# Patient Record
Sex: FEMALE | Race: WHITE | NOT HISPANIC OR LATINO | Employment: OTHER | URBAN - METROPOLITAN AREA
[De-identification: names, ages, dates, MRNs, and addresses within clinical notes are randomized per-mention and may not be internally consistent; named-entity substitution may affect disease eponyms.]

---

## 2023-02-16 PROBLEM — F32.A DEPRESSION: Status: ACTIVE | Noted: 2023-02-16

## 2023-02-16 PROBLEM — R35.0 INCREASED FREQUENCY OF URINATION: Status: ACTIVE | Noted: 2023-02-16

## 2023-02-16 PROBLEM — M79.7 FIBROMYALGIA: Status: ACTIVE | Noted: 2023-02-16

## 2023-02-16 PROBLEM — G89.29 CHRONIC PAIN: Status: ACTIVE | Noted: 2023-02-16

## 2023-02-16 PROBLEM — K59.00 CONSTIPATION: Status: ACTIVE | Noted: 2023-02-16

## 2023-02-16 PROBLEM — E66.812 CLASS 2 SEVERE OBESITY WITH SERIOUS COMORBIDITY AND BODY MASS INDEX (BMI) OF 39.0 TO 39.9 IN ADULT: Status: ACTIVE | Noted: 2023-02-16

## 2023-02-16 PROBLEM — Z96.651 CHRONIC KNEE PAIN AFTER TOTAL REPLACEMENT OF RIGHT KNEE JOINT: Status: ACTIVE | Noted: 2023-02-16

## 2023-02-16 PROBLEM — K11.8 PAROTID NODULE: Status: ACTIVE | Noted: 2023-02-16

## 2023-02-16 PROBLEM — F17.200 NICOTINE DEPENDENCE: Status: ACTIVE | Noted: 2023-02-16

## 2023-02-16 PROBLEM — G47.30 SLEEP APNEA: Status: ACTIVE | Noted: 2023-02-16

## 2023-02-16 PROBLEM — R74.8 ELEVATED LIVER ENZYMES: Status: ACTIVE | Noted: 2023-02-16

## 2023-02-16 PROBLEM — M17.12 PRIMARY OSTEOARTHRITIS OF LEFT KNEE: Status: ACTIVE | Noted: 2023-02-16

## 2023-02-16 PROBLEM — K11.8 MASS OF LEFT PAROTID GLAND: Status: ACTIVE | Noted: 2023-02-16

## 2023-02-16 PROBLEM — E66.01 MORBID OBESITY WITH BMI OF 40.0-44.9, ADULT (MULTI): Status: ACTIVE | Noted: 2023-02-16

## 2023-02-16 PROBLEM — K21.9 GERD (GASTROESOPHAGEAL REFLUX DISEASE): Status: ACTIVE | Noted: 2023-02-16

## 2023-02-16 PROBLEM — J01.90 ACUTE SINUSITIS: Status: ACTIVE | Noted: 2023-02-16

## 2023-02-16 PROBLEM — T84.028A INSTABILITY OF PROSTHETIC KNEE (CMS-HCC): Status: ACTIVE | Noted: 2023-02-16

## 2023-02-16 PROBLEM — M25.561 CHRONIC KNEE PAIN AFTER TOTAL REPLACEMENT OF RIGHT KNEE JOINT: Status: ACTIVE | Noted: 2023-02-16

## 2023-02-16 PROBLEM — R91.1 LUNG NODULE: Status: ACTIVE | Noted: 2023-02-16

## 2023-02-16 PROBLEM — E55.9 VITAMIN D DEFICIENCY: Status: ACTIVE | Noted: 2023-02-16

## 2023-02-16 PROBLEM — D61.818 PANCYTOPENIA (MULTI): Status: ACTIVE | Noted: 2023-02-16

## 2023-02-16 PROBLEM — R53.83 FATIGUE: Status: ACTIVE | Noted: 2023-02-16

## 2023-02-16 PROBLEM — Z96.651 PAIN DUE TO TOTAL RIGHT KNEE REPLACEMENT (CMS-HCC): Status: ACTIVE | Noted: 2023-02-16

## 2023-02-16 PROBLEM — M25.461 EFFUSION OF RIGHT KNEE: Status: ACTIVE | Noted: 2023-02-16

## 2023-02-16 PROBLEM — T84.84XA PAIN DUE TO TOTAL RIGHT KNEE REPLACEMENT (CMS-HCC): Status: ACTIVE | Noted: 2023-02-16

## 2023-02-16 PROBLEM — R05.3 CHRONIC COUGH: Status: ACTIVE | Noted: 2023-02-16

## 2023-02-16 PROBLEM — Z86.16 PERSONAL HISTORY OF COVID-19: Status: ACTIVE | Noted: 2023-02-16

## 2023-02-16 PROBLEM — K76.0 FATTY LIVER DISEASE, NONALCOHOLIC: Status: ACTIVE | Noted: 2023-02-16

## 2023-02-16 PROBLEM — M54.50 LOW BACK PAIN: Status: ACTIVE | Noted: 2023-02-16

## 2023-02-16 PROBLEM — G47.00 INSOMNIA: Status: ACTIVE | Noted: 2023-02-16

## 2023-02-16 PROBLEM — Z96.651 STATUS POST REVISION OF TOTAL REPLACEMENT OF RIGHT KNEE: Status: ACTIVE | Noted: 2023-02-16

## 2023-02-16 PROBLEM — G89.29 CHRONIC KNEE PAIN AFTER TOTAL REPLACEMENT OF RIGHT KNEE JOINT: Status: ACTIVE | Noted: 2023-02-16

## 2023-02-16 PROBLEM — E11.9 TYPE 2 DIABETES MELLITUS (MULTI): Status: ACTIVE | Noted: 2023-02-16

## 2023-02-16 PROBLEM — R51.9 HEADACHE: Status: ACTIVE | Noted: 2023-02-16

## 2023-02-16 PROBLEM — E66.812 CLASS 2 OBESITY WITH BODY MASS INDEX (BMI) OF 38.0 TO 38.9 IN ADULT: Status: ACTIVE | Noted: 2023-02-16

## 2023-02-16 PROBLEM — R29.898 LEG WEAKNESS: Status: ACTIVE | Noted: 2023-02-16

## 2023-02-16 PROBLEM — H92.03 OTALGIA OF BOTH EARS: Status: ACTIVE | Noted: 2023-02-16

## 2023-02-16 PROBLEM — Z96.659 INSTABILITY OF PROSTHETIC KNEE (CMS-HCC): Status: ACTIVE | Noted: 2023-02-16

## 2023-02-16 PROBLEM — N95.1 VAGINAL DRYNESS, MENOPAUSAL: Status: ACTIVE | Noted: 2023-02-16

## 2023-02-16 PROBLEM — N39.3 STRESS INCONTINENCE, FEMALE: Status: ACTIVE | Noted: 2023-02-16

## 2023-02-16 PROBLEM — E66.9 CLASS 2 OBESITY WITH BODY MASS INDEX (BMI) OF 38.0 TO 38.9 IN ADULT: Status: ACTIVE | Noted: 2023-02-16

## 2023-02-16 PROBLEM — E66.01 CLASS 2 SEVERE OBESITY WITH SERIOUS COMORBIDITY AND BODY MASS INDEX (BMI) OF 39.0 TO 39.9 IN ADULT (MULTI): Status: ACTIVE | Noted: 2023-02-16

## 2023-02-16 RX ORDER — ESTRADIOL 0.1 MG/G
CREAM VAGINAL
COMMUNITY
Start: 2022-05-12 | End: 2023-06-05 | Stop reason: SDUPTHER

## 2023-02-16 RX ORDER — LIDOCAINE 50 MG/G
PATCH TOPICAL
COMMUNITY
Start: 2017-11-27 | End: 2023-08-10 | Stop reason: ALTCHOICE

## 2023-02-16 RX ORDER — BLOOD SUGAR DIAGNOSTIC
1 STRIP MISCELLANEOUS
COMMUNITY
Start: 2021-12-10 | End: 2023-11-13

## 2023-02-16 RX ORDER — LANOLIN ALCOHOL/MO/W.PET/CERES
1 CREAM (GRAM) TOPICAL DAILY
COMMUNITY
Start: 2019-05-23

## 2023-02-16 RX ORDER — NAPROXEN 500 MG/1
1 TABLET ORAL EVERY 12 HOURS PRN
COMMUNITY
Start: 2016-10-28 | End: 2023-05-03 | Stop reason: SDUPTHER

## 2023-02-16 RX ORDER — DULAGLUTIDE 0.75 MG/.5ML
0.75 INJECTION, SOLUTION SUBCUTANEOUS
COMMUNITY
Start: 2021-12-10 | End: 2023-04-04

## 2023-02-16 RX ORDER — BREXPIPRAZOLE 3 MG/1
1 TABLET ORAL DAILY
COMMUNITY
Start: 2022-03-25 | End: 2023-06-05 | Stop reason: ALTCHOICE

## 2023-02-16 RX ORDER — CHOLECALCIFEROL (VITAMIN D3) 50 MCG
1 TABLET ORAL DAILY
COMMUNITY
Start: 2019-09-30 | End: 2023-03-29 | Stop reason: SDUPTHER

## 2023-02-16 RX ORDER — CYCLOBENZAPRINE HCL 10 MG
1 TABLET ORAL DAILY PRN
COMMUNITY
Start: 2022-07-20 | End: 2023-05-03 | Stop reason: SDUPTHER

## 2023-02-16 RX ORDER — ALBUTEROL SULFATE 90 UG/1
2 AEROSOL, METERED RESPIRATORY (INHALATION) 4 TIMES DAILY PRN
COMMUNITY
Start: 2019-06-10 | End: 2023-07-21 | Stop reason: SDUPTHER

## 2023-02-16 RX ORDER — MULTIVIT WITH MINERALS/HERBS
1 TABLET ORAL DAILY
COMMUNITY
Start: 2020-04-17 | End: 2023-04-04

## 2023-02-16 RX ORDER — FERROUS SULFATE 325(65) MG
1 TABLET ORAL
COMMUNITY
Start: 2022-09-19 | End: 2023-05-03 | Stop reason: SDUPTHER

## 2023-02-16 RX ORDER — IBUPROFEN 200 MG
1 TABLET ORAL EVERY 24 HOURS
COMMUNITY
End: 2023-04-25

## 2023-02-16 RX ORDER — MULTIVITAMIN
1 TABLET ORAL DAILY
COMMUNITY
Start: 2019-12-02 | End: 2023-09-21 | Stop reason: SDUPTHER

## 2023-02-16 RX ORDER — ISOPROPYL ALCOHOL 70 ML/100ML
SWAB TOPICAL
COMMUNITY

## 2023-02-16 RX ORDER — TRAZODONE HYDROCHLORIDE 50 MG/1
2 TABLET ORAL NIGHTLY
COMMUNITY
Start: 2017-11-03 | End: 2023-04-04

## 2023-02-16 RX ORDER — ESCITALOPRAM OXALATE 10 MG/1
1 TABLET ORAL DAILY
COMMUNITY
End: 2023-06-05 | Stop reason: DRUGHIGH

## 2023-02-16 RX ORDER — LORATADINE 10 MG/1
1 TABLET ORAL DAILY
COMMUNITY
Start: 2016-01-07 | End: 2023-07-21 | Stop reason: SDUPTHER

## 2023-02-16 RX ORDER — FLUTICASONE PROPIONATE 50 MCG
2 SPRAY, SUSPENSION (ML) NASAL DAILY
COMMUNITY
Start: 2017-11-30 | End: 2023-04-11

## 2023-02-16 RX ORDER — DIPHENHYDRAMINE HCL 25 MG
CAPSULE ORAL
COMMUNITY
End: 2023-03-29 | Stop reason: SDUPTHER

## 2023-02-16 RX ORDER — FOLIC ACID 1 MG/1
1 TABLET ORAL DAILY
COMMUNITY
Start: 2022-09-19 | End: 2023-04-04

## 2023-02-16 RX ORDER — POLYETHYLENE GLYCOL 3350 17 G/17G
POWDER, FOR SOLUTION ORAL DAILY
COMMUNITY

## 2023-02-16 RX ORDER — TRAMADOL HYDROCHLORIDE 50 MG/1
1 TABLET ORAL EVERY 8 HOURS PRN
COMMUNITY
Start: 2022-10-09 | End: 2023-06-05 | Stop reason: SDUPTHER

## 2023-02-16 RX ORDER — ACETAMINOPHEN 500 MG
2 TABLET ORAL EVERY 8 HOURS PRN
COMMUNITY
Start: 2022-07-20 | End: 2023-04-25

## 2023-02-16 RX ORDER — METFORMIN HYDROCHLORIDE 500 MG/1
2 TABLET ORAL 2 TIMES DAILY
COMMUNITY
Start: 2021-08-17 | End: 2023-03-29 | Stop reason: SDUPTHER

## 2023-02-16 RX ORDER — SIMETHICONE 80 MG
1 TABLET,CHEWABLE ORAL 4 TIMES DAILY
COMMUNITY
Start: 2021-12-27 | End: 2023-08-10 | Stop reason: ALTCHOICE

## 2023-02-16 RX ORDER — INSULIN PUMP SYRINGE, 3 ML
EACH MISCELLANEOUS
COMMUNITY

## 2023-02-16 RX ORDER — ASPIRIN 81 MG
1 TABLET, DELAYED RELEASE (ENTERIC COATED) ORAL 2 TIMES DAILY PRN
COMMUNITY
Start: 2021-06-17 | End: 2023-09-21 | Stop reason: SDUPTHER

## 2023-02-16 RX ORDER — TOPIRAMATE 100 MG/1
1 TABLET, FILM COATED ORAL DAILY
COMMUNITY
Start: 2018-05-08 | End: 2023-06-05 | Stop reason: ALTCHOICE

## 2023-02-16 RX ORDER — GABAPENTIN 300 MG/1
2 CAPSULE ORAL 3 TIMES DAILY
COMMUNITY
Start: 2018-08-03 | End: 2023-03-29 | Stop reason: SDUPTHER

## 2023-03-29 ENCOUNTER — APPOINTMENT (OUTPATIENT)
Dept: PRIMARY CARE | Facility: CLINIC | Age: 58
End: 2023-03-29
Payer: COMMERCIAL

## 2023-03-29 DIAGNOSIS — F17.219 CIGARETTE NICOTINE DEPENDENCE WITH NICOTINE-INDUCED DISORDER: ICD-10-CM

## 2023-03-29 DIAGNOSIS — G89.29 CHRONIC LOW BACK PAIN, UNSPECIFIED BACK PAIN LATERALITY, UNSPECIFIED WHETHER SCIATICA PRESENT: ICD-10-CM

## 2023-03-29 DIAGNOSIS — M54.50 CHRONIC LOW BACK PAIN, UNSPECIFIED BACK PAIN LATERALITY, UNSPECIFIED WHETHER SCIATICA PRESENT: ICD-10-CM

## 2023-03-29 DIAGNOSIS — E11.9 TYPE 2 DIABETES MELLITUS WITHOUT COMPLICATION, WITHOUT LONG-TERM CURRENT USE OF INSULIN (MULTI): Primary | ICD-10-CM

## 2023-03-29 DIAGNOSIS — E55.9 VITAMIN D DEFICIENCY: ICD-10-CM

## 2023-03-29 RX ORDER — METFORMIN HYDROCHLORIDE 500 MG/1
1000 TABLET ORAL 2 TIMES DAILY
Qty: 180 TABLET | Refills: 1 | Status: SHIPPED | OUTPATIENT
Start: 2023-03-29 | End: 2023-05-03 | Stop reason: SDUPTHER

## 2023-03-29 RX ORDER — CHOLECALCIFEROL (VITAMIN D3) 50 MCG
50 TABLET ORAL DAILY
Qty: 90 TABLET | Refills: 1 | Status: SHIPPED | OUTPATIENT
Start: 2023-03-29 | End: 2023-05-03 | Stop reason: SDUPTHER

## 2023-03-29 RX ORDER — GABAPENTIN 300 MG/1
600 CAPSULE ORAL 3 TIMES DAILY
Qty: 270 CAPSULE | Refills: 2 | Status: SHIPPED | OUTPATIENT
Start: 2023-03-29 | End: 2023-05-03 | Stop reason: SDUPTHER

## 2023-03-29 RX ORDER — DIPHENHYDRAMINE HCL 25 MG
4 CAPSULE ORAL AS NEEDED
Qty: 100 EACH | Refills: 1 | Status: SHIPPED | OUTPATIENT
Start: 2023-03-29 | End: 2023-10-03

## 2023-04-11 DIAGNOSIS — J30.9 ALLERGIC RHINITIS, UNSPECIFIED SEASONALITY, UNSPECIFIED TRIGGER: Primary | ICD-10-CM

## 2023-04-11 RX ORDER — FLUTICASONE PROPIONATE 50 MCG
SPRAY, SUSPENSION (ML) NASAL
Qty: 16 G | Refills: 3 | Status: SHIPPED | OUTPATIENT
Start: 2023-04-11 | End: 2023-08-15

## 2023-04-25 DIAGNOSIS — Z72.0 TOBACCO ABUSE: Primary | ICD-10-CM

## 2023-04-25 DIAGNOSIS — G89.29 OTHER CHRONIC PAIN: Primary | ICD-10-CM

## 2023-04-25 RX ORDER — IBUPROFEN 200 MG
TABLET ORAL
Qty: 28 PATCH | Refills: 1 | Status: SHIPPED | OUTPATIENT
Start: 2023-04-25 | End: 2023-06-02

## 2023-04-25 RX ORDER — ACETAMINOPHEN 500 MG
TABLET ORAL
Qty: 180 TABLET | Refills: 1 | Status: SHIPPED | OUTPATIENT
Start: 2023-04-25 | End: 2023-05-03 | Stop reason: SDUPTHER

## 2023-05-03 ENCOUNTER — OFFICE VISIT (OUTPATIENT)
Dept: PRIMARY CARE | Facility: CLINIC | Age: 58
End: 2023-05-03
Payer: COMMERCIAL

## 2023-05-03 VITALS
RESPIRATION RATE: 16 BRPM | SYSTOLIC BLOOD PRESSURE: 128 MMHG | BODY MASS INDEX: 39.94 KG/M2 | TEMPERATURE: 97.1 F | HEART RATE: 82 BPM | DIASTOLIC BLOOD PRESSURE: 70 MMHG | WEIGHT: 255 LBS | OXYGEN SATURATION: 95 %

## 2023-05-03 DIAGNOSIS — E11.9 TYPE 2 DIABETES MELLITUS WITHOUT COMPLICATION, WITHOUT LONG-TERM CURRENT USE OF INSULIN (MULTI): ICD-10-CM

## 2023-05-03 DIAGNOSIS — G47.30 SLEEP APNEA, UNSPECIFIED TYPE: ICD-10-CM

## 2023-05-03 DIAGNOSIS — G89.29 OTHER CHRONIC PAIN: ICD-10-CM

## 2023-05-03 DIAGNOSIS — Z12.31 ENCOUNTER FOR SCREENING MAMMOGRAM FOR MALIGNANT NEOPLASM OF BREAST: Primary | ICD-10-CM

## 2023-05-03 DIAGNOSIS — E55.9 VITAMIN D DEFICIENCY: ICD-10-CM

## 2023-05-03 DIAGNOSIS — M54.50 CHRONIC LOW BACK PAIN, UNSPECIFIED BACK PAIN LATERALITY, UNSPECIFIED WHETHER SCIATICA PRESENT: ICD-10-CM

## 2023-05-03 DIAGNOSIS — R14.3 EXCESSIVE FLATUS: ICD-10-CM

## 2023-05-03 DIAGNOSIS — G89.29 CHRONIC LOW BACK PAIN, UNSPECIFIED BACK PAIN LATERALITY, UNSPECIFIED WHETHER SCIATICA PRESENT: ICD-10-CM

## 2023-05-03 LAB
ALANINE AMINOTRANSFERASE (SGPT) (U/L) IN SER/PLAS: 30 U/L (ref 7–45)
ALBUMIN (G/DL) IN SER/PLAS: 3.5 G/DL (ref 3.4–5)
ALKALINE PHOSPHATASE (U/L) IN SER/PLAS: 65 U/L (ref 33–110)
ANION GAP IN SER/PLAS: 11 MMOL/L (ref 10–20)
ASPARTATE AMINOTRANSFERASE (SGOT) (U/L) IN SER/PLAS: 28 U/L (ref 9–39)
BASOPHILS (10*3/UL) IN BLOOD BY AUTOMATED COUNT: 0.04 X10E9/L (ref 0–0.1)
BASOPHILS/100 LEUKOCYTES IN BLOOD BY AUTOMATED COUNT: 0.6 % (ref 0–2)
BILIRUBIN TOTAL (MG/DL) IN SER/PLAS: 0.4 MG/DL (ref 0–1.2)
CALCIUM (MG/DL) IN SER/PLAS: 9.3 MG/DL (ref 8.6–10.3)
CARBON DIOXIDE, TOTAL (MMOL/L) IN SER/PLAS: 33 MMOL/L (ref 21–32)
CHLORIDE (MMOL/L) IN SER/PLAS: 99 MMOL/L (ref 98–107)
CHOLESTEROL (MG/DL) IN SER/PLAS: 149 MG/DL (ref 0–199)
CHOLESTEROL IN HDL (MG/DL) IN SER/PLAS: 47.2 MG/DL
CHOLESTEROL/HDL RATIO: 3.2
CREATININE (MG/DL) IN SER/PLAS: 0.81 MG/DL (ref 0.5–1.05)
EOSINOPHILS (10*3/UL) IN BLOOD BY AUTOMATED COUNT: 0.23 X10E9/L (ref 0–0.7)
EOSINOPHILS/100 LEUKOCYTES IN BLOOD BY AUTOMATED COUNT: 3.5 % (ref 0–6)
ERYTHROCYTE DISTRIBUTION WIDTH (RATIO) BY AUTOMATED COUNT: 15 % (ref 11.5–14.5)
ERYTHROCYTE MEAN CORPUSCULAR HEMOGLOBIN CONCENTRATION (G/DL) BY AUTOMATED: 33.4 G/DL (ref 32–36)
ERYTHROCYTE MEAN CORPUSCULAR VOLUME (FL) BY AUTOMATED COUNT: 93 FL (ref 80–100)
ERYTHROCYTES (10*6/UL) IN BLOOD BY AUTOMATED COUNT: 3.97 X10E12/L (ref 4–5.2)
ESTIMATED AVERAGE GLUCOSE FOR HBA1C: 143 MG/DL
GFR FEMALE: 85 ML/MIN/1.73M2
GLUCOSE (MG/DL) IN SER/PLAS: 141 MG/DL (ref 74–99)
HEMATOCRIT (%) IN BLOOD BY AUTOMATED COUNT: 36.8 % (ref 36–46)
HEMOGLOBIN (G/DL) IN BLOOD: 12.3 G/DL (ref 12–16)
HEMOGLOBIN A1C/HEMOGLOBIN TOTAL IN BLOOD: 6.6 %
IMMATURE GRANULOCYTES/100 LEUKOCYTES IN BLOOD BY AUTOMATED COUNT: 1.7 % (ref 0–0.9)
LDL: 76 MG/DL (ref 0–99)
LEUKOCYTES (10*3/UL) IN BLOOD BY AUTOMATED COUNT: 6.6 X10E9/L (ref 4.4–11.3)
LYMPHOCYTES (10*3/UL) IN BLOOD BY AUTOMATED COUNT: 1.59 X10E9/L (ref 1.2–4.8)
LYMPHOCYTES/100 LEUKOCYTES IN BLOOD BY AUTOMATED COUNT: 24.1 % (ref 13–44)
MONOCYTES (10*3/UL) IN BLOOD BY AUTOMATED COUNT: 0.67 X10E9/L (ref 0.1–1)
MONOCYTES/100 LEUKOCYTES IN BLOOD BY AUTOMATED COUNT: 10.1 % (ref 2–10)
NEUTROPHILS (10*3/UL) IN BLOOD BY AUTOMATED COUNT: 3.97 X10E9/L (ref 1.2–7.7)
NEUTROPHILS/100 LEUKOCYTES IN BLOOD BY AUTOMATED COUNT: 60 % (ref 40–80)
PLATELETS (10*3/UL) IN BLOOD AUTOMATED COUNT: 95 X10E9/L (ref 150–450)
POTASSIUM (MMOL/L) IN SER/PLAS: 3.5 MMOL/L (ref 3.5–5.3)
PROTEIN TOTAL: 6.6 G/DL (ref 6.4–8.2)
SODIUM (MMOL/L) IN SER/PLAS: 139 MMOL/L (ref 136–145)
THYROTROPIN (MIU/L) IN SER/PLAS BY DETECTION LIMIT <= 0.05 MIU/L: 3.89 MIU/L (ref 0.44–3.98)
TRIGLYCERIDE (MG/DL) IN SER/PLAS: 129 MG/DL (ref 0–149)
UREA NITROGEN (MG/DL) IN SER/PLAS: 17 MG/DL (ref 6–23)
VLDL: 26 MG/DL (ref 0–40)

## 2023-05-03 PROCEDURE — 99214 OFFICE O/P EST MOD 30 MIN: CPT | Performed by: INTERNAL MEDICINE

## 2023-05-03 PROCEDURE — 3078F DIAST BP <80 MM HG: CPT | Performed by: INTERNAL MEDICINE

## 2023-05-03 PROCEDURE — 3051F HG A1C>EQUAL 7.0%<8.0%: CPT | Performed by: INTERNAL MEDICINE

## 2023-05-03 PROCEDURE — 3074F SYST BP LT 130 MM HG: CPT | Performed by: INTERNAL MEDICINE

## 2023-05-03 RX ORDER — GABAPENTIN 300 MG/1
600 CAPSULE ORAL 3 TIMES DAILY
Qty: 270 CAPSULE | Refills: 2 | Status: SHIPPED | OUTPATIENT
Start: 2023-05-03 | End: 2024-01-17

## 2023-05-03 RX ORDER — NAPROXEN 500 MG/1
500 TABLET ORAL EVERY 12 HOURS PRN
Qty: 90 TABLET | Refills: 1 | Status: SHIPPED | OUTPATIENT
Start: 2023-05-03 | End: 2023-07-06

## 2023-05-03 RX ORDER — FERROUS SULFATE 325(65) MG
1 TABLET ORAL
Qty: 90 TABLET | Refills: 2 | Status: SHIPPED | OUTPATIENT
Start: 2023-05-03 | End: 2023-10-16

## 2023-05-03 RX ORDER — CHOLECALCIFEROL (VITAMIN D3) 50 MCG
50 TABLET ORAL DAILY
Qty: 90 TABLET | Refills: 1 | Status: SHIPPED | OUTPATIENT
Start: 2023-05-03 | End: 2023-10-24

## 2023-05-03 RX ORDER — CYCLOBENZAPRINE HCL 10 MG
10 TABLET ORAL DAILY PRN
Qty: 90 TABLET | Refills: 2 | Status: SHIPPED | OUTPATIENT
Start: 2023-05-03 | End: 2023-11-02 | Stop reason: ALTCHOICE

## 2023-05-03 RX ORDER — METFORMIN HYDROCHLORIDE 500 MG/1
1000 TABLET ORAL 2 TIMES DAILY
Qty: 180 TABLET | Refills: 1 | Status: SHIPPED | OUTPATIENT
Start: 2023-05-03 | End: 2023-09-26

## 2023-05-03 RX ORDER — ACETAMINOPHEN 500 MG
TABLET ORAL
Qty: 180 TABLET | Refills: 1 | Status: SHIPPED | OUTPATIENT
Start: 2023-05-03 | End: 2023-07-21 | Stop reason: SDUPTHER

## 2023-05-03 ASSESSMENT — PATIENT HEALTH QUESTIONNAIRE - PHQ9
1. LITTLE INTEREST OR PLEASURE IN DOING THINGS: NOT AT ALL
SUM OF ALL RESPONSES TO PHQ9 QUESTIONS 1 AND 2: 0
2. FEELING DOWN, DEPRESSED OR HOPELESS: NOT AT ALL

## 2023-05-03 ASSESSMENT — ENCOUNTER SYMPTOMS
APNEA: 1
FATIGUE: 1
ABDOMINAL PAIN: 1

## 2023-05-03 NOTE — PROGRESS NOTES
Patient here for stomach issues and restless legs     Subjective   Patient ID: Alejandra Houston is a 57 y.o. female who presents for Abdominal Pain and Restless Legs.  She is accompanied by her  who offers some of the history.    The patient reports recent onset of excessive flatus particularly while walking.  This is affecting her ability to go outside comfortably.  She has tried taking Gas-X with no relief.  Her last colonoscopy was in 5/2019 and will be due for a repeat 5/20247004-3120.  She otherwise denies any bowel problems.     The patient reports severe throbbing hip pain radiating to the lower limb since 3/2023.  At times, she has to ask her son to help her out of bed due to pain.  She went to the ED on 4/27/2023 where DVT was ruled out.  She has an upcoming appointment on 5/11/2023 with  from Orthopedics next week for suspected Sciatica. The patient has been taking Naproxen 500mg BID, and he inquires whether she would be eligible for a scooter to help with ambulation.      The patient notes worsening restless leg syndrome that wakes her up from sleep.     The patient requests a referral to Sleep Medicine for sleep apnea.    The patient mentions sexual dysfunction and inquires about possible management options.    Abdominal Pain    Restless Legs  Associated symptoms include abdominal pain and fatigue.       Review of Systems   Constitutional:  Positive for fatigue.   Respiratory:  Positive for apnea.    Gastrointestinal:  Positive for abdominal pain.        Positive for excessive flatus.   Genitourinary:         Positive for sexual dysfunction.   Musculoskeletal:         Positive for severe hip pain.   Neurological:         Positive for restless legs syndrome.     Objective   Physical Exam  Constitutional:       Appearance: Normal appearance.   Cardiovascular:      Rate and Rhythm: Normal rate and regular rhythm.      Heart sounds: Normal heart sounds.   Pulmonary:      Effort: Pulmonary effort  is normal.      Breath sounds: Normal breath sounds.   Abdominal:      General: Bowel sounds are normal.      Palpations: Abdomen is soft.      Tenderness: There is no abdominal tenderness.   Skin:     General: Skin is warm and dry.   Neurological:      General: No focal deficit present.      Mental Status: She is alert and oriented to person, place, and time. Mental status is at baseline.   Psychiatric:         Mood and Affect: Mood normal.         Behavior: Behavior normal.       Assessment/Plan   Problem List Items Addressed This Visit       Chronic pain    Relevant Medications    acetaminophen (Tylenol) 500 mg tablet    Low back pain    Relevant Medications    naproxen (Naprosyn) 500 mg tablet    ferrous sulfate 325 (65 Fe) MG tablet    cyclobenzaprine (Flexeril) 10 mg tablet    gabapentin (Neurontin) 300 mg capsule    Type 2 diabetes mellitus (CMS/HCC)    Relevant Medications    metFORMIN (Glucophage) 500 mg tablet    Vitamin D deficiency    Relevant Medications    cholecalciferol (Vitamin D-3) 50 MCG (2000 UT) tablet    Sleep apnea    Relevant Orders    Referral to Adult Sleep Medicine     Other Visit Diagnoses       Encounter for screening mammogram for malignant neoplasm of breast    -  Primary    Relevant Orders    BI mammo bilateral screening tomosynthesis    Excessive flatus        Relevant Orders    Referral to Gastroenterology            IMPRESSION/PLAN :      Excessive Flatus  -Advised patient to try Metamucil supplementation as 0.5 teaspoon in a glass of water daily for the first week to be increased to a full teaspoon thereafter.  Last colonoscopy 5/2019, repeat due 5/2024.  Ordered referral to Gastroenterology.    Sleep Apnea  - Ordered referral to Adult Sleep Medicine.    Diabetes II   - Last HbA1c 7.4% 7/2022. currently maintained on metformin 500mg 2 tabs BID, and Trulicity 0.75mg/0.5mL 1 pen weekly. Previously on glimperide 2mg QD. Ordered HbA1c.    Sexual Dysfunction  -Advised patient to try  lubricant over the counter.  Call the clinic if symptoms persist or worsen.    Possible Restless Legs Syndrome  -Likely a complication of hip pain and lumbar spondylosis.  Patient has an upcoming appointment with  on 5/11/2023.  Call the clinic if symptoms persist or worsen.    /70 in the office today.     Fatigue   - Advised to discontinue hydroxyzine 50mg QD. Ordered TSH.     Low Back Pain/ leg weakness   - Xray of LS spine showed multilevel spondylosis and an old-appearing coccygeal deformity. Has upcoming appointment with  5/11/2023.     Smoking History   - CT Chest 8/2021 showed few less than 4 mm nonspecific pulmonary nodules, likely benign. CT Chest with Contrast for PE 4/2023 showed Persistent groundglass opacities in the bilateral lower lobes, posterior left upper lobe and new groundglass opacities in the anterior left upper lobe may be infectious/inflammatory.      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 Underwent complex revision of right TKA. Previously following with  in Orthopedic Surgery, currently following .     Health Maintenance   -  Routine labs completed and results pending. Last Mammogram 5/2022, ordered repeat for 2023. Last colonoscopy 5/2019, repeat due 5/20248683-6163.  Advised patient to receive her second dose of Shingrix series vaccine through the pharmacy and discussed adverse effects.  Advised patient to request a medical record release for employment purposes.     Follow-up according to routine health maintenance, call sooner if needed.        Scribe Attestation  By signing my name below, I, Pedro Richard   attest that this documentation has been prepared under the direction and in the presence of Jhoan Eller DO.

## 2023-05-04 ENCOUNTER — TELEPHONE (OUTPATIENT)
Dept: PRIMARY CARE | Facility: CLINIC | Age: 58
End: 2023-05-04
Payer: COMMERCIAL

## 2023-05-04 DIAGNOSIS — M79.662 PAIN OF LEFT CALF: Primary | ICD-10-CM

## 2023-05-04 DIAGNOSIS — D69.6 THROMBOCYTOPENIA (CMS-HCC): Primary | ICD-10-CM

## 2023-05-04 NOTE — TELEPHONE ENCOUNTER
Patient seen yesterday and forgot to mention that her left ankle/calf area has been swollen. Denies redness and it is not warm/hot to the touch.     Patient wants to know if symptoms could be associated with her sciatica. Wants to know what to do for it.     Please advise....

## 2023-05-04 NOTE — TELEPHONE ENCOUNTER
Patient was seen yesterday- advised a cream was supposed to be called in. Please advise pharmacy didn't receive.

## 2023-05-04 NOTE — TELEPHONE ENCOUNTER
I put an ultrasound order in for her.  Please have her set up ASAP.  If redness/warm develops or if worsening- needs ER.

## 2023-05-15 ENCOUNTER — TELEPHONE (OUTPATIENT)
Dept: PRIMARY CARE | Facility: CLINIC | Age: 58
End: 2023-05-15
Payer: COMMERCIAL

## 2023-05-15 DIAGNOSIS — M79.89 LEG SWELLING: Primary | ICD-10-CM

## 2023-05-15 RX ORDER — CEPHALEXIN 500 MG/1
500 CAPSULE ORAL 3 TIMES DAILY
Qty: 30 CAPSULE | Refills: 0 | Status: SHIPPED | OUTPATIENT
Start: 2023-05-15 | End: 2023-05-25

## 2023-05-15 NOTE — TELEPHONE ENCOUNTER
Thanks Valerie- would recommend she see one of our vascular specialists.  I will enter the referral.  Please have her call the  to set up.  I will also start her on an antibiotic to treat possible cellulitis.  If things worsen- please let us know.

## 2023-05-15 NOTE — TELEPHONE ENCOUNTER
----- Message from Jhoan Eller DO sent at 5/15/2023  4:07 PM EDT -----  Please let her know that her ultrasound looks OK- no signs of any blood clots in the legs.

## 2023-05-15 NOTE — TELEPHONE ENCOUNTER
Patient was given results but she still is having swelling, redness and pain and wants to know what could be going on.

## 2023-05-16 ENCOUNTER — TELEPHONE (OUTPATIENT)
Dept: PRIMARY CARE | Facility: CLINIC | Age: 58
End: 2023-05-16
Payer: COMMERCIAL

## 2023-05-16 NOTE — TELEPHONE ENCOUNTER
I entered a referral for vascular medicine to see why her legs are swelling.  I also sent her in an antibiotic to treat as a skin infection to see if this helps ([facility]'s pharmacy).  She did not have a blood clot on the ultrasound.

## 2023-05-16 NOTE — TELEPHONE ENCOUNTER
Pt received a call regarding her legs however can't remember what was said and would like a call back to advise what is wrong with her legs please call

## 2023-05-24 ENCOUNTER — TELEPHONE (OUTPATIENT)
Dept: PRIMARY CARE | Facility: CLINIC | Age: 58
End: 2023-05-24
Payer: COMMERCIAL

## 2023-05-24 DIAGNOSIS — M79.606 PAIN OF LOWER EXTREMITY, UNSPECIFIED LATERALITY: Primary | ICD-10-CM

## 2023-05-24 NOTE — TELEPHONE ENCOUNTER
Would need to see pain management- I can enter a referral if needed.  Can also see me for follow-up if needed.

## 2023-05-24 NOTE — TELEPHONE ENCOUNTER
Pt stated she was in last month - had discussed leg pain in both legs - pain is getting worse - would like a call back to discuss pain medication. Can hardly walk.     Pt did have appointment with Vascular Dr last week - no pain medication prescribed though.

## 2023-06-02 ENCOUNTER — TELEPHONE (OUTPATIENT)
Dept: PRIMARY CARE | Facility: CLINIC | Age: 58
End: 2023-06-02
Payer: COMMERCIAL

## 2023-06-02 DIAGNOSIS — E11.9 TYPE 2 DIABETES MELLITUS WITHOUT COMPLICATION, WITHOUT LONG-TERM CURRENT USE OF INSULIN (MULTI): ICD-10-CM

## 2023-06-02 DIAGNOSIS — Z72.0 TOBACCO ABUSE: ICD-10-CM

## 2023-06-02 RX ORDER — FOLIC ACID 1 MG/1
TABLET ORAL
Qty: 30 TABLET | Refills: 1 | Status: SHIPPED | OUTPATIENT
Start: 2023-06-02 | End: 2023-07-24

## 2023-06-02 RX ORDER — IBUPROFEN 200 MG
TABLET ORAL
Qty: 28 PATCH | Refills: 1 | Status: SHIPPED | OUTPATIENT
Start: 2023-06-02 | End: 2023-07-24

## 2023-06-02 RX ORDER — DULAGLUTIDE 0.75 MG/.5ML
0.75 INJECTION, SOLUTION SUBCUTANEOUS
Qty: 2 ML | Refills: 1 | Status: SHIPPED | OUTPATIENT
Start: 2023-06-02 | End: 2023-07-24

## 2023-06-05 ENCOUNTER — OFFICE VISIT (OUTPATIENT)
Dept: PRIMARY CARE | Facility: CLINIC | Age: 58
End: 2023-06-05
Payer: COMMERCIAL

## 2023-06-05 VITALS
HEART RATE: 80 BPM | BODY MASS INDEX: 37.9 KG/M2 | WEIGHT: 242 LBS | RESPIRATION RATE: 16 BRPM | TEMPERATURE: 97.3 F | SYSTOLIC BLOOD PRESSURE: 128 MMHG | DIASTOLIC BLOOD PRESSURE: 70 MMHG | OXYGEN SATURATION: 95 %

## 2023-06-05 DIAGNOSIS — N95.1 VAGINAL DRYNESS, MENOPAUSAL: ICD-10-CM

## 2023-06-05 DIAGNOSIS — M54.50 LOW BACK PAIN, UNSPECIFIED BACK PAIN LATERALITY, UNSPECIFIED CHRONICITY, UNSPECIFIED WHETHER SCIATICA PRESENT: Primary | ICD-10-CM

## 2023-06-05 PROCEDURE — 3074F SYST BP LT 130 MM HG: CPT | Performed by: INTERNAL MEDICINE

## 2023-06-05 PROCEDURE — 99214 OFFICE O/P EST MOD 30 MIN: CPT | Performed by: INTERNAL MEDICINE

## 2023-06-05 PROCEDURE — 3078F DIAST BP <80 MM HG: CPT | Performed by: INTERNAL MEDICINE

## 2023-06-05 PROCEDURE — 3044F HG A1C LEVEL LT 7.0%: CPT | Performed by: INTERNAL MEDICINE

## 2023-06-05 RX ORDER — TRAMADOL HYDROCHLORIDE 50 MG/1
50 TABLET ORAL EVERY 8 HOURS PRN
Qty: 15 TABLET | Refills: 0 | Status: SHIPPED | OUTPATIENT
Start: 2023-06-05 | End: 2023-06-16 | Stop reason: SDUPTHER

## 2023-06-05 RX ORDER — ESTRADIOL 0.1 MG/G
CREAM VAGINAL
Qty: 42.5 G | Refills: 0 | Status: SHIPPED | OUTPATIENT
Start: 2023-06-05 | End: 2023-11-29 | Stop reason: SDUPTHER

## 2023-06-05 RX ORDER — ESCITALOPRAM OXALATE 10 MG/1
20 TABLET ORAL DAILY
Qty: 60 TABLET | Refills: 3 | Status: SHIPPED | OUTPATIENT
Start: 2023-06-05 | End: 2023-09-21 | Stop reason: ALTCHOICE

## 2023-06-05 ASSESSMENT — ENCOUNTER SYMPTOMS: LEG PAIN: 1

## 2023-06-05 NOTE — PROGRESS NOTES
"Subjective   Patient ID: Alejandra Houston is a 57 y.o. female who presents for Leg Pain.  Patient here today for follow-up.  She is accompanied by her .  Since hip pain eval in ER- has noted left  leg swelling and toe pain.  Feels like she \"banged her toes\" and it is waking her up in the middle of the night.  Has been present for a month a half now.  Nothing seems to help.  All the way from low back down to toes.  Saw vascular as well as medical spine as well as ortho.    Has an MRI tomorrow of her low back.    Occasionally getting some chest discomfort.  Happening every other day.  Feels like it is tightening.  No trouble breathing or shortness of breath.  If she moves around more she can reproduce the pain.  Can pinpoint the spot in her chest and when she pushes she gets more pain.    She is very fatigued.  She is trying to get an earlier appointment with sleep medicine because she is off CPAP and feels like this is causing her symptoms.  Prior to this was seeing sleep medicine at [facility].    Is off her Topamax and Rexulti now.  Feels like she needs some help for her mood.  No SI or HI.        Leg Pain         Review of Systems   All other systems reviewed and are negative.      Objective   Visit Vitals  /70   Pulse 80   Temp 36.3 °C (97.3 °F)   Resp 16   Wt 110 kg (242 lb)   SpO2 95%   BMI 37.90 kg/m²   Smoking Status Every Day   BSA 2.28 m²     Physical Exam  Constitutional:       Appearance: Normal appearance.   Cardiovascular:      Rate and Rhythm: Normal rate and regular rhythm.      Heart sounds: Normal heart sounds.   Pulmonary:      Effort: Pulmonary effort is normal.      Breath sounds: Normal breath sounds.   Abdominal:      General: Bowel sounds are normal.      Palpations: Abdomen is soft.      Tenderness: There is no abdominal tenderness.   Musculoskeletal:      Comments: LLE swelling and some tenderness throughout as well   Skin:     General: Skin is warm and dry.   Neurological:      " General: No focal deficit present.      Mental Status: She is alert and oriented to person, place, and time. Mental status is at baseline.   Psychiatric:         Mood and Affect: Mood normal.         Behavior: Behavior normal.           Assessment/Plan   Problem List Items Addressed This Visit       Low back pain - Primary    Relevant Medications    traMADol (Ultram) 50 mg tablet    Vaginal dryness, menopausal    Relevant Medications    estradiol (Estrace) 0.1 MG/GM vaginal cream     /70 in the office today.    LLE swelling- has seen vascular.  Looks like they suspect this is related to low back issues which I agree with.  Needs MRI which she has tomorrow.  She is on gabapentin.  I did give her a few Tramadol to have on hand to use VERY sparingly.  Regarding her leg swelling- vascular ordered an ultrasound- I asked her to touch base with their office to see if she needs this repeated as we did this at our last visit and it was negative.  She will call.      Vaginal dryness- I renewed her Estradiol cream.    Constipation - Prescribed polyethylene glycol 3350 17 gm oral packet as 1 packet in 8 ounces of liquid QD.    Depression - Following with Psychiatry.  She is now longer on Rexulti or Topamax.  We increased her Lexapro to 20 mg daily as she feels like she needs some extra support during this issue with her LBP.  No SI or HI.      Sleep Apnea - Has upcoming appointment with sleep medicine in August.  She will attempt to schedule sooner as she is significantly symptomatic now.    Low Back Pain/ leg weakness - As above.  She saw Dr. Elizabeth.    Smoking History - CT Chest 8/2021 showed few less than 4 mm nonspecific pulmonary nodules, likely benign. Ordered repeat CT Chest Low Dose per Radiology recommendations.    Right Knee Pain - s/p failed TKA with coronal and sagittal and plane instability 10/7/2022. Underwent complex revision of right TKA. Following with  in Orthopedic Surgery.    Diabetes II -  Last HbA1c 6.6. currently maintained on metformin 500mg 2 tabs BID, and Trulicity 0.75mg/0.5mL 1 pen weekly. Previously on glimperide 2mg QD. Ordered HbA1c.    Thrombocytopenia- she is going to see Heme.  Has seen in past but lost to follow-up.  Stable- but platelete count 92 on recent CBC.      Chest pain is MSK- sounds like costochondritis as noted in HPI.  Monitor.      Health Maintenance - Last Mammogram 5/2022. Last colonoscopy 5/2019, repeat due 5/20242300-1380.    Follow-up according to routine health maintenance, call sooner if needed.           Jhoan Eller, DO

## 2023-06-06 ENCOUNTER — TELEPHONE (OUTPATIENT)
Dept: PRIMARY CARE | Facility: CLINIC | Age: 58
End: 2023-06-06
Payer: COMMERCIAL

## 2023-06-06 NOTE — TELEPHONE ENCOUNTER
She should touch base with his office and/or Dr. Elizabeth.  If denied by insurance- Dr. Kaminski can address.

## 2023-06-06 NOTE — TELEPHONE ENCOUNTER
Pt had an order for MRI from Dr Nix - pt went to apt but it was cancelled by insurance/denied- Pt does not know if Dr Nix will place another order/or call insurance because he left for a new practice and she will no longer be seeing him. Pt stated she talked to Dr Eller about the MRI - wants to know if he can place a new order/help with insurance.

## 2023-06-06 NOTE — TELEPHONE ENCOUNTER
Advised patient to call the office that ordered the test to see what they would like to do next tn

## 2023-06-09 ENCOUNTER — TELEPHONE (OUTPATIENT)
Dept: PRIMARY CARE | Facility: CLINIC | Age: 58
End: 2023-06-09
Payer: COMMERCIAL

## 2023-06-09 DIAGNOSIS — M54.32 SCIATICA OF LEFT SIDE: Primary | ICD-10-CM

## 2023-06-09 NOTE — TELEPHONE ENCOUNTER
Patient left a message- advised that Dr Casanova's office told her MRI was canceled that she needs to do 6 weeks of PT. She is unable to do this because of no transportation and her legs hurt to much to do PT. She would like to know what her next step is?

## 2023-06-09 NOTE — TELEPHONE ENCOUNTER
Is her pain worsening?  Is she having more trouble ambulating?  If so I can try to order- but they still may make her do physical therapy.

## 2023-06-12 ENCOUNTER — TELEPHONE (OUTPATIENT)
Dept: PRIMARY CARE | Facility: CLINIC | Age: 58
End: 2023-06-12
Payer: COMMERCIAL

## 2023-06-16 ENCOUNTER — OFFICE VISIT (OUTPATIENT)
Dept: PRIMARY CARE | Facility: CLINIC | Age: 58
End: 2023-06-16
Payer: COMMERCIAL

## 2023-06-16 VITALS
SYSTOLIC BLOOD PRESSURE: 128 MMHG | DIASTOLIC BLOOD PRESSURE: 80 MMHG | TEMPERATURE: 97.2 F | OXYGEN SATURATION: 99 % | RESPIRATION RATE: 16 BRPM | HEART RATE: 77 BPM

## 2023-06-16 DIAGNOSIS — M54.30 SCIATICA, UNSPECIFIED LATERALITY: Primary | ICD-10-CM

## 2023-06-16 DIAGNOSIS — M54.50 LOW BACK PAIN, UNSPECIFIED BACK PAIN LATERALITY, UNSPECIFIED CHRONICITY, UNSPECIFIED WHETHER SCIATICA PRESENT: ICD-10-CM

## 2023-06-16 PROCEDURE — 3044F HG A1C LEVEL LT 7.0%: CPT | Performed by: INTERNAL MEDICINE

## 2023-06-16 PROCEDURE — 80361 OPIATES 1 OR MORE: CPT

## 2023-06-16 PROCEDURE — 99214 OFFICE O/P EST MOD 30 MIN: CPT | Performed by: INTERNAL MEDICINE

## 2023-06-16 PROCEDURE — 80373 DRUG SCREENING TRAMADOL: CPT

## 2023-06-16 PROCEDURE — 80346 BENZODIAZEPINES1-12: CPT

## 2023-06-16 PROCEDURE — 80354 DRUG SCREENING FENTANYL: CPT

## 2023-06-16 PROCEDURE — 80358 DRUG SCREENING METHADONE: CPT

## 2023-06-16 PROCEDURE — 80307 DRUG TEST PRSMV CHEM ANLYZR: CPT

## 2023-06-16 PROCEDURE — 3074F SYST BP LT 130 MM HG: CPT | Performed by: INTERNAL MEDICINE

## 2023-06-16 PROCEDURE — 80368 SEDATIVE HYPNOTICS: CPT

## 2023-06-16 PROCEDURE — 3079F DIAST BP 80-89 MM HG: CPT | Performed by: INTERNAL MEDICINE

## 2023-06-16 PROCEDURE — 80365 DRUG SCREENING OXYCODONE: CPT

## 2023-06-16 RX ORDER — TRAMADOL HYDROCHLORIDE 50 MG/1
50 TABLET ORAL EVERY 8 HOURS PRN
Qty: 30 TABLET | Refills: 0 | Status: SHIPPED | OUTPATIENT
Start: 2023-06-16 | End: 2023-06-26 | Stop reason: SDUPTHER

## 2023-06-16 ASSESSMENT — ENCOUNTER SYMPTOMS
BACK PAIN: 1
SLEEP DISTURBANCE: 1

## 2023-06-16 NOTE — PROGRESS NOTES
Patient here for a follow up to discuss medication     Subjective   Patient ID: Alejandra Houston is a 57 y.o. female who presents for Follow-up.    The patient was unable to complete the MRI of the Lumbar Spine due to issues with insurance coverage.  She was advised to undergo six weeks of physical therapy, which she cannot complete due to pain in her lower limbs and inability to find transportation.  She notes that the back pain is worsening and radiating down the gluteal region of the lower limbs into the toes.  The patient notes that she is having to ambulate with a cane in her house, and she is having difficulty sitting, lying down, or sleeping since 4/2023.  She denies bowel incontinence or perianal paresthesia, but does indorse urinary incontinence.  She has been taking gabapentin 600 mg three times daily, Naproxen 500mg twice daily as needed, and Tramadol 50mg twice daily as needed for breakthrough control and this regimen has been helping.  She has an upcoming appointment with Pain Medicine next week.        Review of Systems   Musculoskeletal:  Positive for back pain.   Psychiatric/Behavioral:  Positive for sleep disturbance.      Objective   Physical Exam  Constitutional:       Appearance: Normal appearance.   Cardiovascular:      Rate and Rhythm: Normal rate and regular rhythm.      Heart sounds: Normal heart sounds.   Pulmonary:      Effort: Pulmonary effort is normal.      Breath sounds: Normal breath sounds.   Abdominal:      General: Bowel sounds are normal.      Palpations: Abdomen is soft.      Tenderness: There is no abdominal tenderness.   Skin:     General: Skin is warm and dry.   Neurological:      General: No focal deficit present.      Mental Status: She is alert and oriented to person, place, and time. Mental status is at baseline.      Motor: Weakness present.      Comments: Weakness noted in left lower extremity.   Psychiatric:         Mood and Affect: Mood normal.         Behavior: Behavior  normal.       Assessment/Plan   Problem List Items Addressed This Visit       Low back pain    Relevant Medications    traMADol (Ultram) 50 mg tablet     Other Visit Diagnoses       Sciatica, unspecified laterality    -  Primary    Relevant Orders    Opiate/Opioid/Benzo Extended Prescription Compliance            IMPRESSIONS/PLAN:    LLE swelling  - Last Venous Duplex U/S 5/15/2023 negative for DVT. Advised patient to call the clinic if she is unable to clarify ultrasound order from vascular (she has already had 2 negative ultrasounds for DVT).  Looks like they suspected this is related to low back issues which I agree with.  Re-ordered MRI LS.  She is on gabapentin.  I did give her a few Tramadol to have on hand to use VERY sparingly.     Low Back Pain/ leg weakness   - Worsening since 4/2023.  Weakness on LLE on exam.  Patient unable to ambulate without cane at home, and is having difficulty sitting, lying down, or sleeping.  Patient unable to do physical therapy due to severity of pain in lower limbs and inability to find transportation.  Re-ordered MRI Lumbar Spine without Contrast.  Continue with gabapentin 600 mg TID, Naproxen 500 mg BID prn, and refilled tramadol 50 mg twice daily as needed.  [place]. Substance controlled contract signed. Drug screen ordered.  Seen by  from Orthopedic Surgery.  Patient has upcoming appointment with Pain Medicine next week.    /80 in the office today.     Diabetes II   - Last HbA1c 6.6 per 5/2023.  Currently maintained on metformin 500mg 2 tabs BID, and Trulicity 0.75mg/0.5mL 1 pen weekly. Previously on glimperide 2mg QD.     Depression   - Following with Psychiatry.  Continue with escitalopram 20mg every day.  Previously on Rexulti or Topamax.  No SI or HI.      Sleep Apnea   - Has upcoming appointment with Sleep Medicine in August.  She will attempt to schedule sooner as she is significantly symptomatic now.     Smoking History   - CT Chest 4/2023  shows redemonstration of groundglass opacities in the posterior bilateral upper lobes, left lower lobe and new groundglass  opacities in the anterior left upper lobe. Central airways are clear.  Stable noncalcified pulmonary nodule in the right upper lobe (6, 38) and right middle lobe (6, 57) since 5/19/2019.       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 Underwent complex revision of right TKA. Following with  in Orthopedic Surgery.     Thrombocytopenia  - Patient will see Hematology.  Has seen in past but lost to follow-up.  Stable- but platelete count 95 per 5/2023.      Vaginal dryness  - Refilled Estradiol cream.     Constipation   - Continue with polyethylene glycol 3350 17 gm oral packet as 1 packet in 8 ounces of liquid QD.     Chest pain is MSK  - sounds like costochondritis as noted in HPI.  Monitor.       Health Maintenance   - Routine labs 5/2023.  Last Mammogram 5/2023. Last colonoscopy 5/2019, repeat due 5/20240708-0933.     Follow-up according to routine health maintenance, call sooner if needed.        Scribe Attestation  By signing my name below, I, Aida Galdamez, Scribe   attest that this documentation has been prepared under the direction and in the presence of Jhoan Eller DO.

## 2023-06-22 LAB
6-ACETYLMORPHINE: <25 NG/ML
7-AMINOCLONAZEPAM: <25 NG/ML
ALPHA-HYDROXYALPRAZOLAM: <25 NG/ML
ALPHA-HYDROXYMIDAZOLAM: <25 NG/ML
ALPRAZOLAM: <25 NG/ML
AMPHETAMINE (PRESENCE) IN URINE BY SCREEN METHOD: ABNORMAL
BARBITURATES PRESENCE IN URINE BY SCREEN METHOD: ABNORMAL
CANNABINOIDS IN URINE BY SCREEN METHOD: ABNORMAL
CHLORDIAZEPOXIDE: <25 NG/ML
CLONAZEPAM: <25 NG/ML
COCAINE (PRESENCE) IN URINE BY SCREEN METHOD: ABNORMAL
CODEINE: <50 NG/ML
CREATINE, URINE FOR DRUG: 149.3 MG/DL
DIAZEPAM: <25 NG/ML
DRUG SCREEN COMMENT URINE: ABNORMAL
EDDP: <25 NG/ML
FENTANYL CONFIRMATION, URINE: <2.5 NG/ML
HYDROCODONE: <25 NG/ML
HYDROMORPHONE: <25 NG/ML
LORAZEPAM: <25 NG/ML
METHADONE CONFIRMATION,URINE: <25 NG/ML
MIDAZOLAM: <25 NG/ML
MORPHINE URINE: <50 NG/ML
NORDIAZEPAM: <25 NG/ML
NORFENTANYL: <2.5 NG/ML
NORHYDROCODONE: <25 NG/ML
NOROXYCODONE: <25 NG/ML
O-DESMETHYLTRAMADOL: <50 NG/ML
OXAZEPAM: <25 NG/ML
OXYCODONE: <25 NG/ML
OXYMORPHONE: <25 NG/ML
PHENCYCLIDINE (PRESENCE) IN URINE BY SCREEN METHOD: ABNORMAL
TEMAZEPAM: <25 NG/ML
TRAMADOL: 62 NG/ML
ZOLPIDEM METABOLITE (ZCA): <25 NG/ML
ZOLPIDEM: <25 NG/ML

## 2023-06-26 ENCOUNTER — TELEPHONE (OUTPATIENT)
Dept: PRIMARY CARE | Facility: CLINIC | Age: 58
End: 2023-06-26
Payer: COMMERCIAL

## 2023-06-26 DIAGNOSIS — M54.50 LOW BACK PAIN, UNSPECIFIED BACK PAIN LATERALITY, UNSPECIFIED CHRONICITY, UNSPECIFIED WHETHER SCIATICA PRESENT: ICD-10-CM

## 2023-06-26 DIAGNOSIS — F51.01 PRIMARY INSOMNIA: ICD-10-CM

## 2023-06-26 RX ORDER — TRAMADOL HYDROCHLORIDE 50 MG/1
50 TABLET ORAL EVERY 8 HOURS PRN
Qty: 30 TABLET | Refills: 0 | Status: SHIPPED | OUTPATIENT
Start: 2023-06-26 | End: 2023-07-11 | Stop reason: SDUPTHER

## 2023-06-26 RX ORDER — TRAZODONE HYDROCHLORIDE 50 MG/1
TABLET ORAL
Qty: 60 TABLET | Refills: 2 | Status: SHIPPED | OUTPATIENT
Start: 2023-06-26 | End: 2023-10-03

## 2023-06-26 NOTE — TELEPHONE ENCOUNTER
I renewed her Tramadol for her.  She will need to discuss the Lexapro from Cymbalta switch with her psychiatrist and make sure they are OK with this.

## 2023-06-26 NOTE — TELEPHONE ENCOUNTER
Pt went to see pain management and they said that she had fibromyalgia and a pinched nerve.    That doctor wanted to know if you can put pt on cymbalta to help with the fibromyalgia.    Also pt only has two pills like of the tramadol, can you call in a refill of that?    Please advise.

## 2023-07-06 DIAGNOSIS — G89.29 CHRONIC LOW BACK PAIN, UNSPECIFIED BACK PAIN LATERALITY, UNSPECIFIED WHETHER SCIATICA PRESENT: ICD-10-CM

## 2023-07-06 DIAGNOSIS — M54.50 CHRONIC LOW BACK PAIN, UNSPECIFIED BACK PAIN LATERALITY, UNSPECIFIED WHETHER SCIATICA PRESENT: ICD-10-CM

## 2023-07-06 RX ORDER — NAPROXEN 500 MG/1
TABLET ORAL
Qty: 60 TABLET | Refills: 0 | Status: SHIPPED | OUTPATIENT
Start: 2023-07-06 | End: 2023-08-23

## 2023-07-11 DIAGNOSIS — M54.50 LOW BACK PAIN, UNSPECIFIED BACK PAIN LATERALITY, UNSPECIFIED CHRONICITY, UNSPECIFIED WHETHER SCIATICA PRESENT: ICD-10-CM

## 2023-07-11 RX ORDER — TRAMADOL HYDROCHLORIDE 50 MG/1
50 TABLET ORAL EVERY 8 HOURS PRN
Qty: 30 TABLET | Refills: 0 | Status: SHIPPED | OUTPATIENT
Start: 2023-07-11 | End: 2023-07-21

## 2023-07-14 ENCOUNTER — TELEPHONE (OUTPATIENT)
Dept: PRIMARY CARE | Facility: CLINIC | Age: 58
End: 2023-07-14
Payer: COMMERCIAL

## 2023-07-14 NOTE — TELEPHONE ENCOUNTER
Pt called and states that her stomach is hurting her.    She states that she feels constipated and she is having trouble urinating.    It has been 5+ hours since she urinated last.    She knows that she has to pee but nothing will come out.    She wanted to know if you could prescribe something for her?    Please advise.

## 2023-07-21 DIAGNOSIS — R05.3 CHRONIC COUGH: Primary | ICD-10-CM

## 2023-07-21 DIAGNOSIS — Z00.00 HEALTHCARE MAINTENANCE: Primary | ICD-10-CM

## 2023-07-21 DIAGNOSIS — G89.29 OTHER CHRONIC PAIN: ICD-10-CM

## 2023-07-21 RX ORDER — ALBUTEROL SULFATE 90 UG/1
2 AEROSOL, METERED RESPIRATORY (INHALATION) 4 TIMES DAILY PRN
Qty: 18 G | Refills: 1 | Status: SHIPPED | OUTPATIENT
Start: 2023-07-21 | End: 2023-07-31 | Stop reason: SDUPTHER

## 2023-07-21 RX ORDER — LORATADINE 10 MG/1
10 TABLET ORAL DAILY
Qty: 90 TABLET | Refills: 2 | Status: SHIPPED | OUTPATIENT
Start: 2023-07-21 | End: 2024-03-28 | Stop reason: ALTCHOICE

## 2023-07-21 RX ORDER — ACETAMINOPHEN 500 MG
TABLET ORAL
Qty: 180 TABLET | Refills: 1 | Status: SHIPPED | OUTPATIENT
Start: 2023-07-21 | End: 2023-07-31

## 2023-07-21 NOTE — TELEPHONE ENCOUNTER
Patient left a message nicotine patches are not working for the patient and would like to get chantix instead to be called in.

## 2023-07-24 DIAGNOSIS — Z72.0 TOBACCO ABUSE: ICD-10-CM

## 2023-07-24 DIAGNOSIS — E11.9 TYPE 2 DIABETES MELLITUS WITHOUT COMPLICATION, WITHOUT LONG-TERM CURRENT USE OF INSULIN (MULTI): ICD-10-CM

## 2023-07-24 RX ORDER — IBUPROFEN 200 MG
TABLET ORAL
Qty: 28 PATCH | Refills: 1 | Status: SHIPPED | OUTPATIENT
Start: 2023-07-24 | End: 2023-10-03

## 2023-07-24 RX ORDER — DULAGLUTIDE 0.75 MG/.5ML
INJECTION, SOLUTION SUBCUTANEOUS
Qty: 2 ML | Refills: 1 | Status: SHIPPED | OUTPATIENT
Start: 2023-07-24 | End: 2023-10-03

## 2023-07-24 RX ORDER — FOLIC ACID 1 MG/1
TABLET ORAL
Qty: 30 TABLET | Refills: 1 | Status: SHIPPED | OUTPATIENT
Start: 2023-07-24 | End: 2023-10-03

## 2023-07-28 DIAGNOSIS — G89.29 OTHER CHRONIC PAIN: ICD-10-CM

## 2023-07-28 DIAGNOSIS — Z00.00 HEALTHCARE MAINTENANCE: ICD-10-CM

## 2023-07-28 DIAGNOSIS — Z00.00 ROUTINE GENERAL MEDICAL EXAMINATION AT A HEALTH CARE FACILITY: Primary | ICD-10-CM

## 2023-07-28 RX ORDER — ALBUTEROL SULFATE 90 UG/1
2 AEROSOL, METERED RESPIRATORY (INHALATION) 4 TIMES DAILY PRN
Qty: 18 G | Refills: 0 | OUTPATIENT
Start: 2023-07-28

## 2023-07-28 RX ORDER — ACETAMINOPHEN 500 MG
TABLET ORAL
Qty: 180 TABLET | Refills: 0 | OUTPATIENT
Start: 2023-07-28

## 2023-07-29 DIAGNOSIS — G89.29 OTHER CHRONIC PAIN: ICD-10-CM

## 2023-07-31 DIAGNOSIS — Z00.00 HEALTHCARE MAINTENANCE: ICD-10-CM

## 2023-07-31 RX ORDER — ACETAMINOPHEN 500 MG
TABLET ORAL
Qty: 180 TABLET | Refills: 0 | Status: SHIPPED | OUTPATIENT
Start: 2023-07-31 | End: 2023-08-23

## 2023-07-31 RX ORDER — ALBUTEROL SULFATE 90 UG/1
2 AEROSOL, METERED RESPIRATORY (INHALATION) 4 TIMES DAILY PRN
Qty: 18 G | Refills: 0 | Status: SHIPPED | OUTPATIENT
Start: 2023-07-31 | End: 2023-11-02 | Stop reason: SDUPTHER

## 2023-08-03 ENCOUNTER — TELEPHONE (OUTPATIENT)
Dept: PRIMARY CARE | Facility: CLINIC | Age: 58
End: 2023-08-03
Payer: COMMERCIAL

## 2023-08-03 DIAGNOSIS — G89.4 CHRONIC PAIN SYNDROME: Primary | ICD-10-CM

## 2023-08-03 RX ORDER — TRAMADOL HYDROCHLORIDE 50 MG/1
50 TABLET ORAL EVERY 6 HOURS PRN
Qty: 15 TABLET | Refills: 0 | Status: SHIPPED | OUTPATIENT
Start: 2023-08-03 | End: 2023-08-10 | Stop reason: SDUPTHER

## 2023-08-03 NOTE — TELEPHONE ENCOUNTER
The patient called the office back and left a message stating that she saw Dr. Eller about this concern in the past and he prescribed Tramadol for the pain. The patient states that the swelling became bad last night and today her feet are so big she can hardly walk. The tramadol helped with the pain and swelling in the past

## 2023-08-03 NOTE — TELEPHONE ENCOUNTER
The patient called and stated that she woke up today to leg and feet swelling and pain. The patient was requesting a rx of Tramadol.

## 2023-08-10 ENCOUNTER — OFFICE VISIT (OUTPATIENT)
Dept: PRIMARY CARE | Facility: CLINIC | Age: 58
End: 2023-08-10
Payer: COMMERCIAL

## 2023-08-10 VITALS
HEART RATE: 81 BPM | RESPIRATION RATE: 18 BRPM | OXYGEN SATURATION: 96 % | SYSTOLIC BLOOD PRESSURE: 124 MMHG | BODY MASS INDEX: 39.28 KG/M2 | DIASTOLIC BLOOD PRESSURE: 84 MMHG | TEMPERATURE: 97.2 F | WEIGHT: 250.8 LBS

## 2023-08-10 DIAGNOSIS — R20.2 PARESTHESIA OF RIGHT UPPER EXTREMITY: Primary | ICD-10-CM

## 2023-08-10 DIAGNOSIS — G89.4 CHRONIC PAIN SYNDROME: ICD-10-CM

## 2023-08-10 PROCEDURE — 3044F HG A1C LEVEL LT 7.0%: CPT | Performed by: INTERNAL MEDICINE

## 2023-08-10 PROCEDURE — 99215 OFFICE O/P EST HI 40 MIN: CPT | Performed by: INTERNAL MEDICINE

## 2023-08-10 PROCEDURE — 3079F DIAST BP 80-89 MM HG: CPT | Performed by: INTERNAL MEDICINE

## 2023-08-10 PROCEDURE — 3074F SYST BP LT 130 MM HG: CPT | Performed by: INTERNAL MEDICINE

## 2023-08-10 RX ORDER — TRAMADOL HYDROCHLORIDE 50 MG/1
50 TABLET ORAL EVERY 6 HOURS PRN
Qty: 28 TABLET | Refills: 1 | Status: SHIPPED | OUTPATIENT
Start: 2023-08-10 | End: 2023-08-24

## 2023-08-10 ASSESSMENT — ENCOUNTER SYMPTOMS
SLEEP DISTURBANCE: 1
DYSPHORIC MOOD: 1
NUMBNESS: 1

## 2023-08-10 NOTE — PROGRESS NOTES
Subjective   Patient ID: Alejandra Houston is a 57 y.o. female who presents for Follow-up (3 month follow up), Foot Swelling, and Leg Swelling.  She is accompanied by her  who offers some of the history.    The patient reports ongoing bilateral lower limb pain and swelling, associated with dyspnea and chest pain, that resulted in a visit to the ED at [facility] on 8/7/2023.  She underwent a number of investigations including CT Chest with IV contrast which only showed cirrhosis.  ACS and Pulmonary Embolism were ruled out, and the patient was treated with oxycodone and a short course of furosemide.  She was also given a prescription for doxycycline as empirical management of ground glass opacities seen on the CT Chest.  She was discharged home in stable condition.    The patient states that the lower limb pain is ongoing.  She notes that it seems to be worsening as it is now up to the level of the thigh, and limits her ability to ambulate.  The patient describes the pain as throbbing and aching.  She also mentions that the MRI lumbar spine was denied by her insurance.  She previously followed up with  from Pain Medicine.  The patient is unable to undergo Physical Therapy as she does not have access to regular transportation.    The patient reports worsening numbness in the right upper limb that extends from the shoulder to the hand.  This is resulting in sleep disturbance as she cannot lay on the affected side.  She denies any pain in the upper extremity.    The patient mentions depressed mood and finds that recently, she is crying more often.  She is worried primarily about her health condition and the chronic pain.  She is taking escitalopram 10 mg as two tablets once daily, and does not feel this is helping.    The patient requests a prescription for Chantix, as she was able to stop smoking while on this medication in the past.        Review of Systems   Cardiovascular:  Positive for leg swelling.    Musculoskeletal:         Positive for bilateral lower limb pain and swelling.   Neurological:  Positive for numbness.        Positive for right upper limb numbness.   Psychiatric/Behavioral:  Positive for dysphoric mood and sleep disturbance.      Objective   Physical Exam  Constitutional:       Appearance: Normal appearance.   Cardiovascular:      Rate and Rhythm: Normal rate and regular rhythm.      Heart sounds: Normal heart sounds.   Pulmonary:      Effort: Pulmonary effort is normal.      Breath sounds: Normal breath sounds.   Abdominal:      General: Bowel sounds are normal.      Palpations: Abdomen is soft.      Tenderness: There is no abdominal tenderness.   Skin:     General: Skin is warm and dry.   Neurological:      General: No focal deficit present.      Mental Status: She is alert and oriented to person, place, and time. Mental status is at baseline.   Psychiatric:         Mood and Affect: Mood normal.         Behavior: Behavior normal.       Assessment/Plan       IMPRESSIONS/PLAN:     Depression   - Following with Psychiatry.  Switch from escitalopram to duloxetine gradually as directed.  Reduce escitalopram to 10 mg as 0.5 tablet for one week, and start duloxetine 30 mg once daily at the same time.  Then stop escitalopram and increase duloxetine to 60 mg once daily.  Previously on Rexulti or Topamax.  No SI or HI.  Follow-up in 6 weeks for close monitoring.     Paresthesia of Right Upper Extremity  - Ordered EMG and Nerve Conduction.    LLE swelling  - Currently on short course of furosemide from ER, and advised patient to update clinic with progress once completed.  Last Venous Duplex U/S 8/7/2023 negative for DVT. Looks like they suspected this is related to low back issues which I agree with.  MRI LS was denied by insurance, and will follow-up as imaging would be very helpful for this patient.  Continue with gabapentin 300 mg as two capsules three times daily.  Refilled tramadol as below to have  on hand to use VERY sparingly, and patient understands not to use with cyclobenzaprine. Encouraged patient to follow up with  from Pain Medicine, and she agreed.     Low Back Pain/ leg weakness   - Worsening since 4/2023.  Weakness on LLE on exam.  Patient unable to ambulate without cane at home, and is having difficulty sitting, lying down, or sleeping.  Patient unable to do physical therapy due to severity of pain in lower limbs and inability to find transportation.  MRI Lumbar Spine without Contrast denied by insurance, and will follow-up. Continue with gabapentin 600 mg TID, Naproxen 500 mg BID prn, and refilled tramadol 50 mg every 6 hours as needed.  [place] reviewed. Substance controlled contract signed. Drug screen ordered.  Seen by  from Orthopedic Surgery. Ordered referral for Home Physical Therapy, and encouraged patient to follow up with  from Pain Medicine.       Smoking History   - CT Chest 4/2023 shows redemonstration of groundglass opacities in the posterior bilateral upper lobes, left lower lobe and new groundglass  opacities in the anterior left upper lobe. Central airways are clear.  Stable noncalcified pulmonary nodule in the right upper lobe (6, 38) and right middle lobe (6, 57) since 5/19/2019.  Discussed with patient that varenicline was recalled.      /84 in the office today.     Diabetes II   - Last HbA1c 6.6 per 5/2023.  Currently maintained on metformin 500mg 2 tabs BID, and Trulicity 0.75mg/0.5mL 1 pen weekly. Previously on glimperide 2mg QD.      Sleep Apnea   - Has upcoming appointment with Sleep Medicine in August.  She will attempt to schedule sooner as she is significantly symptomatic now.     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 Underwent complex revision of right TKA. Following with  in Orthopedic Surgery.     Thrombocytopenia  - Patient will see Hematology.  Has seen in past but  lost to follow-up.  Stable- but platelete count 95 per 5/2023.       Vaginal dryness  - Refilled Estradiol cream.     Constipation   - Continue with polyethylene glycol 3350 17 gm oral packet as 1 packet in 8 ounces of liquid QD.     Chest pain is MSK  - sounds like costochondritis as noted in HPI.  Monitor.       Health Maintenance   - Routine labs 5/2023.  Last Mammogram 5/2023. Last colonoscopy 5/2019, repeat due 5/[id number].     Follow-up in 6 weeks for close monitoring, call sooner if needed.        Scribe Attestation  By signing my name below, I, Aida Galdamez, Scribe   attest that this documentation has been prepared under the direction and in the presence of Jhoan Eller DO..

## 2023-08-14 DIAGNOSIS — E11.9 TYPE 2 DIABETES MELLITUS WITHOUT COMPLICATION, WITHOUT LONG-TERM CURRENT USE OF INSULIN (MULTI): ICD-10-CM

## 2023-08-14 DIAGNOSIS — J30.9 ALLERGIC RHINITIS, UNSPECIFIED SEASONALITY, UNSPECIFIED TRIGGER: ICD-10-CM

## 2023-08-15 RX ORDER — VITAMIN B COMPLEX
TABLET ORAL
Qty: 30 TABLET | Refills: 3 | Status: SHIPPED | OUTPATIENT
Start: 2023-08-15 | End: 2023-11-13

## 2023-08-15 RX ORDER — FLUTICASONE PROPIONATE 50 MCG
SPRAY, SUSPENSION (ML) NASAL
Qty: 16 G | Refills: 3 | Status: SHIPPED | OUTPATIENT
Start: 2023-08-15 | End: 2023-11-13

## 2023-08-17 ENCOUNTER — TELEPHONE (OUTPATIENT)
Dept: PRIMARY CARE | Facility: CLINIC | Age: 58
End: 2023-08-17
Payer: COMMERCIAL

## 2023-08-17 NOTE — TELEPHONE ENCOUNTER
Patient left a message- was seen last week and wants PT at home, asking about a prescription for chantix and Cymbalta and a test for her arm?

## 2023-08-18 ENCOUNTER — TELEPHONE (OUTPATIENT)
Dept: PRIMARY CARE | Facility: CLINIC | Age: 58
End: 2023-08-18
Payer: COMMERCIAL

## 2023-08-18 DIAGNOSIS — F32.A DEPRESSION, UNSPECIFIED DEPRESSION TYPE: Primary | ICD-10-CM

## 2023-08-18 RX ORDER — DULOXETIN HYDROCHLORIDE 30 MG/1
30 CAPSULE, DELAYED RELEASE ORAL DAILY
Qty: 30 CAPSULE | Refills: 5 | Status: SHIPPED | OUTPATIENT
Start: 2023-08-18 | End: 2023-09-21 | Stop reason: SDUPTHER

## 2023-08-18 NOTE — TELEPHONE ENCOUNTER
Will send her in the Cymbalta.  She can take 1/2 tablet of her Lexapro for a week along with the cymbalta.  Then after a week of 1/2 tablet of Lexapro she can stop the lexapro all together and just continue on the Cymbalta.   I put home PT order in they should be reaching out.  I also entered EMG they should be reaching out.

## 2023-08-22 DIAGNOSIS — G89.29 OTHER CHRONIC PAIN: ICD-10-CM

## 2023-08-22 DIAGNOSIS — G89.29 CHRONIC LOW BACK PAIN, UNSPECIFIED BACK PAIN LATERALITY, UNSPECIFIED WHETHER SCIATICA PRESENT: ICD-10-CM

## 2023-08-22 DIAGNOSIS — M54.50 CHRONIC LOW BACK PAIN, UNSPECIFIED BACK PAIN LATERALITY, UNSPECIFIED WHETHER SCIATICA PRESENT: ICD-10-CM

## 2023-08-23 RX ORDER — ACETAMINOPHEN 500 MG
TABLET ORAL
Qty: 180 TABLET | Refills: 0 | Status: SHIPPED | OUTPATIENT
Start: 2023-08-23 | End: 2023-08-29

## 2023-08-23 RX ORDER — NAPROXEN 500 MG/1
TABLET ORAL
Qty: 60 TABLET | Refills: 0 | Status: SHIPPED | OUTPATIENT
Start: 2023-08-23 | End: 2023-08-29

## 2023-08-29 DIAGNOSIS — G89.29 CHRONIC LOW BACK PAIN, UNSPECIFIED BACK PAIN LATERALITY, UNSPECIFIED WHETHER SCIATICA PRESENT: ICD-10-CM

## 2023-08-29 DIAGNOSIS — M54.50 CHRONIC LOW BACK PAIN, UNSPECIFIED BACK PAIN LATERALITY, UNSPECIFIED WHETHER SCIATICA PRESENT: ICD-10-CM

## 2023-08-29 DIAGNOSIS — G89.29 OTHER CHRONIC PAIN: ICD-10-CM

## 2023-08-29 RX ORDER — ACETAMINOPHEN 500 MG
TABLET ORAL
Qty: 180 TABLET | Refills: 0 | Status: SHIPPED | OUTPATIENT
Start: 2023-08-29 | End: 2023-09-26

## 2023-08-29 RX ORDER — NAPROXEN 500 MG/1
TABLET ORAL
Qty: 60 TABLET | Refills: 0 | Status: SHIPPED | OUTPATIENT
Start: 2023-08-29 | End: 2023-09-26

## 2023-09-12 PROBLEM — G47.33 OBSTRUCTIVE SLEEP APNEA, ADULT: Status: ACTIVE | Noted: 2018-02-28

## 2023-09-12 PROBLEM — R50.9 FEVER: Status: ACTIVE | Noted: 2017-09-04

## 2023-09-12 PROBLEM — S82.63XA CLOSED FRACTURE OF LATERAL MALLEOLUS: Status: ACTIVE | Noted: 2017-01-13

## 2023-09-12 PROBLEM — M54.16 SPINAL STENOSIS OF LUMBAR REGION WITH RADICULOPATHY: Status: ACTIVE | Noted: 2023-09-12

## 2023-09-12 PROBLEM — J44.9 CHRONIC OBSTRUCTIVE LUNG DISEASE (MULTI): Status: ACTIVE | Noted: 2023-09-12

## 2023-09-12 PROBLEM — R11.2 NON-INTRACTABLE VOMITING WITH NAUSEA: Status: ACTIVE | Noted: 2017-09-06

## 2023-09-12 PROBLEM — L03.90 CELLULITIS: Status: ACTIVE | Noted: 2017-05-24

## 2023-09-12 PROBLEM — M47.816 LUMBAR SPONDYLOSIS: Status: ACTIVE | Noted: 2023-09-12

## 2023-09-12 PROBLEM — I10 HYPERTENSION: Status: ACTIVE | Noted: 2018-08-05

## 2023-09-12 PROBLEM — R60.0 LEG EDEMA, LEFT: Status: ACTIVE | Noted: 2023-09-12

## 2023-09-12 PROBLEM — E78.5 DYSLIPIDEMIA: Status: ACTIVE | Noted: 2017-04-07

## 2023-09-12 PROBLEM — F41.9 ANXIETY: Status: ACTIVE | Noted: 2023-09-12

## 2023-09-12 PROBLEM — M48.061 SPINAL STENOSIS OF LUMBAR REGION WITH RADICULOPATHY: Status: ACTIVE | Noted: 2023-09-12

## 2023-09-12 PROBLEM — L03.115 CELLULITIS OF RIGHT LOWER EXTREMITY: Status: ACTIVE | Noted: 2017-05-24

## 2023-09-12 PROBLEM — M15.9 GENERALIZED OSTEOARTHRITIS: Status: ACTIVE | Noted: 2017-04-07

## 2023-09-21 ENCOUNTER — OFFICE VISIT (OUTPATIENT)
Dept: PRIMARY CARE | Facility: CLINIC | Age: 58
End: 2023-09-21
Payer: COMMERCIAL

## 2023-09-21 ENCOUNTER — LAB (OUTPATIENT)
Dept: LAB | Facility: LAB | Age: 58
End: 2023-09-21
Payer: COMMERCIAL

## 2023-09-21 VITALS
TEMPERATURE: 96.9 F | BODY MASS INDEX: 38.84 KG/M2 | RESPIRATION RATE: 18 BRPM | HEART RATE: 76 BPM | DIASTOLIC BLOOD PRESSURE: 70 MMHG | WEIGHT: 248 LBS | SYSTOLIC BLOOD PRESSURE: 128 MMHG | OXYGEN SATURATION: 96 %

## 2023-09-21 DIAGNOSIS — M79.89 LEG SWELLING: ICD-10-CM

## 2023-09-21 DIAGNOSIS — R20.2 ARM PARESTHESIA, LEFT: ICD-10-CM

## 2023-09-21 DIAGNOSIS — R53.83 OTHER FATIGUE: ICD-10-CM

## 2023-09-21 DIAGNOSIS — M79.644 FINGER PAIN, RIGHT: ICD-10-CM

## 2023-09-21 DIAGNOSIS — Z00.00 HEALTHCARE MAINTENANCE: Primary | ICD-10-CM

## 2023-09-21 DIAGNOSIS — F32.A DEPRESSION, UNSPECIFIED DEPRESSION TYPE: ICD-10-CM

## 2023-09-21 DIAGNOSIS — L85.3 DRY SKIN: ICD-10-CM

## 2023-09-21 DIAGNOSIS — M54.16 LUMBAR RADICULOPATHY: ICD-10-CM

## 2023-09-21 LAB
CALCIDIOL (25 OH VITAMIN D3) (NG/ML) IN SER/PLAS: 32 NG/ML
COBALAMIN (VITAMIN B12) (PG/ML) IN SER/PLAS: 380 PG/ML (ref 211–911)
THYROTROPIN (MIU/L) IN SER/PLAS BY DETECTION LIMIT <= 0.05 MIU/L: 2.19 MIU/L (ref 0.44–3.98)

## 2023-09-21 PROCEDURE — 82306 VITAMIN D 25 HYDROXY: CPT

## 2023-09-21 PROCEDURE — 3078F DIAST BP <80 MM HG: CPT | Performed by: INTERNAL MEDICINE

## 2023-09-21 PROCEDURE — 3074F SYST BP LT 130 MM HG: CPT | Performed by: INTERNAL MEDICINE

## 2023-09-21 PROCEDURE — 99215 OFFICE O/P EST HI 40 MIN: CPT | Performed by: INTERNAL MEDICINE

## 2023-09-21 PROCEDURE — 36415 COLL VENOUS BLD VENIPUNCTURE: CPT

## 2023-09-21 PROCEDURE — 82607 VITAMIN B-12: CPT

## 2023-09-21 PROCEDURE — 84443 ASSAY THYROID STIM HORMONE: CPT

## 2023-09-21 PROCEDURE — 3044F HG A1C LEVEL LT 7.0%: CPT | Performed by: INTERNAL MEDICINE

## 2023-09-21 RX ORDER — ASPIRIN 81 MG
100 TABLET, DELAYED RELEASE (ENTERIC COATED) ORAL 2 TIMES DAILY PRN
Qty: 10 TABLET | Refills: 2 | Status: SHIPPED | OUTPATIENT
Start: 2023-09-21 | End: 2023-11-13

## 2023-09-21 RX ORDER — DULOXETIN HYDROCHLORIDE 30 MG/1
60 CAPSULE, DELAYED RELEASE ORAL DAILY
Qty: 60 CAPSULE | Refills: 5 | Status: SHIPPED | OUTPATIENT
Start: 2023-09-21 | End: 2023-11-02 | Stop reason: SDUPTHER

## 2023-09-21 RX ORDER — MULTIVITAMIN
1 TABLET ORAL DAILY
Qty: 90 TABLET | Refills: 3 | Status: SHIPPED | OUTPATIENT
Start: 2023-09-21

## 2023-09-21 ASSESSMENT — ENCOUNTER SYMPTOMS
FATIGUE: 1
DYSPHORIC MOOD: 1
MYALGIAS: 1

## 2023-09-21 NOTE — PROGRESS NOTES
Patient here for a 6 week follow up    Subjective   Patient ID: Alejandra Houston is a 57 y.o. female who presents for Follow-up.  She is accompanied by her  who offers some of the history.    The patient reports ongoing lower limb pain and swelling.  She has been waiting to schedule Home Physical Therapy sessions.  She was told by her insurance carrier that she needs to complete physical therapy before an MRI of the Lumbar Spine can be approved.  She continues to follow with  from Pain Medicine, and recently rescheduled an appointment with Vascular Medicine.  The patient has EMG and nerve conduction tests pending as well.    The patient has switched from escitalopram to duloxetine 30 mg once daily as clinically advised, and has not yet noticed a difference in mood or chronic pain despite four weeks of therapy.  She continues to follow with Psychiatry.    The patient notes recent onset of intermittent sharp chest pain since about 9/7/2023 that is unrelated to activity.  She recalls that her mother suffered a myocardial infarction, and requests a referral to Cardiology.    The patient notes increased somnolence and finds herself sleeping during the day as well as the night.  She has an appointment with Sleep Medicine next week to discuss starting use of the CPAP machine.     The patient reports right third finger pain since 8/2023 but does not recall any injury.      Review of Systems   Constitutional:  Positive for fatigue.   Cardiovascular:  Positive for leg swelling.   Musculoskeletal:  Positive for myalgias.        Positive for bilateral lower limb pain, right third finger pain, intermittent chest wall pain.   Psychiatric/Behavioral:  Positive for dysphoric mood.        Objective   Physical Exam  Constitutional:       Appearance: Normal appearance.   Neck:      Vascular: No carotid bruit.   Cardiovascular:      Rate and Rhythm: Normal rate and regular rhythm.      Heart sounds: Normal heart sounds.    Pulmonary:      Effort: Pulmonary effort is normal.      Breath sounds: Normal breath sounds.   Abdominal:      General: Bowel sounds are normal.      Palpations: Abdomen is soft.      Tenderness: There is no abdominal tenderness.   Skin:     General: Skin is warm and dry.   Neurological:      General: No focal deficit present.      Mental Status: She is alert and oriented to person, place, and time. Mental status is at baseline.   Psychiatric:         Mood and Affect: Mood normal.         Behavior: Behavior normal.       Assessment/Plan   Problem List Items Addressed This Visit       Depression    Relevant Medications    DULoxetine (Cymbalta) 30 mg DR capsule    Fatigue    Relevant Orders    Tsh With Reflex To Free T4 If Abnormal    Vitamin D 25-Hydroxy,Total (for eval of Vitamin D levels)    Vitamin B12     Other Visit Diagnoses       Healthcare maintenance    -  Primary    Relevant Medications    docusate sodium (Colace) 100 mg tablet    multivitamin tablet    Leg swelling        Relevant Orders    Referral to Cardiology    Referral to Home Care    Transthoracic Echo (TTE) Complete    Arm paresthesia, left        Relevant Orders    EMG & nerve conduction    Dry skin        Relevant Medications    Cetaphil moisturizing lotion    Finger pain, right        Relevant Orders    XR fingers right 2+ views            IMPRESSIONS/PLAN:     Right Third Finger Pain  - Ordered Xray of fingers right hand.    Dry Skin  - Prescribed Cetaphil moisturizing lotion.     Fatigue  - Likely due to untreated sleep apnea.  Encouraged patient to follow-up with Sleep Medicine, and she has appointment for next week.  Ordered Vitamin D, Vitamin B12, and TSH.    LLE swelling  - Ordered Echocardiogram and referral to Cardiology.  Last Venous Duplex U/S 8/7/2023 negative for DVT. Looks like they suspected this is related to low back issues which I agree with.  MRI LS was denied by insurance, and will follow-up as imaging would be very helpful  for this patient.  Continue with gabapentin 300 mg as two capsules three times daily.  Refilled tramadol as below to have on hand to use VERY sparingly, and patient understands not to use with cyclobenzaprine. Encouraged patient to follow up with  from Pain Medicine, and she agreed.  Re-ordered referral for Home Physical Therapy and Occupational Therapy.    Depression   - Following with Psychiatry.  Increase duloxetine 30 mg to two tablets once daily. Previously on Lexapro, and Rexulti or Topamax.  No SI or HI.  Will follow-up in 6 weeks for close monitoring.     Low Back Pain/ leg weakness   - Worsening since 4/2023.  Weakness on LLE on exam.  Patient unable to ambulate without cane at home, and is having difficulty sitting, lying down, or sleeping.  Patient unable to do physical therapy due to severity of pain in lower limbs and inability to find transportation.  MRI Lumbar Spine without Contrast denied by insurance, and will follow-up. Continue with gabapentin 600 mg TID, Naproxen 500 mg BID prn, and refilled tramadol 50 mg every 6 hours as needed.  [place] reviewed. Substance controlled contract signed. Drug screen ordered.  Seen by  from Orthopedic Surgery. Re-ordered referral for Home Physical Therapy, and encouraged patient to follow up with  from Pain Medicine.       /70 in the office today.     Diabetes II   - Last HbA1c 6.6 per 5/2023.  Currently maintained on metformin 500mg 2 tabs BID, and Trulicity 0.75mg/0.5mL 1 pen weekly. Previously on glimperide 2mg QD.      Sleep Apnea   - Has upcoming appointment with Sleep Medicine in August.  She will attempt to schedule sooner as she is significantly symptomatic now.  Encouraged patient to follow-up soon.     Paresthesia of Right Upper Extremity  - Previously ordered EMG and Nerve Conduction.    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 Underwent complex revision of right TKA.  Following with  in Orthopedic Surgery.     Thrombocytopenia  - Patient will see Hematology.  Has seen in past but lost to follow-up.  Stable- but platelete count 95 per 5/2023.       Vaginal dryness  - Refilled Estradiol cream.     Constipation   - Continue with polyethylene glycol 3350 17 gm oral packet as 1 packet in 8 ounces of liquid QD.     Atypical Chest pain  -Echo.  Referred to cards.      Smoking History   - CT Chest 4/2023 shows redemonstration of groundglass opacities in the posterior bilateral upper lobes, left lower lobe and new groundglass  opacities in the anterior left upper lobe. Central airways are clear.  Stable noncalcified pulmonary nodule in the right upper lobe (6, 38) and right middle lobe (6, 57) since 5/19/2019.  Discussed with patient that varenicline was recalled.       Health Maintenance   - Routine labs 5/2023.  Last Mammogram 5/2023. Last colonoscopy 5/2019, repeat due 5/[id number].     Follow-up in 6 weeks for close monitoring, call sooner if needed.        Scribe Attestation  By signing my name below, IAida Scribe   attest that this documentation has been prepared under the direction and in the presence of Jhoan Eller DO.

## 2023-09-25 ENCOUNTER — TELEPHONE (OUTPATIENT)
Dept: PRIMARY CARE | Facility: CLINIC | Age: 58
End: 2023-09-25
Payer: COMMERCIAL

## 2023-09-25 NOTE — TELEPHONE ENCOUNTER
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

## 2023-09-26 ENCOUNTER — TELEPHONE (OUTPATIENT)
Dept: PRIMARY CARE | Facility: CLINIC | Age: 58
End: 2023-09-26
Payer: COMMERCIAL

## 2023-09-26 DIAGNOSIS — E11.9 TYPE 2 DIABETES MELLITUS WITHOUT COMPLICATION, WITHOUT LONG-TERM CURRENT USE OF INSULIN (MULTI): ICD-10-CM

## 2023-09-26 DIAGNOSIS — G89.29 OTHER CHRONIC PAIN: ICD-10-CM

## 2023-09-26 DIAGNOSIS — G89.29 CHRONIC LOW BACK PAIN, UNSPECIFIED BACK PAIN LATERALITY, UNSPECIFIED WHETHER SCIATICA PRESENT: ICD-10-CM

## 2023-09-26 DIAGNOSIS — M54.50 CHRONIC LOW BACK PAIN, UNSPECIFIED BACK PAIN LATERALITY, UNSPECIFIED WHETHER SCIATICA PRESENT: ICD-10-CM

## 2023-09-26 RX ORDER — NAPROXEN 500 MG/1
TABLET ORAL
Qty: 60 TABLET | Refills: 0 | Status: SHIPPED | OUTPATIENT
Start: 2023-09-26 | End: 2023-11-29 | Stop reason: SDUPTHER

## 2023-09-26 RX ORDER — METFORMIN HYDROCHLORIDE 500 MG/1
1000 TABLET ORAL 2 TIMES DAILY
Qty: 120 TABLET | Refills: 1 | Status: SHIPPED | OUTPATIENT
Start: 2023-09-26 | End: 2023-11-21

## 2023-09-26 RX ORDER — ACETAMINOPHEN 500 MG
TABLET ORAL
Qty: 180 TABLET | Refills: 0 | Status: SHIPPED | OUTPATIENT
Start: 2023-09-26 | End: 2023-11-29 | Stop reason: SDUPTHER

## 2023-09-26 NOTE — TELEPHONE ENCOUNTER
Jose Miguel Donaldson from central scheduling advised that referral for PT & OT  is not able to be scheduled until Oct 1

## 2023-10-03 DIAGNOSIS — Z72.0 TOBACCO ABUSE: ICD-10-CM

## 2023-10-03 DIAGNOSIS — E11.9 TYPE 2 DIABETES MELLITUS WITHOUT COMPLICATION, WITHOUT LONG-TERM CURRENT USE OF INSULIN (MULTI): ICD-10-CM

## 2023-10-03 DIAGNOSIS — F51.01 PRIMARY INSOMNIA: ICD-10-CM

## 2023-10-03 DIAGNOSIS — F17.219 CIGARETTE NICOTINE DEPENDENCE WITH NICOTINE-INDUCED DISORDER: ICD-10-CM

## 2023-10-03 RX ORDER — DIPHENHYDRAMINE HCL 25 MG
4 CAPSULE ORAL AS NEEDED
Qty: 100 EACH | Refills: 1 | Status: SHIPPED | OUTPATIENT
Start: 2023-10-03 | End: 2023-11-13

## 2023-10-03 RX ORDER — DULAGLUTIDE 0.75 MG/.5ML
INJECTION, SOLUTION SUBCUTANEOUS
Qty: 2 ML | Refills: 1 | Status: SHIPPED | OUTPATIENT
Start: 2023-10-03 | End: 2023-11-13

## 2023-10-03 RX ORDER — IBUPROFEN 200 MG
TABLET ORAL
Qty: 28 PATCH | Refills: 1 | Status: SHIPPED | OUTPATIENT
Start: 2023-10-03 | End: 2023-11-13

## 2023-10-03 RX ORDER — TRAZODONE HYDROCHLORIDE 50 MG/1
TABLET ORAL
Qty: 60 TABLET | Refills: 2 | Status: SHIPPED | OUTPATIENT
Start: 2023-10-03 | End: 2023-11-29 | Stop reason: SDUPTHER

## 2023-10-03 RX ORDER — FOLIC ACID 1 MG/1
TABLET ORAL
Qty: 30 TABLET | Refills: 1 | Status: SHIPPED | OUTPATIENT
Start: 2023-10-03 | End: 2023-11-13

## 2023-10-06 ENCOUNTER — HOME HEALTH ADMISSION (OUTPATIENT)
Dept: HOME HEALTH SERVICES | Facility: HOME HEALTH | Age: 58
End: 2023-10-06
Payer: COMMERCIAL

## 2023-10-09 ENCOUNTER — APPOINTMENT (OUTPATIENT)
Dept: CARDIOLOGY | Facility: CLINIC | Age: 58
End: 2023-10-09
Payer: COMMERCIAL

## 2023-10-11 ENCOUNTER — HOME CARE VISIT (OUTPATIENT)
Dept: HOME HEALTH SERVICES | Facility: HOME HEALTH | Age: 58
End: 2023-10-11
Payer: COMMERCIAL

## 2023-10-11 VITALS
WEIGHT: 249 LBS | BODY MASS INDEX: 39.08 KG/M2 | TEMPERATURE: 97.3 F | HEIGHT: 67 IN | HEART RATE: 73 BPM | DIASTOLIC BLOOD PRESSURE: 70 MMHG | SYSTOLIC BLOOD PRESSURE: 128 MMHG

## 2023-10-11 PROCEDURE — 0023 HH SOC

## 2023-10-11 PROCEDURE — G0151 HHCP-SERV OF PT,EA 15 MIN: HCPCS

## 2023-10-11 SDOH — HEALTH STABILITY: PHYSICAL HEALTH: EXERCISE ACTIVITIES SETS: 1

## 2023-10-11 SDOH — HEALTH STABILITY: PHYSICAL HEALTH: PHYSICAL EXERCISE: 1

## 2023-10-11 SDOH — HEALTH STABILITY: PHYSICAL HEALTH: EXERCISE ACTIVITY: SINGLE KNEE TO CHEST STRETCH

## 2023-10-11 SDOH — HEALTH STABILITY: PHYSICAL HEALTH: EXERCISE COMMENTS: PAIN INCREASED WITH ALL ACTIVITIES

## 2023-10-11 SDOH — HEALTH STABILITY: PHYSICAL HEALTH: EXERCISE ACTIVITY: HIP STRETCH

## 2023-10-11 SDOH — HEALTH STABILITY: PHYSICAL HEALTH

## 2023-10-11 SDOH — HEALTH STABILITY: PHYSICAL HEALTH: EXERCISE ACTIVITY: SEATED LUMBAR FLEXION

## 2023-10-11 SDOH — HEALTH STABILITY: PHYSICAL HEALTH: PHYSICAL EXERCISE: SEATED, CROSSING LEG OVER

## 2023-10-11 SDOH — HEALTH STABILITY: PHYSICAL HEALTH: PHYSICAL EXERCISE: SEATED

## 2023-10-11 ASSESSMENT — ENCOUNTER SYMPTOMS
PAIN LOCATION - EXACERBATING FACTORS: ACTIVITY
PAIN LOCATION - PAIN QUALITY: ACHING
PAIN LOCATION - RELIEVING FACTORS: REST
MUSCLE WEAKNESS: 1
PAIN SEVERITY GOAL: 0/10
PAIN LOCATION - PAIN FREQUENCY: INTERMITTENT
PAIN LOCATION: BACK
LIMITED RANGE OF MOTION: 1
PAIN LOCATION - PAIN SEVERITY: 8/10
PERSON REPORTING PAIN: PATIENT
PAIN: 1
HIGHEST PAIN SEVERITY IN PAST 24 HOURS: 10/10

## 2023-10-11 ASSESSMENT — ACTIVITIES OF DAILY LIVING (ADL)
AMBULATION ASSISTANCE ON FLAT SURFACES: 1
ENTERING_EXITING_HOME: NEEDS ASSISTANCE

## 2023-10-11 NOTE — HOME HEALTH
58 year old female with yo hx of anxiety/depression/bipolar disorder, COPD, fibromyalgia, neuropathy, DDD, chronic back pain, recently went to the ER related to feeling a crack in her left knee when ambulating on stairs to enter/exit the home. She had orthopedic appointment 10/10 and stated she has a minor ligament tear, but no other issues. Pain was noted with valgus stress test along MCL. Despite the knee pain for 8 days, her biggest complaint today is her left lumbar back pain.  Lower left lateral muscle pain present at all times and was sensitive to palpation. Pain not relieved with flexion based exercise or stretching. When ambulating with small quad cane, she has forward leaning posture and poor tolerance to upright activity. She prefers to lay in sidelying as supine is also painful.  Educated patient on the benefits of slowing increasing walking distances daily for back pain as she endorses spending most of her days in bed. Patient also due to have a sleep study for [facility] and will likely get a CPAP. Improved sleep may potentially reduce back pain. She will benefit from a short bout of skilled physical therapy to address above deficits and attempt to reduce pain.

## 2023-10-12 ENCOUNTER — HOME CARE VISIT (OUTPATIENT)
Dept: HOME HEALTH SERVICES | Facility: HOME HEALTH | Age: 58
End: 2023-10-12
Payer: COMMERCIAL

## 2023-10-12 ENCOUNTER — DOCUMENTATION (OUTPATIENT)
Dept: HEMATOLOGY/ONCOLOGY | Facility: HOSPITAL | Age: 58
End: 2023-10-12
Payer: COMMERCIAL

## 2023-10-12 VITALS
SYSTOLIC BLOOD PRESSURE: 140 MMHG | HEART RATE: 78 BPM | OXYGEN SATURATION: 98 % | DIASTOLIC BLOOD PRESSURE: 80 MMHG | TEMPERATURE: 96.6 F

## 2023-10-12 PROCEDURE — G0152 HHCP-SERV OF OT,EA 15 MIN: HCPCS

## 2023-10-12 ASSESSMENT — ENCOUNTER SYMPTOMS
PAIN LOCATION - EXACERBATING FACTORS: MOVEMENT
PAIN LOCATION - PAIN FREQUENCY: INTERMITTENT
LOWEST PAIN SEVERITY IN PAST 24 HOURS: 4/10
HIGHEST PAIN SEVERITY IN PAST 24 HOURS: 9/10
PAIN LOCATION - PAIN SEVERITY: 7/10
PAIN LOCATION - RELIEVING FACTORS: MEDS
SUBJECTIVE PAIN PROGRESSION: GRADUALLY IMPROVING
PERSON REPORTING PAIN: PATIENT
PAIN LOCATION: BACK
PAIN: 1

## 2023-10-12 ASSESSMENT — ACTIVITIES OF DAILY LIVING (ADL): PREPARING MEALS: INDEPENDENT

## 2023-10-12 NOTE — PROGRESS NOTES
Diagnosis thrombocytopenia. History includes-thrombocytopenia, pancytopenia, fibromyalgia, chronic back pain, lumbar spondylosis, elevated LFTs, anxiety/depression, Bipolar disorder, diabetes, COPD, sleep apnea, HLD, GERD.  Last labs 10/2/23 RBC 4.49, HGB 13.6, Hematocrit 40.7, PLT 77  Patient confirmed her appointment for 10/16/23.

## 2023-10-12 NOTE — HOME HEALTH
Patient referred for OTHH services following ER visit due to aute L knee pain when going down steps of home and chest pain that had been going on for approx 1 week. Patient found to have no acute fx or cardiac findings. PMH:bipolar disorder, COPD, fibromyalgia, obstructive sleep apnea, DM 2, tobacco use disorder, hypertension. per patient she followed up with ortho on 10/11 and had knee drained with significant pain relief. she is going to continue to follow ortho for L knee, R knee and back pain. Patient is reporting some dizziness at times in/out of home activities with all vitals noted to be normal on this date. Patient previously mod I using spc for functional task completion. Patient lives with spouse and other family memebers who assist with IADLs. Patient has tub setup with shower with seat. Patient currently at functional baseline following safety instruction for bathing with no further OT needs at this time.

## 2023-10-16 ENCOUNTER — APPOINTMENT (OUTPATIENT)
Dept: HEMATOLOGY/ONCOLOGY | Facility: CLINIC | Age: 58
End: 2023-10-16
Payer: COMMERCIAL

## 2023-10-16 DIAGNOSIS — G89.29 CHRONIC LOW BACK PAIN, UNSPECIFIED BACK PAIN LATERALITY, UNSPECIFIED WHETHER SCIATICA PRESENT: ICD-10-CM

## 2023-10-16 DIAGNOSIS — M54.50 CHRONIC LOW BACK PAIN, UNSPECIFIED BACK PAIN LATERALITY, UNSPECIFIED WHETHER SCIATICA PRESENT: ICD-10-CM

## 2023-10-16 RX ORDER — FERROUS SULFATE TAB 325 MG (65 MG ELEMENTAL FE) 325 (65 FE) MG
1 TAB ORAL
Qty: 30 TABLET | Refills: 1 | Status: SHIPPED | OUTPATIENT
Start: 2023-10-16 | End: 2023-12-11

## 2023-10-16 NOTE — PROGRESS NOTES
Patient ID: Alejandra Houston is a 58 y.o. female.  Referring Physician: No referring provider defined for this encounter.  Primary Care Provider: Jhoan Eller DO  Visit Type: Initial Visit      Subjective    HPI  Location: [facility]  Initial consult: 10/16/23  Reason: thrombocytopenia    Patient is a  59 yo female with a PMH of thrombocytopenia, cirrhosis, pancytopenia, fibromyalgia, chronic back pain, lumbar spondylosis, elevated LFTs, anxiety/depression, Bipolar disorder, diabetes, COPD, sleep apnea, HLD, GERD and was referred to benign hematology for consultation of thrombocytopenia. Referred by Dr. Eller last May.     Today, patient presents for initial consultation. Denies abnormal weight loss, night sweats, fever. Denies fatigue, chills, sob, cp, n/v/d, abdominal pain, early satiety. Denies any abnormal bleeding or bruising. No recurrent infections or lymphadenopathy. No bone pain. No known blood disorders in family. Has had surgery in past w/o issue.     10/2023 - CT: Cirrhotic liver morphology with sequela of portal hypertension (splenomegaly and esophageal varices).     dental/ent  resp  gi  rash/yeast  uti  nails  Hepatitis/HIV  Recent virus/inf  up to date on cancer screenings    SH: Diet - reg, Tobacco- daily, ETOH- occ, Rec drug use- hx crack cocaine, or Radiation exposure- no  PSH: , lt hip, knee  FH:    Meds: see list     Review of Systems - Oncology     Objective   BSA: There is no height or weight on file to calculate BSA.  There were no vitals taken for this visit.     has a past medical history of Abnormal levels of other serum enzymes (2022) and Idiopathic aseptic necrosis of left femur (CMS/HCC) (10/12/2018).   has a past surgical history that includes Total knee arthroplasty (2018); Other surgical history (06/10/2019); and  section, classic (02/15/2018).  Family History   Problem Relation Name Age of Onset    Hypertension Mother      Diabetes Mother      COPD  "Mother      Heart disease Mother      Lymphoma Mother      Cancer Other fam hx     Diabetes Other fam hx     Hypertension Other fam hx     Heart disease Other fam hx      Oncology History    No history exists.       Alejandra Houston  reports that she has been smoking cigarettes. She has never used smokeless tobacco.  She  reports current alcohol use.  She  reports no history of drug use.    Physical Exam    WBC   Date/Time Value Ref Range Status   05/03/2023 10:10 AM 6.6 4.4 - 11.3 x10E9/L Final   01/24/2023 12:15 PM 4.6 4.4 - 11.3 x10E9/L Final   10/09/2022 05:35 AM 6.3 4.4 - 11.3 x10E9/L Final     nRBC   Date Value Ref Range Status   10/09/2022 0.0 0.0 - 0.0 /100 WBC Final   10/08/2022 0.0 0.0 - 0.0 /100 WBC Final   09/26/2022 0.0 0.0 - 0.0 /100 WBC Final     RBC   Date Value Ref Range Status   05/03/2023 3.97 (L) 4.00 - 5.20 x10E12/L Final   01/24/2023 4.95 4.00 - 5.20 x10E12/L Final   10/09/2022 3.09 (L) 4.00 - 5.20 x10E12/L Final     Hemoglobin   Date Value Ref Range Status   05/03/2023 12.3 12.0 - 16.0 g/dL Final   01/24/2023 14.2 12.0 - 16.0 g/dL Final   10/09/2022 9.2 (L) 12.0 - 16.0 g/dL Final     Hematocrit   Date Value Ref Range Status   05/03/2023 36.8 36.0 - 46.0 % Final   01/24/2023 43.9 36.0 - 46.0 % Final   10/09/2022 28.1 (L) 36.0 - 46.0 % Final     MCV   Date/Time Value Ref Range Status   05/03/2023 10:10 AM 93 80 - 100 fL Final   01/24/2023 12:15 PM 89 80 - 100 fL Final   10/09/2022 05:35 AM 91 80 - 100 fL Final     No results found for: \"MCH\"  MCHC   Date/Time Value Ref Range Status   05/03/2023 10:10 AM 33.4 32.0 - 36.0 g/dL Final   01/24/2023 12:15 PM 32.3 32.0 - 36.0 g/dL Final   10/09/2022 05:35 AM 32.7 32.0 - 36.0 g/dL Final     RDW   Date/Time Value Ref Range Status   05/03/2023 10:10 AM 15.0 (H) 11.5 - 14.5 % Final   01/24/2023 12:15 PM 14.3 11.5 - 14.5 % Final   10/09/2022 05:35 AM 14.0 11.5 - 14.5 % Final     Platelets   Date/Time Value Ref Range Status   05/03/2023 10:10 AM 95 (L) 150 - " "450 x10E9/L Final   01/24/2023 12:15 PM 92 (L) 150 - 450 x10E9/L Final   10/09/2022 05:35 AM 69 (L) 150 - 450 x10E9/L Final     No results found for: \"MPV\"  Neutrophils %   Date/Time Value Ref Range Status   05/03/2023 10:10 AM 60.0 40.0 - 80.0 % Final   01/24/2023 12:15 PM 53.1 40.0 - 80.0 % Final   10/09/2022 05:35 AM 62.1 40.0 - 80.0 % Final     Immature Granulocytes %, Automated   Date/Time Value Ref Range Status   05/03/2023 10:10 AM 1.7 (H) 0.0 - 0.9 % Final     Comment:      Immature Granulocyte Count (IG) includes promyelocytes,    myelocytes and metamyelocytes but does not include bands.   Percent differential counts (%) should be interpreted in the   context of the absolute cell counts (cells/L).     01/24/2023 12:15 PM 0.2 0.0 - 0.9 % Final     Comment:      Immature Granulocyte Count (IG) includes promyelocytes,    myelocytes and metamyelocytes but does not include bands.   Percent differential counts (%) should be interpreted in the   context of the absolute cell counts (cells/L).     10/09/2022 05:35 AM 0.3 0.0 - 0.9 % Final     Comment:      Immature Granulocyte Count (IG) includes promyelocytes,    myelocytes and metamyelocytes but does not include bands.   Percent differential counts (%) should be interpreted in the   context of the absolute cell counts (cells/L).       Lymphocytes %   Date/Time Value Ref Range Status   05/03/2023 10:10 AM 24.1 13.0 - 44.0 % Final   01/24/2023 12:15 PM 29.3 13.0 - 44.0 % Final   10/09/2022 05:35 AM 25.4 13.0 - 44.0 % Final     Monocytes %   Date/Time Value Ref Range Status   05/03/2023 10:10 AM 10.1 2.0 - 10.0 % Final   01/24/2023 12:15 PM 10.0 2.0 - 10.0 % Final   10/09/2022 05:35 AM 9.5 2.0 - 10.0 % Final     Eosinophils %   Date/Time Value Ref Range Status   05/03/2023 10:10 AM 3.5 0.0 - 6.0 % Final   01/24/2023 12:15 PM 6.5 0.0 - 6.0 % Final   10/09/2022 05:35 AM 2.4 0.0 - 6.0 % Final     Basophils %   Date/Time Value Ref Range Status   05/03/2023 10:10 AM 0.6 " "0.0 - 2.0 % Final   01/24/2023 12:15 PM 0.9 0.0 - 2.0 % Final   10/09/2022 05:35 AM 0.3 0.0 - 2.0 % Final     Neutrophils Absolute   Date/Time Value Ref Range Status   05/03/2023 10:10 AM 3.97 1.20 - 7.70 x10E9/L Final   01/24/2023 12:15 PM 2.44 1.20 - 7.70 x10E9/L Final   10/09/2022 05:35 AM 3.90 1.20 - 7.70 x10E9/L Final     No results found for: \"IGABSOL\"  Lymphocytes Absolute   Date/Time Value Ref Range Status   05/03/2023 10:10 AM 1.59 1.20 - 4.80 x10E9/L Final   01/24/2023 12:15 PM 1.35 1.20 - 4.80 x10E9/L Final   10/09/2022 05:35 AM 1.60 1.20 - 4.80 x10E9/L Final     Monocytes Absolute   Date/Time Value Ref Range Status   05/03/2023 10:10 AM 0.67 0.10 - 1.00 x10E9/L Final   01/24/2023 12:15 PM 0.46 0.10 - 1.00 x10E9/L Final   10/09/2022 05:35 AM 0.60 0.10 - 1.00 x10E9/L Final     Eosinophils Absolute   Date/Time Value Ref Range Status   05/03/2023 10:10 AM 0.23 0.00 - 0.70 x10E9/L Final   01/24/2023 12:15 PM 0.30 0.00 - 0.70 x10E9/L Final   10/09/2022 05:35 AM 0.15 0.00 - 0.70 x10E9/L Final     Basophils Absolute   Date/Time Value Ref Range Status   05/03/2023 10:10 AM 0.04 0.00 - 0.10 x10E9/L Final   01/24/2023 12:15 PM 0.04 0.00 - 0.10 x10E9/L Final   10/09/2022 05:35 AM 0.02 0.00 - 0.10 x10E9/L Final       No components found for: \"PT\"  aPTT   Date/Time Value Ref Range Status   09/26/2022 12:20 PM 38 26 - 39 sec Final     Comment:       THE APTT IS NO LONGER USED FOR MONITORING     UNFRACTIONATED HEPARIN THERAPY.    FOR MONITORING HEPARIN THERAPY,     USE THE HEPARIN ASSAY.     07/29/2022 09:53 AM 38 26 - 39 sec Final     Comment:       THE APTT IS NO LONGER USED FOR MONITORING     UNFRACTIONATED HEPARIN THERAPY.    FOR MONITORING HEPARIN THERAPY,     USE THE HEPARIN ASSAY.     09/24/2018 03:24 PM 29 25 - 36 sec Final     Comment:       THE APTT IS NO LONGER USED FOR MONITORING     UNFRACTIONATED HEPARIN THERAPY.    FOR MONITORING HEPARIN THERAPY,     USE THE HEPARIN ASSAY.         Assessment/Plan  "     Thrombocytopenia. 10/2023 - CT: Cirrhotic liver morphology with sequela of portal hypertension (splenomegaly and esophageal varices).     Discussed possible etiologies of thrombocytopenia including vitamin deficiency, enlarged spleen/liver, autoimmune disease, infection, inflammatory disorder, allergies, collection tube clumping, etc. Will start hematological workup today.      Avoid contact sports, including fast rides at water rubin or amusement rubin, always wear a seat belt in car, recommended use of a medical bracelet. Avoid NSAIDs as they affect platelet activity and increase risk of bleeding. Call or visit closest ER with any persistent headaches, visual disturbances, weakness, numbness, severe bruising/bleeding or any concerning signs or symptoms. Will need to contact us at least a couple months in advance before any elective procedure/surgery as may need treatment to increase plt count for procedure.      {Assess/PlanSmartLinks:03529}         Fiorella Hayes, APRN-CNP

## 2023-10-18 ENCOUNTER — HOME CARE VISIT (OUTPATIENT)
Dept: HOME HEALTH SERVICES | Facility: HOME HEALTH | Age: 58
End: 2023-10-18
Payer: COMMERCIAL

## 2023-10-23 ENCOUNTER — OFFICE VISIT (OUTPATIENT)
Dept: CARDIOLOGY | Facility: CLINIC | Age: 58
End: 2023-10-23
Payer: COMMERCIAL

## 2023-10-23 ENCOUNTER — TELEPHONE (OUTPATIENT)
Dept: PRIMARY CARE | Facility: CLINIC | Age: 58
End: 2023-10-23
Payer: COMMERCIAL

## 2023-10-23 VITALS
SYSTOLIC BLOOD PRESSURE: 140 MMHG | OXYGEN SATURATION: 95 % | HEART RATE: 90 BPM | WEIGHT: 233.6 LBS | HEIGHT: 67 IN | BODY MASS INDEX: 36.66 KG/M2 | DIASTOLIC BLOOD PRESSURE: 100 MMHG

## 2023-10-23 DIAGNOSIS — M54.50 LOW BACK PAIN, UNSPECIFIED BACK PAIN LATERALITY, UNSPECIFIED CHRONICITY, UNSPECIFIED WHETHER SCIATICA PRESENT: Primary | ICD-10-CM

## 2023-10-23 DIAGNOSIS — M79.89 LEG SWELLING: ICD-10-CM

## 2023-10-23 DIAGNOSIS — I73.9 CLAUDICATION (CMS-HCC): Primary | ICD-10-CM

## 2023-10-23 DIAGNOSIS — R06.02 SHORTNESS OF BREATH: ICD-10-CM

## 2023-10-23 PROCEDURE — 99214 OFFICE O/P EST MOD 30 MIN: CPT | Performed by: INTERNAL MEDICINE

## 2023-10-23 PROCEDURE — 3044F HG A1C LEVEL LT 7.0%: CPT | Performed by: INTERNAL MEDICINE

## 2023-10-23 PROCEDURE — 3080F DIAST BP >= 90 MM HG: CPT | Performed by: INTERNAL MEDICINE

## 2023-10-23 PROCEDURE — 3077F SYST BP >= 140 MM HG: CPT | Performed by: INTERNAL MEDICINE

## 2023-10-23 RX ORDER — DOCUSATE SODIUM 100 MG/1
CAPSULE, LIQUID FILLED ORAL
COMMUNITY
Start: 2023-10-13 | End: 2023-10-23 | Stop reason: SDUPTHER

## 2023-10-23 RX ORDER — TRAMADOL HYDROCHLORIDE 50 MG/1
50 TABLET ORAL EVERY 6 HOURS PRN
Qty: 28 TABLET | Refills: 0 | Status: SHIPPED | OUTPATIENT
Start: 2023-10-23 | End: 2023-11-02 | Stop reason: SDUPTHER

## 2023-10-23 ASSESSMENT — ACTIVITIES OF DAILY LIVING (ADL): OASIS_M1830: 03

## 2023-10-23 ASSESSMENT — PAIN SCALES - GENERAL: PAINLEVEL: 8

## 2023-10-23 NOTE — PROGRESS NOTES
"Chief complaint: Leg swelling and pain     Subjective   Patient was last seen in May, and then she was lost to follow-up  She has been doing double Tubigrip, and trying to elevate her legs when sitting/sleeping with significant improvement  Recent CT PE/DVT US, negative for VTE   She continues to have back pain, as well as bilateral lower extremity throbbing pain associated with numbness and tenderness  Lumbar MRI was recommended but continues to be denied by insurance    Review of Systems  + As noted above   + Intermittent CP and SOB   + Chronic back pain   + Arthritis   + Neuropathy  + Fibromyalgia   + Insomnia   + RLS   + GERD   + Depression   + Fatigue     Past Medical History:   Diagnosis Date    Abnormal levels of other serum enzymes 2022    Elevated liver enzymes    Idiopathic aseptic necrosis of left femur (CMS/HCC) 10/12/2018    Avascular necrosis of bone of left hip     Past Surgical History:   Procedure Laterality Date     SECTION, CLASSIC  02/15/2018     Section    OTHER SURGICAL HISTORY  06/10/2019    Hip replacement    TOTAL KNEE ARTHROPLASTY  2018    Knee Replacement     Social History     Tobacco Use    Smoking status: Every Day     Packs/day: 0.50     Years: 43.00     Additional pack years: 0.00     Total pack years: 21.50     Types: Cigarettes     Start date:     Smokeless tobacco: Never   Vaping Use    Vaping Use: Never used   Substance Use Topics    Alcohol use: Not Currently     Comment: social    Drug use: Never      Family History   Problem Relation Name Age of Onset    Hypertension Mother      Diabetes Mother      COPD Mother      Heart disease Mother      Lymphoma Mother      Cancer Other fam hx     Diabetes Other fam hx     Hypertension Other fam hx     Heart disease Other fam hx       No Known Allergies    Objective   Physical Exam  BP (!) 140/100 (BP Location: Right arm, Patient Position: Sitting, BP Cuff Size: Large adult)   Pulse 90   Ht 1.702 m (5' 7\") "   Wt 106 kg (233 lb 9.6 oz)   BMI 36.59 kg/m²      General: Not in acute distress, obese   Neuro: AAO x3  Hoarse voice   Pulmonary: scattered rhonchi   CVS: Regular HR   Abdomen: Soft, non-tender   Lower extremity: augustin LE edema. Legs and feet are tender to touch anywhere. Augustin +1DP     Medications   Current Outpatient Medications on File Prior to Visit   Medication Sig Dispense Refill    acetaminophen (Tylenol) 500 mg tablet TAKE 2 TABLETS EVERY 8 HOURS IF NEEDED FOR PAIN 180 tablet 0    alcohol swabs pads, medicated Apply topically. 70% pad, 4 x daily; use as directed      B Complex-Vitamin B12 tablet TAKE 1 TABLET DAILY. 30 tablet 3    Cetaphil moisturizing lotion Apply topically if needed for dry skin. 100 mL 1    cholecalciferol (Vitamin D-3) 50 MCG (2000 UT) tablet Take 1 tablet (50 mcg) by mouth once daily. 90 tablet 1    cyclobenzaprine (Flexeril) 10 mg tablet Take 1 tablet (10 mg) by mouth once daily as needed (back pain). 90 tablet 2    docusate sodium (Colace) 100 mg tablet Take 1 tablet (100 mg) by mouth 2 times a day as needed for constipation. 10 tablet 2    DULoxetine (Cymbalta) 30 mg DR capsule Take 2 capsules (60 mg) by mouth once daily. Do not crush or chew. 60 capsule 5    EASY COMFORT LANCETS MISC in the morning, at noon, and at bedtime. Use to test      estradiol (Estrace) 0.1 MG/GM vaginal cream Insert 1 applicator vaginally every night weekly 42.5 g 0    FeroSuL 325 mg (65 mg iron) tablet TAKE 1 TABLET BY MOUTH TWICE A DAY WITH MEALS 30 tablet 1    fluticasone (Flonase) 50 mcg/actuation nasal spray USE 2 SPRAYS IN EACH NOSTRIL ONCE DAILY 16 g 3    folic acid (Folvite) 1 mg tablet TAKE 1 TABLET BY MOUTH EVERY DAY 30 tablet 1    FreeStyle glucose monitoring kit True Metrix Blood Glucose test in vitro strip; Use as directed 4-5 times daily      gabapentin (Neurontin) 300 mg capsule Take 2 capsules (600 mg) by mouth 3 times a day. TITRATE THE DOSE WEEKLY 270 capsule 2    loratadine (Claritin) 10  mg tablet Take 1 tablet (10 mg) by mouth once daily. 90 tablet 2    metFORMIN (Glucophage) 500 mg tablet TAKE 2 TABLETS (1,000 MG) BY MOUTH IN THE MORNING AND 2 TABLETS (1,000 MG) BEFORE BEDTIME. 120 tablet 1    multivitamin tablet Take 1 tablet by mouth once daily. 90 tablet 3    naproxen (Naprosyn) 500 mg tablet TAKE 1 TABLET BY MOUTH EVERY 12 HOURS AS NEEDED FOR PAIN WITH FOOD 60 tablet 0    OneTouch Ultra Test strip 1 strip. 4 to 5 times daily      polyethylene glycol (Glycolax) 17 gram packet Take by mouth in the morning. Mix 1 packet in 8 ounces of liquid      traZODone (Desyrel) 50 mg tablet TAKE 2 TABLETS BY MOUTH AT BEDTIME 60 tablet 2    Trulicity 0.75 mg/0.5 mL pen injector INJECT 0.75MG UNDER THE SKIN EVERY WEEK 2 mL 1    [DISCONTINUED] docusate sodium (Colace) 100 mg capsule TAKE 1 TABLET (100 MG) BY MOUTH 2 TIMES A DAY AS NEEDED FOR CONSTIPATION.      albuterol 90 mcg/actuation inhaler Inhale 2 puffs 4 times a day as needed for shortness of breath or wheezing. (Patient not taking: Reported on 10/23/2023) 18 g 0    nicotine (Nicoderm CQ) 21 mg/24 hr patch APPLY 1 PATCH DAILY AS DIRECTED. (Patient not taking: Reported on 10/23/2023) 28 patch 1    nicotine polacrilex (Nicorette) 4 mg gum CHEW 1 EACH (4 MG) IF NEEDED FOR SMOKING CESSATION. USE AS DIRECTED (Patient not taking: Reported on 10/23/2023) 100 each 1    thiamine (Vitamin B-1) 100 mg tablet Take 1 tablet (100 mg) by mouth once daily.       No current facility-administered medications on file prior to visit.       Lab Review   Lab Results   Component Value Date     05/03/2023     01/24/2023     10/09/2022    K 3.5 05/03/2023    K 4.1 01/24/2023    K 4.3 10/09/2022    CO2 33 (H) 05/03/2023    CO2 30 01/24/2023    CO2 29 10/09/2022    BUN 17 05/03/2023    BUN 10 01/24/2023    BUN 26 (H) 10/09/2022    CREATININE 0.81 05/03/2023    CREATININE 0.70 01/24/2023    CREATININE 0.90 10/09/2022    GLUCOSE 141 (H) 05/03/2023    GLUCOSE 149  (H) 01/24/2023    GLUCOSE 153 (H) 10/09/2022    CALCIUM 9.3 05/03/2023    CALCIUM 9.9 01/24/2023    CALCIUM 8.5 (L) 10/09/2022     Lab Results   Component Value Date    WBC 6.6 05/03/2023    WBC 4.6 01/24/2023    WBC 6.3 10/09/2022    HGB 12.3 05/03/2023    HGB 14.2 01/24/2023    HGB 9.2 (L) 10/09/2022    HCT 36.8 05/03/2023    HCT 43.9 01/24/2023    HCT 28.1 (L) 10/09/2022    MCV 93 05/03/2023    MCV 89 01/24/2023    MCV 91 10/09/2022    PLT 95 (L) 05/03/2023    PLT 92 (L) 01/24/2023    PLT 69 (L) 10/09/2022       PTT - No results in last year.  _   _ _     Lab Results   Component Value Date    CHOL 149 05/03/2023    CHOL 193 01/24/2023    CHOL 213 (H) 03/25/2022    HDL 47.2 05/03/2023    HDL 30.1 (A) 01/24/2023    HDL 43.0 03/25/2022    TRIG 129 05/03/2023    TRIG 250 (H) 01/24/2023    TRIG 185 (H) 03/25/2022       Lab Results   Component Value Date    HGBA1C 6.6 (A) 05/03/2023       Imaging  CT CHEST W IVCON PE 10/2/2023  No CT evidence of pulmonary embolism. Smoking related changes with mild diffuse bronchial wall thickening secondary to chronic airways inflammation and heterogeneous mosaic attenuation of the lung parenchyma with scattered ill-defined groundglass changes and reticular opacities most prominent in the mid-lower lungs grossly similar to prior exam likely sequela of small airways inflammation and subsegmental atelectasis.  No consolidation. Cirrhotic liver morphology with sequela of portal hypertension (splenomegaly and esophageal varices).     US DVT LOWER LEFT 10/2/2023  Negative study for proximal DVT in the left lower extremity. Negative study for calf DVT in the left lower extremity. Negative study for superficial thrombophlebitis in the imaged segments of the left lower extremity    CT A/P 7/17/23   No significant colonic stool burden. Normal caliber small bowel.   Cirrhotic liver.   Punctate nonobstructing right renal calculus.   A 3.6 cm benign-appearing left adnexal lesion is seen.  In an  early   post-menopausal woman (<5 years since menopause), follow-up ultrasound at   6-12 months is recommended.     Assessment/Plan   Alejandra Houston is a 58 y.o. female with PMHx of obesity,  HTN, ?COPD, CADEN, cirrhosis, DM, thrombocytopenia, fibromyalgia, GERD, RLS and active smoker. F/u with VM for Leg swelling      Leg swelling, Lt>Rt       _ Possible contributing risk factors:   spinal stenosis/arthritis as well as favoring of rt leg, limiting lt calf muscle pump role   Lack of compression/elevation -improved    Lack of exercise   Left adnexal mass   Weight, cirrhosis/pHTN, HTN/COPD/CADEN      Leg pain  _Bil constant (not related to exercise) leg pain associated with tenderness suggesting possible underlying neuropathy rather than a vascular cause, given PAD Rfs, will r/o it out      Plan  -- Use 20-30 mmhg compression stockings  -- SELVIN   -- Agree with ECHO especially with h/o HTN, COPD and CADEN   -- Pt needs further work up of the liver cirrhosis and left adnexal mass noted on the CT, follow up with the PCP  -- Referral to cardiology placed given c/o CP and SOB   -- The rest as detailed in the patient's instructions above     Sandy Mauro MD

## 2023-10-23 NOTE — PATIENT INSTRUCTIONS
-- Meticulous skin care:   Wash daily with mild soap and warm water   Daily use of emollient moisturizer (ex. Nevin, Aquaphor, Eucerin)   Follow up with podiatry for toenail care   -- Legs should be elevated whenever you are sleeping/lying in bed or sitting with 1-2 pillows below the legs in order to try to keep them above the level of heart  -- Exercise encouraged (at least 30 mins, 5 times a week, cyclic dependent thrusting of legs recommended to activate calf muscle pump). Walking encouraged (at least 3 mins each hour, heel to toe)   -- Please try to follow a low salt diet  -- Weight loss highly encouraged, follow up with PCP  -- Close monitoring for signs of infection/wounds   -- Follow up in 8 weeks, earlier if needed

## 2023-10-24 ENCOUNTER — APPOINTMENT (OUTPATIENT)
Dept: HOME HEALTH SERVICES | Facility: HOME HEALTH | Age: 58
End: 2023-10-24
Payer: COMMERCIAL

## 2023-10-24 DIAGNOSIS — E55.9 VITAMIN D DEFICIENCY: ICD-10-CM

## 2023-10-24 RX ORDER — OMEGA-3S/DHA/EPA/FISH OIL/D3 300MG-1000
50 CAPSULE ORAL DAILY
Qty: 30 TABLET | Refills: 1 | Status: SHIPPED | OUTPATIENT
Start: 2023-10-24 | End: 2023-12-19

## 2023-10-25 ENCOUNTER — HOME CARE VISIT (OUTPATIENT)
Dept: HOME HEALTH SERVICES | Facility: HOME HEALTH | Age: 58
End: 2023-10-25
Payer: COMMERCIAL

## 2023-10-25 PROCEDURE — G0157 HHC PT ASSISTANT EA 15: HCPCS

## 2023-10-25 ASSESSMENT — ENCOUNTER SYMPTOMS
HIGHEST PAIN SEVERITY IN PAST 24 HOURS: 10/10
PAIN: 1
PAIN SEVERITY GOAL: 0/10
PAIN LOCATION: LEFT KNEE
PERSON REPORTING PAIN: PATIENT
LOWEST PAIN SEVERITY IN PAST 24 HOURS: 7/10
SUBJECTIVE PAIN PROGRESSION: WAXING AND WANING

## 2023-10-26 ENCOUNTER — APPOINTMENT (OUTPATIENT)
Dept: HOME HEALTH SERVICES | Facility: HOME HEALTH | Age: 58
End: 2023-10-26
Payer: COMMERCIAL

## 2023-10-27 ENCOUNTER — HOME CARE VISIT (OUTPATIENT)
Dept: HOME HEALTH SERVICES | Facility: HOME HEALTH | Age: 58
End: 2023-10-27
Payer: COMMERCIAL

## 2023-10-27 PROCEDURE — G0157 HHC PT ASSISTANT EA 15: HCPCS

## 2023-10-27 ASSESSMENT — ENCOUNTER SYMPTOMS
PAIN LOCATION: LEFT KNEE
PAIN LOCATION: RIGHT LEG
PAIN LOCATION: LEFT LEG
PERSON REPORTING PAIN: PATIENT
HIGHEST PAIN SEVERITY IN PAST 24 HOURS: 7/10
PAIN SEVERITY GOAL: 0/10
PAIN: 1
LOWEST PAIN SEVERITY IN PAST 24 HOURS: 5/10
SUBJECTIVE PAIN PROGRESSION: UNCHANGED
NAUSEA: 1

## 2023-10-30 ENCOUNTER — APPOINTMENT (OUTPATIENT)
Dept: HOME HEALTH SERVICES | Facility: HOME HEALTH | Age: 58
End: 2023-10-30
Payer: COMMERCIAL

## 2023-10-31 ENCOUNTER — HOME CARE VISIT (OUTPATIENT)
Dept: HOME HEALTH SERVICES | Facility: HOME HEALTH | Age: 58
End: 2023-10-31
Payer: COMMERCIAL

## 2023-10-31 PROCEDURE — G0157 HHC PT ASSISTANT EA 15: HCPCS

## 2023-10-31 ASSESSMENT — ENCOUNTER SYMPTOMS
SUBJECTIVE PAIN PROGRESSION: WAXING AND WANING
HIGHEST PAIN SEVERITY IN PAST 24 HOURS: 10/10
PAIN LOCATION: LEFT KNEE
PAIN LOCATION: RIGHT HIP
LOWEST PAIN SEVERITY IN PAST 24 HOURS: 5/10
PAIN SEVERITY GOAL: 0/10
PAIN: 1
NAUSEA: 1

## 2023-11-02 ENCOUNTER — OFFICE VISIT (OUTPATIENT)
Dept: PRIMARY CARE | Facility: CLINIC | Age: 58
End: 2023-11-02
Payer: COMMERCIAL

## 2023-11-02 ENCOUNTER — TELEPHONE (OUTPATIENT)
Dept: PRIMARY CARE | Facility: CLINIC | Age: 58
End: 2023-11-02

## 2023-11-02 VITALS
TEMPERATURE: 97.2 F | DIASTOLIC BLOOD PRESSURE: 70 MMHG | BODY MASS INDEX: 36.81 KG/M2 | OXYGEN SATURATION: 98 % | WEIGHT: 235 LBS | SYSTOLIC BLOOD PRESSURE: 128 MMHG | HEART RATE: 85 BPM | RESPIRATION RATE: 16 BRPM

## 2023-11-02 DIAGNOSIS — D69.6 THROMBOCYTOPENIA (CMS-HCC): ICD-10-CM

## 2023-11-02 DIAGNOSIS — Z00.00 HEALTHCARE MAINTENANCE: ICD-10-CM

## 2023-11-02 DIAGNOSIS — K74.60 CIRRHOSIS OF LIVER WITHOUT ASCITES, UNSPECIFIED HEPATIC CIRRHOSIS TYPE (MULTI): Primary | ICD-10-CM

## 2023-11-02 DIAGNOSIS — F32.A DEPRESSION, UNSPECIFIED DEPRESSION TYPE: ICD-10-CM

## 2023-11-02 DIAGNOSIS — M54.12 CERVICAL RADICULOPATHY: ICD-10-CM

## 2023-11-02 DIAGNOSIS — M54.50 LOW BACK PAIN, UNSPECIFIED BACK PAIN LATERALITY, UNSPECIFIED CHRONICITY, UNSPECIFIED WHETHER SCIATICA PRESENT: ICD-10-CM

## 2023-11-02 PROCEDURE — 3074F SYST BP LT 130 MM HG: CPT | Performed by: INTERNAL MEDICINE

## 2023-11-02 PROCEDURE — 3044F HG A1C LEVEL LT 7.0%: CPT | Performed by: INTERNAL MEDICINE

## 2023-11-02 PROCEDURE — 99214 OFFICE O/P EST MOD 30 MIN: CPT | Performed by: INTERNAL MEDICINE

## 2023-11-02 PROCEDURE — 3078F DIAST BP <80 MM HG: CPT | Performed by: INTERNAL MEDICINE

## 2023-11-02 RX ORDER — DULOXETIN HYDROCHLORIDE 30 MG/1
60 CAPSULE, DELAYED RELEASE ORAL DAILY
Qty: 60 CAPSULE | Refills: 5 | Status: SHIPPED | OUTPATIENT
Start: 2023-11-02 | End: 2024-04-18

## 2023-11-02 RX ORDER — TRAMADOL HYDROCHLORIDE 50 MG/1
50 TABLET ORAL EVERY 6 HOURS PRN
Qty: 28 TABLET | Refills: 0 | Status: SHIPPED | OUTPATIENT
Start: 2023-11-02 | End: 2023-11-09 | Stop reason: SDUPTHER

## 2023-11-02 RX ORDER — METHOCARBAMOL 500 MG/1
TABLET, FILM COATED ORAL
Qty: 40 TABLET | Refills: 0 | Status: SHIPPED | OUTPATIENT
Start: 2023-11-02 | End: 2023-11-29 | Stop reason: SDUPTHER

## 2023-11-02 RX ORDER — ALBUTEROL SULFATE 90 UG/1
2 AEROSOL, METERED RESPIRATORY (INHALATION) 4 TIMES DAILY PRN
Qty: 18 G | Refills: 0 | Status: SHIPPED | OUTPATIENT
Start: 2023-11-02 | End: 2023-11-14 | Stop reason: SDUPTHER

## 2023-11-02 RX ORDER — PREDNISONE 5 MG/1
TABLET ORAL
Qty: 30 TABLET | Refills: 0 | Status: SHIPPED | OUTPATIENT
Start: 2023-11-02 | End: 2023-11-29 | Stop reason: ALTCHOICE

## 2023-11-02 ASSESSMENT — ENCOUNTER SYMPTOMS
BACK PAIN: 1
NECK PAIN: 1

## 2023-11-02 NOTE — PROGRESS NOTES
Patient here for a 6 week follow up    Subjective   Patient ID: Alejandra Houston is a 58 y.o. female who presents for Follow-up.  She is accompanied by her  who offers some of the history.     The patient reports sudden onset of severe left knee pain while climbing the stairs on 10/2/2023, that resulted in a visit to the ED.  She also mentioned a one week history of dyspnea on mild to moderate exertion at the time as well.  The patient underwent a left knee xray which showed no fractures or sublaxation, but did confirm a small effusion with tricompartmental degenerative changes.  She was given morphine and Toradol with improvement, an ACE wrap was applied, she was given crutches, and was recommended to follow-up with Orthopedics.  The patient also underwent a Venous Ultrasound which showed no DVT despite elevated D-Dimer. A CT of the Chest was negative for PE.  EKG was normal sinus rhythm with no ischemic changes, and high-sensitivity troponins were reassuring.  She was advised to follow-up with her Primary Care Physician and discuss undergoing a Cardiac Stress Test.  The patient was discharged home the same day in relatively stable condition.    The patient states that she recalls drinking significant amounts of alcohol in the past when she was younger.  Currently, she has not been drinking alcohol, but has been taking significant amounts of Tylenol for pain control.     The patient met with  from Cardiology and was pleased with the care she received.  She was referred to another Cardiologist for further follow-up.       The patient has been following with  from Orthopedic Surgery and is awaiting an ultrasound to investigate the left knee pain.  She also has an upcoming appointment with  from Pain Medicine on 11/20/2023.  The patient continues to take duloxetine 30 mg as two tablets nightly and gabapentin as 600 mg three times daily.    The patient has started physical therapy  sessions for chronic back pain, and is optimistic this will help.      The patient reports that her blood glucose levels at home have been relatively within the normal range.    The patient reports left-sided shoulder and upper extremity pain that seems to originate from the neck. She suspects this may be related to laying on her left side at night.  She describes the pain as a stabbing sensation but denies any paresthesia.       Review of Systems   Musculoskeletal:  Positive for back pain and neck pain.        Positive for left knee pain, left upper extremity pain.        Objective   Physical Exam  Constitutional:       Appearance: Normal appearance.   Neck:      Vascular: No carotid bruit.   Cardiovascular:      Rate and Rhythm: Normal rate and regular rhythm.      Heart sounds: Normal heart sounds.   Pulmonary:      Effort: Pulmonary effort is normal.      Breath sounds: Normal breath sounds.   Abdominal:      General: Bowel sounds are normal.      Palpations: Abdomen is soft.      Tenderness: There is no abdominal tenderness.   Skin:     General: Skin is warm and dry.   Neurological:      General: No focal deficit present.      Mental Status: She is alert and oriented to person, place, and time. Mental status is at baseline.   Psychiatric:         Mood and Affect: Mood normal.         Behavior: Behavior normal.       Assessment/Plan   Problem List Items Addressed This Visit             ICD-10-CM    Depression F32.A    Relevant Medications    DULoxetine (Cymbalta) 30 mg DR capsule     Other Visit Diagnoses         Codes    Cirrhosis of liver without ascites, unspecified hepatic cirrhosis type (CMS/HCC)    -  Primary K74.60    Relevant Orders    Referral to Hepatology    Healthcare maintenance     Z00.00    Relevant Medications    albuterol 90 mcg/actuation inhaler    Thrombocytopenia (CMS/HCC)     D69.6    Relevant Orders    Referral to Hematology and Oncology    Cervical radiculopathy     M54.12    Relevant  Medications    predniSONE (Deltasone) 5 mg tablet    methocarbamol (Robaxin) 500 mg tablet            IMPRESSIONS/PLAN:     Neck Spasm  - Muscle Tension on palpation.  Prescribed prednisone 5mg taper to be started tomorrow in the morning with breakfast and always taken with food.  Also prescribed methocarbamol 500mg 1 or2 tablets prn at bedtime not to be taken with tramadol.  Told patient to stop taking cyclobenzaprine as well and she verbalized understanding.  Call the clinic if symptoms persist or worsen.    Thrombocytopenia  - Last CBC showed platelet count of 77 per 10/2023.  Ordered referral for Hematology/Oncology with .  Suspect from underlying liver cirrhosis.      Liver Cirrhosis  - CT Chest with IV Contrast 10/2023 showed incidental cirrhotic liver morphology with sequela of portal hypertension (splenomegaly and esophageal varices).  Ordered referral for Hepatology.  Advised the patient to stop taking Tylenol.   She has seen Dr. Millard in the past.     Left Adnexal Mass  - CT Abdomen Pelvis 7/2023 showed incidental finding of A 3.6 cm benign-appearing left adnexal lesion is seen.  In an early post-menopausal woman (<5 years since menopause), follow-up ultrasound at 6-12 months is recommended. Patient has scheduled appointment with Gynecology.    /70 in the office today.     Diabetes II   - Last HbA1c 6.6 per 5/2023.  Currently maintained on metformin 500mg 2 tabs BID, and Trulicity 0.75mg/0.5mL 1 pen weekly. Previously on glimperide 2mg QD.      Sleep Apnea   - Previously referred to sleep medicine.     Paresthesia of Right Upper Extremity  - Previously ordered EMG and Nerve Conduction.     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 Underwent complex revision of right TKA. Following with  in Orthopedic Surgery.     Thrombocytopenia  - Patient will see Hematology.  Has seen in past but lost to follow-up.  Stable- but platelete count  95 per 5/2023.       Vaginal dryness  - Refilled Estradiol cream.     Constipation   - Continue with polyethylene glycol 3350 17 gm oral packet as 1 packet in 8 ounces of liquid QD.     Right Third Finger Pain  - Previously ordered Xray of fingers right hand.     Dry Skin  - Prescribed Cetaphil moisturizing lotion.      Fatigue  - Likely due to untreated sleep apnea.  Encouraged patient to follow-up with Sleep Medicine, and she has appointment for next week.  Vitamin D, Vitamin B12, and TSH wnl per 9/2023.     LLE swelling  - Previously ordered Echocardiogram and referral to Cardiology.  Last Venous Duplex U/S 8/7/2023 negative for DVT. Looks like they suspected this is related to low back issues which I agree with.  MRI LS was denied by insurance, and will follow-up as imaging would be very helpful for this patient.  Continue with gabapentin 300 mg as two capsules three times daily.  Refilled tramadol as below to have on hand to use VERY sparingly, and patient understands not to use with cyclobenzaprine. Encouraged patient to follow up with  from Pain Medicine, and she agreed.  Re-ordered referral for Home Physical Therapy and Occupational Therapy.     Depression   - Following with Psychiatry. Continue duloxetine 30 mg as two tablets once daily. Previously on Lexapro, and Rexulti or Topamax.  No SI or HI.       Low Back Pain/ leg weakness   - Worsening since 4/2023.  Weakness on LLE on exam.  Patient unable to ambulate without cane at home, and is having difficulty sitting, lying down, or sleeping.  Patient unable to do physical therapy due to severity of pain in lower limbs and inability to find transportation.  MRI Lumbar Spine without Contrast denied by insurance, and will follow-up. Continue with gabapentin 600 mg TID, Naproxen 500 mg BID prn, and refilled tramadol 50 mg every 6 hours as needed.  [place]. Substance controlled contract signed. Drug screen ordered.  Seen by  from  Orthopedic Surgery. Re-ordered referral for Home Physical Therapy, and encouraged patient to follow up with  from Pain Medicine.      Atypical Chest pain  -Echo.  Referred to cards.       Smoking History   - CT Chest 4/2023 shows redemonstration of groundglass opacities in the posterior bilateral upper lobes, left lower lobe and new groundglass  opacities in the anterior left upper lobe. Central airways are clear.  Stable noncalcified pulmonary nodule in the right upper lobe (6, 38) and right middle lobe (6, 57) since 5/19/2019.  Discussed with patient that varenicline was recalled.       Health Maintenance   - Routine labs 10/2023.  Last Mammogram 5/2023. Last colonoscopy 5/2019, repeat due [id number].     Follow-up in 3 months, call sooner if needed.        Scribe Attestation  By signing my name below, I, Aida Galdamez, Scribe   attest that this documentation has been prepared under the direction and in the presence of Jhoan Eller DO.

## 2023-11-02 NOTE — TELEPHONE ENCOUNTER
Patient seen today and states you prescribed several medications. States she called her pharmacy and they stated they did not receive script for her Tramadol.     Patient states she really needs this and has no pills left.     Uses [facility].     Please advise

## 2023-11-03 ENCOUNTER — APPOINTMENT (OUTPATIENT)
Dept: HOME HEALTH SERVICES | Facility: HOME HEALTH | Age: 58
End: 2023-11-03
Payer: COMMERCIAL

## 2023-11-06 ENCOUNTER — HOME CARE VISIT (OUTPATIENT)
Dept: HOME HEALTH SERVICES | Facility: HOME HEALTH | Age: 58
End: 2023-11-06
Payer: COMMERCIAL

## 2023-11-07 ENCOUNTER — TELEPHONE (OUTPATIENT)
Dept: PRIMARY CARE | Facility: CLINIC | Age: 58
End: 2023-11-07
Payer: COMMERCIAL

## 2023-11-07 NOTE — TELEPHONE ENCOUNTER
[facility] pharmacy called back in asking for a new script for her walker.   Should state that it is for a Rollator with a diagnosis for hips/knees if possible to assist with her approval process. Please advise and refax. Thank you!

## 2023-11-08 ENCOUNTER — APPOINTMENT (OUTPATIENT)
Dept: CARDIOLOGY | Facility: CLINIC | Age: 58
End: 2023-11-08
Payer: COMMERCIAL

## 2023-11-09 DIAGNOSIS — M54.50 LOW BACK PAIN, UNSPECIFIED BACK PAIN LATERALITY, UNSPECIFIED CHRONICITY, UNSPECIFIED WHETHER SCIATICA PRESENT: ICD-10-CM

## 2023-11-09 RX ORDER — TRAMADOL HYDROCHLORIDE 50 MG/1
50 TABLET ORAL EVERY 6 HOURS PRN
Qty: 28 TABLET | Refills: 0 | Status: SHIPPED | OUTPATIENT
Start: 2023-11-09 | End: 2023-11-16 | Stop reason: SDUPTHER

## 2023-11-10 ENCOUNTER — TELEPHONE (OUTPATIENT)
Dept: PRIMARY CARE | Facility: CLINIC | Age: 58
End: 2023-11-10
Payer: COMMERCIAL

## 2023-11-10 DIAGNOSIS — R14.3 FLATUS: ICD-10-CM

## 2023-11-10 DIAGNOSIS — J01.80 ACUTE NON-RECURRENT SINUSITIS OF OTHER SINUS: Primary | ICD-10-CM

## 2023-11-10 RX ORDER — SIMETHICONE 125 MG
125 CAPSULE ORAL EVERY 6 HOURS PRN
Qty: 28 CAPSULE | Refills: 0 | Status: SHIPPED | OUTPATIENT
Start: 2023-11-10 | End: 2023-11-13

## 2023-11-10 RX ORDER — AMOXICILLIN AND CLAVULANATE POTASSIUM 875; 125 MG/1; MG/1
875 TABLET, FILM COATED ORAL 2 TIMES DAILY
Qty: 20 TABLET | Refills: 0 | Status: SHIPPED | OUTPATIENT
Start: 2023-11-10 | End: 2023-11-20 | Stop reason: ALTCHOICE

## 2023-11-10 NOTE — TELEPHONE ENCOUNTER
Patient would like something for burping and gas. Also has a sinus infection with drainage and headache. Would like both prescriptions to be called into [facility] pharmacy.

## 2023-11-13 DIAGNOSIS — R14.3 FLATUS: ICD-10-CM

## 2023-11-13 DIAGNOSIS — Z72.0 TOBACCO ABUSE: ICD-10-CM

## 2023-11-13 DIAGNOSIS — J30.9 ALLERGIC RHINITIS, UNSPECIFIED SEASONALITY, UNSPECIFIED TRIGGER: ICD-10-CM

## 2023-11-13 DIAGNOSIS — E11.9 TYPE 2 DIABETES MELLITUS WITHOUT COMPLICATION, WITHOUT LONG-TERM CURRENT USE OF INSULIN (MULTI): ICD-10-CM

## 2023-11-13 DIAGNOSIS — F17.219 CIGARETTE NICOTINE DEPENDENCE WITH NICOTINE-INDUCED DISORDER: ICD-10-CM

## 2023-11-13 DIAGNOSIS — Z00.00 HEALTHCARE MAINTENANCE: ICD-10-CM

## 2023-11-13 RX ORDER — FOLIC ACID 1 MG/1
TABLET ORAL
Qty: 30 TABLET | Refills: 1 | Status: SHIPPED | OUTPATIENT
Start: 2023-11-13 | End: 2024-01-16

## 2023-11-13 RX ORDER — CALCIUM CITRATE/VITAMIN D3 200MG-6.25
TABLET ORAL
Qty: 100 STRIP | Refills: 5 | Status: SHIPPED | OUTPATIENT
Start: 2023-11-13 | End: 2024-05-20

## 2023-11-13 RX ORDER — SIMETHICONE 125 MG/1
CAPSULE, LIQUID FILLED ORAL
Qty: 28 CAPSULE | Refills: 0 | Status: SHIPPED | OUTPATIENT
Start: 2023-11-13 | End: 2023-11-29 | Stop reason: SDUPTHER

## 2023-11-13 RX ORDER — DIPHENHYDRAMINE HCL 25 MG
4 CAPSULE ORAL AS NEEDED
Qty: 90 EACH | Refills: 1 | Status: SHIPPED | OUTPATIENT
Start: 2023-11-13 | End: 2024-01-22

## 2023-11-13 RX ORDER — IBUPROFEN 200 MG
TABLET ORAL
Qty: 28 PATCH | Refills: 1 | Status: SHIPPED | OUTPATIENT
Start: 2023-11-13 | End: 2024-01-16

## 2023-11-13 RX ORDER — DOCUSATE SODIUM 100 MG/1
CAPSULE, LIQUID FILLED ORAL
Qty: 10 CAPSULE | Refills: 2 | Status: SHIPPED | OUTPATIENT
Start: 2023-11-13 | End: 2023-11-30 | Stop reason: SDUPTHER

## 2023-11-13 RX ORDER — DULAGLUTIDE 0.75 MG/.5ML
INJECTION, SOLUTION SUBCUTANEOUS
Qty: 2 ML | Refills: 1 | Status: SHIPPED | OUTPATIENT
Start: 2023-11-13 | End: 2024-01-16

## 2023-11-13 RX ORDER — FLUTICASONE PROPIONATE 50 MCG
SPRAY, SUSPENSION (ML) NASAL
Qty: 16 G | Refills: 3 | Status: SHIPPED | OUTPATIENT
Start: 2023-11-13 | End: 2024-03-13

## 2023-11-13 RX ORDER — VITAMIN B COMPLEX
TABLET ORAL
Qty: 30 TABLET | Refills: 3 | Status: SHIPPED | OUTPATIENT
Start: 2023-11-13 | End: 2024-03-13

## 2023-11-14 ENCOUNTER — HOME CARE VISIT (OUTPATIENT)
Dept: HOME HEALTH SERVICES | Facility: HOME HEALTH | Age: 58
End: 2023-11-14
Payer: COMMERCIAL

## 2023-11-14 DIAGNOSIS — Z00.00 HEALTHCARE MAINTENANCE: ICD-10-CM

## 2023-11-14 RX ORDER — ALBUTEROL SULFATE 90 UG/1
2 AEROSOL, METERED RESPIRATORY (INHALATION) 4 TIMES DAILY PRN
Qty: 18 G | Refills: 0 | Status: SHIPPED | OUTPATIENT
Start: 2023-11-14 | End: 2023-11-29 | Stop reason: SDUPTHER

## 2023-11-15 ENCOUNTER — APPOINTMENT (OUTPATIENT)
Dept: GASTROENTEROLOGY | Facility: CLINIC | Age: 58
End: 2023-11-15
Payer: COMMERCIAL

## 2023-11-15 ENCOUNTER — HOME CARE VISIT (OUTPATIENT)
Dept: HOME HEALTH SERVICES | Facility: HOME HEALTH | Age: 58
End: 2023-11-15
Payer: COMMERCIAL

## 2023-11-15 PROCEDURE — 0023 HH SOC

## 2023-11-15 PROCEDURE — G0151 HHCP-SERV OF PT,EA 15 MIN: HCPCS

## 2023-11-15 ASSESSMENT — ENCOUNTER SYMPTOMS
HIGHEST PAIN SEVERITY IN PAST 24 HOURS: 10/10
SUBJECTIVE PAIN PROGRESSION: UNCHANGED
PAIN LOCATION - PAIN SEVERITY: 8/10
PAIN LOCATION: RIGHT LEG
PAIN LOCATION: LEFT KNEE

## 2023-11-15 ASSESSMENT — ACTIVITIES OF DAILY LIVING (ADL)
HOME_HEALTH_OASIS: 01
OASIS_M1830: 03

## 2023-11-16 ENCOUNTER — APPOINTMENT (OUTPATIENT)
Dept: GASTROENTEROLOGY | Facility: CLINIC | Age: 58
End: 2023-11-16
Payer: COMMERCIAL

## 2023-11-16 DIAGNOSIS — M54.50 LOW BACK PAIN, UNSPECIFIED BACK PAIN LATERALITY, UNSPECIFIED CHRONICITY, UNSPECIFIED WHETHER SCIATICA PRESENT: ICD-10-CM

## 2023-11-16 RX ORDER — TRAMADOL HYDROCHLORIDE 50 MG/1
50 TABLET ORAL EVERY 6 HOURS PRN
Qty: 28 TABLET | Refills: 0 | Status: SHIPPED | OUTPATIENT
Start: 2023-11-16 | End: 2023-11-20 | Stop reason: SDUPTHER

## 2023-11-19 ENCOUNTER — CLINICAL SUPPORT (OUTPATIENT)
Dept: SLEEP MEDICINE | Facility: CLINIC | Age: 58
End: 2023-11-19
Payer: COMMERCIAL

## 2023-11-19 DIAGNOSIS — G47.33 OBSTRUCTIVE SLEEP APNEA (ADULT) (PEDIATRIC): ICD-10-CM

## 2023-11-20 ENCOUNTER — OFFICE VISIT (OUTPATIENT)
Dept: PAIN MEDICINE | Facility: CLINIC | Age: 58
End: 2023-11-20
Payer: COMMERCIAL

## 2023-11-20 VITALS
RESPIRATION RATE: 18 BRPM | WEIGHT: 234 LBS | DIASTOLIC BLOOD PRESSURE: 107 MMHG | SYSTOLIC BLOOD PRESSURE: 163 MMHG | HEART RATE: 80 BPM | BODY MASS INDEX: 36.73 KG/M2 | HEIGHT: 67 IN

## 2023-11-20 VITALS
BODY MASS INDEX: 35.46 KG/M2 | HEIGHT: 68 IN | DIASTOLIC BLOOD PRESSURE: 82 MMHG | SYSTOLIC BLOOD PRESSURE: 126 MMHG | WEIGHT: 234 LBS

## 2023-11-20 DIAGNOSIS — M54.50 LOW BACK PAIN, UNSPECIFIED BACK PAIN LATERALITY, UNSPECIFIED CHRONICITY, UNSPECIFIED WHETHER SCIATICA PRESENT: ICD-10-CM

## 2023-11-20 DIAGNOSIS — G89.29 CHRONIC PAIN OF LEFT KNEE: Primary | ICD-10-CM

## 2023-11-20 DIAGNOSIS — M25.562 CHRONIC PAIN OF LEFT KNEE: Primary | ICD-10-CM

## 2023-11-20 DIAGNOSIS — J01.90 ACUTE SINUSITIS, RECURRENCE NOT SPECIFIED, UNSPECIFIED LOCATION: Primary | ICD-10-CM

## 2023-11-20 PROCEDURE — 3080F DIAST BP >= 90 MM HG: CPT | Performed by: PAIN MEDICINE

## 2023-11-20 PROCEDURE — 3044F HG A1C LEVEL LT 7.0%: CPT | Performed by: PAIN MEDICINE

## 2023-11-20 PROCEDURE — 3077F SYST BP >= 140 MM HG: CPT | Performed by: PAIN MEDICINE

## 2023-11-20 PROCEDURE — 99213 OFFICE O/P EST LOW 20 MIN: CPT | Performed by: PAIN MEDICINE

## 2023-11-20 PROCEDURE — 4004F PT TOBACCO SCREEN RCVD TLK: CPT | Performed by: PAIN MEDICINE

## 2023-11-20 RX ORDER — DICLOFENAC SODIUM 10 MG/G
4 GEL TOPICAL 4 TIMES DAILY
Qty: 480 G | Refills: 2 | Status: ON HOLD | OUTPATIENT
Start: 2023-11-20 | End: 2024-02-08

## 2023-11-20 RX ORDER — TRAMADOL HYDROCHLORIDE 50 MG/1
50 TABLET ORAL EVERY 6 HOURS PRN
Qty: 28 TABLET | Refills: 0 | Status: SHIPPED | OUTPATIENT
Start: 2023-11-20 | End: 2023-11-29 | Stop reason: SDUPTHER

## 2023-11-20 RX ORDER — DOXYCYCLINE 100 MG/1
100 CAPSULE ORAL 2 TIMES DAILY
Qty: 14 CAPSULE | Refills: 0 | Status: SHIPPED | OUTPATIENT
Start: 2023-11-20 | End: 2023-11-21

## 2023-11-20 ASSESSMENT — SLEEP AND FATIGUE QUESTIONNAIRES
SITING INACTIVE IN A PUBLIC PLACE LIKE A CLASS ROOM OR A MOVIE THEATER: HIGH CHANCE OF DOZING
HOW LIKELY ARE YOU TO NOD OFF OR FALL ASLEEP WHILE SITTING AND TALKING TO SOMEONE: HIGH CHANCE OF DOZING
HOW LIKELY ARE YOU TO NOD OFF OR FALL ASLEEP WHILE LYING DOWN TO REST IN THE AFTERNOON WHEN CIRCUMSTANCES PERMIT: HIGH CHANCE OF DOZING
HOW LIKELY ARE YOU TO NOD OFF OR FALL ASLEEP IN A CAR, WHILE STOPPED FOR A FEW MINUTES IN TRAFFIC: HIGH CHANCE OF DOZING
HOW LIKELY ARE YOU TO NOD OFF OR FALL ASLEEP WHILE SITTING AND READING: HIGH CHANCE OF DOZING
HOW LIKELY ARE YOU TO NOD OFF OR FALL ASLEEP WHILE SITTING QUIETLY AFTER LUNCH WITHOUT ALCOHOL: HIGH CHANCE OF DOZING
HOW LIKELY ARE YOU TO NOD OFF OR FALL ASLEEP WHILE WATCHING TV: HIGH CHANCE OF DOZING
ESS-CHAD TOTAL SCORE: 24
HOW LIKELY ARE YOU TO NOD OFF OR FALL ASLEEP WHEN YOU ARE A PASSENGER IN A CAR FOR AN HOUR WITHOUT A BREAK: HIGH CHANCE OF DOZING

## 2023-11-20 ASSESSMENT — PAIN - FUNCTIONAL ASSESSMENT: PAIN_FUNCTIONAL_ASSESSMENT: 0-10

## 2023-11-20 ASSESSMENT — PAIN DESCRIPTION - DESCRIPTORS: DESCRIPTORS: ACHING

## 2023-11-20 ASSESSMENT — PAIN SCALES - GENERAL
PAINLEVEL: 6
PAINLEVEL_OUTOF10: 6

## 2023-11-20 NOTE — PROGRESS NOTES
[facility] TECH NOTE:     Patient: Alejandra Houston   MRN//AGE: [id number]  1965  58 y.o.   Technologist: Brandi Oro   Room: 2   Service Date: 2023        Sleep Testing Location: Replaced by [facility]:     TECHNOLOGIST SLEEP STUDY PROCEDURE NOTE:   This sleep study is being conducted according to the policies and procedures outlined by the [employer] accreditation standards.  The sleep study procedure and processes involved during this appointment was explained to the patient/patient’s family, questions were answered. The patient/family verbalized understanding.      The patient is a 58 y.o. year old female scheduled for a Diagnostic PSG Split night with montage of:  Diagnostic PSG . she arrived for her appointment.      The study that was ultimately completed was a Diagnostic PSG  with montage of:  Diagnostic PSG .    The full study Was completed.  Patient questionnaires completed?: yes     Consents signed? yes    Initial Fall Risk Screening:     Alejandra has fallen in the last 6 months. her did result in injury. Alejandra does have a fear of falling. He does need assistance with sitting, standing, or walking. she does need assistance walking in her home. she does need assistance in an unfamiliar setting. The patient isusing an assistive device.     Brief Study observations: Patient ambulates with the assistance of a walker. All sensors applied. Full study completed.    Deviation to order/protocol and reason: None      IAfter the procedure, the patient/family was informed to ensure followup with ordering clinician for testing results.      Technologist: NEY Rodriguez

## 2023-11-20 NOTE — PROGRESS NOTES
11/20/2023      [facility]    Ms. Houston is a pleasant 58-year-old Years old  female Follow-up visit.  She is well-known to my pain clinic.  Multiple pain complaints.  Today.  Left knee pain.  This has been going on for 2 months.  She twisted her left knee.Emergency room because of the severe pain.  They did x-ray no acute fracture.  Small effusion.  She saw orthopedist who did an injection and aspiration in the same time.  She completed 6 weeks of physical therapy.  There is no resolutions.  She is waiting for MRI.  Pain is specifically in the right mid pain of the knee with some swelling no redness no fever no chills.  Unable to walk with.  She tried ice and heat as well as.  She also suffering from the back pain  But her left knee pain is the worst.      P.E    Awake oriented x3  No acute distress  Antalgic gait favoring left side using walker  Left knee mild swollen  Tenderness in the right aspect of the knee  Flexion extension aggravate the knee pain  No redness  No discharges      A/P    Pt  Voltaren cream   Consider genicular block if MRI no approved       Buddy spivey                     Signed             Diagnoses/Problems          Knee pain     · Fibromyalgia (729.1) (M79.7)   · Nicotine dependence (305.1) (F17.200)   · Spinal stenosis of lumbar region with radiculopathy (724.02,724.4) (M48.061,M54.16)     Patient Discussion/Summary     Discussed with patient possible etiology of pain. Please follow-up with vascular medicine. Please talk to your primary care physician about switching Lexapro to Cymbalta as this may help with your myalgia type pain. We have also given you exercises to complete from the [employer] please start these. We have also ordered a lumbar MRI for you please get this completed at your earliest convenience.      Provider Impressions     Patient is a 57-year-old female who presents with fibromyalgia as well as lumbar radicular type symptoms radiating down  the left lower extremity in the L4 distribution. Patient's last MRI was in 2018 which demonstrated sever degenerative disease with stenosis.     -Follow-up with vascular medicine  â€“ Continue with [facility]  â€“ Discussed with patient to discuss with her primary care physician about switching from Lexapro to Cymbalta as may help with abnormality  â€“ Patient given [employer] spine conditioning program she will start doing these.  â€“ Lumbar MRI ordered for the patient. She may benefit from some injections in the future.  -Patient counseled on smoking sensation     Attending Attestation: I saw and evaluated the patient with the PGY-5 Fellow, Dr. Smart, participated in caicedo portions of the service and directly involved in management of the patient. Discussed with resident and agree with the resident's findings and plan as documented in the resident's note.      Chief Complaint     pt here today c/o lower back and bilateral leg, more left than right. no recent images, no PT. pt is currently taking Tramadol, gabapentin, naproxen and Tylenol      History of Present IllnessReferred by primary care physician     CC: Lower back pain     HPI:  Patient is a 57-year-old female who presents with lower back pain. Patient states that she had back pain on and off for years as well as palp with her fibromyalgia but states that in April 2023 she started spearing seeing back pain radiating down the left lower extremity in the L4 distribution. She states her pain is currently 8 out of 10. She states that her pain is associated with swelling and redness. She has been worked up with 2 previous DVT studies she went to go see vascular medicine who recommended another study. She has yet to do the study. Patient states that her pain is worse with walking and better with elevation and rest tramadol 50 mg twice daily prescribed her primary care physician, naproxen 500 mg 2 times daily and gabapentin 600 mg 3 times daily. Patient  states that her pain is constant through the day and is worse at night.        Therapy: Patient has not done any recent physical therapy does not do home exercise program. She states that it is difficult for her to get to physical therapy because she only has a limited amount of rides by her insurance.     PMHx: From myalgia, diabetes, CADEN, depression, fatty liver disease, GERD, headaches, smoker diabetes     PSurgHx: Right-sided knee replacement with revision x2, left hip replacement     Social Hx: tobacco EtOH recreational drugs, works as a     Family Hx: non contributory     Meds: As per EMR of note patient is on aspirin 81 mg daily     Allergies: Denies     Past Procedures: Patient states she had multiple epidural steroid injections in the past done at [facility]     Imaging:      Patient has a lumbar MRI from 2018 which demonstrates degenerative disc disease as well as moderate to severe central stenosis at L2-3, L3-4, L4-5      Review of Systems     13 systems all normal except noted in HP.   Patient denies fevers chills nausea vomiting denies any changes in bowel or bladder      Active Problems     · Acute sinusitis (461.9) (J01.90)   · Back pain (724.5) (M54.9)   · Breast cancer screening (V76.10) (Z12.39)   · Chronic cough (786.2) (R05.3)   · Chronic knee pain after total replacement of right knee joint (719.46,V43.65)  (M25.561,G89.29,Z96.651)   · Chronic pain (338.29) (G89.29)   · Class 2 obesity with body mass index (BMI) of 38.0 to 38.9 in adult (278.00,V85.38)  (E66.9,Z68.38)   · Class 2 severe obesity with serious comorbidity and body mass index (BMI) of 39.0 to  39.9 in adult (278.01,V85.39) (E66.01,Z68.39)   · Constipation (564.00) (K59.00)   · Depression (311) (F32.A)   · Effusion of right knee (719.06) (M25.461)   · Elevated liver enzymes (790.5) (R74.8)   · Fatigue, unspecified type (780.79) (R53.83)   · Fatty liver disease, nonalcoholic (571.8) (K76.0)   · Fibromyalgia (729.1) (M79.7)   ·  Follow-up exam (V67.9) (Z09)   · GERD (gastroesophageal reflux disease) (530.81) (K21.9)   · Headache (784.0) (R51.9)   · History of smoking 30 or more pack years (V15.82) (Z87.891)   · Increased frequency of urination (788.41) (R35.0)   · Insomnia (780.52) (G47.00)   · Instability of prosthetic knee, initial encounter (996.42,V43.65) (T84.028A,Z96.659)   · Leg edema, left (782.3) (R60.0)   · Leg weakness (729.89) (R29.898)   · Low back pain (724.2) (M54.50)   · Lumbar spondylosis (721.3) (M47.816)   · Lung nodule (793.11) (R91.1)   · Mass of left parotid gland (527.8) (K11.8)   · Morbid obesity with BMI of 40.0-44.9, adult (278.01,V85.41) (E66.01,Z68.41)   · Nicotine dependence (305.1) (F17.200)   · Otalgia of both ears (388.70) (H92.03)   · Pain due to total right knee replacement, subsequent encounter  (V58.89,996.77,338.18,V43.65) (T84.84XD,Z96.651)   · Pain in hip region after total hip replacement, initial encounter (996.77,V43.64)  (T84.84XA,Z96.649)   · Left hip   · Pain of left hip joint (719.45) (M25.552)   · Pancytopenia (284.19) (D61.818)   · Parotid nodule (527.8) (K11.8)   · Personal history of COVID-19 (V12.09) (Z86.16)   · Positive 2021   · Primary osteoarthritis of left knee (715.16) (M17.12)   · Sleep apnea (780.57) (G47.30)   · Status post revision of total replacement of right knee (V43.65) (Z96.651)   · Stress incontinence, female (625.6) (N39.3)   · Type 2 diabetes mellitus (250.00) (E11.9)   · Vaginal dryness, menopausal (627.2) (N95.1)   · Vitamin D deficiency (268.9) (E55.9)     Past Medical History     · History of Avascular necrosis of bone of left hip (733.42) (M87.052)   · Resolved Date: 2022   · Elevated liver enzymes (790.5) (R74.8)   · Encounter for preventive health examination (V70.0) (Z00.00)   · Resolved Date: 1900     Surgical History     · History of  Section   · History of Hip replacement   · LEft   · History of Knee Replacement     Family History     ·  Family history of cardiac disorder (V17.49) (Z82.49)   · Family history of chronic obstructive pulmonary disease (V17.6) (Z82.5)   · Family history of diabetes mellitus (V18.0) (Z83.3)   · Family history of hypertension (V17.49) (Z82.49)   · Family history of lymphoma (V16.7) (Z80.7)     · Family history of cardiac disorder (V17.49) (Z82.49)   · Family history of diabetes mellitus (V18.0) (Z83.3)   · Family history of hypertension (V17.49) (Z82.49)   · Family history of malignant neoplasm (V16.9) (Z80.9)     Social History     · Always uses seat belt   · Current every day smoker (305.1) (F17.200)   · Disabled   · Has 3 children   · History of crack cocaine use (305.63) (Z87.898)   · Denied: History of domestic violence   · History of smoking 30 or more pack years (V15.82) (Z87.891)   · History of tobacco abuse (V15.82) (Z87.891)   ·    · No alcohol use   · No illicit drug use   · Occasional alcohol use     Allergies     · No Known Drug Allergies   Recorded By: Juju Harris; 1/7/2016 2:20:52 PM     Current Meds     Medication Name Instruction   Acetaminophen Extra Strength 500 MG Oral Tablet TAKE 2 TABLET EVERY 8 HOURS IF NEEDED FOR PAIN   Albuterol Sulfate  (90 Base) MCG/ACT Inhalation Aerosol Solution INHALE 2 PUFFS 4 TIMES A DAY IF NEEDED FOR SHORTNESS OF BREATH AND WHEEZING   Alcohol Pads 70 % Pad USE AS DIRECTED.   Alcohol Prep 70 % Pad use as directed 4 times daily   Aspirin 81 MG Oral Tablet Delayed Release Take 1 tablet 2 times a day for a month after surgery to minimize the risk of blood clots   B-Complex/B-12 Oral Tablet TAKE 1 TABLET DAILY.   Daily Multiple Vitamins Oral Tablet TAKE ONE TABLET BY MOUTH EVERY DAY   Docusate Sodium 100 MG TABS TAKE 1 TABLET Twice daily PRN constipation   Easy Comfort Lancets USE TO TEST 3 TIMES A DAY   Estradiol 0.1 MG/GM Vaginal Cream 1 applicator vaginally every night for 1 week, then taper to twice weekly for 1 month, then continue weekly.    FeroSul 325 (65 Fe) MG Oral Tablet TAKE 1 TABLET BY MOUTH TWICE A DAY WITH MEALS   Fluticasone Propionate 50 MCG/ACT Nasal Suspension USE 2 SPRAYS IN EACH NOSTRIL ONCE DAILY   Folic Acid 1 MG Oral Tablet TAKE 1 TABLET BY MOUTH EVERY DAY   Gabapentin 300 MG Oral Capsule TAKE 2 CAPSULES BY MOUTH 3 TIMES A DAY - TITRATE THE DOSE WEEKLY   Lexapro 10 MG Oral Tablet Take 1 tablet daily   Loratadine 10 MG Oral Tablet TAKE 1 TABLET BY MOUTH EVERY DAY   metFORMIN HCl - 500 MG Oral Tablet TAKE 2 TABLETS BY MOUTH TWICE A DAY   Mi-Acid Gas Relief 80 MG CHEW CHEW AND SWALLOW 1 TABLET 4 TIMES A DAY AFTER MEALS AND AT BEDTIME   Naproxen 500 MG Oral Tablet TAKE 1 TABLET BY MOUTH EVERY 12 HOURS WITH FOOD AS NEEDED   OneTouch Ultra 2 w/Device Kit     OneTouch Ultra In Vitro Strip use one strip 4 to 5 times daily   Polyethylene Glycol 3350 17 GM Oral Packet MIX 1 PACKET IN 8 OUNCES OF LIQUID AND DRINK ONE  TIME DAILY   traZODone HCl - 50 MG Oral Tablet TAKE 2 TABLETS BY MOUTH AT BEDTIME   True Metrix Blood Glucose Test In Vitro Strip USE AS DIRECTED 4-5 TIMES DAILY   Trulicity 0.75 MG/0.5ML Subcutaneous Solution Pen-injector INJECT 0.5 ML UNDER THE SKIN EVERY SUNDAY   Ventolin  (90 Base) MCG/ACT Inhalation Aerosol Solution INHALE 2 PUFFS 4 TIMES A DAY IF NEEDED FOR SHORTNESS OF BREATH AND WHEEZING   Vitamin B1 100 MG Oral Tablet TAKE 1 TABLET DAILY.   Vitamin D3 50 MCG (2000 UT) Oral Tablet TAKE 1 TABLET BY MOUTH EVERY DAY      Vitals  Vital Signs     Recorded: 21Jun2023 11:03AM   Heart Rate 82   Respiration 17   Systolic 128   Diastolic 72   Height 5 ft 7 in   Weight 242 lb    BMI Calculated 37.9 kg/m2   BSA Calculated 2.19   Tobacco Use a) Yes   Patient encouraged to stop using tobacco products Yes   Falls Screening (Age 18+) a) No falls within the last year   Pain Scale 8      Physical Exam  Awake, alert and oriented  Constitutional: Patient is well-appearing in no acute distress  Eyes: Pupils are equal and round  Psych:  Normal affect  Cardiovascular: Regular rate and rhythm  Abdomen: Nondistended  Respiratory: Nonlabored breathing  Skin: Warm and dry scar over right knee present      Lumbar spine      Palpation:  Tenderness to palpation over hips shoulders elbows knees chest as well as ankles and wrists   lumbar spinous tenderness   lumbar paraspinal tenderness        Sensory examination:  Decree sensation in stocking distribution     Manual Muscle Testing   RIGHT LEFT  Hip Flexors: 4 4  Hip Abductor: 4 4  Quadriceps: 4 4  Hamstrings: 4 4  Dorsiflexors: 4 4  Plantarflexors: 4 4  1st Toe Dorsiflexion: 4 4     Of note atrophy of the patient right-sided quadricep noted.   MSR   RIGHT LEFT  Quadriceps: 2+ 2+  Gastrocsoleus: 2+ 2+  clonus neg neg      Gait Evaluation:  Patient ambulates with a slow antalgic gait     Special tests  SLR + on the right   Seated slump + on t he right   Cotter -      Results/Data  Radiology were obtained and reviewed. Xray BN Spine, Lumbosacral; 2 or 3 Views [id number] 10:34AM Alannah Elizabeth      Test Name Result Flag Reference   Xray Lumbar Spine AP + Lateral (Report)       FINAL REPORT  Interpreted by: JAMES BOLDEN   05/12/23 12:39  MRN: [id number]  Patient Name: DARRION UMAÑA     STUDY:  Lumbar Spine, 2 views.     INDICATION:  pain M54.9: Back pain.     COMPARISON:  07/20/2022..     ACCESSION NUMBER(S):  [id number]     ORDERING CLINICIAN:  ALANNAH ELIZABETH     FINDINGS:  There is mild levoconvex scoliosis of the lumbar spine centered  around L2-L3 level. Lumbar vertebral body heights are maintained  without acute compression fracture deformities. Alignment of lumbar  vertebral bodies is maintained. There are moderate discogenic  degenerative changes of the lumbar spine, more pronounced at L3-L4  and L4-L5 levels with significant loss of disc space height and  posterior disc osteophyte complex with diffuse facet arthropathy  contributing to at least mild to moderate degree of bilateral  neural  foraminal stenosis at these 2 levels. Bowel gas pattern is  nonobstructive. Moderate degenerative changes of bilateral sacroiliac  joints are noted. Left total hip arthroplasty hardware is partially  included in the field of view.     IMPRESSION:  Moderate lumbar spondylosis more pronounced at L3-L4 and L4-L5 levels  with suggestion of at least mild to moderate neural foraminal  stenosis.     Electronically signed by: KIMI  05/12/23 12:39      MRI L Spine without Contrast [id number] 10:32AM Jhoan Eller      Test Name Result Flag Reference   MRI L Spine without Contrast

## 2023-11-20 NOTE — PROGRESS NOTES
Pt here today c/o left knee pain. Referral for MRI. Recently was walking up the stairs, there was popping sound she went to ED. Recent PT.

## 2023-11-21 DIAGNOSIS — J01.90 ACUTE SINUSITIS, RECURRENCE NOT SPECIFIED, UNSPECIFIED LOCATION: ICD-10-CM

## 2023-11-21 DIAGNOSIS — E11.9 TYPE 2 DIABETES MELLITUS WITHOUT COMPLICATION, WITHOUT LONG-TERM CURRENT USE OF INSULIN (MULTI): ICD-10-CM

## 2023-11-21 RX ORDER — METFORMIN HYDROCHLORIDE 500 MG/1
1000 TABLET ORAL 2 TIMES DAILY
Qty: 120 TABLET | Refills: 1 | Status: SHIPPED | OUTPATIENT
Start: 2023-11-21 | End: 2024-01-17

## 2023-11-21 RX ORDER — DOXYCYCLINE 100 MG/1
100 CAPSULE ORAL 2 TIMES DAILY
Qty: 14 CAPSULE | Refills: 0 | Status: SHIPPED | OUTPATIENT
Start: 2023-11-21 | End: 2023-11-28

## 2023-11-28 ENCOUNTER — OFFICE VISIT (OUTPATIENT)
Dept: HEMATOLOGY/ONCOLOGY | Facility: CLINIC | Age: 58
End: 2023-11-28
Payer: COMMERCIAL

## 2023-11-28 ENCOUNTER — LAB (OUTPATIENT)
Dept: LAB | Facility: CLINIC | Age: 58
End: 2023-11-28
Payer: COMMERCIAL

## 2023-11-28 VITALS
DIASTOLIC BLOOD PRESSURE: 86 MMHG | HEART RATE: 73 BPM | HEIGHT: 68 IN | SYSTOLIC BLOOD PRESSURE: 134 MMHG | RESPIRATION RATE: 16 BRPM | OXYGEN SATURATION: 93 % | WEIGHT: 238.1 LBS | BODY MASS INDEX: 36.09 KG/M2 | TEMPERATURE: 97.2 F

## 2023-11-28 DIAGNOSIS — E11.9 TYPE 2 DIABETES MELLITUS WITHOUT COMPLICATION, WITHOUT LONG-TERM CURRENT USE OF INSULIN (MULTI): ICD-10-CM

## 2023-11-28 DIAGNOSIS — D69.6 THROMBOCYTOPENIA (CMS-HCC): ICD-10-CM

## 2023-11-28 LAB
BASOPHILS # BLD AUTO: 0.03 X10*3/UL (ref 0–0.1)
BASOPHILS NFR BLD AUTO: 0.5 %
EOSINOPHIL # BLD AUTO: 0.24 X10*3/UL (ref 0–0.7)
EOSINOPHIL NFR BLD AUTO: 4 %
ERYTHROCYTE [DISTWIDTH] IN BLOOD BY AUTOMATED COUNT: 14.4 % (ref 11.5–14.5)
HCT VFR BLD AUTO: 42.2 % (ref 36–46)
HGB BLD-MCNC: 14.4 G/DL (ref 12–16)
IMM GRANULOCYTES # BLD AUTO: 0.02 X10*3/UL (ref 0–0.7)
IMM GRANULOCYTES NFR BLD AUTO: 0.3 % (ref 0–0.9)
LYMPHOCYTES # BLD AUTO: 1.42 X10*3/UL (ref 1.2–4.8)
LYMPHOCYTES NFR BLD AUTO: 23.5 %
MCH RBC QN AUTO: 30.3 PG (ref 26–34)
MCHC RBC AUTO-ENTMCNC: 34.1 G/DL (ref 32–36)
MCV RBC AUTO: 89 FL (ref 80–100)
MONOCYTES # BLD AUTO: 0.52 X10*3/UL (ref 0.1–1)
MONOCYTES NFR BLD AUTO: 8.6 %
NEUTROPHILS # BLD AUTO: 3.81 X10*3/UL (ref 1.2–7.7)
NEUTROPHILS NFR BLD AUTO: 63.1 %
PLATELET # BLD AUTO: 83 X10*3/UL (ref 150–450)
RBC # BLD AUTO: 4.76 X10*6/UL (ref 4–5.2)
WBC # BLD AUTO: 6 X10*3/UL (ref 4.4–11.3)

## 2023-11-28 PROCEDURE — 3075F SYST BP GE 130 - 139MM HG: CPT | Performed by: PHYSICIAN ASSISTANT

## 2023-11-28 PROCEDURE — 83036 HEMOGLOBIN GLYCOSYLATED A1C: CPT

## 2023-11-28 PROCEDURE — 4004F PT TOBACCO SCREEN RCVD TLK: CPT | Performed by: PHYSICIAN ASSISTANT

## 2023-11-28 PROCEDURE — 86235 NUCLEAR ANTIGEN ANTIBODY: CPT

## 2023-11-28 PROCEDURE — 82607 VITAMIN B-12: CPT

## 2023-11-28 PROCEDURE — 86431 RHEUMATOID FACTOR QUANT: CPT

## 2023-11-28 PROCEDURE — 86038 ANTINUCLEAR ANTIBODIES: CPT

## 2023-11-28 PROCEDURE — 82525 ASSAY OF COPPER: CPT

## 2023-11-28 PROCEDURE — 36415 COLL VENOUS BLD VENIPUNCTURE: CPT

## 2023-11-28 PROCEDURE — 85025 COMPLETE CBC W/AUTO DIFF WBC: CPT

## 2023-11-28 PROCEDURE — 3079F DIAST BP 80-89 MM HG: CPT | Performed by: PHYSICIAN ASSISTANT

## 2023-11-28 PROCEDURE — 3044F HG A1C LEVEL LT 7.0%: CPT | Performed by: PHYSICIAN ASSISTANT

## 2023-11-28 PROCEDURE — 99215 OFFICE O/P EST HI 40 MIN: CPT | Performed by: PHYSICIAN ASSISTANT

## 2023-11-28 PROCEDURE — 99205 OFFICE O/P NEW HI 60 MIN: CPT | Performed by: PHYSICIAN ASSISTANT

## 2023-11-28 ASSESSMENT — ENCOUNTER SYMPTOMS
FATIGUE: 1
DIARRHEA: 0
NECK PAIN: 0
DIFFICULTY URINATING: 0
FREQUENCY: 0
CARDIOVASCULAR NEGATIVE: 1
HEADACHES: 1
CONFUSION: 0
VOMITING: 0
BACK PAIN: 1
BRUISES/BLEEDS EASILY: 1
NUMBNESS: 0
CONSTIPATION: 0
SCLERAL ICTERUS: 0
DECREASED CONCENTRATION: 0
ARTHRALGIAS: 1
ABDOMINAL PAIN: 0
APPETITE CHANGE: 0
NAUSEA: 1
EYE PROBLEMS: 0
NECK STIFFNESS: 0
HEMATURIA: 0
CHILLS: 0
DIAPHORESIS: 0
DIZZINESS: 0
SHORTNESS OF BREATH: 1
ABDOMINAL DISTENTION: 0
SPEECH DIFFICULTY: 0
WOUND: 0

## 2023-11-28 ASSESSMENT — PAIN SCALES - GENERAL: PAINLEVEL: 0-NO PAIN

## 2023-11-28 NOTE — PROGRESS NOTES
Patient ID: Alejandra Houston is a 58 y.o. female.  Primary care physician: Dr. Jhoan Eller     Interval History:   Ms. Houston is a 58 years-old female with PMH of cirrhosis, fibromyalgia, COPD, type 2 DM, and obstructive sleep apnea. She is referred to [facility] today for the evaluation of thrombocytopenia. Of note, patient was seen by Dr. Bernabe Millard previously, however, she lost follow up since 2021. Admits history of alcohol drinking, 12 packs once every other day since 1980, but starting 5 to 6 years ago, patient states that she only drinks socially. Admits history of smoking, used to smoke one pack per day since 1979, currently, she smokes 3 cigarette per day.     Selected labs prior to the initial consult:   7/6/18: platelets 91,000  3/15/21: platelets 122,000  10/28/21: Hep A total Ab reactive, hepatitis B surface Ag non-reactive, Hep C Ab non-reactive, hep B core total Ab non-reactive, WILLEM negative,   7/29/22: platelets 70,000  5/3/23: platelets 95,000  8/7/23: platelet 72,000  10/2/23: WBC 4.25, ANC 2.75, Hgb 13.6, MCV 90.6, platelets 77,000    7/17/23: CT abdomen/pelvis without IV contrast  -Liver: Cirrhotic nodular liver.   Biliary: The gallbladder is unremarkable.   Spleen: Borderline enlarged measuring 13.8 cm in craniocaudal length.    Subjective    -Complains of fatigue and headaches  -Complains of low back pain, knee pain  -Denies any fever, drenching night sweats. Denies early satiety.   -Complains of SOB and congestion  -Complains of nausea  -Denies any nose or gum bleeding. Denies any melena or hematochezia.   -Admits easy bruising.     Social History     Tobacco Use    Smoking status: Every Day     Packs/day: 0.50     Years: 43.00     Additional pack years: 0.00     Total pack years: 21.50     Types: Cigarettes     Start date: 1980    Smokeless tobacco: Never   Vaping Use    Vaping Use: Never used   Substance Use Topics    Alcohol use: Not Currently     Comment: social    Drug use: Never         Objective    BMI:   Body mass index is 36.25 kg/m².     Vitals:   Visit Vitals  Smoking Status Every Day     Vitals:    11/28/23 1254   BP: 134/86   Pulse: 73   Resp: 16   Temp: 36.2 °C (97.2 °F)   SpO2: 93%        Review of Systems   Constitutional:  Positive for fatigue. Negative for appetite change, chills and diaphoresis.   HENT:   Negative for lump/mass, mouth sores and nosebleeds.    Eyes:  Negative for eye problems and icterus.   Respiratory:  Positive for shortness of breath.         Admits congestion    Cardiovascular: Negative.    Gastrointestinal:  Positive for nausea. Negative for abdominal distention, abdominal pain, constipation, diarrhea and vomiting.   Genitourinary:  Negative for bladder incontinence, difficulty urinating, frequency and hematuria.    Musculoskeletal:  Positive for arthralgias and back pain. Negative for gait problem, neck pain and neck stiffness.   Skin:  Negative for itching, rash and wound.   Neurological:  Positive for headaches. Negative for dizziness, gait problem, numbness and speech difficulty.   Hematological:  Bruises/bleeds easily.   Psychiatric/Behavioral:  Negative for confusion and decreased concentration.         Physical Exam  HENT:      Head: Normocephalic.      Nose: Nose normal.      Mouth/Throat:      Mouth: Mucous membranes are moist.   Eyes:      Pupils: Pupils are equal, round, and reactive to light.   Cardiovascular:      Rate and Rhythm: Normal rate and regular rhythm.      Pulses: Normal pulses.      Heart sounds: Normal heart sounds.   Pulmonary:      Effort: Pulmonary effort is normal.      Breath sounds: Normal breath sounds.   Musculoskeletal:         General: Normal range of motion.   Skin:     General: Skin is warm and dry.   Neurological:      General: No focal deficit present.      Mental Status: She is alert and oriented to person, place, and time.   Psychiatric:         Mood and Affect: Mood normal.         Behavior: Behavior normal.  "        Labs:  Lab Results   Component Value Date    WBC 6.6 05/03/2023    NEUTROABS 3.97 05/03/2023    LYMPHSABS 1.59 05/03/2023    MONOSABS 0.67 05/03/2023    EOSABS 0.23 05/03/2023    BASOSABS 0.04 05/03/2023    RBC 3.97 (L) 05/03/2023    MCV 93 05/03/2023    MCHC 33.4 05/03/2023    HGB 12.3 05/03/2023    HCT 36.8 05/03/2023    PLT 95 (L) 05/03/2023     No results found for: \"RETICCTPCT\"   Lab Results   Component Value Date    CREATININE 0.81 05/03/2023    BUN 17 05/03/2023     05/03/2023    K 3.5 05/03/2023    CL 99 05/03/2023    CO2 33 (H) 05/03/2023      Lab Results   Component Value Date    ALT 30 05/03/2023    AST 28 05/03/2023    ALKPHOS 65 05/03/2023    BILITOT 0.4 05/03/2023      Lab Results   Component Value Date    TSH 2.19 09/21/2023     Lab Results   Component Value Date    TSH 2.19 09/21/2023     Lab Results   Component Value Date    IRON 128 10/28/2021    TIBC 304 10/28/2021    FERRITIN 314 (H) 10/28/2021      Lab Results   Component Value Date    BQBTXMQY23 380 09/21/2023      Lab Results   Component Value Date    WILLEM NEGATIVE 10/28/2021    SEDRATE 16 02/27/2018        Lab Results   Component Value Date    SPEP SEE COMMENT 12/03/2019     No results found for: \"IGG\", \"IGM\", \"IGA\"       Performance Status:  Asymptomatic    Assessment/Plan      Thrombocytopenia  -Chronic, moderate thrombocytopenia. Patient is asymptomatic.   -Discussed causes of thrombocytopenia with patient: pseudothrombocytopenia, ITP, drug induced, infection (e.g. HIV, hepatitis C, mono), hypersplenism due to chronic liver disease, alcohol, nutrient deficiencies (e.g. vitamin B12, folate, copper), autoimmune disorder (SLE, RA), MDS, cancer with bone marrow infiltration, hereditary    -9/21/23: vitamin B12 380--> at the lower end of the normal range. Currently, patient is taking oral vitamin B12 once per day. States that she has been taking it for years.   -7/17/23: CT abdomen/pelvis without IV contrast showed   Liver: " Cirrhotic nodular liver.   Spleen: Borderline enlarged measuring 13.8 cm in craniocaudal length.  -most likely, thrombocytopenia secondary to liver cirrhosis and splenomegaly.   -However, we will do CBC, vitamin B12, folate, hayde, WILLEM and RF today.   -We will call and update results once they are available. If her vitamin B12 is low, we will switch patient to vitamin B12 subQ injection  -Patient was seen by Dr. Bernabe Millard previously, but lost follow up since 2021. We will refer patient to a hepatologist today.   -If above blood tests come back normal, we will monitor her blood counts, I will go ahead and schedule a 4 months follow up with patient with CBC done on arrival due to patient's transportation issue.      2. DM  -currently on metformin, Trulicity      3. Sleep apnea    4. Anxiety/depression  -Currently on duloxetine    5. Back pain     6. COPD  -currently on albuterol, fluticasone      7. Fibromyalgia         Problem List Items Addressed This Visit    None  Visit Diagnoses         Codes    Thrombocytopenia (CMS/HCC)     D69.6    Relevant Orders    Referral to Hepatology    Vitamin B12    CBC and Auto Differential    WILLEM with Reflex to [place]    Rheumatoid Factor    Copper, Blood    Clinic Appointment Request Follow Up; FERMÍN PAYNE; [facility]    CBC and Auto Differential                 Fermín Payne PA-C

## 2023-11-28 NOTE — PATIENT INSTRUCTIONS
We will send you to see a liver doctor for cirrhosis.     You had some blood drawn today. We will call and update results with you once they are available.     I will see you back in 4 months to follow up with labs done on arrival.

## 2023-11-29 ENCOUNTER — OFFICE VISIT (OUTPATIENT)
Dept: PRIMARY CARE | Facility: CLINIC | Age: 58
End: 2023-11-29
Payer: COMMERCIAL

## 2023-11-29 VITALS
SYSTOLIC BLOOD PRESSURE: 132 MMHG | OXYGEN SATURATION: 97 % | TEMPERATURE: 97.1 F | HEART RATE: 79 BPM | WEIGHT: 239.2 LBS | DIASTOLIC BLOOD PRESSURE: 90 MMHG | BODY MASS INDEX: 36.25 KG/M2 | HEIGHT: 68 IN

## 2023-11-29 DIAGNOSIS — G89.29 CHRONIC LOW BACK PAIN, UNSPECIFIED BACK PAIN LATERALITY, UNSPECIFIED WHETHER SCIATICA PRESENT: ICD-10-CM

## 2023-11-29 DIAGNOSIS — R35.0 URINARY FREQUENCY: ICD-10-CM

## 2023-11-29 DIAGNOSIS — E11.9 TYPE 2 DIABETES MELLITUS WITHOUT COMPLICATION, WITHOUT LONG-TERM CURRENT USE OF INSULIN (MULTI): ICD-10-CM

## 2023-11-29 DIAGNOSIS — J32.9 CHRONIC SINUSITIS, UNSPECIFIED LOCATION: Primary | ICD-10-CM

## 2023-11-29 DIAGNOSIS — G89.29 OTHER CHRONIC PAIN: ICD-10-CM

## 2023-11-29 DIAGNOSIS — M54.12 CERVICAL RADICULOPATHY: ICD-10-CM

## 2023-11-29 DIAGNOSIS — M54.50 LOW BACK PAIN, UNSPECIFIED BACK PAIN LATERALITY, UNSPECIFIED CHRONICITY, UNSPECIFIED WHETHER SCIATICA PRESENT: ICD-10-CM

## 2023-11-29 DIAGNOSIS — N95.1 VAGINAL DRYNESS, MENOPAUSAL: ICD-10-CM

## 2023-11-29 DIAGNOSIS — S43.422A SPRAIN OF LEFT ROTATOR CUFF CAPSULE, INITIAL ENCOUNTER: ICD-10-CM

## 2023-11-29 DIAGNOSIS — M54.50 CHRONIC LOW BACK PAIN, UNSPECIFIED BACK PAIN LATERALITY, UNSPECIFIED WHETHER SCIATICA PRESENT: ICD-10-CM

## 2023-11-29 DIAGNOSIS — F51.01 PRIMARY INSOMNIA: ICD-10-CM

## 2023-11-29 DIAGNOSIS — R14.3 FLATUS: ICD-10-CM

## 2023-11-29 DIAGNOSIS — Z00.00 HEALTHCARE MAINTENANCE: ICD-10-CM

## 2023-11-29 PROBLEM — E83.10 DISORDER OF IRON METABOLISM: Status: ACTIVE | Noted: 2023-11-29

## 2023-11-29 PROBLEM — G25.81 RESTLESS LEG SYNDROME: Status: ACTIVE | Noted: 2023-11-29

## 2023-11-29 PROBLEM — M25.561 CHRONIC PAIN OF RIGHT KNEE: Status: ACTIVE | Noted: 2017-06-14

## 2023-11-29 PROBLEM — M16.0 ARTHRITIS OF BOTH HIPS: Status: ACTIVE | Noted: 2023-10-06

## 2023-11-29 LAB
EST. AVERAGE GLUCOSE BLD GHB EST-MCNC: 177 MG/DL
HBA1C MFR BLD: 7.8 %
RHEUMATOID FACT SER NEPH-ACNC: 11 IU/ML (ref 0–15)
VIT B12 SERPL-MCNC: 520 PG/ML (ref 211–911)

## 2023-11-29 PROCEDURE — 3080F DIAST BP >= 90 MM HG: CPT | Performed by: INTERNAL MEDICINE

## 2023-11-29 PROCEDURE — 99215 OFFICE O/P EST HI 40 MIN: CPT | Performed by: INTERNAL MEDICINE

## 2023-11-29 PROCEDURE — 3075F SYST BP GE 130 - 139MM HG: CPT | Performed by: INTERNAL MEDICINE

## 2023-11-29 PROCEDURE — 4004F PT TOBACCO SCREEN RCVD TLK: CPT | Performed by: INTERNAL MEDICINE

## 2023-11-29 PROCEDURE — 3044F HG A1C LEVEL LT 7.0%: CPT | Performed by: INTERNAL MEDICINE

## 2023-11-29 RX ORDER — SIMETHICONE 125 MG
CAPSULE ORAL
Qty: 28 CAPSULE | Refills: 0 | Status: SHIPPED | OUTPATIENT
Start: 2023-11-29 | End: 2024-02-10 | Stop reason: HOSPADM

## 2023-11-29 RX ORDER — ALBUTEROL SULFATE 90 UG/1
2 AEROSOL, METERED RESPIRATORY (INHALATION) 4 TIMES DAILY PRN
Qty: 18 G | Refills: 0 | Status: SHIPPED | OUTPATIENT
Start: 2023-11-29 | End: 2024-01-17

## 2023-11-29 RX ORDER — NAPROXEN 500 MG/1
TABLET ORAL
Qty: 60 TABLET | Refills: 0 | Status: SHIPPED | OUTPATIENT
Start: 2023-11-29 | End: 2024-03-28 | Stop reason: SDUPTHER

## 2023-11-29 RX ORDER — TRAZODONE HYDROCHLORIDE 50 MG/1
100 TABLET ORAL NIGHTLY
Qty: 60 TABLET | Refills: 2 | Status: SHIPPED | OUTPATIENT
Start: 2023-11-29 | End: 2024-03-13

## 2023-11-29 RX ORDER — METHYLPREDNISOLONE 4 MG/1
TABLET ORAL
Qty: 21 TABLET | Refills: 0 | Status: SHIPPED | OUTPATIENT
Start: 2023-11-29 | End: 2023-12-06

## 2023-11-29 RX ORDER — ACETAMINOPHEN 500 MG
1000 TABLET ORAL EVERY 8 HOURS PRN
Qty: 180 TABLET | Refills: 0 | Status: SHIPPED | OUTPATIENT
Start: 2023-11-29 | End: 2023-11-30 | Stop reason: SDUPTHER

## 2023-11-29 RX ORDER — FAMOTIDINE 40 MG/1
40 TABLET, FILM COATED ORAL DAILY
COMMUNITY
Start: 2023-11-20 | End: 2024-01-22 | Stop reason: ENTERED-IN-ERROR

## 2023-11-29 RX ORDER — TRAMADOL HYDROCHLORIDE 50 MG/1
50 TABLET ORAL EVERY 6 HOURS PRN
Qty: 28 TABLET | Refills: 0 | Status: SHIPPED | OUTPATIENT
Start: 2023-11-29 | End: 2023-12-06 | Stop reason: SDUPTHER

## 2023-11-29 RX ORDER — METHOCARBAMOL 500 MG/1
TABLET, FILM COATED ORAL
Qty: 40 TABLET | Refills: 0 | Status: SHIPPED | OUTPATIENT
Start: 2023-11-29 | End: 2023-12-21

## 2023-11-29 RX ORDER — ESTRADIOL 0.1 MG/G
CREAM VAGINAL
Qty: 42.5 G | Refills: 0 | Status: SHIPPED | OUTPATIENT
Start: 2023-11-29 | End: 2024-02-10 | Stop reason: HOSPADM

## 2023-11-29 ASSESSMENT — ENCOUNTER SYMPTOMS
SINUS PRESSURE: 1
DEPRESSION: 0
LOSS OF SENSATION IN FEET: 0
RHINORRHEA: 1
OCCASIONAL FEELINGS OF UNSTEADINESS: 1
CONSTIPATION: 1

## 2023-11-29 ASSESSMENT — PATIENT HEALTH QUESTIONNAIRE - PHQ9
2. FEELING DOWN, DEPRESSED OR HOPELESS: NOT AT ALL
1. LITTLE INTEREST OR PLEASURE IN DOING THINGS: NOT AT ALL
SUM OF ALL RESPONSES TO PHQ9 QUESTIONS 1 AND 2: 0

## 2023-11-29 NOTE — PROGRESS NOTES
Patient is here for a follow up    Subjective   Patient ID: Alejandra Houston is a 58 y.o. female who presents for Follow-up (Patient states that the antibiotics has not been working, shoulder pain is still persistent. Patient has incontinence, and headaches every day for couple weeks now. Cetaphil prescription not covered by her insurance, she is asking if you can prescribe something else.). She is accompanied by her  who offers some of the history.     The patient reports ongoing rhinorrhea, coryza, sinus pressure, and postnasal drip since about 10/18/2023 despite several rounds of antibiotics and nasal sprays.      The patient mentions recurrent constipation despite taking Colace.  Currently, the last bowel movement was three days prior, and recalls runny loose stools at the time.  She has scheduled an appointment with Gastroenterology for 1/2024.    The patient reports nocturia of 5 times per evening associated with urinary urgency.  She is maintained with metformin 500mg 2 tabs BID, and Trulicity 0.75mg/0.5mL 1 pen weekly and is generally tolerating these medications well.    The patient met with Hematology/Oncology yesterday for thrombocytopenia, and is pleased with the care she received.  The last platelet count yesterday was stable at 83 per CBC with differential.    The patient mentions ongoing left shoulder pain that is limiting her ability to put on her shirt.      The patient has cut down on smoking, and had a small cigarette this morning.       Review of Systems   HENT:  Positive for postnasal drip, rhinorrhea, sinus pressure and sneezing.    Gastrointestinal:  Positive for constipation.   Genitourinary:  Positive for urgency.        Positive for nocturia of five times per evening.   Musculoskeletal:         Positive for left shoulder pain.       Objective   Physical Exam  Constitutional:       Appearance: Normal appearance.   Neck:      Vascular: No carotid bruit.   Cardiovascular:      Rate and  Rhythm: Normal rate and regular rhythm.      Heart sounds: Normal heart sounds.   Pulmonary:      Effort: Pulmonary effort is normal.      Breath sounds: Normal breath sounds.   Abdominal:      General: Bowel sounds are normal.      Palpations: Abdomen is soft.      Tenderness: There is no abdominal tenderness.   Skin:     General: Skin is warm and dry.   Neurological:      General: No focal deficit present.      Mental Status: She is alert and oriented to person, place, and time. Mental status is at baseline.   Psychiatric:         Mood and Affect: Mood normal.         Behavior: Behavior normal.         Assessment/Plan   Problem List Items Addressed This Visit             ICD-10-CM    Chronic pain G89.29    Relevant Medications    acetaminophen (Tylenol) 500 mg tablet    Insomnia G47.00    Relevant Medications    traZODone (Desyrel) 50 mg tablet    Low back pain M54.50    Relevant Medications    traMADol (Ultram) 50 mg tablet    naproxen (Naprosyn) 500 mg tablet    Type 2 diabetes mellitus (CMS/[facility]) E11.9    Relevant Orders    Hemoglobin A1c    Vaginal dryness, menopausal N95.1    Relevant Medications    estradiol (Estrace) 0.01 % (0.1 mg/gram) vaginal cream     Other Visit Diagnoses         Codes    Chronic sinusitis, unspecified location    -  Primary J32.9    Relevant Orders    Referral to ENT    CT sinus wo IV contrast    Healthcare maintenance     Z00.00    Relevant Medications    albuterol 90 mcg/actuation inhaler    Flatus     R14.3    Relevant Medications    simethicone (Gas Relief Extra Strength) 125 mg capsule    Cervical radiculopathy     M54.12    Relevant Medications    methocarbamol (Robaxin) 500 mg tablet    Urinary frequency     R35.0    Relevant Orders    Urinalysis with Reflex Microscopic    Urine Culture    Sprain of left rotator cuff capsule, initial encounter     S43.422A    Relevant Medications    methylPREDNISolone (Medrol Dospak) 4 mg tablets            IMPRESSIONS/PLAN:     Diabetes II   -  Last HbA1c 6.6 per 5/2023.  Currently maintained on metformin 500mg 2 tabs BID, and Trulicity 0.75mg/0.5mL 1 pen weekly. Previously on glimperide 2mg QD.  Ordered HbA1c.     Urinary Frequency  - Ordered Urinalysis with reflex microscopy, and Urine Culture.    Chronic Sinusitis  - Ordered CT Sinus without Contrast, and referral to Otolaryngology.      Constipation   - Last BM was three days prior.  Advised patient to try Colace BID and polyethylene glycol 3350 17 gm oral packet as 1 packet in 8 ounces of liquid once daily in the afternoon.    Left Shoulder Pain  - Prescribed methylprednisolone 4mg Dospak to be taken as directed on the packaging.  Call the clinic if symptoms persist or worsen.    /90 in the office today.     Sleep Apnea   - Previously referred to sleep medicine.     Paresthesia of Right Upper Extremity  - Previously ordered EMG and Nerve Conduction.     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 Underwent complex revision of right TKA. Following with  in Orthopedic Surgery.     Thrombocytopenia  - Last CBC showed platelet count of 77 per 10/2023.  Following with Hematology/Oncology.  Suspect from underlying liver cirrhosis.       Liver Cirrhosis  - CT Chest with IV Contrast 10/2023 showed incidental cirrhotic liver morphology with sequela of portal hypertension (splenomegaly and esophageal varices).  Previously ordered referral for Hepatology. Advised the patient to stop taking Tylenol.   She has seen Dr. Millard in the past.      Left Adnexal Mass  - CT Abdomen Pelvis 7/2023 showed incidental finding of A 3.6 cm benign-appearing left adnexal lesion is seen.  In an early post-menopausal woman (<5 years since menopause), follow-up ultrasound at 6-12 months is recommended. Patient has scheduled appointment with Gynecology.    Vaginal dryness  - Refilled Estradiol cream.     Right Third Finger Pain  - Previously ordered Xray of fingers  right hand.     Dry Skin  - Prescribed Cetaphil moisturizing lotion.      Fatigue  - Likely due to untreated sleep apnea.  Encouraged patient to follow-up with Sleep Medicine, and she has appointment for next week.  Vitamin D, Vitamin B12, and TSH wnl per 9/2023.     LLE swelling  - Previously ordered Echocardiogram and referral to Cardiology.  Last Venous Duplex U/S 8/7/2023 negative for DVT. Looks like they suspected this is related to low back issues which I agree with.  MRI LS was denied by insurance, and will follow-up as imaging would be very helpful for this patient.  Continue with gabapentin 300 mg as two capsules three times daily.  Refilled tramadol as below to have on hand to use VERY sparingly, and patient understands not to use with cyclobenzaprine. Encouraged patient to follow up with  from Pain Medicine, and she agreed.       Depression   - Following with Psychiatry. Continue duloxetine 30 mg as two tablets once daily. Previously on Lexapro, and Rexulti or Topamax.  No SI or HI.       Low Back Pain/ leg weakness   - Worsening since 4/2023.  Weakness on LLE on exam.  Patient unable to ambulate without cane at home, and is having difficulty sitting, lying down, or sleeping.  Patient unable to do physical therapy due to severity of pain in lower limbs and inability to find transportation.  MRI Lumbar Spine without Contrast denied by insurance, and will follow-up. Continue with gabapentin 600 mg TID, Naproxen 500 mg BID prn, and refilled tramadol 50 mg every 6 hours as needed.  [place]. Substance controlled contract signed. Drug screen ordered. Has followed up with  from Pain Medicine.       Atypical Chest pain  -Echo.  Referred to cards previously.     Smoking History   - CT Chest 4/2023 shows redemonstration of groundglass opacities in the posterior bilateral upper lobes, left lower lobe and new groundglass  opacities in the anterior left upper lobe. Central airways are clear.   Stable noncalcified pulmonary nodule in the right upper lobe (6, 38) and right middle lobe (6, 57) since 5/19/2019.  Discussed with patient that varenicline was recalled.       Health Maintenance   - Routine labs 10/2023.  Last Mammogram 5/2023. Last colonoscopy 5/2019, repeat due [id number].  Patient received Influenza vaccine in the clinic today, tolerated well.      Follow-up in 3 months, call sooner if needed.        Scribe Attestation  By signing my name below, I, Pedro Richard   attest that this documentation has been prepared under the direction and in the presence of Jhoan Eller DO.

## 2023-11-30 DIAGNOSIS — G89.29 OTHER CHRONIC PAIN: ICD-10-CM

## 2023-11-30 DIAGNOSIS — Z00.00 HEALTHCARE MAINTENANCE: ICD-10-CM

## 2023-11-30 LAB — COPPER SERPL-MCNC: 91.6 UG/DL (ref 80–155)

## 2023-11-30 RX ORDER — ACETAMINOPHEN 500 MG
1000 TABLET ORAL EVERY 8 HOURS PRN
Qty: 180 TABLET | Refills: 0 | Status: SHIPPED | OUTPATIENT
Start: 2023-11-30 | End: 2023-12-11

## 2023-11-30 RX ORDER — DOCUSATE SODIUM 100 MG/1
CAPSULE, LIQUID FILLED ORAL
Qty: 10 CAPSULE | Refills: 2 | Status: SHIPPED | OUTPATIENT
Start: 2023-11-30 | End: 2024-02-10 | Stop reason: HOSPADM

## 2023-12-03 LAB
ANA PATTERN: ABNORMAL
ANA SER QL HEP2 SUBST: POSITIVE
ANA TITR SER IF: ABNORMAL {TITER}

## 2023-12-04 ENCOUNTER — TELEPHONE (OUTPATIENT)
Dept: HEMATOLOGY/ONCOLOGY | Facility: CLINIC | Age: 58
End: 2023-12-04
Payer: COMMERCIAL

## 2023-12-04 LAB
CENTROMERE B AB SER-ACNC: <0.2 AI
CHROMATIN AB SERPL-ACNC: <0.2 AI
DSDNA AB SER-ACNC: 1 IU/ML
ENA JO1 AB SER QL IA: <0.2 AI
ENA RNP AB SER IA-ACNC: 0.2 AI
ENA SCL70 AB SER QL IA: <0.2 AI
ENA SM AB SER IA-ACNC: <0.2 AI
ENA SM+RNP AB SER QL IA: <0.2 AI
ENA SS-A AB SER IA-ACNC: <0.2 AI
ENA SS-B AB SER IA-ACNC: <0.2 AI
RIBOSOMAL P AB SER-ACNC: <0.2 AI

## 2023-12-04 NOTE — LETTER
December 5, 2023     Alejandra Houston    Patient: Alejandra Houston   YOB: 1965   Date of Visit:        Dear Alejandra Houston:    Thank you for seeing me in clinic for evaluation. Below are your lab results and findings. We have tried several times to call your home to discuss; without success. Please reach out to the office with any further questions. Our office phone number is: [phone], option #3    Test results:  Platelet counts are low, but stable, likely due to cirrhosis. I already sent a referral to hepatologist. Her vitamin B12 is good. She does not need vitamin B12 injections. WILLEM positive, but reflex is normal. RF is normal, Copper level is normal. She will keep an appointment with me in 4 months.         Sincerely,         Shruthi Vincent PA-C        ______________________________________________________________________________________

## 2023-12-04 NOTE — TELEPHONE ENCOUNTER
Per Shruthi Vincent:  Can you let patient know that her platelet counts are low, but stable, likely due to cirrhosis. I already sent a referral to hepatologist. Her vitamin B12 is good. She does not need vitamin B12 injections. WILLEM positive, but reflex is normal. RF is normal, Copper level is normal. She will keep an appointment with me in 4 months.     Left message for the patient to call the office back to discuss further.

## 2023-12-05 NOTE — TELEPHONE ENCOUNTER
2nd message left for the patient to call the office back to discuss further. Letter also sent to patient's My Chart haider with lab results.

## 2023-12-06 DIAGNOSIS — M54.50 LOW BACK PAIN, UNSPECIFIED BACK PAIN LATERALITY, UNSPECIFIED CHRONICITY, UNSPECIFIED WHETHER SCIATICA PRESENT: ICD-10-CM

## 2023-12-06 RX ORDER — TRAMADOL HYDROCHLORIDE 50 MG/1
50 TABLET ORAL EVERY 6 HOURS PRN
Qty: 28 TABLET | Refills: 0 | Status: SHIPPED | OUTPATIENT
Start: 2023-12-06 | End: 2023-12-13 | Stop reason: SDUPTHER

## 2023-12-06 NOTE — TELEPHONE ENCOUNTER
Spoke with the patient. Provided update from Shruthi Vincent on lab results. Pt verbalized understanding and had no further questions or concerns.

## 2023-12-07 ENCOUNTER — TELEPHONE (OUTPATIENT)
Dept: PRIMARY CARE | Facility: CLINIC | Age: 58
End: 2023-12-07
Payer: COMMERCIAL

## 2023-12-07 ENCOUNTER — PATIENT MESSAGE (OUTPATIENT)
Dept: PRIMARY CARE | Facility: CLINIC | Age: 58
End: 2023-12-07
Payer: COMMERCIAL

## 2023-12-07 DIAGNOSIS — M54.41 LOW BACK PAIN WITH BILATERAL SCIATICA, UNSPECIFIED BACK PAIN LATERALITY, UNSPECIFIED CHRONICITY: Primary | ICD-10-CM

## 2023-12-07 DIAGNOSIS — M54.42 LOW BACK PAIN WITH BILATERAL SCIATICA, UNSPECIFIED BACK PAIN LATERALITY, UNSPECIFIED CHRONICITY: Primary | ICD-10-CM

## 2023-12-07 NOTE — TELEPHONE ENCOUNTER
Advised patient she will call around to [employer] or [employer] to see if they will take her insurance for is to send the order for the walker.

## 2023-12-07 NOTE — TELEPHONE ENCOUNTER
Cindy from [facility] called in stating they received a script for her walker and they cannot complete this. Just wanting to inform us. Please advise. Thank you!

## 2023-12-08 ENCOUNTER — PATIENT MESSAGE (OUTPATIENT)
Dept: PRIMARY CARE | Facility: CLINIC | Age: 58
End: 2023-12-08
Payer: COMMERCIAL

## 2023-12-11 DIAGNOSIS — G89.29 OTHER CHRONIC PAIN: ICD-10-CM

## 2023-12-11 DIAGNOSIS — M54.50 CHRONIC LOW BACK PAIN, UNSPECIFIED BACK PAIN LATERALITY, UNSPECIFIED WHETHER SCIATICA PRESENT: ICD-10-CM

## 2023-12-11 DIAGNOSIS — G89.29 CHRONIC LOW BACK PAIN, UNSPECIFIED BACK PAIN LATERALITY, UNSPECIFIED WHETHER SCIATICA PRESENT: ICD-10-CM

## 2023-12-11 RX ORDER — FERROUS SULFATE TAB 325 MG (65 MG ELEMENTAL FE) 325 (65 FE) MG
1 TAB ORAL
Qty: 30 TABLET | Refills: 0 | Status: SHIPPED | OUTPATIENT
Start: 2023-12-11 | End: 2024-01-16

## 2023-12-11 RX ORDER — ACETAMINOPHEN 500 MG
1000 TABLET ORAL EVERY 8 HOURS PRN
Qty: 180 TABLET | Refills: 0 | Status: SHIPPED | OUTPATIENT
Start: 2023-12-11 | End: 2024-02-05

## 2023-12-13 ENCOUNTER — OFFICE VISIT (OUTPATIENT)
Dept: CARDIOLOGY | Facility: CLINIC | Age: 58
End: 2023-12-13
Payer: COMMERCIAL

## 2023-12-13 ENCOUNTER — APPOINTMENT (OUTPATIENT)
Dept: RADIOLOGY | Facility: CLINIC | Age: 58
End: 2023-12-13
Payer: COMMERCIAL

## 2023-12-13 VITALS
HEIGHT: 69 IN | BODY MASS INDEX: 35.99 KG/M2 | OXYGEN SATURATION: 97 % | WEIGHT: 243 LBS | DIASTOLIC BLOOD PRESSURE: 84 MMHG | SYSTOLIC BLOOD PRESSURE: 134 MMHG | HEART RATE: 74 BPM

## 2023-12-13 DIAGNOSIS — I10 PRIMARY HYPERTENSION: ICD-10-CM

## 2023-12-13 DIAGNOSIS — R07.2 PRECORDIAL PAIN: Primary | ICD-10-CM

## 2023-12-13 DIAGNOSIS — M54.50 LOW BACK PAIN, UNSPECIFIED BACK PAIN LATERALITY, UNSPECIFIED CHRONICITY, UNSPECIFIED WHETHER SCIATICA PRESENT: ICD-10-CM

## 2023-12-13 DIAGNOSIS — R06.02 SHORTNESS OF BREATH: ICD-10-CM

## 2023-12-13 PROCEDURE — 99214 OFFICE O/P EST MOD 30 MIN: CPT | Performed by: INTERNAL MEDICINE

## 2023-12-13 PROCEDURE — 93010 ELECTROCARDIOGRAM REPORT: CPT | Performed by: INTERNAL MEDICINE

## 2023-12-13 PROCEDURE — 99215 OFFICE O/P EST HI 40 MIN: CPT | Mod: 25 | Performed by: INTERNAL MEDICINE

## 2023-12-13 PROCEDURE — 3051F HG A1C>EQUAL 7.0%<8.0%: CPT | Performed by: INTERNAL MEDICINE

## 2023-12-13 PROCEDURE — 4004F PT TOBACCO SCREEN RCVD TLK: CPT | Performed by: INTERNAL MEDICINE

## 2023-12-13 PROCEDURE — 93005 ELECTROCARDIOGRAM TRACING: CPT | Performed by: INTERNAL MEDICINE

## 2023-12-13 PROCEDURE — 3075F SYST BP GE 130 - 139MM HG: CPT | Performed by: INTERNAL MEDICINE

## 2023-12-13 PROCEDURE — 3079F DIAST BP 80-89 MM HG: CPT | Performed by: INTERNAL MEDICINE

## 2023-12-13 RX ORDER — TRAMADOL HYDROCHLORIDE 50 MG/1
50 TABLET ORAL EVERY 6 HOURS PRN
Qty: 28 TABLET | Refills: 0 | Status: SHIPPED | OUTPATIENT
Start: 2023-12-13 | End: 2023-12-20 | Stop reason: SDUPTHER

## 2023-12-13 RX ORDER — REGADENOSON 0.08 MG/ML
0.4 INJECTION, SOLUTION INTRAVENOUS
Status: CANCELLED | OUTPATIENT
Start: 2023-12-13

## 2023-12-13 ASSESSMENT — PAIN SCALES - GENERAL
PAINLEVEL: 6
PAINLEVEL: 6

## 2023-12-13 NOTE — PROGRESS NOTES
Name : Alejandra Houston    : 1965   MRN : [id number]   ENC Date : 23     Reason for visit: Chest pain and shortness of breath    Assessment and Plan:  Chest pain: Patient has multiple risk factors for coronary disease including tobacco use and significant family history.  She is unable to exercise due to orthopedic issues and currently is using a cane.  I recommended a Lexiscan nuclear imaging stress test.  Patient was agreeable with that plan.  Shortness of breath: Patient has multiple reasons for potential shortness of breath including to continued tobacco use.  However she also has cirrhosis of the liver and cirrhosis can be associated with pulmonary hypertension.  Therefore I think an echocardiogram is warranted to both assess LV function and make sure were not missing pulmonary hypertension.  Thrombocytopenia: Agree with hematology this is most likely sequestration.  Cirrhosis: Currently not on any beta-blocker and has not had an EGD to look for varices.  That being said she has not had any bleeding either.  We have enough blood pressure and heart rate room to add carvedilol if needed.  Await hepatology visit in early January.  Disp: Follow-up after echocardiogram and stress test      HPI:  Patient seen by myself for the first time today.  She has no prior cardiac history.  She has a history of what appears to be cirrhosis of the liver.  She has not had any esophageal varices documented.  She has thrombocytopenia unfortunately but not had any bleeding complications.  She describes chest pain has been going on for the last several months.  Her discomfort is described as a chest pressure with and without activity.  It is generally worse with activity.  She has not had any prior evaluation.  She also continues to complain of shortness of breath.  She is still smoking but plans on quitting very soon.  No orthopnea nor PND.  No syncopal events.  No TIA or CVA-like symptoms.      Problem List:   Patient  Active Problem List   Diagnosis    Acute sinusitis    Chronic cough    Depression    Effusion of right knee    Elevated liver enzymes    Fatigue    Fatty liver disease, nonalcoholic    GERD (gastroesophageal reflux disease)    Chronic pain    Fibromyalgia    Headache    Increased frequency of urination    Instability of prosthetic knee (CMS/HCC)    Insomnia    Leg weakness    Low back pain    Mass of left parotid gland    Nicotine dependence    Otalgia of both ears    Chronic knee pain after total replacement of right knee joint    Pain due to total right knee replacement (CMS/HCC)    Pancytopenia (CMS/HCC)    Parotid nodule    Personal history of COVID-19    Primary osteoarthritis of left knee    Status post revision of total replacement of right knee    Stress incontinence, female    Type 2 diabetes mellitus (CMS/HCC)    Vaginal dryness, menopausal    Vitamin D deficiency    Class 2 severe obesity with serious comorbidity and body mass index (BMI) of 39.0 to 39.9 in adult (CMS/HCC)    Class 2 obesity with body mass index (BMI) of 38.0 to 38.9 in adult    Constipation    Lung nodule    Morbid obesity with BMI of 40.0-44.9, adult (CMS/HCC)    Sleep apnea    Anxiety    Cellulitis of right lower extremity    Cellulitis    Chronic obstructive lung disease (CMS/HCC)    Closed fracture of lateral malleolus    Dermatophytosis of nail    Dyslipidemia    Disorder of sacrum    Essential hypertension, benign    Fever    Generalized osteoarthritis    Genital herpes    Hyperlipidemia    Hypertension    Hypomagnesemia    Leg edema, left    Lumbar spondylosis    Lumbosacral spondylosis without myelopathy    Non-intractable vomiting with nausea    Obstructive sleep apnea, adult    Other specified abnormal findings of blood chemistry    Hereditary and idiopathic peripheral neuropathy    Peripheral nerve disease    Spinal stenosis of lumbar region with radiculopathy    Primary localized osteoarthrosis, lower leg    Arthritis of both  hips    Disorder of iron metabolism    Chronic pain of right knee    Restless leg syndrome        Meds:   Current Outpatient Medications on File Prior to Visit   Medication Sig Dispense Refill    acetaminophen (Tylenol) 500 mg tablet TAKE 2 TABLETS (1,000 MG) BY MOUTH EVERY 8 HOURS IF NEEDED FOR MILD PAIN (1 - 3). (Patient taking differently: Take 2 tablets (1,000 mg) by mouth 3 times a day.) 180 tablet 0    albuterol 90 mcg/actuation inhaler Inhale 2 puffs 4 times a day as needed for shortness of breath or wheezing. 18 g 0    alcohol swabs pads, medicated Apply topically. 70% pad, 4 x daily; use as directed      B Complex-Vitamin B12 tablet TAKE 1 TABLET DAILY. 30 tablet 3    Cetaphil moisturizing lotion Apply topically if needed for dry skin. 100 mL 1    diclofenac sodium (Voltaren) 1 % gel gel Apply 1 Application topically 4 times a day. 480 g 2    docusate sodium (Colace) 100 mg capsule TAKE 1 TABLET (100 MG) BY MOUTH 2 TIMES A DAY AS NEEDED FOR CONSTIPATION. 10 capsule 2    DULoxetine (Cymbalta) 30 mg DR capsule Take 2 capsules (60 mg) by mouth once daily. Do not crush or chew. 60 capsule 5    EASY COMFORT LANCETS MISC in the morning, at noon, and at bedtime. Use to test      estradiol (Estrace) 0.01 % (0.1 mg/gram) vaginal cream Insert 1 applicator vaginally every night weekly 42.5 g 0    famotidine (Pepcid) 40 mg tablet Take 1 tablet (40 mg) by mouth once daily.      ferrous sulfate, 325 mg ferrous sulfate, (FeroSuL) tablet TAKE 1 TABLET BY MOUTH TWICE A DAY WITH MEALS 30 tablet 0    fluticasone (Flonase) 50 mcg/actuation nasal spray USE 2 SPRAYS IN EACH NOSTRIL ONCE DAILY 16 g 3    folic acid (Folvite) 1 mg tablet TAKE 1 TABLET BY MOUTH EVERY DAY 30 tablet 1    FreeStyle glucose monitoring kit True Metrix Blood Glucose test in vitro strip; Use as directed 4-5 times daily      gabapentin (Neurontin) 300 mg capsule Take 2 capsules (600 mg) by mouth 3 times a day. TITRATE THE DOSE WEEKLY 270 capsule 2     loratadine (Claritin) 10 mg tablet Take 1 tablet (10 mg) by mouth once daily. 90 tablet 2    metFORMIN (Glucophage) 500 mg tablet TAKE 2 TABLETS (1,000 MG) BY MOUTH IN THE MORNING AND 2 TABLETS (1,000 MG) BEFORE BEDTIME. 120 tablet 1    methocarbamol (Robaxin) 500 mg tablet Take 1 to 2 tablets at bedtime as needed for muscle spam (Patient taking differently: Take 2 tablets (1,000 mg) by mouth once daily at bedtime. Take 1 to 2 tablets at bedtime as needed for muscle spam) 40 tablet 0    multivitamin tablet Take 1 tablet by mouth once daily. 90 tablet 3    naproxen (Naprosyn) 500 mg tablet TAKE 1 TABLET BY MOUTH EVERY 12 HOURS AS NEEDED FOR PAIN WITH FOOD (Patient taking differently: Take 1 tablet (500 mg) by mouth 2 times a day with meals. TAKE 1 TABLET BY MOUTH EVERY 12 HOURS AS NEEDED FOR PAIN WITH FOOD) 60 tablet 0    polyethylene glycol (Glycolax) 17 gram packet Take by mouth in the morning. Mix 1 packet in 8 ounces of liquid      simethicone (Gas Relief Extra Strength) 125 mg capsule TAKE 1 CAPSULE (125 MG) BY MOUTH EVERY 6 HOURS IF NEEDED FOR FLATULENCE. 28 capsule 0    thiamine (Vitamin B-1) 100 mg tablet Take 1 tablet (100 mg) by mouth once daily.      traZODone (Desyrel) 50 mg tablet Take 2 tablets (100 mg) by mouth once daily at bedtime. 60 tablet 2    True Metrix Glucose Test Strip strip USE AS DIRECTED 4-5 TIMES DAILY 100 strip 5    Trulicity 0.75 mg/0.5 mL pen injector INJECT 0.75MG UNDER THE SKIN EVERY WEEK 2 mL 1    Vitamin D3 50 mcg (2,000 unit) tablet TAKE 1 TABLET (50 MCG) BY MOUTH ONCE DAILY. 30 tablet 1    [] methylPREDNISolone (Medrol Dospak) 4 mg tablets Take as directed on package. 21 tablet 0    nicotine (Nicoderm CQ) 21 mg/24 hr patch APPLY 1 PATCH DAILY AS DIRECTED. (Patient not taking: Reported on 2023) 28 patch 1    nicotine polacrilex (Nicorette) 4 mg gum CHEW 1 EACH (4 MG) IF NEEDED FOR SMOKING CESSATION. USE AS DIRECTED (Patient not taking: Reported on 2023) 90 each  1    [DISCONTINUED] acetaminophen (Tylenol) 500 mg tablet Take 2 tablets (1,000 mg) by mouth every 8 hours if needed for mild pain (1 - 3). 180 tablet 0    [DISCONTINUED] FeroSuL 325 mg (65 mg iron) tablet TAKE 1 TABLET BY MOUTH TWICE A DAY WITH MEALS 30 tablet 1    [DISCONTINUED] traMADol (Ultram) 50 mg tablet Take 1 tablet (50 mg) by mouth every 6 hours if needed for severe pain (7 - 10) for up to 7 days. 28 tablet 0     No current facility-administered medications on file prior to visit.       All: No Known Allergies    Fam Hx:   Family History   Problem Relation Name Age of Onset    Hypertension Mother      Diabetes Mother      COPD Mother      Heart disease Mother      Lymphoma Mother      Cancer Other fam hx     Diabetes Other fam hx     Hypertension Other fam hx     Heart disease Other fam hx        Soc Hx:   Social History     Socioeconomic History    Marital status:      Spouse name: Not on file    Number of children: Not on file    Years of education: Not on file    Highest education level: Not on file   Occupational History     Employer: NONE   Tobacco Use    Smoking status: Every Day     Packs/day: 0.50     Years: 43.00     Additional pack years: 0.00     Total pack years: 21.50     Types: Cigarettes     Start date: 1980    Smokeless tobacco: Never   Vaping Use    Vaping Use: Never used   Substance and Sexual Activity    Alcohol use: Not Currently     Comment: social    Drug use: Never    Sexual activity: Not on file   Other Topics Concern    Not on file   Social History Narrative    Not on file     Social Determinants of Health     Financial Resource Strain: Not on file   Food Insecurity: Not on file   Transportation Needs: No Transportation Needs (11/15/2023)    [place]IS : Transportation     Lack of Transportation (Medical): No     Lack of Transportation (Non-Medical): No     Patient Unable or Declines to Respond: No   Physical Activity: Not on file   Stress: Not on file   Social Connections:  "Feeling Socially Integrated (11/15/2023)    OASIS : Social Isolation     Frequency of experiencing loneliness or isolation: Never   Intimate Partner Violence: Not on file   Housing Stability: Not on file       ROS    VS: /84 (BP Location: Right arm, Patient Position: Sitting, BP Cuff Size: Large adult)   Pulse 74   Ht 1.753 m (5' 9\")   Wt 110 kg (243 lb)   SpO2 97%   BMI 35.88 kg/m²      Physical Exam  Vitals reviewed.   Constitutional:       Appearance: Normal appearance.   Eyes:      Pupils: Pupils are equal, round, and reactive to light.   Neck:      Vascular: No JVD.   Cardiovascular:      Rate and Rhythm: Normal rate and regular rhythm.      Pulses: Normal pulses.      Heart sounds: No murmur heard.     No gallop.   Pulmonary:      Effort: No respiratory distress.      Breath sounds: No wheezing or rales.   Abdominal:      General: Abdomen is flat. There is no distension.      Palpations: Abdomen is soft.   Musculoskeletal:         General: No swelling.      Right lower leg: No edema.      Left lower leg: No edema.   Neurological:      General: No focal deficit present.      Mental Status: She is alert.   Psychiatric:         Mood and Affect: Mood normal.        ECG: Normal sinus rhythm.  PACs.  Otherwise unremarkable          Tae Zheng MD   "

## 2023-12-14 LAB
ATRIAL RATE: 67 BPM
P AXIS: 69 DEGREES
P OFFSET: 188 MS
P ONSET: 135 MS
PR INTERVAL: 162 MS
Q ONSET: 216 MS
QRS COUNT: 11 BEATS
QRS DURATION: 86 MS
QT INTERVAL: 406 MS
QTC CALCULATION(BAZETT): 429 MS
QTC FREDERICIA: 421 MS
R AXIS: 20 DEGREES
T AXIS: 62 DEGREES
T OFFSET: 419 MS
VENTRICULAR RATE: 67 BPM

## 2023-12-18 ENCOUNTER — APPOINTMENT (OUTPATIENT)
Dept: CARDIOLOGY | Facility: CLINIC | Age: 58
End: 2023-12-18
Payer: COMMERCIAL

## 2023-12-19 ENCOUNTER — PATIENT MESSAGE (OUTPATIENT)
Dept: PRIMARY CARE | Facility: CLINIC | Age: 58
End: 2023-12-19
Payer: COMMERCIAL

## 2023-12-19 DIAGNOSIS — E55.9 VITAMIN D DEFICIENCY: ICD-10-CM

## 2023-12-19 RX ORDER — OMEGA-3S/DHA/EPA/FISH OIL/D3 300MG-1000
50 CAPSULE ORAL DAILY
Qty: 30 TABLET | Refills: 1 | Status: SHIPPED | OUTPATIENT
Start: 2023-12-19 | End: 2024-02-16

## 2023-12-20 ENCOUNTER — APPOINTMENT (OUTPATIENT)
Dept: RADIOLOGY | Facility: CLINIC | Age: 58
End: 2023-12-20
Payer: COMMERCIAL

## 2023-12-20 DIAGNOSIS — M54.50 LOW BACK PAIN, UNSPECIFIED BACK PAIN LATERALITY, UNSPECIFIED CHRONICITY, UNSPECIFIED WHETHER SCIATICA PRESENT: ICD-10-CM

## 2023-12-20 RX ORDER — TRAMADOL HYDROCHLORIDE 50 MG/1
50 TABLET ORAL EVERY 6 HOURS PRN
Qty: 28 TABLET | Refills: 0 | Status: SHIPPED | OUTPATIENT
Start: 2023-12-20 | End: 2023-12-27 | Stop reason: SDUPTHER

## 2023-12-21 DIAGNOSIS — M54.12 CERVICAL RADICULOPATHY: ICD-10-CM

## 2023-12-21 RX ORDER — METHOCARBAMOL 500 MG/1
1000 TABLET, FILM COATED ORAL NIGHTLY
Qty: 40 TABLET | Refills: 0 | Status: SHIPPED | OUTPATIENT
Start: 2023-12-21 | End: 2024-02-10 | Stop reason: HOSPADM

## 2023-12-27 ENCOUNTER — OFFICE VISIT (OUTPATIENT)
Dept: OTOLARYNGOLOGY | Facility: CLINIC | Age: 58
End: 2023-12-27
Payer: COMMERCIAL

## 2023-12-27 VITALS — WEIGHT: 242.9 LBS | BODY MASS INDEX: 35.87 KG/M2

## 2023-12-27 DIAGNOSIS — J32.9 CHRONIC SINUSITIS, UNSPECIFIED LOCATION: ICD-10-CM

## 2023-12-27 DIAGNOSIS — R09.81 NASAL CONGESTION: ICD-10-CM

## 2023-12-27 DIAGNOSIS — J34.2 DEVIATED NASAL SEPTUM: ICD-10-CM

## 2023-12-27 DIAGNOSIS — R09.82 POST-NASAL DRAINAGE: ICD-10-CM

## 2023-12-27 DIAGNOSIS — R51.9 FREQUENT HEADACHES: Primary | ICD-10-CM

## 2023-12-27 DIAGNOSIS — M54.50 LOW BACK PAIN, UNSPECIFIED BACK PAIN LATERALITY, UNSPECIFIED CHRONICITY, UNSPECIFIED WHETHER SCIATICA PRESENT: ICD-10-CM

## 2023-12-27 PROCEDURE — 3051F HG A1C>EQUAL 7.0%<8.0%: CPT | Performed by: OTOLARYNGOLOGY

## 2023-12-27 PROCEDURE — 4004F PT TOBACCO SCREEN RCVD TLK: CPT | Performed by: OTOLARYNGOLOGY

## 2023-12-27 PROCEDURE — 99204 OFFICE O/P NEW MOD 45 MIN: CPT | Performed by: OTOLARYNGOLOGY

## 2023-12-27 PROCEDURE — 31231 NASAL ENDOSCOPY DX: CPT | Performed by: OTOLARYNGOLOGY

## 2023-12-27 RX ORDER — TRAMADOL HYDROCHLORIDE 50 MG/1
50 TABLET ORAL EVERY 6 HOURS PRN
Qty: 28 TABLET | Refills: 0 | Status: SHIPPED | OUTPATIENT
Start: 2023-12-27 | End: 2024-01-03 | Stop reason: SDUPTHER

## 2023-12-27 ASSESSMENT — PATIENT HEALTH QUESTIONNAIRE - PHQ9
2. FEELING DOWN, DEPRESSED OR HOPELESS: NEARLY EVERY DAY
2. FEELING DOWN, DEPRESSED OR HOPELESS: NOT AT ALL

## 2023-12-27 NOTE — PROGRESS NOTES
Chief Complaint:  Headaches    History Of Present Illness:  Reason For Visit:   Patient presents to Otolaryngology Clinic in consultation at request of Dr. Jhoan Eller.    The Symptoms Are: persistent and have been present for 2-3 months.  In the last 12 months, the patient has had 1 sinus infections.  In the last 12 months, the patient has been treated with 2 antibiotic course(s).  The antibiotics have included Doxycycline and Augmentin.     Main Symptoms:  Patient has anterior nasal drainage.     Patient has  posterior nasal drainage.  posterior worse than anterior   Patient has nasal airway obstruction. bilateral   Patient has  facial pain.    Patient has  facial pressure.  over face, pressure worse than pain   Patient does not have decreased sense of smell.   Associated Symptoms:   Patient has  headaches.  over forehead, travel down back of neck   Patient has throat clearing.    Patient has coughing.    Patient has dysphonia.   Patient has nasal bleeding.  Can see blood on the tissue    Medications currently on for sinonasal symptoms: Flonase - 2 puffs each side 1x daily (as needed)   Medications tried in the past for sinonasal symptoms:  Augmentin, Doxycyline, Medrol Dose Zack     Other Pertinent Medical Conditions:   Patient does not have asthma.    Patient does not have aspirin sensitivity.    Patient does not have migraines.  was told in the ED head pain could be migraine related, has never seen neurology  Patient does not have history of allergy testing.   Patient does not have history of sinus surgery.  [facility]   Patient does not have history of nasal fracture.    Patient has heartburn.    The patient does not take medical therapy for heartburn.   The patient has an appointment to establish with GI.  January 10th 2024.   The patient does have imaging of sinuses. When: CT Brain/Head 12/4/23 @ [facility]     Alejandra Houston presents to me as a new patient in consultation of her  primary care provider for frequent headaches    Over the last several months she has had progressive symptoms as outlined above notably frequent headaches.  She is also been experiencing bilateral ear fullness.  She has nasal drainage that is worse going down the back of her throat as well as bilateral nasal airway obstruction.  She had a quite severe headache and presented to an emergency department through the [facility] and underwent a CT head.  I could not directly visualize the imaging but the report demonstrated clear sinuses that were visualized on that study.  She has been treated with Augmentin, doxycycline, and a Medrol Dosepak without resolution of her symptoms.    She denies significant issues with her nose or sinuses prior to the last several months.  She has a number of health issues and follows with multiple specialist.  She does have reflux and is establishing with a GI provider in January.  She is currently not on any medical therapy for reflux.    The remainder of a full 10 systems review of systems was pan negative outside of that stated in the history of present illness.    Active Problems:  Patient Active Problem List   Diagnosis    Acute sinusitis    Chronic cough    Depression    Effusion of right knee    Elevated liver enzymes    Fatigue    Fatty liver disease, nonalcoholic    GERD (gastroesophageal reflux disease)    Chronic pain    Fibromyalgia    Headache    Increased frequency of urination    Instability of prosthetic knee (CMS/HCC)    Insomnia    Leg weakness    Low back pain    Mass of left parotid gland    Nicotine dependence    Otalgia of both ears    Chronic knee pain after total replacement of right knee joint    Pain due to total right knee replacement (CMS/HCC)    Pancytopenia (CMS/HCC)    Parotid nodule    Personal history of COVID-19    Primary osteoarthritis of left knee    Status post revision of total replacement of right knee    Stress incontinence, female    Type 2  diabetes mellitus (CMS/[facility])    Vaginal dryness, menopausal    Vitamin D deficiency    Class 2 severe obesity with serious comorbidity and body mass index (BMI) of 39.0 to 39.9 in adult (CMS/[facility])    Class 2 obesity with body mass index (BMI) of 38.0 to 38.9 in adult    Constipation    Lung nodule    Morbid obesity with BMI of 40.0-44.9, adult (CMS/[facility])    Sleep apnea    Anxiety    Cellulitis of right lower extremity    Cellulitis    Chronic obstructive lung disease (CMS/[facility])    Closed fracture of lateral malleolus    Dermatophytosis of nail    Dyslipidemia    Disorder of sacrum    Essential hypertension, benign    Fever    Generalized osteoarthritis    Genital herpes    Hyperlipidemia    Hypertension    Hypomagnesemia    Leg edema, left    Lumbar spondylosis    Lumbosacral spondylosis without myelopathy    Non-intractable vomiting with nausea    Obstructive sleep apnea, adult    Other specified abnormal findings of blood chemistry    Hereditary and idiopathic peripheral neuropathy    Peripheral nerve disease    Spinal stenosis of lumbar region with radiculopathy    Primary localized osteoarthrosis, lower leg    Arthritis of both hips    Disorder of iron metabolism    Chronic pain of right knee    Restless leg syndrome      Past Medical History:  She has a past medical history of Abnormal levels of other serum enzymes (2022) and 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    Surgical History:  She has a past surgical history that includes Total knee arthroplasty (2018); Other surgical history (06/10/2019); and  section, classic (02/15/2018).     Family History:  Family History   Problem Relation Name Age of Onset    Hypertension Mother      Diabetes Mother      COPD Mother      Heart disease Mother      Lymphoma Mother      Cancer Other fam hx     Diabetes Other fam hx     Hypertension Other fam hx     Heart disease Other fam hx      Social History:  She reports that she has  been smoking cigarettes. She started smoking about 44 years ago. She has a 21.50 pack-year smoking history. She has never used smokeless tobacco. She reports that she does not currently use alcohol. She reports that she does not use drugs.     Allergies:  Patient has no known allergies.    Current Meds:  Current Outpatient Medications:     acetaminophen (Tylenol) 500 mg tablet, TAKE 2 TABLETS (1,000 MG) BY MOUTH EVERY 8 HOURS IF NEEDED FOR MILD PAIN (1 - 3). (Patient taking differently: Take 2 tablets (1,000 mg) by mouth 3 times a day.), Disp: 180 tablet, Rfl: 0    albuterol 90 mcg/actuation inhaler, Inhale 2 puffs 4 times a day as needed for shortness of breath or wheezing., Disp: 18 g, Rfl: 0    alcohol swabs pads, medicated, Apply topically. 70% pad, 4 x daily; use as directed, Disp: , Rfl:     B Complex-Vitamin B12 tablet, TAKE 1 TABLET DAILY., Disp: 30 tablet, Rfl: 3    Cetaphil moisturizing lotion, Apply topically if needed for dry skin., Disp: 100 mL, Rfl: 1    diclofenac sodium (Voltaren) 1 % gel gel, Apply 1 Application topically 4 times a day., Disp: 480 g, Rfl: 2    docusate sodium (Colace) 100 mg capsule, TAKE 1 TABLET (100 MG) BY MOUTH 2 TIMES A DAY AS NEEDED FOR CONSTIPATION., Disp: 10 capsule, Rfl: 2    DULoxetine (Cymbalta) 30 mg DR capsule, Take 2 capsules (60 mg) by mouth once daily. Do not crush or chew., Disp: 60 capsule, Rfl: 5    EASY COMFORT LANCETS [facility], in the morning, at noon, and at bedtime. Use to test, Disp: , Rfl:     estradiol (Estrace) 0.01 % (0.1 mg/gram) vaginal cream, Insert 1 applicator vaginally every night weekly, Disp: 42.5 g, Rfl: 0    famotidine (Pepcid) 40 mg tablet, Take 1 tablet (40 mg) by mouth once daily., Disp: , Rfl:     ferrous sulfate, 325 mg ferrous sulfate, (FeroSuL) tablet, TAKE 1 TABLET BY MOUTH TWICE A DAY WITH MEALS, Disp: 30 tablet, Rfl: 0    fluticasone (Flonase) 50 mcg/actuation nasal spray, USE 2 SPRAYS IN EACH NOSTRIL ONCE DAILY, Disp: 16 g, Rfl: 3     folic acid (Folvite) 1 mg tablet, TAKE 1 TABLET BY MOUTH EVERY DAY, Disp: 30 tablet, Rfl: 1    FreeStyle glucose monitoring kit, True Metrix Blood Glucose test in vitro strip; Use as directed 4-5 times daily, Disp: , Rfl:     gabapentin (Neurontin) 300 mg capsule, Take 2 capsules (600 mg) by mouth 3 times a day. TITRATE THE DOSE WEEKLY, Disp: 270 capsule, Rfl: 2    loratadine (Claritin) 10 mg tablet, Take 1 tablet (10 mg) by mouth once daily., Disp: 90 tablet, Rfl: 2    metFORMIN (Glucophage) 500 mg tablet, TAKE 2 TABLETS (1,000 MG) BY MOUTH IN THE MORNING AND 2 TABLETS (1,000 MG) BEFORE BEDTIME., Disp: 120 tablet, Rfl: 1    methocarbamol (Robaxin) 500 mg tablet, Take 2 tablets (1,000 mg) by mouth once daily at bedtime. Take 1 to 2 tablets at bedtime as needed for muscle spam, Disp: 40 tablet, Rfl: 0    multivitamin tablet, Take 1 tablet by mouth once daily., Disp: 90 tablet, Rfl: 3    naproxen (Naprosyn) 500 mg tablet, TAKE 1 TABLET BY MOUTH EVERY 12 HOURS AS NEEDED FOR PAIN WITH FOOD (Patient taking differently: Take 1 tablet (500 mg) by mouth 2 times a day with meals. TAKE 1 TABLET BY MOUTH EVERY 12 HOURS AS NEEDED FOR PAIN WITH FOOD), Disp: 60 tablet, Rfl: 0    nicotine (Nicoderm CQ) 21 mg/24 hr patch, APPLY 1 PATCH DAILY AS DIRECTED. (Patient not taking: Reported on 12/13/2023), Disp: 28 patch, Rfl: 1    nicotine polacrilex (Nicorette) 4 mg gum, CHEW 1 EACH (4 MG) IF NEEDED FOR SMOKING CESSATION. USE AS DIRECTED (Patient not taking: Reported on 12/13/2023), Disp: 90 each, Rfl: 1    polyethylene glycol (Glycolax) 17 gram packet, Take by mouth in the morning. Mix 1 packet in 8 ounces of liquid, Disp: , Rfl:     simethicone (Gas Relief Extra Strength) 125 mg capsule, TAKE 1 CAPSULE (125 MG) BY MOUTH EVERY 6 HOURS IF NEEDED FOR FLATULENCE., Disp: 28 capsule, Rfl: 0    thiamine (Vitamin B-1) 100 mg tablet, Take 1 tablet (100 mg) by mouth once daily., Disp: , Rfl:     traMADol (Ultram) 50 mg tablet, Take 1 tablet (50  mg) by mouth every 6 hours if needed for severe pain (7 - 10) for up to 7 days., Disp: 28 tablet, Rfl: 0    traZODone (Desyrel) 50 mg tablet, Take 2 tablets (100 mg) by mouth once daily at bedtime., Disp: 60 tablet, Rfl: 2    True Metrix Glucose Test Strip strip, USE AS DIRECTED 4-5 TIMES DAILY, Disp: 100 strip, Rfl: 5    Trulicity 0.75 mg/0.5 mL pen injector, INJECT 0.75MG UNDER THE SKIN EVERY WEEK, Disp: 2 mL, Rfl: 1    Vitamin D3 50 mcg (2,000 unit) tablet, TAKE 1 TABLET (50 MCG) BY MOUTH ONCE DAILY., Disp: 30 tablet, Rfl: 1    Vitals:  Visit Vitals  Wt 110 kg (242 lb 14.4 oz)   BMI 35.87 kg/m²   Smoking Status Every Day   BSA 2.31 m²     Physical Exam:  CONSTITUTIONAL:  Vitals reviewed in nursing chart, well developed, well nourished.  RESPIRATION:  Breathing comfortably, no stridor.  CV:  No clubbing/cyanosis/edema in hands.  EYES:  EOM Intact, sclera normal.  NEURO:  Alert and oriented times 3, Cranial nerves 2-12 intact and symmetric bilaterally.  HEAD AND FACE:  Skin with no masses or lesions, sinuses nontender to palpation.  SALIVARY GLANDS:  Parotid and submandibular glands normal bilaterally.  EARS:  Normal external ears.  Scattered cerumen within each external canal limiting view of each tympanic membrane.  Normal hearing to whispered voice.  NOSE:  External nose midline, anterior rhinoscopy is normal with limited visualization to the anterior aspect of the inferior turbinates (see nasal endoscopy).  ORAL CAVITY/OROPHARYNX/LIPS:  Normal mucous membranes, normal floor of mouth/tongue/OP, no masses or lesions are noted. Status post uvulopalatopharyngoplasty.   PHARYNGEAL WALLS AND NASOPHARYNX:  No masses noted.  NECK/LYMPH:  No LAD, no thyroid masses.  Sensitive to palpation of the base of her neck.  LARYNX:  Cannot visualize transorally.     SINONASAL ENDOSCOPY (CPT 79510): To better evaluate the patient's symptoms, sinonasal endoscopy is indicated.  After discussion of risks and benefits, and topical  decongestion and anesthesia,an endoscope was used to perform nasal endoscopy on each side.  A time out identifying the patient, the procedure, the location of the procedure and any concerns was performed prior to beginning the procedure.    Findings:  Examination of the right nasal cavity revealed a general septal deviation to that side.  Middle meatus and sphenoethmoid recess were normal without pus or polyps.  Examination of the left nasal cavity revealed a normal middle meatus and sphenoethmoid recess without pus or polyps.  She had a bony spur to the left posteriorly.  The nasopharynx appears normal.     Results/Data:  I personally reviewed the report of the CT Brain from 12/4/23, I demonstrated the visualized sinuses were clear.  I reviewed an office visit from December 13, 2023 with cardiology.  This was part of the workup for chest pain.  They also mention cirrhosis as well as thrombocytopenia.  I reviewed her last note from her primary care provider outlining consult to ENT for her new sinus symptoms.    Provider Impressions:  1.  Headaches, facial pain and pressure  2.  Rhinorrhea  3.  Nasal airway obstruction, deviated nasal septum  4.  Throat clearing, coughing, dysphonia  5.  Obstructive sleep apnea s/p UPPP through [facility] 2001  6.  Reflux planning to follow with gastroenterology  7.  Cirrhosis  8.  Thrombocytopenia    Discussion: Alejandra SMITH Davideugene and I discussed her exam and options.  I will request the CT brain from the [facility] but this will not completely image all of her sinuses.      We discussed additional options such as trials of intranasal medical therapy, neurology evaluation, or formal CT sinus.  At this point in time she wished to move forward with imaging.  She has recently completed a 10-day course of doxycycline and I did not see evidence of infection on exam today so I recommended a noncontrast CT sinus without further medical therapy.  I asked her to schedule a virtual visit to  discuss those results.  Contact information was provided both to schedule the imaging as well as to set up the follow-up.      We can also look at her ears on the CT to determine if she has middle ear fluid but I do not see anything specifically concerning today.  We could consider an audiogram to assess her baseline hearing if desired.  We can discuss this at follow-up.    I also provided her with a consultation to be seen by headache neurology.  There is often quite a wait to be seen by that service so hopefully she can get scheduled and if her headaches persist be seen following her otolaryngology workup.    If her CT is normal, we can certainly instate her alternative intranasal medical therapy pending her neurologic evaluation.  All questions were answered.    Patient Discussion/Summary:  Welcome to Dr. Umair Alexandra's clinic. We are here to assist you with your ENT needs at [facility] [place]. Dr. Alexandra is an ENT that specializes in nose, sinus, and skull base disorders.    Dr. Umair Alexandra's office number is [phone]. Please call this number to contact his care team regardless of which office you use to access care. This number is the most direct way to communicate with all the members of the care team.    Deborah Harry CNP is a nurse practitioner who is a part of Dr. Alexandra's team. She will work collaboratively with Dr. Alexandra to meet your goals. This often may include seeing you for more urgent appointments or follow-up visits under Dr. Alexandra's supervision.    Dilma Recio RN BSN is Dr. Alexandra's primary nurse. She can be reached by calling [phone]. Dilma is available during business hours Monday through Friday. Messages left for her will be returned within one business day. She is also Dr. Alexandra's surgery scheduler and will assist you with planning and scheduling of your surgery.     Danielle Wahl is Dr. Alexandra's  and you can reach her  at [phone]. She can help you with scheduling of appointments, general questions and information. She is available to receive calls Monday through Friday from 8:00 am until 4:25 pm.     For your convenience, Dr. Alexandra sees patients at [facility] and [facility]. While we try to make your appointments as convenient as possible, occasionally a visit to another location may be necessary to provide the best care for you.    Dr. Alexandra makes every effort to run on time for your appointments. Therefore, if you are more than 25 minutes late, your appointment will need to be rescheduled to another day. We appreciate your understanding.     We look forward to working with you to meet your healthcare goals.     Signature:  Scribe Attestation  By signing my name below, IIrma Scribe   attest that this documentation has been prepared under the direction and in the presence of Umair Alexandra MD.

## 2023-12-28 ENCOUNTER — APPOINTMENT (OUTPATIENT)
Dept: RADIOLOGY | Facility: CLINIC | Age: 58
End: 2023-12-28
Payer: COMMERCIAL

## 2023-12-29 DIAGNOSIS — M54.50 LOW BACK PAIN, UNSPECIFIED BACK PAIN LATERALITY, UNSPECIFIED CHRONICITY, UNSPECIFIED WHETHER SCIATICA PRESENT: ICD-10-CM

## 2023-12-29 DIAGNOSIS — G89.29 CHRONIC LOW BACK PAIN, UNSPECIFIED BACK PAIN LATERALITY, UNSPECIFIED WHETHER SCIATICA PRESENT: ICD-10-CM

## 2023-12-29 DIAGNOSIS — M54.50 CHRONIC LOW BACK PAIN, UNSPECIFIED BACK PAIN LATERALITY, UNSPECIFIED WHETHER SCIATICA PRESENT: ICD-10-CM

## 2023-12-29 RX ORDER — NAPROXEN 500 MG/1
TABLET ORAL
Qty: 60 TABLET | Refills: 0 | OUTPATIENT
Start: 2023-12-29

## 2024-01-03 ENCOUNTER — APPOINTMENT (OUTPATIENT)
Dept: RADIOLOGY | Facility: CLINIC | Age: 59
End: 2024-01-03
Payer: COMMERCIAL

## 2024-01-03 ENCOUNTER — PATIENT MESSAGE (OUTPATIENT)
Dept: PRIMARY CARE | Facility: CLINIC | Age: 59
End: 2024-01-03
Payer: COMMERCIAL

## 2024-01-03 ENCOUNTER — TELEPHONE (OUTPATIENT)
Dept: CARDIOLOGY | Facility: HOSPITAL | Age: 59
End: 2024-01-03
Payer: COMMERCIAL

## 2024-01-03 ENCOUNTER — APPOINTMENT (OUTPATIENT)
Dept: CARDIOLOGY | Facility: CLINIC | Age: 59
End: 2024-01-03
Payer: COMMERCIAL

## 2024-01-03 RX ORDER — TRAMADOL HYDROCHLORIDE 50 MG/1
50 TABLET ORAL EVERY 6 HOURS PRN
Qty: 28 TABLET | Refills: 0 | Status: SHIPPED | OUTPATIENT
Start: 2024-01-03 | End: 2024-01-10 | Stop reason: SDUPTHER

## 2024-01-03 NOTE — TELEPHONE ENCOUNTER
I was sent a preop form for knee surgery on this patient.  When I saw her in December she had complaints of chest pain and shortness of breath and we ordered a stress test and echocardiogram.  They do not appear to have been done yet.  Can you see if they are scheduled.    SDH

## 2024-01-05 ENCOUNTER — APPOINTMENT (OUTPATIENT)
Dept: CARDIOLOGY | Facility: CLINIC | Age: 59
End: 2024-01-05
Payer: COMMERCIAL

## 2024-01-07 NOTE — PROGRESS NOTES
Hepatology: Initial Office Note     Patient: Alejandra Houston, a 58 y.o. year old female presents for evaluation of cirrhosis.  She is accompanied by her spouse for today's visit.  PCP: Jhoan Eller, DO    History of Present Illness   Alejandra Houston presents for evaluation of cirrhosis.    Diagnosed with cirrhosis due to steatotic liver disease. Had prior imaging with hepatic steatosis. Prior hx of abnormal liver enzymes.    History of complications their liver disease:   Ascites or Volume overload:  no known hx  Hepatic Encephalopathy:  no known hx  GI (variceal) Bleeding:  no known hx  HCC:  no known hx  Hx of Infections:  no known hx  Portal vein Thrombosis:  no known hx  SMV Thrombosis:  no known hx  Hx of TIPS:  no known hx  Recent Hospitalizations: none due to liver ds    Notes that back in the 1990s she had developed jaundice due to acute hepatitis-she is unaware of what type of viral hepatitis infection she had.  Has noted easy bruisability, no episodes of nosebleeds, gum bleed, melenic stools, hematemesis or rectal bleeding.  Due to financial constraints, she is dependent on limited food items.  In addition patient notes that she has been experiencing increased abdominal bloating/belching and constipation.  Notes using MiraLAX daily for constipation with little to no effect.  Moves her bowels every 3 to 4 days.  The states she typically eats Ramen noodles.  Occasional symptoms of heartburn and sensation of pills getting stuck in the retrosternal area.  Denies episodes of food bolus impaction.  Denies having episodes of regurgitation of food.  Has been experiencing increased postnasal drip.  Denies having inadvertent weight loss.  Denies symptoms of right upper quadrant abdominal pain, nausea, vomiting, jaundice, confusion.     Review of Systems   Constitutional/ General: No fever, no night sweats, no weight loss  Eyes: no yellow discoloration  ENT: Postnasal drip  Cardiovascular: no chest pain, no  palpitations  Respiratory: no shortness of breath  Gastrointestinal: Abdominal bloating, belching, constipation  Integumentary: Easy bruising over skin  Neurologic: no weakness or numbness of extremities  Psychiatric: no mood fluctuations  Musculoskeletal: no joint swelling   Genitourinary: no dysuria, no hematuria    All other systems have been reviewed and are negative except as noted in the HPI and above.    PMHx: HTN, depression, GERD, DM II  PSHx: C section, hip and knee replacement  Social hx: Stopped alcohol use, + hx of smoking, denies hx of illicit substance use.   Family hx: denies history of hepatobiliary disease or malignancy in the family.     Medications     Current Outpatient Medications   Medication Instructions    acetaminophen (TYLENOL) 1,000 mg, oral, Every 8 hours PRN    albuterol 90 mcg/actuation inhaler 2 puffs, inhalation, 4 times daily PRN    alcohol swabs pads, medicated topical (top), 70% pad, 4 x daily; use as directed    B Complex-Vitamin B12 tablet TAKE 1 TABLET DAILY.    Cetaphil moisturizing lotion Topical, As needed    ciprofloxacin-hydrocortisone (Cipro HC Otic) otic suspension 3 drops, Each Ear, 2 times daily    cyclobenzaprine (FLEXERIL) 10 mg, oral, 3 times daily PRN    diclofenac sodium (Voltaren) 1 % gel gel 1 Application, Topical, 4 times daily    docusate sodium (Colace) 100 mg capsule TAKE 1 TABLET (100 MG) BY MOUTH 2 TIMES A DAY AS NEEDED FOR CONSTIPATION.    DULoxetine (CYMBALTA) 60 mg, oral, Daily, Do not crush or chew.    EASY COMFORT LANCETS MISC miscellaneous, 3 times daily, Use to test    erythromycin (Romycin) 5 mg/gram (0.5 %) ophthalmic ointment ophthalmic (eye), 4 times daily, Apply Amount per Dose: 0.5 inch (~1 cm) per dose.    estradiol (Estrace) 0.01 % (0.1 mg/gram) vaginal cream Insert 1 applicator vaginally every night weekly    famotidine (PEPCID) 40 mg, oral, Daily    ferrous sulfate, 325 mg ferrous sulfate, (FeroSuL) tablet 1 tablet, oral, 2 times daily  with meals    fluticasone (Flonase) 50 mcg/actuation nasal spray USE 2 SPRAYS IN EACH NOSTRIL ONCE DAILY    folic acid (Folvite) 1 mg tablet TAKE 1 TABLET BY MOUTH EVERY DAY    FreeStyle glucose monitoring kit miscellaneous, True Metrix Blood Glucose test in vitro strip; Use as directed 4-5 times daily    gabapentin (NEURONTIN) 600 mg, oral, 3 times daily, TITRATE THE DOSE WEEKLY    ibuprofen 600 mg, oral, Every 6 hours PRN    lidocaine 4 % kit topical (top)    loratadine (CLARITIN) 10 mg, oral, Daily    metFORMIN (GLUCOPHAGE) 1,000 mg, oral, 2 times daily    methocarbamol (ROBAXIN) 1,000 mg, oral, Nightly, Take 1 to 2 tablets at bedtime as needed for muscle spam    multivitamin tablet 1 tablet, oral, Daily    naproxen (Naprosyn) 500 mg tablet TAKE 1 TABLET BY MOUTH EVERY 12 HOURS AS NEEDED FOR PAIN WITH FOOD    nicotine (Nicoderm CQ) 21 mg/24 hr patch APPLY 1 PATCH DAILY AS DIRECTED.    nicotine polacrilex (NICORETTE) 4 mg, Mouth/Throat, As needed, Use as directed    polyethylene glycol (Glycolax) 17 gram packet oral, Daily, Mix 1 packet in 8 ounces of liquid    predniSONE (Deltasone) 10 mg tablet Take 5 tablets (50 mg) by mouth once daily for 2 days, THEN 4 tablets (40 mg) once daily for 2 days, THEN 3 tablets (30 mg) once daily for 2 days, THEN 2 tablets (20 mg) once daily for 2 days, THEN 1 tablet (10 mg) once daily for 2 days.    psyllium (Metamucil) 3.4 gram packet 1 packet, oral, 2 times daily    simethicone (Gas Relief Extra Strength) 125 mg capsule TAKE 1 CAPSULE (125 MG) BY MOUTH EVERY 6 HOURS IF NEEDED FOR FLATULENCE.    thiamine (Vitamin B-1) 100 mg tablet 1 tablet, oral, Daily    traMADol (ULTRAM) 50 mg, oral, Every 6 hours PRN    traZODone (DESYREL) 100 mg, oral, Nightly    True Metrix Glucose Test Strip strip USE AS DIRECTED 4-5 TIMES DAILY    Trulicity 0.75 mg/0.5 mL pen injector INJECT 0.75MG UNDER THE SKIN EVERY WEEK    Vitamin D3 50 mcg, oral, Daily         Physical Examination     Vitals:     01/10/24 1511   BP: (!) 137/98   Pulse: 76   Temp: 36.3 °C (97.3 °F)   SpO2: 99%     Vitals:    01/10/24 1511   Weight: 112 kg (246 lb)     Body mass index is 37.4 kg/m².  Constitutional: awake, alert   Eyes: EOMI, anicteric sclera  ENT: no oropharyngeal lesions visualized  Respiratory: Bilateral air entry equal no wheezing  Cardiovascular: regular rate and rhythm, no lower extremity edema  Abdomen: soft, non tender, non distended, increased abdominal adiposity, no free fluid wave appreciated, bowel sounds present  Integumentary: no wounds on examined skin   Musculoskeletal: no joint swelling   Neurologic: gross motor strength intact, no asterixis  Psychiatric: alert, appropriate mood and affect, oriented to time/place/person    Labs     Lab Results   Component Value Date     05/03/2023    K 3.5 05/03/2023    CREATININE 0.81 05/03/2023    ALBUMIN 3.5 05/03/2023    ALT 30 05/03/2023    AST 28 05/03/2023    ALKPHOS 65 05/03/2023    INR 1.2 (H) 09/26/2022    HGB 14.4 11/28/2023    PLT 83 (L) 11/28/2023       Dec 2023: albumin 4.3, ALT 34, AST 35, ALP 82, Bili 0.7, Na 143, creat 0.73, Platelet 102, Hb 15, WBC 8.43  Nov 2023: WILLEM 1:160  July 2023: Plt 88, Hb 13.8, ALT 23, AST 38, ALP 89, Bili 0.7    Imaging   CTAP with contrast Dec 2023: Hepatic steatosis and cirrhotic liver morphology. No splenomegaly.   US abdomen April 2021: Coarsened and hyperechoic hepatic parenchyma, compatible with steatosis or hepatocellular disease.    Assessment and Plan    Alejandra Houston, a 58 y.o. year old female presents for evaluation of cirrhosis. I have reviewed pertinent provider notes, labs and imaging studies. Discussed results and their interpretation with the patient today.    Encounter Diagnoses   Name Primary?    Cirrhosis of liver without ascites, unspecified hepatic cirrhosis type (CMS/HCC)     Thrombocytopenia (CMS/HCC)     Constipation, unspecified constipation type Yes       Orders Placed This Encounter   Procedures     US abdomen limited liver    Alpha-Fetoprotein    Basic Metabolic Panel    CBC and Auto Differential    Hepatic Function Panel    Protime-INR    Hepatitis A Antibody, Total    Hepatitis B Core Antibody, Total    Hepatitis B Surface Antibody    Hepatitis B Surface Antigen    Hepatitis C Antibody      Appears to have compensated cirrhosis in the setting of steatotic liver disease likely from metabolic dysfunction.  Given prior reported history of hepatitis infection in the 90s, would like to screen patient for chronic viral hepatitis B and C.  In addition recommend check of serologies for hepatitis A and B.  No overt features/symptoms to suggest jaundice, encephalopathy, ascites on exam today.    Discussed with Alejandra SMITH Rosemarie the pathophysiology of cirrhosis, the etiology of her liver disease. Discussed disease course including development of potential complications like portal hypertension leading to ascites, development of varices with complications of bleeding from the same; hepatic encephalopathy, jaundice, hepatocellular carcinoma, renal failure, sarcopenia, etc. We discussed the management of advanced liver disease with lifestyle measures including dietary recommendations, medications, lab work and screening measures.     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     Encourage patient to target weight loss of at least 7 to 10% body weight in the next 6 to 12 months.    Discussed dietary modifications for fatty liver disease, increasing proportion of grains and vegetables, opting for leaner proteins and decreasing proportion of simple carbohydrates.  Discussed exclusion/elimination of poor caloric value foods including sodas, cakes, candy, ice cream, crackers, chips etc.  In addition given financial constraints, patient is advised to incorporate some form of protein with each meal.    Recommendations:  - Labs: Recommend check of MELD labs, hepatitis A/B/C  serologies.  - Variceal screening: Given degree of thrombocytopenia, recommend check of EGD to evaluate for presence of varices.  In the absence of high risk varices, can consider beta-blocker therapy over band ligation.  - HCC screening: Recommend every 6 month AFP check as well as imaging.  She will be due for an AFP check with labs now.  Ultrasound liver is advised for June 2024.    - Medications: Pending EGD results can consider need for nonselective beta-blocker therapy.  Counseled patient against use of NSAIDs, given risk of idiosyncratic liver injury as well as renal injury in the setting of cirrhosis.  Okay to use acetaminophen however patient should limit this dose to less than 2000 mg in 24 hours.    -Given patient's upper GI symptoms of abdominal bloating, belching, heartburn and occasional pill associated dysphagia recommend EGD.  EGD is being planned for variceal screening as noted above.  -Suspect patient is experiencing constipation in the setting of low fiber and fluid intake in her diet.  Counseled patient to incorporate soluble fiber such as Metamucil up to twice daily dosing.  She is also advised to increase MiraLAX from daily to twice daily dosing as well.  She has had 1 tubular adenoma on colonoscopy in 2019.  Pending improvement of constipation, can plan surveillance colonoscopy which based on updated guidelines would be indicated within 7 years of last colonoscopy.  -Counseled patient to avoid hepatotoxic agents as noted above including NSAIDs, alcohol use as well as herbal supplements.    Follow up visit in 6 months.

## 2024-01-10 ENCOUNTER — OFFICE VISIT (OUTPATIENT)
Dept: GASTROENTEROLOGY | Facility: HOSPITAL | Age: 59
End: 2024-01-10
Payer: COMMERCIAL

## 2024-01-10 VITALS
BODY MASS INDEX: 37.28 KG/M2 | WEIGHT: 246 LBS | OXYGEN SATURATION: 99 % | SYSTOLIC BLOOD PRESSURE: 137 MMHG | TEMPERATURE: 97.3 F | HEIGHT: 68 IN | DIASTOLIC BLOOD PRESSURE: 98 MMHG | HEART RATE: 76 BPM

## 2024-01-10 DIAGNOSIS — D69.6 THROMBOCYTOPENIA (CMS-HCC): ICD-10-CM

## 2024-01-10 DIAGNOSIS — M54.50 LOW BACK PAIN, UNSPECIFIED BACK PAIN LATERALITY, UNSPECIFIED CHRONICITY, UNSPECIFIED WHETHER SCIATICA PRESENT: ICD-10-CM

## 2024-01-10 DIAGNOSIS — K74.60 CIRRHOSIS OF LIVER WITHOUT ASCITES, UNSPECIFIED HEPATIC CIRRHOSIS TYPE (MULTI): ICD-10-CM

## 2024-01-10 DIAGNOSIS — K59.00 CONSTIPATION, UNSPECIFIED CONSTIPATION TYPE: Primary | ICD-10-CM

## 2024-01-10 PROCEDURE — 99214 OFFICE O/P EST MOD 30 MIN: CPT | Performed by: STUDENT IN AN ORGANIZED HEALTH CARE EDUCATION/TRAINING PROGRAM

## 2024-01-10 PROCEDURE — 3075F SYST BP GE 130 - 139MM HG: CPT | Performed by: STUDENT IN AN ORGANIZED HEALTH CARE EDUCATION/TRAINING PROGRAM

## 2024-01-10 PROCEDURE — 3080F DIAST BP >= 90 MM HG: CPT | Performed by: STUDENT IN AN ORGANIZED HEALTH CARE EDUCATION/TRAINING PROGRAM

## 2024-01-10 PROCEDURE — 99204 OFFICE O/P NEW MOD 45 MIN: CPT | Performed by: STUDENT IN AN ORGANIZED HEALTH CARE EDUCATION/TRAINING PROGRAM

## 2024-01-10 RX ORDER — TRAMADOL HYDROCHLORIDE 50 MG/1
50 TABLET ORAL EVERY 6 HOURS PRN
Qty: 28 TABLET | Refills: 0 | Status: SHIPPED | OUTPATIENT
Start: 2024-01-10 | End: 2024-01-17 | Stop reason: SDUPTHER

## 2024-01-10 ASSESSMENT — PAIN SCALES - GENERAL: PAINLEVEL: 10-WORST PAIN EVER

## 2024-01-10 NOTE — PATIENT INSTRUCTIONS
Alejandra Houston,     Thank you for coming in for your hepatology office visit today. As per our discussion, I recommend:     Have labs done for check of the liver.   Have an endoscopy done, call [phone] to schedule this.   Have an ultrasound of the liver done in June 2024. Call [phone] to schedule this.   Try metamucil 2 times a day. You can also increase miralax to 2scoops a day with 10-12 oz of fluid.     I would like to see you back in the office in 6 months.   If you are not provided with a date for your follow up visit at the end of your encounter today at the check out desk, I would encourage you to call [phone] to schedule your appointment with me.   If you have any trouble or need assistance with having this done, please reach out to my 's office at [phone] (Ms Yolanda Cerda) or my nurse coordinator at [phone] (Ms Negrita PATEL).     I look forward to seeing you again. Thank you for allowing me to participate in your care.     Sincerely,  Jamison Flores MD  Hepatology

## 2024-01-11 ENCOUNTER — LAB (OUTPATIENT)
Dept: LAB | Facility: LAB | Age: 59
End: 2024-01-11
Payer: COMMERCIAL

## 2024-01-11 ENCOUNTER — OFFICE VISIT (OUTPATIENT)
Dept: PRIMARY CARE | Facility: CLINIC | Age: 59
End: 2024-01-11
Payer: COMMERCIAL

## 2024-01-11 ENCOUNTER — ANCILLARY PROCEDURE (OUTPATIENT)
Dept: RADIOLOGY | Facility: CLINIC | Age: 59
End: 2024-01-11
Payer: COMMERCIAL

## 2024-01-11 VITALS
RESPIRATION RATE: 16 BRPM | BODY MASS INDEX: 37.4 KG/M2 | DIASTOLIC BLOOD PRESSURE: 90 MMHG | WEIGHT: 246 LBS | OXYGEN SATURATION: 98 % | TEMPERATURE: 96.9 F | HEART RATE: 85 BPM | SYSTOLIC BLOOD PRESSURE: 148 MMHG

## 2024-01-11 DIAGNOSIS — H01.006 BLEPHARITIS OF BOTH EYES, UNSPECIFIED EYELID, UNSPECIFIED TYPE: ICD-10-CM

## 2024-01-11 DIAGNOSIS — K74.60 CIRRHOSIS OF LIVER WITHOUT ASCITES, UNSPECIFIED HEPATIC CIRRHOSIS TYPE (MULTI): ICD-10-CM

## 2024-01-11 DIAGNOSIS — G54.2 PINCHED CERVICAL NERVE ROOT: Primary | ICD-10-CM

## 2024-01-11 DIAGNOSIS — R09.81 NASAL CONGESTION: ICD-10-CM

## 2024-01-11 DIAGNOSIS — R09.82 POST-NASAL DRAINAGE: ICD-10-CM

## 2024-01-11 DIAGNOSIS — R35.0 URINARY FREQUENCY: ICD-10-CM

## 2024-01-11 DIAGNOSIS — J44.9 CHRONIC OBSTRUCTIVE PULMONARY DISEASE, UNSPECIFIED COPD TYPE (MULTI): ICD-10-CM

## 2024-01-11 DIAGNOSIS — H01.003 BLEPHARITIS OF BOTH EYES, UNSPECIFIED EYELID, UNSPECIFIED TYPE: ICD-10-CM

## 2024-01-11 DIAGNOSIS — E11.69 TYPE 2 DIABETES MELLITUS WITH OTHER SPECIFIED COMPLICATION, WITHOUT LONG-TERM CURRENT USE OF INSULIN (MULTI): ICD-10-CM

## 2024-01-11 DIAGNOSIS — H60.90 OTITIS EXTERNA, UNSPECIFIED CHRONICITY, UNSPECIFIED LATERALITY, UNSPECIFIED TYPE: ICD-10-CM

## 2024-01-11 LAB
AFP SERPL-MCNC: 4 NG/ML (ref 0–9)
ALBUMIN SERPL BCP-MCNC: 4.2 G/DL (ref 3.4–5)
ALP SERPL-CCNC: 79 U/L (ref 33–110)
ALT SERPL W P-5'-P-CCNC: 33 U/L (ref 7–45)
AMORPH CRY #/AREA UR COMP ASSIST: NORMAL /HPF
ANION GAP SERPL CALC-SCNC: 12 MMOL/L (ref 10–20)
APPEARANCE UR: ABNORMAL
AST SERPL W P-5'-P-CCNC: 32 U/L (ref 9–39)
BASOPHILS # BLD AUTO: 0.02 X10*3/UL (ref 0–0.1)
BASOPHILS NFR BLD AUTO: 0.5 %
BILIRUB DIRECT SERPL-MCNC: 0.1 MG/DL (ref 0–0.3)
BILIRUB SERPL-MCNC: 0.7 MG/DL (ref 0–1.2)
BILIRUB UR STRIP.AUTO-MCNC: NEGATIVE MG/DL
BUN SERPL-MCNC: 12 MG/DL (ref 6–23)
BURR CELLS BLD QL SMEAR: NORMAL
CALCIUM SERPL-MCNC: 9.2 MG/DL (ref 8.6–10.3)
CHLORIDE SERPL-SCNC: 103 MMOL/L (ref 98–107)
CO2 SERPL-SCNC: 25 MMOL/L (ref 21–32)
COLOR UR: ABNORMAL
CREAT SERPL-MCNC: 0.7 MG/DL (ref 0.5–1.05)
EGFRCR SERPLBLD CKD-EPI 2021: >90 ML/MIN/1.73M*2
EOSINOPHIL # BLD AUTO: 0.17 X10*3/UL (ref 0–0.7)
EOSINOPHIL NFR BLD AUTO: 4.1 %
ERYTHROCYTE [DISTWIDTH] IN BLOOD BY AUTOMATED COUNT: 13.8 % (ref 11.5–14.5)
GLUCOSE SERPL-MCNC: 231 MG/DL (ref 74–99)
GLUCOSE UR STRIP.AUTO-MCNC: ABNORMAL MG/DL
HAV AB SER QL IA: REACTIVE
HBV CORE AB SER QL: NONREACTIVE
HBV SURFACE AB SER-ACNC: <3.1 MIU/ML
HBV SURFACE AG SERPL QL IA: NONREACTIVE
HCT VFR BLD AUTO: 37.5 % (ref 36–46)
HCV AB SER QL: NONREACTIVE
HGB BLD-MCNC: 12.4 G/DL (ref 12–16)
IMM GRANULOCYTES # BLD AUTO: 0.01 X10*3/UL (ref 0–0.7)
IMM GRANULOCYTES NFR BLD AUTO: 0.2 % (ref 0–0.9)
INR PPP: 1.2 (ref 0.9–1.1)
KETONES UR STRIP.AUTO-MCNC: ABNORMAL MG/DL
LEUKOCYTE ESTERASE UR QL STRIP.AUTO: ABNORMAL
LYMPHOCYTES # BLD AUTO: 1.22 X10*3/UL (ref 1.2–4.8)
LYMPHOCYTES NFR BLD AUTO: 29.4 %
MCH RBC QN AUTO: 31.4 PG (ref 26–34)
MCHC RBC AUTO-ENTMCNC: 33.1 G/DL (ref 32–36)
MCV RBC AUTO: 95 FL (ref 80–100)
MONOCYTES # BLD AUTO: 0.43 X10*3/UL (ref 0.1–1)
MONOCYTES NFR BLD AUTO: 10.4 %
MUCOUS THREADS #/AREA URNS AUTO: NORMAL /LPF
NEUTROPHILS # BLD AUTO: 2.3 X10*3/UL (ref 1.2–7.7)
NEUTROPHILS NFR BLD AUTO: 55.4 %
NITRITE UR QL STRIP.AUTO: NEGATIVE
NRBC BLD-RTO: 0 /100 WBCS (ref 0–0)
PH UR STRIP.AUTO: 5 [PH]
PLATELET # BLD AUTO: 77 X10*3/UL (ref 150–450)
POTASSIUM SERPL-SCNC: 3.9 MMOL/L (ref 3.5–5.3)
PROT SERPL-MCNC: 7.4 G/DL (ref 6.4–8.2)
PROT UR STRIP.AUTO-MCNC: NEGATIVE MG/DL
PROTHROMBIN TIME: 13 SECONDS (ref 9.8–12.8)
RBC # BLD AUTO: 3.95 X10*6/UL (ref 4–5.2)
RBC # UR STRIP.AUTO: NEGATIVE /UL
RBC #/AREA URNS AUTO: NORMAL /HPF
RBC MORPH BLD: NORMAL
SODIUM SERPL-SCNC: 136 MMOL/L (ref 136–145)
SP GR UR STRIP.AUTO: 1.03
SQUAMOUS #/AREA URNS AUTO: NORMAL /HPF
UROBILINOGEN UR STRIP.AUTO-MCNC: 4 MG/DL
WBC # BLD AUTO: 4.2 X10*3/UL (ref 4.4–11.3)
WBC #/AREA URNS AUTO: NORMAL /HPF

## 2024-01-11 PROCEDURE — 82105 ALPHA-FETOPROTEIN SERUM: CPT

## 2024-01-11 PROCEDURE — 86704 HEP B CORE ANTIBODY TOTAL: CPT

## 2024-01-11 PROCEDURE — 86708 HEPATITIS A ANTIBODY: CPT

## 2024-01-11 PROCEDURE — 85610 PROTHROMBIN TIME: CPT

## 2024-01-11 PROCEDURE — 86803 HEPATITIS C AB TEST: CPT

## 2024-01-11 PROCEDURE — 86706 HEP B SURFACE ANTIBODY: CPT

## 2024-01-11 PROCEDURE — 99214 OFFICE O/P EST MOD 30 MIN: CPT | Performed by: INTERNAL MEDICINE

## 2024-01-11 PROCEDURE — 81001 URINALYSIS AUTO W/SCOPE: CPT

## 2024-01-11 PROCEDURE — 3077F SYST BP >= 140 MM HG: CPT | Performed by: INTERNAL MEDICINE

## 2024-01-11 PROCEDURE — 3080F DIAST BP >= 90 MM HG: CPT | Performed by: INTERNAL MEDICINE

## 2024-01-11 PROCEDURE — 85025 COMPLETE CBC W/AUTO DIFF WBC: CPT

## 2024-01-11 PROCEDURE — 80053 COMPREHEN METABOLIC PANEL: CPT

## 2024-01-11 PROCEDURE — 70486 CT MAXILLOFACIAL W/O DYE: CPT

## 2024-01-11 PROCEDURE — 70486 CT MAXILLOFACIAL W/O DYE: CPT | Performed by: RADIOLOGY

## 2024-01-11 PROCEDURE — 87340 HEPATITIS B SURFACE AG IA: CPT

## 2024-01-11 PROCEDURE — 82248 BILIRUBIN DIRECT: CPT

## 2024-01-11 PROCEDURE — 36415 COLL VENOUS BLD VENIPUNCTURE: CPT

## 2024-01-11 RX ORDER — PREDNISONE 10 MG/1
TABLET ORAL
Qty: 30 TABLET | Refills: 0 | Status: SHIPPED | OUTPATIENT
Start: 2024-01-11 | End: 2024-01-21

## 2024-01-11 RX ORDER — CYCLOBENZAPRINE HCL 10 MG
10 TABLET ORAL 3 TIMES DAILY PRN
COMMUNITY
End: 2024-02-10 | Stop reason: HOSPADM

## 2024-01-11 RX ORDER — IBUPROFEN 600 MG/1
600 TABLET ORAL EVERY 6 HOURS PRN
Status: ON HOLD | COMMUNITY
End: 2024-02-10 | Stop reason: SDUPTHER

## 2024-01-11 RX ORDER — ERYTHROMYCIN 5 MG/G
OINTMENT OPHTHALMIC 4 TIMES DAILY
Qty: 3.5 G | Refills: 0 | Status: SHIPPED | OUTPATIENT
Start: 2024-01-11 | End: 2024-01-18

## 2024-01-11 ASSESSMENT — ENCOUNTER SYMPTOMS
EYE ITCHING: 1
NECK PAIN: 1

## 2024-01-11 NOTE — PROGRESS NOTES
Patient here for possible pinch nerve in neck     Subjective   Patient ID: Alejandra Houston is a 58 y.o. female who presents for Neck Pain.  She is accompanied by her  who offers some of the history.     The patient reports worsening left shoulder pain since 10/2023 that resulted in an ED visit on 1/6/2024.  She underwent CT Cervical Spine which showed mild to moderate spinal canal stenosis at C4-C5 and moderate bilateral foraminal stenosis.  She was given oxycodone in the ER, which resulted in significant relief.  The patient was discharged home in stable condition with prescriptions for Ibuprofen, lidocaine patches, and cyclobenzaprine the same day.    The patient notes that the left shoulder pain is ongoing with minimal relief from the cyclobenzaprine.  She has been taking gabapentin 300mg three times daily as well.  The patient has met with  from Pain Medicine in the past.      The patient mentions bilateral ear fullness and requests ear drops.  She recently was seen by  from Otolaryngology.    The patient reports bilateral itchy eyes which she believes is due to dry eyelashes.      Neck Pain       Review of Systems   HENT:          Positive for bilateral ear fullness.    Eyes:  Positive for itching.   Musculoskeletal:  Positive for neck pain.        Positive for left shoulder pain.     Objective   Physical Exam  Constitutional:       Appearance: Normal appearance.   HENT:      Right Ear: Tympanic membrane normal.      Left Ear: Tympanic membrane, ear canal and external ear normal.      Ears:      Comments: Mild erythema of right ear canal.  Left ear normal.  Neck:      Vascular: No carotid bruit.   Cardiovascular:      Rate and Rhythm: Normal rate and regular rhythm.      Heart sounds: Normal heart sounds.   Pulmonary:      Effort: Pulmonary effort is normal.      Breath sounds: Normal breath sounds.   Abdominal:      General: Bowel sounds are normal.      Palpations: Abdomen is soft.       Tenderness: There is no abdominal tenderness.   Skin:     General: Skin is warm and dry.   Neurological:      General: No focal deficit present.      Mental Status: She is alert and oriented to person, place, and time. Mental status is at baseline.   Psychiatric:         Mood and Affect: Mood normal.         Behavior: Behavior normal.       Assessment/Plan   Problem List Items Addressed This Visit             ICD-10-CM    Type 2 diabetes mellitus (CMS/[facility]) E11.9    Chronic obstructive lung disease (CMS/[facility]) J44.9     Other Visit Diagnoses         Codes    Pinched cervical nerve root    -  Primary G54.2    Relevant Medications    predniSONE (Deltasone) 10 mg tablet    Blepharitis of both eyes, unspecified eyelid, unspecified type     H01.003, H01.006    Relevant Medications    erythromycin (Romycin) 5 mg/gram (0.5 %) ophthalmic ointment    Otitis externa, unspecified chronicity, unspecified laterality, unspecified type     H60.90    Relevant Medications    ciprofloxacin-hydrocortisone (Cipro HC Otic) otic suspension            IMPRESSIONS/PLAN:     Bilateral Blepharitis  - Prescribed erythromycin 5mg/gram (0.5%) ophthalmic ointment as 4 times daily.  Also advised patient to try small amount of baby oil for moisturization.  Call the clinic if symptoms persist or worsen.    Otitis Externa  - Mild erythema of right ear canal.  Prescribed ciprofloxacin-hydrocortisone otic suspension as 3 drops twice daily.Call the clinic I symptoms persist or worsen.      Pinched Cervical Nerve Root/Left Shoulder Pain  - CT Cervical Spine 1/2024 showed mild to moderate spinal canal stenosis at C4-C5 and moderate bilateral foraminal stenosis.  Prescribed prednisone 10mg taper as directed on packaging. Encouraged patient to schedule appointment with  from Pain Medicine.  Call the clinic if symptoms persist or worsen.    /90 in the office today.     Diabetes II   - Last HbA1c 7.8 per 11/2023.  Currently maintained on  metformin 500mg 2 tabs BID, and Trulicity 0.75mg/0.5mL 1 pen weekly. Previously on glimperide 2mg QD.      Depression   - Following with Psychiatry. Continue duloxetine 30 mg as two tablets once daily. Previously on Lexapro, and Rexulti or Topamax.  No SI or HI.      Chronic Sinusitis  - CT Sinus without Contrast wnl and unremarkable 1/2024.  Following with  from Otolaryngology..       Constipation   - Advised patient to try Colace BID and polyethylene glycol 3350 17 gm oral packet as 1 packet in 8 ounces of liquid once daily in the afternoon.    Sleep Apnea   - Previously referred to sleep medicine.     Paresthesia of Right Upper Extremity  - Previously ordered EMG and Nerve Conduction.     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 Underwent complex revision of right TKA. Following with  in Orthopedic Surgery.     Thrombocytopenia  - Last CBC showed platelet count of 77 per 10/2023.  Following with Hematology/Oncology.  Suspect from underlying liver cirrhosis.       Liver Cirrhosis  - CT Chest with IV Contrast 10/2023 showed incidental cirrhotic liver morphology with sequela of portal hypertension (splenomegaly and esophageal varices).  Previously ordered referral for Hepatology. Advised the patient to stop taking Tylenol.   She has seen Dr. Millard in the past.      Left Adnexal Mass  - CT Abdomen Pelvis 7/2023 showed incidental finding of A 3.6 cm benign-appearing left adnexal lesion is seen.  In an early post-menopausal woman (<5 years since menopause), follow-up ultrasound at 6-12 months is recommended. Patient has scheduled appointment with Gynecology.     Vaginal dryness  - Refilled Estradiol cream.     Right Third Finger Pain  - Previously ordered Xray of fingers right hand.     Dry Skin  - Prescribed Cetaphil moisturizing lotion.      Fatigue  - Likely due to untreated sleep apnea.  Encouraged patient to follow-up with Sleep Medicine,  and she has appointment for next week.  Vitamin D, Vitamin B12, and TSH wnl per 9/2023.     LLE swelling  - Previously ordered Echocardiogram and referral to Cardiology.  Last Venous Duplex U/S 8/7/2023 negative for DVT. Looks like they suspected this is related to low back issues which I agree with.  MRI LS was denied by insurance, and will follow-up as imaging would be very helpful for this patient.  Continue with gabapentin 300 mg as two capsules three times daily.  Refilled tramadol as below to have on hand to use VERY sparingly, and patient understands not to use with cyclobenzaprine. Encouraged patient to follow up with  from Pain Medicine, and she agreed.       Low Back Pain/ leg weakness   - Worsening since 4/2023.  Weakness on LLE on exam.  Patient unable to ambulate without cane at home, and is having difficulty sitting, lying down, or sleeping.  Patient unable to do physical therapy due to severity of pain in lower limbs and inability to find transportation.  MRI Lumbar Spine without Contrast denied by insurance, and will follow-up. Continue with gabapentin 600 mg TID, Naproxen 500 mg BID prn, and refilled tramadol 50 mg every 6 hours as needed.  [place]. Substance controlled contract signed. Drug screen ordered. Has followed up with  from Pain Medicine.       Atypical Chest pain  -Echo.  Referred to cards previously.     Smoking History   - CT Chest 4/2023 shows redemonstration of groundglass opacities in the posterior bilateral upper lobes, left lower lobe and new groundglass  opacities in the anterior left upper lobe. Central airways are clear.  Stable noncalcified pulmonary nodule in the right upper lobe (6, 38) and right middle lobe (6, 57) since 5/19/2019.  Discussed with patient that varenicline was recalled.       Health Maintenance   - Routine labs 10/2023.  Last Mammogram 5/2023. Last colonoscopy 5/2019, repeat due [id number].  Patient received Influenza vaccine in the  clinic today, tolerated well.      Follow-up in 3 months, call sooner if needed.        Scribe Attestation  By signing my name below, I, Aida Galdamez, Pedro   attest that this documentation has been prepared under the direction and in the presence of Jhoan Eller DO.   Aida Galdamez 01/11/24 3:34 PM

## 2024-01-15 ENCOUNTER — ANCILLARY PROCEDURE (OUTPATIENT)
Dept: RADIOLOGY | Facility: CLINIC | Age: 59
End: 2024-01-15
Payer: COMMERCIAL

## 2024-01-15 ENCOUNTER — TELEPHONE (OUTPATIENT)
Dept: CARDIOLOGY | Facility: HOSPITAL | Age: 59
End: 2024-01-15
Payer: COMMERCIAL

## 2024-01-15 ENCOUNTER — HOSPITAL ENCOUNTER (OUTPATIENT)
Dept: CARDIOLOGY | Facility: CLINIC | Age: 59
Discharge: HOME | End: 2024-01-15
Payer: COMMERCIAL

## 2024-01-15 DIAGNOSIS — R07.9 CHEST PAIN: Primary | ICD-10-CM

## 2024-01-15 DIAGNOSIS — M54.41 LOW BACK PAIN WITH BILATERAL SCIATICA, UNSPECIFIED BACK PAIN LATERALITY, UNSPECIFIED CHRONICITY: ICD-10-CM

## 2024-01-15 DIAGNOSIS — R07.2 PRECORDIAL PAIN: ICD-10-CM

## 2024-01-15 DIAGNOSIS — R06.02 SOB (SHORTNESS OF BREATH): ICD-10-CM

## 2024-01-15 DIAGNOSIS — M54.42 LOW BACK PAIN WITH BILATERAL SCIATICA, UNSPECIFIED BACK PAIN LATERALITY, UNSPECIFIED CHRONICITY: ICD-10-CM

## 2024-01-15 DIAGNOSIS — R06.02 SHORTNESS OF BREATH: ICD-10-CM

## 2024-01-15 PROCEDURE — 78452 HT MUSCLE IMAGE SPECT MULT: CPT

## 2024-01-15 PROCEDURE — 93017 CV STRESS TEST TRACING ONLY: CPT

## 2024-01-15 PROCEDURE — 78452 HT MUSCLE IMAGE SPECT MULT: CPT | Performed by: NUCLEAR MEDICINE

## 2024-01-15 PROCEDURE — 93016 CV STRESS TEST SUPVJ ONLY: CPT | Performed by: INTERNAL MEDICINE

## 2024-01-15 PROCEDURE — 72148 MRI LUMBAR SPINE W/O DYE: CPT | Performed by: RADIOLOGY

## 2024-01-15 PROCEDURE — A9502 TC99M TETROFOSMIN: HCPCS | Performed by: INTERNAL MEDICINE

## 2024-01-15 PROCEDURE — 3430000001 HC RX 343 DIAGNOSTIC RADIOPHARMACEUTICALS: Performed by: INTERNAL MEDICINE

## 2024-01-15 PROCEDURE — 72148 MRI LUMBAR SPINE W/O DYE: CPT

## 2024-01-15 PROCEDURE — 2500000004 HC RX 250 GENERAL PHARMACY W/ HCPCS (ALT 636 FOR OP/ED): Mod: SE | Performed by: INTERNAL MEDICINE

## 2024-01-15 RX ORDER — REGADENOSON 0.08 MG/ML
0.4 INJECTION, SOLUTION INTRAVENOUS
Status: COMPLETED | OUTPATIENT
Start: 2024-01-15 | End: 2024-01-15

## 2024-01-15 RX ADMIN — TETROFOSMIN 10.2 MILLICURIE: 0.23 INJECTION, POWDER, LYOPHILIZED, FOR SOLUTION INTRAVENOUS at 08:48

## 2024-01-15 RX ADMIN — TETROFOSMIN 33 MILLICURIE: 0.23 INJECTION, POWDER, LYOPHILIZED, FOR SOLUTION INTRAVENOUS at 10:42

## 2024-01-15 RX ADMIN — REGADENOSON 0.4 MG: 0.08 INJECTION INTRAVENOUS at 10:42

## 2024-01-15 NOTE — TELEPHONE ENCOUNTER
I called and informed patient.   Please advise  Sig states:    Apply once daily at nighttime, wash in the morning.  Maintenance 1-2 times a week     Is it daily? Or 1-2 times a week?  Can we send a new script with correct sig?  Please advise

## 2024-01-16 ENCOUNTER — APPOINTMENT (OUTPATIENT)
Dept: PAIN MEDICINE | Facility: CLINIC | Age: 59
End: 2024-01-16
Payer: COMMERCIAL

## 2024-01-16 DIAGNOSIS — G89.29 CHRONIC LOW BACK PAIN, UNSPECIFIED BACK PAIN LATERALITY, UNSPECIFIED WHETHER SCIATICA PRESENT: ICD-10-CM

## 2024-01-16 DIAGNOSIS — E11.9 TYPE 2 DIABETES MELLITUS WITHOUT COMPLICATION, WITHOUT LONG-TERM CURRENT USE OF INSULIN (MULTI): ICD-10-CM

## 2024-01-16 DIAGNOSIS — Z72.0 TOBACCO ABUSE: ICD-10-CM

## 2024-01-16 DIAGNOSIS — M54.50 CHRONIC LOW BACK PAIN, UNSPECIFIED BACK PAIN LATERALITY, UNSPECIFIED WHETHER SCIATICA PRESENT: ICD-10-CM

## 2024-01-16 DIAGNOSIS — K74.60 CIRRHOSIS OF LIVER WITHOUT ASCITES, UNSPECIFIED HEPATIC CIRRHOSIS TYPE (MULTI): ICD-10-CM

## 2024-01-16 RX ORDER — FERROUS SULFATE TAB 325 MG (65 MG ELEMENTAL FE) 325 (65 FE) MG
1 TAB ORAL
Qty: 30 TABLET | Refills: 0 | Status: SHIPPED | OUTPATIENT
Start: 2024-01-16 | End: 2024-02-05

## 2024-01-16 RX ORDER — FOLIC ACID 1 MG/1
TABLET ORAL
Qty: 30 TABLET | Refills: 1 | Status: SHIPPED | OUTPATIENT
Start: 2024-01-16 | End: 2024-03-13

## 2024-01-16 RX ORDER — DULAGLUTIDE 0.75 MG/.5ML
INJECTION, SOLUTION SUBCUTANEOUS
Qty: 2 ML | Refills: 1 | Status: SHIPPED | OUTPATIENT
Start: 2024-01-16 | End: 2024-03-13

## 2024-01-16 RX ORDER — IBUPROFEN 200 MG
TABLET ORAL
Qty: 28 PATCH | Refills: 1 | Status: SHIPPED | OUTPATIENT
Start: 2024-01-16 | End: 2024-02-10 | Stop reason: HOSPADM

## 2024-01-17 ENCOUNTER — PATIENT MESSAGE (OUTPATIENT)
Dept: PRIMARY CARE | Facility: CLINIC | Age: 59
End: 2024-01-17
Payer: COMMERCIAL

## 2024-01-17 DIAGNOSIS — G89.29 CHRONIC LOW BACK PAIN, UNSPECIFIED BACK PAIN LATERALITY, UNSPECIFIED WHETHER SCIATICA PRESENT: ICD-10-CM

## 2024-01-17 DIAGNOSIS — M54.50 CHRONIC LOW BACK PAIN, UNSPECIFIED BACK PAIN LATERALITY, UNSPECIFIED WHETHER SCIATICA PRESENT: ICD-10-CM

## 2024-01-17 DIAGNOSIS — Z00.00 HEALTHCARE MAINTENANCE: ICD-10-CM

## 2024-01-17 DIAGNOSIS — E11.9 TYPE 2 DIABETES MELLITUS WITHOUT COMPLICATION, WITHOUT LONG-TERM CURRENT USE OF INSULIN (MULTI): ICD-10-CM

## 2024-01-17 DIAGNOSIS — M54.50 LOW BACK PAIN, UNSPECIFIED BACK PAIN LATERALITY, UNSPECIFIED CHRONICITY, UNSPECIFIED WHETHER SCIATICA PRESENT: ICD-10-CM

## 2024-01-17 RX ORDER — METFORMIN HYDROCHLORIDE 500 MG/1
1000 TABLET ORAL 2 TIMES DAILY
Qty: 120 TABLET | Refills: 1 | Status: SHIPPED | OUTPATIENT
Start: 2024-01-17 | End: 2024-03-15

## 2024-01-17 RX ORDER — ALBUTEROL SULFATE 90 UG/1
2 AEROSOL, METERED RESPIRATORY (INHALATION) 4 TIMES DAILY PRN
Qty: 8.5 G | Refills: 0 | Status: SHIPPED | OUTPATIENT
Start: 2024-01-17 | End: 2024-02-05

## 2024-01-17 RX ORDER — TRAMADOL HYDROCHLORIDE 50 MG/1
50 TABLET ORAL EVERY 6 HOURS PRN
Qty: 28 TABLET | Refills: 0 | Status: SHIPPED | OUTPATIENT
Start: 2024-01-17 | End: 2024-01-24

## 2024-01-17 RX ORDER — GABAPENTIN 300 MG/1
CAPSULE ORAL
Qty: 90 CAPSULE | Refills: 0 | Status: SHIPPED | OUTPATIENT
Start: 2024-01-17 | End: 2024-02-05

## 2024-01-17 NOTE — TELEPHONE ENCOUNTER
You can send me refill but she needs to see pain management for anything stronger or an increase in frequency.  Her MRI shows some stenosis as well- she should discuss options with them tomorrow about this.

## 2024-01-17 NOTE — TELEPHONE ENCOUNTER
From: Alejandra Houston  To: Jhoan Eller DO  Sent: 1/17/2024 2:15 PM EST  Subject: Refill     Hi Dr Mesa I was calling to see if you were going to be able to renew my tramadol please I'm hurting very bad if you can raise the milligram or let me take three a day until I can get into the doctors tomorrow

## 2024-01-18 ENCOUNTER — APPOINTMENT (OUTPATIENT)
Dept: PAIN MEDICINE | Facility: CLINIC | Age: 59
End: 2024-01-18
Payer: COMMERCIAL

## 2024-01-19 ENCOUNTER — APPOINTMENT (OUTPATIENT)
Dept: CARDIOLOGY | Facility: CLINIC | Age: 59
End: 2024-01-19
Payer: COMMERCIAL

## 2024-01-19 ENCOUNTER — PATIENT MESSAGE (OUTPATIENT)
Dept: PRIMARY CARE | Facility: CLINIC | Age: 59
End: 2024-01-19
Payer: COMMERCIAL

## 2024-01-19 DIAGNOSIS — F17.219 CIGARETTE NICOTINE DEPENDENCE WITH NICOTINE-INDUCED DISORDER: ICD-10-CM

## 2024-01-22 ENCOUNTER — LAB (OUTPATIENT)
Dept: LAB | Facility: LAB | Age: 59
End: 2024-01-22
Payer: COMMERCIAL

## 2024-01-22 ENCOUNTER — PRE-ADMISSION TESTING (OUTPATIENT)
Dept: PREADMISSION TESTING | Facility: HOSPITAL | Age: 59
End: 2024-01-22
Payer: COMMERCIAL

## 2024-01-22 VITALS
HEIGHT: 68 IN | SYSTOLIC BLOOD PRESSURE: 167 MMHG | BODY MASS INDEX: 37.89 KG/M2 | DIASTOLIC BLOOD PRESSURE: 99 MMHG | WEIGHT: 250 LBS | TEMPERATURE: 96.8 F | HEART RATE: 71 BPM | RESPIRATION RATE: 16 BRPM

## 2024-01-22 DIAGNOSIS — Z01.818 PRE-OP EXAM: ICD-10-CM

## 2024-01-22 DIAGNOSIS — M17.12 PRIMARY OSTEOARTHRITIS OF LEFT KNEE: ICD-10-CM

## 2024-01-22 DIAGNOSIS — Z01.818 PRE-OP TESTING: Primary | ICD-10-CM

## 2024-01-22 LAB
ABO GROUP (TYPE) IN BLOOD: NORMAL
ANION GAP SERPL CALC-SCNC: 11 MMOL/L (ref 10–20)
ANTIBODY SCREEN: NORMAL
APTT PPP: 33 SECONDS (ref 27–38)
BASOPHILS # BLD AUTO: 0.04 X10*3/UL (ref 0–0.1)
BASOPHILS NFR BLD AUTO: 0.8 %
BUN SERPL-MCNC: 11 MG/DL (ref 6–23)
CALCIUM SERPL-MCNC: 9.2 MG/DL (ref 8.6–10.3)
CHLORIDE SERPL-SCNC: 100 MMOL/L (ref 98–107)
CO2 SERPL-SCNC: 28 MMOL/L (ref 21–32)
CREAT SERPL-MCNC: 0.65 MG/DL (ref 0.5–1.05)
EGFRCR SERPLBLD CKD-EPI 2021: >90 ML/MIN/1.73M*2
EOSINOPHIL # BLD AUTO: 0.18 X10*3/UL (ref 0–0.7)
EOSINOPHIL NFR BLD AUTO: 3.7 %
ERYTHROCYTE [DISTWIDTH] IN BLOOD BY AUTOMATED COUNT: 13.5 % (ref 11.5–14.5)
GLUCOSE SERPL-MCNC: 155 MG/DL (ref 74–99)
HCT VFR BLD AUTO: 39.2 % (ref 36–46)
HGB BLD-MCNC: 12.9 G/DL (ref 12–16)
IMM GRANULOCYTES # BLD AUTO: 0.02 X10*3/UL (ref 0–0.7)
IMM GRANULOCYTES NFR BLD AUTO: 0.4 % (ref 0–0.9)
INR PPP: 1.1 (ref 0.9–1.1)
LYMPHOCYTES # BLD AUTO: 1.28 X10*3/UL (ref 1.2–4.8)
LYMPHOCYTES NFR BLD AUTO: 26.1 %
MCH RBC QN AUTO: 30.9 PG (ref 26–34)
MCHC RBC AUTO-ENTMCNC: 32.9 G/DL (ref 32–36)
MCV RBC AUTO: 94 FL (ref 80–100)
MONOCYTES # BLD AUTO: 0.42 X10*3/UL (ref 0.1–1)
MONOCYTES NFR BLD AUTO: 8.6 %
NEUTROPHILS # BLD AUTO: 2.97 X10*3/UL (ref 1.2–7.7)
NEUTROPHILS NFR BLD AUTO: 60.4 %
NRBC BLD-RTO: 0 /100 WBCS (ref 0–0)
PLATELET # BLD AUTO: 76 X10*3/UL (ref 150–450)
POTASSIUM SERPL-SCNC: 3.8 MMOL/L (ref 3.5–5.3)
PROTHROMBIN TIME: 12.9 SECONDS (ref 9.8–12.8)
RBC # BLD AUTO: 4.18 X10*6/UL (ref 4–5.2)
RH FACTOR (ANTIGEN D): NORMAL
SODIUM SERPL-SCNC: 135 MMOL/L (ref 136–145)
WBC # BLD AUTO: 4.9 X10*3/UL (ref 4.4–11.3)

## 2024-01-22 PROCEDURE — 85025 COMPLETE CBC W/AUTO DIFF WBC: CPT

## 2024-01-22 PROCEDURE — 85610 PROTHROMBIN TIME: CPT

## 2024-01-22 PROCEDURE — 86850 RBC ANTIBODY SCREEN: CPT

## 2024-01-22 PROCEDURE — 99214 OFFICE O/P EST MOD 30 MIN: CPT | Performed by: NURSE PRACTITIONER

## 2024-01-22 PROCEDURE — 85730 THROMBOPLASTIN TIME PARTIAL: CPT

## 2024-01-22 PROCEDURE — 36415 COLL VENOUS BLD VENIPUNCTURE: CPT

## 2024-01-22 PROCEDURE — 80048 BASIC METABOLIC PNL TOTAL CA: CPT

## 2024-01-22 PROCEDURE — 86900 BLOOD TYPING SEROLOGIC ABO: CPT

## 2024-01-22 PROCEDURE — 86901 BLOOD TYPING SEROLOGIC RH(D): CPT

## 2024-01-22 PROCEDURE — 87081 CULTURE SCREEN ONLY: CPT | Mod: STJLAB

## 2024-01-22 RX ORDER — OXYCODONE AND ACETAMINOPHEN 5; 325 MG/1; MG/1
1 TABLET ORAL EVERY 6 HOURS PRN
COMMUNITY

## 2024-01-22 RX ORDER — CHLORHEXIDINE GLUCONATE ORAL RINSE 1.2 MG/ML
SOLUTION DENTAL
Qty: 473 ML | Refills: 0 | Status: SHIPPED | OUTPATIENT
Start: 2024-01-22 | End: 2024-02-10 | Stop reason: HOSPADM

## 2024-01-22 RX ORDER — DIPHENHYDRAMINE HCL 25 MG
4 CAPSULE ORAL AS NEEDED
Qty: 110 EACH | Refills: 1 | Status: SHIPPED | OUTPATIENT
Start: 2024-01-22 | End: 2024-02-10 | Stop reason: HOSPADM

## 2024-01-22 ASSESSMENT — CHADS2 SCORE
CHF: NO
CHADS2 SCORE: 1
DIABETES: YES
AGE GREATER THAN OR EQUAL TO 75: NO
PRIOR STROKE OR TIA OR THROMBOEMBOLISM: NO
HYPERTENSION: NO

## 2024-01-22 ASSESSMENT — DUKE ACTIVITY SCORE INDEX (DASI)
CAN YOU WALK A BLOCK OR TWO ON LEVEL GROUND: NO
CAN YOU WALK INDOORS, SUCH AS AROUND YOUR HOUSE: YES
CAN YOU DO LIGHT WORK AROUND THE HOUSE LIKE DUSTING OR WASHING DISHES: YES
CAN YOU DO YARD WORK LIKE RAKING LEAVES, WEEDING OR PUSHING A MOWER: NO
CAN YOU DO MODERATE WORK AROUND THE HOUSE LIKE VACUUMING, SWEEPING FLOORS OR CARRYING GROCERIES: NO
CAN YOU PARTICIPATE IN MODERATE RECREATIONAL ACTIVITIES LIKE GOLF, BOWLING, DANCING, DOUBLES TENNIS OR THROWING A BASEBALL OR FOOTBALL: NO
CAN YOU DO HEAVY WORK AROUND THE HOUSE LIKE SCRUBBING FLOORS OR LIFTING AND MOVING HEAVY FURNITURE: NO
CAN YOU RUN A SHORT DISTANCE: NO
CAN YOU CLIMB A FLIGHT OF STAIRS OR WALK UP A HILL: NO
CAN YOU HAVE SEXUAL RELATIONS: NO
CAN YOU TAKE CARE OF YOURSELF (EAT, DRESS, BATHE, OR USE TOILET): NO

## 2024-01-22 ASSESSMENT — LIFESTYLE VARIABLES: SMOKING_STATUS: SMOKER

## 2024-01-22 ASSESSMENT — ACTIVITIES OF DAILY LIVING (ADL): ADL_SCORE: 5

## 2024-01-22 NOTE — PREPROCEDURE INSTRUCTIONS
Medication List            Accurate as of January 22, 2024 10:25 AM. Always use your most recent med list.                acetaminophen 500 mg tablet  Commonly known as: Tylenol  TAKE 2 TABLETS (1,000 MG) BY MOUTH EVERY 8 HOURS IF NEEDED FOR MILD PAIN (1 - 3).  Medication Adjustments for Surgery: Other (Comment)  Notes to patient: May take the morning of surgery     albuterol 90 mcg/actuation inhaler  INHALE 2 PUFFS 4 TIMES A DAY AS NEEDED FOR SHORTNESS OF BREATH OR WHEEZING.  Medication Adjustments for Surgery: Other (Comment)  Notes to patient: May take the morning of surgery     alcohol swabs pads, medicated  Medication Adjustments for Surgery: Other (Comment)     B Complex-Vitamin B12 tablet  Generic drug: vitamin B complex  TAKE 1 TABLET DAILY.  Medication Adjustments for Surgery: Stop 7 days before surgery     Cetaphil moisturizing lotion  Apply topically if needed for dry skin.  Medication Adjustments for Surgery: Other (Comment)  Notes to patient: May use as needed     chlorhexidine 0.12 % solution  Commonly known as: Peridex  Swish and spit 15 ml for 2 doses, 15mL the night before surgery and 15 ml morning of surgery - swish for 30 seconds -DO NOT SWALLOW, SPIT OUT     cyclobenzaprine 10 mg tablet  Commonly known as: Flexeril  Medication Adjustments for Surgery: Take morning of surgery with sip of water, no other fluids     diclofenac sodium 1 % gel gel  Commonly known as: Voltaren  Apply 1 Application topically 4 times a day.  Medication Adjustments for Surgery: Stop 7 days before surgery     docusate sodium 100 mg capsule  Commonly known as: Colace  TAKE 1 TABLET (100 MG) BY MOUTH 2 TIMES A DAY AS NEEDED FOR CONSTIPATION.  Medication Adjustments for Surgery: Continue until night before surgery     DULoxetine 30 mg DR capsule  Commonly known as: Cymbalta  Take 2 capsules (60 mg) by mouth once daily. Do not crush or chew.  Medication Adjustments for Surgery: Take morning of surgery with sip of water, no  other fluids     EASY COMFORT LANCETS MISC  Medication Adjustments for Surgery: Other (Comment)     estradiol 0.01 % (0.1 mg/gram) vaginal cream  Commonly known as: Estrace  Insert 1 applicator vaginally every night weekly  Medication Adjustments for Surgery: Continue until night before surgery     FeroSuL tablet  Generic drug: ferrous sulfate (325 mg ferrous sulfate)  TAKE 1 TABLET BY MOUTH TWICE A DAY WITH MEALS  Medication Adjustments for Surgery: Continue until night before surgery     fluticasone 50 mcg/actuation nasal spray  Commonly known as: Flonase  USE 2 SPRAYS IN EACH NOSTRIL ONCE DAILY  Medication Adjustments for Surgery: Take morning of surgery with sip of water, no other fluids  Notes to patient: May use morning of surgery     folic acid 1 mg tablet  Commonly known as: Folvite  TAKE 1 TABLET BY MOUTH EVERY DAY  Medication Adjustments for Surgery: Stop 7 days before surgery     FreeStyle glucose monitoring kit  Medication Adjustments for Surgery: Other (Comment)     gabapentin 300 mg capsule  Commonly known as: Neurontin  TAKE 2 CAPSULES BY MOUTH 3 TIMES A DAY. TITRATE THE DOSE WEEKLY  Medication Adjustments for Surgery: Take morning of surgery with sip of water, no other fluids     ibuprofen 600 mg tablet  Medication Adjustments for Surgery: Stop 7 days before surgery     loratadine 10 mg tablet  Commonly known as: Claritin  Take 1 tablet (10 mg) by mouth once daily.  Medication Adjustments for Surgery: Take morning of surgery with sip of water, no other fluids     metFORMIN 500 mg tablet  Commonly known as: Glucophage  TAKE 2 TABLETS (1,000 MG) BY MOUTH IN THE MORNING AND 2 TABLETS (1,000 MG) BEFORE BEDTIME.  Medication Adjustments for Surgery: Continue until night before surgery     methocarbamol 500 mg tablet  Commonly known as: Robaxin  Take 2 tablets (1,000 mg) by mouth once daily at bedtime. Take 1 to 2 tablets at bedtime as needed for muscle spam  Medication Adjustments for Surgery: Other  (Comment)  Notes to patient: Not taking     multivitamin tablet  Take 1 tablet by mouth once daily.  Medication Adjustments for Surgery: Stop 7 days before surgery     naproxen 500 mg tablet  Commonly known as: Naprosyn  TAKE 1 TABLET BY MOUTH EVERY 12 HOURS AS NEEDED FOR PAIN WITH FOOD  Medication Adjustments for Surgery: Stop 7 days before surgery     nicotine 21 mg/24 hr patch  Commonly known as: Nicoderm CQ  APPLY 1 PATCH DAILY AS DIRECTED.  Medication Adjustments for Surgery: Other (Comment)  Notes to patient: Not taking     nicotine polacrilex 4 mg gum  Commonly known as: Nicorette  CHEW 1 EACH (4 MG) IF NEEDED FOR SMOKING CESSATION. USE AS DIRECTED  Medication Adjustments for Surgery: Other (Comment)  Notes to patient: Not taking     oxyCODONE-acetaminophen 5-325 mg tablet  Commonly known as: Percocet  Medication Adjustments for Surgery: Take morning of surgery with sip of water, no other fluids  Notes to patient: As needed     polyethylene glycol 17 gram packet  Commonly known as: Glycolax, Miralax  Medication Adjustments for Surgery: Continue until night before surgery     psyllium 3.4 gram packet  Commonly known as: Metamucil  Take 1 packet by mouth 2 times a day.  Medication Adjustments for Surgery: Other (Comment)  Notes to patient: Not taking     simethicone 125 mg capsule  Commonly known as: Gas Relief Extra Strength  TAKE 1 CAPSULE (125 MG) BY MOUTH EVERY 6 HOURS IF NEEDED FOR FLATULENCE.  Medication Adjustments for Surgery: Other (Comment)  Notes to patient: Not taking     thiamine 100 mg tablet  Commonly known as: Vitamin B-1  Medication Adjustments for Surgery: Stop 7 days before surgery     traMADol 50 mg tablet  Commonly known as: Ultram  Take 1 tablet (50 mg) by mouth every 6 hours if needed for severe pain (7 - 10) for up to 7 days.  Medication Adjustments for Surgery: Other (Comment)  Notes to patient: Not taking     traZODone 50 mg tablet  Commonly known as: Desyrel  Take 2 tablets (100 mg)  by mouth once daily at bedtime.  Medication Adjustments for Surgery: Other (Comment)  Notes to patient: Not taking     True Metrix Glucose Test Strip strip  Generic drug: blood sugar diagnostic  USE AS DIRECTED 4-5 TIMES DAILY  Medication Adjustments for Surgery: Other (Comment)     Trulicity 0.75 mg/0.5 mL pen injector  Generic drug: dulaglutide  INJECT 0.75MG UNDER THE SKIN EVERY WEEK  Medication Adjustments for Surgery: Stop 7 days before surgery     Vitamin D3 50 MCG (2000 UT) tablet  Generic drug: cholecalciferol  TAKE 1 TABLET (50 MCG) BY MOUTH ONCE DAILY.  Medication Adjustments for Surgery: Stop 7 days before surgery                            .STJ

## 2024-01-22 NOTE — H&P (VIEW-ONLY)
[facility]/PAT Evaluation       Name: Alejandra Houston (Alejandra Houston)  /Age: 1965/58 y.o.     In-Person       Chief Complaint: left knee pains    HPI    AALIYAH is a 59 yo female who suffers from multiple medical complaints involving neck, lower back, and left knee pains.  Presents today to [facility] for upcoming scheduled left total knee replacement due to imaging showing left knee osteoarthritis.  She complains of constant left knee pain with limited range of motion, using a cane for stability.  Has had a recent steroid injection in the left knee and oral steroids for recent neck pains due to a possible pinched nerve as she was total in the emergency room-has an upcoming pain management appointment on 2024.  Denies any recent falls and skin is intact to surgical limb. Otherwise denies any recent illness, fever/chills, chest pains or shortness of breath.     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 Eller  Cards: Dr. Zheng    Stress test 1/15/2024  Summary:   1. PEAK HR= 79 which is 48% of APMHR.   2. PEAK BP= 149/94.   3. LOWEST BP= 141/82.   4. 0.4mg Reagadenson given IVP.   5. 33.0 mCi Myoview Tracer given IVP.   6. Adequate level of stress achieved.   7. No clinical or electrocardiographic evidence for ischemia at a maximal infusion.   8. Normal Stress Test.   9. Nuclear image results are reported separately.    Hemoglobin A1C   Date Value Ref Range Status   2023 7.8 (H) see below % Final         Past Surgical  History:   Procedure Laterality Date     SECTION, CLASSIC  02/15/2018     Section     SECTION, CLASSIC      HIP ARTHROPLASTY      KNEE ARTHROPLASTY      OTHER SURGICAL HISTORY  06/10/2019    Hip replacement    TOTAL KNEE ARTHROPLASTY  2018    Knee Replacement    UVULOPALATOPHARYNGOPLASTY         Patient Sexual activity questions deferred to the physician.    Family History   Problem Relation Name Age of Onset    Hypertension Mother      Diabetes Mother      COPD Mother      Heart disease Mother      Lymphoma Mother      Cancer Other fam hx     Diabetes Other fam hx     Hypertension Other fam hx     Heart disease Other fam hx        No Known Allergies    Prior to Admission medications    Medication Sig Start Date End Date Taking? Authorizing Provider   acetaminophen (Tylenol) 500 mg tablet TAKE 2 TABLETS (1,000 MG) BY MOUTH EVERY 8 HOURS IF NEEDED FOR MILD PAIN (1 - 3).  Patient taking differently: Take 2 tablets (1,000 mg) by mouth once daily as needed for mild pain (1 - 3). 23   Carlos Vanessa MD   albuterol 90 mcg/actuation inhaler INHALE 2 PUFFS 4 TIMES A DAY AS NEEDED FOR SHORTNESS OF BREATH OR WHEEZING. 24   Jhoan Eller, DO   alcohol swabs pads, medicated Apply topically. 70% pad, 4 x daily; use as directed    Historical Provider, MD   B Complex-Vitamin B12 tablet TAKE 1 TABLET DAILY. 23   Jhoan Eller, DO   Cetaphil moisturizing lotion Apply topically if needed for dry skin.  Patient not taking: Reported on 2024  Jhoan Eller, DO   ciprofloxacin-hydrocortisone (Cipro HC Otic) otic suspension Administer 3 drops into each ear 2 times a day for 10 days. 24  Jhoan Eller DO   cyclobenzaprine (Flexeril) 10 mg tablet Take 1 tablet (10 mg) by mouth 3 times a day as needed for muscle spasms.    Historical Provider, MD   diclofenac sodium (Voltaren) 1 % gel gel Apply 1 Application topically 4 times a day. 23   Elsa Rojas MD   docusate sodium (Colace) 100 mg capsule TAKE 1 TABLET (100 MG) BY MOUTH 2 TIMES A DAY AS NEEDED FOR CONSTIPATION. 11/30/23   Jhoan Eller DO   DULoxetine (Cymbalta) 30 mg DR capsule Take 2 capsules (60 mg) by mouth once daily. Do not crush or chew. 11/2/23 4/30/24  Jhoan Eller DO   EASY COMFORT LANCETS MISC in the morning, at noon, and at bedtime. Use to test    Historical Provider, MD   erythromycin (Romycin) 5 mg/gram (0.5 %) ophthalmic ointment Apply to affected eye(s) 4 times a day for 7 days. Apply Amount per Dose: 0.5 inch (~1 cm) per dose.  Patient not taking: Reported on 1/22/2024 1/11/24 1/18/24  Jhoan Eller DO   estradiol (Estrace) 0.01 % (0.1 mg/gram) vaginal cream Insert 1 applicator vaginally every night weekly  Patient taking differently: Insert 1 applicator vaginally every night weekly-Sunday 11/29/23   Jhoan Eller DO   FeroSuL tablet TAKE 1 TABLET BY MOUTH TWICE A DAY WITH MEALS 1/16/24   Jhoan Eller DO   fluticasone (Flonase) 50 mcg/actuation nasal spray USE 2 SPRAYS IN EACH NOSTRIL ONCE DAILY 11/13/23   Jhoan Eller DO   folic acid (Folvite) 1 mg tablet TAKE 1 TABLET BY MOUTH EVERY DAY 1/16/24   Jhoan Eller DO   FreeStyle glucose monitoring kit True Metrix Blood Glucose test in vitro strip; Use as directed 4-5 times daily    Historical Provider, MD   gabapentin (Neurontin) 300 mg capsule TAKE 2 CAPSULES BY MOUTH 3 TIMES A DAY. TITRATE THE DOSE WEEKLY 1/17/24   Jhoan Eller DO   ibuprofen 600 mg tablet Take 1 tablet (600 mg) by mouth every 6 hours if needed for mild pain (1 - 3).    Historical Provider, MD   loratadine (Claritin) 10 mg tablet Take 1 tablet (10 mg) by mouth once daily. 7/21/23   Jhoan Eller DO   metFORMIN (Glucophage) 500 mg tablet TAKE 2 TABLETS (1,000 MG) BY MOUTH IN THE MORNING AND 2 TABLETS (1,000 MG) BEFORE BEDTIME. 1/17/24   Jhoan Eller DO   methocarbamol (Robaxin) 500 mg tablet Take 2 tablets  (1,000 mg) by mouth once daily at bedtime. Take 1 to 2 tablets at bedtime as needed for muscle spam 12/21/23   Jhoan Eller DO   multivitamin tablet Take 1 tablet by mouth once daily. 9/21/23   Jhoan Eller DO   naproxen (Naprosyn) 500 mg tablet TAKE 1 TABLET BY MOUTH EVERY 12 HOURS AS NEEDED FOR PAIN WITH FOOD  Patient taking differently: Take 1 tablet (500 mg) by mouth 2 times a day with meals. TAKE 1 TABLET BY MOUTH EVERY 12 HOURS AS NEEDED FOR PAIN WITH FOOD 11/29/23   Jhoan Eller DO   nicotine (Nicoderm CQ) 21 mg/24 hr patch APPLY 1 PATCH DAILY AS DIRECTED. 1/16/24   Jhoan Eller DO   nicotine polacrilex (Nicorette) 4 mg gum CHEW 1 EACH (4 MG) IF NEEDED FOR SMOKING CESSATION. USE AS DIRECTED 1/22/24   Jhoan Eller DO   polyethylene glycol (Glycolax) 17 gram packet Take by mouth in the morning. Mix 1 packet in 8 ounces of liquid    Historical Provider, MD   predniSONE (Deltasone) 10 mg tablet Take 5 tablets (50 mg) by mouth once daily for 2 days, THEN 4 tablets (40 mg) once daily for 2 days, THEN 3 tablets (30 mg) once daily for 2 days, THEN 2 tablets (20 mg) once daily for 2 days, THEN 1 tablet (10 mg) once daily for 2 days. 1/11/24 1/21/24  Jhoan Eller DO   psyllium (Metamucil) 3.4 gram packet Take 1 packet by mouth 2 times a day. 1/10/24 2/9/24  Jamison Flores MD   simethicone (Gas Relief Extra Strength) 125 mg capsule TAKE 1 CAPSULE (125 MG) BY MOUTH EVERY 6 HOURS IF NEEDED FOR FLATULENCE. 11/29/23   Jhoan Eller DO   thiamine (Vitamin B-1) 100 mg tablet Take 1 tablet (100 mg) by mouth once daily. 5/23/19   Historical Provider, MD   traMADol (Ultram) 50 mg tablet Take 1 tablet (50 mg) by mouth every 6 hours if needed for severe pain (7 - 10) for up to 7 days. 1/17/24 1/24/24  Jhoan Eller DO   traZODone (Desyrel) 50 mg tablet Take 2 tablets (100 mg) by mouth once daily at bedtime. 11/29/23   Jhoan Eller, DO   True Metrix Glucose Test Strip strip USE AS DIRECTED  4-5 TIMES DAILY 11/13/23   Jhoan Eller DO   Trulicity 0.75 mg/0.5 mL pen injector INJECT 0.75MG UNDER THE SKIN EVERY WEEK 1/16/24   Jhoan Eller DO   Vitamin D3 50 mcg (2,000 unit) tablet TAKE 1 TABLET (50 MCG) BY MOUTH ONCE DAILY. 12/19/23   Jhoan Eller DO   albuterol 90 mcg/actuation inhaler Inhale 2 puffs 4 times a day as needed for shortness of breath or wheezing. 11/29/23 1/17/24  Jhoan Eller DO   famotidine (Pepcid) 40 mg tablet Take 1 tablet (40 mg) by mouth once daily. 11/20/23 1/22/24  Historical Provider, MD   ferrous sulfate, 325 mg ferrous sulfate, (FeroSuL) tablet TAKE 1 TABLET BY MOUTH TWICE A DAY WITH MEALS 12/11/23 1/16/24  Carlos Vanessa MD   folic acid (Folvite) 1 mg tablet TAKE 1 TABLET BY MOUTH EVERY DAY 11/13/23 1/16/24  Jhoan Eller DO   gabapentin (Neurontin) 300 mg capsule Take 2 capsules (600 mg) by mouth 3 times a day. TITRATE THE DOSE WEEKLY 5/3/23 1/17/24  Jhoan Eller DO   lidocaine 4 % kit Apply topically.  1/22/24  Historical Provider, MD   metFORMIN (Glucophage) 500 mg tablet TAKE 2 TABLETS (1,000 MG) BY MOUTH IN THE MORNING AND 2 TABLETS (1,000 MG) BEFORE BEDTIME. 11/21/23 1/17/24  Jhoan Eller DO   nicotine (Nicoderm CQ) 21 mg/24 hr patch APPLY 1 PATCH DAILY AS DIRECTED.  Patient not taking: Reported on 1/11/2024 11/13/23 1/16/24  Jhoan Eller DO   nicotine polacrilex (Nicorette) 4 mg gum CHEW 1 EACH (4 MG) IF NEEDED FOR SMOKING CESSATION. USE AS DIRECTED  Patient not taking: Reported on 1/11/2024 11/13/23 1/22/24  Jhoan Eller DO   traMADol (Ultram) 50 mg tablet Take 1 tablet (50 mg) by mouth every 6 hours if needed for severe pain (7 - 10) for up to 7 days. 1/10/24 1/17/24  Jhoan Eller DO   Trulicity 0.75 mg/0.5 mL pen injector INJECT 0.75MG UNDER THE SKIN EVERY WEEK 11/13/23 1/16/24  Jhoan Eller DO        PAT ROS   Constitutional: Negative for fever, chills, or sweats   ENMT: Negative for nasal discharge, congestion, ear  pain, mouth pain, throat pain   Respiratory: Negative for cough, wheezing, shortness of breath   Cardiac: Negative for chest pain, dyspnea on exertion, palpitations   Gastrointestinal: Negative for nausea, vomiting, diarrhea, constipation, abdominal pain  Genitourinary: Negative for dysuria, flank pain, frequency, hematuria     Musculoskeletal: Positive for decreased ROM, pain, swelling, weakness in left knee, lower back and neck with n/t down the left arm at times- using a cane     Neurological: Negative for dizziness, confusion, headache  Psychiatric: Negative for mood changes   Skin: Negative for itching, rash, ulcer    Hematologic/Lymph: Negative for bruising, easy bleeding  Allergic/Immunologic: Negative itching, sneezing, swelling      Physical Exam  HENT:      Head: Normocephalic.      Comments: Edentulous- mouth caves in     Mouth/Throat:      Mouth: Mucous membranes are moist.   Eyes:      Extraocular Movements: Extraocular movements intact.   Cardiovascular:      Rate and Rhythm: Normal rate and regular rhythm.   Pulmonary:      Effort: Pulmonary effort is normal.      Breath sounds: Normal breath sounds.   Abdominal:      General: Abdomen is flat.      Palpations: Abdomen is soft.   Musculoskeletal:         General: Normal range of motion.      Cervical back: Normal range of motion.   Skin:     General: Skin is warm and dry.   Neurological:      General: No focal deficit present.      Mental Status: She is alert.   Psychiatric:         Mood and Affect: Mood normal.          PAT AIRWAY:   Airway:     Neck ROM::  Limited   edentulous    Anesthesia:  Patient denies any anesthesia complications.       Visit Vitals  BP (!) 167/99   Pulse 71   Temp 36 °C (96.8 °F)   Resp 16       DASI Risk Score    No data to display       Caprini DVT Assessment      Flowsheet Row Most Recent Value   DVT Score 5   Current Status Major surgery planned, lasting 2-3 hours   History Prior major surgery   Age 40-59 years           Modified Frailty Index    No data to display       CHADS2 Stroke Risk  Current as of 51 minutes ago        N/A 3 - 100%: High Risk   2 - 3%: Medium Risk   0 - 2%: Low Risk     Last Change: N/A          This score determines the patient's risk of having a stroke if the patient has atrial fibrillation.        This score is not applicable to this patient. Components are not calculated.          Revised Cardiac Risk Index      Flowsheet Row Most Recent Value   Revised Cardiac Risk Calculator 0          Apfel Simplified Score    No data to display       Risk Analysis Index Results This Encounter         1/22/2024  0949             GARCIA Cancer History: Patient does not indicate history of cancer    Total Risk Analysis Index Score Without Cancer: 24    Total Risk Analysis Index Score: 24          Stop Bang Score      Flowsheet Row Most Recent Value   Do you often feel tired or fatigued after your sleep? 1   Has anyone ever observed you stop breathing in your sleep? 0   Do you have or are you being treated for high blood pressure? 0   Is your neck circumference greater than 17 inches (Male) or 16 inches (Female)? 0            Assessment and Plan:     57 yo female scheduled for left total knee arthroplasty on 2/8/2024 with Dr. Kaminski. Blood work and MRSA ordered- oral rinse prescribed. Previous EKG in Dec 2023 shows sinus rhythm with premature ventricular complexes or fusion complexes on file with stress test on file as well for surgical clearance. She is instructed to notify surgeon office if any new skin changes occur to surgical limb. Otherwise no further orders indicated.     Cardiac clearance has been requested from surgeon- Dr. Zheng sent patient for EKG and stress test which results are in- pending clearance at this time.     ASA 3: A to review  Anesthesia:  Patient denies any anesthesia complications.   Airway: Edentulous    See risk scores as previously documented.

## 2024-01-22 NOTE — TELEPHONE ENCOUNTER
From: Alejandra Houston  To: Jhoan Eller, DO  Sent: 1/19/2024 2:51 PM EST  Subject: Prescription     Hey Dr Mesa I'm just messaging you because [facility] is having a hard time of filling the prescription I got from emergency last night they did give me something stronger and because you have been giving me the tramadol so they're saying there's a red flag because I'm getting something stronger but I did go to emergency last night and they did give me a couple pills the last few days the [facility] is supposed to be getting in contact with you so that you can give them permission to fill it. Thank you very much Dr Mesa you are the best

## 2024-01-22 NOTE — CPM/PAT H&P
[facility]/PAT Evaluation       Name: Alejandra Houston (Alejandra Houston)  /Age: 1965/58 y.o.     In-Person       Chief Complaint: left knee pains    HPI    AALIYAH is a 59 yo female who suffers from multiple medical complaints involving neck, lower back, and left knee pains.  Presents today to [facility] for upcoming scheduled left total knee replacement due to imaging showing left knee osteoarthritis.  She complains of constant left knee pain with limited range of motion, using a cane for stability.  Has had a recent steroid injection in the left knee and oral steroids for recent neck pains due to a possible pinched nerve as she was total in the emergency room-has an upcoming pain management appointment on 2024.  Denies any recent falls and skin is intact to surgical limb. Otherwise denies any recent illness, fever/chills, chest pains or shortness of breath.     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 Eller  Cards: Dr. Zheng    Stress test 1/15/2024  Summary:   1. PEAK HR= 79 which is 48% of APMHR.   2. PEAK BP= 149/94.   3. LOWEST BP= 141/82.   4. 0.4mg Reagadenson given IVP.   5. 33.0 mCi Myoview Tracer given IVP.   6. Adequate level of stress achieved.   7. No clinical or electrocardiographic evidence for ischemia at a maximal infusion.   8. Normal Stress Test.   9. Nuclear image results are reported separately.    Hemoglobin A1C   Date Value Ref Range Status   2023 7.8 (H) see below % Final         Past Surgical  History:   Procedure Laterality Date     SECTION, CLASSIC  02/15/2018     Section     SECTION, CLASSIC      HIP ARTHROPLASTY      KNEE ARTHROPLASTY      OTHER SURGICAL HISTORY  06/10/2019    Hip replacement    TOTAL KNEE ARTHROPLASTY  2018    Knee Replacement    UVULOPALATOPHARYNGOPLASTY         Patient Sexual activity questions deferred to the physician.    Family History   Problem Relation Name Age of Onset    Hypertension Mother      Diabetes Mother      COPD Mother      Heart disease Mother      Lymphoma Mother      Cancer Other fam hx     Diabetes Other fam hx     Hypertension Other fam hx     Heart disease Other fam hx        No Known Allergies    Prior to Admission medications    Medication Sig Start Date End Date Taking? Authorizing Provider   acetaminophen (Tylenol) 500 mg tablet TAKE 2 TABLETS (1,000 MG) BY MOUTH EVERY 8 HOURS IF NEEDED FOR MILD PAIN (1 - 3).  Patient taking differently: Take 2 tablets (1,000 mg) by mouth once daily as needed for mild pain (1 - 3). 23   Carlos Vanessa MD   albuterol 90 mcg/actuation inhaler INHALE 2 PUFFS 4 TIMES A DAY AS NEEDED FOR SHORTNESS OF BREATH OR WHEEZING. 24   Jhoan Eller, DO   alcohol swabs pads, medicated Apply topically. 70% pad, 4 x daily; use as directed    Historical Provider, MD   B Complex-Vitamin B12 tablet TAKE 1 TABLET DAILY. 23   Jhoan Eller, DO   Cetaphil moisturizing lotion Apply topically if needed for dry skin.  Patient not taking: Reported on 2024  Jhoan Eller, DO   ciprofloxacin-hydrocortisone (Cipro HC Otic) otic suspension Administer 3 drops into each ear 2 times a day for 10 days. 24  Jhoan Eller DO   cyclobenzaprine (Flexeril) 10 mg tablet Take 1 tablet (10 mg) by mouth 3 times a day as needed for muscle spasms.    Historical Provider, MD   diclofenac sodium (Voltaren) 1 % gel gel Apply 1 Application topically 4 times a day. 23   Elsa Rojas MD   docusate sodium (Colace) 100 mg capsule TAKE 1 TABLET (100 MG) BY MOUTH 2 TIMES A DAY AS NEEDED FOR CONSTIPATION. 11/30/23   Jhoan Eller DO   DULoxetine (Cymbalta) 30 mg DR capsule Take 2 capsules (60 mg) by mouth once daily. Do not crush or chew. 11/2/23 4/30/24  Jhoan Eller DO   EASY COMFORT LANCETS MISC in the morning, at noon, and at bedtime. Use to test    Historical Provider, MD   erythromycin (Romycin) 5 mg/gram (0.5 %) ophthalmic ointment Apply to affected eye(s) 4 times a day for 7 days. Apply Amount per Dose: 0.5 inch (~1 cm) per dose.  Patient not taking: Reported on 1/22/2024 1/11/24 1/18/24  Jhoan Eller DO   estradiol (Estrace) 0.01 % (0.1 mg/gram) vaginal cream Insert 1 applicator vaginally every night weekly  Patient taking differently: Insert 1 applicator vaginally every night weekly-Sunday 11/29/23   Jhoan Eller DO   FeroSuL tablet TAKE 1 TABLET BY MOUTH TWICE A DAY WITH MEALS 1/16/24   Jhoan Eller DO   fluticasone (Flonase) 50 mcg/actuation nasal spray USE 2 SPRAYS IN EACH NOSTRIL ONCE DAILY 11/13/23   Jhoan Eller DO   folic acid (Folvite) 1 mg tablet TAKE 1 TABLET BY MOUTH EVERY DAY 1/16/24   Jhoan Eller DO   FreeStyle glucose monitoring kit True Metrix Blood Glucose test in vitro strip; Use as directed 4-5 times daily    Historical Provider, MD   gabapentin (Neurontin) 300 mg capsule TAKE 2 CAPSULES BY MOUTH 3 TIMES A DAY. TITRATE THE DOSE WEEKLY 1/17/24   Jhoan Eller DO   ibuprofen 600 mg tablet Take 1 tablet (600 mg) by mouth every 6 hours if needed for mild pain (1 - 3).    Historical Provider, MD   loratadine (Claritin) 10 mg tablet Take 1 tablet (10 mg) by mouth once daily. 7/21/23   Jhoan Eller DO   metFORMIN (Glucophage) 500 mg tablet TAKE 2 TABLETS (1,000 MG) BY MOUTH IN THE MORNING AND 2 TABLETS (1,000 MG) BEFORE BEDTIME. 1/17/24   Jhoan Eller DO   methocarbamol (Robaxin) 500 mg tablet Take 2 tablets  (1,000 mg) by mouth once daily at bedtime. Take 1 to 2 tablets at bedtime as needed for muscle spam 12/21/23   Jhoan Eller DO   multivitamin tablet Take 1 tablet by mouth once daily. 9/21/23   Jhoan Eller DO   naproxen (Naprosyn) 500 mg tablet TAKE 1 TABLET BY MOUTH EVERY 12 HOURS AS NEEDED FOR PAIN WITH FOOD  Patient taking differently: Take 1 tablet (500 mg) by mouth 2 times a day with meals. TAKE 1 TABLET BY MOUTH EVERY 12 HOURS AS NEEDED FOR PAIN WITH FOOD 11/29/23   Jhoan Eller DO   nicotine (Nicoderm CQ) 21 mg/24 hr patch APPLY 1 PATCH DAILY AS DIRECTED. 1/16/24   Jhoan Eller DO   nicotine polacrilex (Nicorette) 4 mg gum CHEW 1 EACH (4 MG) IF NEEDED FOR SMOKING CESSATION. USE AS DIRECTED 1/22/24   Jhoan Eller DO   polyethylene glycol (Glycolax) 17 gram packet Take by mouth in the morning. Mix 1 packet in 8 ounces of liquid    Historical Provider, MD   predniSONE (Deltasone) 10 mg tablet Take 5 tablets (50 mg) by mouth once daily for 2 days, THEN 4 tablets (40 mg) once daily for 2 days, THEN 3 tablets (30 mg) once daily for 2 days, THEN 2 tablets (20 mg) once daily for 2 days, THEN 1 tablet (10 mg) once daily for 2 days. 1/11/24 1/21/24  Jhoan Eller DO   psyllium (Metamucil) 3.4 gram packet Take 1 packet by mouth 2 times a day. 1/10/24 2/9/24  Jamison Flores MD   simethicone (Gas Relief Extra Strength) 125 mg capsule TAKE 1 CAPSULE (125 MG) BY MOUTH EVERY 6 HOURS IF NEEDED FOR FLATULENCE. 11/29/23   Jhoan Eller DO   thiamine (Vitamin B-1) 100 mg tablet Take 1 tablet (100 mg) by mouth once daily. 5/23/19   Historical Provider, MD   traMADol (Ultram) 50 mg tablet Take 1 tablet (50 mg) by mouth every 6 hours if needed for severe pain (7 - 10) for up to 7 days. 1/17/24 1/24/24  Jhoan Eller DO   traZODone (Desyrel) 50 mg tablet Take 2 tablets (100 mg) by mouth once daily at bedtime. 11/29/23   Jhoan Eller, DO   True Metrix Glucose Test Strip strip USE AS DIRECTED  4-5 TIMES DAILY 11/13/23   Jhoan Eller DO   Trulicity 0.75 mg/0.5 mL pen injector INJECT 0.75MG UNDER THE SKIN EVERY WEEK 1/16/24   Jhoan Eller DO   Vitamin D3 50 mcg (2,000 unit) tablet TAKE 1 TABLET (50 MCG) BY MOUTH ONCE DAILY. 12/19/23   Jhoan Eller DO   albuterol 90 mcg/actuation inhaler Inhale 2 puffs 4 times a day as needed for shortness of breath or wheezing. 11/29/23 1/17/24  Jhoan Eller DO   famotidine (Pepcid) 40 mg tablet Take 1 tablet (40 mg) by mouth once daily. 11/20/23 1/22/24  Historical Provider, MD   ferrous sulfate, 325 mg ferrous sulfate, (FeroSuL) tablet TAKE 1 TABLET BY MOUTH TWICE A DAY WITH MEALS 12/11/23 1/16/24  Carlos Vanessa MD   folic acid (Folvite) 1 mg tablet TAKE 1 TABLET BY MOUTH EVERY DAY 11/13/23 1/16/24  Jhoan Eller DO   gabapentin (Neurontin) 300 mg capsule Take 2 capsules (600 mg) by mouth 3 times a day. TITRATE THE DOSE WEEKLY 5/3/23 1/17/24  Jhoan Eller DO   lidocaine 4 % kit Apply topically.  1/22/24  Historical Provider, MD   metFORMIN (Glucophage) 500 mg tablet TAKE 2 TABLETS (1,000 MG) BY MOUTH IN THE MORNING AND 2 TABLETS (1,000 MG) BEFORE BEDTIME. 11/21/23 1/17/24  Jhoan Eller DO   nicotine (Nicoderm CQ) 21 mg/24 hr patch APPLY 1 PATCH DAILY AS DIRECTED.  Patient not taking: Reported on 1/11/2024 11/13/23 1/16/24  Jhoan Eller DO   nicotine polacrilex (Nicorette) 4 mg gum CHEW 1 EACH (4 MG) IF NEEDED FOR SMOKING CESSATION. USE AS DIRECTED  Patient not taking: Reported on 1/11/2024 11/13/23 1/22/24  Jhoan Eller DO   traMADol (Ultram) 50 mg tablet Take 1 tablet (50 mg) by mouth every 6 hours if needed for severe pain (7 - 10) for up to 7 days. 1/10/24 1/17/24  Jhoan Eller DO   Trulicity 0.75 mg/0.5 mL pen injector INJECT 0.75MG UNDER THE SKIN EVERY WEEK 11/13/23 1/16/24  Jhoan Eller DO        PAT ROS   Constitutional: Negative for fever, chills, or sweats   ENMT: Negative for nasal discharge, congestion, ear  pain, mouth pain, throat pain   Respiratory: Negative for cough, wheezing, shortness of breath   Cardiac: Negative for chest pain, dyspnea on exertion, palpitations   Gastrointestinal: Negative for nausea, vomiting, diarrhea, constipation, abdominal pain  Genitourinary: Negative for dysuria, flank pain, frequency, hematuria     Musculoskeletal: Positive for decreased ROM, pain, swelling, weakness in left knee, lower back and neck with n/t down the left arm at times- using a cane     Neurological: Negative for dizziness, confusion, headache  Psychiatric: Negative for mood changes   Skin: Negative for itching, rash, ulcer    Hematologic/Lymph: Negative for bruising, easy bleeding  Allergic/Immunologic: Negative itching, sneezing, swelling      Physical Exam  HENT:      Head: Normocephalic.      Comments: Edentulous- mouth caves in     Mouth/Throat:      Mouth: Mucous membranes are moist.   Eyes:      Extraocular Movements: Extraocular movements intact.   Cardiovascular:      Rate and Rhythm: Normal rate and regular rhythm.   Pulmonary:      Effort: Pulmonary effort is normal.      Breath sounds: Normal breath sounds.   Abdominal:      General: Abdomen is flat.      Palpations: Abdomen is soft.   Musculoskeletal:         General: Normal range of motion.      Cervical back: Normal range of motion.   Skin:     General: Skin is warm and dry.   Neurological:      General: No focal deficit present.      Mental Status: She is alert.   Psychiatric:         Mood and Affect: Mood normal.          PAT AIRWAY:   Airway:     Neck ROM::  Limited   edentulous    Anesthesia:  Patient denies any anesthesia complications.       Visit Vitals  BP (!) 167/99   Pulse 71   Temp 36 °C (96.8 °F)   Resp 16       DASI Risk Score    No data to display       Caprini DVT Assessment      Flowsheet Row Most Recent Value   DVT Score 5   Current Status Major surgery planned, lasting 2-3 hours   History Prior major surgery   Age 40-59 years           Modified Frailty Index    No data to display       CHADS2 Stroke Risk  Current as of 51 minutes ago        N/A 3 - 100%: High Risk   2 - 3%: Medium Risk   0 - 2%: Low Risk     Last Change: N/A          This score determines the patient's risk of having a stroke if the patient has atrial fibrillation.        This score is not applicable to this patient. Components are not calculated.          Revised Cardiac Risk Index      Flowsheet Row Most Recent Value   Revised Cardiac Risk Calculator 0          Apfel Simplified Score    No data to display       Risk Analysis Index Results This Encounter         1/22/2024  0949             GARCIA Cancer History: Patient does not indicate history of cancer    Total Risk Analysis Index Score Without Cancer: 24    Total Risk Analysis Index Score: 24          Stop Bang Score      Flowsheet Row Most Recent Value   Do you often feel tired or fatigued after your sleep? 1   Has anyone ever observed you stop breathing in your sleep? 0   Do you have or are you being treated for high blood pressure? 0   Is your neck circumference greater than 17 inches (Male) or 16 inches (Female)? 0            Assessment and Plan:     57 yo female scheduled for left total knee arthroplasty on 2/8/2024 with Dr. Kaminski. Blood work and MRSA ordered- oral rinse prescribed. Previous EKG in Dec 2023 shows sinus rhythm with premature ventricular complexes or fusion complexes on file with stress test on file as well for surgical clearance. She is instructed to notify surgeon office if any new skin changes occur to surgical limb. Otherwise no further orders indicated.     Cardiac clearance has been requested from surgeon- Dr. Zheng sent patient for EKG and stress test which results are in- pending clearance at this time.     ASA 3: A to review  Anesthesia:  Patient denies any anesthesia complications.   Airway: Edentulous    See risk scores as previously documented.

## 2024-01-23 ENCOUNTER — PATIENT MESSAGE (OUTPATIENT)
Dept: PRIMARY CARE | Facility: CLINIC | Age: 59
End: 2024-01-23
Payer: COMMERCIAL

## 2024-01-23 NOTE — TELEPHONE ENCOUNTER
Please have her work with the [employer] to see if we can get her into  pain management- she has seen a  pain management physician here previously.  The appointment they cancelled was for a [facility] doctor.

## 2024-01-24 LAB — STAPHYLOCOCCUS SPEC CULT: ABNORMAL

## 2024-01-26 ENCOUNTER — APPOINTMENT (OUTPATIENT)
Dept: PAIN MEDICINE | Facility: CLINIC | Age: 59
End: 2024-01-26
Payer: COMMERCIAL

## 2024-01-30 ENCOUNTER — TELEPHONE (OUTPATIENT)
Dept: CARDIOLOGY | Facility: HOSPITAL | Age: 59
End: 2024-01-30

## 2024-01-30 NOTE — TELEPHONE ENCOUNTER
Please see if patient is scheduled for echocardiogram.  I see her stress test was completed.  Also please reach out to her orthopedic surgeons office to get a form for clearance.    SDH

## 2024-02-02 ENCOUNTER — APPOINTMENT (OUTPATIENT)
Dept: PRIMARY CARE | Facility: CLINIC | Age: 59
End: 2024-02-02
Payer: COMMERCIAL

## 2024-02-05 DIAGNOSIS — Z00.00 HEALTHCARE MAINTENANCE: ICD-10-CM

## 2024-02-05 DIAGNOSIS — G89.29 CHRONIC LOW BACK PAIN, UNSPECIFIED BACK PAIN LATERALITY, UNSPECIFIED WHETHER SCIATICA PRESENT: ICD-10-CM

## 2024-02-05 DIAGNOSIS — G89.29 OTHER CHRONIC PAIN: ICD-10-CM

## 2024-02-05 DIAGNOSIS — M54.50 CHRONIC LOW BACK PAIN, UNSPECIFIED BACK PAIN LATERALITY, UNSPECIFIED WHETHER SCIATICA PRESENT: ICD-10-CM

## 2024-02-05 RX ORDER — ALBUTEROL SULFATE 90 UG/1
2 AEROSOL, METERED RESPIRATORY (INHALATION) 4 TIMES DAILY PRN
Qty: 8.5 G | Refills: 0 | Status: SHIPPED | OUTPATIENT
Start: 2024-02-05 | End: 2024-03-13

## 2024-02-05 RX ORDER — GABAPENTIN 300 MG/1
CAPSULE ORAL
Qty: 90 CAPSULE | Refills: 0 | Status: SHIPPED | OUTPATIENT
Start: 2024-02-05 | End: 2024-02-15

## 2024-02-05 RX ORDER — ACETAMINOPHEN 500 MG
1000 TABLET ORAL EVERY 8 HOURS PRN
Qty: 180 TABLET | Refills: 0 | Status: SHIPPED | OUTPATIENT
Start: 2024-02-05 | End: 2024-02-10 | Stop reason: HOSPADM

## 2024-02-05 RX ORDER — FERROUS SULFATE TAB 325 MG (65 MG ELEMENTAL FE) 325 (65 FE) MG
1 TAB ORAL
Qty: 30 TABLET | Refills: 0 | Status: SHIPPED | OUTPATIENT
Start: 2024-02-05 | End: 2024-03-13

## 2024-02-05 NOTE — TELEPHONE ENCOUNTER
Alejandra left a message on VM stating that the insurance would not pay for the echocardiogram, that's why it was cancelled.      She is scheduled for total knee arthroscopy on Thursday 2/8/24 and surgeon is looking for clearance.  Will you clear her ?  I can send the form to you to sign and send back to me if you like.

## 2024-02-06 ENCOUNTER — ANESTHESIA EVENT (OUTPATIENT)
Dept: OPERATING ROOM | Facility: HOSPITAL | Age: 59
End: 2024-02-06
Payer: COMMERCIAL

## 2024-02-08 ENCOUNTER — HOSPITAL ENCOUNTER (OUTPATIENT)
Facility: HOSPITAL | Age: 59
Discharge: HOME | End: 2024-02-10
Attending: ORTHOPAEDIC SURGERY | Admitting: ORTHOPAEDIC SURGERY
Payer: COMMERCIAL

## 2024-02-08 ENCOUNTER — ANESTHESIA (OUTPATIENT)
Dept: OPERATING ROOM | Facility: HOSPITAL | Age: 59
End: 2024-02-08
Payer: COMMERCIAL

## 2024-02-08 DIAGNOSIS — M17.12 ARTHRITIS OF LEFT KNEE: Primary | ICD-10-CM

## 2024-02-08 DIAGNOSIS — Z96.652 STATUS POST TOTAL LEFT KNEE REPLACEMENT: ICD-10-CM

## 2024-02-08 DIAGNOSIS — G89.18 ACUTE POST-OPERATIVE PAIN: ICD-10-CM

## 2024-02-08 LAB
GLUCOSE BLD MANUAL STRIP-MCNC: 140 MG/DL (ref 74–99)
GLUCOSE BLD MANUAL STRIP-MCNC: 350 MG/DL (ref 74–99)
GLUCOSE BLD MANUAL STRIP-MCNC: 415 MG/DL (ref 74–99)

## 2024-02-08 PROCEDURE — 2720000007 HC OR 272 NO HCPCS: Performed by: ORTHOPAEDIC SURGERY

## 2024-02-08 PROCEDURE — 64447 NJX AA&/STRD FEMORAL NRV IMG: CPT | Performed by: ANESTHESIOLOGY

## 2024-02-08 PROCEDURE — 2500000005 HC RX 250 GENERAL PHARMACY W/O HCPCS: Performed by: ANESTHESIOLOGIST ASSISTANT

## 2024-02-08 PROCEDURE — C1776 JOINT DEVICE (IMPLANTABLE): HCPCS | Performed by: ORTHOPAEDIC SURGERY

## 2024-02-08 PROCEDURE — A27447 PR TOTAL KNEE ARTHROPLASTY: Performed by: ANESTHESIOLOGIST ASSISTANT

## 2024-02-08 PROCEDURE — 7100000001 HC RECOVERY ROOM TIME - INITIAL BASE CHARGE: Performed by: ORTHOPAEDIC SURGERY

## 2024-02-08 PROCEDURE — 3600000005 HC OR TIME - INITIAL BASE CHARGE - PROCEDURE LEVEL FIVE: Performed by: ORTHOPAEDIC SURGERY

## 2024-02-08 PROCEDURE — 2500000004 HC RX 250 GENERAL PHARMACY W/ HCPCS (ALT 636 FOR OP/ED): Performed by: ANESTHESIOLOGY

## 2024-02-08 PROCEDURE — 2500000001 HC RX 250 WO HCPCS SELF ADMINISTERED DRUGS (ALT 637 FOR MEDICARE OP): Performed by: ORTHOPAEDIC SURGERY

## 2024-02-08 PROCEDURE — 3600000010 HC OR TIME - EACH INCREMENTAL 1 MINUTE - PROCEDURE LEVEL FIVE: Performed by: ORTHOPAEDIC SURGERY

## 2024-02-08 PROCEDURE — 2500000001 HC RX 250 WO HCPCS SELF ADMINISTERED DRUGS (ALT 637 FOR MEDICARE OP): Performed by: ANESTHESIOLOGY

## 2024-02-08 PROCEDURE — 2500000005 HC RX 250 GENERAL PHARMACY W/O HCPCS: Performed by: ORTHOPAEDIC SURGERY

## 2024-02-08 PROCEDURE — 82947 ASSAY GLUCOSE BLOOD QUANT: CPT | Mod: 59

## 2024-02-08 PROCEDURE — 7100000011 HC EXTENDED STAY RECOVERY HOURLY - NURSING UNIT

## 2024-02-08 PROCEDURE — 2500000004 HC RX 250 GENERAL PHARMACY W/ HCPCS (ALT 636 FOR OP/ED): Performed by: ANESTHESIOLOGIST ASSISTANT

## 2024-02-08 PROCEDURE — 3700000002 HC GENERAL ANESTHESIA TIME - EACH INCREMENTAL 1 MINUTE: Performed by: ORTHOPAEDIC SURGERY

## 2024-02-08 PROCEDURE — 2500000002 HC RX 250 W HCPCS SELF ADMINISTERED DRUGS (ALT 637 FOR MEDICARE OP, ALT 636 FOR OP/ED): Performed by: ORTHOPAEDIC SURGERY

## 2024-02-08 PROCEDURE — C1713 ANCHOR/SCREW BN/BN,TIS/BN: HCPCS | Performed by: ORTHOPAEDIC SURGERY

## 2024-02-08 PROCEDURE — 3700000001 HC GENERAL ANESTHESIA TIME - INITIAL BASE CHARGE: Performed by: ORTHOPAEDIC SURGERY

## 2024-02-08 PROCEDURE — 2780000003 HC OR 278 NO HCPCS: Performed by: ORTHOPAEDIC SURGERY

## 2024-02-08 PROCEDURE — 7100000002 HC RECOVERY ROOM TIME - EACH INCREMENTAL 1 MINUTE: Performed by: ORTHOPAEDIC SURGERY

## 2024-02-08 PROCEDURE — 2500000002 HC RX 250 W HCPCS SELF ADMINISTERED DRUGS (ALT 637 FOR MEDICARE OP, ALT 636 FOR OP/ED): Performed by: PHYSICIAN ASSISTANT

## 2024-02-08 PROCEDURE — A27447 PR TOTAL KNEE ARTHROPLASTY: Performed by: ANESTHESIOLOGY

## 2024-02-08 PROCEDURE — 2500000004 HC RX 250 GENERAL PHARMACY W/ HCPCS (ALT 636 FOR OP/ED): Performed by: ORTHOPAEDIC SURGERY

## 2024-02-08 PROCEDURE — A4217 STERILE WATER/SALINE, 500 ML: HCPCS | Performed by: ORTHOPAEDIC SURGERY

## 2024-02-08 DEVICE — ATTUNE FEMORAL POROCOAT POSTERIOR STABILIZED SIZE 7 LEFT CEMENTLESS
Type: IMPLANTABLE DEVICE | Site: KNEE | Status: FUNCTIONAL
Brand: ATTUNE

## 2024-02-08 DEVICE — DEPUY CMW 2 FAST SET BONE CEMENT 20G: Type: IMPLANTABLE DEVICE | Site: KNEE | Status: FUNCTIONAL

## 2024-02-08 DEVICE — ATTUNE KNEE SYSTEM TIBIAL INSERT ROTATING PLATFORM POSTERIOR STABILIZED 7 5MM AOX
Type: IMPLANTABLE DEVICE | Site: KNEE | Status: NON-FUNCTIONAL
Brand: ATTUNE

## 2024-02-08 DEVICE — IMPLANTABLE DEVICE: Type: IMPLANTABLE DEVICE | Site: KNEE | Status: FUNCTIONAL

## 2024-02-08 DEVICE — ATTUNE PATELLA MEDIALIZED DOME 41MM CEMENTED AOX
Type: IMPLANTABLE DEVICE | Site: KNEE | Status: FUNCTIONAL
Brand: ATTUNE

## 2024-02-08 DEVICE — SIGMA LCS HIGH PERFORMANCE STERILE THREADED HEADED PINS
Type: IMPLANTABLE DEVICE | Site: KNEE | Status: FUNCTIONAL
Brand: SIGMA LCS HIGH PERFORMANCE

## 2024-02-08 DEVICE — ATTUNE KNEE SYSTEM TIBIAL INSERT ROTATING PLATFORM POSTERIOR STABILIZED 7 6MM AOX
Type: IMPLANTABLE DEVICE | Site: KNEE | Status: FUNCTIONAL
Brand: ATTUNE

## 2024-02-08 RX ORDER — POLYETHYLENE GLYCOL 3350 17 G/17G
17 POWDER, FOR SOLUTION ORAL DAILY
Status: DISCONTINUED | OUTPATIENT
Start: 2024-02-08 | End: 2024-02-10 | Stop reason: HOSPADM

## 2024-02-08 RX ORDER — PROMETHAZINE HYDROCHLORIDE 25 MG/1
25 SUPPOSITORY RECTAL EVERY 12 HOURS PRN
Status: DISCONTINUED | OUTPATIENT
Start: 2024-02-08 | End: 2024-02-10 | Stop reason: HOSPADM

## 2024-02-08 RX ORDER — ASPIRIN 81 MG/1
81 TABLET ORAL 2 TIMES DAILY
Status: DISCONTINUED | OUTPATIENT
Start: 2024-02-08 | End: 2024-02-10 | Stop reason: HOSPADM

## 2024-02-08 RX ORDER — ACETAMINOPHEN 325 MG/1
975 TABLET ORAL EVERY 6 HOURS SCHEDULED
Status: DISCONTINUED | OUTPATIENT
Start: 2024-02-08 | End: 2024-02-10 | Stop reason: HOSPADM

## 2024-02-08 RX ORDER — TRAZODONE HYDROCHLORIDE 50 MG/1
50 TABLET ORAL NIGHTLY
Status: DISCONTINUED | OUTPATIENT
Start: 2024-02-08 | End: 2024-02-10 | Stop reason: HOSPADM

## 2024-02-08 RX ORDER — PROPOFOL 10 MG/ML
INJECTION, EMULSION INTRAVENOUS AS NEEDED
Status: DISCONTINUED | OUTPATIENT
Start: 2024-02-08 | End: 2024-02-08

## 2024-02-08 RX ORDER — CEFAZOLIN SODIUM 2 G/100ML
2 INJECTION, SOLUTION INTRAVENOUS EVERY 8 HOURS
Status: COMPLETED | OUTPATIENT
Start: 2024-02-08 | End: 2024-02-09

## 2024-02-08 RX ORDER — DULOXETIN HYDROCHLORIDE 60 MG/1
60 CAPSULE, DELAYED RELEASE ORAL DAILY
Status: DISCONTINUED | OUTPATIENT
Start: 2024-02-08 | End: 2024-02-10 | Stop reason: HOSPADM

## 2024-02-08 RX ORDER — TRANEXAMIC ACID 100 MG/ML
INJECTION, SOLUTION INTRAVENOUS AS NEEDED
Status: DISCONTINUED | OUTPATIENT
Start: 2024-02-08 | End: 2024-02-08

## 2024-02-08 RX ORDER — OXYCODONE HYDROCHLORIDE 10 MG/1
10 TABLET ORAL EVERY 4 HOURS PRN
Status: DISCONTINUED | OUTPATIENT
Start: 2024-02-08 | End: 2024-02-10 | Stop reason: HOSPADM

## 2024-02-08 RX ORDER — FOLIC ACID 1 MG/1
1 TABLET ORAL DAILY
Status: DISCONTINUED | OUTPATIENT
Start: 2024-02-08 | End: 2024-02-10 | Stop reason: HOSPADM

## 2024-02-08 RX ORDER — DIPHENHYDRAMINE HCL 25 MG
25 TABLET ORAL EVERY 6 HOURS PRN
Status: DISCONTINUED | OUTPATIENT
Start: 2024-02-08 | End: 2024-02-10 | Stop reason: HOSPADM

## 2024-02-08 RX ORDER — HYDROMORPHONE HYDROCHLORIDE 1 MG/ML
0.5 INJECTION, SOLUTION INTRAMUSCULAR; INTRAVENOUS; SUBCUTANEOUS EVERY 5 MIN PRN
Status: DISCONTINUED | OUTPATIENT
Start: 2024-02-08 | End: 2024-02-08 | Stop reason: HOSPADM

## 2024-02-08 RX ORDER — PROMETHAZINE HYDROCHLORIDE 25 MG/1
25 TABLET ORAL EVERY 6 HOURS PRN
Status: DISCONTINUED | OUTPATIENT
Start: 2024-02-08 | End: 2024-02-10 | Stop reason: HOSPADM

## 2024-02-08 RX ORDER — ALBUTEROL SULFATE 90 UG/1
2 AEROSOL, METERED RESPIRATORY (INHALATION) 4 TIMES DAILY PRN
Status: DISCONTINUED | OUTPATIENT
Start: 2024-02-08 | End: 2024-02-08 | Stop reason: CLARIF

## 2024-02-08 RX ORDER — LIDOCAINE HYDROCHLORIDE 10 MG/ML
0.1 INJECTION, SOLUTION EPIDURAL; INFILTRATION; INTRACAUDAL; PERINEURAL ONCE
Status: DISCONTINUED | OUTPATIENT
Start: 2024-02-08 | End: 2024-02-08 | Stop reason: HOSPADM

## 2024-02-08 RX ORDER — ROPIVACAINE/EPI/CLONIDINE/KET 2.46-0.005
SYRINGE (ML) INJECTION AS NEEDED
Status: DISCONTINUED | OUTPATIENT
Start: 2024-02-08 | End: 2024-02-08 | Stop reason: HOSPADM

## 2024-02-08 RX ORDER — FLUTICASONE PROPIONATE 50 MCG
2 SPRAY, SUSPENSION (ML) NASAL DAILY
Status: DISCONTINUED | OUTPATIENT
Start: 2024-02-08 | End: 2024-02-10 | Stop reason: HOSPADM

## 2024-02-08 RX ORDER — DEXTROSE 50 % IN WATER (D50W) INTRAVENOUS SYRINGE
25
Status: DISCONTINUED | OUTPATIENT
Start: 2024-02-08 | End: 2024-02-10 | Stop reason: HOSPADM

## 2024-02-08 RX ORDER — CEFAZOLIN SODIUM 2 G/100ML
INJECTION, SOLUTION INTRAVENOUS AS NEEDED
Status: DISCONTINUED | OUTPATIENT
Start: 2024-02-08 | End: 2024-02-08

## 2024-02-08 RX ORDER — ONDANSETRON HYDROCHLORIDE 2 MG/ML
4 INJECTION, SOLUTION INTRAVENOUS EVERY 8 HOURS PRN
Status: DISCONTINUED | OUTPATIENT
Start: 2024-02-08 | End: 2024-02-10 | Stop reason: HOSPADM

## 2024-02-08 RX ORDER — CYCLOBENZAPRINE HCL 10 MG
10 TABLET ORAL 3 TIMES DAILY
Status: DISCONTINUED | OUTPATIENT
Start: 2024-02-08 | End: 2024-02-10 | Stop reason: HOSPADM

## 2024-02-08 RX ORDER — DEXAMETHASONE SODIUM PHOSPHATE 4 MG/ML
INJECTION, SOLUTION INTRA-ARTICULAR; INTRALESIONAL; INTRAMUSCULAR; INTRAVENOUS; SOFT TISSUE AS NEEDED
Status: DISCONTINUED | OUTPATIENT
Start: 2024-02-08 | End: 2024-02-08

## 2024-02-08 RX ORDER — OXYCODONE HYDROCHLORIDE 10 MG/1
10 TABLET ORAL EVERY 4 HOURS PRN
Status: DISCONTINUED | OUTPATIENT
Start: 2024-02-08 | End: 2024-02-08 | Stop reason: HOSPADM

## 2024-02-08 RX ORDER — ALBUTEROL SULFATE 0.83 MG/ML
2.5 SOLUTION RESPIRATORY (INHALATION) EVERY 6 HOURS PRN
Status: DISCONTINUED | OUTPATIENT
Start: 2024-02-08 | End: 2024-02-10 | Stop reason: HOSPADM

## 2024-02-08 RX ORDER — DIPHENHYDRAMINE HYDROCHLORIDE 50 MG/ML
12.5 INJECTION INTRAMUSCULAR; INTRAVENOUS ONCE AS NEEDED
Status: DISCONTINUED | OUTPATIENT
Start: 2024-02-08 | End: 2024-02-08 | Stop reason: HOSPADM

## 2024-02-08 RX ORDER — INSULIN LISPRO 100 [IU]/ML
15 INJECTION, SOLUTION INTRAVENOUS; SUBCUTANEOUS ONCE
Status: COMPLETED | OUTPATIENT
Start: 2024-02-08 | End: 2024-02-08

## 2024-02-08 RX ORDER — GABAPENTIN 600 MG/1
600 TABLET ORAL 3 TIMES DAILY
Status: DISCONTINUED | OUTPATIENT
Start: 2024-02-08 | End: 2024-02-10 | Stop reason: HOSPADM

## 2024-02-08 RX ORDER — ONDANSETRON 4 MG/1
4 TABLET, ORALLY DISINTEGRATING ORAL EVERY 8 HOURS PRN
Status: DISCONTINUED | OUTPATIENT
Start: 2024-02-08 | End: 2024-02-10 | Stop reason: HOSPADM

## 2024-02-08 RX ORDER — ONDANSETRON HYDROCHLORIDE 2 MG/ML
INJECTION, SOLUTION INTRAVENOUS AS NEEDED
Status: DISCONTINUED | OUTPATIENT
Start: 2024-02-08 | End: 2024-02-08

## 2024-02-08 RX ORDER — ONDANSETRON HYDROCHLORIDE 2 MG/ML
4 INJECTION, SOLUTION INTRAVENOUS ONCE AS NEEDED
Status: COMPLETED | OUTPATIENT
Start: 2024-02-08 | End: 2024-02-08

## 2024-02-08 RX ORDER — MIDAZOLAM HYDROCHLORIDE 1 MG/ML
INJECTION, SOLUTION INTRAMUSCULAR; INTRAVENOUS AS NEEDED
Status: DISCONTINUED | OUTPATIENT
Start: 2024-02-08 | End: 2024-02-08

## 2024-02-08 RX ORDER — HYDRALAZINE HYDROCHLORIDE 20 MG/ML
5 INJECTION INTRAMUSCULAR; INTRAVENOUS EVERY 30 MIN PRN
Status: DISCONTINUED | OUTPATIENT
Start: 2024-02-08 | End: 2024-02-08 | Stop reason: HOSPADM

## 2024-02-08 RX ORDER — LORATADINE 10 MG/1
10 TABLET ORAL DAILY
Status: DISCONTINUED | OUTPATIENT
Start: 2024-02-08 | End: 2024-02-10 | Stop reason: HOSPADM

## 2024-02-08 RX ORDER — SODIUM CHLORIDE, SODIUM LACTATE, POTASSIUM CHLORIDE, CALCIUM CHLORIDE 600; 310; 30; 20 MG/100ML; MG/100ML; MG/100ML; MG/100ML
100 INJECTION, SOLUTION INTRAVENOUS CONTINUOUS
Status: DISCONTINUED | OUTPATIENT
Start: 2024-02-08 | End: 2024-02-08 | Stop reason: HOSPADM

## 2024-02-08 RX ORDER — BISACODYL 5 MG
10 TABLET, DELAYED RELEASE (ENTERIC COATED) ORAL DAILY PRN
Status: DISCONTINUED | OUTPATIENT
Start: 2024-02-08 | End: 2024-02-10 | Stop reason: HOSPADM

## 2024-02-08 RX ORDER — FERROUS SULFATE 325(65) MG
65 TABLET ORAL DAILY
Status: DISCONTINUED | OUTPATIENT
Start: 2024-02-08 | End: 2024-02-10 | Stop reason: HOSPADM

## 2024-02-08 RX ORDER — MULTIVIT-MIN/IRON FUM/FOLIC AC 7.5 MG-4
1 TABLET ORAL DAILY
Status: DISCONTINUED | OUTPATIENT
Start: 2024-02-08 | End: 2024-02-10 | Stop reason: HOSPADM

## 2024-02-08 RX ORDER — ACETAMINOPHEN 325 MG/1
975 TABLET ORAL ONCE
Status: DISCONTINUED | OUTPATIENT
Start: 2024-02-08 | End: 2024-02-08 | Stop reason: HOSPADM

## 2024-02-08 RX ORDER — HYDROMORPHONE HYDROCHLORIDE 1 MG/ML
0.2 INJECTION, SOLUTION INTRAMUSCULAR; INTRAVENOUS; SUBCUTANEOUS EVERY 5 MIN PRN
Status: DISCONTINUED | OUTPATIENT
Start: 2024-02-08 | End: 2024-02-08 | Stop reason: HOSPADM

## 2024-02-08 RX ORDER — LABETALOL HYDROCHLORIDE 5 MG/ML
5 INJECTION, SOLUTION INTRAVENOUS ONCE AS NEEDED
Status: DISCONTINUED | OUTPATIENT
Start: 2024-02-08 | End: 2024-02-08 | Stop reason: HOSPADM

## 2024-02-08 RX ORDER — FENTANYL CITRATE 50 UG/ML
INJECTION, SOLUTION INTRAMUSCULAR; INTRAVENOUS AS NEEDED
Status: DISCONTINUED | OUTPATIENT
Start: 2024-02-08 | End: 2024-02-08

## 2024-02-08 RX ORDER — OXYCODONE HYDROCHLORIDE 5 MG/1
5 TABLET ORAL EVERY 4 HOURS PRN
Status: DISCONTINUED | OUTPATIENT
Start: 2024-02-08 | End: 2024-02-08 | Stop reason: HOSPADM

## 2024-02-08 RX ORDER — MEPERIDINE HYDROCHLORIDE 50 MG/ML
12.5 INJECTION INTRAMUSCULAR; INTRAVENOUS; SUBCUTANEOUS EVERY 10 MIN PRN
Status: DISCONTINUED | OUTPATIENT
Start: 2024-02-08 | End: 2024-02-08 | Stop reason: HOSPADM

## 2024-02-08 RX ORDER — INSULIN LISPRO 100 [IU]/ML
0-15 INJECTION, SOLUTION INTRAVENOUS; SUBCUTANEOUS
Status: DISCONTINUED | OUTPATIENT
Start: 2024-02-09 | End: 2024-02-10 | Stop reason: HOSPADM

## 2024-02-08 RX ORDER — SODIUM CHLORIDE 0.9 G/100ML
IRRIGANT IRRIGATION AS NEEDED
Status: DISCONTINUED | OUTPATIENT
Start: 2024-02-08 | End: 2024-02-08 | Stop reason: HOSPADM

## 2024-02-08 RX ORDER — NALOXONE HYDROCHLORIDE 0.4 MG/ML
0.2 INJECTION, SOLUTION INTRAMUSCULAR; INTRAVENOUS; SUBCUTANEOUS EVERY 5 MIN PRN
Status: DISCONTINUED | OUTPATIENT
Start: 2024-02-08 | End: 2024-02-10 | Stop reason: HOSPADM

## 2024-02-08 RX ORDER — DOCUSATE SODIUM 100 MG/1
100 CAPSULE, LIQUID FILLED ORAL 2 TIMES DAILY
Status: DISCONTINUED | OUTPATIENT
Start: 2024-02-08 | End: 2024-02-10 | Stop reason: HOSPADM

## 2024-02-08 RX ORDER — LIDOCAINE HYDROCHLORIDE 20 MG/ML
INJECTION, SOLUTION INFILTRATION; PERINEURAL AS NEEDED
Status: DISCONTINUED | OUTPATIENT
Start: 2024-02-08 | End: 2024-02-08

## 2024-02-08 RX ORDER — FAMOTIDINE 10 MG/ML
20 INJECTION INTRAVENOUS ONCE
Status: DISCONTINUED | OUTPATIENT
Start: 2024-02-08 | End: 2024-02-08 | Stop reason: HOSPADM

## 2024-02-08 RX ORDER — METFORMIN HYDROCHLORIDE 1000 MG/1
1000 TABLET ORAL 2 TIMES DAILY
Status: DISCONTINUED | OUTPATIENT
Start: 2024-02-08 | End: 2024-02-10 | Stop reason: HOSPADM

## 2024-02-08 RX ORDER — KETOROLAC TROMETHAMINE 30 MG/ML
INJECTION, SOLUTION INTRAMUSCULAR; INTRAVENOUS AS NEEDED
Status: DISCONTINUED | OUTPATIENT
Start: 2024-02-08 | End: 2024-02-08

## 2024-02-08 RX ORDER — KETOROLAC TROMETHAMINE 30 MG/ML
30 INJECTION, SOLUTION INTRAMUSCULAR; INTRAVENOUS EVERY 6 HOURS
Status: COMPLETED | OUTPATIENT
Start: 2024-02-08 | End: 2024-02-09

## 2024-02-08 RX ORDER — TRANEXAMIC ACID 650 MG/1
1950 TABLET ORAL ONCE
Status: COMPLETED | OUTPATIENT
Start: 2024-02-08 | End: 2024-02-08

## 2024-02-08 RX ORDER — METOCLOPRAMIDE HYDROCHLORIDE 5 MG/ML
10 INJECTION INTRAMUSCULAR; INTRAVENOUS ONCE AS NEEDED
Status: DISCONTINUED | OUTPATIENT
Start: 2024-02-08 | End: 2024-02-08 | Stop reason: HOSPADM

## 2024-02-08 RX ORDER — HYDROMORPHONE HYDROCHLORIDE 1 MG/ML
INJECTION, SOLUTION INTRAMUSCULAR; INTRAVENOUS; SUBCUTANEOUS AS NEEDED
Status: DISCONTINUED | OUTPATIENT
Start: 2024-02-08 | End: 2024-02-08

## 2024-02-08 RX ORDER — DEXTROSE MONOHYDRATE 100 MG/ML
0.3 INJECTION, SOLUTION INTRAVENOUS ONCE AS NEEDED
Status: DISCONTINUED | OUTPATIENT
Start: 2024-02-08 | End: 2024-02-10 | Stop reason: HOSPADM

## 2024-02-08 RX ORDER — ALBUTEROL SULFATE 0.83 MG/ML
2.5 SOLUTION RESPIRATORY (INHALATION) ONCE AS NEEDED
Status: DISCONTINUED | OUTPATIENT
Start: 2024-02-08 | End: 2024-02-08 | Stop reason: HOSPADM

## 2024-02-08 RX ORDER — SODIUM CHLORIDE, SODIUM LACTATE, POTASSIUM CHLORIDE, CALCIUM CHLORIDE 600; 310; 30; 20 MG/100ML; MG/100ML; MG/100ML; MG/100ML
100 INJECTION, SOLUTION INTRAVENOUS CONTINUOUS
Status: ACTIVE | OUTPATIENT
Start: 2024-02-08 | End: 2024-02-09

## 2024-02-08 RX ADMIN — Medication 1 CAPSULE: at 20:58

## 2024-02-08 RX ADMIN — CYCLOBENZAPRINE 10 MG: 10 TABLET, FILM COATED ORAL at 20:57

## 2024-02-08 RX ADMIN — HYDROMORPHONE HYDROCHLORIDE 0.5 MG: 1 INJECTION, SOLUTION INTRAMUSCULAR; INTRAVENOUS; SUBCUTANEOUS at 22:30

## 2024-02-08 RX ADMIN — Medication 2 L/MIN: at 19:00

## 2024-02-08 RX ADMIN — HYDROMORPHONE HYDROCHLORIDE 0.5 MG: 1 INJECTION, SOLUTION INTRAMUSCULAR; INTRAVENOUS; SUBCUTANEOUS at 11:50

## 2024-02-08 RX ADMIN — HYDROMORPHONE HYDROCHLORIDE 0.5 MG: 1 INJECTION, SOLUTION INTRAMUSCULAR; INTRAVENOUS; SUBCUTANEOUS at 14:02

## 2024-02-08 RX ADMIN — DEXAMETHASONE SODIUM PHOSPHATE 4 MG: 4 INJECTION INTRA-ARTICULAR; INTRALESIONAL; INTRAMUSCULAR; INTRAVENOUS; SOFT TISSUE at 11:19

## 2024-02-08 RX ADMIN — LIDOCAINE HYDROCHLORIDE 100 MG: 20 INJECTION, SOLUTION INFILTRATION; PERINEURAL at 11:14

## 2024-02-08 RX ADMIN — KETOROLAC TROMETHAMINE 30 MG: 30 INJECTION, SOLUTION INTRAMUSCULAR; INTRAVENOUS at 19:00

## 2024-02-08 RX ADMIN — INSULIN LISPRO 15 UNITS: 100 INJECTION, SOLUTION INTRAVENOUS; SUBCUTANEOUS at 19:31

## 2024-02-08 RX ADMIN — PROPOFOL 200 MG: 10 INJECTION, EMULSION INTRAVENOUS at 11:14

## 2024-02-08 RX ADMIN — LORATADINE 10 MG: 10 TABLET ORAL at 20:57

## 2024-02-08 RX ADMIN — HYDROMORPHONE HYDROCHLORIDE 0.5 MG: 1 INJECTION, SOLUTION INTRAMUSCULAR; INTRAVENOUS; SUBCUTANEOUS at 12:20

## 2024-02-08 RX ADMIN — FENTANYL CITRATE 75 MCG: 50 INJECTION, SOLUTION INTRAMUSCULAR; INTRAVENOUS at 11:50

## 2024-02-08 RX ADMIN — HYDROMORPHONE HYDROCHLORIDE 0.2 MG: 1 INJECTION, SOLUTION INTRAMUSCULAR; INTRAVENOUS; SUBCUTANEOUS at 15:15

## 2024-02-08 RX ADMIN — HYDROMORPHONE HYDROCHLORIDE 0.2 MG: 1 INJECTION, SOLUTION INTRAMUSCULAR; INTRAVENOUS; SUBCUTANEOUS at 14:21

## 2024-02-08 RX ADMIN — DEXAMETHASONE SODIUM PHOSPHATE 4 MG: 4 INJECTION INTRA-ARTICULAR; INTRALESIONAL; INTRAMUSCULAR; INTRAVENOUS; SOFT TISSUE at 13:26

## 2024-02-08 RX ADMIN — TRAZODONE HYDROCHLORIDE 50 MG: 50 TABLET ORAL at 20:57

## 2024-02-08 RX ADMIN — TRANEXAMIC ACID 1950 MG: 650 TABLET ORAL at 19:00

## 2024-02-08 RX ADMIN — HYDROMORPHONE HYDROCHLORIDE 0.5 MG: 1 INJECTION, SOLUTION INTRAMUSCULAR; INTRAVENOUS; SUBCUTANEOUS at 11:34

## 2024-02-08 RX ADMIN — SODIUM CHLORIDE, POTASSIUM CHLORIDE, SODIUM LACTATE AND CALCIUM CHLORIDE 100 ML/HR: 600; 310; 30; 20 INJECTION, SOLUTION INTRAVENOUS at 19:00

## 2024-02-08 RX ADMIN — ONDANSETRON 4 MG: 2 INJECTION, SOLUTION INTRAMUSCULAR; INTRAVENOUS at 13:26

## 2024-02-08 RX ADMIN — GABAPENTIN 600 MG: 600 TABLET, FILM COATED ORAL at 20:57

## 2024-02-08 RX ADMIN — FERROUS SULFATE TAB 325 MG (65 MG ELEMENTAL FE) 1 TABLET: 325 (65 FE) TAB at 20:57

## 2024-02-08 RX ADMIN — CEFAZOLIN SODIUM 2 G: 2 INJECTION, SOLUTION INTRAVENOUS at 19:00

## 2024-02-08 RX ADMIN — FOLIC ACID 1 MG: 1 TABLET ORAL at 20:59

## 2024-02-08 RX ADMIN — ASPIRIN 81 MG: 81 TABLET, COATED ORAL at 20:58

## 2024-02-08 RX ADMIN — HYDROMORPHONE HYDROCHLORIDE 0.5 MG: 1 INJECTION, SOLUTION INTRAMUSCULAR; INTRAVENOUS; SUBCUTANEOUS at 12:14

## 2024-02-08 RX ADMIN — HYDROMORPHONE HYDROCHLORIDE 0.5 MG: 1 INJECTION, SOLUTION INTRAMUSCULAR; INTRAVENOUS; SUBCUTANEOUS at 13:50

## 2024-02-08 RX ADMIN — CEFAZOLIN SODIUM 3 G: 2 INJECTION, SOLUTION INTRAVENOUS at 11:19

## 2024-02-08 RX ADMIN — ONDANSETRON 4 MG: 2 INJECTION INTRAMUSCULAR; INTRAVENOUS at 14:02

## 2024-02-08 RX ADMIN — OXYCODONE HYDROCHLORIDE 10 MG: 10 TABLET ORAL at 15:56

## 2024-02-08 RX ADMIN — DULOXETINE HYDROCHLORIDE 60 MG: 60 CAPSULE, DELAYED RELEASE ORAL at 20:57

## 2024-02-08 RX ADMIN — MIDAZOLAM 2 MG: 1 INJECTION INTRAMUSCULAR; INTRAVENOUS at 11:00

## 2024-02-08 RX ADMIN — Medication 1 TABLET: at 20:57

## 2024-02-08 RX ADMIN — OXYCODONE HYDROCHLORIDE 10 MG: 10 TABLET ORAL at 20:58

## 2024-02-08 RX ADMIN — KETOROLAC TROMETHAMINE 30 MG: 30 INJECTION, SOLUTION INTRAMUSCULAR; INTRAVENOUS at 13:26

## 2024-02-08 RX ADMIN — TRANEXAMIC ACID 1000 MG: 100 INJECTION, SOLUTION INTRAVENOUS at 11:24

## 2024-02-08 RX ADMIN — ACETAMINOPHEN 975 MG: 325 TABLET ORAL at 19:00

## 2024-02-08 RX ADMIN — METFORMIN HYDROCHLORIDE 1000 MG: 1000 TABLET ORAL at 20:57

## 2024-02-08 RX ADMIN — FENTANYL CITRATE 25 MCG: 50 INJECTION, SOLUTION INTRAMUSCULAR; INTRAVENOUS at 11:34

## 2024-02-08 SDOH — SOCIAL STABILITY: SOCIAL INSECURITY: HAVE YOU HAD THOUGHTS OF HARMING ANYONE ELSE?: NO

## 2024-02-08 SDOH — SOCIAL STABILITY: SOCIAL INSECURITY: ARE THERE ANY APPARENT SIGNS OF INJURIES/BEHAVIORS THAT COULD BE RELATED TO ABUSE/NEGLECT?: NO

## 2024-02-08 SDOH — SOCIAL STABILITY: SOCIAL INSECURITY: DO YOU FEEL ANYONE HAS EXPLOITED OR TAKEN ADVANTAGE OF YOU FINANCIALLY OR OF YOUR PERSONAL PROPERTY?: NO

## 2024-02-08 SDOH — SOCIAL STABILITY: SOCIAL INSECURITY: DOES ANYONE TRY TO KEEP YOU FROM HAVING/CONTACTING OTHER FRIENDS OR DOING THINGS OUTSIDE YOUR HOME?: NO

## 2024-02-08 SDOH — SOCIAL STABILITY: SOCIAL INSECURITY: WERE YOU ABLE TO COMPLETE ALL THE BEHAVIORAL HEALTH SCREENINGS?: YES

## 2024-02-08 SDOH — SOCIAL STABILITY: SOCIAL INSECURITY: ABUSE: ADULT

## 2024-02-08 SDOH — SOCIAL STABILITY: SOCIAL INSECURITY: DO YOU FEEL UNSAFE GOING BACK TO THE PLACE WHERE YOU ARE LIVING?: NO

## 2024-02-08 SDOH — SOCIAL STABILITY: SOCIAL INSECURITY: ARE YOU OR HAVE YOU BEEN THREATENED OR ABUSED PHYSICALLY, EMOTIONALLY, OR SEXUALLY BY ANYONE?: NO

## 2024-02-08 SDOH — SOCIAL STABILITY: SOCIAL INSECURITY: HAS ANYONE EVER THREATENED TO HURT YOUR FAMILY OR YOUR PETS?: NO

## 2024-02-08 ASSESSMENT — PAIN DESCRIPTION - DESCRIPTORS
DESCRIPTORS: SHARP;SORE;THROBBING
DESCRIPTORS: THROBBING
DESCRIPTORS: SHARP;SORE;THROBBING

## 2024-02-08 ASSESSMENT — PAIN DESCRIPTION - ORIENTATION
ORIENTATION: LEFT

## 2024-02-08 ASSESSMENT — PAIN SCALES - GENERAL
PAINLEVEL_OUTOF10: 5 - MODERATE PAIN
PAINLEVEL_OUTOF10: 5 - MODERATE PAIN
PAINLEVEL_OUTOF10: 9
PAINLEVEL_OUTOF10: 9
PAINLEVEL_OUTOF10: 5 - MODERATE PAIN
PAINLEVEL_OUTOF10: 6
PAINLEVEL_OUTOF10: 5 - MODERATE PAIN
PAINLEVEL_OUTOF10: 7
PAINLEVEL_OUTOF10: 6
PAINLEVEL_OUTOF10: 9
PAINLEVEL_OUTOF10: 6
PAINLEVEL_OUTOF10: 6
PAINLEVEL_OUTOF10: 7
PAINLEVEL_OUTOF10: 6
PAINLEVEL_OUTOF10: 0 - NO PAIN
PAINLEVEL_OUTOF10: 5 - MODERATE PAIN
PAIN_LEVEL: 0
PAINLEVEL_OUTOF10: 5 - MODERATE PAIN
PAINLEVEL_OUTOF10: 5 - MODERATE PAIN

## 2024-02-08 ASSESSMENT — ACTIVITIES OF DAILY LIVING (ADL)
HEARING - LEFT EAR: FUNCTIONAL
PATIENT'S MEMORY ADEQUATE TO SAFELY COMPLETE DAILY ACTIVITIES?: YES
JUDGMENT_ADEQUATE_SAFELY_COMPLETE_DAILY_ACTIVITIES: YES
TOILETING: NEEDS ASSISTANCE
LACK_OF_TRANSPORTATION: NO
ASSISTIVE_DEVICE: CANE;WALKER
BATHING: NEEDS ASSISTANCE
ADEQUATE_TO_COMPLETE_ADL: YES
HEARING - RIGHT EAR: FUNCTIONAL
DRESSING YOURSELF: NEEDS ASSISTANCE
GROOMING: NEEDS ASSISTANCE
FEEDING YOURSELF: NEEDS ASSISTANCE
WALKS IN HOME: NEEDS ASSISTANCE

## 2024-02-08 ASSESSMENT — COLUMBIA-SUICIDE SEVERITY RATING SCALE - C-SSRS
1. IN THE PAST MONTH, HAVE YOU WISHED YOU WERE DEAD OR WISHED YOU COULD GO TO SLEEP AND NOT WAKE UP?: NO
2. HAVE YOU ACTUALLY HAD ANY THOUGHTS OF KILLING YOURSELF?: NO
6. HAVE YOU EVER DONE ANYTHING, STARTED TO DO ANYTHING, OR PREPARED TO DO ANYTHING TO END YOUR LIFE?: NO

## 2024-02-08 ASSESSMENT — LIFESTYLE VARIABLES
SUBSTANCE_ABUSE_PAST_12_MONTHS: NO
HOW OFTEN DO YOU HAVE 6 OR MORE DRINKS ON ONE OCCASION: NEVER
HOW OFTEN DO YOU HAVE A DRINK CONTAINING ALCOHOL: MONTHLY OR LESS
PRESCIPTION_ABUSE_PAST_12_MONTHS: NO
AUDIT-C TOTAL SCORE: 1
HOW MANY STANDARD DRINKS CONTAINING ALCOHOL DO YOU HAVE ON A TYPICAL DAY: 1 OR 2
SKIP TO QUESTIONS 9-10: 1
AUDIT-C TOTAL SCORE: 1

## 2024-02-08 ASSESSMENT — COGNITIVE AND FUNCTIONAL STATUS - GENERAL
WALKING IN HOSPITAL ROOM: A LITTLE
HELP NEEDED FOR BATHING: A LITTLE
MOVING TO AND FROM BED TO CHAIR: A LITTLE
EATING MEALS: A LITTLE
MOBILITY SCORE: 18
MOVING FROM LYING ON BACK TO SITTING ON SIDE OF FLAT BED WITH BEDRAILS: A LITTLE
DRESSING REGULAR LOWER BODY CLOTHING: A LITTLE
DAILY ACTIVITIY SCORE: 18
TURNING FROM BACK TO SIDE WHILE IN FLAT BAD: A LITTLE
CLIMB 3 TO 5 STEPS WITH RAILING: A LITTLE
TOILETING: A LITTLE
DRESSING REGULAR UPPER BODY CLOTHING: A LITTLE
STANDING UP FROM CHAIR USING ARMS: A LITTLE
PATIENT BASELINE BEDBOUND: NO
PERSONAL GROOMING: A LITTLE

## 2024-02-08 ASSESSMENT — PAIN DESCRIPTION - LOCATION
LOCATION: KNEE

## 2024-02-08 ASSESSMENT — PATIENT HEALTH QUESTIONNAIRE - PHQ9
1. LITTLE INTEREST OR PLEASURE IN DOING THINGS: SEVERAL DAYS
2. FEELING DOWN, DEPRESSED OR HOPELESS: NOT AT ALL
SUM OF ALL RESPONSES TO PHQ9 QUESTIONS 1 & 2: 1

## 2024-02-08 NOTE — NURSING NOTE
Providers notified of pt's glucose levels on admission. Per pt she recently ate dinner an hour prior. Awaiting further interventions at this time.

## 2024-02-08 NOTE — ANESTHESIA PROCEDURE NOTES
Peripheral Block    Patient location during procedure: pre-op  Start time: 2/8/2024 11:00 AM  End time: 2/8/2024 11:01 AM  Reason for block: at surgeon's request and post-op pain management  Staffing  Performed: attending   Authorized by: Mamadou Chowdary DO    Performed by: Mamadou Chowdary DO  Preanesthetic Checklist  Completed: patient identified, IV checked, site marked, risks and benefits discussed, surgical consent, monitors and equipment checked, pre-op evaluation and timeout performed   Timeout performed at: 2/8/2024 11:00 AM  Peripheral Block  Patient position: laying flat  Prep: ChloraPrep  Patient monitoring: heart rate and continuous pulse ox  Block type: adductor canal  Laterality: left  Injection technique: single-shot  Guidance: ultrasound guided  Local infiltration: ropivacaine  Infiltration strength: 0.5 %  Dose: 25 mL  Needle  Needle type: pencil-tip   Needle gauge: 21 G  Needle localization: ultrasound guidance     image stored in chart  Assessment  Injection assessment: negative aspiration for heme, no paresthesia on injection, incremental injection and local visualized surrounding nerve on ultrasound

## 2024-02-08 NOTE — OP NOTE
Arthroplasty Resurfacing Total Knee (L) Operative Note     Date: 2024  OR Location: [facility] OR    Name: Alejandra Houston, : 1965, Age: 58 y.o., MRN: [id number], Sex: female    Diagnosis  Pre-op Diagnosis     * Primary osteoarthritis of left knee [M17.12] Post-op Diagnosis     * Primary osteoarthritis of left knee [M17.12]     Procedures  LEFT TOTAL KNEE ARTHROPLASTY    Surgeons      * Carlos Kaminski - Primary    Resident/Fellow/Other Assistant:  Mrs. Ann Centeno ()    Procedure Summary  Anesthesia: General with pre-op adductor canal block  ASA: III  Anesthesia Staff: Anesthesiologist: Mamadou Chowdary DO  C-AA: PATRIC Chamberlain; PATRIC Marcano  Estimated Blood Loss: 250mL  Intra-op Medications:   Administrations occurring from 1030 to 1330 on 24:   Medication Name Total Dose   ropivacaine-epinephrine-clonidine-ketorolac 2.46-0.005- 0.0008-0.3mg/mL periarticular syringe 100 mL   sodium chloride 0.9 % irrigation solution 1,000 mL              Anesthesia Record               Intraprocedure I/O Totals          Intake    Tranexamic Acid 0.00 mL    The total shown is the total volume documented since Anesthesia Start was filed.    Total Intake 0 mL          Specimen: No specimens collected     Staff:   Circulator: Haim Helton RN; Beto Sanders RN  Relief Circulator: Tom Dubose Jr. RN  Scrub Person: Ann Centeno; Han Morales         Drains and/or Catheters: * None in log *    Tourniquet Times:   N/A    Implants:  1. DePuy Attune [place] size #7 LEFT femoral posterior stabilized cementless component.   2. DePuy Attune [place] size #7 press-fit rotating platform tibial component.   3. DePuy Attune 6mm anti-oxidized posterior stabilized rotating platform tibial polyethylene insert.   4. DePuyy Attune 41mm anti-oxidized, domed all-polyethylene patellar component.  Implants       Type Name Action Serial No.      Screw PIN, HP THREADED, STRL, HEADED - [id number]  Implanted      Joint Knee BONE CEMENT, CMW 2 , FAST SET, 20G - [id number] Implanted      Joint Knee INSERT, ATTUNE PS RP, SZ 7, 6MM - [id number] Implanted      Joint Knee DOME, PATELLA, MEDIALIZED, 41MM - [id number] Implanted      Joint Knee INSERT, ATTUNE PS RP, SZ 7, 5MM - [id number] Implanted       SIZE 7 ATTUNE ROTATING PLATFORM TIBIAL BASE, POROUS Implanted      Joint Knee FEMORAL, ATTUNE, POR PS, SIZE 7, LT - [id number] Implanted               Findings: Significant medial and patellofemoral compartment degenerative changes with displaced root tear of the medial meniscus.      Indications: Alejandra Houston is an 58 y.o. female with longstanding history of LEFT knee pain and osteoarthritis best noticed on MRI which demonstrated significant patellofemoral and medial compartment disease with reactive bone marrow edema, subchondral cystic changes and a complete, displaced tear of the root of the medial meniscus.  She has severe pain and dysfunction that is severely impacting her quality of life and ability to perform day-to-day activities.  She has exhausted an extensive course of conservative treatment and is still having severe pain.  She was indicated for a LEFT total knee replacement.  Please see my office note for further in-depth details of the history, conservative treatments, physical exam, informed consent and discussion process.  We discussed the pros and cons of press-fit vs cemented total knee components given her young age and bone quality based on Xrays.  Will plan on using press-fit femoral and tibial components unless there are intra-operative findings that I feel that cementless components would be at risk of poor outcome or failure of ingrowth.       The patient was seen in the preoperative area. The risks, benefits, complications, treatment options, non-operative alternatives, expected recovery and outcomes were discussed with the patient. The possibilities of reaction to medication, pulmonary  aspiration, injury to surrounding structures, bleeding, recurrent infection, the need for additional procedures, failure to diagnose a condition, and creating a complication requiring transfusion or operation were discussed with the patient. The patient concurred with the proposed plan, giving informed consent.  The site of surgery was properly noted/marked if necessary per policy. The patient has been actively warmed in preoperative area.      Preoperative antibiotics have been ordered and given within 1 hours of incision.   Venous thrombosis prophylaxis have been ordered including unilateral sequential compression device     OPERATIVE DETAILS:    The patient was met in the preoperative holding area.  The LEFT knee was marked.  We reviewed and signed her informed consent.  A pre-op adductor canal block was performed by the anesthesia service.  She was transported to the OR and transferred from the hospital bed to the operating room table.  A time-out was performed, which confirmed the correct patient, procedure, and laterality.  Her x-rays were on the PACS viewing monitor.  A general anesthesia was performed and she was supine and all bony prominences were well padded.  A bump was placed under the hip.  Tourniquet was placed high on the operative lower extremity.  We prepped and draped the lower extremity in the usual sterile fashion.  A preincision pause confirmed that weight-based IV Ancef and tranexamic acid had been administered and documented.  We did not use a tourniquet for this surgery and used both a Bovie electrocautery and bipolar sealant to gain excellent hemostasis.     We began by making a longitudinal incision over the anterior aspect of the knee.  We incised through the skin and subcutaneous tissue.  We came down the level of fascia and raised small full-thickness flap to identify the medial and lateral border of the patella as well as the  VMO.  We made a medial parapatellar arthrotomy.  We made a  standard medial release, mobilized the extensor mechanism, and excised the majority of the synovium throughout the suprapatellar pouch, medial and lateral gutter.  Then, we were able to sublux the patella and flex the knee to 90 degrees.  We excised the majority of the infrapatellar fat pad.  All osteophytes over the distal femur, proximal tibia and patella were excised.  We then excised the ACL and PCL.  We gained access to the distal femur by using the step drill and inserted the intramedullary guide set at 5-degree valgus cut angle and 9mm of distal resection.  The guide was pinned in place.  We made the distal femoral resection.  We used the AP sizing guide and it was sized to a #7 component and pinned in appropriate external rotation approximately 3 degrees from the transepicondylar axis and perpendicular to Whitesides line.  We then pinned the 4-in-1 cutting block in place and confirmed an rex wing that we would not notch the distal femur.  We then made the cuts in sequence.  We then pinned the posterior stabilized box cutting guide in place and was slightly lateralized.  We made the box cut with the reciprocating saw.       We then subluxed the tibia anteriorly with a blunt Hohmann and had excellent exposure to the proximal tibia.  We recessed the remaining PCL over the insertion on the proximal tibia.  We then pinned the extramedullary tibial cutting guide in place centered over the medial third of the tibial tubercle in line with the  center of the ankle joint distally.  It was pinned roughly 3 degree posterior slope.  We then used a stylus to confirm that we were taking ~2mm off the medial tibial plateau. We then made the cut and then confirmed that it was in neutral coronal alignment and appropriate posterior slope using a block and a balbir.  We then placed a laminar  and sequence excised the medial meniscus, lateral meniscus, and inflamed synovium over the posterior capsule and posterior condylar  osteophytes.  We had excellent hemostasis over the posterior capsule.  We then subluxed the tibia anteriorly and it was sized to a #7 component.  It was pinned in appropriate external rotation roughly centered over the medial third of the tibial tubercle.  We removed any remaining osteophytes over the medial tibial plateau.  We then used the press-fit Boss reamer and keel punch and drilled for the press-fit lugs.  We then placed the size #7 femoral component in place. We got optimal stability using a 5mm posterior stabilized rotating platform polyethylene trial.  There was excellent tension over the MCL and was nicely stable to varus and valgus stress at 0, 30, 90 degrees.  There was smooth patellofemoral tracking from 0 to 130 degrees.  There was a couple millimeters of anterior tibial subluxation with the knee at 90 degrees and clamping down the arthrotomy with 2 towel clips.  We measured the thickness of her patella at 21mm and then made a flush resection with the remaining thickness of 14mm.  She was sized to a 41mm patella we drilled for the lug holes and then removed any remaining peripheral osteophytes over the superolateral facet.  We drilled the lug holes in the  distal femur.  There was excellent bony apposition and bone stock, so we elected to use the press-fit components.  We were happy with our overall stability in the coronal and sagittal plane, patellofemoral tracking and component orientation.  We used these as our final implants.  We then removed all of the trials, gently irrigated the bony surfaces and removed any bony debris.  We then impacted the size #7 press-fit rotating platform tibial component and there was excellent bony apposition and excellent rotational stability.  Similarly, we impacted the size #7  posterior stabilized press-fit component.  There was excellent bony apposition overall cut surfaces.  We then mixed 1 bag of DePuy HV bone cement without antibiotics and then cemented the  41mm patellar component and this was held in place with a patellar clamp as the cement hardened. We then inserted the 5mm posterior stabilized rotating platform polyethylene insert and reduced the knee.  At this time, there was some mild laxity in the coronal plane.  To improve this, I removed the 5mm poly and inserted a 6mm RP PS insert.  At this point the knee still came into extension.  There was no recurvatum.  It did not buckle into flexion with axial load.  There was very nice coronal plane stability to varus/valgus stress.  The MCL was appropriately taut.  There was still smooth patellofemoral tracking from 0-125 degrees with no patellar subluxation, lift off or tilt.  We irrigated with a total of 3L of normal saline followed by 1 bottle of Irrisept solution.  We made sure there was no retained cement or bony debris.  We injected the capsule and periosteum with 100cc of Ropivacaine, Epinephrine, Clonidine and Ketorolac.  We then closed the arthrotomy with a combination of #1 Vicryl in a figure-of-eight fashion and #2 Stratafix running barbed suture.  There was no extravasation of fluid and a watertight arthrotomy with excellent patellofemoral tracking.  We then closed the subcutaneous tissue with 2-0 Vicryl. The skin was closed with a running subcuticular 3-0 Stratafix.  We then applied Exofin and a silver impregnated Mepilex dressing.  The drapes were removed.  I was present and scrubbed for all critical portions of the procedure.  The patient had palpable pulses.  She was awoken from anesthesia and transferred to the recovery room in stable condition.       Procedure Details: See Above  Complications:  None; patient tolerated the procedure well.    Disposition: PACU - hemodynamically stable.  Condition: stable         Additional Details: None    Attending Attestation: I was present and scrubbed for the entire procedure.    Carlos Kaminski MD  Phone: [phone]

## 2024-02-08 NOTE — ANESTHESIA PROCEDURE NOTES
Airway  Date/Time: 2/8/2024 11:17 AM  Urgency: elective    Airway not difficult    Staffing  Performed: PATRIC   Authorized by: Mamadou Chowdary DO    Performed by: PATRIC Chamberlain  Patient location during procedure: OR    Indications and Patient Condition  Indications for airway management: anesthesia  Spontaneous ventilation: present  Sedation level: deep  Preoxygenated: yes  Patient position: sniffing  Mask difficulty assessment: 0 - not attempted  Planned trial extubation    Final Airway Details  Final airway type: supraglottic airway      Successful airway: Supreme  Size 4     Number of attempts at approach: 1

## 2024-02-08 NOTE — BRIEF OP NOTE
Date: 2024  OR Location: [facility] OR    Name: Alejandra Houston, : 1965, Age: 58 y.o., MRN: [id number], Sex: female    Diagnosis  Pre-op Diagnosis     * Primary osteoarthritis of left knee [M17.12] Post-op Diagnosis     * Primary osteoarthritis of left knee [M17.12]     Procedures  LEFT TOTAL KNEE ARTHROPLASTY    Surgeons      * Carlos Kaminski - Primary    Resident/Fellow/Other Assistant:  Mrs. Ann Centeno ()    Procedure Summary  Anesthesia: General with pre-op adductor canal block  ASA: III  Anesthesia Staff: Anesthesiologist: Mamadou Chowdary DO  C-AA: PATRIC Chamberlain; PATRIC Marcano  Estimated Blood Loss: 250mL  Intra-op Medications:   Administrations occurring from 1030 to 1330 on 24:   Medication Name Total Dose   ropivacaine-epinephrine-clonidine-ketorolac 2.46-0.005- 0.0008-0.3mg/mL periarticular syringe 100 mL   sodium chloride 0.9 % irrigation solution 1,000 mL              Anesthesia Record               Intraprocedure I/O Totals          Intake    Tranexamic Acid 0.00 mL    The total shown is the total volume documented since Anesthesia Start was filed.    Total Intake 0 mL          Specimen: No specimens collected     Staff:   Circulator: Haim Helton RN; Beto Sanders RN  Relief Circulator: Tom Dubose Jr., RN  Scrub Person: Ann Centeno; Han Morales          Findings: Significant medial and patellofemoral compartment degenerative changes with displaced root tear of the medial meniscus.     Complications:  None; patient tolerated the procedure well.     Disposition: PACU - hemodynamically stable.  Condition: stable  Specimens Collected: No specimens collected  Attending Attestation: I was present and scrubbed for the entire procedure.    Carlos Kaminski MD  Phone: [phone]

## 2024-02-08 NOTE — ANESTHESIA PREPROCEDURE EVALUATION
Patient: Alejandra Houston    Procedure Information       Date/Time: 02/08/24 1030    Procedure: Arthroplasty Resurfacing Total Knee (Left: Knee)    Location: [facility] OR 02 / [facility]    Surgeons: Carlos Kaminski MD            Relevant Problems   Cardiovascular   (+) Essential hypertension, benign   (+) Hyperlipidemia   (+) Hypertension      Endocrine   (+) Class 2 obesity with body mass index (BMI) of 38.0 to 38.9 in adult   (+) Class 2 severe obesity with serious comorbidity and body mass index (BMI) of 39.0 to 39.9 in adult (CMS/HCC)   (+) Type 2 diabetes mellitus (CMS/HCC)      GI   (+) GERD (gastroesophageal reflux disease)      /Renal   (+) Fatty liver disease, nonalcoholic      Neuro/Psych   (+) Anxiety   (+) Depression   (+) Peripheral nerve disease      Pulmonary   (+) Chronic obstructive lung disease (CMS/HCC)   (+) Obstructive sleep apnea, adult      GI/Hepatic   (+) Fatty liver disease, nonalcoholic      Hematology   (+) Pancytopenia (CMS/HCC)      Musculoskeletal   (+) Fibromyalgia   (+) Generalized osteoarthritis   (+) Lumbar spondylosis   (+) Lumbosacral spondylosis without myelopathy   (+) Primary localized osteoarthrosis, lower leg   (+) Primary osteoarthritis of left knee   (+) Spinal stenosis of lumbar region with radiculopathy      Infectious Disease   (+) Dermatophytosis of nail   (+) Genital herpes      Other   (+) Arthritis of both hips       Clinical information reviewed:   Tobacco  Allergies  Meds   Med Hx  Surg Hx  OB Status  Fam Hx  Soc   Hx        NPO Detail:  NPO/Void Status  Date of Last Liquid: 02/08/24  Time of Last Liquid: 0630  Date of Last Solid: 02/07/24  Time of Last Solid: 1200  Last Intake Type: Clear fluids  Time of Last Void: 0745         Physical Exam    Airway  Mallampati: III  TM distance: >3 FB     Cardiovascular   Rhythm: regular  Rate: normal     Dental - normal exam     Pulmonary   Breath sounds clear to auscultation     Abdominal        Anesthesia  Plan    History of general anesthesia?: yes  History of complications of general anesthesia?: no    ASA 3     general and regional     intravenous induction   Postoperative administration of opioids is intended.  Anesthetic plan and risks discussed with patient.

## 2024-02-08 NOTE — ANESTHESIA POSTPROCEDURE EVALUATION
Patient: Alejandra Houston    Procedure Summary       Date: 02/08/24 Room / Location: [facility] 02 / [facility]    Anesthesia Start: 1111 Anesthesia Stop: 1346    Procedure: Arthroplasty Resurfacing Total Knee (Left: Knee) Diagnosis:       Primary osteoarthritis of left knee      (Left knee osteoarthritis)      (Depuy- Jacques Depompei)      (Depuy Attune Press fit TKA system)    Surgeons: Carlos Kaminski MD Responsible Provider: Mamadou Chowdary DO    Anesthesia Type: general, regional ASA Status: 3            Anesthesia Type: general, regional    Vitals Value Taken Time   /106 02/08/24 1340   Temp 36.5 02/08/24 1346   Pulse 98 02/08/24 1343   Resp 26 02/08/24 1343   SpO2 100 % 02/08/24 1343   Vitals shown include unvalidated device data.    Anesthesia Post Evaluation    Patient location during evaluation: PACU  Patient participation: complete - patient cannot participate  Level of consciousness: sleepy but conscious  Pain score: 0  Pain management: adequate  Multimodal analgesia pain management approach  Airway patency: patent  Two or more strategies used to mitigate risk of obstructive sleep apnea  Cardiovascular status: acceptable and stable  Respiratory status: acceptable, face mask and oral airway  Hydration status: acceptable  Postoperative Nausea and Vomiting: none        No notable events documented.

## 2024-02-09 LAB
ANION GAP SERPL CALC-SCNC: 12 MMOL/L (ref 10–20)
BUN SERPL-MCNC: 16 MG/DL (ref 6–23)
CALCIUM SERPL-MCNC: 9.4 MG/DL (ref 8.6–10.3)
CHLORIDE SERPL-SCNC: 103 MMOL/L (ref 98–107)
CO2 SERPL-SCNC: 26 MMOL/L (ref 21–32)
CREAT SERPL-MCNC: 0.8 MG/DL (ref 0.5–1.05)
EGFRCR SERPLBLD CKD-EPI 2021: 87 ML/MIN/1.73M*2
ERYTHROCYTE [DISTWIDTH] IN BLOOD BY AUTOMATED COUNT: 13.2 % (ref 11.5–14.5)
GLUCOSE BLD MANUAL STRIP-MCNC: 246 MG/DL (ref 74–99)
GLUCOSE BLD MANUAL STRIP-MCNC: 254 MG/DL (ref 74–99)
GLUCOSE SERPL-MCNC: 181 MG/DL (ref 74–99)
HCT VFR BLD AUTO: 34.2 % (ref 36–46)
HGB BLD-MCNC: 11 G/DL (ref 12–16)
MCH RBC QN AUTO: 30.2 PG (ref 26–34)
MCHC RBC AUTO-ENTMCNC: 32.2 G/DL (ref 32–36)
MCV RBC AUTO: 94 FL (ref 80–100)
NRBC BLD-RTO: 0 /100 WBCS (ref 0–0)
PLATELET # BLD AUTO: 84 X10*3/UL (ref 150–450)
POTASSIUM SERPL-SCNC: 4.4 MMOL/L (ref 3.5–5.3)
RBC # BLD AUTO: 3.64 X10*6/UL (ref 4–5.2)
SODIUM SERPL-SCNC: 137 MMOL/L (ref 136–145)
WBC # BLD AUTO: 7 X10*3/UL (ref 4.4–11.3)

## 2024-02-09 PROCEDURE — 80048 BASIC METABOLIC PNL TOTAL CA: CPT | Performed by: ORTHOPAEDIC SURGERY

## 2024-02-09 PROCEDURE — 82947 ASSAY GLUCOSE BLOOD QUANT: CPT

## 2024-02-09 PROCEDURE — 97116 GAIT TRAINING THERAPY: CPT | Mod: GP

## 2024-02-09 PROCEDURE — 2500000004 HC RX 250 GENERAL PHARMACY W/ HCPCS (ALT 636 FOR OP/ED): Performed by: ORTHOPAEDIC SURGERY

## 2024-02-09 PROCEDURE — 36415 COLL VENOUS BLD VENIPUNCTURE: CPT | Performed by: ORTHOPAEDIC SURGERY

## 2024-02-09 PROCEDURE — 97165 OT EVAL LOW COMPLEX 30 MIN: CPT | Mod: GO | Performed by: OCCUPATIONAL THERAPIST

## 2024-02-09 PROCEDURE — 85027 COMPLETE CBC AUTOMATED: CPT | Performed by: ORTHOPAEDIC SURGERY

## 2024-02-09 PROCEDURE — 2500000001 HC RX 250 WO HCPCS SELF ADMINISTERED DRUGS (ALT 637 FOR MEDICARE OP): Performed by: ORTHOPAEDIC SURGERY

## 2024-02-09 PROCEDURE — 97161 PT EVAL LOW COMPLEX 20 MIN: CPT | Mod: GP

## 2024-02-09 PROCEDURE — 2500000004 HC RX 250 GENERAL PHARMACY W/ HCPCS (ALT 636 FOR OP/ED)

## 2024-02-09 PROCEDURE — 2500000002 HC RX 250 W HCPCS SELF ADMINISTERED DRUGS (ALT 637 FOR MEDICARE OP, ALT 636 FOR OP/ED): Performed by: ORTHOPAEDIC SURGERY

## 2024-02-09 PROCEDURE — 7100000011 HC EXTENDED STAY RECOVERY HOURLY - NURSING UNIT

## 2024-02-09 PROCEDURE — 97110 THERAPEUTIC EXERCISES: CPT | Mod: GP,CQ

## 2024-02-09 RX ORDER — KETOROLAC TROMETHAMINE 30 MG/ML
30 INJECTION, SOLUTION INTRAMUSCULAR; INTRAVENOUS EVERY 6 HOURS SCHEDULED
Status: COMPLETED | OUTPATIENT
Start: 2024-02-09 | End: 2024-02-10

## 2024-02-09 RX ADMIN — HYDROMORPHONE HYDROCHLORIDE 0.5 MG: 1 INJECTION, SOLUTION INTRAMUSCULAR; INTRAVENOUS; SUBCUTANEOUS at 14:48

## 2024-02-09 RX ADMIN — DOCUSATE SODIUM 100 MG: 100 CAPSULE, LIQUID FILLED ORAL at 09:32

## 2024-02-09 RX ADMIN — OXYCODONE HYDROCHLORIDE 10 MG: 10 TABLET ORAL at 02:07

## 2024-02-09 RX ADMIN — KETOROLAC TROMETHAMINE 30 MG: 30 INJECTION, SOLUTION INTRAMUSCULAR; INTRAVENOUS at 07:41

## 2024-02-09 RX ADMIN — CYCLOBENZAPRINE 10 MG: 10 TABLET, FILM COATED ORAL at 09:33

## 2024-02-09 RX ADMIN — OXYCODONE HYDROCHLORIDE 10 MG: 10 TABLET ORAL at 11:46

## 2024-02-09 RX ADMIN — INSULIN LISPRO 6 UNITS: 100 INJECTION, SOLUTION INTRAVENOUS; SUBCUTANEOUS at 17:41

## 2024-02-09 RX ADMIN — KETOROLAC TROMETHAMINE 30 MG: 30 INJECTION, SOLUTION INTRAMUSCULAR; INTRAVENOUS at 14:03

## 2024-02-09 RX ADMIN — ASPIRIN 81 MG: 81 TABLET, COATED ORAL at 09:33

## 2024-02-09 RX ADMIN — HYDROMORPHONE HYDROCHLORIDE 0.5 MG: 1 INJECTION, SOLUTION INTRAMUSCULAR; INTRAVENOUS; SUBCUTANEOUS at 10:12

## 2024-02-09 RX ADMIN — DULOXETINE HYDROCHLORIDE 60 MG: 60 CAPSULE, DELAYED RELEASE ORAL at 09:32

## 2024-02-09 RX ADMIN — FOLIC ACID 1 MG: 1 TABLET ORAL at 09:33

## 2024-02-09 RX ADMIN — ACETAMINOPHEN 975 MG: 325 TABLET ORAL at 05:03

## 2024-02-09 RX ADMIN — OXYCODONE HYDROCHLORIDE 10 MG: 10 TABLET ORAL at 22:43

## 2024-02-09 RX ADMIN — Medication 1 TABLET: at 09:32

## 2024-02-09 RX ADMIN — OXYCODONE HYDROCHLORIDE 10 MG: 10 TABLET ORAL at 07:40

## 2024-02-09 RX ADMIN — POLYETHYLENE GLYCOL 3350 17 G: 17 POWDER, FOR SOLUTION ORAL at 09:34

## 2024-02-09 RX ADMIN — METFORMIN HYDROCHLORIDE 1000 MG: 1000 TABLET ORAL at 21:07

## 2024-02-09 RX ADMIN — CYCLOBENZAPRINE 10 MG: 10 TABLET, FILM COATED ORAL at 21:07

## 2024-02-09 RX ADMIN — PSYLLIUM HUSK 1 PACKET: 3.4 POWDER ORAL at 09:32

## 2024-02-09 RX ADMIN — ACETAMINOPHEN 975 MG: 325 TABLET ORAL at 00:09

## 2024-02-09 RX ADMIN — FERROUS SULFATE TAB 325 MG (65 MG ELEMENTAL FE) 1 TABLET: 325 (65 FE) TAB at 09:32

## 2024-02-09 RX ADMIN — ASPIRIN 81 MG: 81 TABLET, COATED ORAL at 21:07

## 2024-02-09 RX ADMIN — GABAPENTIN 600 MG: 600 TABLET, FILM COATED ORAL at 21:07

## 2024-02-09 RX ADMIN — DOCUSATE SODIUM 100 MG: 100 CAPSULE, LIQUID FILLED ORAL at 21:07

## 2024-02-09 RX ADMIN — CYCLOBENZAPRINE 10 MG: 10 TABLET, FILM COATED ORAL at 14:03

## 2024-02-09 RX ADMIN — OXYCODONE HYDROCHLORIDE 10 MG: 10 TABLET ORAL at 17:41

## 2024-02-09 RX ADMIN — ACETAMINOPHEN 975 MG: 325 TABLET ORAL at 17:41

## 2024-02-09 RX ADMIN — HYDROMORPHONE HYDROCHLORIDE 0.5 MG: 1 INJECTION, SOLUTION INTRAMUSCULAR; INTRAVENOUS; SUBCUTANEOUS at 05:04

## 2024-02-09 RX ADMIN — GABAPENTIN 600 MG: 600 TABLET, FILM COATED ORAL at 09:33

## 2024-02-09 RX ADMIN — KETOROLAC TROMETHAMINE 30 MG: 30 INJECTION, SOLUTION INTRAMUSCULAR; INTRAVENOUS at 23:48

## 2024-02-09 RX ADMIN — METFORMIN HYDROCHLORIDE 1000 MG: 1000 TABLET ORAL at 09:33

## 2024-02-09 RX ADMIN — GABAPENTIN 600 MG: 600 TABLET, FILM COATED ORAL at 14:03

## 2024-02-09 RX ADMIN — TRAZODONE HYDROCHLORIDE 50 MG: 50 TABLET ORAL at 21:07

## 2024-02-09 RX ADMIN — ACETAMINOPHEN 975 MG: 325 TABLET ORAL at 22:43

## 2024-02-09 RX ADMIN — KETOROLAC TROMETHAMINE 30 MG: 30 INJECTION, SOLUTION INTRAMUSCULAR; INTRAVENOUS at 00:09

## 2024-02-09 RX ADMIN — Medication 1 CAPSULE: at 09:32

## 2024-02-09 RX ADMIN — CEFAZOLIN SODIUM 2 G: 2 INJECTION, SOLUTION INTRAVENOUS at 02:15

## 2024-02-09 RX ADMIN — LORATADINE 10 MG: 10 TABLET ORAL at 09:33

## 2024-02-09 RX ADMIN — HYDROMORPHONE HYDROCHLORIDE 0.5 MG: 1 INJECTION, SOLUTION INTRAMUSCULAR; INTRAVENOUS; SUBCUTANEOUS at 21:17

## 2024-02-09 ASSESSMENT — COGNITIVE AND FUNCTIONAL STATUS - GENERAL
TOILETING: A LITTLE
EATING MEALS: A LITTLE
WALKING IN HOSPITAL ROOM: A LITTLE
CLIMB 3 TO 5 STEPS WITH RAILING: A LITTLE
MOVING TO AND FROM BED TO CHAIR: A LITTLE
STANDING UP FROM CHAIR USING ARMS: A LITTLE
PERSONAL GROOMING: A LITTLE
DAILY ACTIVITIY SCORE: 19
DAILY ACTIVITIY SCORE: 18
MOVING FROM LYING ON BACK TO SITTING ON SIDE OF FLAT BED WITH BEDRAILS: A LITTLE
WALKING IN HOSPITAL ROOM: A LITTLE
WALKING IN HOSPITAL ROOM: A LITTLE
DRESSING REGULAR UPPER BODY CLOTHING: A LITTLE
DRESSING REGULAR UPPER BODY CLOTHING: A LITTLE
DAILY ACTIVITIY SCORE: 18
HELP NEEDED FOR BATHING: A LITTLE
MOBILITY SCORE: 20
WALKING IN HOSPITAL ROOM: A LITTLE
MOBILITY SCORE: 18
DRESSING REGULAR UPPER BODY CLOTHING: A LITTLE
DRESSING REGULAR LOWER BODY CLOTHING: A LITTLE
CLIMB 3 TO 5 STEPS WITH RAILING: A LITTLE
DRESSING REGULAR LOWER BODY CLOTHING: A LITTLE
HELP NEEDED FOR BATHING: A LITTLE
MOVING TO AND FROM BED TO CHAIR: A LITTLE
STANDING UP FROM CHAIR USING ARMS: A LITTLE
MOBILITY SCORE: 19
MOVING FROM LYING ON BACK TO SITTING ON SIDE OF FLAT BED WITH BEDRAILS: A LITTLE
CLIMB 3 TO 5 STEPS WITH RAILING: A LITTLE
STANDING UP FROM CHAIR USING ARMS: A LITTLE
PERSONAL GROOMING: A LITTLE
TURNING FROM BACK TO SIDE WHILE IN FLAT BAD: A LITTLE
MOVING TO AND FROM BED TO CHAIR: A LITTLE
TURNING FROM BACK TO SIDE WHILE IN FLAT BAD: A LITTLE
MOVING TO AND FROM BED TO CHAIR: A LITTLE
PERSONAL GROOMING: A LITTLE
TOILETING: A LITTLE
CLIMB 3 TO 5 STEPS WITH RAILING: A LOT
MOBILITY SCORE: 18
STANDING UP FROM CHAIR USING ARMS: A LITTLE
HELP NEEDED FOR BATHING: A LITTLE
DRESSING REGULAR LOWER BODY CLOTHING: A LOT
TOILETING: A LITTLE

## 2024-02-09 ASSESSMENT — PAIN - FUNCTIONAL ASSESSMENT

## 2024-02-09 ASSESSMENT — PAIN SCALES - GENERAL
PAINLEVEL_OUTOF10: 5 - MODERATE PAIN
PAINLEVEL_OUTOF10: 10 - WORST POSSIBLE PAIN
PAINLEVEL_OUTOF10: 9
PAINLEVEL_OUTOF10: 10 - WORST POSSIBLE PAIN
PAINLEVEL_OUTOF10: 9
PAINLEVEL_OUTOF10: 6
PAINLEVEL_OUTOF10: 8
PAINLEVEL_OUTOF10: 9
PAINLEVEL_OUTOF10: 5 - MODERATE PAIN
PAINLEVEL_OUTOF10: 6
PAINLEVEL_OUTOF10: 10 - WORST POSSIBLE PAIN
PAINLEVEL_OUTOF10: 0 - NO PAIN
PAINLEVEL_OUTOF10: 6
PAINLEVEL_OUTOF10: 8
PAINLEVEL_OUTOF10: 9
PAINLEVEL_OUTOF10: 6
PAINLEVEL_OUTOF10: 10 - WORST POSSIBLE PAIN
PAINLEVEL_OUTOF10: 8
PAINLEVEL_OUTOF10: 9
PAINLEVEL_OUTOF10: 9
PAINLEVEL_OUTOF10: 7

## 2024-02-09 ASSESSMENT — PAIN DESCRIPTION - LOCATION
LOCATION: KNEE

## 2024-02-09 ASSESSMENT — PAIN DESCRIPTION - ORIENTATION
ORIENTATION: LEFT

## 2024-02-09 ASSESSMENT — PAIN DESCRIPTION - DESCRIPTORS: DESCRIPTORS: SHARP

## 2024-02-09 NOTE — PROGRESS NOTES
Occupational Therapy    Evaluation    Patient Name: Alejandra Houston  MRN: [id number]  Today's Date: 2/9/2024  Time Calculation  Start Time: 1031  Stop Time: 1103  Time Calculation (min): 32 min  Eval only     Assessment  IP OT Assessment  Prognosis: Good  End of Session Communication: Bedside nurse  End of Session Patient Position: Up in chair, Alarm on  Patient presents with decline in ADLs, functional transfers, functional mobility and would benefit from OT during acute stay to improve functional independence and safety. Recommend low intensity OT to maximize functional independence and safety.     Plan:  Treatment Interventions: ADL retraining, UE strengthening/ROM, Patient/family training, Equipment evaluation/education, Compensatory technique education  OT Frequency: Daily  OT Discharge Recommendations: Low intensity level of continued care  Equipment Recommended upon Discharge: Wheeled walker (shower chair, reacher, sock aide, LH shoe horn)  OT - OK to Discharge: Yes from acute care OT services to the next level of care when cleared by medical team    Subjective   Current Problem:  No diagnosis found.    General:  General  Reason for Referral: left TKA  Referred By: Carlos Kaminski MD  Past Medical History Relevant to Rehab: Admitted 2/8/2024 after having left TKA completed by Dr Kaminski.  Co-Treatment: PT  Co-Treatment Reason: for safety  Prior to Session Communication: Bedside nurse  Patient Position Received: Bed, 3 rail up  Preferred Learning Style: verbal  General Comment: Patient in long legged sitting in bed and agreeable to participate in OT evaluation.    Precautions:  LE Weight Bearing Status: Weight Bearing as Tolerated  Medical Precautions: Fall precautions      Pain:  Pain Assessment  Pain Assessment: 0-10  Pain Score: 9  Pain Type: Surgical pain  Pain Location: Knee  Pain Orientation: Left  Pain Interventions: Rest    Objective   Cognition:  Overall Cognitive Status: Within Functional  Limits    Home Living:  Home Living Comments: Lives at home with spouse with 3 SAMMIE with left railing.  Has tub shower with no DME.     Prior Function:  Prior Function Comments: Was independent with all ADLs. Owns cane and WW    ADL:  LE Dressing Assistance: Moderate (don underwear and pants)  ADL Comments: Educated patient on safety and comp strategies for lower body dressing tasks and patient verbalized understanding.    Activity Tolerance:  Endurance: Endurance does not limit participation in activity    Bed Mobility/Transfers:   Bed Mobility  Bed Mobility:  (CGA supine to sit at edge of bed)  Transfers  Transfer:  (CGA sit <>stand. CGA/SBA with WW functional mobility task in room)  Educated patient on safety with car transfers and patient verbalized understanding.    Educated patient on safety and use of shower chair for safety and patient verbalized understanding. Educated patient on having [facility] assess shower safety prior to completing first shower and patient verbalized understanding.     Extremities: RUE   RUE : Within Functional Limits and LUE   LUE: Within Functional Limits    Outcome Measures: [facility] Daily Activity  Putting on and taking off regular lower body clothing: A lot  Bathing (including washing, rinsing, drying): A little  Putting on and taking off regular upper body clothing: A little  Toileting, which includes using toilet, bedpan or urinal: A little  Taking care of personal grooming such as brushing teeth: A little  Eating Meals: None  Daily Activity - Total Score: 18    EDUCATION:  Education  Individual(s) Educated: Patient  Education Provided: Fall precautons (safety and comp strategies with lower body dressing)  Patient Response to Education: Patient/Caregiver Verbalized Understanding of Information      Goals:   Encounter Problems       Encounter Problems (Active)       Balance       STG-Patient will be independent with assistive device dynamic stand task >5 minutes for ADL completion    (Progressing)       Start:  02/09/24    Expected End:  02/23/24               Dressings Lower Extremities       STG - Patient will complete lower body dressing independently with AE and comp strategies  (Progressing)       Start:  02/09/24    Expected End:  02/23/24               Mobility       STG-Patient will be independent with assistive device functional mobility tasks   (Progressing)       Start:  02/09/24    Expected End:  02/23/24               Transfers       STG - Patient will perform car transfers independently demonstrating good safety  (Progressing)       Start:  02/09/24    Expected End:  02/23/24            STG-Patient will be independent with functional transfers demonstrating good safety   (Progressing)       Start:  02/09/24    Expected End:  02/23/24

## 2024-02-09 NOTE — PROGRESS NOTES
"Alejandra Houston is a 58 y.o. female on day 0 of admission presenting with Arthritis of left knee.    Subjective   Overall patient is progressing well post-op.  Having moderate to severe pain.  Feels the \"block wearing off.\"  Tolerating diabetic diet.  Blood sugar under much better control overnight.  Denies n/v/d/f/c/cp/sob         Objective     Physical Exam  NAD, AAOx3  NCAT  MMM  Unlabored symmetric breathing  Left knee dressing c/d/I.  Mild swelling. Mild effusion.  Stable to varus/valgus stress.  Mild pain with ROM but appropriate post-op.  5/5 strength DF/PF/EHL.  SILT s/s/sp/dp/t.  Palp pulses distally.  Negative Homans'    Last Recorded Vitals  Blood pressure 100/68, pulse 70, temperature 36.7 °C (98.1 °F), temperature source Temporal, resp. rate 17, height 1.727 m (5' 7.99\"), weight 113 kg (250 lb), SpO2 99 %.  Intake/Output last 3 Shifts:  I/O last 3 completed shifts:  In: 100 (0.9 mL/kg) [IV Piggyback:100]  Out: - (0 mL/kg)   Weight: 113.4 kg     Relevant Results      Scheduled medications  acetaminophen, 975 mg, oral, q6h SHARRON  aspirin, 81 mg, oral, BID  B complex-vitamin C-folic acid, 1 capsule, oral, Daily  cyclobenzaprine, 10 mg, oral, TID  docusate sodium, 100 mg, oral, BID  DULoxetine, 60 mg, oral, Daily  ferrous sulfate (325 mg ferrous sulfate), 65 mg of iron, oral, Daily  fluticasone, 2 spray, Each Nostril, Daily  folic acid, 1 mg, oral, Daily  gabapentin, 600 mg, oral, TID  insulin lispro, 0-15 Units, subcutaneous, TID with meals  loratadine, 10 mg, oral, Daily  metFORMIN, 1,000 mg, oral, BID  multivitamin with minerals, 1 tablet, oral, Daily  polyethylene glycol, 17 g, oral, Daily  psyllium, 1 packet, oral, Daily  traZODone, 50 mg, oral, Nightly      Continuous medications  lactated Ringer's, 100 mL/hr, Last Rate: Stopped (02/09/24 1442)  oxygen, 2 L/min      PRN medications  PRN medications: albuterol, bisacodyl, dextrose 10 % in water (D10W), dextrose, diphenhydrAMINE, glucagon, HYDROmorphone, " naloxone, ondansetron ODT **OR** ondansetron, oxyCODONE, oxyCODONE, promethazine **OR** promethazine  Results for orders placed or performed during the hospital encounter of 02/08/24 (from the past 24 hour(s))   POCT GLUCOSE   Result Value Ref Range    POCT Glucose 415 (H) 74 - 99 mg/dL   POCT GLUCOSE   Result Value Ref Range    POCT Glucose 350 (H) 74 - 99 mg/dL   CBC   Result Value Ref Range    WBC 7.0 4.4 - 11.3 x10*3/uL    nRBC 0.0 0.0 - 0.0 /100 WBCs    RBC 3.64 (L) 4.00 - 5.20 x10*6/uL    Hemoglobin 11.0 (L) 12.0 - 16.0 g/dL    Hematocrit 34.2 (L) 36.0 - 46.0 %    MCV 94 80 - 100 fL    MCH 30.2 26.0 - 34.0 pg    MCHC 32.2 32.0 - 36.0 g/dL    RDW 13.2 11.5 - 14.5 %    Platelets 84 (L) 150 - 450 x10*3/uL   Basic Metabolic Panel   Result Value Ref Range    Glucose 181 (H) 74 - 99 mg/dL    Sodium 137 136 - 145 mmol/L    Potassium 4.4 3.5 - 5.3 mmol/L    Chloride 103 98 - 107 mmol/L    Bicarbonate 26 21 - 32 mmol/L    Anion Gap 12 10 - 20 mmol/L    Urea Nitrogen 16 6 - 23 mg/dL    Creatinine 0.80 0.50 - 1.05 mg/dL    eGFR 87 >60 mL/min/1.73m*2    Calcium 9.4 8.6 - 10.3 mg/dL   POCT GLUCOSE   Result Value Ref Range    POCT Glucose 254 (H) 74 - 99 mg/dL                            Assessment/Plan   Principal Problem:    Arthritis of left knee    POD #1 following left TKA  -WBAT on LLE  -Continue PT/OT  -Complete 24hrs IV Ancef for Abx ppx  -ASA 81mg po bid along with SCDs for DVT ppx  -Diabetic diet  -Continue home oral hypoglycemics with supplemental ISS  -Heploc IVF with good intake  -No drain, No askew  -Anticipate discharge home with HC services.  Will keep overnight for pain control and likely home tomorrow.    -Resume home meds       I spent 15 minutes in the professional and overall care of this patient.      Carlos Kaminski MD

## 2024-02-09 NOTE — PROGRESS NOTES
02/09/24 1006   Discharge Planning   Living Arrangements Spouse/significant other   Support Systems Spouse/significant other   Type of Residence Private residence   Home or Post Acute Services In home services   Type of Home Care Services Home OT;Home PT   Patient expects to be discharged to: Home with [facility]   Does the patient need discharge transport arranged? No     Met with patient at bedside. Admitted for left total hip replacement. Pt lives with spouse and daughter and was independent PTA with no HHC. Pt has a walker. Pt was able to drive and obtain medications. Therapy evals pending. Pt plans to return home with [facility] PT/OT. Provided patient number to call them when she arrives home. Pt is requesting RX for bedside commode and shower grab bars to obtain equipment from [facility]. . Message sent to MD.

## 2024-02-09 NOTE — PROGRESS NOTES
Physical Therapy    Physical Therapy    Physical Therapy Treatment    Patient Name: Alejandra Houston  MRN: [id number]  Today's Date: 2/9/2024  Time Calculation  Start Time: 1230  Stop Time: 1257  Time Calculation (min): 27 min      02/09/24 1230   PT  Visit   PT Received On 02/09/24   General   Reason for Referral S/p L TKA   Patient Position Received Bed, 3 rail up   Preferred Learning Style verbal;visual   General Comment Pt agreeable to therapy and cleared for participation   Precautions   LE Weight Bearing Status Weight Bearing as Tolerated   Medical Precautions Fall precautions   Pain Assessment   Pain Assessment 0-10   Pain Score 8   Pain Type Surgical pain   Pain Location Knee   Pain Orientation Left   Pain Interventions Cold applied   Cognition   Overall Cognitive Status WFL   Therapeutic Exercise   Therapeutic Exercise Performed Yes   Therapeutic Exercise Activity 1 AP,QS,GS,SLR,HIP ABD, MARCHING,BALL SQUEEZES X 20 B WITH EMPHASIS ON OPERATED LE   Therapeutic Exercise Activity 2 ROM  (75 degrees flexion)   Bed Mobility   Bed Mobility Yes   Bed Mobility 1   Bed Mobility 1 Supine to sitting   Bed Mobility Comments 1 SUP   Ambulation/Gait Training   Ambulation/Gait Training Performed Yes   Ambulation/Gait Training 1   Surface 1 Level tile   Device 1 Rolling walker   Gait Support Devices Gait belt   Assistance 1 Close supervision   Comments/Distance (ft) 1 150', 200' decreased cammie, step to progressing to step through, good safety awareness.   Transfers   Transfer Yes   Transfer 1   Transfer From 1 Bed to   Transfer to 1 Stand   Technique 1 Sit to stand;Stand to sit   Transfer Device 1 Walker;Gait belt   Trials/Comments 1 x4 from varying heights  and surfaces, grossly steady   Stairs   Stairs Yes   Stairs   Rails 1 Right   Device 1 Single point cane   Support Devices 1 Gait belt   Assistance 1 Close supervision   Comment/Number of Steps 1 x3, good safetyawareness, grossly steady.   Activity Tolerance    Endurance Tolerates 30 min exercise with multiple rests   PT Assessment   End of Session Communication Bedside nurse   Assessment Comment Pt requires increased time and effort to complete all activites. Pt is grossly steady with good safety awareness.   End of Session Patient Position Alarm on;Up in chair       Outcome Measures:  [facility] Basic Mobility  Turning from your back to your side while in a flat bed without using bedrails: None  Moving from lying on your back to sitting on the side of a flat bed without using bedrails: None  Moving to and from bed to chair (including a wheelchair): A little  Standing up from a chair using your arms (e.g. wheelchair or bedside chair): A little  To walk in hospital room: A little  Climbing 3-5 steps with railing: A little  Basic Mobility - Total Score: 20                             EDUCATION:     Education Documentation  Mobility Training, taught by Rhianna Pettit PTA at 2/9/2024  1:08 PM.  Learner: Patient  Readiness: Acceptance  Method: Explanation  Response: Verbalizes Understanding, Demonstrated Understanding    Education Comments  No comments found.        GOALS:  Encounter Problems       Encounter Problems (Active)       PT Problem       Bed mobility (Progressing)       Start:  02/09/24    Expected End:  02/23/24       Pt will perform supine<>sit with HOB flat, AMANDA.           Transfers (Progressing)       Start:  02/09/24    Expected End:  02/23/24       Pt will perform all transfers with LRAD, AMANDA.            Gait (Progressing)       Start:  02/09/24    Expected End:  02/23/24       Pt will amb 150' with LRAD, AMANDA with reciprocal gait, upright posture and improved activity tolerance as demonstrated by vitals.           Stairs (Progressing)       Start:  02/09/24    Expected End:  02/23/24       Pt will ascend/descend 3 with LRAD, AMANDA.           L knee AROM (Progressing)       Start:  02/09/24    Expected End:  02/23/24       Pt will improved L knee extension AROM  to 0 degrees.            PT Problem       Strength (Progressing)       Start:  02/09/24    Expected End:  02/23/24       Pt will improve gross LLE strength to 4-/5.            Pain - Adult

## 2024-02-09 NOTE — PROGRESS NOTES
Physical Therapy    Physical Therapy Evaluation & Treatment    Patient Name: Alejandra Houston  MRN: [id number]  Today's Date: 2/9/2024   Time Calculation  Start Time: 1035  Stop Time: 1059  Time Calculation (min): 24 min    Assessment/Plan   PT Assessment  PT Assessment Results: Decreased strength, Decreased range of motion, Decreased endurance, Impaired balance, Decreased mobility, Decreased safety awareness, Pain, Orthopedic restrictions  Rehab Prognosis: Good  Medical Staff Made Aware: Yes  End of Session Communication: Bedside nurse  Assessment Comment: Pt's impairments include generalized weakness, LLE weakness, decreased AROM L knee, impaired balance and decreased activity tolerance. Pt's functional limitations include bed mobility, transfers, gait and elevations. Pt would benefit from continued acute care PT during hospital LOS and upon discharge at a low level intensity. Recommend FWW.  End of Session Patient Position: Alarm on, Up in chair   IP OR SWING BED PT PLAN  Inpatient or Swing Bed: Inpatient  PT Plan  Treatment/Interventions: Bed mobility, Gait training, Transfer training, Stair training, Balance training, Neuromuscular re-education, Strengthening, Endurance training, Range of motion, Therapeutic exercise, Therapeutic activity, Home exercise program, Positioning, Postural re-education  PT Plan: Skilled PT  PT Frequency: BID  PT Discharge Recommendations: Low intensity level of continued care  Equipment Recommended upon Discharge: Wheeled walker  PT Recommended Transfer Status: Contact guard, Assistive device  PT - OK to Discharge: Yes (Pt is ok to discharge from acute care PT to next level of care once cleared by medical team.)      Subjective     General Visit Information:  General  Reason for Referral: S/p L TKA  Referred By: Carlos Kaminski MD  Past Medical History Relevant to Rehab:   Past Medical History:   Diagnosis Date    Abnormal levels of other serum enzymes 03/25/2022    Elevated liver  enzymes    Acid reflux     Anxiety     Bipolar disorder (CMS/HCC)     COPD (chronic obstructive pulmonary disease) (CMS/HCC)     Depression     DM (diabetes mellitus) (CMS/HCC)     Fibromyalgia, primary     Glaucoma     Hypertension     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 FWW for mobility prior to surgery. Assist as needed for ADLs from spouse. Assist for all IADLs from spouse. L shoulder/neck pain that is nerve related per pt.  Precautions:  Precautions  LE Weight Bearing Status: Weight Bearing as Tolerated  Medical Precautions: Fall precautions    Objective   Pain:  Pain Assessment  Pain Assessment: 0-10  Pain Score: 9  Pain Type: Surgical pain  Pain Location: Knee  Pain Orientation: Left  Cognition:  Cognition  Orientation Level: Oriented X4    General Assessments:  Activity Tolerance  Endurance: Decreased tolerance for upright activites (pain)    Sensation  Light Touch: No apparent deficits    Static Sitting Balance  Static Sitting-Comment/Number of Minutes: SBA  Dynamic Sitting Balance  Dynamic Sitting-Comments: SBA to scoot    Static Standing Balance  Static Standing-Comment/Number of Minutes: CGA with FWW  Dynamic Standing Balance  Dynamic Standing-Comments: CGA with FWW during gait, no pathway deviation or overt LOB episodes.  Functional Assessments:  Bed Mobility  Bed Mobility: Yes  Bed Mobility 1  Bed Mobility 1: Supine to sitting  Bed Mobility Comments 1: x1 trial to sit on R side of bed, SBAx1.    Transfers  Transfer: Yes  Transfer 1  Technique 1: Sit to stand, Stand to sit  Transfer Device 1: Walker, Gait belt  Trials/Comments 1: x1 trial from EOB, CGAx1  "with cues for hand placement and LLE management. Denies dizziness or lightheadedness in standing.    Ambulation/Gait Training  Ambulation/Gait Training Performed: Yes  Ambulation/Gait Training 1  Surface 1: Level tile  Device 1: Rolling walker  Gait Support Devices: Gait belt  Assistance 1: Contact guard  Comments/Distance (ft) 1: Pt amb 1x200' with FWW, CGAx1 with step-to vs. step-through gait pattern, decreased gait speed, antalgic gait pattern LLE and flexed postures. Cues for upright posture, gaze elevation, step-to pattern as needed for pain. Education to avoid twisting on LLE while navigating turns. Pt demonstrates understanding.    Stairs  Stairs: Yes  Stairs  Rails 1: Right, Left  Device 1: Single point cane, Railing  Support Devices 1: Gait belt  Assistance 1: Contact guard  Comment/Number of Steps 1: Pt ascended/descended 3 steps with L HR and R SPC, CGAx1 with step-to pattern. Pt ascended/descended 1x3 steps with R HR and L SPC, CGAx1 with step-to pattern. Pt provided education and cues for step-to pattern and \"up with the good, down with the bad technique\".  Extremity/Trunk Assessments:  RLE   RLE : Within Functional Limits  LLE   LLE : Exceptions to WFL  AROM LLE (degrees)  L Knee Extension 0-130: -5  Strength LLE  L Hip Flexion: 2+/5  L Knee Extension: 3/5  L Ankle Dorsiflexion: 5/5  Treatments:  Therapeutic Exercise  Therapeutic Exercise Performed: Yes  Therapeutic Exercise Activity 1: Pt educated to perform 1x10 reps quad sets for LLE. Cues for improved contraction. Pt demonstrates understanding. Pt educated on importance of maintianing L knee extension at rest, no pillows under knee/bed settings. Pt verbalized understanding.  Outcome Measures:  [facility] Basic Mobility  Turning from your back to your side while in a flat bed without using bedrails: A little  Moving from lying on your back to sitting on the side of a flat bed without using bedrails: A little  Moving to and from bed to chair (including a " wheelchair): A little  Standing up from a chair using your arms (e.g. wheelchair or bedside chair): A little  To walk in hospital room: A little  Climbing 3-5 steps with railing: A little  Basic Mobility - Total Score: 18    Encounter Problems       Encounter Problems (Active)       PT Problem       Bed mobility (Progressing)       Start:  02/09/24    Expected End:  02/23/24       Pt will perform supine<>sit with HOB flat, AMANDA.           Transfers (Progressing)       Start:  02/09/24    Expected End:  02/23/24       Pt will perform all transfers with LRAD, AMANDA.            Gait (Progressing)       Start:  02/09/24    Expected End:  02/23/24       Pt will amb 150' with LRAD, AMANDA with reciprocal gait, upright posture and improved activity tolerance as demonstrated by vitals.           Stairs (Progressing)       Start:  02/09/24    Expected End:  02/23/24       Pt will ascend/descend 3 with LRAD, AMANDA.           L knee AROM (Progressing)       Start:  02/09/24    Expected End:  02/23/24       Pt will improved L knee extension AROM to 0 degrees.            PT Problem       Strength (Progressing)       Start:  02/09/24    Expected End:  02/23/24       Pt will improve gross LLE strength to 4-/5.            Pain - Adult              Education Documentation  Mobility Training, taught by Noemi Pittman, PT at 2/9/2024 11:52 AM.  Learner: Patient  Readiness: Acceptance  Method: Explanation  Response: Verbalizes Understanding, Demonstrated Understanding    Education Comments  No comments found.

## 2024-02-10 VITALS
RESPIRATION RATE: 18 BRPM | BODY MASS INDEX: 37.68 KG/M2 | DIASTOLIC BLOOD PRESSURE: 96 MMHG | TEMPERATURE: 96.3 F | HEIGHT: 68 IN | SYSTOLIC BLOOD PRESSURE: 146 MMHG | OXYGEN SATURATION: 97 % | HEART RATE: 80 BPM | WEIGHT: 248.6 LBS

## 2024-02-10 PROBLEM — M17.12 ARTHRITIS OF LEFT KNEE: Status: RESOLVED | Noted: 2024-02-08 | Resolved: 2024-02-10

## 2024-02-10 LAB
GLUCOSE BLD MANUAL STRIP-MCNC: 160 MG/DL (ref 74–99)
GLUCOSE BLD MANUAL STRIP-MCNC: 183 MG/DL (ref 74–99)
GLUCOSE BLD MANUAL STRIP-MCNC: 198 MG/DL (ref 74–99)
GLUCOSE BLD MANUAL STRIP-MCNC: 310 MG/DL (ref 74–99)

## 2024-02-10 PROCEDURE — 97535 SELF CARE MNGMENT TRAINING: CPT | Mod: GO,CO

## 2024-02-10 PROCEDURE — 2500000004 HC RX 250 GENERAL PHARMACY W/ HCPCS (ALT 636 FOR OP/ED): Performed by: ORTHOPAEDIC SURGERY

## 2024-02-10 PROCEDURE — 2500000004 HC RX 250 GENERAL PHARMACY W/ HCPCS (ALT 636 FOR OP/ED)

## 2024-02-10 PROCEDURE — 97530 THERAPEUTIC ACTIVITIES: CPT | Mod: GO,CO

## 2024-02-10 PROCEDURE — 82947 ASSAY GLUCOSE BLOOD QUANT: CPT

## 2024-02-10 PROCEDURE — 7100000011 HC EXTENDED STAY RECOVERY HOURLY - NURSING UNIT

## 2024-02-10 PROCEDURE — 97116 GAIT TRAINING THERAPY: CPT | Mod: GP,CQ

## 2024-02-10 PROCEDURE — 2500000002 HC RX 250 W HCPCS SELF ADMINISTERED DRUGS (ALT 637 FOR MEDICARE OP, ALT 636 FOR OP/ED): Performed by: ORTHOPAEDIC SURGERY

## 2024-02-10 PROCEDURE — 97110 THERAPEUTIC EXERCISES: CPT | Mod: GP,CQ

## 2024-02-10 PROCEDURE — 2500000001 HC RX 250 WO HCPCS SELF ADMINISTERED DRUGS (ALT 637 FOR MEDICARE OP): Performed by: ORTHOPAEDIC SURGERY

## 2024-02-10 RX ORDER — ASPIRIN 81 MG/1
81 TABLET ORAL 2 TIMES DAILY
Qty: 60 TABLET | Refills: 0 | Status: SHIPPED | OUTPATIENT
Start: 2024-02-10 | End: 2024-03-11

## 2024-02-10 RX ORDER — TRAMADOL HYDROCHLORIDE 50 MG/1
50 TABLET ORAL EVERY 8 HOURS PRN
Qty: 20 TABLET | Refills: 0 | Status: SHIPPED | OUTPATIENT
Start: 2024-02-10 | End: 2024-02-17

## 2024-02-10 RX ORDER — IBUPROFEN 800 MG/1
800 TABLET ORAL
Qty: 45 TABLET | Refills: 0 | Status: SHIPPED | OUTPATIENT
Start: 2024-02-10 | End: 2024-02-25

## 2024-02-10 RX ADMIN — OXYCODONE HYDROCHLORIDE 10 MG: 10 TABLET ORAL at 03:29

## 2024-02-10 RX ADMIN — ASPIRIN 81 MG: 81 TABLET, COATED ORAL at 08:40

## 2024-02-10 RX ADMIN — CYCLOBENZAPRINE 10 MG: 10 TABLET, FILM COATED ORAL at 14:47

## 2024-02-10 RX ADMIN — ACETAMINOPHEN 975 MG: 325 TABLET ORAL at 05:15

## 2024-02-10 RX ADMIN — Medication 1 TABLET: at 08:40

## 2024-02-10 RX ADMIN — HYDROMORPHONE HYDROCHLORIDE 0.5 MG: 1 INJECTION, SOLUTION INTRAMUSCULAR; INTRAVENOUS; SUBCUTANEOUS at 01:53

## 2024-02-10 RX ADMIN — METFORMIN HYDROCHLORIDE 1000 MG: 1000 TABLET ORAL at 08:41

## 2024-02-10 RX ADMIN — Medication 1 CAPSULE: at 08:40

## 2024-02-10 RX ADMIN — ACETAMINOPHEN 975 MG: 325 TABLET ORAL at 11:23

## 2024-02-10 RX ADMIN — INSULIN LISPRO 3 UNITS: 100 INJECTION, SOLUTION INTRAVENOUS; SUBCUTANEOUS at 13:38

## 2024-02-10 RX ADMIN — GABAPENTIN 600 MG: 600 TABLET, FILM COATED ORAL at 08:40

## 2024-02-10 RX ADMIN — FERROUS SULFATE TAB 325 MG (65 MG ELEMENTAL FE) 1 TABLET: 325 (65 FE) TAB at 08:41

## 2024-02-10 RX ADMIN — INSULIN LISPRO 3 UNITS: 100 INJECTION, SOLUTION INTRAVENOUS; SUBCUTANEOUS at 08:41

## 2024-02-10 RX ADMIN — DOCUSATE SODIUM 100 MG: 100 CAPSULE, LIQUID FILLED ORAL at 08:41

## 2024-02-10 RX ADMIN — GABAPENTIN 600 MG: 600 TABLET, FILM COATED ORAL at 14:47

## 2024-02-10 RX ADMIN — KETOROLAC TROMETHAMINE 30 MG: 30 INJECTION, SOLUTION INTRAMUSCULAR; INTRAVENOUS at 11:23

## 2024-02-10 RX ADMIN — FOLIC ACID 1 MG: 1 TABLET ORAL at 08:41

## 2024-02-10 RX ADMIN — OXYCODONE HYDROCHLORIDE 10 MG: 10 TABLET ORAL at 11:22

## 2024-02-10 RX ADMIN — KETOROLAC TROMETHAMINE 30 MG: 30 INJECTION, SOLUTION INTRAMUSCULAR; INTRAVENOUS at 05:15

## 2024-02-10 RX ADMIN — LORATADINE 10 MG: 10 TABLET ORAL at 08:41

## 2024-02-10 RX ADMIN — PSYLLIUM HUSK 1 PACKET: 3.4 POWDER ORAL at 08:41

## 2024-02-10 RX ADMIN — POLYETHYLENE GLYCOL 3350 17 G: 17 POWDER, FOR SOLUTION ORAL at 08:41

## 2024-02-10 RX ADMIN — CYCLOBENZAPRINE 10 MG: 10 TABLET, FILM COATED ORAL at 08:41

## 2024-02-10 RX ADMIN — HYDROMORPHONE HYDROCHLORIDE 0.5 MG: 1 INJECTION, SOLUTION INTRAMUSCULAR; INTRAVENOUS; SUBCUTANEOUS at 05:55

## 2024-02-10 RX ADMIN — HYDROMORPHONE HYDROCHLORIDE 0.5 MG: 1 INJECTION, SOLUTION INTRAMUSCULAR; INTRAVENOUS; SUBCUTANEOUS at 14:47

## 2024-02-10 RX ADMIN — DULOXETINE HYDROCHLORIDE 60 MG: 60 CAPSULE, DELAYED RELEASE ORAL at 08:41

## 2024-02-10 ASSESSMENT — PAIN SCALES - GENERAL
PAINLEVEL_OUTOF10: 4
PAINLEVEL_OUTOF10: 8
PAINLEVEL_OUTOF10: 0 - NO PAIN
PAINLEVEL_OUTOF10: 5 - MODERATE PAIN
PAINLEVEL_OUTOF10: 4
PAINLEVEL_OUTOF10: 6
PAINLEVEL_OUTOF10: 10 - WORST POSSIBLE PAIN
PAINLEVEL_OUTOF10: 0 - NO PAIN
PAINLEVEL_OUTOF10: 8
PAINLEVEL_OUTOF10: 9
PAINLEVEL_OUTOF10: 7
PAINLEVEL_OUTOF10: 9
PAINLEVEL_OUTOF10: 6

## 2024-02-10 ASSESSMENT — PAIN - FUNCTIONAL ASSESSMENT
PAIN_FUNCTIONAL_ASSESSMENT: 0-10

## 2024-02-10 ASSESSMENT — COGNITIVE AND FUNCTIONAL STATUS - GENERAL
MOBILITY SCORE: 17
CLIMB 3 TO 5 STEPS WITH RAILING: A LITTLE
MOVING TO AND FROM BED TO CHAIR: A LITTLE
WALKING IN HOSPITAL ROOM: A LITTLE
STANDING UP FROM CHAIR USING ARMS: A LITTLE
TURNING FROM BACK TO SIDE WHILE IN FLAT BAD: A LOT
TOILETING: A LITTLE
DRESSING REGULAR LOWER BODY CLOTHING: A LOT
STANDING UP FROM CHAIR USING ARMS: A LITTLE
MOVING FROM LYING ON BACK TO SITTING ON SIDE OF FLAT BED WITH BEDRAILS: A LITTLE
CLIMB 3 TO 5 STEPS WITH RAILING: A LITTLE
MOBILITY SCORE: 17
HELP NEEDED FOR BATHING: A LOT
TURNING FROM BACK TO SIDE WHILE IN FLAT BAD: A LOT
MOVING FROM LYING ON BACK TO SITTING ON SIDE OF FLAT BED WITH BEDRAILS: A LITTLE
WALKING IN HOSPITAL ROOM: A LITTLE
MOVING TO AND FROM BED TO CHAIR: A LITTLE
PERSONAL GROOMING: A LITTLE
DAILY ACTIVITIY SCORE: 18

## 2024-02-10 ASSESSMENT — ACTIVITIES OF DAILY LIVING (ADL): HOME_MANAGEMENT_TIME_ENTRY: 23

## 2024-02-10 ASSESSMENT — PAIN DESCRIPTION - ORIENTATION: ORIENTATION: LEFT

## 2024-02-10 ASSESSMENT — PAIN DESCRIPTION - LOCATION: LOCATION: KNEE

## 2024-02-10 NOTE — PROGRESS NOTES
Occupational Therapy    OT Treatment    Patient Name: Alejandra Houston  MRN: [id number]  Today's Date: 2/10/2024  Time Calculation  Start Time: 1300  Stop Time: 1339  Time Calculation (min): 39 min       Assessment:  End of Session Communication: Bedside nurse, Physician (requested prescription from MD for BSC, shower chair and tub transfer bench)  End of Session Patient Position: Alarm on, Up in chair     Plan:  Treatment Interventions: ADL retraining, UE strengthening/ROM, Patient/family training, Equipment evaluation/education, Compensatory technique education  OT Frequency: Daily  Treatment Interventions: ADL retraining, UE strengthening/ROM, Patient/family training, Equipment evaluation/education, Compensatory technique education    Subjective   Previous Visit Info:  OT Last Visit  OT Received On: 02/10/24  General:  General  Reason for Referral: S/p L TKA  Prior to Session Communication: Bedside nurse  Patient Position Received: Bed, 3 rail up, Alarm on  General Comment: cleared for therapy by nursing, BP better this afternoon  Precautions:  LE Weight Bearing Status: Weight Bearing as Tolerated  Medical Precautions: Fall precautions  Post-Surgical Precautions: Left total knee precautions     Pain:  Pain Assessment  Pain Assessment: 0-10  Pain Score: 8  Pain Type: Surgical pain  Pain Location: Knee  Pain Orientation: Left    Objective    Cognition:  Cognition  Overall Cognitive Status: Impaired  Orientation Level: Oriented X4  Following Commands: Follows multistep commands with repetition  Problem Solving: Assistance required to implement solutions  Insight: Moderate  Impulsive: Mildly     Activities of Daily Living: UE Dressing  UE Dressing Level of Assistance: Independent (donned shirt)  UE Dressing Where Assessed: Recliner    LE Dressing  LE Dressing: Yes  Pants Level of Assistance:  (donned with comp tech and min a. pants mgmt while in stance with min a)  Sock Level of Assistance:  (right- SBA. left with AE  and min a)  LE Dressing Where Assessed: Edge of bed, Recliner    Toileting  Toileting Level of Assistance: Minimum assistance  Where Assessed: Toilet  Functional Standing Tolerance:  Functional Standing Tolerance Comments: patient stood for a total of 10 mins this date with fair/fair- balance with 2--0 ue support as needed during fucntional ambulation/transfers and ADL tasks  Bed Mobility/Transfers: Bed Mobility  Bed Mobility: Yes  Bed Mobility 1  Bed Mobility 1: Supine to sitting  Level of Assistance 1: Moderate assistance (for LLE)    Transfers  Transfer: Yes  Transfer 1  Technique 1: Sit to stand  Transfer Device 1: Walker  Transfer Level of Assistance 1: Minimum assistance  Transfers 2  Transfer Device 2: Walker  Transfer Level of Assistance 2: Minimum assistance  Trials/Comments 2: functional ambulation    Toilet Transfers  Toilet Transfer Type: To and from  Toilet Transfer to: Standard toilet (BSC frame over toilet)  Toilet Transfer Technique: Ambulating  Toilet Transfers:  (CGA)  Tub Transfers  Tub Transfer Type: To and from  Tub Transfer to: Transfer tub bench  Tub Transfer Technique: Ambulating  Tub Transfers: Minimal assistance (to lift LLE up over tub wall)    Car Transfers  Car Transfer From: Walker  Car Transfer Technique: Ambulating  Car Transfers:  (simualted with use of tub transfer bench)    Sitting Balance:  Dynamic Sitting Balance  Dynamic Sitting-Comments: fair+  Standing Balance:  Static Standing Balance  Static Standing-Level of Assistance:  (fair)  Dynamic Standing Balance  Dynamic Standing-Balance:  (fair/fair- balance)  Outcome Measures:[facility] Daily Activity  Putting on and taking off regular lower body clothing: A lot  Bathing (including washing, rinsing, drying): A lot  Putting on and taking off regular upper body clothing: None  Toileting, which includes using toilet, bedpan or urinal: A little  Taking care of personal grooming such as brushing teeth: A little  Eating Meals: None  Daily  Activity - Total Score: 18    OP EDUCATION:  Education  Individual(s) Educated: Patient  Education Provided: Diagnosis & Precautions, Symptom management, Ergonomics and postural realignment, Joint protection and energy conservation, Fall precautons, Risk and benefits of OT discussed with patient or other, POC discussed and agreed upon  Equipment:  (AE, EC tech, home DME)  Patient/Caregiver Demonstrated Understanding: yes  Plan of Care Discussed and Agreed Upon: yes  Patient Response to Education: Patient/Caregiver Verbalized Understanding of Information    Goals:  Encounter Problems       Encounter Problems (Active)       Balance       STG-Patient will be independent with assistive device dynamic stand task >5 minutes for ADL completion   (Progressing)       Start:  02/09/24    Expected End:  02/23/24               Dressings Lower Extremities       STG - Patient will complete lower body dressing independently with AE and comp strategies  (Progressing)       Start:  02/09/24    Expected End:  02/23/24               Mobility       STG-Patient will be independent with assistive device functional mobility tasks   (Progressing)       Start:  02/09/24    Expected End:  02/23/24               Transfers       STG - Patient will perform car transfers independently demonstrating good safety  (Progressing)       Start:  02/09/24    Expected End:  02/23/24            STG-Patient will be independent with functional transfers demonstrating good safety   (Progressing)       Start:  02/09/24    Expected End:  02/23/24

## 2024-02-10 NOTE — CARE PLAN
Problem: Diabetes  Goal: Achieve decreasing blood glucose levels by end of shift  Outcome: Progressing     Problem: Pain  Goal: Performs ADL's with improved pain control throughout shift  Outcome: Progressing     Problem: Fall/Injury  Goal: Not fall by end of shift  Outcome: Progressing     Problem: Pain - Adult  Goal: Verbalizes/displays adequate comfort level or baseline comfort level  Outcome: Progressing     Problem: Safety - Adult  Goal: Free from fall injury  Outcome: Progressing    The patient's goals for the shift include  feel better and have better pain control    The clinical goals for the shift include manage pain with PRN medications throughout end of shift    Patient having a significant amount of pain throughout the shift despite several different doses of different medications for pain. Patient receiving several doses of oxycodone, dilaudid, toradol, tylenol, gabapentin, and flexeril with persistent 10/10 pain being reported. Patient only has relief for about 30-60 minutes and then she reports she is back to 10/10 pain. Other pain interventions provided like ice application, distraction with television, and repositioning.

## 2024-02-10 NOTE — DISCHARGE SUMMARY
Discharge Diagnosis  Arthritis of left knee    Issues Requiring Follow-Up  S/P left TKA    Test Results Pending At Discharge  Pending Labs       No current pending labs.            Hospital Course  Alejandra is a pleasant 58 year-old female with a history of severe LEFT knee osteoarthritis.  Patient was admitted on elective basis for LEFT total knee arthroplasty on 2/8/24.  Procedure was performed under spinal anesthesia with IV Ancef for patricia-operative antibiotics.  Please see operative note for further details of this procedure.  Patient recovered in the PACU before transfer to a regular nursing floor.  Patient was started on a multimodal pain protocol utilizing narcotic and non-narcotic pain medication. She had ASA 81mg by mouth twice a day and SCDs for DVT prophylaxis.  She did well and by the morning of POD #2 was mobilizing well with physical therapy, able to have ~90 degrees of flexion, tolerate a diabetic diet, void on her own volition, walk several hundred feet with minimal difficulty, navigate stairs, and did not have any abnormalities with her vital signs.  Physical therapy recommended continued recovery at home with home care services.  She was medically appropriate for discharge home on the afternoon of POD #2.  Her dressing was clean, dry and intact with no staining or saturation.  No concern for DVT.  She will keep the Mepilex dressing in place for a week.  She will complete a total of 4 weeks of Aspirin 81mg by mouth twice a day.  She was discharged with prescriptions for Percocet, Tramadol, and Ibuprofen for pain.  She will follow-up with Dr. Kaminski in 2-3 weeks for outpatient follow-up.     Pertinent Physical Exam At Time of Discharge  Physical Exam    Home Medications     Medication List      START taking these medications     aspirin 81 mg EC tablet; Take 1 tablet (81 mg) by mouth 2 times a day.   traMADol 50 mg tablet; Commonly known as: Ultram; Take 1 tablet (50 mg)   by mouth every 8 hours if  needed (breakthrough pain not controlled by   percocet.) for up to 7 days.     CHANGE how you take these medications     ibuprofen 800 mg tablet; Take 1 tablet (800 mg) by mouth 3 times a day   with meals for 15 days.; What changed: medication strength, how much to   take, when to take this, reasons to take this   loratadine 10 mg tablet; Commonly known as: Claritin; Take 1 tablet (10   mg) by mouth once daily.; What changed: when to take this   traZODone 50 mg tablet; Commonly known as: Desyrel; Take 2 tablets (100   mg) by mouth once daily at bedtime.; What changed: how much to take   Trulicity 0.75 mg/0.5 mL pen injector; Generic drug: dulaglutide; INJECT   0.75MG UNDER THE SKIN EVERY WEEK; What changed: See the new instructions.     CONTINUE taking these medications     albuterol 90 mcg/actuation inhaler; INHALE 2 PUFFS 4 TIMES A DAY AS   NEEDED FOR SHORTNESS OF BREATH OR WHEEZING.   alcohol swabs pads, medicated   B Complex-Vitamin B12 tablet; Generic drug: vitamin B complex; TAKE 1   TABLET DAILY.   Blood glucose monitoring meter kit kit   DULoxetine 30 mg DR capsule; Commonly known as: Cymbalta; Take 2   capsules (60 mg) by mouth once daily. Do not crush or chew.   EASY COMFORT LANCETS MISC   FeroSuL tablet; Generic drug: ferrous sulfate (325 mg ferrous sulfate);   TAKE 1 TABLET BY MOUTH TWICE A DAY WITH MEALS   fluticasone 50 mcg/actuation nasal spray; Commonly known as: Flonase;   USE 2 SPRAYS IN EACH NOSTRIL ONCE DAILY   folic acid 1 mg tablet; Commonly known as: Folvite; TAKE 1 TABLET BY   MOUTH EVERY DAY   gabapentin 300 mg capsule; Commonly known as: Neurontin; TAKE 2 CAPSULES   BY MOUTH 3 TIMES A DAY. TITRATE THE DOSE WEEKLY   metFORMIN 500 mg tablet; Commonly known as: Glucophage; TAKE 2 TABLETS   (1,000 MG) BY MOUTH IN THE MORNING AND 2 TABLETS (1,000 MG) BEFORE   BEDTIME.   multivitamin tablet; Take 1 tablet by mouth once daily.   naproxen 500 mg tablet; Commonly known as: Naprosyn; TAKE 1 TABLET BY    MOUTH EVERY 12 HOURS AS NEEDED FOR PAIN WITH FOOD   oxyCODONE-acetaminophen 5-325 mg tablet; Commonly known as: Percocet   polyethylene glycol 17 gram packet; Commonly known as: Glycolax, Miralax   thiamine 100 mg tablet; Commonly known as: Vitamin B-1   True Metrix Glucose Test Strip strip; Generic drug: blood sugar   diagnostic; USE AS DIRECTED 4-5 TIMES DAILY   Vitamin D3 50 MCG (2000 UT) tablet; Generic drug: cholecalciferol; TAKE   1 TABLET (50 MCG) BY MOUTH ONCE DAILY.     STOP taking these medications     acetaminophen 500 mg tablet; Commonly known as: Tylenol   Cetaphil moisturizing lotion   chlorhexidine 0.12 % solution; Commonly known as: Peridex   cyclobenzaprine 10 mg tablet; Commonly known as: Flexeril   docusate sodium 100 mg capsule; Commonly known as: Colace   estradiol 0.01 % (0.1 mg/gram) vaginal cream; Commonly known as: Estrace   methocarbamol 500 mg tablet; Commonly known as: Robaxin   nicotine 21 mg/24 hr patch; Commonly known as: Nicoderm CQ   nicotine polacrilex 4 mg gum; Commonly known as: Nicorette   psyllium 3.4 gram packet; Commonly known as: Metamucil   simethicone 125 mg capsule; Commonly known as: Gas Relief Extra Strength       Outpatient Follow-Up  Future Appointments   Date Time Provider Department [place]   2/14/2024  4:30 PM Umair Alexandra MD [id number] [facility]   2/21/2024  9:30 AM Elsa Rojas MD [id number] [facility]   2/29/2024 11:00 AM Jhoan Eller DO [id number] [facility]   3/8/2024  1:00 PM Reg Willoughby MD [id number] [facility]   3/22/2024  7:30 AM [facility] [id number] [facility]   3/28/2024 10:30 AM JUNAID Lilly-CNP [id number] [facility]   6/3/2024 10:30 [id number] [id number] [facility] Pradeep Kaminski MD

## 2024-02-10 NOTE — DISCHARGE INSTRUCTIONS
Activity:  --Activity as tolerated.     --May shower as the bandage is waterproof.     --May not drive until follow-up visit with Dr. Kaminski.     --No pushing, pulling, or lifting objects greater than 10 pounds until follow-up visit.     --Weight-bearing Instructions: weight-bearing as tolerated LEFT leg.     --Other activity instructions: You can put as much weight on the LEFT leg as you can tolerate.  Continue to use a walker with ambulation for the first 1-2 weeks and transition to a cane as you feel safe/comfortable. Keep the Mepilex bandage in place for 1 week after surgery and then it can be removed.  After it is removed you can continue to shower, but no baths, tubs, soaks, pools or hot tubs for a month after surgery until the incision fully heals. Similarly, do not apply any lotions, creams, or ointments over the incision for a month.  Continue to wear your compression stockings for 2-3 weeks to help with swelling.  Continue to frequently ice several hours throughout the day to help with pain and swelling.    Diet:  · Diet regular, resume DIABETIC diet    Wound Care 1:  · Wound Site LEFT knee  · Wound Type surgical incision  · Instructions no lotions, creams, or tub soaks  · Other Instructions See Above    Additional Orders:  · Additional Instructions   --You will have a number of medications to help control pain after surgery:  1) You can take Ibuprofen (800mg) 1 tab by mouth 3x/day with food for the first 1-2 weeks to help with baseline pain and inflammation.    2) If you are having moderate to severe pain you can take Percocet (5/325mg) 1-2 tabs by mouth every 4 hour as needed for pain.   3) Lastly there will be a Rx for Tramadol (50mg) that you can take that every 8 hours as needed for breakthrough pain.     --Continue taking the Baby Aspirin 81mg 2x/day for the first month after surgery to minimize the risk of blood clots following surgery.     --If needed, you can take any combination of Colace (stool  softener), Dulcolax or Miralax (laxative).  All of these are over-the-counter.     --The following Rxs have been prescribed to you: Percocet (pain medication), Ibuprofen, Tramadol, and Baby Aspirin.  All the stool softeners/laxatives are over-the-counter and can be picked up at any local pharmacy.     --You can resume all of your normal home medications and vitamins.     MEDICATION SIDE EFFECTS.  PERCOCET/TRAMADOL: constipation, nausea, vomiting, upset stomach, drowsiness, dizziness, lightheadedness, itching, headache, blurred vision, dry mouth, sweating    --Call Dr. Kaminski's office if there is any drainage after 7 days, increased redness/warmth/swelling at incision site, pain/tenderness of calf, swelling of calf that does not respond to elevation, SOB/chest pain.    --Do not visit the dentist until 3 months after the surgery date.  You will need to take an antibiotic prior to any dental procedure.  Please call [phone] and request a prescription for antibiotics for prior to these procedures       Call Provider If (Homegoing Patients):  --Breathing faster than normal.     --Breathing harder than normal or having retractions.     --Fever of 100.4 F (38 C) or higher.     --Chills.     --Drinking less than normal.     --Urinating less than normal, over 1 day.     --Acting very sleepy and difficult to awaken.     --Vomiting (throwing up) and not able to eat or drink for 12 hours.     --3 or more loose, watery bowel movements in 24 hours (diarrhea).     --Any new concerning symptoms.

## 2024-02-10 NOTE — NURSING NOTE
DC instructions reviewed with this pt. This pt was educated on activity restrictions as well as incision care. No questions or concerns and follow up appts already scheduled. This nurse gave breakthrough dose of dilaudid to help maintain pt's pain. Ivs removed. Pt to go home via uber where she has family that can help her. Scripts given for bedside commode and shower chair as well. Pt's pharmacy is refilling her tramadol to help manage pain. No questions or concerns.

## 2024-02-10 NOTE — PROGRESS NOTES
Physical Therapy                 Therapy Communication Note    Patient Name: Alejandra Houston  MRN: [id number]  Today's Date: 2/10/2024     Discipline: Physical Therapy    Missed Time: Attempt    Comment:  Missed visit this a.m. due to low BP.  Will try back this p.m..

## 2024-02-10 NOTE — PROGRESS NOTES
Physical Therapy    Physical Therapy Treatment    Patient Name: Alejandra Houston  MRN: [id number]  Today's Date: 2/10/2024  Time Calculation  Start Time: 1430  Stop Time: 1455  Time Calculation (min): 25 min       Assessment/Plan   PT Assessment  PT Assessment Results: Decreased strength, Decreased range of motion, Decreased endurance, Impaired balance, Decreased mobility, Decreased safety awareness, Pain, Orthopedic restrictions  Rehab Prognosis: Good  End of Session Communication: Bedside nurse  Assessment Comment: Patient able to ambulate 50 feet with FWW and close guard supervision-demonstrated antalgic gait-complaining of increased L LE and LBP-RN aware. Patient feels confident in ability to safely navigate home upon d/c and will have assistance from family.  End of Session Patient Position: Bed, 3 rail up  PT Plan  Inpatient/Swing Bed or Outpatient: Inpatient  PT Plan  Treatment/Interventions: Bed mobility, Gait training, Transfer training, Stair training, Balance training, Neuromuscular re-education, Strengthening, Endurance training, Range of motion, Therapeutic exercise, Therapeutic activity, Home exercise program, Positioning, Postural re-education  PT Plan: Skilled PT  PT Frequency: BID  PT Discharge Recommendations: Low intensity level of continued care  Equipment Recommended upon Discharge: Wheeled walker  PT Recommended Transfer Status: Contact guard, Assistive device  PT - OK to Discharge: Yes (Pt is ok to discharge from acute care PT to next level of care once cleared by medical team.)      General Visit Information:   PT  Visit  PT Received On: 02/10/24  General  Reason for Referral: S/p L TKA  Referred By: Carlos Kaminski MD  Prior to Session Communication: Bedside nurse  Patient Position Received: Bed, 3 rail up  Preferred Learning Style: verbal, visual  General Comment: RN cleared patient to work with therapy. Patient pleasant and agreeable to therapy session.    Subjective    Precautions:  Precautions  LE Weight Bearing Status: Weight Bearing as Tolerated (L LE)  Medical Precautions: Fall precautions  Post-Surgical Precautions: Left total knee precautions  Vital Signs:       Objective   Pain:  Pain Assessment  Pain Assessment: 0-10  Pain Score: 8  Pain Type: Surgical pain  Pain Location: Knee  Pain Orientation: Left  Cognition:  Cognition  Overall Cognitive Status: Within Functional Limits  Orientation Level: Oriented X4    Activity Tolerance:  Activity Tolerance  Endurance: Tolerates 30 min exercise with multiple rests, Tolerates 10 - 20 min exercise with multiple rests  Treatments:  Therapeutic Exercise  Therapeutic Exercise Activity 1: Seated Bilateral LE: ankle pumps, LAQ, marching, HS, HIP ABD/ADD, GS x10 each    Bed Mobility  Bed Mobility: Yes  Bed Mobility 1  Bed Mobility 1: Sitting to supine  Level of Assistance 1: Moderate assistance  Bed Mobility Comments 1:  (Patient required assistance with trunk and LE's during transfer)    Ambulation/Gait Training  Ambulation/Gait Training Performed: Yes  Ambulation/Gait Training 1  Surface 1: Level tile  Device 1: Rolling walker  Gait Support Devices: Gait belt  Assistance 1: Close supervision  Quality of Gait 1: Antalgic  Comments/Distance (ft) 1: 50 feet  Transfers  Transfer: Yes  Transfer 1  Transfer From 1: Sit to  Transfer to 1: Stand  Transfer Device 1: Walker  Transfer Level of Assistance 1: Minimum assistance  Transfers 2  Transfer From 2: Stand to  Transfer to 2: Sit  Transfer Device 2: Walker  Transfer Level of Assistance 2: Minimum assistance, Minimal tactile cues    Stairs  Stairs: No    Outcome Measures:  [facility] Basic Mobility  Turning from your back to your side while in a flat bed without using bedrails: A little  Moving from lying on your back to sitting on the side of a flat bed without using bedrails: A lot  Moving to and from bed to chair (including a wheelchair): A little  Standing up from a chair using your arms (e.g.  wheelchair or bedside chair): A little  To walk in hospital room: A little  Climbing 3-5 steps with railing: A little  Basic Mobility - Total Score: 17      Encounter Problems       Encounter Problems (Active)       Balance       STG-Patient will be independent with assistive device dynamic stand task >5 minutes for ADL completion   (Progressing)       Start:  02/09/24    Expected End:  02/23/24               Mobility       STG-Patient will be independent with assistive device functional mobility tasks   (Progressing)       Start:  02/09/24    Expected End:  02/23/24               PT Problem       Bed mobility (Progressing)       Start:  02/09/24    Expected End:  02/23/24       Pt will perform supine<>sit with HOB flat, AMANDA.           Transfers (Progressing)       Start:  02/09/24    Expected End:  02/23/24       Pt will perform all transfers with LRAD, AMANDA.            Gait (Progressing)       Start:  02/09/24    Expected End:  02/23/24       Pt will amb 150' with LRAD, AMANDA with reciprocal gait, upright posture and improved activity tolerance as demonstrated by vitals.           Stairs (Progressing)       Start:  02/09/24    Expected End:  02/23/24       Pt will ascend/descend 3 with LRAD, AMANDA.           L knee AROM (Progressing)       Start:  02/09/24    Expected End:  02/23/24       Pt will improved L knee extension AROM to 0 degrees.            PT Problem       Strength (Progressing)       Start:  02/09/24    Expected End:  02/23/24       Pt will improve gross LLE strength to 4-/5.            Pain - Adult          Transfers       STG - Patient will perform car transfers independently demonstrating good safety  (Progressing)       Start:  02/09/24    Expected End:  02/23/24            STG-Patient will be independent with functional transfers demonstrating good safety   (Progressing)       Start:  02/09/24    Expected End:  02/23/24

## 2024-02-10 NOTE — PROGRESS NOTES
02/10/24 1620   Discharge Planning   Living Arrangements Spouse/significant other   Support Systems Spouse/significant other   Type of Residence Private residence   Do you have animals or pets at home? Yes   Type of Animals or Pets 3 cats; 1 dog   Who is requesting discharge planning? Patient   Home or Post Acute Services In home services   Type of Home Care Services Home OT;Home PT   Patient expects to be discharged to: home with [facility]     Advised [facility]en of pt d/c home today,

## 2024-02-11 LAB
GLUCOSE BLD MANUAL STRIP-MCNC: 139 MG/DL (ref 74–99)
GLUCOSE BLD MANUAL STRIP-MCNC: 175 MG/DL (ref 74–99)

## 2024-02-14 ENCOUNTER — TELEMEDICINE (OUTPATIENT)
Dept: OTOLARYNGOLOGY | Facility: CLINIC | Age: 59
End: 2024-02-14
Payer: COMMERCIAL

## 2024-02-14 DIAGNOSIS — R09.81 NASAL CONGESTION: ICD-10-CM

## 2024-02-14 DIAGNOSIS — R51.9 FREQUENT HEADACHES: Primary | ICD-10-CM

## 2024-02-14 DIAGNOSIS — R09.82 POST-NASAL DRAINAGE: ICD-10-CM

## 2024-02-14 PROCEDURE — 99213 OFFICE O/P EST LOW 20 MIN: CPT | Performed by: OTOLARYNGOLOGY

## 2024-02-14 NOTE — PROGRESS NOTES
An interactive audio and video telecommunication system which permits real time communications between the patient (at the originating site) and provider (at the distant site) was utilized to provide this telehealth service.  Verbal consent was requested and obtained for a telehealth visit.     Chief Complaint:  1.  Headaches, facial pain and pressure  2.  Rhinorrhea  3.  Nasal airway obstruction, deviated nasal septum  4.  Throat clearing, coughing, dysphonia  5.  Obstructive sleep apnea s/p UPPP through [facility]  6.  Reflux planning to follow with gastroenterology  7.  Cirrhosis  8.  Thrombocytopenia    History Of Present Illness:    Alejandra Houston presents being last seen 12/27/23, a CT Sinus was ordered at that time, she presents today to review this imaging.     Interestingly, she presented to the emergency department because of ongoing issues with headaches and imaging was obtained and she was told that she has a pinched nerve in her neck and she is planning further evaluation for this.  The emergency department felt that this would account for some of her headache related issues.     Active Problems:  Patient Active Problem List   Diagnosis    Acute sinusitis    Chronic cough    Depression    Effusion of right knee    Elevated liver enzymes    Fatigue    Fatty liver disease, nonalcoholic    GERD (gastroesophageal reflux disease)    Chronic pain    Fibromyalgia    Headache    Increased frequency of urination    Instability of prosthetic knee (CMS/HCC)    Insomnia    Leg weakness    Low back pain    Mass of left parotid gland    Nicotine dependence    Otalgia of both ears    Chronic knee pain after total replacement of right knee joint    Pain due to total right knee replacement (CMS/HCC)    Pancytopenia (CMS/HCC)    Parotid nodule    Personal history of COVID-19    Primary osteoarthritis of left knee    Status post revision of total replacement of right knee    Stress incontinence, female    Type 2 diabetes  mellitus (CMS/[facility])    Vaginal dryness, menopausal    Vitamin D deficiency    Class 2 severe obesity with serious comorbidity and body mass index (BMI) of 39.0 to 39.9 in adult (CMS/[facility])    Class 2 obesity with body mass index (BMI) of 38.0 to 38.9 in adult    Constipation    Lung nodule    Morbid obesity with BMI of 40.0-44.9, adult (CMS/[facility])    Sleep apnea    Anxiety    Cellulitis of right lower extremity    Cellulitis    Chronic obstructive lung disease (CMS/[facility])    Closed fracture of lateral malleolus    Dermatophytosis of nail    Dyslipidemia    Disorder of sacrum    Essential hypertension, benign    Fever    Generalized osteoarthritis    Genital herpes    Hyperlipidemia    Hypertension    Hypomagnesemia    Leg edema, left    Lumbar spondylosis    Lumbosacral spondylosis without myelopathy    Non-intractable vomiting with nausea    Obstructive sleep apnea, adult    Other specified abnormal findings of blood chemistry    Hereditary and idiopathic peripheral neuropathy    Peripheral nerve disease    Spinal stenosis of lumbar region with radiculopathy    Primary localized osteoarthrosis, lower leg    Arthritis of both hips    Disorder of iron metabolism    Chronic pain of right knee    Restless leg syndrome      Past Medical History:  She has a past medical history of Abnormal levels of other serum enzymes (2022), Acid reflux, Anxiety, Bipolar disorder (CMS/[facility]), COPD (chronic obstructive pulmonary disease) (CMS/[facility]), Depression, DM (diabetes mellitus) (CMS/[facility]), Fibromyalgia, primary, Glaucoma, Hypertension, 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    Surgical History:  She has a past surgical history that includes Total knee arthroplasty (2018); Other surgical history (06/10/2019);  section, classic (02/15/2018); Hip Arthroplasty; Uvulopalatopharyngoplasty;  section, classic; and Knee  Arthroplasty.     Family History:  Family History   Problem Relation Name Age of Onset    Hypertension Mother      Diabetes Mother      COPD Mother      Heart disease Mother      Lymphoma Mother      Cancer Other fam hx     Diabetes Other fam hx     Hypertension Other fam hx     Heart disease Other fam hx      Social History:  She reports that she has been smoking cigarettes. She started smoking about 44 years ago. She has a 21.50 pack-year smoking history. She has never used smokeless tobacco. She reports that she does not currently use alcohol. She reports that she does not use drugs.     Allergies:  Patient has no known allergies.    Current Meds:  Current Outpatient Medications:     albuterol 90 mcg/actuation inhaler, INHALE 2 PUFFS 4 TIMES A DAY AS NEEDED FOR SHORTNESS OF BREATH OR WHEEZING., Disp: 8.5 g, Rfl: 0    alcohol swabs pads, medicated, Apply topically. 70% pad, 4 x daily; use as directed, Disp: , Rfl:     aspirin 81 mg EC tablet, Take 1 tablet (81 mg) by mouth 2 times a day., Disp: 60 tablet, Rfl: 0    B Complex-Vitamin B12 tablet, TAKE 1 TABLET DAILY., Disp: 30 tablet, Rfl: 3    DULoxetine (Cymbalta) 30 mg DR capsule, Take 2 capsules (60 mg) by mouth once daily. Do not crush or chew., Disp: 60 capsule, Rfl: 5    EASY COMFORT LANCETS MISC, in the morning, at noon, and at bedtime. Use to test, Disp: , Rfl:     FeroSuL tablet, TAKE 1 TABLET BY MOUTH TWICE A DAY WITH MEALS, Disp: 30 tablet, Rfl: 0    fluticasone (Flonase) 50 mcg/actuation nasal spray, USE 2 SPRAYS IN EACH NOSTRIL ONCE DAILY, Disp: 16 g, Rfl: 3    folic acid (Folvite) 1 mg tablet, TAKE 1 TABLET BY MOUTH EVERY DAY, Disp: 30 tablet, Rfl: 1    FreeStyle glucose monitoring kit, True Metrix Blood Glucose test in vitro strip; Use as directed 4-5 times daily, Disp: , Rfl:     gabapentin (Neurontin) 300 mg capsule, TAKE 2 CAPSULES BY MOUTH 3 TIMES A DAY. TITRATE THE DOSE WEEKLY, Disp: 90 capsule, Rfl: 0    ibuprofen 800 mg tablet, Take 1 tablet  (800 mg) by mouth 3 times a day with meals for 15 days., Disp: 45 tablet, Rfl: 0    loratadine (Claritin) 10 mg tablet, Take 1 tablet (10 mg) by mouth once daily. (Patient taking differently: Take 1 tablet (10 mg) by mouth once daily at bedtime.), Disp: 90 tablet, Rfl: 2    metFORMIN (Glucophage) 500 mg tablet, TAKE 2 TABLETS (1,000 MG) BY MOUTH IN THE MORNING AND 2 TABLETS (1,000 MG) BEFORE BEDTIME., Disp: 120 tablet, Rfl: 1    multivitamin tablet, Take 1 tablet by mouth once daily., Disp: 90 tablet, Rfl: 3    naproxen (Naprosyn) 500 mg tablet, TAKE 1 TABLET BY MOUTH EVERY 12 HOURS AS NEEDED FOR PAIN WITH FOOD, Disp: 60 tablet, Rfl: 0    oxyCODONE-acetaminophen (Percocet) 5-325 mg tablet, Take 1 tablet by mouth every 6 hours if needed for severe pain (7 - 10)., Disp: , Rfl:     polyethylene glycol (Glycolax) 17 gram packet, Take by mouth in the morning. Mix 1 packet in 8 ounces of liquid, Disp: , Rfl:     thiamine (Vitamin B-1) 100 mg tablet, Take 1 tablet (100 mg) by mouth once daily., Disp: , Rfl:     traMADol (Ultram) 50 mg tablet, Take 1 tablet (50 mg) by mouth every 8 hours if needed (breakthrough pain not controlled by percocet.) for up to 7 days., Disp: 20 tablet, Rfl: 0    traZODone (Desyrel) 50 mg tablet, Take 2 tablets (100 mg) by mouth once daily at bedtime., Disp: 60 tablet, Rfl: 2    True Metrix Glucose Test Strip strip, USE AS DIRECTED 4-5 TIMES DAILY, Disp: 100 strip, Rfl: 5    Trulicity 0.75 mg/0.5 mL pen injector, INJECT 0.75MG UNDER THE SKIN EVERY WEEK (Patient taking differently: Sunday), Disp: 2 mL, Rfl: 1    Vitamin D3 50 mcg (2,000 unit) tablet, TAKE 1 TABLET (50 MCG) BY MOUTH ONCE DAILY., Disp: 30 tablet, Rfl: 1    Vitals:  Visit Vitals  OB Status Menopausal   Smoking Status Every Day      Physical Exam:  Visit conducted virtually.     Results/Data:  I personally reviewed the CT scan dated 1/11/24  with the patient virtually today.  It demonstrated minimal inflammatory change within each  maxillary.  Otherwise it appeared normal.    Provider Impressions:  1.  Headaches, facial pain and pressure  2.  Rhinorrhea  3.  Nasal airway obstruction, deviated nasal septum  4.  Throat clearing, coughing, dysphonia  5.  Obstructive sleep apnea s/p UPPP through [facility]  6.  Reflux planning to follow with gastroenterology  7.  Cirrhosis  8.  Thrombocytopenia     Discussion: Alejandra Houston and I discussed her CT and I reassured her that there was nothing concerning present and I would not recommend surgical intervention.  She is planning on being evaluated through pain medicine in about 1 week and I think this is her best next step in regard to her headaches and facial pain and pressure.  I asked her to follow-up with me as needed and she was amenable to this.  All questions were answered.    Umair Alexandra MD

## 2024-02-15 DIAGNOSIS — M54.50 CHRONIC LOW BACK PAIN, UNSPECIFIED BACK PAIN LATERALITY, UNSPECIFIED WHETHER SCIATICA PRESENT: ICD-10-CM

## 2024-02-15 DIAGNOSIS — G89.29 CHRONIC LOW BACK PAIN, UNSPECIFIED BACK PAIN LATERALITY, UNSPECIFIED WHETHER SCIATICA PRESENT: ICD-10-CM

## 2024-02-15 RX ORDER — GABAPENTIN 300 MG/1
CAPSULE ORAL
Qty: 90 CAPSULE | Refills: 0 | Status: SHIPPED | OUTPATIENT
Start: 2024-02-15 | End: 2024-04-03 | Stop reason: SDUPTHER

## 2024-02-16 DIAGNOSIS — E55.9 VITAMIN D DEFICIENCY: ICD-10-CM

## 2024-02-16 RX ORDER — OMEGA-3S/DHA/EPA/FISH OIL/D3 300MG-1000
50 CAPSULE ORAL DAILY
Qty: 30 TABLET | Refills: 1 | Status: SHIPPED | OUTPATIENT
Start: 2024-02-16 | End: 2024-04-12

## 2024-02-21 ENCOUNTER — OFFICE VISIT (OUTPATIENT)
Dept: PAIN MEDICINE | Facility: CLINIC | Age: 59
End: 2024-02-21
Payer: COMMERCIAL

## 2024-02-21 VITALS — RESPIRATION RATE: 18 BRPM | HEART RATE: 80 BPM | DIASTOLIC BLOOD PRESSURE: 69 MMHG | SYSTOLIC BLOOD PRESSURE: 123 MMHG

## 2024-02-21 DIAGNOSIS — G89.29 CHRONIC LEFT SHOULDER PAIN: ICD-10-CM

## 2024-02-21 DIAGNOSIS — M25.512 CHRONIC LEFT SHOULDER PAIN: ICD-10-CM

## 2024-02-21 PROCEDURE — 3078F DIAST BP <80 MM HG: CPT | Performed by: PAIN MEDICINE

## 2024-02-21 PROCEDURE — 99214 OFFICE O/P EST MOD 30 MIN: CPT | Performed by: PAIN MEDICINE

## 2024-02-21 PROCEDURE — 3074F SYST BP LT 130 MM HG: CPT | Performed by: PAIN MEDICINE

## 2024-02-21 ASSESSMENT — PAIN SCALES - GENERAL
PAINLEVEL_OUTOF10: 8
PAINLEVEL: 8

## 2024-02-21 ASSESSMENT — PAIN DESCRIPTION - DESCRIPTORS: DESCRIPTORS: SHARP;STABBING

## 2024-02-21 ASSESSMENT — PAIN - FUNCTIONAL ASSESSMENT: PAIN_FUNCTIONAL_ASSESSMENT: 0-10

## 2024-02-21 NOTE — PROGRESS NOTES
Subjective   Patient ID: Alejandra Houston is a 58 y.o. female who came today to the clinic complaining of neck pain and left shoulder pain since December.  Her pain is aching and pain is aching and sharp and it starts from her left neck and goes to the shoulder.  Her pain increases with movement when she left her left arm and when she is laying down on bed.  Her pain is decreased with not moving her neck or shoulder, applying ice or heat, and when she takes Percocet.  She had recent visit to the ER at [facility] and she had a CT scan of her C-spine and there was mild to moderate spinal stenosis C4-C5 and moderate bilateral foraminal stenosis at this level.  Notably the patient had a left total knee replacement surgery 2 weeks ago and she is currently taking tramadol 50 3 times daily, ibuprofen 400 mg twice daily, gabapentin 300 mg twice daily, and Flexeril.      Physical Therapy: The patient has not done physical therapy within the past six months  Other Conservative Measures she has tried: Heating Pad and Ice  Classes of medications tried in the past: Acetaminophen, NSAIDs, Gabapentenoids, and Muscle Relaxants      Review of Systems   13-point ROS done and negative except for HPI.     Current Outpatient Medications   Medication Instructions    albuterol 90 mcg/actuation inhaler 2 puffs, inhalation, 4 times daily PRN    alcohol swabs pads, medicated topical (top), 70% pad, 4 x daily; use as directed    aspirin 81 mg, oral, 2 times daily    B Complex-Vitamin B12 tablet TAKE 1 TABLET DAILY.    DULoxetine (CYMBALTA) 60 mg, oral, Daily, Do not crush or chew.    EASY COMFORT LANCETS [facility] miscellaneous, 3 times daily, Use to test    FeroSuL tablet 1 tablet, oral, 2 times daily with meals    fluticasone (Flonase) 50 mcg/actuation nasal spray USE 2 SPRAYS IN EACH NOSTRIL ONCE DAILY    folic acid (Folvite) 1 mg tablet TAKE 1 TABLET BY MOUTH EVERY DAY    FreeStyle glucose monitoring kit miscellaneous, True Metrix Blood  Glucose test in vitro strip; Use as directed 4-5 times daily    gabapentin (Neurontin) 300 mg capsule TAKE 2 CAPSULES BY MOUTH 3 TIMES A DAY. TITRATE THE DOSE WEEKLY    ibuprofen 800 mg, oral, 3 times daily with meals    loratadine (CLARITIN) 10 mg, oral, Daily    metFORMIN (GLUCOPHAGE) 1,000 mg, oral, 2 times daily    multivitamin tablet 1 tablet, oral, Daily    naproxen (Naprosyn) 500 mg tablet TAKE 1 TABLET BY MOUTH EVERY 12 HOURS AS NEEDED FOR PAIN WITH FOOD    oxyCODONE-acetaminophen (Percocet) 5-325 mg tablet 1 tablet, oral, Every 6 hours PRN    polyethylene glycol (Glycolax) 17 gram packet oral, Daily, Mix 1 packet in 8 ounces of liquid    thiamine (Vitamin B-1) 100 mg tablet 1 tablet, oral, Daily    traZODone (DESYREL) 100 mg, oral, Nightly    True Metrix Glucose Test Strip strip USE AS DIRECTED 4-5 TIMES DAILY    Trulicity 0.75 mg/0.5 mL pen injector INJECT 0.75MG UNDER THE SKIN EVERY WEEK    Vitamin D3 50 mcg, oral, Daily       Past Medical History:   Diagnosis Date    Abnormal levels of other serum enzymes 2022    Elevated liver enzymes    Acid reflux     Anxiety     Bipolar disorder (CMS/HCC)     COPD (chronic obstructive pulmonary disease) (CMS/HCC)     Depression     DM (diabetes mellitus) (CMS/HCC)     Fibromyalgia, primary     Glaucoma     Hypertension     Idiopathic aseptic necrosis of left femur (CMS/HCC) 10/12/2018    Avascular necrosis of bone of left hip    Sleep apnea     no cpap    Suicidal ideations     Thrombocytopenia (CMS/HCC)         Past Surgical History:   Procedure Laterality Date     SECTION, CLASSIC  02/15/2018     Section     SECTION, CLASSIC      HIP ARTHROPLASTY      KNEE ARTHROPLASTY      OTHER SURGICAL HISTORY  06/10/2019    Hip replacement    TOTAL KNEE ARTHROPLASTY  2018    Knee Replacement    UVULOPALATOPHARYNGOPLASTY          Family History   Problem Relation Name Age of Onset    Hypertension Mother      Diabetes Mother      COPD Mother       Heart disease Mother      Lymphoma Mother      Cancer Other fam hx     Diabetes Other fam hx     Hypertension Other fam hx     Heart disease Other fam hx         No Known Allergies     Objective     Vitals:    02/21/24 0941   BP: 123/69   Pulse: 80   Resp: 18        Physical Exam  General: NAD, well groomed, well nourished  Eyes: Non-icteric sclera, EOMI  Ears, Nose, Mouth, and Throat: External ears and nose appear to be without deformity or rash. No lesions or masses noted. Hearing is grossly intact.   Neck: Trachea midline  Respiratory: Nonlabored breathing   Cardiovascular: no peripheral edema   Skin: No rashes or open lesions/ulcers identified on skin.  Fibromyalgia Exam: Non-tender at multiple tender points in accordance with ACR criteria.   MSK: Normal muscle tone and bulk  Extremity: No lower extremity edema     Shoulder:   Restricted active and passive range of motion . Internal/external rotation are intact.     Supraspinatus pain over supraspinatus fossa  Empty can test positive   Infraspinatus/  Teres Minor  pain over infraspinatus fossa   Resisted external rotation does  reproduce pain   Subscapularis Bear hug and liftoff tests positive   Coracoid process pain to palpation over coracoid process   AC Joint pain to palpation   Crossed arm adduction positive              Neurologic:   Cranial nerves grossly intact.   Strength 5/5 and symmetric plantar/dorsiflexion   Sensation: Normal to light touch throughout, and to pinprick intact throughout.  DTRs:normal and symmetric throughout  Hardy: absent  Clonus: absent    Psychiatric: Alert, orientation to person, place, and time. Cooperative.        Assessment/Plan   58 y.o. female who came today to the clinic complaining of neck pain and left shoulder pain since December.  Her pain is aching and pain is aching and sharp and it starts from her left neck and goes to the shoulder.  Today during physical exam she has shoulder pain and less likely to be related  neck pain even though that she has some pathology with C4-C5 foraminal stenosis that was seen on CT scan.  differential diagnosis to include, osteoarthritis of the shoulder, impingement syndrome    Plan:  -Suprascapular nerve block today  Verbal consent was done. Complete sterile tech. Suprascapular nerve block on the left side using 3.5  25 g spinal needle total of 7 ml of marcaine 0.75%   Pt tolerated well. Will report in one week  -Left shoulder x-ray  -Refer to physical therapy for her shoulder pain    Planned Procedure:    The patient has failed treatment with : have significant limitations of their quality of life due to the pain    We discussed  the risks, benefits and alternatives of the procedure including but not limited to: , Lack of efficacy , Transiently worsening pain , Bleeding, Infection , and Nerve Damage    Follow up: After procedure    The patient was invited to contact us back anytime with any questions or concerns and follow-up with us in the office as needed.       This note was generated with the aid of dictation software, there may be typos despite my attempts at proofreading.

## 2024-02-21 NOTE — PROGRESS NOTES
Pt here today c/o pain in the neck and left shoulder, with limited movement, with headaches. no recent images, no recent PT. Currently taking percocet that is being given for her knee that she recently had surgery on.

## 2024-02-29 ENCOUNTER — TELEMEDICINE (OUTPATIENT)
Dept: PRIMARY CARE | Facility: CLINIC | Age: 59
End: 2024-02-29
Payer: COMMERCIAL

## 2024-02-29 DIAGNOSIS — G47.33 MODERATE OBSTRUCTIVE SLEEP APNEA: Primary | ICD-10-CM

## 2024-02-29 DIAGNOSIS — G47.30 SLEEP APNEA, UNSPECIFIED TYPE: ICD-10-CM

## 2024-02-29 DIAGNOSIS — E11.9 TYPE 2 DIABETES MELLITUS WITHOUT COMPLICATION, WITHOUT LONG-TERM CURRENT USE OF INSULIN (MULTI): Primary | ICD-10-CM

## 2024-02-29 PROCEDURE — 99213 OFFICE O/P EST LOW 20 MIN: CPT | Performed by: INTERNAL MEDICINE

## 2024-02-29 ASSESSMENT — ENCOUNTER SYMPTOMS: FATIGUE: 1

## 2024-02-29 NOTE — PROGRESS NOTES
Patient doing a virtual visit to discuss sleep study and diabetes   Virtual or Telephone Consent    An interactive audio and video telecommunication system which permits real time communications between the patient (at the originating site) and provider (at the distant site) was utilized to provide this telehealth service.   Verbal consent was requested and obtained from Alejandra Houston on this date, 02/29/24 for a telehealth visit.      Subjective   Patient ID: Alejandra Houston is a 58 y.o. female who presents for Follow-up.    The patient requests a prescription for a glucometer, lancets, and testing strips.  She normally checks her blood glucose once daily.    The patient recently completed a Sleep Study which confirmed moderate CADEN.  She is not currently following with Sleep Medicine.  The patient notes ongoing snoring and is not feeling rested after waking up in the morning.     The patient has followed up with  from Pain Management since her last clinic visit, and will be undergoing an X-ray of Cervical Spine.  She received a corticosteroid injection with minimal to partial benefit.     The patient underwent left total knee arthroplasty on 2/8/2024 with  from Orthopedic Surgery.  She is recovering relatively well from the procedure.    There have been no changes to the patient's medications.      Review of Systems   Constitutional:  Positive for fatigue.   Respiratory:          Positive for snoring.   Musculoskeletal:         Positive for left knee pain.       Objective   Physical Exam  Physical examination not done.    Assessment/Plan   Problem List Items Addressed This Visit    None      IMPRESSIONS/PLAN:     Sleep Apnea   - Completed in-center Sleep Study confirming moderate CADEN.  Ordered referral to Sleep Medicine with .       Diabetes II   - Last HbA1c 7.8 per 11/2023.  Currently maintained on metformin 500mg 2 tabs BID, and Trulicity 0.75mg/0.5mL 1 pen weekly. Previously on  glimperide 2mg QD. Ordered glucometer, lancets, and testing strips per patient request.      Depression   - Following with Psychiatry. Continue duloxetine 30 mg as two tablets once daily. Previously on Lexapro, and Rexulti or Topamax.  No SI or HI.      Pinched Cervical Nerve Root/Left Shoulder Pain   - CT Cervical Spine 1/2024 showed mild to moderate spinal canal stenosis at C4-C5 and moderate bilateral foraminal stenosis.  Prescribed prednisone 10mg taper as directed on packaging. Encouraged patient to schedule appointment with  from Pain Medicine.  Call the clinic if symptoms persist or worsen.    Chronic Sinusitis  - CT Sinus without Contrast wnl and unremarkable 1/2024.  Following with  from Otolaryngology..       Constipation   - Advised patient to try Colace BID and polyethylene glycol 3350 17 gm oral packet as 1 packet in 8 ounces of liquid once daily in the afternoon.     Left Knee OA  - s/p left total knee arthroplasty 2/8/2024 with  from Orthopedic Surgery.    Paresthesia of Right Upper Extremity  - Previously ordered EMG and Nerve Conduction.     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 Underwent complex revision of right TKA. Following with  in Orthopedic Surgery.     Thrombocytopenia  - Last CBC showed platelet count of 77 per 10/2023.  Following with Hematology/Oncology.  Suspect from underlying liver cirrhosis.       Liver Cirrhosis  - CT Chest with IV Contrast 10/2023 showed incidental cirrhotic liver morphology with sequela of portal hypertension (splenomegaly and esophageal varices).  Previously ordered referral for Hepatology. Advised the patient to stop taking Tylenol.   She has seen Dr. Millard in the past.      Left Adnexal Mass  - CT Abdomen Pelvis 7/2023 showed incidental finding of A 3.6 cm benign-appearing left adnexal lesion is seen.  In an early post-menopausal woman (<5 years since menopause),  follow-up ultrasound at 6-12 months is recommended. Patient has scheduled appointment with Gynecology.     Vaginal dryness  - Refilled Estradiol cream.     Right Third Finger Pain  - Previously ordered Xray of fingers right hand.     Dry Skin  - Prescribed Cetaphil moisturizing lotion.      Fatigue  - Likely due to untreated sleep apnea.  Encouraged patient to follow-up with Sleep Medicine, and she has appointment for next week.  Vitamin D, Vitamin B12, and TSH wnl per 9/2023.     LLE swelling  - Previously ordered Echocardiogram and referral to Cardiology.  Last Venous Duplex U/S 8/7/2023 negative for DVT. Looks like they suspected this is related to low back issues which I agree with.  MRI LS was denied by insurance, and will follow-up as imaging would be very helpful for this patient.  Continue with gabapentin 300 mg as two capsules three times daily.  Refilled tramadol as below to have on hand to use VERY sparingly, and patient understands not to use with cyclobenzaprine. Encouraged patient to follow up with  from Pain Medicine, and she agreed.       Low Back Pain/ leg weakness   - Worsening since 4/2023.  Weakness on LLE on exam.  Patient unable to ambulate without cane at home, and is having difficulty sitting, lying down, or sleeping.  Patient unable to do physical therapy due to severity of pain in lower limbs and inability to find transportation.  MRI Lumbar Spine without Contrast denied by insurance, and will follow-up. Continue with gabapentin 600 mg TID, Naproxen 500 mg BID prn, and refilled tramadol 50 mg every 6 hours as needed.  [place]. Substance controlled contract signed. Drug screen ordered. Has followed up with  from Pain Medicine.       Atypical Chest pain  -Echo.  Referred to cards previously.     Smoking History   - CT Chest 4/2023 shows redemonstration of groundglass opacities in the posterior bilateral upper lobes, left lower lobe and new groundglass  opacities in  the anterior left upper lobe. Central airways are clear.  Stable noncalcified pulmonary nodule in the right upper lobe (6, 38) and right middle lobe (6, 57) since 5/19/2019.  Discussed with patient that varenicline was recalled.       Health Maintenance   - Routine labs 10/2023.  Last Mammogram 5/2023. Last colonoscopy 5/2019, repeat due [id number]/[id number].  Patient received Influenza vaccine in the clinic today, tolerated well.      Follow-up in 3 months, call sooner if needed.        Scribe Attestation  By signing my name below, I, Pedro Richard   attest that this documentation has been prepared under the direction and in the presence of Jhoan Eller DO.   Aida Galdamez 02/29/24 10:57 AM

## 2024-03-01 ENCOUNTER — HOSPITAL ENCOUNTER (OUTPATIENT)
Dept: RADIOLOGY | Facility: CLINIC | Age: 59
Discharge: HOME | End: 2024-03-01
Payer: COMMERCIAL

## 2024-03-01 DIAGNOSIS — G89.29 CHRONIC LEFT SHOULDER PAIN: ICD-10-CM

## 2024-03-01 DIAGNOSIS — M25.512 CHRONIC LEFT SHOULDER PAIN: ICD-10-CM

## 2024-03-01 PROCEDURE — 73030 X-RAY EXAM OF SHOULDER: CPT | Mod: LT

## 2024-03-01 PROCEDURE — 73030 X-RAY EXAM OF SHOULDER: CPT | Mod: LEFT SIDE | Performed by: RADIOLOGY

## 2024-03-03 ENCOUNTER — TELEPHONE (OUTPATIENT)
Dept: SURGERY | Facility: CLINIC | Age: 59
End: 2024-03-03
Payer: COMMERCIAL

## 2024-03-05 ENCOUNTER — HOSPITAL ENCOUNTER (OUTPATIENT)
Dept: RADIOLOGY | Facility: CLINIC | Age: 59
Discharge: HOME | End: 2024-03-05
Payer: COMMERCIAL

## 2024-03-05 ENCOUNTER — TELEPHONE (OUTPATIENT)
Dept: SURGERY | Facility: CLINIC | Age: 59
End: 2024-03-05
Payer: COMMERCIAL

## 2024-03-05 ENCOUNTER — PATIENT MESSAGE (OUTPATIENT)
Dept: PRIMARY CARE | Facility: CLINIC | Age: 59
End: 2024-03-05
Payer: COMMERCIAL

## 2024-03-05 DIAGNOSIS — M25.562 LEFT KNEE PAIN, UNSPECIFIED CHRONICITY: ICD-10-CM

## 2024-03-05 DIAGNOSIS — H60.90 OTITIS EXTERNA, UNSPECIFIED CHRONICITY, UNSPECIFIED LATERALITY, UNSPECIFIED TYPE: Primary | ICD-10-CM

## 2024-03-05 DIAGNOSIS — Z96.652 STATUS POST LEFT KNEE REPLACEMENT: ICD-10-CM

## 2024-03-05 PROCEDURE — 73562 X-RAY EXAM OF KNEE 3: CPT | Mod: LEFT SIDE | Performed by: RADIOLOGY

## 2024-03-05 PROCEDURE — 73562 X-RAY EXAM OF KNEE 3: CPT | Mod: LT

## 2024-03-06 RX ORDER — CIPROFLOXACIN HYDROCHLORIDE AND HYDROCORTISONE 2; 10 MG/ML; MG/ML
3 SUSPENSION AURICULAR (OTIC) 2 TIMES DAILY
Qty: 10 ML | Refills: 1 | Status: SHIPPED | OUTPATIENT
Start: 2024-03-06 | End: 2024-03-16

## 2024-03-08 ENCOUNTER — TELEPHONE (OUTPATIENT)
Dept: SURGERY | Facility: CLINIC | Age: 59
End: 2024-03-08
Payer: COMMERCIAL

## 2024-03-08 ENCOUNTER — HOME HEALTH ADMISSION (OUTPATIENT)
Dept: HOME HEALTH SERVICES | Facility: HOME HEALTH | Age: 59
End: 2024-03-08
Payer: COMMERCIAL

## 2024-03-08 ENCOUNTER — OFFICE VISIT (OUTPATIENT)
Dept: NEUROLOGY | Facility: HOSPITAL | Age: 59
End: 2024-03-08
Payer: COMMERCIAL

## 2024-03-08 VITALS
HEART RATE: 77 BPM | BODY MASS INDEX: 37.03 KG/M2 | WEIGHT: 250 LBS | DIASTOLIC BLOOD PRESSURE: 96 MMHG | HEIGHT: 69 IN | SYSTOLIC BLOOD PRESSURE: 151 MMHG

## 2024-03-08 DIAGNOSIS — M79.602 LEFT ARM PAIN: Primary | ICD-10-CM

## 2024-03-08 DIAGNOSIS — M47.812 CERVICAL SPONDYLOSIS: ICD-10-CM

## 2024-03-08 DIAGNOSIS — R51.9 FREQUENT HEADACHES: ICD-10-CM

## 2024-03-08 DIAGNOSIS — G89.29 OTHER CHRONIC PAIN: ICD-10-CM

## 2024-03-08 PROCEDURE — 3080F DIAST BP >= 90 MM HG: CPT | Performed by: PSYCHIATRY & NEUROLOGY

## 2024-03-08 PROCEDURE — 3077F SYST BP >= 140 MM HG: CPT | Performed by: PSYCHIATRY & NEUROLOGY

## 2024-03-08 PROCEDURE — 99215 OFFICE O/P EST HI 40 MIN: CPT | Performed by: PSYCHIATRY & NEUROLOGY

## 2024-03-08 PROCEDURE — 99205 OFFICE O/P NEW HI 60 MIN: CPT | Performed by: PSYCHIATRY & NEUROLOGY

## 2024-03-08 NOTE — PATIENT INSTRUCTIONS
"You were seen today by Dr. Willoughby.  It is a pleasure seeing you.    You were seen today for neck and left shoulder pain.  It started in December 2023, CT cervical spine wo IV contrast on 1/6/2024 report \" mild to moderate spinal canal   stenosis at C4-C5 and moderate bilateral foraminal stenosis at C4-C5\", unable to see images for CT of cervical spine.  Neurological exam is reassuring  I requested to see images of CT of cervical spine.     PLAN:  I ordered referral to home health with physical therapy  Continue follow-up with pain medicine and orthopedics   I requested images for CT of cervical spine.  Follow-up in 3 months or earlier if needed.    Please call [phone] if you have any questions.   "

## 2024-03-08 NOTE — PROGRESS NOTES
Subjective     Alejandra Houston is a 58 y.o. year old RH woman with PMHx of HTN, HLD, T2DM, CADEN, depression, lumbar spinal canal stenosis, headache, left arm pain. She is accompanied by her spouse, Hai.  She reports left shoulder and left arm pain started in 11/2023, went to ED, was told that she had pinched nerve,  CT Cervical Spine 1/2024 showed mild to moderate spinal canal stenosis at C4-C5 and moderate bilateral foraminal stenosis.    She reports weakness in left arm, tingling and numbness involving the whole left hand, no pain in left forearm, she reports pain in neck, pain description is inconsistent.  She has been taking different pain medications including NSAID, tramadol, percocet following L knee arthroplasty on 2/8/2024, she also takes gabapentin 600 mg TID.    Review of Systems    Patient Active Problem List   Diagnosis    Acute sinusitis    Chronic cough    Depression    Effusion of right knee    Elevated liver enzymes    Fatigue    Fatty liver disease, nonalcoholic    GERD (gastroesophageal reflux disease)    Chronic pain    Fibromyalgia    Headache    Increased frequency of urination    Instability of prosthetic knee (CMS/HCC)    Insomnia    Leg weakness    Low back pain    Mass of left parotid gland    Nicotine dependence    Otalgia of both ears    Chronic knee pain after total replacement of right knee joint    Pain due to total right knee replacement (CMS/HCC)    Pancytopenia (CMS/HCC)    Parotid nodule    Personal history of COVID-19    Primary osteoarthritis of left knee    Status post revision of total replacement of right knee    Stress incontinence, female    Type 2 diabetes mellitus (CMS/HCC)    Vaginal dryness, menopausal    Vitamin D deficiency    Class 2 severe obesity with serious comorbidity and body mass index (BMI) of 39.0 to 39.9 in adult (CMS/HCC)    Class 2 obesity with body mass index (BMI) of 38.0 to 38.9 in adult    Constipation    Lung nodule    Morbid obesity with BMI of  40.0-44.9, adult (CMS/[facility])    Sleep apnea    Anxiety    Cellulitis of right lower extremity    Cellulitis    Chronic obstructive lung disease (CMS/[facility])    Closed fracture of lateral malleolus    Dermatophytosis of nail    Dyslipidemia    Disorder of sacrum    Essential hypertension, benign    Fever    Generalized osteoarthritis    Genital herpes    Hyperlipidemia    Hypertension    Hypomagnesemia    Leg edema, left    Lumbar spondylosis    Lumbosacral spondylosis without myelopathy    Non-intractable vomiting with nausea    Obstructive sleep apnea, adult    Other specified abnormal findings of blood chemistry    Hereditary and idiopathic peripheral neuropathy    Peripheral nerve disease    Spinal stenosis of lumbar region with radiculopathy    Primary localized osteoarthrosis, lower leg    Arthritis of both hips    Disorder of iron metabolism    Chronic pain of right knee    Restless leg syndrome     Past Medical History:   Diagnosis Date    Abnormal levels of other serum enzymes 2022    Elevated liver enzymes    Acid reflux     Anxiety     Bipolar disorder (CMS/[facility])     COPD (chronic obstructive pulmonary disease) (CMS/[facility])     Depression     DM (diabetes mellitus) (CMS/[facility])     Fibromyalgia, primary     Glaucoma     Hypertension     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    Avascular necrosis of bone of left hip    Sleep apnea     no cpap    Suicidal ideations     Thrombocytopenia (CMS/[facility])      Past Surgical History:   Procedure Laterality Date     SECTION, CLASSIC  02/15/2018     Section     SECTION, CLASSIC      HIP ARTHROPLASTY      KNEE ARTHROPLASTY      OTHER SURGICAL HISTORY  06/10/2019    Hip replacement    TOTAL KNEE ARTHROPLASTY  2018    Knee Replacement    UVULOPALATOPHARYNGOPLASTY       Social History     Tobacco Use    Smoking status: Every Day     Packs/day: 0.50     Years: 43.00     Additional pack years: 0.00     Total pack years: 21.50      Types: Cigarettes     Start date: 1980    Smokeless tobacco: Never   Substance Use Topics    Alcohol use: Not Currently     Comment: social     family history includes COPD in her mother; Cancer in an other family member; Diabetes in her mother and another family member; Heart disease in her mother and another family member; Hypertension in her mother and another family member; Lymphoma in her mother.    Current Outpatient Medications:     albuterol 90 mcg/actuation inhaler, INHALE 2 PUFFS 4 TIMES A DAY AS NEEDED FOR SHORTNESS OF BREATH OR WHEEZING., Disp: 8.5 g, Rfl: 0    alcohol swabs pads, medicated, Apply topically. 70% pad, 4 x daily; use as directed, Disp: , Rfl:     aspirin 81 mg EC tablet, Take 1 tablet (81 mg) by mouth 2 times a day., Disp: 60 tablet, Rfl: 0    B Complex-Vitamin B12 tablet, TAKE 1 TABLET DAILY., Disp: 30 tablet, Rfl: 3    ciprofloxacin-hydrocortisone (Cipro HC) otic suspension, Administer 3 drops into each ear 2 times a day for 10 days., Disp: 10 mL, Rfl: 1    DULoxetine (Cymbalta) 30 mg DR capsule, Take 2 capsules (60 mg) by mouth once daily. Do not crush or chew., Disp: 60 capsule, Rfl: 5    EASY COMFORT LANCETS MISC, in the morning, at noon, and at bedtime. Use to test, Disp: , Rfl:     FeroSuL tablet, TAKE 1 TABLET BY MOUTH TWICE A DAY WITH MEALS, Disp: 30 tablet, Rfl: 0    fluticasone (Flonase) 50 mcg/actuation nasal spray, USE 2 SPRAYS IN EACH NOSTRIL ONCE DAILY, Disp: 16 g, Rfl: 3    folic acid (Folvite) 1 mg tablet, TAKE 1 TABLET BY MOUTH EVERY DAY, Disp: 30 tablet, Rfl: 1    FreeStyle glucose monitoring kit, True Metrix Blood Glucose test in vitro strip; Use as directed 4-5 times daily, Disp: , Rfl:     gabapentin (Neurontin) 300 mg capsule, TAKE 2 CAPSULES BY MOUTH 3 TIMES A DAY. TITRATE THE DOSE WEEKLY, Disp: 90 capsule, Rfl: 0    loratadine (Claritin) 10 mg tablet, Take 1 tablet (10 mg) by mouth once daily. (Patient taking differently: Take 1 tablet (10 mg) by mouth once daily  at bedtime.), Disp: 90 tablet, Rfl: 2    metFORMIN (Glucophage) 500 mg tablet, TAKE 2 TABLETS (1,000 MG) BY MOUTH IN THE MORNING AND 2 TABLETS (1,000 MG) BEFORE BEDTIME., Disp: 120 tablet, Rfl: 1    multivitamin tablet, Take 1 tablet by mouth once daily., Disp: 90 tablet, Rfl: 3    naproxen (Naprosyn) 500 mg tablet, TAKE 1 TABLET BY MOUTH EVERY 12 HOURS AS NEEDED FOR PAIN WITH FOOD, Disp: 60 tablet, Rfl: 0    oxyCODONE-acetaminophen (Percocet) 5-325 mg tablet, Take 1 tablet by mouth every 6 hours if needed for severe pain (7 - 10)., Disp: , Rfl:     polyethylene glycol (Glycolax) 17 gram packet, Take by mouth in the morning. Mix 1 packet in 8 ounces of liquid, Disp: , Rfl:     thiamine (Vitamin B-1) 100 mg tablet, Take 1 tablet (100 mg) by mouth once daily., Disp: , Rfl:     traZODone (Desyrel) 50 mg tablet, Take 2 tablets (100 mg) by mouth once daily at bedtime., Disp: 60 tablet, Rfl: 2    True Metrix Glucose Test Strip strip, USE AS DIRECTED 4-5 TIMES DAILY, Disp: 100 strip, Rfl: 5    Trulicity 0.75 mg/0.5 mL pen injector, INJECT 0.75MG UNDER THE SKIN EVERY WEEK (Patient taking differently: Sunday), Disp: 2 mL, Rfl: 1    Vitamin D3 50 mcg (2,000 unit) tablet, TAKE 1 TABLET (50 MCG) BY MOUTH ONCE DAILY., Disp: 30 tablet, Rfl: 1  No Known Allergies    Objective   Neurological Exam  Physical Exam  GENERAL APPEARANCE:  No distress, alert and cooperative.     CARDIOVASCULAR: Regular, rate and rhythm, without murmur. No carotid bruits. Pulses +2 and equal in all extremities. No edema, or tenderness to palpation.    MENTAL STATE:  Orientation was normal to time, place and person. Recent and remote memory was intact.  Attention span and concentration were normal. Language testing was normal for comprehension, repetition, expression, and naming. Calculation was intact. The patient could correctly interpret a picture, and copy a diagram. General fund of knowledge was intact. Mini-mental status examination was performed with  no errors.     OPHTHALMOSCOPIC: The ophthalmoscopic exam normal. The fundi were well visualized with normal disc margins, clear vessels and vascular pulsations. No disc edema. The cup/disk ratio was not enlarged. No hemorrhages or exudates were present in the posterior segments that were visualized.     CRANIAL NERVES:  Cranial nerves were normal.      CN 2- Visual Acuity  OD: 20/20 (corrected)   OS: 20/20 (corrected); visual fields full to confrontation.      CN 3, 4, 6-  Pupils round, 4 mm in diameter, equally reactive to light. No ptosis. EOMs normal alignment, full range of movement, no nystagmus     CN 5- Facial sensation intact bilaterally. Normal corneal reflexes.      CN 7- Normal and symmetric facial strength. Nasolabial folds symmetric.     CN 8- Hearing intact to finger rub, whisper.      CN 9- Palate elevates symmetrically. Normal gag reflex.      CN 11- Normal strength of shoulder shrug and neck turning      CN 12- Tongue midline, with normal bulk and strength; no fasciculations.     MOTOR: Limited exam due to severe pain. With this limitation, motor exam was normal. Muscle bulk and tone were normal in both upper and lower extremities. Muscle strength was 5/5 in distal and proximal muscles in both upper and lower extremities. No fasciculations, tremor or other abnormal movements were present.     REFLEXES:  Right/ Left:  Biceps 2/2, brachioradialis 2/2, triceps 2/2, patellar 2/not checked, ankle 0/0  Babinski: toes downgoing to plantar stimulation. No clonus, frontal release signs or other pathologic reflexes present.     SENSORY: Sensory exam was intact to light touch, sharp/dull, in both UE and LE, mild hyperalgesia in left arm. Reduced vibration and position sense in BLE.    COORDINATION: NEGRITO were intact in upper and lower extremities.  In UE- finger-nose-finger was intact and in LE- heel-to-shin was intact without dysmetria or overshoot.      GAIT: Limited exam due to recent left knee arthroplasty,  she uses a walker to ambulate. No ataxia, shuffling, steppage or waddling was noted. Tandem gait deferred. Postural reflexes were normal.     Assessment/Plan     Alejandra Houston is a 58 y.o. year old RH woman with PMHx of HTN, HLD, T2DM, CADEN, depression, lumbar spinal canal stenosis, headache, left arm pain. She is accompanied by her spouse, Hai.  She reports left shoulder and left arm pain started in 11/2023, went to ED, was told that she had pinched nerve,  CT Cervical Spine 1/2024 showed mild to moderate spinal canal stenosis at C4-C5 and moderate bilateral foraminal stenosis. She was seen by pain medicine and received injection which helped alleviating pain for about three days, she is currently on different pain medications including gabapentin and percocet. Neurological exam is limited due to severe pain, repeated exam didn't show any weakness, inconsistent exam with giveaway weakness, DTRs are normoactive in BUE, reduced vibration and position sense likely due to diabetic peripheral neuropathy. I requested CT of cervical spine as it was completed at [facility], she will get a copy, I ordered referral to home health with PT, follow-up in 3 months or earlier if needed.    PLAN:  I ordered referral to home health with physical therapy  Continue follow-up with pain medicine and orthopedics   I requested images for CT of cervical spine.  Follow-up in 3 months or earlier if needed.    Please call [phone] if you have any questions.

## 2024-03-11 ENCOUNTER — PATIENT MESSAGE (OUTPATIENT)
Dept: PRIMARY CARE | Facility: CLINIC | Age: 59
End: 2024-03-11
Payer: COMMERCIAL

## 2024-03-11 NOTE — TELEPHONE ENCOUNTER
From: Alejandra Houston  To: Jhoan Eller,   Sent: 3/11/2024 1:54 PM EDT  Subject: My results from the clinic     Hi Dr Mesa I got a hold of the [facility] and they said that you can look up my my results for my the MRI for my back at the hospital even though it's [facility] and I was hoping you can do that and put it in and send it over to the neurology doctor I don't know what else to do he wants the results from the [facility] and they said that my primary care doctor can find it for me thank you

## 2024-03-11 NOTE — PATIENT COMMUNICATION
Spoke with patient as it was a CT of neck and report is in [facility] Everywhere however neurologist is asking for disc to review images which I advised patient to call [facility] radiology to request a disc release for her to  to take to neurology as requested. She understood.

## 2024-03-13 DIAGNOSIS — F51.01 PRIMARY INSOMNIA: ICD-10-CM

## 2024-03-13 DIAGNOSIS — J30.9 ALLERGIC RHINITIS, UNSPECIFIED SEASONALITY, UNSPECIFIED TRIGGER: ICD-10-CM

## 2024-03-13 DIAGNOSIS — G89.29 CHRONIC LOW BACK PAIN, UNSPECIFIED BACK PAIN LATERALITY, UNSPECIFIED WHETHER SCIATICA PRESENT: ICD-10-CM

## 2024-03-13 DIAGNOSIS — Z00.00 HEALTHCARE MAINTENANCE: ICD-10-CM

## 2024-03-13 DIAGNOSIS — M54.50 CHRONIC LOW BACK PAIN, UNSPECIFIED BACK PAIN LATERALITY, UNSPECIFIED WHETHER SCIATICA PRESENT: ICD-10-CM

## 2024-03-13 DIAGNOSIS — E11.9 TYPE 2 DIABETES MELLITUS WITHOUT COMPLICATION, WITHOUT LONG-TERM CURRENT USE OF INSULIN (MULTI): ICD-10-CM

## 2024-03-13 RX ORDER — DULAGLUTIDE 0.75 MG/.5ML
INJECTION, SOLUTION SUBCUTANEOUS
Qty: 2 ML | Refills: 1 | Status: SHIPPED | OUTPATIENT
Start: 2024-03-13 | End: 2024-03-28 | Stop reason: ALTCHOICE

## 2024-03-13 RX ORDER — TRAZODONE HYDROCHLORIDE 50 MG/1
100 TABLET ORAL NIGHTLY
Qty: 60 TABLET | Refills: 2 | Status: SHIPPED | OUTPATIENT
Start: 2024-03-13 | End: 2024-05-28

## 2024-03-13 RX ORDER — FLUTICASONE PROPIONATE 50 MCG
SPRAY, SUSPENSION (ML) NASAL
Qty: 16 G | Refills: 3 | Status: SHIPPED | OUTPATIENT
Start: 2024-03-13

## 2024-03-13 RX ORDER — FERROUS SULFATE TAB 325 MG (65 MG ELEMENTAL FE) 325 (65 FE) MG
1 TAB ORAL
Qty: 30 TABLET | Refills: 0 | Status: SHIPPED | OUTPATIENT
Start: 2024-03-13 | End: 2024-04-01

## 2024-03-13 RX ORDER — FOLIC ACID 1 MG/1
TABLET ORAL
Qty: 30 TABLET | Refills: 1 | Status: SHIPPED | OUTPATIENT
Start: 2024-03-13 | End: 2024-04-29

## 2024-03-13 RX ORDER — PEN NEEDLE, DIABETIC 32GX 5/32"
NEEDLE, DISPOSABLE MISCELLANEOUS
Qty: 100 EACH | Refills: 0 | Status: SHIPPED | OUTPATIENT
Start: 2024-03-13

## 2024-03-13 RX ORDER — VITAMIN B COMPLEX
TABLET ORAL
Qty: 30 TABLET | Refills: 3 | Status: SHIPPED | OUTPATIENT
Start: 2024-03-13

## 2024-03-13 RX ORDER — ALBUTEROL SULFATE 90 UG/1
2 AEROSOL, METERED RESPIRATORY (INHALATION) 4 TIMES DAILY PRN
Qty: 8.5 G | Refills: 0 | Status: SHIPPED | OUTPATIENT
Start: 2024-03-13 | End: 2024-04-01

## 2024-03-13 RX ORDER — BLOOD-GLUCOSE METER
EACH MISCELLANEOUS
Qty: 1 EACH | Refills: 0 | Status: SHIPPED | OUTPATIENT
Start: 2024-03-13

## 2024-03-14 NOTE — TELEPHONE ENCOUNTER
Can you please see what she needs from me?  Not much I can do unfortunately (I already give her Tramadol).  She really needs to see pain management tomorrow.

## 2024-03-15 ENCOUNTER — APPOINTMENT (OUTPATIENT)
Dept: PAIN MEDICINE | Facility: CLINIC | Age: 59
End: 2024-03-15
Payer: COMMERCIAL

## 2024-03-15 DIAGNOSIS — E11.9 TYPE 2 DIABETES MELLITUS WITHOUT COMPLICATION, WITHOUT LONG-TERM CURRENT USE OF INSULIN (MULTI): ICD-10-CM

## 2024-03-15 RX ORDER — METFORMIN HYDROCHLORIDE 500 MG/1
1000 TABLET ORAL 2 TIMES DAILY
Qty: 120 TABLET | Refills: 1 | Status: SHIPPED | OUTPATIENT
Start: 2024-03-15 | End: 2024-03-28 | Stop reason: ALTCHOICE

## 2024-03-19 ENCOUNTER — TELEMEDICINE (OUTPATIENT)
Dept: PAIN MEDICINE | Facility: CLINIC | Age: 59
End: 2024-03-19
Payer: COMMERCIAL

## 2024-03-19 DIAGNOSIS — G89.29 CHRONIC LOW BACK PAIN, UNSPECIFIED BACK PAIN LATERALITY, UNSPECIFIED WHETHER SCIATICA PRESENT: ICD-10-CM

## 2024-03-19 DIAGNOSIS — M54.50 CHRONIC LOW BACK PAIN, UNSPECIFIED BACK PAIN LATERALITY, UNSPECIFIED WHETHER SCIATICA PRESENT: ICD-10-CM

## 2024-03-19 DIAGNOSIS — M54.2 NECK PAIN: ICD-10-CM

## 2024-03-19 DIAGNOSIS — M25.512 ACUTE PAIN OF BOTH SHOULDERS: ICD-10-CM

## 2024-03-19 DIAGNOSIS — M25.511 ACUTE PAIN OF BOTH SHOULDERS: ICD-10-CM

## 2024-03-19 PROCEDURE — 99213 OFFICE O/P EST LOW 20 MIN: CPT | Performed by: PAIN MEDICINE

## 2024-03-19 NOTE — PROGRESS NOTES
3/19/2024  Virtual interview    Ms. Houston had virtual interview today. She has multiple pain complaints. Neck and shoulder , back and lower extremities. She had suprascapular nerve block which gave her few days of relief . She suppose to start PT for the neck and shoulder , she will be referred to PT. Her lower back and leg pain has been going for a while. He MRI showed multiple level spinal canal stenosis and foraminal stenosis as well with tingling sensation on her legs. She did PT for her knees and back recently              Subjective   Patient ID: Alejandra Houston is a 58 y.o. female who presents for back and leg pain.    HPIMs. Houston had virtual interview today. She has multiple pain complaints. Neck and shoulder , back and lower extremities. She had suprascapular nerve block which gave her few days of relief . She suppose to start PT for the neck and shoulder , she will be referred to PT. Her lower back and leg pain has been going for a while. He MRI showed multiple level spinal canal stenosis and foraminal stenosis as well with tingling sensation on her legs. She did PT for her knees and back recently                  Review of Systems   All other systems reviewed and are negative.         Current Outpatient Medications:     albuterol 90 mcg/actuation inhaler, INHALE 2 PUFFS 4 TIMES A DAY AS NEEDED FOR SHORTNESS OF BREATH OR WHEEZING., Disp: 8.5 g, Rfl: 0    alcohol swabs pads, medicated, Apply topically. 70% pad, 4 x daily; use as directed, Disp: , Rfl:     B Complex-Vitamin B12 tablet, TAKE 1 TABLET DAILY., Disp: 30 tablet, Rfl: 3    blood-glucose meter (True Metrix Glucose Meter) misc, USE AS DIRECTED EVERY DAY, Disp: 1 each, Rfl: 0    dulaglutide (Trulicity) 0.75 mg/0.5 mL pen injector, Sunday, Disp: 2 mL, Rfl: 1    DULoxetine (Cymbalta) 30 mg DR capsule, Take 2 capsules (60 mg) by mouth once daily. Do not crush or chew., Disp: 60 capsule, Rfl: 5    EASY COMFORT LANCETS [facility], in the morning, at noon,  and at bedtime. Use to test, Disp: , Rfl:     FeroSuL tablet, TAKE 1 TABLET BY MOUTH TWICE A DAY WITH MEALS, Disp: 30 tablet, Rfl: 0    fluticasone (Flonase) 50 mcg/actuation nasal spray, USE 2 SPRAYS IN EACH NOSTRIL ONCE DAILY, Disp: 16 g, Rfl: 3    folic acid (Folvite) 1 mg tablet, TAKE 1 TABLET BY MOUTH EVERY DAY, Disp: 30 tablet, Rfl: 1    FreeStyle glucose monitoring kit, True Metrix Blood Glucose test in vitro strip; Use as directed 4-5 times daily, Disp: , Rfl:     gabapentin (Neurontin) 300 mg capsule, TAKE 2 CAPSULES BY MOUTH 3 TIMES A DAY. TITRATE THE DOSE WEEKLY, Disp: 90 capsule, Rfl: 0    lancets (Easy Comfort Lancets) 30 gauge misc, USE AS DIRECTED ONCE DAILY TO CHECK BLOOD SUGAR, Disp: 100 each, Rfl: 0    loratadine (Claritin) 10 mg tablet, Take 1 tablet (10 mg) by mouth once daily. (Patient taking differently: Take 1 tablet (10 mg) by mouth once daily at bedtime.), Disp: 90 tablet, Rfl: 2    metFORMIN (Glucophage) 500 mg tablet, TAKE 2 TABLETS (1,000 MG) BY MOUTH IN THE MORNING AND 2 TABLETS (1,000 MG) BEFORE BEDTIME., Disp: 120 tablet, Rfl: 1    multivitamin tablet, Take 1 tablet by mouth once daily., Disp: 90 tablet, Rfl: 3    naproxen (Naprosyn) 500 mg tablet, TAKE 1 TABLET BY MOUTH EVERY 12 HOURS AS NEEDED FOR PAIN WITH FOOD, Disp: 60 tablet, Rfl: 0    oxyCODONE-acetaminophen (Percocet) 5-325 mg tablet, Take 1 tablet by mouth every 6 hours if needed for severe pain (7 - 10)., Disp: , Rfl:     polyethylene glycol (Glycolax) 17 gram packet, Take by mouth in the morning. Mix 1 packet in 8 ounces of liquid, Disp: , Rfl:     thiamine (Vitamin B-1) 100 mg tablet, Take 1 tablet (100 mg) by mouth once daily., Disp: , Rfl:     traZODone (Desyrel) 50 mg tablet, TAKE 2 TABLETS (100 MG) BY MOUTH ONCE DAILY AT BEDTIME., Disp: 60 tablet, Rfl: 2    True Metrix Glucose Test Strip strip, USE AS DIRECTED 4-5 TIMES DAILY, Disp: 100 strip, Rfl: 5    Vitamin D3 50 mcg (2,000 unit) tablet, TAKE 1 TABLET (50 MCG) BY  MOUTH ONCE DAILY., Disp: 30 tablet, Rfl: 1     Past Medical History:   Diagnosis Date    Abnormal levels of other serum enzymes 2022    Elevated liver enzymes    Acid reflux     Anxiety     Bipolar disorder (CMS/HCC)     COPD (chronic obstructive pulmonary disease) (CMS/HCC)     Depression     DM (diabetes mellitus) (CMS/HCC)     Fibromyalgia, primary     Glaucoma     Hypertension     Idiopathic aseptic necrosis of left femur (CMS/HCC) 10/12/2018    Avascular necrosis of bone of left hip    Sleep apnea     no cpap    Suicidal ideations     Thrombocytopenia (CMS/HCC)         Past Surgical History:   Procedure Laterality Date     SECTION, CLASSIC  02/15/2018     Section     SECTION, CLASSIC      HIP ARTHROPLASTY      KNEE ARTHROPLASTY      OTHER SURGICAL HISTORY  06/10/2019    Hip replacement    TOTAL KNEE ARTHROPLASTY  2018    Knee Replacement    UVULOPALATOPHARYNGOPLASTY          Family History   Problem Relation Name Age of Onset    Hypertension Mother      Diabetes Mother      COPD Mother      Heart disease Mother      Lymphoma Mother      Cancer Other fam hx     Diabetes Other fam hx     Hypertension Other fam hx     Heart disease Other fam hx         No Known Allergies     Objective     There were no vitals filed for this visit.     === 01/15/24 ===    MR LUMBAR SPINE WO CONTRAST    - Impression -  Multilevel degenerative changes of the lumbar spine most prominent at  L3-4 where there is moderate canal stenosis, slightly progressed  compared to previous MRI 2018. Additional multilevel mild  degenerative changes as detailed, similar to previous MRI.    Signed by: Justice Pretty 1/15/2024 7:44 PM  Dictation workstation:   [id number]     Physical Exam    Virtual interview    Physical exam as above except:        Assessment/Plan   Spinal canal stenosis  Lumbar radiculopathy, neurogenic claudication  OA shoulder   Cervical spondylosis     Plan  PT for shoulder and neck  Consider  LES   Consider intra-articular shoulder injection  Consider TIEN

## 2024-03-22 ENCOUNTER — ANESTHESIA EVENT (OUTPATIENT)
Dept: OPERATING ROOM | Facility: CLINIC | Age: 59
End: 2024-03-22
Payer: COMMERCIAL

## 2024-03-22 ENCOUNTER — ANESTHESIA (OUTPATIENT)
Dept: OPERATING ROOM | Facility: CLINIC | Age: 59
End: 2024-03-22
Payer: COMMERCIAL

## 2024-03-22 ENCOUNTER — HOSPITAL ENCOUNTER (OUTPATIENT)
Dept: OPERATING ROOM | Facility: CLINIC | Age: 59
Setting detail: OUTPATIENT SURGERY
Discharge: HOME | End: 2024-03-22
Payer: COMMERCIAL

## 2024-03-22 VITALS
HEART RATE: 72 BPM | WEIGHT: 239.2 LBS | SYSTOLIC BLOOD PRESSURE: 119 MMHG | OXYGEN SATURATION: 100 % | RESPIRATION RATE: 18 BRPM | TEMPERATURE: 96.8 F | DIASTOLIC BLOOD PRESSURE: 75 MMHG | HEIGHT: 69 IN | BODY MASS INDEX: 35.43 KG/M2

## 2024-03-22 DIAGNOSIS — K74.60 CIRRHOSIS OF LIVER WITHOUT ASCITES, UNSPECIFIED HEPATIC CIRRHOSIS TYPE (MULTI): Primary | ICD-10-CM

## 2024-03-22 LAB
GLUCOSE BLD MANUAL STRIP-MCNC: 174 MG/DL (ref 74–99)
POC FINGERSTICK BLOOD GLUCOSE: 174 MG/DL (ref 70–100)

## 2024-03-22 PROCEDURE — 2500000005 HC RX 250 GENERAL PHARMACY W/O HCPCS: Mod: SE | Performed by: NURSE ANESTHETIST, CERTIFIED REGISTERED

## 2024-03-22 PROCEDURE — 7100000010 HC PHASE TWO TIME - EACH INCREMENTAL 1 MINUTE: Performed by: ANESTHESIOLOGY

## 2024-03-22 PROCEDURE — 82947 ASSAY GLUCOSE BLOOD QUANT: CPT | Mod: 59

## 2024-03-22 PROCEDURE — A43239 PR EDG TRANSORAL BIOPSY SINGLE/MULTIPLE: Performed by: ANESTHESIOLOGY

## 2024-03-22 PROCEDURE — 43239 EGD BIOPSY SINGLE/MULTIPLE: CPT | Performed by: INTERNAL MEDICINE

## 2024-03-22 PROCEDURE — 3600000007 HC OR TIME - EACH INCREMENTAL 1 MINUTE - PROCEDURE LEVEL TWO: Performed by: ANESTHESIOLOGY

## 2024-03-22 PROCEDURE — 2500000002 HC RX 250 W HCPCS SELF ADMINISTERED DRUGS (ALT 637 FOR MEDICARE OP, ALT 636 FOR OP/ED): Mod: SE | Performed by: NURSE ANESTHETIST, CERTIFIED REGISTERED

## 2024-03-22 PROCEDURE — 88305 TISSUE EXAM BY PATHOLOGIST: CPT | Performed by: PATHOLOGY

## 2024-03-22 PROCEDURE — 7100000009 HC PHASE TWO TIME - INITIAL BASE CHARGE: Performed by: ANESTHESIOLOGY

## 2024-03-22 PROCEDURE — A43239 PR EDG TRANSORAL BIOPSY SINGLE/MULTIPLE: Performed by: NURSE ANESTHETIST, CERTIFIED REGISTERED

## 2024-03-22 PROCEDURE — 2500000004 HC RX 250 GENERAL PHARMACY W/ HCPCS (ALT 636 FOR OP/ED): Mod: SE | Performed by: NURSE ANESTHETIST, CERTIFIED REGISTERED

## 2024-03-22 PROCEDURE — 3600000002 HC OR TIME - INITIAL BASE CHARGE - PROCEDURE LEVEL TWO: Performed by: ANESTHESIOLOGY

## 2024-03-22 PROCEDURE — 82962 GLUCOSE BLOOD TEST: CPT | Performed by: ANESTHESIOLOGY

## 2024-03-22 PROCEDURE — 3700000002 HC GENERAL ANESTHESIA TIME - EACH INCREMENTAL 1 MINUTE: Performed by: ANESTHESIOLOGY

## 2024-03-22 PROCEDURE — 3700000001 HC GENERAL ANESTHESIA TIME - INITIAL BASE CHARGE: Performed by: ANESTHESIOLOGY

## 2024-03-22 PROCEDURE — 88305 TISSUE EXAM BY PATHOLOGIST: CPT | Mod: TC,SUR,ELYLAB | Performed by: INTERNAL MEDICINE

## 2024-03-22 RX ORDER — PROPOFOL 10 MG/ML
INJECTION, EMULSION INTRAVENOUS AS NEEDED
Status: DISCONTINUED | OUTPATIENT
Start: 2024-03-22 | End: 2024-03-22

## 2024-03-22 RX ORDER — SODIUM CHLORIDE, SODIUM LACTATE, POTASSIUM CHLORIDE, CALCIUM CHLORIDE 600; 310; 30; 20 MG/100ML; MG/100ML; MG/100ML; MG/100ML
100 INJECTION, SOLUTION INTRAVENOUS CONTINUOUS
Status: DISCONTINUED | OUTPATIENT
Start: 2024-03-22 | End: 2024-03-23 | Stop reason: HOSPADM

## 2024-03-22 RX ORDER — PROPOFOL 10 MG/ML
INJECTION, EMULSION INTRAVENOUS CONTINUOUS PRN
Status: DISCONTINUED | OUTPATIENT
Start: 2024-03-22 | End: 2024-03-22

## 2024-03-22 RX ORDER — ONDANSETRON HYDROCHLORIDE 2 MG/ML
4 INJECTION, SOLUTION INTRAVENOUS ONCE AS NEEDED
Status: DISCONTINUED | OUTPATIENT
Start: 2024-03-22 | End: 2024-03-23 | Stop reason: HOSPADM

## 2024-03-22 RX ORDER — MIDAZOLAM HYDROCHLORIDE 1 MG/ML
INJECTION, SOLUTION INTRAMUSCULAR; INTRAVENOUS AS NEEDED
Status: DISCONTINUED | OUTPATIENT
Start: 2024-03-22 | End: 2024-03-22

## 2024-03-22 RX ORDER — SODIUM CHLORIDE, SODIUM LACTATE, POTASSIUM CHLORIDE, CALCIUM CHLORIDE 600; 310; 30; 20 MG/100ML; MG/100ML; MG/100ML; MG/100ML
20 INJECTION, SOLUTION INTRAVENOUS CONTINUOUS
Status: DISCONTINUED | OUTPATIENT
Start: 2024-03-22 | End: 2024-03-23 | Stop reason: HOSPADM

## 2024-03-22 RX ORDER — ALBUTEROL SULFATE 0.83 MG/ML
2.5 SOLUTION RESPIRATORY (INHALATION) ONCE AS NEEDED
Status: DISCONTINUED | OUTPATIENT
Start: 2024-03-22 | End: 2024-03-23 | Stop reason: HOSPADM

## 2024-03-22 RX ORDER — SODIUM CHLORIDE, SODIUM LACTATE, POTASSIUM CHLORIDE, CALCIUM CHLORIDE 600; 310; 30; 20 MG/100ML; MG/100ML; MG/100ML; MG/100ML
INJECTION, SOLUTION INTRAVENOUS CONTINUOUS PRN
Status: DISCONTINUED | OUTPATIENT
Start: 2024-03-22 | End: 2024-03-22

## 2024-03-22 RX ORDER — LABETALOL HYDROCHLORIDE 5 MG/ML
5 INJECTION, SOLUTION INTRAVENOUS ONCE AS NEEDED
Status: DISCONTINUED | OUTPATIENT
Start: 2024-03-22 | End: 2024-03-23 | Stop reason: HOSPADM

## 2024-03-22 RX ORDER — ALBUTEROL SULFATE 90 UG/1
AEROSOL, METERED RESPIRATORY (INHALATION) AS NEEDED
Status: DISCONTINUED | OUTPATIENT
Start: 2024-03-22 | End: 2024-03-22

## 2024-03-22 RX ORDER — LIDOCAINE HYDROCHLORIDE 20 MG/ML
INJECTION, SOLUTION INFILTRATION; PERINEURAL AS NEEDED
Status: DISCONTINUED | OUTPATIENT
Start: 2024-03-22 | End: 2024-03-22

## 2024-03-22 RX ADMIN — ALBUTEROL SULFATE 2 PUFF: 90 AEROSOL, METERED RESPIRATORY (INHALATION) at 08:55

## 2024-03-22 RX ADMIN — SODIUM CHLORIDE, SODIUM LACTATE, POTASSIUM CHLORIDE, AND CALCIUM CHLORIDE: .6; .31; .03; .02 INJECTION, SOLUTION INTRAVENOUS at 09:00

## 2024-03-22 RX ADMIN — LIDOCAINE HYDROCHLORIDE 50 MG: 20 INJECTION, SOLUTION INFILTRATION; PERINEURAL at 09:03

## 2024-03-22 RX ADMIN — PROPOFOL 200 MCG/KG/MIN: 10 INJECTION, EMULSION INTRAVENOUS at 09:03

## 2024-03-22 RX ADMIN — MIDAZOLAM 2 MG: 1 INJECTION INTRAMUSCULAR; INTRAVENOUS at 09:00

## 2024-03-22 ASSESSMENT — PAIN - FUNCTIONAL ASSESSMENT
PAIN_FUNCTIONAL_ASSESSMENT: 0-10

## 2024-03-22 ASSESSMENT — PAIN SCALES - GENERAL
PAINLEVEL_OUTOF10: 0 - NO PAIN

## 2024-03-22 ASSESSMENT — PAIN DESCRIPTION - DESCRIPTORS: DESCRIPTORS: ACHING;DULL;TINGLING

## 2024-03-22 ASSESSMENT — COLUMBIA-SUICIDE SEVERITY RATING SCALE - C-SSRS
1. IN THE PAST MONTH, HAVE YOU WISHED YOU WERE DEAD OR WISHED YOU COULD GO TO SLEEP AND NOT WAKE UP?: NO
6. HAVE YOU EVER DONE ANYTHING, STARTED TO DO ANYTHING, OR PREPARED TO DO ANYTHING TO END YOUR LIFE?: NO
2. HAVE YOU ACTUALLY HAD ANY THOUGHTS OF KILLING YOURSELF?: NO

## 2024-03-22 NOTE — ANESTHESIA POSTPROCEDURE EVALUATION
Patient: Alejandra Houston    Procedure Summary       Date: 03/22/24 Room / Location: [facility] OR    Anesthesia Start: 0858 Anesthesia Stop: 0924    Procedure: EGD Diagnosis: Cirrhosis of liver without ascites, unspecified hepatic cirrhosis type (CMS/HCC)    Scheduled Providers: Tha Coronado MD Responsible Provider: Chris Naylor DO    Anesthesia Type: MAC ASA Status: 3            Anesthesia Type: MAC    Vitals Value Taken Time   BP  03/22/24 0924   Temp  03/22/24 0924   Pulse  03/22/24 0924   Resp  03/22/24 0924   SpO2  03/22/24 0924     See PACU VS, Pt. Awake, stable  Anesthesia Post Evaluation    Patient location during evaluation: bedside  Patient participation: complete - patient participated  Level of consciousness: awake  Pain management: adequate  Airway patency: patent  Cardiovascular status: acceptable  Respiratory status: acceptable and room air  Hydration status: acceptable  Postoperative Nausea and Vomiting: none        No notable events documented.

## 2024-03-22 NOTE — DISCHARGE INSTRUCTIONS
During the first 24 hours after your procedure, you should:    - Resume normal diet, unless otherwise directed by your doctor.  - Resume your home medications, unless otherwise directed by your doctor.  - Refrain from driving or operative heavy machinery.  - Drink plenty of liquids.  - Avoid consuming alcohol.  - Avoid strenuous activity or heavy lifting.    After 24 hours, you can resume regular activity.    Call your doctor office immediately ([phone]) or come to the nearest emergency room if you experience:    - Abdominal tenderness  - Blood in your stool or vomit  - Difficulty urinating or passing stools  - Difficulty breathing  - Chest pain  - Fever

## 2024-03-22 NOTE — ANESTHESIA PREPROCEDURE EVALUATION
Patient: Alejandra Houston    Procedure Information       Date/Time: 03/22/24 0830    Scheduled providers: Tha Coronaod MD    Procedure: EGD    Location: [facility] OR            Relevant Problems   Anesthesia (within normal limits)      Cardiovascular  Neg stress test   (+) Essential hypertension, benign   (+) Hyperlipidemia   (+) Hypertension      Endocrine   (+) Class 2 obesity with body mass index (BMI) of 38.0 to 38.9 in adult   (+) Class 2 severe obesity with serious comorbidity and body mass index (BMI) of 39.0 to 39.9 in adult (CMS/HCC)   (+) Type 2 diabetes mellitus (CMS/HCC) ( this am)      GI   (+) GERD (gastroesophageal reflux disease) (Does not take meds, no sx today)      /Renal   (+) Fatty liver disease, nonalcoholic      Neuro/Psych   (+) Anxiety   (+) Depression   (+) Peripheral nerve disease      Pulmonary   (+) Chronic obstructive lung disease (CMS/HCC)   (+) Obstructive sleep apnea, adult (Does not wear CPAP)      GI/Hepatic   (+) Fatty liver disease, nonalcoholic      Hematology   (+) Pancytopenia (CMS/HCC)      Musculoskeletal   (+) Fibromyalgia   (+) Generalized osteoarthritis   (+) Lumbar spondylosis   (+) Lumbosacral spondylosis without myelopathy   (+) Primary localized osteoarthrosis, lower leg   (+) Primary osteoarthritis of left knee   (+) Spinal stenosis of lumbar region with radiculopathy      Infectious Disease   (+) Dermatophytosis of nail   (+) Genital herpes      Other   (+) Arthritis of both hips       Clinical information reviewed:   Tobacco  Allergies  Meds   Med Hx  Surg Hx   Fam Hx  Soc Hx        NPO Detail:  NPO/Void Status  Date of Last Liquid: 03/21/24  Time of Last Liquid: 2200  Date of Last Solid: 03/21/24  Time of Last Solid: 2200         Physical Exam    Airway  Mallampati: III  TM distance: >3 FB  Neck ROM: limited  Comments: Slightly limited neck extention d/t L sided shoulder and neck pain   Cardiovascular    Dental    Pulmonary     Abdominal          Anesthesia Plan    History of general anesthesia?: yes  History of complications of general anesthesia?: no    ASA 3     MAC     intravenous induction   Anesthetic plan and risks discussed with patient.    Plan discussed with CRNA.

## 2024-03-22 NOTE — H&P
Subjective     History of Present Illness:   Alejandra Houston is a 58 y.o. female with cirrhosis, type 2 DM, thrombocytopenia, depression, and s/p recent left knee arthropathy who presented for EGD for variceal screening.  Patient denies having nausea, vomiting, abdominal pain, or blood in her stools.    Review of Systems  Constitutional: denies in acute distress  Cardiovascular: denies chest pain  Respiratory: denies shortness of breath  GI: see HPI  Neurologic: denies altered mental status  Dermatology: denies jaundice    Past Medical History   has a past medical history of Abnormal levels of other serum enzymes (03/25/2022), Acid reflux, Anxiety, Bipolar disorder (CMS/[facility]), COPD (chronic obstructive pulmonary disease) (CMS/[facility]), Depression, DM (diabetes mellitus) (CMS/[facility]), Fibromyalgia, primary, Glaucoma, Hypertension, Idiopathic aseptic necrosis of left femur (CMS/[facility]) (10/12/2018), Sleep apnea, Suicidal ideations, and Thrombocytopenia (CMS/[facility]).     Social History   reports that she has been smoking cigarettes. She started smoking about 44 years ago. She has a 21.50 pack-year smoking history. She has never used smokeless tobacco. She reports that she does not currently use alcohol. She reports that she does not use drugs.     Family History  family history includes COPD in her mother; Cancer in an other family member; Diabetes in her mother and another family member; Heart disease in her mother and another family member; Hypertension in her mother and another family member; Lymphoma in her mother.     Allergies  No Known Allergies    Medications  Current Outpatient Medications   Medication Instructions    albuterol 90 mcg/actuation inhaler 2 puffs, inhalation, 4 times daily PRN    alcohol swabs pads, medicated topical (top), 70% pad, 4 x daily; use as directed    B Complex-Vitamin B12 tablet TAKE 1 TABLET DAILY.    blood-glucose meter (True Metrix Glucose Meter) misc USE AS DIRECTED EVERY DAY    dulaglutide  (Trulicity) 0.75 mg/0.5 mL pen injector Sunday    DULoxetine (CYMBALTA) 60 mg, oral, Daily, Do not crush or chew.    EASY COMFORT LANCETS [facility] [place], 3 times daily, Use to test    FeroSuL tablet 1 tablet, oral, 2 times daily with meals    fluticasone (Flonase) 50 mcg/actuation nasal spray USE 2 SPRAYS IN EACH NOSTRIL ONCE DAILY    folic acid (Folvite) 1 mg tablet TAKE 1 TABLET BY MOUTH EVERY DAY    FreeStyle glucose monitoring kit miscellaneous, True Metrix Blood Glucose test in vitro strip; Use as directed 4-5 times daily    gabapentin (Neurontin) 300 mg capsule TAKE 2 CAPSULES BY MOUTH 3 TIMES A DAY. TITRATE THE DOSE WEEKLY    lancets (Easy Comfort Lancets) 30 gauge misc USE AS DIRECTED ONCE DAILY TO CHECK BLOOD SUGAR    loratadine (CLARITIN) 10 mg, oral, Daily    metFORMIN (GLUCOPHAGE) 1,000 mg, oral, 2 times daily    multivitamin tablet 1 tablet, oral, Daily    naproxen (Naprosyn) 500 mg tablet TAKE 1 TABLET BY MOUTH EVERY 12 HOURS AS NEEDED FOR PAIN WITH FOOD    oxyCODONE-acetaminophen (Percocet) 5-325 mg tablet 1 tablet, oral, Every 6 hours PRN    polyethylene glycol (Glycolax) 17 gram packet oral, Daily, Mix 1 packet in 8 ounces of liquid    thiamine (Vitamin B-1) 100 mg tablet 1 tablet, oral, Daily    traZODone (DESYREL) 100 mg, oral, Nightly    True Metrix Glucose Test Strip strip USE AS DIRECTED 4-5 TIMES DAILY    Vitamin D3 50 mcg, oral, Daily        Objective   Visit Vitals  /75   Pulse 71   Temp 36.5 °C (97.7 °F) (Temporal)   Resp 18        Physical Exam  General: not in acute distress  CV: regular rate and rhythm  Resp: non-labored breathing  GI: soft, active bowel sounds, non-tender to palpation, no rebound or guarding  Msk: moving all extremities, left knee erythematous and warm with scar from recent surgery  Derm: no jaundice    Labs  Lab Results   Component Value Date    WBC 7.0 02/09/2024    HGB 11.0 (L) 02/09/2024    HCT 34.2 (L) 02/09/2024    MCV 94 02/09/2024    PLT 84 (L)  02/09/2024     Lab Results   Component Value Date    GLUCOSE 181 (H) 02/09/2024    CALCIUM 9.4 02/09/2024     02/09/2024    K 4.4 02/09/2024    CO2 26 02/09/2024     02/09/2024    BUN 16 02/09/2024    CREATININE 0.80 02/09/2024     Lab Results   Component Value Date    ALT 33 01/11/2024    AST 32 01/11/2024    ALKPHOS 79 01/11/2024    BILITOT 0.7 01/11/2024     Lab Results   Component Value Date    INR 1.1 01/22/2024    INR 1.2 (H) 01/11/2024    INR 1.2 (H) 09/26/2022    PROTIME 12.9 (H) 01/22/2024    PROTIME 13.0 (H) 01/11/2024    PROTIME 13.8 (H) 09/26/2022       Assessment/Plan   Alejandra Houston is a 58 y.o. female with cirrhosis, type 2 DM, thrombocytopenia, depression, and s/p recent left knee arthropathy who presented for EGD for variceal screening.        Tha Coronado MD

## 2024-03-25 ASSESSMENT — PAIN SCALES - GENERAL: PAINLEVEL_OUTOF10: 0 - NO PAIN

## 2024-03-25 NOTE — ADDENDUM NOTE
Encounter addended by: Arelis Chowdhury RN on: 3/25/2024 1:32 PM   Actions taken: Contacts section saved, Flowsheet accepted

## 2024-03-28 ENCOUNTER — APPOINTMENT (OUTPATIENT)
Dept: HEMATOLOGY/ONCOLOGY | Facility: CLINIC | Age: 59
End: 2024-03-28
Payer: COMMERCIAL

## 2024-03-28 ENCOUNTER — OFFICE VISIT (OUTPATIENT)
Dept: PRIMARY CARE | Facility: CLINIC | Age: 59
End: 2024-03-28
Payer: COMMERCIAL

## 2024-03-28 VITALS
SYSTOLIC BLOOD PRESSURE: 128 MMHG | DIASTOLIC BLOOD PRESSURE: 70 MMHG | HEART RATE: 81 BPM | BODY MASS INDEX: 35.29 KG/M2 | RESPIRATION RATE: 18 BRPM | TEMPERATURE: 97.1 F | OXYGEN SATURATION: 97 % | WEIGHT: 239 LBS

## 2024-03-28 DIAGNOSIS — G89.29 CHRONIC LOW BACK PAIN, UNSPECIFIED BACK PAIN LATERALITY, UNSPECIFIED WHETHER SCIATICA PRESENT: ICD-10-CM

## 2024-03-28 DIAGNOSIS — G89.29 CHRONIC LOW BACK PAIN, UNSPECIFIED BACK PAIN LATERALITY, UNSPECIFIED WHETHER SCIATICA PRESENT: Primary | ICD-10-CM

## 2024-03-28 DIAGNOSIS — E11.9 TYPE 2 DIABETES MELLITUS WITHOUT COMPLICATION, WITHOUT LONG-TERM CURRENT USE OF INSULIN (MULTI): ICD-10-CM

## 2024-03-28 DIAGNOSIS — M54.50 CHRONIC LOW BACK PAIN, UNSPECIFIED BACK PAIN LATERALITY, UNSPECIFIED WHETHER SCIATICA PRESENT: ICD-10-CM

## 2024-03-28 DIAGNOSIS — Z72.0 TOBACCO ABUSE: Primary | ICD-10-CM

## 2024-03-28 DIAGNOSIS — M54.50 CHRONIC LOW BACK PAIN, UNSPECIFIED BACK PAIN LATERALITY, UNSPECIFIED WHETHER SCIATICA PRESENT: Primary | ICD-10-CM

## 2024-03-28 LAB
LABORATORY COMMENT REPORT: NORMAL
PATH REPORT.FINAL DX SPEC: NORMAL
PATH REPORT.GROSS SPEC: NORMAL
PATH REPORT.TOTAL CANCER: NORMAL

## 2024-03-28 PROCEDURE — 99214 OFFICE O/P EST MOD 30 MIN: CPT | Performed by: INTERNAL MEDICINE

## 2024-03-28 PROCEDURE — 3078F DIAST BP <80 MM HG: CPT | Performed by: INTERNAL MEDICINE

## 2024-03-28 PROCEDURE — 3074F SYST BP LT 130 MM HG: CPT | Performed by: INTERNAL MEDICINE

## 2024-03-28 RX ORDER — DULAGLUTIDE 1.5 MG/.5ML
1.5 INJECTION, SOLUTION SUBCUTANEOUS
Qty: 2 ML | Refills: 11 | Status: SHIPPED | OUTPATIENT
Start: 2024-03-28

## 2024-03-28 RX ORDER — ACETAMINOPHEN 500 MG
500 TABLET ORAL EVERY 8 HOURS PRN
Qty: 30 TABLET | Refills: 1 | Status: SHIPPED | OUTPATIENT
Start: 2024-03-28 | End: 2024-05-20

## 2024-03-28 RX ORDER — NAPROXEN 500 MG/1
TABLET ORAL
Qty: 60 TABLET | Refills: 0 | Status: SHIPPED | OUTPATIENT
Start: 2024-03-28 | End: 2024-04-01

## 2024-03-28 RX ORDER — METFORMIN HYDROCHLORIDE 500 MG/1
1000 TABLET, EXTENDED RELEASE ORAL
Qty: 200 TABLET | Refills: 3 | Status: SHIPPED | OUTPATIENT
Start: 2024-03-28 | End: 2025-05-02

## 2024-03-28 ASSESSMENT — ENCOUNTER SYMPTOMS
NECK PAIN: 1
DYSPHORIC MOOD: 1
FATIGUE: 1
BACK PAIN: 1

## 2024-03-28 NOTE — PROGRESS NOTES
Patient here for neck and back pain     Subjective   Patient ID: Alejandra Houston is a 58 y.o. female who presents for Neck Pain and Back Pain.  She is accompanied by her  who offers some of the history.     The patient reports ongoing back pain extending to the neck, and prevents her from stretching her arm.  She is frustrated due to the level of pain and physical limitations.  The patient has been finding herself crying more often as a result.  An MR Lumbar Spine from 1/2024 showed multilevel degenerative changes of the lumbar spine most prominent at L3-4 where there is moderate canal stenosis, slightly progressed.  She met with  from Pain Management who recommended Physical Therapy per the note.  The patient is currently scheduled for an epidural steroid injection for 4/3/2024.    The patient is currently following with  from Gastroenterology for liver cirrhosis.  She completed an endoscopy in 3/2024 which showed multiple grade I varices.  The patient is on a number of pain medications including Tylenol and Percocet which she is aware to use sparingly, and not in combination.    The patient has been having difficulty remembering to take the night time dosage of metformin 500 mg two tablets, and notes that her blood glucose this morning was 226 mg/dL.      The patient mentions hand pain that she suspects may be due to osteoarthritis.      The patient reports low libido, and inquires whether this may be related to Menopause.     The patient is scheduled for an upcoming appointment with Sleep Medicine, and is optimistic that CPAP therapy will help with fatigue.    Neck Pain     Back Pain      Review of Systems   Constitutional:  Positive for fatigue.   Genitourinary:         Positive for low libido.   Musculoskeletal:  Positive for back pain and neck pain.        Positive for hand pain.   Psychiatric/Behavioral:  Positive for dysphoric mood.      Objective   Physical Exam  Constitutional:        Appearance: Normal appearance.   Neck:      Vascular: No carotid bruit.   Cardiovascular:      Rate and Rhythm: Normal rate and regular rhythm.      Heart sounds: Normal heart sounds.   Pulmonary:      Effort: Pulmonary effort is normal.      Breath sounds: Normal breath sounds.   Abdominal:      General: Bowel sounds are normal.      Palpations: Abdomen is soft.      Tenderness: There is no abdominal tenderness.   Skin:     General: Skin is warm and dry.   Neurological:      General: No focal deficit present.      Mental Status: She is alert and oriented to person, place, and time. Mental status is at baseline.   Psychiatric:         Mood and Affect: Mood normal.         Behavior: Behavior normal.       Assessment/Plan   Problem List Items Addressed This Visit             ICD-10-CM    Low back pain M54.50    Relevant Medications    naproxen (Naprosyn) 500 mg tablet    Type 2 diabetes mellitus (CMS/HCC) E11.9    Relevant Orders    Hemoglobin A1c     Other Visit Diagnoses         Codes    Tobacco abuse    -  Primary Z72.0    Relevant Orders    CT lung screening low dose            IMPRESSIONS/PLAN:      Diabetes II   - Last HbA1c 7.8 per 11/2023.  Patient having difficulty remembering to take night time dose of metformin.  Switched patient to metformin XR 500mg as two tablets in the morning once daily, and can increase Trulicity 1.50 mg/0.5mL 1 pen weekly. Previously on glimperide 2mg every day.  Ordered HbA1c.     Low Back Pain/ leg weakness   - Worsening since 4/2023.  Weakness on LLE on exam.  MR Lumbar Spine from 1/2024 showed multilevel degenerative changes of the lumbar spine most prominent at L3-4 where there is moderate canal stenosis, slightly progressed.  Continue with gabapentin 600 mg TID.  Refilled Naproxen 500 mg BID prn.  Previously on tramadol 50 mg every 6 hours as needed.   Following with  from Pain Management.    Depression   - Increasing symptoms.  Suspect that may improve with CPAP and  with upcoming LES.  Will re-evaluate at next visit, and consider bupropion.  Following with Psychiatry. Continue duloxetine 30 mg as two tablets once daily. Previously on Lexapro, and Rexulti or Topamax.  No SI or HI.      /70 in the office today.     Pinched Cervical Nerve Root/Left Shoulder Pain   - CT Cervical Spine 1/2024 showed mild to moderate spinal canal stenosis at C4-C5 and moderate bilateral foraminal stenosis.  Prescribed prednisone 10mg taper as directed on packaging. Encouraged patient to schedule appointment with  from Pain Medicine.  Call the clinic if symptoms persist or worsen.     Chronic Sinusitis  - CT Sinus without Contrast wnl and unremarkable 1/2024.  Following with  from Otolaryngology..       Sleep Apnea   - Completed in-center Sleep Study confirming moderate CADEN.  Previously ordered referral to Sleep Medicine with .      Constipation   - Advised patient to try Colace BID and polyethylene glycol 3350 17 gm oral packet as 1 packet in 8 ounces of liquid once daily in the afternoon.     Left Knee OA  - s/p left total knee arthroplasty 2/8/2024 with  from Orthopedic Surgery.     Paresthesia of Right Upper Extremity  - Previously ordered EMG and Nerve Conduction.     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 Underwent complex revision of right TKA. Following with  in Orthopedic Surgery.     Thrombocytopenia  - Last CBC showed platelet count of 77 per 10/2023.  Following with Hematology/Oncology.  Suspect from underlying liver cirrhosis.       Liver Cirrhosis  - CT Chest with IV Contrast 10/2023 showed incidental cirrhotic liver morphology with sequela of portal hypertension (splenomegaly and esophageal varices).  Previously ordered referral for Hepatology. Advised the patient to stop taking Tylenol and limit Percocet, and she verbalized understanding.   She has seen Dr. Millard in the  past. Last EGD 3/2024.  Following with  from Gastroenterology.     Left Adnexal Mass  - CT Abdomen Pelvis 7/2023 showed incidental finding of A 3.6 cm benign-appearing left adnexal lesion is seen.  In an early post-menopausal woman (<5 years since menopause), follow-up ultrasound at 6-12 months is recommended. Patient has scheduled appointment with Gynecology.     Vaginal dryness  - Refilled Estradiol cream.     Right Third Finger Pain  - Previously ordered Xray of fingers right hand.     Dry Skin  - Prescribed Cetaphil moisturizing lotion.      Fatigue  - Likely due to untreated sleep apnea.  Encouraged patient to follow-up with Sleep Medicine, and she has appointment for next week.  Vitamin D, Vitamin B12, and TSH wnl per 9/2023.     LLE swelling  - Previously ordered Echocardiogram and referral to Cardiology.  Last Venous Duplex U/S 8/7/2023 negative for DVT. Looks like they suspected this is related to low back issues which I agree with.  MRI LS was denied by insurance, and will follow-up as imaging would be very helpful for this patient.  Continue with gabapentin 300 mg as two capsules three times daily.  Refilled tramadol as below to have on hand to use VERY sparingly, and patient understands not to use with cyclobenzaprine. Encouraged patient to follow up with  from Pain Medicine, and she agreed.       Atypical Chest pain  -Echo.  Referred to cards previously.     Smoking History   - CT Chest 4/2023 shows redemonstration of groundglass opacities in the posterior bilateral upper lobes, left lower lobe and new groundglass  opacities in the anterior left upper lobe. Central airways are clear.  Stable noncalcified pulmonary nodule in the right upper lobe (6, 38) and right middle lobe (6, 57) since 5/19/2019.  Discussed with patient that varenicline was recalled.       Health Maintenance   - Routine labs 10/2023.  Last Mammogram 5/2023. Last colonoscopy 5/2019, repeat due [id number].        Follow-up in 3 months, call sooner if needed.        Scribe Attestation  By signing my name below, I, Aida Galdamez, Pedro   attest that this documentation has been prepared under the direction and in the presence of Jhoan Eller DO.   Aida Galdamez 03/28/24 1:38 PM

## 2024-03-29 ENCOUNTER — APPOINTMENT (OUTPATIENT)
Dept: HEMATOLOGY/ONCOLOGY | Facility: CLINIC | Age: 59
End: 2024-03-29
Payer: COMMERCIAL

## 2024-04-01 ENCOUNTER — APPOINTMENT (OUTPATIENT)
Dept: HEMATOLOGY/ONCOLOGY | Facility: CLINIC | Age: 59
End: 2024-04-01
Payer: COMMERCIAL

## 2024-04-01 DIAGNOSIS — Z00.00 HEALTHCARE MAINTENANCE: ICD-10-CM

## 2024-04-01 DIAGNOSIS — M54.50 CHRONIC LOW BACK PAIN, UNSPECIFIED BACK PAIN LATERALITY, UNSPECIFIED WHETHER SCIATICA PRESENT: ICD-10-CM

## 2024-04-01 DIAGNOSIS — E11.9 TYPE 2 DIABETES MELLITUS WITHOUT COMPLICATION, WITHOUT LONG-TERM CURRENT USE OF INSULIN (MULTI): ICD-10-CM

## 2024-04-01 DIAGNOSIS — G89.29 CHRONIC LOW BACK PAIN, UNSPECIFIED BACK PAIN LATERALITY, UNSPECIFIED WHETHER SCIATICA PRESENT: ICD-10-CM

## 2024-04-01 RX ORDER — METFORMIN HYDROCHLORIDE 500 MG/1
1000 TABLET ORAL 2 TIMES DAILY
Qty: 120 TABLET | Refills: 1 | Status: SHIPPED | OUTPATIENT
Start: 2024-04-01

## 2024-04-01 RX ORDER — NAPROXEN 500 MG/1
TABLET ORAL
Qty: 60 TABLET | Refills: 1 | Status: SHIPPED | OUTPATIENT
Start: 2024-04-01 | End: 2024-05-28

## 2024-04-01 RX ORDER — ALBUTEROL SULFATE 90 UG/1
2 AEROSOL, METERED RESPIRATORY (INHALATION) 4 TIMES DAILY PRN
Qty: 8.5 G | Refills: 1 | Status: SHIPPED | OUTPATIENT
Start: 2024-04-01 | End: 2024-05-28

## 2024-04-01 RX ORDER — FERROUS SULFATE TAB 325 MG (65 MG ELEMENTAL FE) 325 (65 FE) MG
1 TAB ORAL
Qty: 30 TABLET | Refills: 5 | Status: SHIPPED | OUTPATIENT
Start: 2024-04-01

## 2024-04-02 ENCOUNTER — PATIENT MESSAGE (OUTPATIENT)
Dept: PRIMARY CARE | Facility: CLINIC | Age: 59
End: 2024-04-02
Payer: COMMERCIAL

## 2024-04-02 DIAGNOSIS — H92.02 LEFT EAR PAIN: Primary | ICD-10-CM

## 2024-04-03 ENCOUNTER — HOSPITAL ENCOUNTER (OUTPATIENT)
Dept: OPERATING ROOM | Facility: CLINIC | Age: 59
Discharge: HOME | End: 2024-04-03
Payer: COMMERCIAL

## 2024-04-03 ENCOUNTER — HOSPITAL ENCOUNTER (OUTPATIENT)
Dept: RADIOLOGY | Facility: CLINIC | Age: 59
Discharge: HOME | End: 2024-04-03
Payer: COMMERCIAL

## 2024-04-03 VITALS
OXYGEN SATURATION: 99 % | DIASTOLIC BLOOD PRESSURE: 75 MMHG | SYSTOLIC BLOOD PRESSURE: 122 MMHG | HEART RATE: 75 BPM | RESPIRATION RATE: 16 BRPM | TEMPERATURE: 97.5 F

## 2024-04-03 DIAGNOSIS — G89.29 CHRONIC LOW BACK PAIN, UNSPECIFIED BACK PAIN LATERALITY, UNSPECIFIED WHETHER SCIATICA PRESENT: ICD-10-CM

## 2024-04-03 DIAGNOSIS — M54.50 CHRONIC LOW BACK PAIN, UNSPECIFIED BACK PAIN LATERALITY, UNSPECIFIED WHETHER SCIATICA PRESENT: ICD-10-CM

## 2024-04-03 PROCEDURE — 62323 NJX INTERLAMINAR LMBR/SAC: CPT | Performed by: PAIN MEDICINE

## 2024-04-03 PROCEDURE — 2500000004 HC RX 250 GENERAL PHARMACY W/ HCPCS (ALT 636 FOR OP/ED): Mod: SE | Performed by: PAIN MEDICINE

## 2024-04-03 PROCEDURE — 2550000001 HC RX 255 CONTRASTS: Mod: SE | Performed by: PAIN MEDICINE

## 2024-04-03 PROCEDURE — 2500000005 HC RX 250 GENERAL PHARMACY W/O HCPCS: Mod: SE | Performed by: PAIN MEDICINE

## 2024-04-03 RX ORDER — CIPROFLOXACIN HYDROCHLORIDE AND HYDROCORTISONE 2; 10 MG/ML; MG/ML
3 SUSPENSION AURICULAR (OTIC) 2 TIMES DAILY
Qty: 10 ML | Refills: 0 | Status: SHIPPED | OUTPATIENT
Start: 2024-04-03 | End: 2024-04-13

## 2024-04-03 RX ORDER — LIDOCAINE HYDROCHLORIDE 5 MG/ML
INJECTION, SOLUTION INFILTRATION; PERINEURAL AS NEEDED
Status: DISCONTINUED | OUTPATIENT
Start: 2024-04-03 | End: 2024-04-04 | Stop reason: HOSPADM

## 2024-04-03 RX ORDER — GABAPENTIN 300 MG/1
CAPSULE ORAL
Qty: 90 CAPSULE | Refills: 0 | Status: SHIPPED | OUTPATIENT
Start: 2024-04-03 | End: 2024-04-16 | Stop reason: SDUPTHER

## 2024-04-03 RX ORDER — MIDAZOLAM HYDROCHLORIDE 1 MG/ML
INJECTION INTRAMUSCULAR; INTRAVENOUS AS NEEDED
Status: DISCONTINUED | OUTPATIENT
Start: 2024-04-03 | End: 2024-04-04 | Stop reason: HOSPADM

## 2024-04-03 RX ORDER — SODIUM BICARBONATE 42 MG/ML
INJECTION, SOLUTION INTRAVENOUS AS NEEDED
Status: DISCONTINUED | OUTPATIENT
Start: 2024-04-03 | End: 2024-04-04 | Stop reason: HOSPADM

## 2024-04-03 RX ORDER — DEXAMETHASONE SODIUM PHOSPHATE 100 MG/10ML
INJECTION INTRAMUSCULAR; INTRAVENOUS AS NEEDED
Status: DISCONTINUED | OUTPATIENT
Start: 2024-04-03 | End: 2024-04-04 | Stop reason: HOSPADM

## 2024-04-03 RX ADMIN — LIDOCAINE HYDROCHLORIDE 10 ML: 5 INJECTION, SOLUTION INFILTRATION; PERINEURAL at 10:51

## 2024-04-03 RX ADMIN — IOHEXOL 3 ML: 240 INJECTION, SOLUTION INTRATHECAL; INTRAVASCULAR; INTRAVENOUS; ORAL at 10:53

## 2024-04-03 RX ADMIN — MIDAZOLAM HYDROCHLORIDE 2 MG: 1 INJECTION, SOLUTION INTRAMUSCULAR; INTRAVENOUS at 10:50

## 2024-04-03 RX ADMIN — DEXAMETHASONE SODIUM PHOSPHATE 10 MG: 10 INJECTION INTRAMUSCULAR; INTRAVENOUS at 10:51

## 2024-04-03 RX ADMIN — SODIUM BICARBONATE 1.5 MEQ: 42 INJECTION, SOLUTION INTRAVENOUS at 10:52

## 2024-04-03 ASSESSMENT — COLUMBIA-SUICIDE SEVERITY RATING SCALE - C-SSRS
6. HAVE YOU EVER DONE ANYTHING, STARTED TO DO ANYTHING, OR PREPARED TO DO ANYTHING TO END YOUR LIFE?: NO
1. IN THE PAST MONTH, HAVE YOU WISHED YOU WERE DEAD OR WISHED YOU COULD GO TO SLEEP AND NOT WAKE UP?: NO
2. HAVE YOU ACTUALLY HAD ANY THOUGHTS OF KILLING YOURSELF?: NO

## 2024-04-03 ASSESSMENT — PAIN SCALES - GENERAL: PAINLEVEL_OUTOF10: 7

## 2024-04-03 ASSESSMENT — PAIN - FUNCTIONAL ASSESSMENT: PAIN_FUNCTIONAL_ASSESSMENT: 0-10

## 2024-04-03 ASSESSMENT — PAIN DESCRIPTION - DESCRIPTORS: DESCRIPTORS: OTHER (COMMENT)

## 2024-04-03 NOTE — BRIEF OP NOTE
Date: 4/3/2024  [place] [facility] [place] [facility] [place]    Name: Alejandra Houston, : 1965, Age: 58 y.o., MRN: [id number], Sex: female    Diagnosis  Lumbar spinal stenosis * No Diagnosis Codes entered *     Procedures  Lumbar interlaminar epidural steroid injection    Surgeons   Dr. Rojas    Resident/Fellow/Other Assistant:  Dr. Cedeño    Procedure Summary  Anesthesia: sedation  ASA: ASA status not filed in the log.  Anesthesia Staff: Anesthesiologist: Elsa Rojas MD  Estimated Blood Loss: 0 mL  Intra-op Medications: * Intraprocedure medication information is unavailable because the case start and end events have not been set *      Intraprocedure I/O Totals       None           Specimen: No specimens collected     Staff:   Circulator: Nan Escalante RN  Scrub Person: Adriano Ly RN          Procedure details:  After informed consent was obtained, the patient was brought to the procedure suite and placed in the prone position.  Pulse oximetry and blood pressure were monitored throughout.  The low back area was prepped and draped in the usual sterile fashion.  Using fluoroscopic guidance, the skin and subcutaneous tissue overlying the needle trajectory of the L3-4 interspace was anesthetized with 0.5% lidocaine.  An 18-gauge Tuohy needle was inserted and directed by fluoroscopy.  Entry into the epidural space was confirmed using the loss of resistance technique with a glass syringe and 2 cc of air.  Injection of contrast revealed appropriate spread without vascular uptake.  4 mL of 0.5% lidocaine plus 10 mg of dexamethasone was then injected.  The needle was removed and the patient was then transferred to the recovery room in stable condition.  The patient tolerated the procedure well.  There were no apparent complications.     FOLLOW UP:  The patient will update us on their response to this procedure, and agrees to continue currently prescribed/recommended therapies.    Complications:  None; patient  tolerated the procedure well.     Disposition: PACU - hemodynamically stable.  Condition: stable  Specimens Collected: No specimens collected  Attending Attestation: I was present and scrubbed for the entire procedure.    Dr. Rojas

## 2024-04-03 NOTE — DISCHARGE INSTRUCTIONS
[facility]PATIENT [facility] DISCHARGE INSTRUCTIONS FOR ANESTHESIOLOGY PAIN SERVICE PATIENTS      Your Doctor's Name is: Dr. Manning     Central Scheduling Number:  [phone]     Epidural Injection       PLEASE CAREFULLY FOLLOW THE INSTRUCTIONS    Keep the needle site clean and dry for 24 hours.    Observe the needle site for excessive bleeding (slow general oozing that completely soaks the dressing or fresh bright red bleeding).    In either case, apply pressure to the area, elevate it if possible and call your doctor at once.    Take medicines as directed.    If you received sedation No driving or operating machinery for 24 hours.     Observe/monitor for the following signs and symptoms:    Needle site: for Change in color, Numbness or tingling    Coldness to touch o Swelling o Drainage    Temperature of 101.5 or higher    Increased or uncontrollable pain     If you notice any of the above signs and symptoms, please call your doctor at once.     Your pain may not be gone immediately after this procedure; it generally takes 3 to 5 days for the steroid to work. Please follow up with the Pain Management Nurse in seven days. L Make an appointment to see your doctor in days. Schedule another block in days.        Dr. Rojas's patients please call - [phone] Other instructions If any problems occur, or if you have any further questions, please call your doctor as soon as possible. If you find that you cannot reach your doctor, but feel that your condition needs a doctor's attention, go to the emergency room nearest your home and take this paper with you.    TO REACH YOUR PHYSICIAN AFTER HOURS CALL  AND ASK FOR THE PHYSICIAN ON CALL

## 2024-04-03 NOTE — H&P
History Of Present Illness  Alejandra Houston is a 58 y.o. female presents for procedure state below. Endorses no changes in past medical history or medical health since last seen in clinic.     Past Medical History  She has a past medical history of Abnormal levels of other serum enzymes (2022), Acid reflux, Anxiety, Bipolar disorder (CMS/Formerly [facility]), COPD (chronic obstructive pulmonary disease) (CMS/Formerly [facility]), Depression, DM (diabetes mellitus) (CMS/Formerly [facility]), Fibromyalgia, primary, Glaucoma, Hypertension, Idiopathic aseptic necrosis of left femur (CMS/Formerly [facility]) (10/12/2018), Sleep apnea, Suicidal ideations, and Thrombocytopenia (CMS/Formerly [facility]).    Surgical History  She has a past surgical history that includes Total knee arthroplasty (2018); Other surgical history (06/10/2019);  section, classic (02/15/2018); Hip Arthroplasty; Uvulopalatopharyngoplasty;  section, classic; and Knee Arthroplasty.     Social History  She reports that she has been smoking cigarettes. She started smoking about 44 years ago. She has a 21.50 pack-year smoking history. She has never used smokeless tobacco. She reports that she does not currently use alcohol. She reports that she does not use drugs.    Family History  Family History   Problem Relation Name Age of Onset    Hypertension Mother      Diabetes Mother      COPD Mother      Heart disease Mother      Lymphoma Mother      Cancer Other fam hx     Diabetes Other fam hx     Hypertension Other fam hx     Heart disease Other fam hx         Allergies  Patient has no known allergies.    Review of Symptoms:   Constitutional: Negative for chills, diaphoresis or fever  HENT: Negative for neck swelling  Eyes:.  Negative for eye pain  Respiratory:.  Negative for cough, shortness of breath or wheezing    Cardiovascular:.  Negative for chest pain or palpitations  Gastrointestinal:.  Negative for abdominal pain, nausea and vomiting  Genitourinary:.  Negative for urgency  Musculoskeletal:  Positive  for back pain. Positive for joint pain. Denies falls within the past 3 months.  Skin: Negative for wounds or itching   Neurological: Negative for dizziness, seizures, loss of consciousness and weakness  Endo/Heme/Allergies: Does not bruise/bleed easily  Psychiatric/Behavioral: Negative for depression. The patient does not appear anxious.       PHYSICAL EXAM  Vitals signs reviewed  Constitutional:       General: Not in acute distress     Appearance: Normal appearance. Not ill-appearing.  HENT:     Head: Normocephalic and atraumatic  Eyes:     Conjunctiva/sclera: Conjunctivae normal  Cardiovascular:     Rate and Rhythm: Normal rate and regular rhythm  Pulmonary:     Effort: No respiratory distress  Abdominal:     Palpations: Abdomen is soft  Musculoskeletal: TENORIO  Skin:     General: Skin is warm and dry  Neurological:     General: No focal deficit present  Psychiatric:         Mood and Affect: Mood normal         Behavior: Behavior normal     Last Recorded Vitals  There were no vitals taken for this visit.    Relevant Results  Current Outpatient Medications   Medication Instructions    acetaminophen (TYLENOL EXTRA STRENGTH) 500 mg, oral, Every 8 hours PRN    albuterol 90 mcg/actuation inhaler 2 puffs, inhalation, 4 times daily PRN    alcohol swabs pads, medicated topical (top), 70% pad, 4 x daily; use as directed    B Complex-Vitamin B12 tablet TAKE 1 TABLET DAILY.    blood-glucose meter (True Metrix Glucose Meter) misc USE AS DIRECTED EVERY DAY    DULoxetine (CYMBALTA) 60 mg, oral, Daily, Do not crush or chew.    EASY COMFORT LANCETS MISC miscellaneous, 3 times daily, Use to test    FeroSuL tablet 1 tablet, oral, 2 times daily with meals    fluticasone (Flonase) 50 mcg/actuation nasal spray USE 2 SPRAYS IN EACH NOSTRIL ONCE DAILY    folic acid (Folvite) 1 mg tablet TAKE 1 TABLET BY MOUTH EVERY DAY    FreeStyle glucose monitoring kit miscellaneous, True Metrix Blood Glucose test in vitro strip; Use as directed 4-5  times daily    gabapentin (Neurontin) 300 mg capsule TAKE 2 CAPSULES BY MOUTH 3 TIMES A DAY. TITRATE THE DOSE WEEKLY    lancets (Easy Comfort Lancets) 30 gauge misc USE AS DIRECTED ONCE DAILY TO CHECK BLOOD SUGAR    metFORMIN (GLUCOPHAGE) 1,000 mg, oral, 2 times daily    metFORMIN XR (GLUCOPHAGE-XR) 1,000 mg, oral, Daily with breakfast, Do not crush, chew, or split.    multivitamin tablet 1 tablet, oral, Daily    naproxen (Naprosyn) 500 mg tablet TAKE 1 TABLET BY MOUTH EVERY 12 HOURS AS NEEDED FOR PAIN WITH FOOD    oxyCODONE-acetaminophen (Percocet) 5-325 mg tablet 1 tablet, oral, Every 6 hours PRN    polyethylene glycol (Glycolax) 17 gram packet oral, Daily, Mix 1 packet in 8 ounces of liquid    thiamine (Vitamin B-1) 100 mg tablet 1 tablet, oral, Daily    traZODone (DESYREL) 100 mg, oral, Nightly    True Metrix Glucose Test Strip strip USE AS DIRECTED 4-5 TIMES DAILY    Trulicity 1.5 mg, subcutaneous, Weekly    Vitamin D3 50 mcg, oral, Daily         MR lumbar spine wo IV contrast 01/15/2024    Narrative  Interpreted By:  Justice Pretty,  STUDY:  MR LUMBAR SPINE WO IV CONTRAST    INDICATION:  Signs/Symptoms:Worsening low back pain- failed home PT    COMPARISON:  Lumbar spine MRI 11/20/2018    ACCESSION NUMBER(S):  [id number]    ORDERING CLINICIAN:  TRAVIS HART    TECHNIQUE:  Multiplanar multisequence MRI of the lumbar spine was performed  without the administration of intravenous contrast, according to  standard protocol.    FINDINGS:  ALIGNMENT: Levoconvex curvature of the lumbar spine centered at L3-4.    VERTEBRAE: The vertebral bodies are normal in height. There is no  fracture or aggressive osseous lesion. Schmorl's node in the superior  endplate of L2 in the inferior endplate of L4.    DISCS: Multilevel disc desiccation. Mild disc height loss at L3-4.    CONUS MEDULLARIS AND CAUDA EQUINA: The conus medullaris terminates at  L2. Mild crowding of the cauda equina nerve roots at L3-4 secondary  to degenerate  changes detailed further below    PARAVERTEBRAL SOFT TISSUES AND VISUALIZED RETROPERITONEUM: The  visualized paravertebral soft tissues appear within normal limits.    EVALUATION OF INDIVIDUAL LEVELS:  L1-2: Shallow disc bulge and facet hypertrophy results in mild  narrowing of the spinal canal. Neural foramina are patent.    L2-3: Shallow disc bulge with facet hypertrophy results in mild  narrowing of the spinal canal. Neural foramina are patent.    L3-4: Disc bulge with superimposed left paracentral disc protrusion  and epidural lipomatosis. There is moderate narrowing of the spinal  canal, slightly progressed compared to previous MRI 2018. There is  mild bilateral foraminal stenosis.    L4-5: Shallow disc bulge with facet hypertrophy, infolding of  ligamentum flavum, and epidural lipomatosis. Mild narrowing of the  spinal canal and bilateral foramina.    L5-S1: Bilateral facet hypertrophy and shallow disc bulge results in  mild foraminal stenosis. Spinal canal remains patent.    LIMITED EVALUATION OF UPPER SACRUM AND SACROILIAC JOINTS: Mild  degenerative changes of the sacroiliac joints.    Impression  Multilevel degenerative changes of the lumbar spine most prominent at  L3-4 where there is moderate canal stenosis, slightly progressed  compared to previous MRI 2018. Additional multilevel mild  degenerative changes as detailed, similar to previous MRI.    Signed by: Justice Pretty 1/15/2024 7:44 PM  Dictation workstation:   [id number]      XR CERVICAL SPINE 2-3 VIEWS 01/06/2024    Narrative  * * *Final Report* * *    DATE OF EXAM: Jan 6 2024  4:18PM    LFX   [id number]  -  XR CERVICAL  2V AP/LAT  / ACCESSION #  [id number]    PROCEDURE REASON: Neck pain    * * * * Physician Interpretation * * * *    EXAMINATION:  XR CERVICAL  2V AP/LAT    CLINICAL HISTORY:   pt states lt shoulder pain and neck pain for months  denies injury    Technique:   XR CERVICAL  2V AP/LAT -- NOT APPLICABLE with 2 c spine 2  chest views on 4 c  spine 3 chest images    Comparison: None      RESULT:    Cervical spine: Counting reference:  Craniocervical junction.   Anatomic  Variants:  None.. Straightening of normal cervical lordosis. Mild  multilevel degenerative disc changes with narrowing and endplate  osteophytes. Mild to moderate facet and uncovertebral arthropathy. No  erosions or syndesmophytes.  No fracture or compression defect.  Prevertebral soft tissues are unremarkable.  Lung apices are clear.    Impression  IMPRESSION:    Degenerative changes in the cervical spine as described without acute  osseous findings.          : [facility]  Transcribe Date/Time: Jan 6 2024  4:49P    Dictated by : TJ WOOTEN MD    This examination was interpreted and the report reviewed and  electronically signed by:  TJ WOOTEN MD on Jan 6 2024  4:50PM  EST     No image results found.       1. Chronic low back pain, unspecified back pain laterality, unspecified whether sciatica present  Epidural Steroid Injection    Epidural Steroid Injection           ASSESSMENT/PLAN  Alejandra Houston is a 58 y.o. female presents for an L3-4 interlaminar epidural steroid injection    Our plan is as follows:  - Follow In pain clinic  - Continue to participate in physical therapy as well as physician directed home exercises  - Continue pain medications as prescribed       Raji Cedeño MD

## 2024-04-04 ENCOUNTER — PATIENT MESSAGE (OUTPATIENT)
Dept: PRIMARY CARE | Facility: CLINIC | Age: 59
End: 2024-04-04
Payer: COMMERCIAL

## 2024-04-04 ENCOUNTER — APPOINTMENT (OUTPATIENT)
Dept: HEMATOLOGY/ONCOLOGY | Facility: CLINIC | Age: 59
End: 2024-04-04
Payer: COMMERCIAL

## 2024-04-04 DIAGNOSIS — N39.0 URINARY TRACT INFECTION WITHOUT HEMATURIA, SITE UNSPECIFIED: Primary | ICD-10-CM

## 2024-04-04 RX ORDER — NITROFURANTOIN 25; 75 MG/1; MG/1
100 CAPSULE ORAL 2 TIMES DAILY
Qty: 10 CAPSULE | Refills: 0 | Status: SHIPPED | OUTPATIENT
Start: 2024-04-04 | End: 2024-04-09

## 2024-04-07 NOTE — PROGRESS NOTES
Hepatology: Follow up Office Note     Patient: Alejandra Houston, a 58 y.o. year old female presents for follow up of cirrhosis.    She is accompanied by her spouse for today's visit.  PCP: Jhoan Eller, DO    History of Present Illness   Alejandra Houston presents for a follow up visit for cirrhosis.    She established care for cirrhosis in Jan 2024. Had been diagnosed with cirrhosis due to steatotic liver disease. Had prior imaging with hepatic steatosis. Prior hx of abnormal liver enzymes.      History of complications their liver disease:   Ascites or Volume overload:  no known hx  Hepatic Encephalopathy:  no known hx  GI (variceal) Bleeding:  no known hx  HCC:  no known hx  Hx of Infections:  no known hx  Portal vein Thrombosis:  no known hx  SMV Thrombosis:  no known hx  Hx of TIPS:  no known hx  Recent Hospitalizations: none due to liver ds    Noted that back in the 1990s she had developed jaundice due to acute hepatitis-she is unaware of what type of viral hepatitis infection she had.  Had noted easy bruisability, no episodes of overt mucosal or GI bleeding.   Due to financial constraints, she noted being dependent on limited food items- ex. Ramen Noodles.  In addition patient noted that she has been experiencing increased abdominal bloating/belching and constipation.  Had been using MiraLAX daily for constipation with little to no effect.  BMs every 3 to 4 days. Occasional symptoms of heartburn and sensation of pills getting stuck in the retrosternal area.  Denied episodes of food bolus impaction., episodes of regurgitation of food.  Had been experiencing increased postnasal drip.  Denied having inadvertent weight loss.    Today's visit:     Denies symptoms of right upper quadrant abdominal pain, nausea, vomiting, jaundice, confusion.     Review of Systems   Constitutional/ General: No fever, no night sweats, no weight loss  Eyes: no yellow discoloration  ENT: Postnasal drip  Cardiovascular: no chest pain,  no palpitations  Respiratory: no shortness of breath  Gastrointestinal: Abdominal bloating, belching, constipation []  Integumentary: Easy bruising over skin  Neurologic: no weakness or numbness of extremities  Psychiatric: no mood fluctuations  Musculoskeletal: no joint swelling   Genitourinary: no dysuria, no hematuria    All other systems have been reviewed and are negative except as noted in the HPI and above.    PMHx: HTN, depression, GERD, DM II  PSHx: C section, hip and knee replacement, left TKA Feb 2024  Social hx: Stopped alcohol use, + hx of smoking, denies hx of illicit substance use.   Family hx: denies history of hepatobiliary disease or malignancy in the family.     Medications     Current Outpatient Medications   Medication Instructions    acetaminophen (TYLENOL EXTRA STRENGTH) 500 mg, oral, Every 8 hours PRN    albuterol 90 mcg/actuation inhaler 2 puffs, inhalation, 4 times daily PRN    alcohol swabs pads, medicated topical (top), 70% pad, 4 x daily; use as directed    B Complex-Vitamin B12 tablet TAKE 1 TABLET DAILY.    blood-glucose meter (True Metrix Glucose Meter) misc USE AS DIRECTED EVERY DAY    ciprofloxacin-hydrocortisone (Cipro HC) otic suspension 3 drops, Left Ear, 2 times daily    DULoxetine (CYMBALTA) 60 mg, oral, Daily, Do not crush or chew.    EASY COMFORT LANCETS MISC miscellaneous, 3 times daily, Use to test    FeroSuL tablet 1 tablet, oral, 2 times daily with meals    fluticasone (Flonase) 50 mcg/actuation nasal spray USE 2 SPRAYS IN EACH NOSTRIL ONCE DAILY    folic acid (Folvite) 1 mg tablet TAKE 1 TABLET BY MOUTH EVERY DAY    FreeStyle glucose monitoring kit miscellaneous, True Metrix Blood Glucose test in vitro strip; Use as directed 4-5 times daily    gabapentin (Neurontin) 300 mg capsule TAKE 2 CAPSULES BY MOUTH 3 TIMES A DAY. TITRATE THE DOSE WEEKLY    lancets (Easy Comfort Lancets) 30 gauge misc USE AS DIRECTED ONCE DAILY TO CHECK BLOOD SUGAR    metFORMIN (GLUCOPHAGE) 1,000  mg, oral, 2 times daily    metFORMIN XR (GLUCOPHAGE-XR) 1,000 mg, oral, Daily with breakfast, Do not crush, chew, or split.    multivitamin tablet 1 tablet, oral, Daily    naproxen (Naprosyn) 500 mg tablet TAKE 1 TABLET BY MOUTH EVERY 12 HOURS AS NEEDED FOR PAIN WITH FOOD    nitrofurantoin, macrocrystal-monohydrate, (Macrobid) 100 mg capsule 100 mg, oral, 2 times daily    oxyCODONE-acetaminophen (Percocet) 5-325 mg tablet 1 tablet, oral, Every 6 hours PRN    polyethylene glycol (Glycolax) 17 gram packet oral, Daily, Mix 1 packet in 8 ounces of liquid    thiamine (Vitamin B-1) 100 mg tablet 1 tablet, oral, Daily    traZODone (DESYREL) 100 mg, oral, Nightly    True Metrix Glucose Test Strip strip USE AS DIRECTED 4-5 TIMES DAILY    Trulicity 1.5 mg, subcutaneous, Once Weekly    Vitamin D3 50 mcg, oral, Daily         Physical Examination     There were no vitals filed for this visit.    There were no vitals filed for this visit.    There is no height or weight on file to calculate BMI.  Constitutional: awake, alert   Eyes: EOMI, anicteric sclera  ENT: no oropharyngeal lesions visualized  Respiratory: Bilateral air entry equal no wheezing  Cardiovascular: regular rate and rhythm, no lower extremity edema  Abdomen: soft, non tender, non distended, increased abdominal adiposity, no free fluid wave appreciated, bowel sounds present  Integumentary: no wounds on examined skin   Musculoskeletal: no joint swelling   Neurologic: gross motor strength intact, no asterixis  Psychiatric: alert, appropriate mood and affect, oriented to time/place/person    Labs     Lab Results   Component Value Date     02/09/2024    K 4.4 02/09/2024    CREATININE 0.80 02/09/2024    ALBUMIN 4.2 01/11/2024    ALT 33 01/11/2024    AST 32 01/11/2024    ALKPHOS 79 01/11/2024    INR 1.1 01/22/2024    AFP 4 01/11/2024    HGB 11.0 (L) 02/09/2024    PLT 84 (L) 02/09/2024       MELD 3.0: 10 at 1/11/2024  1:05 PM  MELD-Na: 8 at 1/11/2024  1:05  PM  Calculated from:  Serum Creatinine: 0.70 mg/dL (Using min of 1 mg/dL) at 1/11/2024  1:05 PM  Serum Sodium: 136 mmol/L at 1/11/2024  1:05 PM  Total Bilirubin: 0.7 mg/dL (Using min of 1 mg/dL) at 1/11/2024  1:05 PM  Serum Albumin: 4.2 g/dL (Using max of 3.5 g/dL) at 1/11/2024  1:05 PM  INR(ratio): 1.2 at 1/11/2024  1:05 PM  Age at listing (hypothetical): 58 years  Sex: Female at 1/11/2024  1:05 PM    Lab Results   Component Value Date    HEPCAB Nonreactive 01/11/2024    HEPBSAG Nonreactive 01/11/2024    HEPBSAB <3.1 01/11/2024    HEPBCAB Nonreactive 01/11/2024    HEPATOT Reactive (A) 01/11/2024     Dec 2023: albumin 4.3, ALT 34, AST 35, ALP 82, Bili 0.7, Na 143, creat 0.73, Platelet 102, Hb 15, WBC 8.43  Nov 2023: WILLEM 1:160  July 2023: Plt 88, Hb 13.8, ALT 23, AST 38, ALP 89, Bili 0.7    Imaging   CTAP with contrast Dec 2023: Hepatic steatosis and cirrhotic liver morphology. No splenomegaly.   US abdomen April 2021: Coarsened and hyperechoic hepatic parenchyma, compatible with steatosis or hepatocellular disease.    EGD March 2024: Grade I Evs.   Assessment and Plan    Alejandra Houston, a 58 y.o. year old female presents for follow up of cirrhosis. I have reviewed pertinent provider notes, labs and imaging studies. Discussed results and their interpretation with the patient today.    No diagnosis found.    No orders of the defined types were placed in this encounter.     # Compensated cirrhosis in the setting of steatotic liver disease likely from metabolic dysfunction.  No overt features/symptoms to suggest jaundice, encephalopathy, ascites on exam today.    Discussed with Alejandra Houston the pathophysiology of cirrhosis, the etiology of her liver disease. Discussed disease course including development of potential complications like portal hypertension leading to ascites, development of varices with complications of bleeding from the same; hepatic encephalopathy, jaundice, hepatocellular carcinoma, renal failure,  sarcopenia, etc. We discussed the management of advanced liver disease with lifestyle measures including dietary recommendations, medications, lab work and screening measures.     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     Encourage patient to target weight loss of at least 7 to 10% body weight in the next 6 to 12 months.    Discussed dietary modifications for fatty liver disease, increasing proportion of grains and vegetables, opting for leaner proteins and decreasing proportion of simple carbohydrates.  Discussed exclusion/elimination of poor caloric value foods including sodas, cakes, candy, ice cream, crackers, chips etc.  In addition given financial constraints, patient is advised to incorporate some form of protein with each meal.    Recommendations:  - Labs: Recommend check of MELD labs now. (Has lab orders in chart)  - Recommend Hep B vaccine.   - Variceal surveillance: Small Evs noted on EGD in March 2024. [] carvedilol  - HCC screening: Recommend every 6 month AFP check as well as imaging. Ultrasound liver is advised for June 2024.       Counseled patient against use of NSAIDs, given risk of idiosyncratic liver injury as well as renal injury in the setting of cirrhosis.  Okay to use acetaminophen however patient should limit this dose to less than 2000 mg in 24 hours.    -Given patient's upper GI symptoms of abdominal bloating, belching, heartburn and occasional pill associated dysphagia recommend EGD.  EGD is being planned for variceal screening as noted above.    -Suspect patient is experiencing constipation in the setting of low fiber and fluid intake in her diet.  Counseled patient to incorporate soluble fiber such as Metamucil up to twice daily dosing.  She is also advised to increase MiraLAX from daily to twice daily dosing as well.  She has had 1 tubular adenoma on colonoscopy in 2019.  Pending improvement of constipation, can plan  surveillance colonoscopy which based on updated guidelines would be indicated within 7 years of last colonoscopy.  -Counseled patient to avoid hepatotoxic agents as noted above including NSAIDs, alcohol use as well as herbal supplements.    Follow up visit in 6 months.    [] labs now for MELD  [] US scheduled in June  [] carvedilol initiation

## 2024-04-08 ENCOUNTER — TELEPHONE (OUTPATIENT)
Dept: PRIMARY CARE | Facility: CLINIC | Age: 59
End: 2024-04-08
Payer: COMMERCIAL

## 2024-04-08 NOTE — TELEPHONE ENCOUNTER
Patient has had issues with depression-with crying  Please call her and get her in over the next several days for an appointment.  Thank you

## 2024-04-09 ENCOUNTER — APPOINTMENT (OUTPATIENT)
Dept: GASTROENTEROLOGY | Facility: CLINIC | Age: 59
End: 2024-04-09
Payer: COMMERCIAL

## 2024-04-09 ENCOUNTER — TELEPHONE (OUTPATIENT)
Dept: GASTROENTEROLOGY | Facility: CLINIC | Age: 59
End: 2024-04-09

## 2024-04-11 ENCOUNTER — TELEMEDICINE (OUTPATIENT)
Dept: PRIMARY CARE | Facility: CLINIC | Age: 59
End: 2024-04-11
Payer: COMMERCIAL

## 2024-04-11 DIAGNOSIS — F32.A DEPRESSION, UNSPECIFIED DEPRESSION TYPE: Primary | ICD-10-CM

## 2024-04-11 PROCEDURE — 99213 OFFICE O/P EST LOW 20 MIN: CPT | Performed by: INTERNAL MEDICINE

## 2024-04-11 RX ORDER — BUPROPION HYDROCHLORIDE 150 MG/1
150 TABLET ORAL EVERY MORNING
Qty: 30 TABLET | Refills: 1 | Status: SHIPPED | OUTPATIENT
Start: 2024-04-11 | End: 2024-05-10

## 2024-04-11 ASSESSMENT — PATIENT HEALTH QUESTIONNAIRE - PHQ9
2. FEELING DOWN, DEPRESSED OR HOPELESS: NEARLY EVERY DAY
3. TROUBLE FALLING OR STAYING ASLEEP OR SLEEPING TOO MUCH: NEARLY EVERY DAY
9. THOUGHTS THAT YOU WOULD BE BETTER OFF DEAD, OR OF HURTING YOURSELF: NEARLY EVERY DAY
SUM OF ALL RESPONSES TO PHQ9 QUESTIONS 1 AND 2: 6
1. LITTLE INTEREST OR PLEASURE IN DOING THINGS: NEARLY EVERY DAY
SUM OF ALL RESPONSES TO PHQ QUESTIONS 1-9: 27
5. POOR APPETITE OR OVEREATING: NEARLY EVERY DAY
9. THOUGHTS THAT YOU WOULD BE BETTER OFF DEAD, OR OF HURTING YOURSELF: NEARLY EVERY DAY
10. IF YOU CHECKED OFF ANY PROBLEMS, HOW DIFFICULT HAVE THESE PROBLEMS MADE IT FOR YOU TO DO YOUR WORK, TAKE CARE OF THINGS AT HOME, OR GET ALONG WITH OTHER PEOPLE: EXTREMELY DIFFICULT
2. FEELING DOWN, DEPRESSED OR HOPELESS: NEARLY EVERY DAY
SUM OF ALL RESPONSES TO PHQ9 QUESTIONS 1 AND 2: 6
4. FEELING TIRED OR HAVING LITTLE ENERGY: NEARLY EVERY DAY
7. TROUBLE CONCENTRATING ON THINGS, SUCH AS READING THE NEWSPAPER OR WATCHING TELEVISION: NEARLY EVERY DAY
5. POOR APPETITE OR OVEREATING: NEARLY EVERY DAY
SUM OF ALL RESPONSES TO PHQ QUESTIONS 1-9: 27
7. TROUBLE CONCENTRATING ON THINGS, SUCH AS READING THE NEWSPAPER OR WATCHING TELEVISION: NEARLY EVERY DAY
8. MOVING OR SPEAKING SO SLOWLY THAT OTHER PEOPLE COULD HAVE NOTICED. OR THE OPPOSITE, BEING SO FIGETY OR RESTLESS THAT YOU HAVE BEEN MOVING AROUND A LOT MORE THAN USUAL: NEARLY EVERY DAY
1. LITTLE INTEREST OR PLEASURE IN DOING THINGS: NEARLY EVERY DAY
3. TROUBLE FALLING OR STAYING ASLEEP OR SLEEPING TOO MUCH: NEARLY EVERY DAY
6. FEELING BAD ABOUT YOURSELF - OR THAT YOU ARE A FAILURE OR HAVE LET YOURSELF OR YOUR FAMILY DOWN: NEARLY EVERY DAY
4. FEELING TIRED OR HAVING LITTLE ENERGY: NEARLY EVERY DAY
8. MOVING OR SPEAKING SO SLOWLY THAT OTHER PEOPLE COULD HAVE NOTICED. OR THE OPPOSITE, BEING SO FIGETY OR RESTLESS THAT YOU HAVE BEEN MOVING AROUND A LOT MORE THAN USUAL: NEARLY EVERY DAY
6. FEELING BAD ABOUT YOURSELF - OR THAT YOU ARE A FAILURE OR HAVE LET YOURSELF OR YOUR FAMILY DOWN: NEARLY EVERY DAY

## 2024-04-11 ASSESSMENT — ENCOUNTER SYMPTOMS
DYSPHORIC MOOD: 1
DEPRESSION: 1

## 2024-04-11 NOTE — PROGRESS NOTES
Patient doing a phone call to discuss depression.  Virtual or Telephone Consent    A telephone visit (audio only) between the patient (at the originating site) and the provider (at the distant site) was utilized to provide this telehealth service.   Verbal consent was requested and obtained from Alejandra Houston on this date, 04/11/24 for a telehealth visit- phone call only.    Subjective   Patient ID: Alejandra Houston is a 58 y.o. female who presents for Depression.  The patient reports depressed mood, increased crying, and irritability that she believes is primarily due to longstanding chronic pain symptoms.  She has an upcoming appointment with Pain Management tomorrow.  The patient is currently taking duloxetine 30mg as two tablets once daily.    Depression      Review of Systems   Psychiatric/Behavioral:  Positive for depression and dysphoric mood.         Positive for irritability.       Objective   Physical Exam  Physical examination not done.    Assessment/Plan   Problem List Items Addressed This Visit             ICD-10-CM    Depression - Primary F32.A    Relevant Medications    buPROPion XL (Wellbutrin XL) 150 mg 24 hr tablet       IMPRESSIONS/PLAN:       Diabetes II   - Last HbA1c 7.8 per 11/2023.  Patient having difficulty remembering to take night time dose of metformin.  Switched patient to metformin XR 500mg as two tablets in the morning once daily, and can increase Trulicity 1.50 mg/0.5mL 1 pen weekly. Previously on glimperide 2mg every day.  Previously ordered HbA1c.     Depression   - Increasing symptoms.  Prescribed bupropion 150mg once daily in the morning, discussed adverse effect profile, and therapeutic expectations.  Advised patient to reach out in two weeks via [facility] with update about progress.  Suspect that may improve with CPAP and with upcoming LES.   Continue duloxetine 30 mg as two tablets once daily. Previously on Lexapro, and Rexulti or Topamax.  No SI or HI.  Call the clinic if  symptoms persist or worsen.      Pinched Cervical Nerve Root/Left Shoulder Pain   - CT Cervical Spine 1/2024 showed mild to moderate spinal canal stenosis at C4-C5 and moderate bilateral foraminal stenosis.  Prescribed prednisone 10mg taper as directed on packaging. Encouraged patient to schedule appointment with  from Pain Medicine.  Call the clinic if symptoms persist or worsen.    Low Back Pain/ leg weakness   - Worsening since 4/2023.  Weakness on LLE on exam.  MR Lumbar Spine from 1/2024 showed multilevel degenerative changes of the lumbar spine most prominent at L3-4 where there is moderate canal stenosis, slightly progressed.  Continue with gabapentin 600 mg TID.  Refilled Naproxen 500 mg BID prn.  Previously on tramadol 50 mg every 6 hours as needed.   Following with  from Pain Management.    Chronic Sinusitis  - CT Sinus without Contrast wnl and unremarkable 1/2024.  Following with  from Otolaryngology..       Sleep Apnea   - Completed in-center Sleep Study confirming moderate CADEN.  Previously ordered referral to Sleep Medicine with .       Constipation   - Advised patient to try Colace BID and polyethylene glycol 3350 17 gm oral packet as 1 packet in 8 ounces of liquid once daily in the afternoon.     Left Knee OA  - s/p left total knee arthroplasty 2/8/2024 with  from Orthopedic Surgery.     Paresthesia of Right Upper Extremity  - Previously ordered EMG and Nerve Conduction.     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 Underwent complex revision of right TKA. Following with  in Orthopedic Surgery.     Thrombocytopenia  - Last CBC showed platelet count of 77 per 10/2023.  Following with Hematology/Oncology.  Suspect from underlying liver cirrhosis.       Liver Cirrhosis  - CT Chest with IV Contrast 10/2023 showed incidental cirrhotic liver morphology with sequela of portal hypertension  (splenomegaly and esophageal varices).  Previously ordered referral for Hepatology. Advised the patient to stop taking Tylenol and limit Percocet, and she verbalized understanding.   She has seen Dr. Millard in the past. Last EGD 3/2024.  Following with  from Gastroenterology.     Left Adnexal Mass  - CT Abdomen Pelvis 7/2023 showed incidental finding of A 3.6 cm benign-appearing left adnexal lesion is seen.  In an early post-menopausal woman (<5 years since menopause), follow-up ultrasound at 6-12 months is recommended. Patient has scheduled appointment with Gynecology.     Vaginal dryness  - Refilled Estradiol cream.     Right Third Finger Pain  - Previously ordered Xray of fingers right hand.     Dry Skin  - Prescribed Cetaphil moisturizing lotion.      Fatigue  - Likely due to untreated sleep apnea.  Encouraged patient to follow-up with Sleep Medicine, and she has appointment for next week.  Vitamin D, Vitamin B12, and TSH wnl per 9/2023.     LLE swelling  - Previously ordered Echocardiogram and referral to Cardiology.  Last Venous Duplex U/S 8/7/2023 negative for DVT. Looks like they suspected this is related to low back issues which I agree with.  MRI LS was denied by insurance, and will follow-up as imaging would be very helpful for this patient.  Continue with gabapentin 300 mg as two capsules three times daily.  Refilled tramadol as below to have on hand to use VERY sparingly, and patient understands not to use with cyclobenzaprine. Encouraged patient to follow up with  from Pain Medicine, and she agreed.       Atypical Chest pain  -Echo.  Referred to cards previously.     Smoking History   - CT Chest 4/2023 shows redemonstration of groundglass opacities in the posterior bilateral upper lobes, left lower lobe and new groundglass  opacities in the anterior left upper lobe. Central airways are clear.  Stable noncalcified pulmonary nodule in the right upper lobe (6, 38) and right middle lobe  (6, 57) since 5/19/2019.  Discussed with patient that varenicline was recalled.       Health Maintenance   - Routine labs 10/2023.  Last Mammogram 5/2023. Last colonoscopy 5/2019, repeat due [id number]/[id number].       Follow-up in 3 months, call sooner if needed.        Scribe Attestation  By signing my name below, I, Aida Galdamez, Scribe   attest that this documentation has been prepared under the direction and in the presence of Jhoan Eller DO.   Aida Galdamez 04/11/24 2:27 PM

## 2024-04-12 ENCOUNTER — OFFICE VISIT (OUTPATIENT)
Dept: PAIN MEDICINE | Facility: CLINIC | Age: 59
End: 2024-04-12
Payer: COMMERCIAL

## 2024-04-12 VITALS — DIASTOLIC BLOOD PRESSURE: 72 MMHG | RESPIRATION RATE: 18 BRPM | SYSTOLIC BLOOD PRESSURE: 109 MMHG | HEART RATE: 94 BPM

## 2024-04-12 DIAGNOSIS — M25.511 CHRONIC RIGHT SHOULDER PAIN: ICD-10-CM

## 2024-04-12 DIAGNOSIS — E55.9 VITAMIN D DEFICIENCY: ICD-10-CM

## 2024-04-12 DIAGNOSIS — M54.2 CERVICAL PAIN (NECK): ICD-10-CM

## 2024-04-12 DIAGNOSIS — G89.29 CHRONIC RIGHT SHOULDER PAIN: ICD-10-CM

## 2024-04-12 PROCEDURE — 3074F SYST BP LT 130 MM HG: CPT | Performed by: PAIN MEDICINE

## 2024-04-12 PROCEDURE — 99214 OFFICE O/P EST MOD 30 MIN: CPT | Performed by: PAIN MEDICINE

## 2024-04-12 PROCEDURE — 3078F DIAST BP <80 MM HG: CPT | Performed by: PAIN MEDICINE

## 2024-04-12 RX ORDER — LIDOCAINE 50 MG/G
1 PATCH TOPICAL DAILY
Qty: 30 PATCH | Refills: 3 | Status: SHIPPED | OUTPATIENT
Start: 2024-04-12 | End: 2024-08-10

## 2024-04-12 RX ORDER — OMEGA-3S/DHA/EPA/FISH OIL/D3 300MG-1000
50 CAPSULE ORAL DAILY
Qty: 30 TABLET | Refills: 1 | Status: SHIPPED | OUTPATIENT
Start: 2024-04-12

## 2024-04-12 ASSESSMENT — ENCOUNTER SYMPTOMS
BACK PAIN: 1
MYALGIAS: 1
WHEEZING: 1
ACTIVITY CHANGE: 1
ARTHRALGIAS: 1
NECK STIFFNESS: 1
PAIN: 1
NECK PAIN: 1

## 2024-04-12 ASSESSMENT — PAIN - FUNCTIONAL ASSESSMENT: PAIN_FUNCTIONAL_ASSESSMENT: 0-10

## 2024-04-12 ASSESSMENT — PAIN SCALES - GENERAL
PAINLEVEL: 10-WORST PAIN EVER
PAINLEVEL_OUTOF10: 10 - WORST POSSIBLE PAIN

## 2024-04-12 ASSESSMENT — PAIN DESCRIPTION - DESCRIPTORS: DESCRIPTORS: SHARP;SHOOTING

## 2024-04-12 NOTE — PROGRESS NOTES
Subjective   Patient ID: Alejandra Houston is a 58 y.o. female who presents for Pain (Pt here today c/o lower back, recently had injection which did help some, also has pain in the neck).    Pain  Associated symptoms include wheezing.       Ms. Houston is 58 y  female known to pain clinic. Multiple pain complains. Low back and leg pain. She had epidural injection few weeks ago that helped. She also has neck and left shoulder pain this pain is the worse for her. Unable to sleep and unable to use her arm on the left side. She reported pain ic all the time , left side of the neck side of the neck to shoulder to front of the clavicle area and sensitivity to touch. She had suprascapular nerve block few weeks ago, did not give good relief. Pain go to arm with subjective weakness     Review of Systems   Constitutional:  Positive for activity change.   HENT: Negative.     Respiratory:  Positive for wheezing.    Musculoskeletal:  Positive for arthralgias, back pain, myalgias, neck pain and neck stiffness.   Skin: Negative.           Current Outpatient Medications:     albuterol 90 mcg/actuation inhaler, INHALE 2 PUFFS 4 TIMES A DAY AS NEEDED FOR SHORTNESS OF BREATH OR WHEEZING., Disp: 8.5 g, Rfl: 1    alcohol swabs pads, medicated, Apply topically. 70% pad, 4 x daily; use as directed, Disp: , Rfl:     B Complex-Vitamin B12 tablet, TAKE 1 TABLET DAILY., Disp: 30 tablet, Rfl: 3    blood-glucose meter (True Metrix Glucose Meter) misc, USE AS DIRECTED EVERY DAY, Disp: 1 each, Rfl: 0    buPROPion XL (Wellbutrin XL) 150 mg 24 hr tablet, Take 1 tablet (150 mg) by mouth once daily in the morning. Do not crush, chew, or split., Disp: 30 tablet, Rfl: 1    ciprofloxacin-hydrocortisone (Cipro HC) otic suspension, Administer 3 drops into the left ear 2 times a day for 10 days., Disp: 10 mL, Rfl: 0    dulaglutide (Trulicity) 1.5 mg/0.5 mL pen injector injection, Inject 1.5 mg under the skin 1 (one) time per week., Disp: 2 mL, Rfl:  11    DULoxetine (Cymbalta) 30 mg DR capsule, Take 2 capsules (60 mg) by mouth once daily. Do not crush or chew., Disp: 60 capsule, Rfl: 5    EASY COMFORT LANCETS MISC, in the morning, at noon, and at bedtime. Use to test, Disp: , Rfl:     FeroSuL tablet, TAKE 1 TABLET BY MOUTH TWICE A DAY WITH MEALS, Disp: 30 tablet, Rfl: 5    fluticasone (Flonase) 50 mcg/actuation nasal spray, USE 2 SPRAYS IN EACH NOSTRIL ONCE DAILY, Disp: 16 g, Rfl: 3    folic acid (Folvite) 1 mg tablet, TAKE 1 TABLET BY MOUTH EVERY DAY, Disp: 30 tablet, Rfl: 1    FreeStyle glucose monitoring kit, True Metrix Blood Glucose test in vitro strip; Use as directed 4-5 times daily, Disp: , Rfl:     gabapentin (Neurontin) 300 mg capsule, TAKE 2 CAPSULES BY MOUTH 3 TIMES A DAY. TITRATE THE DOSE WEEKLY, Disp: 90 capsule, Rfl: 0    lancets (Easy Comfort Lancets) 30 gauge misc, USE AS DIRECTED ONCE DAILY TO CHECK BLOOD SUGAR, Disp: 100 each, Rfl: 0    lidocaine (Lidoderm) 5 % patch, Place 1 patch over 12 hours on the skin once daily. Remove & discard patch within 12 hours or as directed by MD., Disp: 30 patch, Rfl: 3    metFORMIN (Glucophage) 500 mg tablet, TAKE 2 TABLETS (1,000 MG) BY MOUTH IN THE MORNING AND 2 TABLETS (1,000 MG) BEFORE BEDTIME., Disp: 120 tablet, Rfl: 1    metFORMIN XR (Glucophage-XR) 500 mg 24 hr tablet, Take 2 tablets (1,000 mg) by mouth once daily with breakfast. Do not crush, chew, or split., Disp: 200 tablet, Rfl: 3    multivitamin tablet, Take 1 tablet by mouth once daily., Disp: 90 tablet, Rfl: 3    naproxen (Naprosyn) 500 mg tablet, TAKE 1 TABLET BY MOUTH EVERY 12 HOURS AS NEEDED FOR PAIN WITH FOOD, Disp: 60 tablet, Rfl: 1    oxyCODONE-acetaminophen (Percocet) 5-325 mg tablet, Take 1 tablet by mouth every 6 hours if needed for severe pain (7 - 10)., Disp: , Rfl:     polyethylene glycol (Glycolax) 17 gram packet, Take by mouth in the morning. Mix 1 packet in 8 ounces of liquid, Disp: , Rfl:     thiamine (Vitamin B-1) 100 mg tablet,  Take 1 tablet (100 mg) by mouth once daily., Disp: , Rfl:     traZODone (Desyrel) 50 mg tablet, TAKE 2 TABLETS (100 MG) BY MOUTH ONCE DAILY AT BEDTIME., Disp: 60 tablet, Rfl: 2    True Metrix Glucose Test Strip strip, USE AS DIRECTED 4-5 TIMES DAILY, Disp: 100 strip, Rfl: 5    Vitamin D3 50 mcg (2,000 unit) tablet, TAKE 1 TABLET (50 MCG) BY MOUTH ONCE DAILY., Disp: 30 tablet, Rfl: 1     Past Medical History:   Diagnosis Date    Abnormal levels of other serum enzymes 2022    Elevated liver enzymes    Acid reflux     Anxiety     Bipolar disorder (Multi)     COPD (chronic obstructive pulmonary disease) (Multi)     Depression     DM (diabetes mellitus) (Multi)     Fibromyalgia, primary     Glaucoma     Hypertension     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    Avascular necrosis of bone of left hip    Sleep apnea     no cpap    Suicidal ideations     Thrombocytopenia (CMS-HCC)         Past Surgical History:   Procedure Laterality Date     SECTION, CLASSIC  02/15/2018     Section     SECTION, CLASSIC      HIP ARTHROPLASTY      KNEE ARTHROPLASTY      OTHER SURGICAL HISTORY  06/10/2019    Hip replacement    TOTAL KNEE ARTHROPLASTY  2018    Knee Replacement    UVULOPALATOPHARYNGOPLASTY          Family History   Problem Relation Name Age of Onset    Hypertension Mother      Diabetes Mother      COPD Mother      Heart disease Mother      Lymphoma Mother      Cancer Other fam hx     Diabetes Other fam hx     Hypertension Other fam hx     Heart disease Other fam hx         No Known Allergies     Objective     Vitals:    24 1324   BP: 109/72   Pulse: 94   Resp: 18        === 01/15/24 ===    MR LUMBAR SPINE WO CONTRAST    - Impression -  Multilevel degenerative changes of the lumbar spine most prominent at  L3-4 where there is moderate canal stenosis, slightly progressed  compared to previous MRI 2018. Additional multilevel mild  degenerative changes as detailed,  similar to previous MRI.    Signed by: Justice Pretty 1/15/2024 7:44 PM  Dictation workstation:   [id number]     Physical Exam    GENERAL EXAM  Vital Signs: Vital signs to include heart rate, respiration rate, blood pressure, and temperature were reviewed.  General Appearance:  Awake, alert, look tired appearing, well developed, mild pain distress .  Head: Normocephalic without evidence of head injury.  Neck: The appearance of the neck was normal without swelling with a midline trachea. Sensitivity to touch around the neck area and clavicle area . Limited shoulder range of motion secondary to pain  Eyes: The eyelids and eyebrows exhibited no abnormalities.  Pupils were not pin-point.  Sclera was without icterus.  Lungs: Respiration rhythm and depth was normal.  Respiratory movements were normal without labored breathing.  Cardiovascular: No peripheral edema was present.    Neurological: Patient was oriented to time, place, and person.  Speech was normal.  Balance, gait, and stance abnormal because of pain   Psychiatric: Appearance was normal with appropriate dress.  Mood was euthymic and affect was normal.  Skin: Affected regions were without ecchymosis or skin lesions.        Physical exam as above except:        Assessment/Plan   Cervical spondylosis  Cervical radiculopathy  Left shoulder dysfunction  Lumbar radiculopathy      Plan    TIEN  Intra-articular left shoulder steroid injection  PT

## 2024-04-12 NOTE — TELEPHONE ENCOUNTER
"Hepatology Nurse Coordinator Note  Called patient back regarding missed appointment. Patient has already rescheduled for July. Reminded patient she is due for blood work now and her Liver Ultrasound in June. Patient verbalized understanding.     Patient states she wanted to know if she \"had cirrhosis.\" Patient aware it appears this was the case and was discussed in her last office visit. She would like to confirm. Will forward to Dr. Flores for confirmation. Patient would like Cirrhosis education mailed to her if she does. Confirmed mailing address. She would also like to know the \"stage.\" Patient is aware this would be discussed in more detail at her upcoming visit, after she gets updated labs and her liver ultrasound. She verbalized understanding.   "

## 2024-04-16 ENCOUNTER — OFFICE VISIT (OUTPATIENT)
Dept: SLEEP MEDICINE | Facility: CLINIC | Age: 59
End: 2024-04-16
Payer: COMMERCIAL

## 2024-04-16 VITALS
HEIGHT: 68 IN | OXYGEN SATURATION: 99 % | BODY MASS INDEX: 35.77 KG/M2 | WEIGHT: 236 LBS | DIASTOLIC BLOOD PRESSURE: 84 MMHG | SYSTOLIC BLOOD PRESSURE: 120 MMHG | HEART RATE: 73 BPM

## 2024-04-16 DIAGNOSIS — G25.81 RESTLESS LEG SYNDROME: ICD-10-CM

## 2024-04-16 DIAGNOSIS — E83.10 DISORDER OF IRON METABOLISM: ICD-10-CM

## 2024-04-16 DIAGNOSIS — K21.9 GASTROESOPHAGEAL REFLUX DISEASE WITHOUT ESOPHAGITIS: ICD-10-CM

## 2024-04-16 DIAGNOSIS — G47.21 CIRCADIAN RHYTHM SLEEP DISORDER, DELAYED SLEEP PHASE TYPE: ICD-10-CM

## 2024-04-16 DIAGNOSIS — R53.83 OTHER FATIGUE: ICD-10-CM

## 2024-04-16 DIAGNOSIS — G47.33 MODERATE OBSTRUCTIVE SLEEP APNEA: Primary | ICD-10-CM

## 2024-04-16 DIAGNOSIS — G89.29 CHRONIC LOW BACK PAIN, UNSPECIFIED BACK PAIN LATERALITY, UNSPECIFIED WHETHER SCIATICA PRESENT: ICD-10-CM

## 2024-04-16 DIAGNOSIS — M54.50 CHRONIC LOW BACK PAIN, UNSPECIFIED BACK PAIN LATERALITY, UNSPECIFIED WHETHER SCIATICA PRESENT: ICD-10-CM

## 2024-04-16 PROCEDURE — 3008F BODY MASS INDEX DOCD: CPT | Performed by: PHYSICIAN ASSISTANT

## 2024-04-16 PROCEDURE — 3079F DIAST BP 80-89 MM HG: CPT | Performed by: PHYSICIAN ASSISTANT

## 2024-04-16 PROCEDURE — 99215 OFFICE O/P EST HI 40 MIN: CPT | Performed by: PHYSICIAN ASSISTANT

## 2024-04-16 PROCEDURE — 3074F SYST BP LT 130 MM HG: CPT | Performed by: PHYSICIAN ASSISTANT

## 2024-04-16 RX ORDER — FAMOTIDINE 40 MG/1
40 TABLET, FILM COATED ORAL DAILY
Qty: 90 TABLET | Refills: 3 | Status: SHIPPED | OUTPATIENT
Start: 2024-04-16 | End: 2025-04-16

## 2024-04-16 RX ORDER — GABAPENTIN 300 MG/1
CAPSULE ORAL
Qty: 120 CAPSULE | Refills: 2 | Status: SHIPPED | OUTPATIENT
Start: 2024-04-16

## 2024-04-16 ASSESSMENT — PAIN SCALES - GENERAL: PAINLEVEL: 0-NO PAIN

## 2024-04-16 NOTE — PROGRESS NOTES
"[facility]  Followup Visit Note    HISTORY OF PRESENT ILLNESS   Alejandra Houston is a 58 y.o. female who presents to a [facility] for followup.       Current History    On today's visit, the patient reports she had sleep study completed in November of last year. Study showed moderate severity CADEN. Her sleep has been a poor quality.  She has had difficulty falling asleep and staying asleep and had difficulty with daytime sleepiness.    She was previously on CPAP and overall tolerated it well and felt better with it.  She did swallow air with CPAP.  She also had a fullface mask in the past.    She is having difficulty falling asleep.  Currently taking trazodone 50 mg at bedtime.  100 mg dose made her too tired in the morning.  In addition to difficulty falling asleep she is having difficulty waking up in the morning.  She would prefer to wake up around 7 AM when her grandchildren wake up for school.    Her restless leg syndrome symptoms remain bothersome.  She is on gabapentin 600 mg 3 times daily for nerve pain.  She has been recommended to take iron supplements but does sometimes forget.  She did not have her iron checked last time.    Sleep Scales:  ESS: 24     REVIEW OF SYSTEMS    All other systems negative      PHYSICAL EXAM     VITAL SIGNS: /84   Pulse 73   Ht 1.727 m (5' 8\")   Wt 107 kg (236 lb)   SpO2 99%   BMI 35.88 kg/m²      CURRENT WEIGHT: [unfilled]  BMI: [unfilled]  PREVIOUS WEIGHTS:  Wt Readings from Last 3 Encounters:   04/16/24 107 kg (236 lb)   03/28/24 108 kg (239 lb)   03/22/24 109 kg (239 lb 3.2 oz)       Constitutional: Alert and oriented, cooperative, no obvious distress   HEENT: Non icteric or anemic, EOM WNL bilaterally   Neck: Supple, no JVD, no goiter, no adenopathy, no rigidity  Extremities: No clubbing, no LL edema   Neuromuscular: Cranial nerves grossly intact, no focal deficits, using cane for ambulation    RESULTS/DATA "     Iron (ug/dL)   Date Value   10/28/2021 128     Iron Saturation (%)   Date Value   10/28/2021 42     TIBC (ug/dL)   Date Value   10/28/2021 304     Ferritin (ug/L)   Date Value   10/28/2021 314 (H)         ASSESSMENT/PLAN     Ms. Houston is a 58 y.o. female and returns in followup for the following issues:    OBSTRUCTIVE SLEEP APNEA / SLEEPINESS / FREQUENT WAKING  -Reviewed test results with patient  -Will order new CPAP from [facility] [place] [place] with pressure 5-15 cm H2O EPR 3  -Wants to start with n30  -Discussed mask options and common tolerance issues  -Goal of CPAP includes less daytime sleepiness, better sleep quality    CHRONIC INSOMNIA / DELAYED SLEEP PHASE  -Recommend melatonin 1 mg 4-5 hours prior to intended bedtime  -Try increasing light exposure in the morning and decreasing light exposure in the afternoon  -Continue trazodone    RESTLESS LEG SYNDROME  -Likely worse due to trazodone  -Check ferritin level  -increase evening gabapentin dose to 900 mg     GERD  -continue famotidine 40 mg every day    BMI>35  -Body mass index is 35.88 kg/m².  today  -With sufficient weight loss may no longer require treatment for CADEN     Followup 31-90 days after starting CPAP

## 2024-04-16 NOTE — PATIENT INSTRUCTIONS
Good seeing you today,    DELAYED SLEEP PHASE  -Recommend melatonin 1 mg 4-5 hours prior to intended bedtime  -Try increasing light exposure in the morning and decreasing light exposure in the afternoon   -Wake up at 7 am and don't go back to sleep after waking until evening    Try increasing gabapentin to 3 capsules (900 mg) in the evening. This can help with restless leg. We will also check your iron levels. Continue taking iron.    Restart famotidine for acid reflux    Order sent to [facility] [place] with auto pressure 5-15 cm H2O. If you feel uncomfortable with pressure settings let me know and I can change them online.    Some mask options I recommend    Resmed N30i nasal mask (tube on top of head) - good option if you toss and turn a lot  Resmed N30 nasal mask (tube in front)  Arreaga lisandra fx nasal pillow mask (tube in front)  Resmed P10 nasal pillow mask (tube in front)  Resmed P30i nasal mask (tube on top of head) - good option if you toss and turn a lot    You can change humidity settings based on comfort    We will plan on seeing you back in 31-90 days for a required compliance appointment. Try to use at least 4 hours per night for >70% of nights.    Antonio Feliz PA-C    IMPORTANT PHONE NUMBERS     [id number] ([id number])  [employer]): [phone]  For clinical questions and refilling prescriptions: [phone]  [employer]esna Perez (For Jenifer/Trini): P: [phone]

## 2024-04-16 NOTE — TELEPHONE ENCOUNTER
Hepatology Nurse Coordinator Note   Called patient back with confirmed information from Dr. Flores. She's aware Dr. Flores confirmed she does have cirrhosis, but it appears to be compensated at this time. Patient would like educational information on Cirrhosis and nutrition mailed to her. Confirmed mailing address. Will send out.     Patient also reminded to get her blood work.    Patient verbalized understanding. She had no further questions, or concerns at this time.

## 2024-04-17 ENCOUNTER — PATIENT MESSAGE (OUTPATIENT)
Dept: PRIMARY CARE | Facility: CLINIC | Age: 59
End: 2024-04-17
Payer: COMMERCIAL

## 2024-04-17 DIAGNOSIS — Z72.0 TOBACCO ABUSE: Primary | ICD-10-CM

## 2024-04-17 RX ORDER — IBUPROFEN 200 MG
1 TABLET ORAL EVERY 24 HOURS
Qty: 30 PATCH | Refills: 0 | Status: SHIPPED | OUTPATIENT
Start: 2024-04-17 | End: 2024-04-18

## 2024-04-17 RX ORDER — MICONAZOLE NITRATE 2 %
2 CREAM (GRAM) TOPICAL AS NEEDED
Qty: 100 EACH | Refills: 0 | Status: SHIPPED | OUTPATIENT
Start: 2024-04-17 | End: 2024-04-18

## 2024-04-18 DIAGNOSIS — Z72.0 TOBACCO ABUSE: ICD-10-CM

## 2024-04-18 DIAGNOSIS — F32.A DEPRESSION, UNSPECIFIED DEPRESSION TYPE: ICD-10-CM

## 2024-04-18 RX ORDER — DULOXETIN HYDROCHLORIDE 30 MG/1
60 CAPSULE, DELAYED RELEASE ORAL DAILY
Qty: 60 CAPSULE | Refills: 5 | Status: SHIPPED | OUTPATIENT
Start: 2024-04-18 | End: 2024-10-15

## 2024-04-18 RX ORDER — MICONAZOLE NITRATE 2 %
2 CREAM (GRAM) TOPICAL AS NEEDED
Qty: 30 EACH | Refills: 0 | Status: SHIPPED | OUTPATIENT
Start: 2024-04-18 | End: 2024-05-15

## 2024-04-18 RX ORDER — IBUPROFEN 200 MG
1 TABLET ORAL EVERY 24 HOURS
Qty: 28 PATCH | Refills: 0 | Status: SHIPPED | OUTPATIENT
Start: 2024-04-18 | End: 2024-05-15

## 2024-04-19 NOTE — RESULT ENCOUNTER NOTE
Dear Alejandra,    Biopsies obtained from your stomach showed reactive changes.  No evidence of H. Pylori infection.  You should follow-up with Dr. Flores in the liver clinic as scheduled.  Let me know if you have any questions.    Sincerely,  Tha Coronado MD

## 2024-04-24 DIAGNOSIS — D61.818 PANCYTOPENIA (MULTI): ICD-10-CM

## 2024-04-26 ENCOUNTER — APPOINTMENT (OUTPATIENT)
Dept: HEMATOLOGY/ONCOLOGY | Facility: CLINIC | Age: 59
End: 2024-04-26
Payer: COMMERCIAL

## 2024-04-29 ENCOUNTER — PATIENT MESSAGE (OUTPATIENT)
Dept: PRIMARY CARE | Facility: CLINIC | Age: 59
End: 2024-04-29
Payer: COMMERCIAL

## 2024-04-29 DIAGNOSIS — M79.603 PAIN OF UPPER EXTREMITY, UNSPECIFIED LATERALITY: Primary | ICD-10-CM

## 2024-04-29 DIAGNOSIS — E11.9 TYPE 2 DIABETES MELLITUS WITHOUT COMPLICATION, WITHOUT LONG-TERM CURRENT USE OF INSULIN (MULTI): ICD-10-CM

## 2024-04-29 RX ORDER — FOLIC ACID 1 MG/1
TABLET ORAL
Qty: 30 TABLET | Refills: 1 | Status: SHIPPED | OUTPATIENT
Start: 2024-04-29

## 2024-05-02 ENCOUNTER — TELEPHONE (OUTPATIENT)
Dept: HEMATOLOGY/ONCOLOGY | Facility: CLINIC | Age: 59
End: 2024-05-02
Payer: COMMERCIAL

## 2024-05-02 RX ORDER — DEXAMETHASONE SODIUM PHOSPHATE 100 MG/10ML
10 INJECTION INTRAMUSCULAR; INTRAVENOUS ONCE
OUTPATIENT
Start: 2024-05-02 | End: 2024-05-02

## 2024-05-02 RX ORDER — LIDOCAINE HYDROCHLORIDE 10 MG/ML
10 INJECTION, SOLUTION EPIDURAL; INFILTRATION; INTRACAUDAL; PERINEURAL ONCE
OUTPATIENT
Start: 2024-05-02 | End: 2024-05-02

## 2024-05-02 RX ORDER — MIDAZOLAM HYDROCHLORIDE 1 MG/ML
2 INJECTION, SOLUTION INTRAMUSCULAR; INTRAVENOUS ONCE
OUTPATIENT
Start: 2024-05-03 | End: 2024-05-03

## 2024-05-02 NOTE — TELEPHONE ENCOUNTER
----- Message from Alejandra Houston sent at 5/1/2024  5:16 PM EDT -----  Regarding: Appointment   Contact: [phone]  I'm sorry my last name came out wrong it's s k adarsh jenkins l

## 2024-05-02 NOTE — TELEPHONE ENCOUNTER
Called patient after receiving pt advice message asking for a phone call from the office. States she needs to reschedule her appointment. Discussed patient needs provide a ride for her transportation. Discussed patient can come to her appt on Monday 30-45 minutes early and have her lab work drawn at the lab before coming to the office. Pt states she can do this. No longer a need to reschedule appt. Pt was grateful and had no further questions or concerns at this time,

## 2024-05-03 ENCOUNTER — HOSPITAL ENCOUNTER (OUTPATIENT)
Dept: RADIOLOGY | Facility: CLINIC | Age: 59
Discharge: HOME | End: 2024-05-03
Payer: COMMERCIAL

## 2024-05-03 ENCOUNTER — TELEPHONE (OUTPATIENT)
Dept: GASTROENTEROLOGY | Facility: HOSPITAL | Age: 59
End: 2024-05-03

## 2024-05-03 ENCOUNTER — HOSPITAL ENCOUNTER (OUTPATIENT)
Dept: OPERATING ROOM | Facility: CLINIC | Age: 59
Discharge: HOME | End: 2024-05-03
Payer: COMMERCIAL

## 2024-05-03 VITALS
TEMPERATURE: 97.5 F | RESPIRATION RATE: 22 BRPM | WEIGHT: 239.42 LBS | HEART RATE: 77 BPM | DIASTOLIC BLOOD PRESSURE: 78 MMHG | OXYGEN SATURATION: 97 % | HEIGHT: 68 IN | SYSTOLIC BLOOD PRESSURE: 127 MMHG | BODY MASS INDEX: 36.29 KG/M2

## 2024-05-03 DIAGNOSIS — K74.60 CIRRHOSIS OF LIVER WITHOUT ASCITES, UNSPECIFIED HEPATIC CIRRHOSIS TYPE (MULTI): ICD-10-CM

## 2024-05-03 DIAGNOSIS — M54.2 CERVICAL PAIN (NECK): ICD-10-CM

## 2024-05-03 LAB — GLUCOSE BLD MANUAL STRIP-MCNC: 194 MG/DL (ref 74–99)

## 2024-05-03 PROCEDURE — 7100000009 HC PHASE TWO TIME - INITIAL BASE CHARGE

## 2024-05-03 PROCEDURE — 7100000010 HC PHASE TWO TIME - EACH INCREMENTAL 1 MINUTE

## 2024-05-03 PROCEDURE — 99152 MOD SED SAME PHYS/QHP 5/>YRS: CPT

## 2024-05-03 PROCEDURE — A4216 STERILE WATER/SALINE, 10 ML: HCPCS | Mod: SE | Performed by: PAIN MEDICINE

## 2024-05-03 PROCEDURE — 82947 ASSAY GLUCOSE BLOOD QUANT: CPT

## 2024-05-03 PROCEDURE — 62323 NJX INTERLAMINAR LMBR/SAC: CPT | Performed by: PAIN MEDICINE

## 2024-05-03 PROCEDURE — 2550000001 HC RX 255 CONTRASTS: Mod: SE | Performed by: PAIN MEDICINE

## 2024-05-03 PROCEDURE — 2500000004 HC RX 250 GENERAL PHARMACY W/ HCPCS (ALT 636 FOR OP/ED): Mod: SE | Performed by: PAIN MEDICINE

## 2024-05-03 PROCEDURE — 2500000005 HC RX 250 GENERAL PHARMACY W/O HCPCS: Mod: SE | Performed by: PAIN MEDICINE

## 2024-05-03 RX ORDER — SODIUM CHLORIDE 9 MG/ML
INJECTION, SOLUTION INTRAMUSCULAR; INTRAVENOUS; SUBCUTANEOUS AS NEEDED
Status: COMPLETED | OUTPATIENT
Start: 2024-05-03 | End: 2024-05-03

## 2024-05-03 RX ORDER — LIDOCAINE HYDROCHLORIDE 5 MG/ML
INJECTION, SOLUTION INFILTRATION; PERINEURAL AS NEEDED
Status: COMPLETED | OUTPATIENT
Start: 2024-05-03 | End: 2024-05-03

## 2024-05-03 RX ORDER — DEXAMETHASONE SODIUM PHOSPHATE 100 MG/10ML
INJECTION INTRAMUSCULAR; INTRAVENOUS AS NEEDED
Status: COMPLETED | OUTPATIENT
Start: 2024-05-03 | End: 2024-05-03

## 2024-05-03 RX ORDER — MIDAZOLAM HYDROCHLORIDE 1 MG/ML
INJECTION INTRAMUSCULAR; INTRAVENOUS AS NEEDED
Status: COMPLETED | OUTPATIENT
Start: 2024-05-03 | End: 2024-05-03

## 2024-05-03 RX ADMIN — MIDAZOLAM HYDROCHLORIDE 1 MG: 1 INJECTION, SOLUTION INTRAMUSCULAR; INTRAVENOUS at 13:05

## 2024-05-03 RX ADMIN — LIDOCAINE HYDROCHLORIDE 4 ML: 5 INJECTION, SOLUTION INFILTRATION; PERINEURAL at 13:15

## 2024-05-03 RX ADMIN — SODIUM CHLORIDE 5 ML: 9 INJECTION INTRAMUSCULAR; INTRAVENOUS; SUBCUTANEOUS at 13:05

## 2024-05-03 RX ADMIN — DEXAMETHASONE SODIUM PHOSPHATE 10 MG: 10 INJECTION INTRAMUSCULAR; INTRAVENOUS at 13:15

## 2024-05-03 RX ADMIN — IOHEXOL 1 ML: 240 INJECTION, SOLUTION INTRATHECAL; INTRAVASCULAR; INTRAVENOUS; ORAL at 13:15

## 2024-05-03 RX ADMIN — Medication 5 ML: at 13:07

## 2024-05-03 ASSESSMENT — COLUMBIA-SUICIDE SEVERITY RATING SCALE - C-SSRS
2. HAVE YOU ACTUALLY HAD ANY THOUGHTS OF KILLING YOURSELF?: NO
6. HAVE YOU EVER DONE ANYTHING, STARTED TO DO ANYTHING, OR PREPARED TO DO ANYTHING TO END YOUR LIFE?: NO
1. IN THE PAST MONTH, HAVE YOU WISHED YOU WERE DEAD OR WISHED YOU COULD GO TO SLEEP AND NOT WAKE UP?: NO

## 2024-05-03 ASSESSMENT — PAIN SCALES - GENERAL
PAINLEVEL_OUTOF10: 7
PAINLEVEL_OUTOF10: 0 - NO PAIN
PAINLEVEL_OUTOF10: 7

## 2024-05-03 ASSESSMENT — PAIN - FUNCTIONAL ASSESSMENT
PAIN_FUNCTIONAL_ASSESSMENT: 0-10

## 2024-05-03 NOTE — TELEPHONE ENCOUNTER
Hello, patient wanted to know if she could have a phone call from Dr. Jamison Flores to follow up with test results. Patient transportation unavailable and already has an appointment in July to See Dr. Flores

## 2024-05-03 NOTE — H&P
History Of Present Illness  Alejandra Houston is a 58 y.o. female presenting with cervical radiculopathy who is scheduled for cervical epidural steroid injection.      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 She started smoking about 44 years ago. She has a 22.2 pack-year smoking history. She has never used smokeless tobacco. She reports that she does not currently use alcohol. She reports that she does not use drugs.    Family History  Family History   Problem Relation Name Age of Onset    Hypertension Mother      Diabetes Mother      COPD Mother      Heart disease Mother      Lymphoma Mother      Cancer Other fam hx     Diabetes Other fam hx     Hypertension Other fam hx     Heart disease Other fam hx         Allergies  Patient has no known allergies.    Review of Systems   13 point ROS done and negative except for the above.     Physical Exam     General: NAD, well nourished   Eyes: Non-icteric sclera, EOMI  Ears, Nose, Mouth, and Throat: External ears and nose appear to be without deformity or  rash. No lesions or masses noted. Hearing is grossly intact.   Neck: Trachea midline  Respiratory: Nonlabored breathing   Cardiovascular: No JVD  Skin: No rashes or open lesions/ulcers identified on skin.    Last Recorded Vitals  There were no vitals taken for this visit.    Relevant Results       Assessment/Plan   Problem List Items Addressed This Visit    None  Visit Diagnoses         Codes    Cervical pain (neck)     M54.2    Relevant Orders    Epidural Steroid Injection            Alejandra Houston is a 58 y.o. female presenting with L shoulder pain and dysfunction who is scheduled for cervical epidural steroid injection.    - Scheduled for cervical epidural steroid injection  - Will follow in pain clinic  - Continue with physical therapy  - Continue pain medications as prescribed    Risks, benefits, alternatives to the procedure were discussed in detail and patient was amenable to proceeding.    Juan M Gaitan MD   PGY2 Anesthesiology

## 2024-05-03 NOTE — OP NOTE
* No procedures listed * Operative Note     Date: 5/3/2024  OR Location: [facility] NON-OR PROCEDURES    Name: Alejandra Houston, : 1965, Age: 58 y.o., MRN: [id number], Sex: female    Diagnosis  Cervical radiculopathy * No Diagnosis Codes entered *     Procedures  TIEN    Surgeons   Crystal    Resident/Fellow/Other Assistant:  Layton Mcgowan    Procedure Summary  Anesthesia: local and sedation* No anesthesia type entered *  ASA: ASA status not filed in the log.  Anesthesia Staff: No anesthesia staff entered.  Estimated Blood Loss: 0 mL  Intra-op Medications: * Intraprocedure medication information is unavailable because the case start and end events have not been set *      Intraprocedure I/O Totals       None           Specimen: No specimens collected     Staff:   No surgical staff documented.         Drains and/or Catheters:   [REMOVED] External Urinary Catheter Female (Removed)       Tourniquet Times:         Implants:     Findings:     Indications: Alejandra Houston is an 58 y.o. female who is having surgery for * No pre-op diagnosis entered *. Cervical radiculopathy    The patient was seen in the preoperative area. The risks, benefits, complications, treatment options, non-operative alternatives, expected recovery and outcomes were discussed with the patient. The possibilities of reaction to medication, pulmonary aspiration, injury to surrounding structures, bleeding, recurrent infection, the need for additional procedures, failure to diagnose a condition, and creating a complication requiring transfusion or operation were discussed with the patient. The patient concurred with the proposed plan, giving informed consent.  The site of surgery was properly noted/marked if necessary per policy. The patient has been actively warmed in preoperative area. Preoperative antibiotics are not indicated. Venous thrombosis prophylaxis are not indicated.    Procedure Details: PT identified. Consented. IV in place. Pt to OR  5. Time out confirmed. Pt moved to OR table on prone position. Regular monitors. Back neck exposed. Scrubbed regular fashion. Sterile towels. By fluoro guidance C7/T1 epidural space identified. Skin wheals. Taughy needle inserted and advanced guided by fluoro. Epidural space identified loss of resistance tech. Confirmed by contrast. 4 ml of lidocaine half percent and 10 mg dexamethasone injected.  Complications:  None; patient tolerated the procedure well.    Disposition: PACU - hemodynamically stable.  Condition: stable         Additional Details:     Attending Attestation: I was present and scrubbed for the entire procedure.    MD Crystal

## 2024-05-06 ENCOUNTER — APPOINTMENT (OUTPATIENT)
Dept: HEMATOLOGY/ONCOLOGY | Facility: CLINIC | Age: 59
End: 2024-05-06
Payer: COMMERCIAL

## 2024-05-07 ENCOUNTER — TELEPHONE (OUTPATIENT)
Dept: PAIN MEDICINE | Facility: CLINIC | Age: 59
End: 2024-05-07
Payer: COMMERCIAL

## 2024-05-07 NOTE — TELEPHONE ENCOUNTER
Pt called LM on VM reports she woke up this morning with right lower back pain and hip, numbness down her leg. Pt does know what happened, would like to follow up with Dr. Rojas. Pt recently on 5/3/24 had MAN injection, which isn't related to this pain. I tried to reach to patient LM for her to call me back to follow up.

## 2024-05-08 NOTE — TELEPHONE ENCOUNTER
"Hepatology Nurse Coordinator Note  Returned call to patient. Patient is aware her blood work has still not been drawn that Dr. Flores had ordered to be able to review it. Patient states she has gone to the lab and they have not drawn it. She's aware we will place non standing order in hopes to avoid her labs not being done. She was also advised to let the lab know she needs Dr. Flores's orders drawn in addition to her other providers, to ensure all are collected. She verbalized understanding.     Patient would still like to know if her next appointment can be virtual due to transportation issues. She's aware I would ask Dr. Flores if this is possible.    Patient also asked for her EGD results from March. She's aware Dr. Coronado did message her the results that she read. She states her concern is that \"She continues to belch and wants to know if her stomach showed something that it could be attributed to. She's aware I would review her question with Dr. Flores. She also stated \"milk and certain other foods aggravate it.\" She's aware she should avoid foods that could be causing these symptoms. She states she started to. She's aware once Dr. Flores reviews I will reach back out.     Patient verbalized understanding.   "

## 2024-05-10 DIAGNOSIS — F32.A DEPRESSION, UNSPECIFIED DEPRESSION TYPE: ICD-10-CM

## 2024-05-10 RX ORDER — BUPROPION HYDROCHLORIDE 150 MG/1
150 TABLET ORAL EVERY MORNING
Qty: 30 TABLET | Refills: 1 | Status: SHIPPED | OUTPATIENT
Start: 2024-05-10 | End: 2024-07-09

## 2024-05-13 ENCOUNTER — DOCUMENTATION (OUTPATIENT)
Dept: PRIMARY CARE | Facility: CLINIC | Age: 59
End: 2024-05-13
Payer: COMMERCIAL

## 2024-05-14 ENCOUNTER — PATIENT OUTREACH (OUTPATIENT)
Dept: CARE COORDINATION | Facility: CLINIC | Age: 59
End: 2024-05-14
Payer: COMMERCIAL

## 2024-05-14 RX ORDER — OXYCODONE HYDROCHLORIDE 5 MG/1
5 TABLET ORAL EVERY 6 HOURS PRN
COMMUNITY

## 2024-05-14 NOTE — PROGRESS NOTES
Discharge Facility:[facility]  Discharge Diagnosis:Fracture of unspecified carpal bone, left wrist, initial encounter for closed fracture   Admission Date:5/11/2024  Discharge Date: 5/12/2024    PCP Appointment Date:Declined   Specialist Appointment Date:   5/16/2024 Ortho/Cynthia  5/22/2024 Hem/Onc Parker  6/4/2024 [facility]   Hospital Encounter and Summary: Linked   See discharge assessment below for further details  Engagement  Call Start Time: 1237 (5/14/2024 12:41 PM)    Medications  Medications reviewed with patient/caregiver?: Yes (5/14/2024 12:41 PM)  Is the patient having any side effects they believe may be caused by any medication additions or changes?: No (5/14/2024 12:41 PM)  Does the patient have all medications ordered at discharge?: Yes (5/14/2024 12:41 PM)  Care Management Interventions: No intervention needed (5/14/2024 12:41 PM)  Prescription Comments: -- (Oxycodone 5 mg one q 6 hours prn) (5/14/2024 12:41 PM)  Is the patient taking all medications as directed (includes completed medication regime)?: Yes (5/14/2024 12:41 PM)  Care Management Interventions: Provided patient education (Discussed may need to take stool softener if constipation from pain meds.) (5/14/2024 12:41 PM)    Appointments  Does the patient have a primary care provider?: Yes (5/14/2024 12:41 PM)  Care Management Interventions: -- (Has appointment in July, will keep that one and fu with specialists.) (5/14/2024 12:41 PM)  Has the patient kept scheduled appointments due by today?: Yes (5/14/2024 12:41 PM)    Self Management  What is the home health agency?: -- (N/A) (5/14/2024 12:41 PM)  What Durable Medical Equipment (DME) was ordered?: -- (N/A) (5/14/2024 12:41 PM)    Patient Teaching  Does the patient have access to their discharge instructions?: Yes (5/14/2024 12:41 PM)  What is the patient's perception of their health status since discharge?: Improving (5/14/2024 12:41 PM)  Is the patient/caregiver able to teach back the  hierarchy of who to call/visit for symptoms/problems? PCP, Specialist, Home Health nurse, Urgent Care, ED, 911: Yes (5/14/2024 12:41 PM)    Wrap Up  Wrap Up Additional Comments: -- (Alejandra is doing ok, she states still discomfort in wrist but pain meds are helping. She has fu appointments scheduled with her specialist. She will contact PCP office if needs seen sooner or any concerns.) (5/14/2024 12:41 PM)

## 2024-05-15 DIAGNOSIS — Z72.0 TOBACCO ABUSE: ICD-10-CM

## 2024-05-15 RX ORDER — IBUPROFEN 200 MG
1 TABLET ORAL EVERY 24 HOURS
Qty: 28 PATCH | Refills: 0 | Status: SHIPPED | OUTPATIENT
Start: 2024-05-15 | End: 2024-06-14

## 2024-05-15 RX ORDER — MICONAZOLE NITRATE 2 %
2 CREAM (GRAM) TOPICAL AS NEEDED
Qty: 40 EACH | Refills: 0 | Status: SHIPPED | OUTPATIENT
Start: 2024-05-15 | End: 2024-06-14

## 2024-05-17 DIAGNOSIS — G89.29 CHRONIC LOW BACK PAIN, UNSPECIFIED BACK PAIN LATERALITY, UNSPECIFIED WHETHER SCIATICA PRESENT: ICD-10-CM

## 2024-05-17 DIAGNOSIS — M54.50 CHRONIC LOW BACK PAIN, UNSPECIFIED BACK PAIN LATERALITY, UNSPECIFIED WHETHER SCIATICA PRESENT: ICD-10-CM

## 2024-05-17 DIAGNOSIS — E11.9 TYPE 2 DIABETES MELLITUS WITHOUT COMPLICATION, WITHOUT LONG-TERM CURRENT USE OF INSULIN (MULTI): ICD-10-CM

## 2024-05-20 RX ORDER — CALCIUM CITRATE/VITAMIN D3 200MG-6.25
TABLET ORAL
Qty: 100 STRIP | Refills: 5 | Status: SHIPPED | OUTPATIENT
Start: 2024-05-20

## 2024-05-20 RX ORDER — ACETAMINOPHEN 500 MG
500 TABLET ORAL EVERY 8 HOURS PRN
Qty: 30 TABLET | Refills: 1 | Status: SHIPPED | OUTPATIENT
Start: 2024-05-20 | End: 2024-05-30

## 2024-05-24 NOTE — TELEPHONE ENCOUNTER
Hepatology Nurse Coordinator Note   Called patient to provide updated results and recommendations from Dr. Flores as requested. Patient is aware her EGD showed some reactive changes in the stomach and small esophageal varices. She's aware Dr. Flores is recommending that she avoid all dairy products, as belching could be due to lactose intolerance. In addition, she's aware to avoid NSAIDS.     Patient is aware that Dr. Flores prefers to keep her upcoming visit in person, as she would like discuss starting a beta blocker. She's aware Dr. Flores would need to do vitals and assess her. Patient is agreeable to keep her upcoming visit in person. She did request a sooner appointment. She's aware she has been placed on the waiting list.     Patient verbalized understanding. She had no further questions, or concerns at this time.

## 2024-05-28 DIAGNOSIS — Z00.00 HEALTHCARE MAINTENANCE: ICD-10-CM

## 2024-05-28 DIAGNOSIS — F51.01 PRIMARY INSOMNIA: ICD-10-CM

## 2024-05-28 DIAGNOSIS — G89.29 CHRONIC LOW BACK PAIN, UNSPECIFIED BACK PAIN LATERALITY, UNSPECIFIED WHETHER SCIATICA PRESENT: ICD-10-CM

## 2024-05-28 DIAGNOSIS — M54.50 CHRONIC LOW BACK PAIN, UNSPECIFIED BACK PAIN LATERALITY, UNSPECIFIED WHETHER SCIATICA PRESENT: ICD-10-CM

## 2024-05-28 RX ORDER — ALBUTEROL SULFATE 90 UG/1
2 AEROSOL, METERED RESPIRATORY (INHALATION) 4 TIMES DAILY PRN
Qty: 8.5 G | Refills: 1 | Status: SHIPPED | OUTPATIENT
Start: 2024-05-28

## 2024-05-28 RX ORDER — NAPROXEN 500 MG/1
TABLET ORAL
Qty: 60 TABLET | Refills: 1 | Status: SHIPPED | OUTPATIENT
Start: 2024-05-28

## 2024-05-28 RX ORDER — TRAZODONE HYDROCHLORIDE 50 MG/1
100 TABLET ORAL NIGHTLY
Qty: 60 TABLET | Refills: 2 | Status: SHIPPED | OUTPATIENT
Start: 2024-05-28

## 2024-06-03 ENCOUNTER — LAB (OUTPATIENT)
Dept: LAB | Facility: LAB | Age: 59
End: 2024-06-03
Payer: COMMERCIAL

## 2024-06-03 ENCOUNTER — HOSPITAL ENCOUNTER (OUTPATIENT)
Dept: RADIOLOGY | Facility: CLINIC | Age: 59
Discharge: HOME | End: 2024-06-03
Payer: COMMERCIAL

## 2024-06-03 DIAGNOSIS — K74.60 CIRRHOSIS OF LIVER WITHOUT ASCITES, UNSPECIFIED HEPATIC CIRRHOSIS TYPE (MULTI): ICD-10-CM

## 2024-06-03 DIAGNOSIS — D61.818 PANCYTOPENIA (MULTI): ICD-10-CM

## 2024-06-03 DIAGNOSIS — G25.81 RESTLESS LEG SYNDROME: ICD-10-CM

## 2024-06-03 DIAGNOSIS — E83.10 DISORDER OF IRON METABOLISM: ICD-10-CM

## 2024-06-03 DIAGNOSIS — E11.9 TYPE 2 DIABETES MELLITUS WITHOUT COMPLICATION, WITHOUT LONG-TERM CURRENT USE OF INSULIN (MULTI): ICD-10-CM

## 2024-06-03 LAB
AFP SERPL-MCNC: <4 NG/ML (ref 0–9)
ALBUMIN SERPL BCP-MCNC: 4 G/DL (ref 3.4–5)
ALP SERPL-CCNC: 94 U/L (ref 33–110)
ALT SERPL W P-5'-P-CCNC: 18 U/L (ref 7–45)
ANION GAP SERPL CALC-SCNC: 14 MMOL/L (ref 10–20)
AST SERPL W P-5'-P-CCNC: 19 U/L (ref 9–39)
BASOPHILS # BLD AUTO: 0.02 X10*3/UL (ref 0–0.1)
BASOPHILS NFR BLD AUTO: 0.5 %
BILIRUB DIRECT SERPL-MCNC: 0.1 MG/DL (ref 0–0.3)
BILIRUB SERPL-MCNC: 0.6 MG/DL (ref 0–1.2)
BUN SERPL-MCNC: 12 MG/DL (ref 6–23)
CALCIUM SERPL-MCNC: 9.5 MG/DL (ref 8.6–10.6)
CHLORIDE SERPL-SCNC: 106 MMOL/L (ref 98–107)
CO2 SERPL-SCNC: 28 MMOL/L (ref 21–32)
CREAT SERPL-MCNC: 0.59 MG/DL (ref 0.5–1.05)
EGFRCR SERPLBLD CKD-EPI 2021: >90 ML/MIN/1.73M*2
EOSINOPHIL # BLD AUTO: 0.18 X10*3/UL (ref 0–0.7)
EOSINOPHIL NFR BLD AUTO: 4.4 %
ERYTHROCYTE [DISTWIDTH] IN BLOOD BY AUTOMATED COUNT: 15.8 % (ref 11.5–14.5)
EST. AVERAGE GLUCOSE BLD GHB EST-MCNC: 160 MG/DL
FERRITIN SERPL-MCNC: 83 NG/ML (ref 8–150)
GLUCOSE SERPL-MCNC: 144 MG/DL (ref 74–99)
HBA1C MFR BLD: 7.2 %
HCT VFR BLD AUTO: 37.2 % (ref 36–46)
HGB BLD-MCNC: 12.7 G/DL (ref 12–16)
IMM GRANULOCYTES # BLD AUTO: 0.02 X10*3/UL (ref 0–0.7)
IMM GRANULOCYTES NFR BLD AUTO: 0.5 % (ref 0–0.9)
INR PPP: 1.1 (ref 0.9–1.1)
IRON SATN MFR SERPL: 23 % (ref 25–45)
IRON SERPL-MCNC: 71 UG/DL (ref 35–150)
LYMPHOCYTES # BLD AUTO: 1.1 X10*3/UL (ref 1.2–4.8)
LYMPHOCYTES NFR BLD AUTO: 27 %
MCH RBC QN AUTO: 29.7 PG (ref 26–34)
MCHC RBC AUTO-ENTMCNC: 34.1 G/DL (ref 32–36)
MCV RBC AUTO: 87 FL (ref 80–100)
MONOCYTES # BLD AUTO: 0.38 X10*3/UL (ref 0.1–1)
MONOCYTES NFR BLD AUTO: 9.3 %
NEUTROPHILS # BLD AUTO: 2.38 X10*3/UL (ref 1.2–7.7)
NEUTROPHILS NFR BLD AUTO: 58.3 %
NRBC BLD-RTO: 0 /100 WBCS (ref 0–0)
PLATELET # BLD AUTO: 87 X10*3/UL (ref 150–450)
POTASSIUM SERPL-SCNC: 3.6 MMOL/L (ref 3.5–5.3)
PROT SERPL-MCNC: 6.4 G/DL (ref 6.4–8.2)
PROTHROMBIN TIME: 12.5 SECONDS (ref 9.8–12.8)
RBC # BLD AUTO: 4.28 X10*6/UL (ref 4–5.2)
SODIUM SERPL-SCNC: 144 MMOL/L (ref 136–145)
TIBC SERPL-MCNC: 303 UG/DL (ref 240–445)
UIBC SERPL-MCNC: 232 UG/DL (ref 110–370)
WBC # BLD AUTO: 4.1 X10*3/UL (ref 4.4–11.3)

## 2024-06-03 PROCEDURE — 82728 ASSAY OF FERRITIN: CPT

## 2024-06-03 PROCEDURE — 83540 ASSAY OF IRON: CPT

## 2024-06-03 PROCEDURE — 85610 PROTHROMBIN TIME: CPT

## 2024-06-03 PROCEDURE — 83550 IRON BINDING TEST: CPT

## 2024-06-03 PROCEDURE — 76705 ECHO EXAM OF ABDOMEN: CPT | Performed by: RADIOLOGY

## 2024-06-03 PROCEDURE — 76705 ECHO EXAM OF ABDOMEN: CPT

## 2024-06-03 PROCEDURE — 82105 ALPHA-FETOPROTEIN SERUM: CPT

## 2024-06-03 PROCEDURE — 82248 BILIRUBIN DIRECT: CPT

## 2024-06-03 PROCEDURE — 80053 COMPREHEN METABOLIC PANEL: CPT

## 2024-06-03 PROCEDURE — 85025 COMPLETE CBC W/AUTO DIFF WBC: CPT

## 2024-06-03 PROCEDURE — 83036 HEMOGLOBIN GLYCOSYLATED A1C: CPT

## 2024-06-03 PROCEDURE — 36415 COLL VENOUS BLD VENIPUNCTURE: CPT

## 2024-06-04 ENCOUNTER — APPOINTMENT (OUTPATIENT)
Dept: PAIN MEDICINE | Facility: CLINIC | Age: 59
End: 2024-06-04
Payer: COMMERCIAL

## 2024-06-07 ENCOUNTER — HOSPITAL ENCOUNTER (OUTPATIENT)
Dept: RADIOLOGY | Facility: CLINIC | Age: 59
Discharge: HOME | End: 2024-06-07
Payer: COMMERCIAL

## 2024-06-07 DIAGNOSIS — Z72.0 TOBACCO ABUSE: ICD-10-CM

## 2024-06-07 PROCEDURE — 71271 CT THORAX LUNG CANCER SCR C-: CPT

## 2024-06-12 ENCOUNTER — APPOINTMENT (OUTPATIENT)
Dept: HEMATOLOGY/ONCOLOGY | Facility: CLINIC | Age: 59
End: 2024-06-12
Payer: COMMERCIAL

## 2024-06-18 ENCOUNTER — OFFICE VISIT (OUTPATIENT)
Dept: HEMATOLOGY/ONCOLOGY | Facility: CLINIC | Age: 59
End: 2024-06-18
Payer: COMMERCIAL

## 2024-06-18 VITALS
DIASTOLIC BLOOD PRESSURE: 90 MMHG | RESPIRATION RATE: 16 BRPM | HEART RATE: 66 BPM | BODY MASS INDEX: 35.93 KG/M2 | SYSTOLIC BLOOD PRESSURE: 150 MMHG | TEMPERATURE: 96.8 F | OXYGEN SATURATION: 97 % | WEIGHT: 236.33 LBS

## 2024-06-18 DIAGNOSIS — J44.9 CHRONIC OBSTRUCTIVE PULMONARY DISEASE, UNSPECIFIED COPD TYPE (MULTI): ICD-10-CM

## 2024-06-18 DIAGNOSIS — M54.16 SPINAL STENOSIS OF LUMBAR REGION WITH RADICULOPATHY: ICD-10-CM

## 2024-06-18 DIAGNOSIS — F17.219 CIGARETTE NICOTINE DEPENDENCE WITH NICOTINE-INDUCED DISORDER: ICD-10-CM

## 2024-06-18 DIAGNOSIS — F32.A DEPRESSION, UNSPECIFIED DEPRESSION TYPE: ICD-10-CM

## 2024-06-18 DIAGNOSIS — D69.6 THROMBOCYTOPENIA (CMS-HCC): ICD-10-CM

## 2024-06-18 DIAGNOSIS — M48.061 SPINAL STENOSIS OF LUMBAR REGION WITH RADICULOPATHY: ICD-10-CM

## 2024-06-18 DIAGNOSIS — E11.9 TYPE 2 DIABETES MELLITUS WITHOUT COMPLICATION, WITHOUT LONG-TERM CURRENT USE OF INSULIN (MULTI): Primary | ICD-10-CM

## 2024-06-18 PROCEDURE — 99214 OFFICE O/P EST MOD 30 MIN: CPT | Performed by: INTERNAL MEDICINE

## 2024-06-18 PROCEDURE — 3080F DIAST BP >= 90 MM HG: CPT | Performed by: INTERNAL MEDICINE

## 2024-06-18 PROCEDURE — 3077F SYST BP >= 140 MM HG: CPT | Performed by: INTERNAL MEDICINE

## 2024-06-18 PROCEDURE — 3051F HG A1C>EQUAL 7.0%<8.0%: CPT | Performed by: INTERNAL MEDICINE

## 2024-06-18 PROCEDURE — 3008F BODY MASS INDEX DOCD: CPT | Performed by: INTERNAL MEDICINE

## 2024-06-18 ASSESSMENT — PAIN SCALES - GENERAL: PAINLEVEL: 7

## 2024-06-18 NOTE — PROGRESS NOTES
Patient ID: Alejandra Houston is a 58 y.o. female.  Referring Physician: Shruthi Vincent PA-C  Office Address Unavailable  as of 2024  Primary Care Provider: Jhoan Eller DO  Visit Type: Follow Up      Subjective    HPI  I am doing okay    Review of Systems   Constitutional: Negative.    HENT:  Negative.     Eyes: Negative.    Respiratory: Negative.     Cardiovascular: Negative.    Gastrointestinal: Negative.    Endocrine: Negative.    Genitourinary: Negative.     Musculoskeletal: Negative.    Skin: Negative.    Neurological: Negative.    Hematological: Negative.    Psychiatric/Behavioral: Negative.          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   has a past surgical history that includes Total knee arthroplasty (2018); Other surgical history (06/10/2019);  section, classic (02/15/2018); Hip Arthroplasty; Uvulopalatopharyngoplasty;  section, classic; and Knee Arthroplasty.  Family History   Problem Relation Name Age of Onset    Hypertension Mother      Diabetes Mother      COPD Mother      Heart disease Mother      Lymphoma Mother      Cancer Other fam hx     Diabetes Other fam hx     Hypertension Other fam hx     Heart disease Other fam hx      Oncology History    No history exists.       Alejandra Houston  reports that she has been smoking cigarettes. She started smoking about 44 years ago. She has a 22.2 pack-year smoking history. She has  never used smokeless tobacco.  She  reports that she does not currently use alcohol.  She  reports no history of drug use.    Physical Exam  Vitals reviewed.   Constitutional:       Appearance: Normal appearance.   HENT:      Head: Normocephalic.      Mouth/Throat:      Mouth: Mucous membranes are moist.   Eyes:      Extraocular Movements: Extraocular movements intact.      Pupils: Pupils are equal, round, and reactive to light.   Cardiovascular:      Rate and Rhythm: Normal rate and regular rhythm.      Pulses: Normal pulses.      Heart sounds: Normal heart sounds.   Pulmonary:      Breath sounds: Normal breath sounds.   Abdominal:      General: Bowel sounds are normal.      Palpations: Abdomen is soft.   Musculoskeletal:         General: Normal range of motion.      Cervical back: Normal range of motion and neck supple.   Skin:     General: Skin is warm.   Neurological:      General: No focal deficit present.      Mental Status: She is alert and oriented to person, place, and time.   Psychiatric:         Mood and Affect: Mood normal.         Behavior: Behavior normal.         WBC   Date/Time Value Ref Range Status   06/03/2024 09:52 AM 4.1 (L) 4.4 - 11.3 x10*3/uL Final   02/09/2024 05:55 AM 7.0 4.4 - 11.3 x10*3/uL Final   01/22/2024 10:37 AM 4.9 4.4 - 11.3 x10*3/uL Final     nRBC   Date Value Ref Range Status   06/03/2024 0.0 0.0 - 0.0 /100 WBCs Final   02/09/2024 0.0 0.0 - 0.0 /100 WBCs Final   01/22/2024 0.0 0.0 - 0.0 /100 WBCs Final     RBC   Date Value Ref Range Status   06/03/2024 4.28 4.00 - 5.20 x10*6/uL Final   02/09/2024 3.64 (L) 4.00 - 5.20 x10*6/uL Final   01/22/2024 4.18 4.00 - 5.20 x10*6/uL Final     Hemoglobin   Date Value Ref Range Status   06/03/2024 12.7 12.0 - 16.0 g/dL Final   02/09/2024 11.0 (L) 12.0 - 16.0 g/dL Final   01/22/2024 12.9 12.0 - 16.0 g/dL Final     Hematocrit   Date Value Ref Range Status   06/03/2024 37.2 36.0 - 46.0 % Final   02/09/2024 34.2 (L) 36.0 - 46.0 % Final   01/22/2024  "39.2 36.0 - 46.0 % Final     MCV   Date/Time Value Ref Range Status   06/03/2024 09:52 AM 87 80 - 100 fL Final   02/09/2024 05:55 AM 94 80 - 100 fL Final   01/22/2024 10:37 AM 94 80 - 100 fL Final     MCH   Date/Time Value Ref Range Status   06/03/2024 09:52 AM 29.7 26.0 - 34.0 pg Final   02/09/2024 05:55 AM 30.2 26.0 - 34.0 pg Final   01/22/2024 10:37 AM 30.9 26.0 - 34.0 pg Final     MCHC   Date/Time Value Ref Range Status   06/03/2024 09:52 AM 34.1 32.0 - 36.0 g/dL Final   02/09/2024 05:55 AM 32.2 32.0 - 36.0 g/dL Final   01/22/2024 10:37 AM 32.9 32.0 - 36.0 g/dL Final     RDW   Date/Time Value Ref Range Status   06/03/2024 09:52 AM 15.8 (H) 11.5 - 14.5 % Final   02/09/2024 05:55 AM 13.2 11.5 - 14.5 % Final   01/22/2024 10:37 AM 13.5 11.5 - 14.5 % Final     Platelets   Date/Time Value Ref Range Status   06/03/2024 09:52 AM 87 (L) 150 - 450 x10*3/uL Final   02/09/2024 05:55 AM 84 (L) 150 - 450 x10*3/uL Final   01/22/2024 10:37 AM 76 (L) 150 - 450 x10*3/uL Final     No results found for: \"MPV\"  Neutrophils %   Date/Time Value Ref Range Status   06/03/2024 09:52 AM 58.3 40.0 - 80.0 % Final   01/22/2024 10:37 AM 60.4 40.0 - 80.0 % Final   01/11/2024 01:05 PM 55.4 40.0 - 80.0 % Final     Immature Granulocytes %, Automated   Date/Time Value Ref Range Status   06/03/2024 09:52 AM 0.5 0.0 - 0.9 % Final     Comment:     Immature Granulocyte Count (IG) includes promyelocytes, myelocytes and metamyelocytes but does not include bands. Percent differential counts (%) should be interpreted in the context of the absolute cell counts (cells/UL).   01/22/2024 10:37 AM 0.4 0.0 - 0.9 % Final     Comment:     Immature Granulocyte Count (IG) includes promyelocytes, myelocytes and metamyelocytes but does not include bands. Percent differential counts (%) should be interpreted in the context of the absolute cell counts (cells/UL).   01/11/2024 01:05 PM 0.2 0.0 - 0.9 % Final     Comment:     Immature Granulocyte Count (IG) includes " promyelocytes, myelocytes and metamyelocytes but does not include bands. Percent differential counts (%) should be interpreted in the context of the absolute cell counts (cells/UL).     Lymphocytes %   Date/Time Value Ref Range Status   06/03/2024 09:52 AM 27.0 13.0 - 44.0 % Final   01/22/2024 10:37 AM 26.1 13.0 - 44.0 % Final   01/11/2024 01:05 PM 29.4 13.0 - 44.0 % Final     Monocytes %   Date/Time Value Ref Range Status   06/03/2024 09:52 AM 9.3 2.0 - 10.0 % Final   01/22/2024 10:37 AM 8.6 2.0 - 10.0 % Final   01/11/2024 01:05 PM 10.4 2.0 - 10.0 % Final     Eosinophils %   Date/Time Value Ref Range Status   06/03/2024 09:52 AM 4.4 0.0 - 6.0 % Final   01/22/2024 10:37 AM 3.7 0.0 - 6.0 % Final   01/11/2024 01:05 PM 4.1 0.0 - 6.0 % Final     Basophils %   Date/Time Value Ref Range Status   06/03/2024 09:52 AM 0.5 0.0 - 2.0 % Final   01/22/2024 10:37 AM 0.8 0.0 - 2.0 % Final   01/11/2024 01:05 PM 0.5 0.0 - 2.0 % Final     Neutrophils Absolute   Date/Time Value Ref Range Status   06/03/2024 09:52 AM 2.38 1.20 - 7.70 x10*3/uL Final     Comment:     Percent differential counts (%) should be interpreted in the context of the absolute cell counts (cells/uL).   01/22/2024 10:37 AM 2.97 1.20 - 7.70 x10*3/uL Final     Comment:     Percent differential counts (%) should be interpreted in the context of the absolute cell counts (cells/uL).   01/11/2024 01:05 PM 2.30 1.20 - 7.70 x10*3/uL Final     Comment:     Percent differential counts (%) should be interpreted in the context of the absolute cell counts (cells/uL).     Immature Granulocytes Absolute, Automated   Date/Time Value Ref Range Status   06/03/2024 09:52 AM 0.02 0.00 - 0.70 x10*3/uL Final   01/22/2024 10:37 AM 0.02 0.00 - 0.70 x10*3/uL Final   01/11/2024 01:05 PM 0.01 0.00 - 0.70 x10*3/uL Final     Lymphocytes Absolute   Date/Time Value Ref Range Status   06/03/2024 09:52 AM 1.10 (L) 1.20 - 4.80 x10*3/uL Final   01/22/2024 10:37 AM 1.28 1.20 - 4.80 x10*3/uL Final  "  01/11/2024 01:05 PM 1.22 1.20 - 4.80 x10*3/uL Final     Monocytes Absolute   Date/Time Value Ref Range Status   06/03/2024 09:52 AM 0.38 0.10 - 1.00 x10*3/uL Final   01/22/2024 10:37 AM 0.42 0.10 - 1.00 x10*3/uL Final   01/11/2024 01:05 PM 0.43 0.10 - 1.00 x10*3/uL Final     Eosinophils Absolute   Date/Time Value Ref Range Status   06/03/2024 09:52 AM 0.18 0.00 - 0.70 x10*3/uL Final   01/22/2024 10:37 AM 0.18 0.00 - 0.70 x10*3/uL Final   01/11/2024 01:05 PM 0.17 0.00 - 0.70 x10*3/uL Final     Basophils Absolute   Date/Time Value Ref Range Status   06/03/2024 09:52 AM 0.02 0.00 - 0.10 x10*3/uL Final   01/22/2024 10:37 AM 0.04 0.00 - 0.10 x10*3/uL Final   01/11/2024 01:05 PM 0.02 0.00 - 0.10 x10*3/uL Final       No components found for: \"PT\"  aPTT   Date/Time Value Ref Range Status   01/22/2024 10:37 AM 33 27 - 38 seconds Final   09/26/2022 12:20 PM 38 26 - 39 sec Final     Comment:       THE APTT IS NO LONGER USED FOR MONITORING     UNFRACTIONATED HEPARIN THERAPY.    FOR MONITORING HEPARIN THERAPY,     USE THE HEPARIN ASSAY.     07/29/2022 09:53 AM 38 26 - 39 sec Final     Comment:       THE APTT IS NO LONGER USED FOR MONITORING     UNFRACTIONATED HEPARIN THERAPY.    FOR MONITORING HEPARIN THERAPY,     USE THE HEPARIN ASSAY.     09/24/2018 03:24 PM 29 25 - 36 sec Final     Comment:       THE APTT IS NO LONGER USED FOR MONITORING     UNFRACTIONATED HEPARIN THERAPY.    FOR MONITORING HEPARIN THERAPY,     USE THE HEPARIN ASSAY.         Assessment/Plan    Thrombocytopenia  -Chronic, moderate thrombocytopenia. Patient is asymptomatic.   -Discussed causes of thrombocytopenia with patient: pseudothrombocytopenia, ITP, drug induced, infection (e.g. HIV, hepatitis C, mono), hypersplenism due to chronic liver disease, alcohol, nutrient deficiencies (e.g. vitamin B12, folate, copper), autoimmune disorder (SLE, RA), MDS, cancer with bone marrow infiltration, hereditary    -9/21/23: vitamin B12 380--> at the lower end of the " normal range. Currently, patient is taking oral vitamin B12 once per day. States that she has been taking it for years.   -7/17/23: CT abdomen/pelvis without IV contrast showed   Liver: Cirrhotic nodular liver.   Spleen: Borderline enlarged measuring 13.8 cm in craniocaudal length.  -most likely, thrombocytopenia secondary to liver cirrhosis and splenomegaly.   -here for interval followup  -labs done on 6/3/2024 included CBC, COMP  -results reviewed--wbc 4.1, hgb 12.7, plt 87,000, ANC 2380, creatinine 0.59, alk phos 94, AST 19, ALT 18  -thrombocytopenia, secondary to hypersplenism, is stable  -hgb stable  -clinically stable, so will continue to see annually     2. DM  -on Trulicity   -on metformin     3. Tobacco abuse  -on nicoderm     4. Anxiety/depression  -on wellbutrin  -on cymbalta     5. Back pain   -secondary to spinal stenosis  -on oxycodone     6. COPD  -on albuterol  -on fluticasone          Problem List Items Addressed This Visit    None  Visit Diagnoses         Codes    Thrombocytopenia (CMS-HCC)     D69.6    Relevant Orders    Clinic Appointment Request Follow Up; C[facility] PARKER; [id number]    CBC and Auto Differential    Comprehensive metabolic panel                 Cj Parker MD

## 2024-06-20 ENCOUNTER — TELEPHONE (OUTPATIENT)
Dept: GASTROENTEROLOGY | Facility: CLINIC | Age: 59
End: 2024-06-20
Payer: COMMERCIAL

## 2024-06-20 PROBLEM — D69.6 THROMBOCYTOPENIA (CMS-HCC): Status: ACTIVE | Noted: 2024-06-20

## 2024-06-20 ASSESSMENT — ENCOUNTER SYMPTOMS
GASTROINTESTINAL NEGATIVE: 1
CARDIOVASCULAR NEGATIVE: 1
CONSTITUTIONAL NEGATIVE: 1
NEUROLOGICAL NEGATIVE: 1
RESPIRATORY NEGATIVE: 1
ENDOCRINE NEGATIVE: 1
EYES NEGATIVE: 1
MUSCULOSKELETAL NEGATIVE: 1
PSYCHIATRIC NEGATIVE: 1
HEMATOLOGIC/LYMPHATIC NEGATIVE: 1

## 2024-06-20 NOTE — TELEPHONE ENCOUNTER
Hepatology Nurse Coordinator Note - ADDENDUM  6/20/2024 3:17 PM  Called patient to follow up on Mychart message received. She was scheduled for a sooner appointment with Dr. Flores next week as requested. She is aware this is at the [place] location. Also reviewed her liver ultrasound results as noted in Dr. Flores's message to her. She's aware the ultrasound of liver shows stable changes of fatty tissue in the liver. She's aware no spots were noted on the liver. She's aware Dr. Flores will review in more detail at her appointment next week. Patient verbalized understanding and had no further questions at this time.     ----- Message from Jamison Flores MD sent at 5/29/2024 11:44 AM EDT -----  Regarding: FW: Appointment  Contact: [phone]  Can you call pt and review what are her concerns. I think you spoke with her this month, let me know if any questions/updates.     If there is a routine in person visit available for follow up before July 17th, can offer that .     Thanks    ----- Message -----  From: Leonides Blackwell MA  Sent: 5/29/2024  11:39 AM EDT  To: Jamison Flores MD  Subject: FW: Appointment                                    ----- Message -----  From: Alejandra Houston  Sent: 5/29/2024  11:32 AM EDT  To: [facility] Clinical Support Staff  Subject: Appointment                                      Hi I'm messaging you because a nurse called me Friday about the results of my liver and a couple other things she told me and unfortunately when she called at that time I was in with the doctor for my knee I heard what she said but I just wasn't you know into it right then in there because I was with the doctors is there any way somebody could call me back or message me and let me know exactly what is going on please with my stomach and is there a way I can get in to see you sooner than July 17th thank you very much

## 2024-06-24 DIAGNOSIS — E11.9 TYPE 2 DIABETES MELLITUS WITHOUT COMPLICATION, WITHOUT LONG-TERM CURRENT USE OF INSULIN (MULTI): ICD-10-CM

## 2024-06-24 DIAGNOSIS — J30.9 ALLERGIC RHINITIS, UNSPECIFIED SEASONALITY, UNSPECIFIED TRIGGER: ICD-10-CM

## 2024-06-24 RX ORDER — VITAMIN B COMPLEX
TABLET ORAL
Qty: 30 TABLET | Refills: 3 | Status: SHIPPED | OUTPATIENT
Start: 2024-06-24

## 2024-06-24 RX ORDER — FLUTICASONE PROPIONATE 50 MCG
SPRAY, SUSPENSION (ML) NASAL
Qty: 16 G | Refills: 3 | Status: SHIPPED | OUTPATIENT
Start: 2024-06-24

## 2024-06-24 RX ORDER — FOLIC ACID 1 MG/1
TABLET ORAL
Qty: 30 TABLET | Refills: 1 | Status: SHIPPED | OUTPATIENT
Start: 2024-06-24

## 2024-06-24 NOTE — PROGRESS NOTES
Hepatology: Follow up Office Note      Patient: Alejandra Houston, a 58 y.o. year old female presents for follow up of cirrhosis.    PCP: Jhoan Eller, DO    History of Present Illness   Alejandra Houston presents for a follow up visit for cirrhosis.     She established care for cirrhosis in Jan 2024. Had been diagnosed with cirrhosis due to steatotic liver disease. Had prior imaging with hepatic steatosis. Prior hx of abnormal liver enzymes.       History of complications their liver disease:   Ascites or Volume overload:  no known hx  Hepatic Encephalopathy:  no known hx  GI (variceal) Bleeding:  no known hx  HCC:  no known hx  Hx of Infections:  no known hx  Portal vein Thrombosis:  no known hx  SMV Thrombosis:  no known hx  Hx of TIPS:  no known hx  Recent Hospitalizations: none due to liver ds     Noted that back in the 1990s she had developed jaundice due to acute hepatitis-she is unaware of what type of viral hepatitis infection she had.  Had noted easy bruisability, no episodes of overt mucosal or GI bleeding.   Due to financial constraints, she noted being dependent on limited food items- ex. Ramen Noodles.  In addition patient noted that she has been experiencing increased abdominal bloating/belching and constipation. Occasional symptoms of heartburn and sensation of pills getting stuck in the retrosternal area.  Denied episodes of food bolus impaction, episodes of regurgitation of food.  Had been experiencing increased postnasal drip.  Denied having inadvertent weight loss.     Today's visit: Here for a follow-up visit.  Denies having acute symptoms.  Continued symptoms of constipation, can go without having a bowel movement for 4 days.  Notes having straining.  This is subsequently followed with bowel movements that are loose and watery.  She has not been using MiraLAX consistently.  She also notes upper abdominal bloating, discomfort.  Denies symptoms of right upper quadrant abdominal pain, nausea,  vomiting, jaundice, confusion.     Review of Systems   Constitutional/ General: No fever  Eyes: no yellow discoloration  ENT: Postnasal drip  Cardiovascular: no chest pain, no palpitations  Respiratory: no shortness of breath  Gastrointestinal: constipation   Neurologic: no weakness or numbness of extremities  Genitourinary: no dysuria, no hematuria     All other systems have been reviewed and are negative except as noted in the HPI and above.     PMHx: HTN, depression, GERD, DM II  PSHx: C section, hip and knee replacement, left TKA Feb 2024  Social hx: Stopped alcohol use, + hx of smoking, denies hx of illicit substance use.   Family hx: denies history of hepatobiliary disease or malignancy in the family.     Medications     Current Outpatient Medications   Medication Instructions    albuterol 90 mcg/actuation inhaler 2 puffs, inhalation, 4 times daily PRN    alcohol swabs pads, medicated topical (top), 70% pad, 4 x daily; use as directed    B Complex-Vitamin B12 tablet TAKE 1 TABLET DAILY.    blood-glucose meter (True Metrix Glucose Meter) misc USE AS DIRECTED EVERY DAY    buPROPion XL (WELLBUTRIN XL) 150 mg, oral, Every morning, Do not crush, chew, or split.    carvedilol (COREG) 6.25 mg, oral, Daily    cholecalciferol (VITAMIN D-3) 50 mcg, oral, Daily    DULoxetine (CYMBALTA) 60 mg, oral, Daily, Do not crush or chew.    EASY COMFORT LANCETS MISC miscellaneous, 3 times daily, Use to test    famotidine (PEPCID) 40 mg, oral, Daily    FeroSuL tablet 1 tablet, oral, 2 times daily (morning and late afternoon)    fluticasone (Flonase) 50 mcg/actuation nasal spray USE 2 SPRAYS IN EACH NOSTRIL ONCE DAILY    folic acid (Folvite) 1 mg tablet TAKE 1 TABLET BY MOUTH EVERY DAY    FreeStyle glucose monitoring kit miscellaneous, True Metrix Blood Glucose test in vitro strip; Use as directed 4-5 times daily    gabapentin (Neurontin) 300 mg capsule TAKE 2 CAPSULES BY MOUTH IN THE MORNING AND AFTERNOON AND TAKE 3 CAPSULES IN THE  EVENING.    lancets (Easy Comfort Lancets) 30 gauge misc USE AS DIRECTED ONCE DAILY TO CHECK BLOOD SUGAR    lidocaine (Lidoderm) 5 % patch 1 patch, transdermal, Daily, Remove & discard patch within 12 hours or as directed by MD.    metFORMIN (GLUCOPHAGE) 1,000 mg, oral, 2 times daily    metFORMIN XR (GLUCOPHAGE-XR) 1,000 mg, oral, Daily with breakfast, Do not crush, chew, or split.    multivitamin tablet 1 tablet, oral, Daily    naproxen (Naprosyn) 500 mg tablet TAKE 1 TABLET BY MOUTH EVERY 12 HOURS AS NEEDED FOR PAIN WITH FOOD    nicotine (Nicoderm CQ) 21 mg/24 hr patch 1 patch, transdermal, Every 24 hours    nicotine polacrilex (NICORETTE) 2 mg, Mouth/Throat, As needed    omeprazole (PRILOSEC) 20 mg, oral, Daily before breakfast, Do not crush or chew.    oxyCODONE (ROXICODONE) 5 mg, oral, Every 6 hours PRN    oxyCODONE-acetaminophen (Percocet) 5-325 mg tablet 1 tablet, oral, Every 6 hours PRN    polyethylene glycol (GLYCOLAX, MIRALAX) 17 g, oral, Daily    thiamine (Vitamin B-1) 100 mg tablet 1 tablet, oral, Daily    traZODone (DESYREL) 100 mg, oral, Nightly    True Metrix Glucose Test Strip strip USE AS DIRECTED 4-5 TIMES DAILY    Trulicity 1.5 mg, subcutaneous, Once Weekly         Physical Examination     Vitals:    06/25/24 1458   BP: 114/67   Pulse: 66   Resp: 18   Temp: 36.2 °C (97.2 °F)   SpO2: 95%       Vitals:    06/25/24 1458   Weight: 101 kg (222 lb 5.3 oz)       Body mass index is 33.81 kg/m².  Constitutional: awake, alert   Eyes: EOMI, anicteric sclera  ENT: no oropharyngeal lesions visualized  Respiratory: Bilateral air entry equal no wheezing  Cardiovascular: regular rate and rhythm, no lower extremity edema  Abdomen: soft, non tender, non distended, increased abdominal adiposity, no free fluid wave appreciated, bowel sounds present  Neurologic: gross motor strength intact, no asterixis  Psychiatric: alert, appropriate mood and affect, oriented to time/place/person    Labs     Lab Results   Component  Value Date     06/03/2024    K 3.6 06/03/2024    CREATININE 0.59 06/03/2024    ALBUMIN 4.0 06/03/2024    ALT 18 06/03/2024    AST 19 06/03/2024    ALKPHOS 94 06/03/2024    INR 1.1 06/03/2024    AFP <4 06/03/2024    HGB 12.7 06/03/2024    PLT 87 (L) 06/03/2024      MELD 3.0: 8 at 6/3/2024  9:52 AM  MELD-Na: 7 at 6/3/2024  9:52 AM  Calculated from:  Serum Creatinine: 0.59 mg/dL (Using min of 1 mg/dL) at 6/3/2024  9:52 AM  Serum Sodium: 144 mmol/L (Using max of 137 mmol/L) at 6/3/2024  9:52 AM  Total Bilirubin: 0.6 mg/dL (Using min of 1 mg/dL) at 6/3/2024  9:52 AM  Serum Albumin: 4.0 g/dL (Using max of 3.5 g/dL) at 6/3/2024  9:52 AM  INR(ratio): 1.1 at 6/3/2024  9:52 AM  Age at listing (hypothetical): 58 years  Sex: Female at 6/3/2024  9:52 AM      Lab Results   Component Value Date     HEPCAB Nonreactive 01/11/2024     HEPBSAG Nonreactive 01/11/2024     HEPBSAB <3.1 01/11/2024     HEPBCAB Nonreactive 01/11/2024     HEPATOT Reactive (A) 01/11/2024      Dec 2023: albumin 4.3, ALT 34, AST 35, ALP 82, Bili 0.7, Na 143, creat 0.73, Platelet 102, Hb 15, WBC 8.43  Nov 2023: WILLEM 1:160  July 2023: Plt 88, Hb 13.8, ALT 23, AST 38, ALP 89, Bili 0.7    Imaging   US liver June 2024: IMPRESSION:  Hepatomegaly with increased echogenicity of the hepatic parenchyma. The parenchyma is heterogeneous in appearance. The appearance  indicates diffuse hepatocellular disease. However, no evidence for hepatic mass. The appearance of the liver has not changed significantly since prior examination.    Contracted gallbladder with no biliary ductal dilatation.  No pancreatic abnormality.    CTAP with contrast Dec 2023: Hepatic steatosis and cirrhotic liver morphology. No splenomegaly.   US abdomen April 2021: Coarsened and hyperechoic hepatic parenchyma, compatible with steatosis or hepatocellular disease.     EGD March 2024: Grade I Evs.     Assessment and Plan    Alejandra Houston, a 58 y.o. year old female presents for evaluation of  cirrhosis. I have reviewed pertinent provider notes, labs and imaging studies. Discussed results and their interpretation with the patient today.    Encounter Diagnoses   Name Primary?    Cirrhosis of liver without ascites, unspecified hepatic cirrhosis type (Multi) Yes    Portal hypertension with esophageal varices (Multi)     Constipation, unspecified constipation type     Dyspepsia      Orders Placed This Encounter   Procedures    US abdomen limited liver    Alpha-Fetoprotein    Basic Metabolic Panel    CBC and Auto Differential    Hepatic Function Panel    Protime-INR      # Compensated cirrhosis in the setting of steatotic liver disease likely from metabolic dysfunction.  Clinically significant portal hypertension present as evidenced by small nonbleeding esophageal varices on EGD.  No overt features/symptoms to suggest jaundice, encephalopathy, ascites on exam today.     Discussed with Alejandra Houston the pathophysiology of cirrhosis, the etiology of her liver disease.   Discussed in detail disease course including development of potential complications like portal hypertension leading to ascites, development of varices with complications of bleeding from the same; hepatic encephalopathy, jaundice, hepatocellular carcinoma, renal failure, sarcopenia, etc.      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     Encourage patient to target weight loss of at least 7 to 10% body weight in the next 6 to 12 months.    Discussed dietary modifications for fatty liver disease, increasing proportion of grains and vegetables, opting for leaner proteins and decreasing proportion of simple carbohydrates.  Discussed exclusion/elimination of poor caloric value foods including sodas, cakes, candy, ice cream, crackers, chips etc.      Recommendations:  - Labs: Recommend check of MELD labs in 3 months.  - Recommend Hep B vaccine.   - Variceal surveillance: Small Evs noted on EGD  in March 2024.  Recommend initiation of carvedilol 6.25 mg daily.  Patient advised to follow-up in July as scheduled to follow-up on blood pressure and titrate carvedilol accordingly.  - HCC screening: Recommend every 6 month AFP check as well as imaging.   - Patient is advised to avoid hepatotoxic agents including NSAIDs, alcohol and herbal supplements.      -For upper GI symptoms suggestive of nonulcer dyspepsia/non-H. pylori dyspepsia recommend trial of omeprazole 20 mg daily.  Patient is advised to take PPI first thing in the morning 30 minutes before meal.  -Counseled patient to start use of MiraLAX on a daily basis.  Patient has been inconsistent with use of this.  Advised patient that goal is to have more regular bowel movements.  Suspect that she has some degree of overflow symptoms in the setting of constipation.    Follow up visit in July as scheduled

## 2024-06-25 ENCOUNTER — APPOINTMENT (OUTPATIENT)
Dept: GASTROENTEROLOGY | Facility: CLINIC | Age: 59
End: 2024-06-25
Payer: COMMERCIAL

## 2024-06-25 VITALS
TEMPERATURE: 97.2 F | WEIGHT: 222.33 LBS | DIASTOLIC BLOOD PRESSURE: 67 MMHG | SYSTOLIC BLOOD PRESSURE: 114 MMHG | BODY MASS INDEX: 33.81 KG/M2 | HEART RATE: 66 BPM | OXYGEN SATURATION: 95 % | RESPIRATION RATE: 18 BRPM

## 2024-06-25 DIAGNOSIS — R10.13 DYSPEPSIA: ICD-10-CM

## 2024-06-25 DIAGNOSIS — I85.00 PORTAL HYPERTENSION WITH ESOPHAGEAL VARICES (MULTI): ICD-10-CM

## 2024-06-25 DIAGNOSIS — K74.60 CIRRHOSIS OF LIVER WITHOUT ASCITES, UNSPECIFIED HEPATIC CIRRHOSIS TYPE (MULTI): Primary | ICD-10-CM

## 2024-06-25 DIAGNOSIS — K59.00 CONSTIPATION, UNSPECIFIED CONSTIPATION TYPE: ICD-10-CM

## 2024-06-25 DIAGNOSIS — K76.6 PORTAL HYPERTENSION WITH ESOPHAGEAL VARICES (MULTI): ICD-10-CM

## 2024-06-25 PROCEDURE — 3074F SYST BP LT 130 MM HG: CPT | Performed by: STUDENT IN AN ORGANIZED HEALTH CARE EDUCATION/TRAINING PROGRAM

## 2024-06-25 PROCEDURE — 99214 OFFICE O/P EST MOD 30 MIN: CPT | Performed by: STUDENT IN AN ORGANIZED HEALTH CARE EDUCATION/TRAINING PROGRAM

## 2024-06-25 PROCEDURE — 3008F BODY MASS INDEX DOCD: CPT | Performed by: STUDENT IN AN ORGANIZED HEALTH CARE EDUCATION/TRAINING PROGRAM

## 2024-06-25 PROCEDURE — 3051F HG A1C>EQUAL 7.0%<8.0%: CPT | Performed by: STUDENT IN AN ORGANIZED HEALTH CARE EDUCATION/TRAINING PROGRAM

## 2024-06-25 PROCEDURE — 3078F DIAST BP <80 MM HG: CPT | Performed by: STUDENT IN AN ORGANIZED HEALTH CARE EDUCATION/TRAINING PROGRAM

## 2024-06-25 RX ORDER — OMEPRAZOLE 20 MG/1
20 CAPSULE, DELAYED RELEASE ORAL
Qty: 30 CAPSULE | Refills: 2 | Status: SHIPPED | OUTPATIENT
Start: 2024-06-25 | End: 2024-09-23

## 2024-06-25 RX ORDER — CARVEDILOL 6.25 MG/1
6.25 TABLET ORAL DAILY
Qty: 30 TABLET | Refills: 3 | Status: SHIPPED | OUTPATIENT
Start: 2024-06-25 | End: 2024-10-23

## 2024-06-25 RX ORDER — POLYETHYLENE GLYCOL 3350 17 G/17G
17 POWDER, FOR SOLUTION ORAL DAILY
Qty: 30 PACKET | Refills: 1 | Status: SHIPPED | OUTPATIENT
Start: 2024-06-25 | End: 2024-08-24

## 2024-06-25 NOTE — PATIENT INSTRUCTIONS
Alejandra Houston,     Thank you for coming in for your hepatology office visit today. As per our discussion, I recommend:     Start carvedilol 6.25mg once a day dose. If you have any dizziness or lightheadedness with this medication, hold it and call my office.   Start taking omeprazole 20mg once a day.   Start taking miralax 1 packet or 17gm/1 scoop with 8-10 oz of water every day.   Your next lab check will be in 3 months.   Next ultrasound liver in Dec 2024. Call [phone] to schedule this test.    I would like to see you back in the office in July, as scheduled.    If you have any trouble or need assistance with having this done, please reach out to my 's office at [phone] (Ms Yolanda Cerda) or my nurse coordinator at [phone] (Ms Negrita PATEL).     I look forward to seeing you again. Thank you for allowing me to participate in your care.     Sincerely,  Jamison Flores MD  Hepatology    Your next visit is in [facility].     *CHANGE OF LOCATION*  Starting July 2024, Dr. Flores will no longer be seeing patients at [facility] site.  The new Tuesday clinic location will change to [facility], starting July 2024. We look forward to providing care at our new location.   [facility]  [address] (3rd Floor)  [address] [place] [address]

## 2024-06-26 ENCOUNTER — PATIENT MESSAGE (OUTPATIENT)
Dept: PRIMARY CARE | Facility: CLINIC | Age: 59
End: 2024-06-26
Payer: COMMERCIAL

## 2024-06-26 DIAGNOSIS — B37.9 YEAST INFECTION: Primary | ICD-10-CM

## 2024-06-26 RX ORDER — FLUCONAZOLE 150 MG/1
150 TABLET ORAL ONCE
Qty: 2 TABLET | Refills: 0 | Status: SHIPPED | OUTPATIENT
Start: 2024-06-26 | End: 2024-06-26

## 2024-06-27 DIAGNOSIS — E55.9 VITAMIN D DEFICIENCY: ICD-10-CM

## 2024-06-27 DIAGNOSIS — G89.29 CHRONIC LOW BACK PAIN, UNSPECIFIED BACK PAIN LATERALITY, UNSPECIFIED WHETHER SCIATICA PRESENT: ICD-10-CM

## 2024-06-27 DIAGNOSIS — Z72.0 TOBACCO ABUSE: ICD-10-CM

## 2024-06-27 DIAGNOSIS — M54.50 CHRONIC LOW BACK PAIN, UNSPECIFIED BACK PAIN LATERALITY, UNSPECIFIED WHETHER SCIATICA PRESENT: ICD-10-CM

## 2024-06-28 RX ORDER — MICONAZOLE NITRATE 2 %
2 CREAM (GRAM) TOPICAL AS NEEDED
Qty: 40 EACH | Refills: 0 | Status: SHIPPED | OUTPATIENT
Start: 2024-06-28 | End: 2024-07-28

## 2024-06-28 RX ORDER — CHOLECALCIFEROL (VITAMIN D3) 50 MCG
50 TABLET ORAL DAILY
Qty: 30 TABLET | Refills: 1 | Status: SHIPPED | OUTPATIENT
Start: 2024-06-28

## 2024-06-28 RX ORDER — IBUPROFEN 200 MG
1 TABLET ORAL EVERY 24 HOURS
Qty: 28 PATCH | Refills: 0 | Status: SHIPPED | OUTPATIENT
Start: 2024-06-28 | End: 2024-07-28

## 2024-07-01 ENCOUNTER — APPOINTMENT (OUTPATIENT)
Dept: PAIN MEDICINE | Facility: CLINIC | Age: 59
End: 2024-07-01
Payer: COMMERCIAL

## 2024-07-01 ENCOUNTER — APPOINTMENT (OUTPATIENT)
Dept: PRIMARY CARE | Facility: CLINIC | Age: 59
End: 2024-07-01
Payer: COMMERCIAL

## 2024-07-01 RX ORDER — GABAPENTIN 300 MG/1
CAPSULE ORAL
Qty: 150 CAPSULE | Refills: 2 | Status: SHIPPED | OUTPATIENT
Start: 2024-07-01

## 2024-07-08 ENCOUNTER — APPOINTMENT (OUTPATIENT)
Dept: NEUROLOGY | Facility: HOSPITAL | Age: 59
End: 2024-07-08
Payer: COMMERCIAL

## 2024-07-10 ENCOUNTER — APPOINTMENT (OUTPATIENT)
Dept: NEUROLOGY | Facility: HOSPITAL | Age: 59
End: 2024-07-10
Payer: COMMERCIAL

## 2024-07-11 ENCOUNTER — PATIENT MESSAGE (OUTPATIENT)
Dept: PRIMARY CARE | Facility: CLINIC | Age: 59
End: 2024-07-11

## 2024-07-11 ENCOUNTER — APPOINTMENT (OUTPATIENT)
Dept: PRIMARY CARE | Facility: CLINIC | Age: 59
End: 2024-07-11
Payer: COMMERCIAL

## 2024-07-11 DIAGNOSIS — G89.29 OTHER CHRONIC PAIN: ICD-10-CM

## 2024-07-11 DIAGNOSIS — K59.00 CONSTIPATION, UNSPECIFIED CONSTIPATION TYPE: Primary | ICD-10-CM

## 2024-07-11 RX ORDER — ACETAMINOPHEN 500 MG
1000 TABLET ORAL EVERY 8 HOURS PRN
Qty: 30 TABLET | Refills: 2 | Status: SHIPPED | OUTPATIENT
Start: 2024-07-11 | End: 2024-07-26

## 2024-07-17 ENCOUNTER — APPOINTMENT (OUTPATIENT)
Dept: GASTROENTEROLOGY | Facility: HOSPITAL | Age: 59
End: 2024-07-17
Payer: COMMERCIAL

## 2024-07-19 ENCOUNTER — TELEPHONE (OUTPATIENT)
Dept: PULMONOLOGY | Facility: CLINIC | Age: 59
End: 2024-07-19
Payer: COMMERCIAL

## 2024-07-25 DIAGNOSIS — F32.A DEPRESSION, UNSPECIFIED DEPRESSION TYPE: ICD-10-CM

## 2024-07-25 DIAGNOSIS — G89.29 CHRONIC RIGHT SHOULDER PAIN: ICD-10-CM

## 2024-07-25 DIAGNOSIS — M25.511 CHRONIC RIGHT SHOULDER PAIN: ICD-10-CM

## 2024-07-25 DIAGNOSIS — Z72.0 TOBACCO ABUSE: ICD-10-CM

## 2024-07-26 RX ORDER — LIDOCAINE 50 MG/G
1 PATCH TOPICAL DAILY
Qty: 30 PATCH | Refills: 3 | Status: SHIPPED | OUTPATIENT
Start: 2024-07-26 | End: 2024-11-23

## 2024-07-26 RX ORDER — IBUPROFEN 200 MG
1 TABLET ORAL EVERY 24 HOURS
Qty: 28 PATCH | Refills: 0 | Status: SHIPPED | OUTPATIENT
Start: 2024-07-26 | End: 2024-08-25

## 2024-07-26 RX ORDER — BUPROPION HYDROCHLORIDE 150 MG/1
150 TABLET ORAL EVERY MORNING
Qty: 30 TABLET | Refills: 1 | Status: SHIPPED | OUTPATIENT
Start: 2024-07-26 | End: 2024-09-24

## 2024-07-28 NOTE — PROGRESS NOTES
Hepatology: Follow up Office Note      Patient: Alejandra Houston, a 58 y.o. year old female presents for follow up of cirrhosis.    PCP: Jhoan Eller, DO    History of Present Illness   Alejandra Houston presents for a follow up visit for cirrhosis.     She established care for cirrhosis in Jan 2024. Had been diagnosed with cirrhosis due to steatotic liver disease. Had prior imaging with hepatic steatosis. Prior hx of abnormal liver enzymes.       History of complications their liver disease:   Ascites or Volume overload:  no known hx  Hepatic Encephalopathy:  no known hx  GI (variceal) Bleeding:  no known hx  HCC:  no known hx  Hx of Infections:  no known hx  Portal vein Thrombosis:  no known hx  SMV Thrombosis:  no known hx  Hx of TIPS:  no known hx  Recent Hospitalizations: none due to liver ds     Noted that back in the 1990s she had developed jaundice due to acute hepatitis-she is unaware of what type of viral hepatitis infection she had.  Had noted easy bruisability, no episodes of overt mucosal or GI bleeding.   Due to financial constraints, she noted being dependent on limited food items- ex. Ramen Noodles.  In addition patient noted that she has been experiencing increased abdominal bloating/belching and constipation. Occasional symptoms of heartburn and sensation of pills getting stuck in the retrosternal area.  Denied episodes of food bolus impaction, episodes of regurgitation of food.  Had been experiencing increased postnasal drip.  Denied having inadvertent weight loss.     Today's visit: Here for a follow-up visit.  Denies having acute symptoms.  Continued symptoms of constipation, can go without having a bowel movement for 4 days.  Notes having straining.  This is subsequently followed with bowel movements that are loose and watery.  She has not been using MiraLAX consistently.  She also notes upper abdominal bloating, discomfort.  Denies symptoms of right upper quadrant abdominal pain, nausea,  vomiting, jaundice, confusion.     Review of Systems   Constitutional/ General: No fever  Eyes: no yellow discoloration  ENT: Postnasal drip  Cardiovascular: no chest pain, no palpitations  Respiratory: no shortness of breath  Gastrointestinal: constipation   Neurologic: no weakness or numbness of extremities  Genitourinary: no dysuria, no hematuria     All other systems have been reviewed and are negative except as noted in the HPI and above.     PMHx: HTN, depression, GERD, DM II  PSHx: C section, hip and knee replacement, left TKA Feb 2024  Social hx: Stopped alcohol use, + hx of smoking, denies hx of illicit substance use.   Family hx: denies history of hepatobiliary disease or malignancy in the family.     Medications     Current Outpatient Medications   Medication Instructions    albuterol 90 mcg/actuation inhaler 2 puffs, inhalation, 4 times daily PRN    alcohol swabs pads, medicated topical (top), 70% pad, 4 x daily; use as directed    B Complex-Vitamin B12 tablet TAKE 1 TABLET DAILY.    blood-glucose meter (True Metrix Glucose Meter) misc USE AS DIRECTED EVERY DAY    buPROPion XL (WELLBUTRIN XL) 150 mg, oral, Every morning, Do not crush, chew, or split.    carvedilol (COREG) 6.25 mg, oral, Daily    cholecalciferol (VITAMIN D-3) 50 mcg, oral, Daily    DULoxetine (CYMBALTA) 60 mg, oral, Daily, Do not crush or chew.    EASY COMFORT LANCETS MISC miscellaneous, 3 times daily, Use to test    famotidine (PEPCID) 40 mg, oral, Daily    FeroSuL tablet 1 tablet, oral, 2 times daily (morning and late afternoon)    fluticasone (Flonase) 50 mcg/actuation nasal spray USE 2 SPRAYS IN EACH NOSTRIL ONCE DAILY    folic acid (Folvite) 1 mg tablet TAKE 1 TABLET BY MOUTH EVERY DAY    FreeStyle glucose monitoring kit miscellaneous, True Metrix Blood Glucose test in vitro strip; Use as directed 4-5 times daily    gabapentin (Neurontin) 300 mg capsule TAKE 2 CAPSULES BY MOUTH IN THE MORNING AND AFTERNOON AND TAKE 3 CAPSULES IN THE  EVENING.    lancets (Easy Comfort Lancets) 30 gauge misc USE AS DIRECTED ONCE DAILY TO CHECK BLOOD SUGAR    lidocaine (Lidoderm) 5 % patch 1 patch, transdermal, Daily, Remove & discard patch within 12 hours or as directed by MD.    metFORMIN (GLUCOPHAGE) 1,000 mg, oral, 2 times daily    metFORMIN XR (GLUCOPHAGE-XR) 1,000 mg, oral, Daily with breakfast, Do not crush, chew, or split.    multivitamin tablet 1 tablet, oral, Daily    naproxen (Naprosyn) 500 mg tablet TAKE 1 TABLET BY MOUTH EVERY 12 HOURS AS NEEDED FOR PAIN WITH FOOD    nicotine (Nicoderm CQ) 21 mg/24 hr patch 1 patch, transdermal, Every 24 hours    nicotine polacrilex (NICORETTE) 2 mg, Mouth/Throat, As needed    omeprazole (PRILOSEC) 20 mg, oral, Daily before breakfast, Do not crush or chew.    oxyCODONE (ROXICODONE) 5 mg, oral, Every 6 hours PRN    oxyCODONE-acetaminophen (Percocet) 5-325 mg tablet 1 tablet, oral, Every 6 hours PRN    polyethylene glycol (GLYCOLAX, MIRALAX) 17 g, oral, Daily    thiamine (Vitamin B-1) 100 mg tablet 1 tablet, oral, Daily    traZODone (DESYREL) 100 mg, oral, Nightly    True Metrix Glucose Test Strip strip USE AS DIRECTED 4-5 TIMES DAILY    Trulicity 1.5 mg, subcutaneous, Once Weekly         Physical Examination     There were no vitals filed for this visit.      There were no vitals filed for this visit.      There is no height or weight on file to calculate BMI.  Constitutional: awake, alert   Eyes: EOMI, anicteric sclera  ENT: no oropharyngeal lesions visualized  Respiratory: Bilateral air entry equal no wheezing  Cardiovascular: regular rate and rhythm, no lower extremity edema  Abdomen: soft, non tender, non distended, increased abdominal adiposity, no free fluid wave appreciated, bowel sounds present  Neurologic: gross motor strength intact, no asterixis  Psychiatric: alert, appropriate mood and affect, oriented to time/place/person    Labs     Lab Results   Component Value Date     06/03/2024    K 3.6  06/03/2024    CREATININE 0.59 06/03/2024    ALBUMIN 4.0 06/03/2024    ALT 18 06/03/2024    AST 19 06/03/2024    ALKPHOS 94 06/03/2024    INR 1.1 06/03/2024    AFP <4 06/03/2024    HGB 12.7 06/03/2024    PLT 87 (L) 06/03/2024      MELD 3.0: 8 at 6/3/2024  9:52 AM  MELD-Na: 7 at 6/3/2024  9:52 AM  Calculated from:  Serum Creatinine: 0.59 mg/dL (Using min of 1 mg/dL) at 6/3/2024  9:52 AM  Serum Sodium: 144 mmol/L (Using max of 137 mmol/L) at 6/3/2024  9:52 AM  Total Bilirubin: 0.6 mg/dL (Using min of 1 mg/dL) at 6/3/2024  9:52 AM  Serum Albumin: 4.0 g/dL (Using max of 3.5 g/dL) at 6/3/2024  9:52 AM  INR(ratio): 1.1 at 6/3/2024  9:52 AM  Age at listing (hypothetical): 58 years  Sex: Female at 6/3/2024  9:52 AM      Lab Results   Component Value Date     HEPCAB Nonreactive 01/11/2024     HEPBSAG Nonreactive 01/11/2024     HEPBSAB <3.1 01/11/2024     HEPBCAB Nonreactive 01/11/2024     HEPATOT Reactive (A) 01/11/2024      Dec 2023: albumin 4.3, ALT 34, AST 35, ALP 82, Bili 0.7, Na 143, creat 0.73, Platelet 102, Hb 15, WBC 8.43  Nov 2023: WILLEM 1:160  July 2023: Plt 88, Hb 13.8, ALT 23, AST 38, ALP 89, Bili 0.7    Imaging   US liver June 2024: IMPRESSION:  Hepatomegaly with increased echogenicity of the hepatic parenchyma. The parenchyma is heterogeneous in appearance. The appearance  indicates diffuse hepatocellular disease. However, no evidence for hepatic mass. The appearance of the liver has not changed significantly since prior examination.    Contracted gallbladder with no biliary ductal dilatation.  No pancreatic abnormality.    CTAP with contrast Dec 2023: Hepatic steatosis and cirrhotic liver morphology. No splenomegaly.   US abdomen April 2021: Coarsened and hyperechoic hepatic parenchyma, compatible with steatosis or hepatocellular disease.     EGD March 2024: Grade I Evs.     Assessment and Plan    Alejandra Houston, a 58 y.o. year old female presents for evaluation of cirrhosis. I have reviewed pertinent provider  notes, labs and imaging studies. Discussed results and their interpretation with the patient today.    No diagnosis found.    No orders of the defined types were placed in this encounter.     # Compensated cirrhosis in the setting of steatotic liver disease likely from metabolic dysfunction.  Clinically significant portal hypertension present as evidenced by small nonbleeding esophageal varices on EGD.  No overt features/symptoms to suggest jaundice, encephalopathy, ascites on exam today.     Discussed with Alejandra Houston the pathophysiology of cirrhosis, the etiology of her liver disease.   Discussed in detail disease course including development of potential complications like portal hypertension leading to ascites, development of varices with complications of bleeding from the same; hepatic encephalopathy, jaundice, hepatocellular carcinoma, renal failure, sarcopenia, etc.      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     Encourage patient to target weight loss of at least 7 to 10% body weight in the next 6 to 12 months.    Discussed dietary modifications for fatty liver disease, increasing proportion of grains and vegetables, opting for leaner proteins and decreasing proportion of simple carbohydrates.  Discussed exclusion/elimination of poor caloric value foods including sodas, cakes, candy, ice cream, crackers, chips etc.      Recommendations:  - Labs: Recommend check of MELD labs in 3 months.  - Recommend Hep B vaccine.   - Variceal surveillance: Small Evs noted on EGD in March 2024.  Recommend initiation of carvedilol 6.25 mg daily.  Patient advised to follow-up in July as scheduled to follow-up on blood pressure and titrate carvedilol accordingly.  - HCC screening: Recommend every 6 month AFP check as well as imaging.   - Patient is advised to avoid hepatotoxic agents including NSAIDs, alcohol and herbal supplements.      -For upper GI symptoms  suggestive of nonulcer dyspepsia/non-H. pylori dyspepsia recommend trial of omeprazole 20 mg daily.  Patient is advised to take PPI first thing in the morning 30 minutes before meal.  -Counseled patient to start use of MiraLAX on a daily basis.  Patient has been inconsistent with use of this.  Advised patient that goal is to have more regular bowel movements.  Suspect that she has some degree of overflow symptoms in the setting of constipation.    Follow up visit in 4-6 months.

## 2024-07-31 ENCOUNTER — TELEPHONE (OUTPATIENT)
Dept: PRIMARY CARE | Facility: CLINIC | Age: 59
End: 2024-07-31

## 2024-07-31 ENCOUNTER — APPOINTMENT (OUTPATIENT)
Dept: PAIN MEDICINE | Facility: CLINIC | Age: 59
End: 2024-07-31
Payer: COMMERCIAL

## 2024-07-31 ENCOUNTER — OFFICE VISIT (OUTPATIENT)
Dept: PRIMARY CARE | Facility: CLINIC | Age: 59
End: 2024-07-31
Payer: COMMERCIAL

## 2024-07-31 ENCOUNTER — APPOINTMENT (OUTPATIENT)
Dept: GASTROENTEROLOGY | Facility: HOSPITAL | Age: 59
End: 2024-07-31
Payer: COMMERCIAL

## 2024-07-31 VITALS
HEART RATE: 62 BPM | SYSTOLIC BLOOD PRESSURE: 121 MMHG | BODY MASS INDEX: 34.4 KG/M2 | HEIGHT: 68 IN | DIASTOLIC BLOOD PRESSURE: 78 MMHG | RESPIRATION RATE: 18 BRPM | WEIGHT: 227 LBS

## 2024-07-31 VITALS
RESPIRATION RATE: 16 BRPM | OXYGEN SATURATION: 96 % | DIASTOLIC BLOOD PRESSURE: 80 MMHG | TEMPERATURE: 97.6 F | HEART RATE: 75 BPM | BODY MASS INDEX: 34.4 KG/M2 | WEIGHT: 227 LBS | HEIGHT: 68 IN | SYSTOLIC BLOOD PRESSURE: 130 MMHG

## 2024-07-31 DIAGNOSIS — M54.2 CERVICAL PAIN (NECK): ICD-10-CM

## 2024-07-31 DIAGNOSIS — K59.00 CONSTIPATION, UNSPECIFIED CONSTIPATION TYPE: ICD-10-CM

## 2024-07-31 DIAGNOSIS — Z12.31 ENCOUNTER FOR SCREENING MAMMOGRAM FOR MALIGNANT NEOPLASM OF BREAST: Primary | ICD-10-CM

## 2024-07-31 DIAGNOSIS — N83.202 CYST OF LEFT OVARY: ICD-10-CM

## 2024-07-31 DIAGNOSIS — Z12.11 COLON CANCER SCREENING: ICD-10-CM

## 2024-07-31 DIAGNOSIS — Z00.00 HEALTHCARE MAINTENANCE: ICD-10-CM

## 2024-07-31 PROCEDURE — 3078F DIAST BP <80 MM HG: CPT | Performed by: PAIN MEDICINE

## 2024-07-31 PROCEDURE — 3051F HG A1C>EQUAL 7.0%<8.0%: CPT | Performed by: INTERNAL MEDICINE

## 2024-07-31 PROCEDURE — 3051F HG A1C>EQUAL 7.0%<8.0%: CPT | Performed by: PAIN MEDICINE

## 2024-07-31 PROCEDURE — 3074F SYST BP LT 130 MM HG: CPT | Performed by: PAIN MEDICINE

## 2024-07-31 PROCEDURE — 3008F BODY MASS INDEX DOCD: CPT | Performed by: PAIN MEDICINE

## 2024-07-31 PROCEDURE — 3079F DIAST BP 80-89 MM HG: CPT | Performed by: INTERNAL MEDICINE

## 2024-07-31 PROCEDURE — 3075F SYST BP GE 130 - 139MM HG: CPT | Performed by: INTERNAL MEDICINE

## 2024-07-31 PROCEDURE — 99213 OFFICE O/P EST LOW 20 MIN: CPT | Performed by: PAIN MEDICINE

## 2024-07-31 PROCEDURE — 99396 PREV VISIT EST AGE 40-64: CPT | Performed by: INTERNAL MEDICINE

## 2024-07-31 PROCEDURE — 3008F BODY MASS INDEX DOCD: CPT | Performed by: INTERNAL MEDICINE

## 2024-07-31 RX ORDER — ENEMA 19; 7 G/133ML; G/133ML
1 ENEMA RECTAL ONCE
Qty: 3 EACH | Refills: 0 | Status: SHIPPED | OUTPATIENT
Start: 2024-07-31 | End: 2024-07-31

## 2024-07-31 ASSESSMENT — ENCOUNTER SYMPTOMS
NECK PAIN: 1
BLOOD IN STOOL: 0
HEADACHES: 1
ABDOMINAL PAIN: 0
CONSTIPATION: 0

## 2024-07-31 ASSESSMENT — PAIN DESCRIPTION - DESCRIPTORS: DESCRIPTORS: ACHING

## 2024-07-31 ASSESSMENT — PAIN SCALES - GENERAL
PAINLEVEL: 7
PAINLEVEL_OUTOF10: 7

## 2024-07-31 ASSESSMENT — PAIN - FUNCTIONAL ASSESSMENT: PAIN_FUNCTIONAL_ASSESSMENT: 0-10

## 2024-07-31 NOTE — PROGRESS NOTES
Subjective   Patient ID: Alejandra Houston is a 58 y.o. female with a past medical history of cervical pain, left shoulder pain       HPI:   Patient describes at least one year history of neck pain that radiates to her shoulders, especially on the left side.  She recently received Cervical epidural injection with Dr. Rojas in 5/24 that did relieve her pain for the most part. The pain has started to return in the past 3 weeks.  Pain is in her lower neck and shoulder area.  She is getting headaches that are located in her neck and back of head.  The pain is throbbing and aching, 6/10 most of the time but can flair up to a 10/10.  She has tried flexeril that helped partially in the past.  Lidocaine patches did not help.  She has been to PT that did not help.  She takes percocet for her left knee that she thinks also helps her neck pain.  Her current daily medications include tylenol 1000 mg q8 prn, ibuprofen, and gabapentin 300 mg 2 capsules in morning and afternoon, 3 in the evening.  If she misses these meds, her pain does increase.     Physical Therapy: The patient has done six or more weeks of physical therapy in the past six months with minimal improvement  Other Conservative Measures she has tried: Heating Pad, Ice, and Injections  Classes of medications tried in the past: Acetaminophen, NSAIDs, Gabapentenoids, Muscle Relaxants, and Topical agents    I have personally reviewed the [facility] report for Alejandra Houston I have considered the risks of abuse, dependence, addiction and diversion    Is the patient prescribed a combination of a benzodiazepine and opioid?  No    Last Urine Drug Screen:  No results found for this or any previous visit (from the past 8760 hour(s)).  N/A      Review of Systems   13-point ROS done and negative except for HPI.     Current Outpatient Medications   Medication Instructions    albuterol 90 mcg/actuation inhaler 2 puffs, inhalation, 4 times daily PRN    alcohol swabs pads, medicated  topical (top), 70% pad, 4 x daily; use as directed    B Complex-Vitamin B12 tablet TAKE 1 TABLET DAILY.    blood-glucose meter (True Metrix Glucose Meter) misc USE AS DIRECTED EVERY DAY    buPROPion XL (WELLBUTRIN XL) 150 mg, oral, Every morning, Do not crush, chew, or split.    carvedilol (COREG) 6.25 mg, oral, Daily    cholecalciferol (VITAMIN D-3) 50 mcg, oral, Daily    DULoxetine (CYMBALTA) 60 mg, oral, Daily, Do not crush or chew.    EASY COMFORT LANCETS MISC miscellaneous, 3 times daily, Use to test    famotidine (PEPCID) 40 mg, oral, Daily    FeroSuL tablet 1 tablet, oral, 2 times daily (morning and late afternoon)    fluticasone (Flonase) 50 mcg/actuation nasal spray USE 2 SPRAYS IN EACH NOSTRIL ONCE DAILY    folic acid (Folvite) 1 mg tablet TAKE 1 TABLET BY MOUTH EVERY DAY    FreeStyle glucose monitoring kit miscellaneous, True Metrix Blood Glucose test in vitro strip; Use as directed 4-5 times daily    gabapentin (Neurontin) 300 mg capsule TAKE 2 CAPSULES BY MOUTH IN THE MORNING AND AFTERNOON AND TAKE 3 CAPSULES IN THE EVENING.    lancets (Easy Comfort Lancets) 30 gauge misc USE AS DIRECTED ONCE DAILY TO CHECK BLOOD SUGAR    lidocaine (Lidoderm) 5 % patch 1 patch, transdermal, Daily, Remove & discard patch within 12 hours or as directed by MD.    metFORMIN (GLUCOPHAGE) 1,000 mg, oral, 2 times daily    metFORMIN XR (GLUCOPHAGE-XR) 1,000 mg, oral, Daily with breakfast, Do not crush, chew, or split.    naproxen (Naprosyn) 500 mg tablet TAKE 1 TABLET BY MOUTH EVERY 12 HOURS AS NEEDED FOR PAIN WITH FOOD    nicotine (Nicoderm CQ) 21 mg/24 hr patch 1 patch, transdermal, Every 24 hours    nicotine polacrilex (NICORETTE) 2 mg, Mouth/Throat, As needed    omeprazole (PRILOSEC) 20 mg, oral, Daily before breakfast, Do not crush or chew.    oxyCODONE-acetaminophen (Percocet) 5-325 mg tablet 1 tablet, oral, Every 6 hours PRN    polyethylene glycol (GLYCOLAX, MIRALAX) 17 g, oral, Daily    sodium phosphates (Fleet Enema)  19-7 gram/118 mL enema enema 1 enema, rectal, Once    thiamine (Vitamin B-1) 100 mg tablet 1 tablet, oral, Daily    traZODone (DESYREL) 100 mg, oral, Nightly    True Metrix Glucose Test Strip strip USE AS DIRECTED 4-5 TIMES DAILY    Trulicity 1.5 mg, subcutaneous, Once Weekly       Past Medical History:   Diagnosis Date    Abnormal levels of other serum enzymes 2022    Elevated liver enzymes    Acid reflux     Anxiety     Bipolar disorder (Multi)     COPD (chronic obstructive pulmonary disease) (Multi)     Depression     DM (diabetes mellitus) (Multi)     Fibromyalgia, primary     Glaucoma     Hypertension     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    Avascular necrosis of bone of left hip    Sleep apnea     no cpap    Suicidal ideations     Thrombocytopenia (CMS-HCC)         Past Surgical History:   Procedure Laterality Date     SECTION, CLASSIC  02/15/2018     Section     SECTION, CLASSIC      HIP ARTHROPLASTY      KNEE ARTHROPLASTY      OTHER SURGICAL HISTORY  06/10/2019    Hip replacement    TOTAL KNEE ARTHROPLASTY  2018    Knee Replacement    UVULOPALATOPHARYNGOPLASTY          Family History   Problem Relation Name Age of Onset    Hypertension Mother      Diabetes Mother      COPD Mother      Heart disease Mother      Lymphoma Mother      Cancer Other fam hx     Diabetes Other fam hx     Hypertension Other fam hx     Heart disease Other fam hx         No Known Allergies     Objective     Vitals:    24 0930   BP: 121/78   Pulse: 62   Resp: 18        Physical Exam  General: NAD, well groomed, well nourished  Eyes: Non-icteric sclera, EOMI  Ears, Nose, Mouth, and Throat: External ears and nose appear to be without deformity or rash. No lesions or masses noted. Hearing is grossly intact.   Neck: Trachea midline  Respiratory: Nonlabored breathing   Cardiovascular: trace peripheral edema   Skin: No rashes or open lesions/ulcers identified on  skin.    Neck:  Pain to palpation over cervical muscles bilaterally  Negative Spurling bilaterally  Decreased ROM on extension, rotation, and side bending    Shoulders:  Decreases ROM for left shoulder in abduction, flexion, and extension. Pain to palpation over left clavicle, left glenohumeral joint, and left upper trapezius muscle     Neurologic:   Cranial nerves grossly intact.   Strength +4/5 in bilateral upper extremities, exam limited due to pain   Sensation: Normal to light touch throughout, Hyperalgesia in left clavicle/shoulder area    Psychiatric: Alert, orientation to person, place, and time. Cooperative.      Assessment/Plan   #Left subacromial bursitis  #Cervical spondylosis  #Left shoulder dysfunction    Plan:  -Subacromial bursitis injection completed in clinic today, see prodcedure note below  -Repeat Cervical epidural steroid injection  -Continue at home pain regimen    PROCEDURE NOTE:  Alejandra Houston is a 58 y.o. female  with  left shoulder pain here for subacromial bursitis    Procedures performed:   1.  left subacromial bursa injection nerve block under landmark guidance     Indication: left shoulder pain, chronic    Performed by: Dr. Rojas  Assistant: Xin Marie DO     Informed consent was obtained and verified during a time-out procedure, verbal consent was obtained. The patient was prepped and draped in the usual sterile fashion using a plastic drape and chloraprep.  A 25-gauge Quincke needle was advanced to the target area of the subacromial bursa between the lateral clavicle and acromion and periosteum was contacted.  3 mL of 0.75% Marcaine with 1 mL methylprednisolone was injected after repeated negative aspiration every 1 mL.     Hemostasis was ensured.  Band-Aids were applied.  All injectate's were noted to be preservative-free and not .    Post-procedure details:   Procedure completion:  Tolerated well, no immediate complications    VAS Pre: 7  VAS Post: 6     The patient has  failed treatment with : Physical therapy , three or more classes of medications, have significant limitations in their sleep quality due to the pain, have significant limitations of their quality of life due to the pain, and have significant impairments of their activities of daily living (ADLs) due to the pain    We discussed  the risks, benefits and alternatives of the procedure including but not limited to: , Lack of efficacy , Transiently worsening pain , Bleeding, Infection , and Nerve Damage    Follow up: After procedure    The patient was invited to contact us back anytime with any questions or concerns and follow-up with us in the office as needed.     Diagnoses and all orders for this visit:  Cervical pain (neck)  -     FL pain management; Future  -     Epidural Steroid Injection; Future  Other orders  -     NPO Diet Except: Sips with meds; Effective now; Standing  -     Height and weight; Standing  -     Insert and maintain peripheral IV; Standing  -     Saline lock IV; Standing  -     POCT Glucose; Standing  -     Adult diet Regular; Standing  -     Vital Signs; Standing  -     Notify physician - Standard Parameters; Standing    Xin Marie DO  Anesthesiology, PGY-1

## 2024-07-31 NOTE — TELEPHONE ENCOUNTER
----- Message from Jhoan Daphnie sent at 7/31/2024  9:09 AM EDT -----  Regarding: left ovarian cyst  Please call- I was doing a chart review on her and she never made a follow-up with gynecology for her ovarian cyst.  I put a new referral in there for gynecology and I ordered an ultrasound for her as well.

## 2024-07-31 NOTE — PROGRESS NOTES
Patient here for a physical     Subjective   Patient ID: Alejandra Houston is a 58 y.o. female who presents for Annual Exam.  She is accompanied by her  who offers some of the history.     The patient reports ongoing pain and swelling from a left wrist fracture sustained in 5/2024.  She is currently following with  from Orthopedic Surgery, and is managed with an exos splint.      The patient mentions worsening pain due to osteoarthritis in the hands and upper extremities bilaterally.  She is having difficulty with activities of daily living including showering.      There have been no changes to the patient's medications.      The patient has an appointment with Pain Management for neck pain resulting in headache.      The patient requests a referral to Podiatry for toe pain.    The patient has not yet been contacted for her CPAP device and requests contact information.     The patient requests a refill of prescription enema.  She is currently following with Hepatology for a history of Cirrhosis.  The patient denies any abdominal pain, melena, or hematochezia.      The patient mentions excessive cerumen resulting in mild hearing impairment.  She has not undergone an eye exam for some time, but denies significant vision changes.        Review of Systems   HENT:          Positive for excessive cerumen.   Gastrointestinal:  Negative for abdominal pain, blood in stool and constipation.   Musculoskeletal:  Positive for neck pain.        Positive for toe pain.   Neurological:  Positive for headaches.       Objective   Physical Exam  Constitutional:       Appearance: Normal appearance.   Neck:      Vascular: No carotid bruit.   Cardiovascular:      Rate and Rhythm: Normal rate and regular rhythm.      Heart sounds: Normal heart sounds.   Pulmonary:      Effort: Pulmonary effort is normal.      Breath sounds: Normal breath sounds.   Abdominal:      General: Bowel sounds are normal.      Palpations: Abdomen is  soft.      Tenderness: There is no abdominal tenderness.   Skin:     General: Skin is warm and dry.   Neurological:      General: No focal deficit present.      Mental Status: She is alert and oriented to person, place, and time. Mental status is at baseline.   Psychiatric:         Mood and Affect: Mood normal.         Behavior: Behavior normal.         Assessment/Plan   Problem List Items Addressed This Visit             ICD-10-CM    Constipation K59.00    Relevant Medications    sodium phosphates (Fleet Enema) 19-7 gram/118 mL enema enema     Other Visit Diagnoses         Codes    Encounter for screening mammogram for malignant neoplasm of breast    -  Primary Z12.31    Relevant Orders    BI mammo bilateral screening tomosynthesis    Colon cancer screening     Z12.11    Relevant Orders    Colonoscopy Screening; Average Risk Patient    Healthcare maintenance     Z00.00    Relevant Orders    Referral to Podiatry            CPE Performed  -  Discussed healthy diet and regular exercise.    -  Physical exam overall unremarkable. Immunizations reviewed and updated accordingly. Healthy lifestyle choices discussed (tobacco avoidance, appropriate alcohol use, avoidance of illicit substances).   -  Patient is wearing seatbelt.   -  Screening lab work ordered as indicated.    -  Age appropriate screening tests reviewed with patient.        IMPRESSIONS/PLAN:       Hand Pain  - Advised patient to try topical pain medication such as Voltaren OTC.  Call the clinic if symptoms persist or worsen.     Tlong toenails   - Ordered referral to Podiatry with .    Constipation   - Refilled sodium phosphates (Fleet Enema) 19-7g/118mL enema as 1 enema once daily.  Following with Gastroenterology.  Needs colonoscopy- ordered today    /80 in the office today.    Diabetes II   - Last HbA1c 7.2 per 6/2024.  Patient having difficulty remembering to take night time dose of metformin.  Switched patient to metformin XR 500mg as  two tablets in the morning once daily, and can increase Trulicity 1.50 mg/0.5mL 1 pen weekly. Previously on glimperide 2mg every day.  Previously ordered HbA1c.      Depression    Continue on bupropion 150mg once daily in the morning, discussed adverse effect profile, and therapeutic expectations.  Advised patient to reach out in two weeks via [place] with update about progress.  Suspect that may improve with CPAP and with upcoming LES.   Continue duloxetine 30 mg as two tablets once daily. Previously on Lexapro, and Rexulti or Topamax.  No SI or HI.  Call the clinic if symptoms persist or worsen.      Left Wrist Fracture  - Xray Left Wrist 5/2024 showed comminuted, mildly displaced, intra-articular fracture of the distal radius with associated ulnar styloid fracture.  There is also a questionable avulsion fracture of the triquetrum.  The distal radius fracture has a significant amount of dorsal comminution with a large intra-articular dorsal fragment that is mildly displaced.  Following with  from Orthopedic Surgery.    Pinched Cervical Nerve Root/Left Shoulder Pain   - CT Cervical Spine 1/2024 showed mild to moderate spinal canal stenosis at C4-C5 and moderate bilateral foraminal stenosis.  Prescribed prednisone 10mg taper as directed on packaging. Encouraged patient to schedule appointment with  from Pain Medicine.  Call the clinic if symptoms persist or worsen.     Low Back Pain/ leg weakness   - Worsening since 4/2023.  Weakness on LLE on exam.  MR Lumbar Spine from 1/2024 showed multilevel degenerative changes of the lumbar spine most prominent at L3-4 where there is moderate canal stenosis, slightly progressed.  Continue with gabapentin 600 mg TID.  Refilled Naproxen 500 mg BID prn.  Previously on tramadol 50 mg every 6 hours as needed.   Following with  from Pain Management.     Chronic Sinusitis  - CT Sinus without Contrast wnl and unremarkable 1/2024.  Following with   from Otolaryngology..       Sleep Apnea   - Completed in-center Sleep Study confirming moderate CADEN.  Previously ordered referral to Sleep Medicine.  She will reach out to them about obtaining the CPAP supplies.     Left Knee OA  - s/p left total knee arthroplasty 2/8/2024 with  from Orthopedic Surgery.     Paresthesia of Right Upper Extremity  - Previously ordered EMG and Nerve Conduction.     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 Underwent complex revision of right TKA. Following with  in Orthopedic Surgery.     Thrombocytopenia  - Last CBC showed platelet count of 77 per 10/2023.  Following with Hematology/Oncology.  Suspect from underlying liver cirrhosis.       Liver Cirrhosis  - CT Chest with IV Contrast 10/2023 showed incidental cirrhotic liver morphology with sequela of portal hypertension (splenomegaly and esophageal varices).  Previously ordered referral for Hepatology. Advised the patient to stop taking Tylenol and limit Percocet, and she verbalized understanding.   She has seen Dr. Millard in the past. Last EGD 3/2024.  Following with  from Gastroenterology.     Left Adnexal Mass  - CT Abdomen Pelvis 7/2023 showed incidental finding of A 3.6 cm benign-appearing left adnexal lesion is seen.  In an early post-menopausal woman (<5 years since menopause), follow-up ultrasound at 6-12 months is recommended. Patient has scheduled appointment with Gynecology.     Vaginal dryness  - Estradiol cream.    Dry Skin  - Cetaphil moisturizing lotion.      Fatigue  - Likely due to untreated sleep apnea.  Encouraged patient to follow-up with Sleep Medicine, and she has appointment for next week.  Vitamin D, Vitamin B12, and TSH wnl per 9/2023.     Atypical Chest pain  -Echo.  Referred to cards previously.     Smoking History   - CT Chest 4/2023 shows redemonstration of groundglass opacities in the posterior bilateral upper  lobes, left lower lobe and new groundglass  opacities in the anterior left upper lobe. Central airways are clear.  Stable noncalcified pulmonary nodule in the right upper lobe (6, 38) and right middle lobe (6, 57) since 5/19/2019.  Discussed with patient that varenicline was recalled.  Stable per 6/2024 repeat of CT Lung Screening.     Health Maintenance   - Routine labs 6/2024.  Last Mammogram 5/2023, ordered repeat for 2024. Last colonoscopy 5/2019, repeat due [id number].  Ordered repeat colonoscopy for 2024.     Follow-up in 3 months, call sooner if needed.        Scribe Attestation  By signing my name below, I, Aida Galdamez, Martinezibe   attest that this documentation has been prepared under the direction and in the presence of Jhoan Eller DO.   Aida Galdamez 07/31/24 8:23 AM

## 2024-08-13 DIAGNOSIS — Z00.00 HEALTHCARE MAINTENANCE: ICD-10-CM

## 2024-08-13 DIAGNOSIS — M54.50 CHRONIC LOW BACK PAIN, UNSPECIFIED BACK PAIN LATERALITY, UNSPECIFIED WHETHER SCIATICA PRESENT: ICD-10-CM

## 2024-08-13 DIAGNOSIS — G89.29 CHRONIC LOW BACK PAIN, UNSPECIFIED BACK PAIN LATERALITY, UNSPECIFIED WHETHER SCIATICA PRESENT: ICD-10-CM

## 2024-08-13 RX ORDER — ALBUTEROL SULFATE 90 UG/1
2 INHALANT RESPIRATORY (INHALATION) 4 TIMES DAILY PRN
Qty: 8.5 G | Refills: 1 | Status: SHIPPED | OUTPATIENT
Start: 2024-08-13

## 2024-08-13 RX ORDER — NAPROXEN 500 MG/1
TABLET ORAL
Qty: 60 TABLET | Refills: 1 | Status: SHIPPED | OUTPATIENT
Start: 2024-08-13

## 2024-08-14 ENCOUNTER — APPOINTMENT (OUTPATIENT)
Dept: RADIOLOGY | Facility: CLINIC | Age: 59
End: 2024-08-14
Payer: COMMERCIAL

## 2024-08-14 ENCOUNTER — APPOINTMENT (OUTPATIENT)
Dept: ORTHOPEDIC SURGERY | Facility: CLINIC | Age: 59
End: 2024-08-14
Payer: COMMERCIAL

## 2024-08-19 ENCOUNTER — PATIENT MESSAGE (OUTPATIENT)
Dept: PRIMARY CARE | Facility: CLINIC | Age: 59
End: 2024-08-19
Payer: COMMERCIAL

## 2024-08-19 DIAGNOSIS — Z78.0 POST-MENOPAUSE: Primary | ICD-10-CM

## 2024-08-21 ENCOUNTER — HOSPITAL ENCOUNTER (OUTPATIENT)
Dept: OPERATING ROOM | Facility: CLINIC | Age: 59
Discharge: HOME | End: 2024-08-21
Payer: COMMERCIAL

## 2024-08-21 VITALS
BODY MASS INDEX: 34.11 KG/M2 | HEART RATE: 69 BPM | WEIGHT: 225.09 LBS | RESPIRATION RATE: 16 BRPM | DIASTOLIC BLOOD PRESSURE: 90 MMHG | SYSTOLIC BLOOD PRESSURE: 150 MMHG | TEMPERATURE: 97 F | OXYGEN SATURATION: 97 % | HEIGHT: 68 IN

## 2024-08-21 DIAGNOSIS — M54.2 CERVICAL PAIN (NECK): ICD-10-CM

## 2024-08-21 LAB — GLUCOSE BLD MANUAL STRIP-MCNC: 97 MG/DL (ref 74–99)

## 2024-08-21 PROCEDURE — 2500000005 HC RX 250 GENERAL PHARMACY W/O HCPCS: Mod: SE | Performed by: PAIN MEDICINE

## 2024-08-21 PROCEDURE — 7100000010 HC PHASE TWO TIME - EACH INCREMENTAL 1 MINUTE

## 2024-08-21 PROCEDURE — 7100000009 HC PHASE TWO TIME - INITIAL BASE CHARGE

## 2024-08-21 PROCEDURE — 3600000001 HC OR TIME - INITIAL BASE CHARGE - PROCEDURE LEVEL ONE

## 2024-08-21 PROCEDURE — 82947 ASSAY GLUCOSE BLOOD QUANT: CPT

## 2024-08-21 PROCEDURE — 2500000004 HC RX 250 GENERAL PHARMACY W/ HCPCS (ALT 636 FOR OP/ED): Mod: SE | Performed by: PAIN MEDICINE

## 2024-08-21 PROCEDURE — 62323 NJX INTERLAMINAR LMBR/SAC: CPT | Performed by: PAIN MEDICINE

## 2024-08-21 PROCEDURE — 3600000006 HC OR TIME - EACH INCREMENTAL 1 MINUTE - PROCEDURE LEVEL ONE

## 2024-08-21 PROCEDURE — 2550000001 HC RX 255 CONTRASTS: Mod: SE | Performed by: PAIN MEDICINE

## 2024-08-21 RX ORDER — MIDAZOLAM HYDROCHLORIDE 1 MG/ML
2 INJECTION, SOLUTION INTRAMUSCULAR; INTRAVENOUS ONCE
Status: DISCONTINUED | OUTPATIENT
Start: 2024-08-22 | End: 2024-08-22 | Stop reason: HOSPADM

## 2024-08-21 RX ORDER — METHYLPREDNISOLONE ACETATE 40 MG/ML
20 INJECTION, SUSPENSION INTRA-ARTICULAR; INTRALESIONAL; INTRAMUSCULAR; SOFT TISSUE ONCE
Status: DISCONTINUED | OUTPATIENT
Start: 2024-08-21 | End: 2024-08-22 | Stop reason: HOSPADM

## 2024-08-21 RX ORDER — LIDOCAINE HYDROCHLORIDE 10 MG/ML
10 INJECTION, SOLUTION EPIDURAL; INFILTRATION; INTRACAUDAL; PERINEURAL ONCE
Status: DISCONTINUED | OUTPATIENT
Start: 2024-08-21 | End: 2024-08-22 | Stop reason: HOSPADM

## 2024-08-21 RX ORDER — LIDOCAINE HYDROCHLORIDE 5 MG/ML
INJECTION, SOLUTION INFILTRATION; INTRAVENOUS AS NEEDED
Status: COMPLETED | OUTPATIENT
Start: 2024-08-21 | End: 2024-08-21

## 2024-08-21 RX ORDER — MIDAZOLAM HYDROCHLORIDE 1 MG/ML
INJECTION, SOLUTION INTRAMUSCULAR; INTRAVENOUS AS NEEDED
Status: COMPLETED | OUTPATIENT
Start: 2024-08-21 | End: 2024-08-21

## 2024-08-21 RX ORDER — DEXAMETHASONE SODIUM PHOSPHATE 10 MG/ML
INJECTION INTRAMUSCULAR; INTRAVENOUS AS NEEDED
Status: COMPLETED | OUTPATIENT
Start: 2024-08-21 | End: 2024-08-21

## 2024-08-21 RX ORDER — SODIUM BICARBONATE 42 MG/ML
INJECTION, SOLUTION INTRAVENOUS AS NEEDED
Status: COMPLETED | OUTPATIENT
Start: 2024-08-21 | End: 2024-08-21

## 2024-08-21 ASSESSMENT — PAIN SCALES - GENERAL
PAINLEVEL_OUTOF10: 0 - NO PAIN
PAINLEVEL_OUTOF10: 0 - NO PAIN
PAINLEVEL_OUTOF10: 6
PAINLEVEL_OUTOF10: 0 - NO PAIN

## 2024-08-21 ASSESSMENT — COLUMBIA-SUICIDE SEVERITY RATING SCALE - C-SSRS
2. HAVE YOU ACTUALLY HAD ANY THOUGHTS OF KILLING YOURSELF?: NO
1. IN THE PAST MONTH, HAVE YOU WISHED YOU WERE DEAD OR WISHED YOU COULD GO TO SLEEP AND NOT WAKE UP?: NO
6. HAVE YOU EVER DONE ANYTHING, STARTED TO DO ANYTHING, OR PREPARED TO DO ANYTHING TO END YOUR LIFE?: NO

## 2024-08-21 ASSESSMENT — ENCOUNTER SYMPTOMS
OCCASIONAL FEELINGS OF UNSTEADINESS: 0
LOSS OF SENSATION IN FEET: 0
DEPRESSION: 0

## 2024-08-21 ASSESSMENT — PATIENT HEALTH QUESTIONNAIRE - PHQ9
SUM OF ALL RESPONSES TO PHQ9 QUESTIONS 1 AND 2: 0
1. LITTLE INTEREST OR PLEASURE IN DOING THINGS: NOT AT ALL
2. FEELING DOWN, DEPRESSED OR HOPELESS: NOT AT ALL

## 2024-08-21 ASSESSMENT — PAIN - FUNCTIONAL ASSESSMENT
PAIN_FUNCTIONAL_ASSESSMENT: 0-10

## 2024-08-21 NOTE — DISCHARGE INSTRUCTIONS
[facility]PAT[facility] DISCHARGE INSTRUCTIONS FOR ANESTHESIOLOGY PAIN SERVICE PATIENTS     Dr. Rojas     PLEASE CAREFULLY FOLLOW THE INSTRUCTIONS    If you received sedation for your procedure NO DRIVING, NO DRINKING ALCOHOL, TAKING SEDATIVE MEDICATIONS, OR ANY IMPORTANT DECISION MAKING FOR 24 HOURS.     Follow up call in 2 weeks to report how your condition is with Macie.  Macie's Phone Number [phone]    Your pain may not be gone immediately after this procedure; it generally takes 3 to 5 days for the steroid to work.     Keep the needle site clean and dry for 24 hours.    Observe the needle site for excessive bleeding (slow general oozing that completely soaks the dressing or fresh bright red bleeding).    In either case, apply pressure to the area, elevate it if possible and call your doctor at once.    Take medicines as directed.    You may remove the bandage after 24 hours and shower at that time.      Observe/monitor for the following signs and symptoms:    Needle site: for Change in color, Numbness or tingling    Coldness to touch, Swelling, Drainage    Temperature of 101.5 or higher    Increased or uncontrollable pain   If you notice any of the above signs and symptoms, please call your doctor at once.       If any problems occur, or if you have any further questions, please call your doctor as soon as possible.     If you find that you cannot reach your doctor, but feel that your condition needs a doctor's attention   Call the after hours phone number  [phone] ask for Pain Management Resident to be paged: pager Number [phone] Or go to the nearest Emergency Room

## 2024-08-21 NOTE — H&P
Pain Management H&P    History Of Present Illness  Alejandra Houston is a 58 y.o. female presents for procedure state below. Endorses no changes in past medical history or medical health since last seen in clinic.      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    Surgical History  She has a past surgical history that includes Total knee arthroplasty (2018); Other surgical history (06/10/2019);  section, classic (02/15/2018); Hip Arthroplasty; Uvulopalatopharyngoplasty;  section, classic; and Knee Arthroplasty.     Social History  She reports that she has been smoking cigarettes. She started smoking about 44 years ago. She has a 22.3 pack-year smoking history. She has never used smokeless tobacco. She reports that she does not currently use alcohol. She reports that she does not use drugs.    Family History  Family History   Problem Relation Name Age of Onset    Hypertension Mother      Diabetes Mother      COPD Mother      Heart disease Mother      Lymphoma Mother      Cancer Other fam hx     Diabetes Other fam hx     Hypertension Other fam hx     Heart disease Other fam hx         Allergies  Patient has no known allergies.    Review of Symptoms:   Constitutional: Negative for chills, diaphoresis or fever  HENT: Negative for neck swelling  Eyes:.  Negative for eye pain  Respiratory:.  Negative for cough, shortness of breath or wheezing    Cardiovascular:.  Negative for chest pain or palpitations  Gastrointestinal:.  Negative for abdominal pain, nausea and vomiting  Genitourinary:.  Negative for  urgency  Musculoskeletal:  Positive for back pain. Positive for joint pain. Denies falls within the past 3 months.  Skin: Negative for wounds or itching   Neurological: Negative for dizziness, seizures, loss of consciousness and weakness  Endo/Heme/Allergies: Does not bruise/bleed easily  Psychiatric/Behavioral: Negative for depression. The patient does not appear anxious.       PHYSICAL EXAM  Vitals signs reviewed  Constitutional:       General: Not in acute distress     Appearance: Normal appearance. Not ill-appearing.  HENT:     Head: Normocephalic and atraumatic  Eyes:     Conjunctiva/sclera: Conjunctivae normal  Cardiovascular:     Rate and Rhythm: Normal rate and regular rhythm  Pulmonary:     Effort: No respiratory distress  Abdominal:     Palpations: Abdomen is soft  Musculoskeletal: TENORIO  Skin:     General: Skin is warm and dry  Neurological:     General: No focal deficit present  Psychiatric:         Mood and Affect: Mood normal         Behavior: Behavior normal     Last Recorded Vitals  There were no vitals taken for this visit.    Relevant Results  Current Outpatient Medications   Medication Instructions    albuterol 90 mcg/actuation inhaler 2 puffs, inhalation, 4 times daily PRN    alcohol swabs pads, medicated topical (top), 70% pad, 4 x daily; use as directed    B Complex-Vitamin B12 tablet TAKE 1 TABLET DAILY.    blood-glucose meter (True Metrix Glucose Meter) misc USE AS DIRECTED EVERY DAY    buPROPion XL (WELLBUTRIN XL) 150 mg, oral, Every morning, Do not crush, chew, or split.    carvedilol (COREG) 6.25 mg, oral, Daily    cholecalciferol (VITAMIN D-3) 50 mcg, oral, Daily    DULoxetine (CYMBALTA) 60 mg, oral, Daily, Do not crush or chew.    EASY COMFORT LANCETS [facility] miscellaneous, 3 times daily, Use to test    famotidine (PEPCID) 40 mg, oral, Daily    FeroSuL tablet 1 tablet, oral, 2 times daily (morning and late afternoon)    fluticasone (Flonase) 50 mcg/actuation nasal spray USE 2 SPRAYS IN EACH  NOSTRIL ONCE DAILY    folic acid (Folvite) 1 mg tablet TAKE 1 TABLET BY MOUTH EVERY DAY    FreeStyle glucose monitoring kit miscellaneous, True Metrix Blood Glucose test in vitro strip; Use as directed 4-5 times daily    gabapentin (Neurontin) 300 mg capsule TAKE 2 CAPSULES BY MOUTH IN THE MORNING AND AFTERNOON AND TAKE 3 CAPSULES IN THE EVENING.    lancets (Easy Comfort Lancets) 30 gauge misc USE AS DIRECTED ONCE DAILY TO CHECK BLOOD SUGAR    lidocaine (Lidoderm) 5 % patch 1 patch, transdermal, Daily, Remove & discard patch within 12 hours or as directed by MD.    metFORMIN (GLUCOPHAGE) 1,000 mg, oral, 2 times daily    metFORMIN XR (GLUCOPHAGE-XR) 1,000 mg, oral, Daily with breakfast, Do not crush, chew, or split.    naproxen (Naprosyn) 500 mg tablet TAKE 1 TABLET BY MOUTH EVERY 12 HOURS AS NEEDED FOR PAIN WITH FOOD    nicotine (Nicoderm CQ) 21 mg/24 hr patch 1 patch, transdermal, Every 24 hours    nicotine polacrilex (NICORETTE) 2 mg, Mouth/Throat, As needed    omeprazole (PRILOSEC) 20 mg, oral, Daily before breakfast, Do not crush or chew.    oxyCODONE-acetaminophen (Percocet) 5-325 mg tablet 1 tablet, oral, Every 6 hours PRN    polyethylene glycol (GLYCOLAX, MIRALAX) 17 g, oral, Daily    thiamine (Vitamin B-1) 100 mg tablet 1 tablet, oral, Daily    traZODone (DESYREL) 100 mg, oral, Nightly    True Metrix Glucose Test Strip strip USE AS DIRECTED 4-5 TIMES DAILY    Trulicity 1.5 mg, subcutaneous, Once Weekly         MR lumbar spine wo IV contrast 01/15/2024    Narrative  Interpreted By:  Justice Pretty,  STUDY:  MR LUMBAR SPINE WO IV CONTRAST    INDICATION:  Signs/Symptoms:Worsening low back pain- failed home PT    COMPARISON:  Lumbar spine MRI 11/20/2018    ACCESSION NUMBER(S):  [id number]    ORDERING CLINICIAN:  TRAVIS HART    TECHNIQUE:  Multiplanar multisequence MRI of the lumbar spine was performed  without the administration of intravenous contrast, according to  standard  protocol.    FINDINGS:  ALIGNMENT: Levoconvex curvature of the lumbar spine centered at L3-4.    VERTEBRAE: The vertebral bodies are normal in height. There is no  fracture or aggressive osseous lesion. Schmorl's node in the superior  endplate of L2 in the inferior endplate of L4.    DISCS: Multilevel disc desiccation. Mild disc height loss at L3-4.    CONUS MEDULLARIS AND CAUDA EQUINA: The conus medullaris terminates at  L2. Mild crowding of the cauda equina nerve roots at L3-4 secondary  to degenerate changes detailed further below    PARAVERTEBRAL SOFT TISSUES AND VISUALIZED RETROPERITONEUM: The  visualized paravertebral soft tissues appear within normal limits.    EVALUATION OF INDIVIDUAL LEVELS:  L1-2: Shallow disc bulge and facet hypertrophy results in mild  narrowing of the spinal canal. Neural foramina are patent.    L2-3: Shallow disc bulge with facet hypertrophy results in mild  narrowing of the spinal canal. Neural foramina are patent.    L3-4: Disc bulge with superimposed left paracentral disc protrusion  and epidural lipomatosis. There is moderate narrowing of the spinal  canal, slightly progressed compared to previous MRI 2018. There is  mild bilateral foraminal stenosis.    L4-5: Shallow disc bulge with facet hypertrophy, infolding of  ligamentum flavum, and epidural lipomatosis. Mild narrowing of the  spinal canal and bilateral foramina.    L5-S1: Bilateral facet hypertrophy and shallow disc bulge results in  mild foraminal stenosis. Spinal canal remains patent.    LIMITED EVALUATION OF UPPER SACRUM AND SACROILIAC JOINTS: Mild  degenerative changes of the sacroiliac joints.    Impression  Multilevel degenerative changes of the lumbar spine most prominent at  L3-4 where there is moderate canal stenosis, slightly progressed  compared to previous MRI 2018. Additional multilevel mild  degenerative changes as detailed, similar to previous MRI.    Signed by: Justice Pretty 1/15/2024 7:44 PM  Dictation  workstation:   [id number]      XR cervical spine 2-3 views 01/06/2024    Narrative  * * *Final Report* * *    DATE OF EXAM: Jan 6 2024  4:18PM    LFX   [id number]  -  XR CERVICAL  2V AP/LAT  / ACCESSION #  [id number]    PROCEDURE REASON: Neck pain    * * * * Physician Interpretation * * * *    EXAMINATION:  XR CERVICAL  2V AP/LAT    CLINICAL HISTORY:   pt states lt shoulder pain and neck pain for months  denies injury    Technique:   XR CERVICAL  2V AP/LAT -- NOT APPLICABLE with 2 c spine 2  chest views on 4 c spine 3 chest images    Comparison: None      RESULT:    Cervical spine: Counting reference:  Craniocervical junction.   Anatomic  Variants:  None.. Straightening of normal cervical lordosis. Mild  multilevel degenerative disc changes with narrowing and endplate  osteophytes. Mild to moderate facet and uncovertebral arthropathy. No  erosions or syndesmophytes.  No fracture or compression defect.  Prevertebral soft tissues are unremarkable.  Lung apices are clear.    Impression  IMPRESSION:    Degenerative changes in the cervical spine as described without acute  osseous findings.          [facility]  Transcribe Date/Time: Jan 6 2024  4:49P    Dictated by : TJ WOOTEN MD    This examination was interpreted and the report reviewed and  electronically signed by:  TJ WOOTEN MD on Jan 6 2024  4:50PM  EST     No image results found.       1. Cervical pain (neck)  FL pain management    FL pain management    Epidural Steroid Injection    Epidural Steroid Injection           ASSESSMENT/PLAN  Alejandra Houston is a 58 y.o. female presenting for cervical epidural steroid injection     Patient denies any recent antibiotic use or infections, denies any blood thinner use, and denies contrast or local anesthetic allergies     Risks, benefits, alternatives discussed. All questions answered to the best of my ability. Patient agrees to proceed.      Our plan is as follows:  - Proceed with aforementioned  procedure          Malgorzata Portillo DO   Pain fellow

## 2024-08-21 NOTE — BRIEF OP NOTE
Alejandra Houston is a 58 y.o.  female  with cervical radiculitis here for cervical epidural steroid injection.    Procedure performed: Cervical interlaminar epidural steroid injection at C7-T1 under fluoroscopic guidance     Performed by: Dr. Rojas  Assistant: Malgorzata Portillo DO     The patient was identified in the preoperative holding area and marked according to protocol.  Informed consent was obtained and he was brought to the operating room on a gurney. A timeout was performed and consent was verified.  ASA Standard monitors were applied.  Patient was positioned prone on the operating room table and x-ray was used to identify the target area.  Skin was prepped in the usual fashion with ChloraPrep and draped using sterile towels.  The skin was anesthetized with 0.5% lidocaine with bicarbonate using a 27-gauge hypodermic needle.  A 20-gauge Touhy was used to contact the lamina and walked into the epidural space.  Epidural access was confirmed using contrast and confirmed on AP and lateral views. There was no evidence of intravascular or intrathecal contrast spread.  4 mL of 0.5% lidocaine and 10 mg of dexamethasone were injected in the epidural space.  The  needle was removed.  Hemostasis was ensured.  A bandage was applied.  The patient was brought to the recovery area in stable condition and there were no apparent complications.    All injected medications used were confirmed to be preservative-free and not .    Anesthesia: Local and conscious sedation with midazolam

## 2024-08-23 ENCOUNTER — APPOINTMENT (OUTPATIENT)
Dept: ORTHOPEDIC SURGERY | Facility: HOSPITAL | Age: 59
End: 2024-08-23
Payer: COMMERCIAL

## 2024-08-23 ENCOUNTER — PATIENT MESSAGE (OUTPATIENT)
Dept: PRIMARY CARE | Facility: CLINIC | Age: 59
End: 2024-08-23
Payer: COMMERCIAL

## 2024-08-28 ENCOUNTER — TELEMEDICINE (OUTPATIENT)
Dept: PRIMARY CARE | Facility: CLINIC | Age: 59
End: 2024-08-28
Payer: COMMERCIAL

## 2024-08-28 ENCOUNTER — OFFICE VISIT (OUTPATIENT)
Dept: ORTHOPEDIC SURGERY | Facility: CLINIC | Age: 59
End: 2024-08-28
Payer: COMMERCIAL

## 2024-08-28 DIAGNOSIS — M79.641 PAIN IN BOTH HANDS: Primary | ICD-10-CM

## 2024-08-28 DIAGNOSIS — M79.642 PAIN IN BOTH HANDS: Primary | ICD-10-CM

## 2024-08-28 DIAGNOSIS — Z72.0 TOBACCO ABUSE: ICD-10-CM

## 2024-08-28 DIAGNOSIS — G56.01 CARPAL TUNNEL SYNDROME ON RIGHT: Primary | ICD-10-CM

## 2024-08-28 DIAGNOSIS — K59.00 CONSTIPATION, UNSPECIFIED CONSTIPATION TYPE: ICD-10-CM

## 2024-08-28 DIAGNOSIS — E55.9 VITAMIN D DEFICIENCY: ICD-10-CM

## 2024-08-28 DIAGNOSIS — Z00.00 HEALTHCARE MAINTENANCE: ICD-10-CM

## 2024-08-28 PROCEDURE — 99203 OFFICE O/P NEW LOW 30 MIN: CPT | Performed by: ORTHOPAEDIC SURGERY

## 2024-08-28 PROCEDURE — 99213 OFFICE O/P EST LOW 20 MIN: CPT | Performed by: INTERNAL MEDICINE

## 2024-08-28 PROCEDURE — 99213 OFFICE O/P EST LOW 20 MIN: CPT | Performed by: ORTHOPAEDIC SURGERY

## 2024-08-28 PROCEDURE — 3051F HG A1C>EQUAL 7.0%<8.0%: CPT | Performed by: ORTHOPAEDIC SURGERY

## 2024-08-28 PROCEDURE — 3051F HG A1C>EQUAL 7.0%<8.0%: CPT | Performed by: INTERNAL MEDICINE

## 2024-08-28 ASSESSMENT — ENCOUNTER SYMPTOMS: BACK PAIN: 1

## 2024-08-28 NOTE — PROGRESS NOTES
Subjective    Patient ID: Alejandra Houston is a 58 y.o. female.    Chief Complaint: No chief complaint on file.     Last Surgery: No surgery found  Last Surgery Date: No surgery found    HPI  Is a 58-year-old right-hand-dominant female who comes in with a complaint of bilateral hand pain and paresthesias.  Both sides are relatively symptomatic.  This is been present for about 2 months.  She did have a recent left distal radius fracture treated conservatively at a different hospital.  She also states she is scheduled for left carpal tunnel release within the next week at that same hospital system.  However she does complain of numbness especially in her right middle and ring fingers as well.  Objective   Ortho Exam  Patient is in no acute distress.  Exam of her right hand and wrist reveals there is no thenar intrinsic atrophy.  She has a positive Tinel's test, a positive carpal tunnel compression test and a positive Phalen test.  She has adequate range of motion in her fingers.    Exam of the patient's left hand reveals again her skin envelope is intact.  She has no thenar or intrinsic atrophy.  She has a positive Tinel's test, a positive carpal tunnel compression test and a positive Phalen test.    Assessment/Plan   Encounter Diagnoses:  Carpal tunnel syndrome on right    Healthcare maintenance    Orders Placed This Encounter    EMG & nerve conduction     The patient has bilateral hand numbness and paresthesias consistent with carpal tunnel syndrome.  However she also has diabetes.  She is already scheduled for a left carpal tunnel release within the next week.  I did encourage the patient that if she had an exam and had signs and symptoms consistent with carpal tunnel syndrome by different surgeon and she should continue to have this procedure done.  With regards to the right side however I will have the patient undergo a right upper extremity EMG.  She will follow-up after the EMG is performed.

## 2024-08-28 NOTE — PROGRESS NOTES
Patient doing a phone call to discuss appointment for her hand   Virtual or Telephone Consent    An interactive audio and video telecommunication system which permits real time communications between the patient (at the originating site) and provider (at the distant site) was utilized to provide this telehealth service.   Verbal consent was requested and obtained from Alejandra Houston on this date, 08/28/24 for a telehealth visit.     Subjective   Patient ID: Alejandra Houston is a 58 y.o. female who presents for Follow-up.    The patient met with  from Orthopedic Surgery earlier today, and is pleased with the care she received.  She has bilateral carpal tunnel syndrome including pain and numbness, particularly on the right side.  The patient is scheduled for left-sided carpal tunnel release within a week.  It was recommended that she complete EMG and Nerve Conduction testing for the right upper extremity prior to scheduling a release procedure for this limb as well.  The patient is currently on gabapentin and duloxetine which are helping somewhat.  She recently underwent C7-T1 epidural steroid injection on 8/21/2024 resulting in minimal to partial benefit.  The patient inquires whether cervical spine surgery would be the next step in management of pain symptoms.    The patient reports ongoing back pain and lower extremity weakness.        Review of Systems   Musculoskeletal:  Positive for back pain.        Positive for bilateral upper extremity pain.        Objective   Physical Exam  Physical examination not done.    Assessment/Plan   Problem List Items Addressed This Visit    None      IMPRESSIONS/PLAN:       Bilateral Hand Pain  - Ongoing.  C7-T1 epidural steroid injection from 8/21/2024 resulted in minimal relief.  Continue with duloxetine 60mg once daily and gabapentin 300mg as two capsules in morning and afternoon, and three capsules in evening for now.  Following with  from Orthopedic Surgery.   Advised patient to complete upcoming left carpal tunnel release, and pending right upper extremity EMG and NC per Orthopedics recommendation.  Will consider MRI of Cervical Spine pending results of EMG with possible referral to Neurosurgery depending on MRI.  Call the clinic if symptoms persist or worsen.     Diabetes II   - Last HbA1c 7.2 per 6/2024.  Patient having difficulty remembering to take night time dose of metformin.  Switched patient to metformin XR 500mg as two tablets in the morning once daily, and can increase Trulicity 1.50 mg/0.5mL 1 pen weekly. Previously on glimperide 2mg every day.  Previously ordered HbA1c.      Depression    Continue on bupropion 150mg once daily in the morning, discussed adverse effect profile, and therapeutic expectations.  Advised patient to reach out in two weeks via [employer] with update about progress.  Suspect that may improve with CPAP and with upcoming LES.   Continue duloxetine 30 mg as two tablets once daily. Previously on Lexapro, and Rexulti or Topamax.  No SI or HI.  Call the clinic if symptoms persist or worsen.      Left Wrist Fracture  - Xray Left Wrist 5/2024 showed comminuted, mildly displaced, intra-articular fracture of the distal radius with associated ulnar styloid fracture.  There is also a questionable avulsion fracture of the triquetrum.  The distal radius fracture has a significant amount of dorsal comminution with a large intra-articular dorsal fragment that is mildly displaced.  Following with  from Orthopedic Surgery.     Pinched Cervical Nerve Root/Left Shoulder Pain   - CT Cervical Spine 1/2024 showed mild to moderate spinal canal stenosis at C4-C5 and moderate bilateral foraminal stenosis.  Prescribed prednisone 10mg taper as directed on packaging. Encouraged patient to schedule appointment with  from Pain Medicine.  Call the clinic if symptoms persist or worsen.     Low Back Pain/ leg weakness   - Worsening since 4/2023.   Weakness on LLE on exam.  MR Lumbar Spine from 1/2024 showed multilevel degenerative changes of the lumbar spine most prominent at L3-4 where there is moderate canal stenosis, slightly progressed.  Continue with gabapentin 600 mg TID.  Refilled Naproxen 500 mg BID prn.  Previously on tramadol 50 mg every 6 hours as needed.   Following with  from Pain Management.     Chronic Sinusitis  - CT Sinus without Contrast wnl and unremarkable 1/2024.  Following with  from Otolaryngology..       Sleep Apnea   - Completed in-center Sleep Study confirming moderate CADEN.  Previously ordered referral to Sleep Medicine.  She will reach out to them about obtaining the CPAP supplies.     Left Knee OA  - s/p left total knee arthroplasty 2/8/2024 with  from Orthopedic Surgery.     Paresthesia of Right Upper Extremity  - Previously ordered EMG and Nerve Conduction.     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 Underwent complex revision of right TKA. Following with  in Orthopedic Surgery.     Thrombocytopenia  - Last CBC showed platelet count of 77 per 10/2023.  Following with Hematology/Oncology.  Suspect from underlying liver cirrhosis.       Liver Cirrhosis  - CT Chest with IV Contrast 10/2023 showed incidental cirrhotic liver morphology with sequela of portal hypertension (splenomegaly and esophageal varices).  Previously ordered referral for Hepatology. Advised the patient to stop taking Tylenol and limit Percocet, and she verbalized understanding.   She has seen Dr. Millard in the past. Last EGD 3/2024.  Following with  from Gastroenterology.     Left Adnexal Mass  - CT Abdomen Pelvis 7/2023 showed incidental finding of A 3.6 cm benign-appearing left adnexal lesion is seen.  In an early post-menopausal woman (<5 years since menopause), follow-up ultrasound at 6-12 months is recommended. Patient has scheduled appointment with  Gynecology.     Constipation   - Refilled sodium phosphates (Fleet Enema) 19-7g/118mL enema as 1 enema once daily.  Following with Gastroenterology.  Needs colonoscopy- previously ordered.    Vaginal dryness  - Estradiol cream.     Long toenails   - Previously ordered referral to Podiatry with .    Dry Skin  - Cetaphil moisturizing lotion.      Fatigue  - Likely due to untreated sleep apnea.  Encouraged patient to follow-up with Sleep Medicine, and she has appointment for next week.  Vitamin D, Vitamin B12, and TSH wnl per 9/2023.     Atypical Chest pain  -Echo.  Referred to cards previously.     Smoking History   - CT Chest 4/2023 shows redemonstration of groundglass opacities in the posterior bilateral upper lobes, left lower lobe and new groundglass  opacities in the anterior left upper lobe. Central airways are clear.  Stable noncalcified pulmonary nodule in the right upper lobe (6, 38) and right middle lobe (6, 57) since 5/19/2019.  Discussed with patient that varenicline was recalled.  Stable per 6/2024 repeat of CT Lung Screening.     Health Maintenance   - Routine labs 6/2024.  Last Mammogram 5/2023, ordered repeat for 2024. Last colonoscopy 5/2019, repeat due [id number]/[id number].  Ordered repeat colonoscopy for 2024.     Follow-up in 3 months, call sooner if needed.        Scribe Attestation  By signing my name below, IAida, Pedro   attest that this documentation has been prepared under the direction and in the presence of Jhoan Eller DO.   Aida Galdamez 08/28/24 3:48 PM

## 2024-08-29 RX ORDER — IBUPROFEN 200 MG
1 TABLET ORAL EVERY 24 HOURS
Qty: 28 PATCH | Refills: 0 | Status: SHIPPED | OUTPATIENT
Start: 2024-08-29 | End: 2024-09-28

## 2024-08-29 RX ORDER — SODIUM PHOSPHATE,MONO-DIBASIC 19G-7G/118
ENEMA (ML) RECTAL
Qty: 133 ML | Refills: 1 | Status: SHIPPED | OUTPATIENT
Start: 2024-08-29

## 2024-08-29 RX ORDER — CHOLECALCIFEROL (VITAMIN D3) 50 MCG
50 TABLET ORAL DAILY
Qty: 30 TABLET | Refills: 1 | Status: SHIPPED | OUTPATIENT
Start: 2024-08-29

## 2024-08-30 ENCOUNTER — HOSPITAL ENCOUNTER (OUTPATIENT)
Dept: RADIOLOGY | Facility: CLINIC | Age: 59
Discharge: HOME | End: 2024-08-30
Payer: COMMERCIAL

## 2024-08-30 ENCOUNTER — APPOINTMENT (OUTPATIENT)
Dept: OBSTETRICS AND GYNECOLOGY | Facility: CLINIC | Age: 59
End: 2024-08-30
Payer: COMMERCIAL

## 2024-08-30 VITALS
DIASTOLIC BLOOD PRESSURE: 52 MMHG | WEIGHT: 227 LBS | BODY MASS INDEX: 34.4 KG/M2 | HEIGHT: 68 IN | SYSTOLIC BLOOD PRESSURE: 104 MMHG

## 2024-08-30 VITALS — WEIGHT: 225 LBS | HEIGHT: 68 IN | BODY MASS INDEX: 34.1 KG/M2

## 2024-08-30 DIAGNOSIS — E66.01 MORBID OBESITY WITH BMI OF 40.0-44.9, ADULT (MULTI): ICD-10-CM

## 2024-08-30 DIAGNOSIS — A60.04 HERPES SIMPLEX VULVOVAGINITIS: ICD-10-CM

## 2024-08-30 DIAGNOSIS — G47.33 OBSTRUCTIVE SLEEP APNEA, ADULT: ICD-10-CM

## 2024-08-30 DIAGNOSIS — K59.00 CONSTIPATION, UNSPECIFIED CONSTIPATION TYPE: ICD-10-CM

## 2024-08-30 DIAGNOSIS — E78.5 HYPERLIPIDEMIA, UNSPECIFIED HYPERLIPIDEMIA TYPE: ICD-10-CM

## 2024-08-30 DIAGNOSIS — N83.202 CYST OF LEFT OVARY: ICD-10-CM

## 2024-08-30 DIAGNOSIS — Z78.0 POST-MENOPAUSE: ICD-10-CM

## 2024-08-30 DIAGNOSIS — N95.1 MENOPAUSAL SYMPTOMS: ICD-10-CM

## 2024-08-30 DIAGNOSIS — E11.9 TYPE 2 DIABETES MELLITUS WITHOUT COMPLICATION, WITHOUT LONG-TERM CURRENT USE OF INSULIN (MULTI): ICD-10-CM

## 2024-08-30 DIAGNOSIS — F17.219 CIGARETTE NICOTINE DEPENDENCE WITH NICOTINE-INDUCED DISORDER: ICD-10-CM

## 2024-08-30 DIAGNOSIS — N95.1 VAGINAL DRYNESS, MENOPAUSAL: Primary | ICD-10-CM

## 2024-08-30 DIAGNOSIS — Z12.4 CERVICAL CANCER SCREENING: ICD-10-CM

## 2024-08-30 DIAGNOSIS — Z12.31 ENCOUNTER FOR SCREENING MAMMOGRAM FOR MALIGNANT NEOPLASM OF BREAST: ICD-10-CM

## 2024-08-30 PROCEDURE — 87624 HPV HI-RISK TYP POOLED RSLT: CPT

## 2024-08-30 PROCEDURE — 77080 DXA BONE DENSITY AXIAL: CPT

## 2024-08-30 PROCEDURE — 77067 SCR MAMMO BI INCL CAD: CPT

## 2024-08-30 ASSESSMENT — ENCOUNTER SYMPTOMS
FEVER: 0
CONSTIPATION: 1
UNEXPECTED WEIGHT CHANGE: 0
CHILLS: 0
ARTHRALGIAS: 1
BACK PAIN: 1
NUMBNESS: 1
ABDOMINAL PAIN: 1
DYSURIA: 0
BRUISES/BLEEDS EASILY: 1
WEAKNESS: 1
FREQUENCY: 1
SHORTNESS OF BREATH: 1

## 2024-08-30 ASSESSMENT — LIFESTYLE VARIABLES
CURRENT_SMOKER: Y
3_OR_MORE_DRINKS_PER_DAY: N

## 2024-08-30 ASSESSMENT — PAIN SCALES - GENERAL: PAINLEVEL: 7

## 2024-08-30 NOTE — ASSESSMENT & PLAN NOTE
Discussed lifestyle modifications; patient states she is in a lot of pain and mobility is difficulty   May benefit from PT

## 2024-08-30 NOTE — PROGRESS NOTES
New pt here to est care and discuss vaginal issues    Last pap: 3.1.18 ( WNL -HPV )  Last mammo: 8.30.24 ( Awaiting results )    Chaperone declined: ASHA Yi    Assessment/Plan   Problem List Items Addressed This Visit             ICD-10-CM       Medium    Constipation K59.00     Increase dietary fiber  Increase activity level         Genital herpes A60.00     Discussed antiviral; declines at this time         Hyperlipidemia E78.5     Need social work referral; food insecure  Limit access to healthy foods         Menopausal symptoms N95.1     Interested in MHT, Referral to MD for eval for complex medical comorbitidies          Morbid obesity with BMI of 40.0-44.9, adult (Multi) E66.01, Z68.41     Discussed lifestyle modifications; patient states she is in a lot of pain and mobility is difficulty   May benefit from PT         Nicotine dependence F17.200     Discussed smoking cessation; Ready to quit  Smoking cessation referral provided         Obstructive sleep apnea, adult G47.33     F/u PCP   May benefit from Bipap/CPAP  Sleep in upright position when possible         Type 2 diabetes mellitus (Multi) E11.9     Need social work referral; food insecure  Limit access to healthy foods         Vaginal dryness, menopausal - Primary N95.1     Previously tried ?estrace in the past with limited results             Other Visit Diagnoses         Codes    Cyst of left ovary     N83.202    Cervical cancer screening     Z12.4    Relevant Orders    THINPREP PAP TEST            Delores Andrade, S-NM  Danna Cook, APRN-CECELIAM     Velia   Alejandra Houston is a 58 y.o. female who is here for a routine exam.     Lives with: , two children and spouse and two grandchildren  Current employment: retired  T/E/D: tobacco will like cessation info  Up to date vision/dental: no but will make appt for vision; has no teeth not interested in dentist  PCP: yes  Menstrual history: 2016 LMP  Sexual history: no sexual  "intercourse at this time d/t dyspareunia and dryness; interested in meds  Pregnancy history:  G/P 4/3  T:3 P: 0 EAB:  1 Ectopic: 0  SAB: 0  L: 3      Diet: poor diet  Exercise: no but sometimes walk dog    PMH, PSH, and Family hx reviewed and updated    Current contraception: none  Refill Y/N:    Last pap: 2018  History of abnormal Pap smear: no  Family history of breast, uterine,  ovarian cancer: no  Regular self breast exam: no  Last mammogram: today  History of abnormal mammogram: no          Review of Systems   Constitutional:  Negative for chills, fever and unexpected weight change.   Eyes:  Positive for visual disturbance.   Respiratory:  Positive for shortness of breath.    Cardiovascular:  Positive for chest pain.   Gastrointestinal:  Positive for abdominal pain and constipation.   Genitourinary:  Positive for frequency and urgency. Negative for dysuria.   Musculoskeletal:  Positive for arthralgias and back pain.   Neurological:  Positive for weakness and numbness.   Hematological:  Bruises/bleeds easily.   Psychiatric/Behavioral:  Negative for self-injury and suicidal ideas.    Tearful and sad    Objective   /52   Ht 1.727 m (5' 8\")   Wt 103 kg (227 lb)   BMI 34.52 kg/m²     Physical Exam  Constitutional:       Appearance: Normal appearance.   HENT:      Head: Normocephalic.   Cardiovascular:      Rate and Rhythm: Normal rate and regular rhythm.   Pulmonary:      Effort: Pulmonary effort is normal.      Breath sounds: Normal breath sounds.   Genitourinary:     General: Normal vulva.      Vagina: Normal.      Cervix: Normal.   Musculoskeletal:         General: Normal range of motion.      Cervical back: Normal range of motion.   Skin:     General: Skin is warm and dry.   Neurological:      Mental Status: She is alert and oriented to person, place, and time.   Psychiatric:         Mood and Affect: Mood normal.         Behavior: Behavior normal.         "

## 2024-08-30 NOTE — ASSESSMENT & PLAN NOTE
F/u PCP   May benefit from Bipap/CPAP  Sleep in upright position when possible   Detail Level: Detailed Detail Level: Zone

## 2024-09-04 ENCOUNTER — PATIENT MESSAGE (OUTPATIENT)
Dept: SLEEP MEDICINE | Facility: HOSPITAL | Age: 59
End: 2024-09-04
Payer: COMMERCIAL

## 2024-09-04 NOTE — PATIENT COMMUNICATION
Called pt, she has not started PAP. Gave her [facility] number to call and schedule machine pick-up, scheduled follow up appt.

## 2024-09-05 DIAGNOSIS — M54.2 NECK PAIN: Primary | ICD-10-CM

## 2024-09-05 RX ORDER — IBUPROFEN 600 MG/1
600 TABLET ORAL EVERY 8 HOURS PRN
Qty: 30 TABLET | Refills: 3 | Status: SHIPPED | OUTPATIENT
Start: 2024-09-05

## 2024-09-05 RX ORDER — ACETAMINOPHEN 500 MG
500 TABLET ORAL EVERY 8 HOURS PRN
Qty: 30 TABLET | Refills: 3 | Status: SHIPPED | OUTPATIENT
Start: 2024-09-05

## 2024-09-05 RX ORDER — IBUPROFEN 600 MG/1
600 TABLET ORAL EVERY 8 HOURS PRN
COMMUNITY
Start: 2024-09-04 | End: 2024-09-05 | Stop reason: SDUPTHER

## 2024-09-05 RX ORDER — ACETAMINOPHEN 500 MG
500 TABLET ORAL EVERY 8 HOURS PRN
COMMUNITY
Start: 2024-09-04 | End: 2024-09-05 | Stop reason: SDUPTHER

## 2024-09-10 DIAGNOSIS — R10.13 DYSPEPSIA: ICD-10-CM

## 2024-09-10 DIAGNOSIS — E11.9 TYPE 2 DIABETES MELLITUS WITHOUT COMPLICATION, WITHOUT LONG-TERM CURRENT USE OF INSULIN (MULTI): ICD-10-CM

## 2024-09-10 DIAGNOSIS — Z12.11 COLON CANCER SCREENING: Primary | ICD-10-CM

## 2024-09-10 RX ORDER — POLYETHYLENE GLYCOL 3350, SODIUM SULFATE ANHYDROUS, SODIUM BICARBONATE, SODIUM CHLORIDE, POTASSIUM CHLORIDE 236; 22.74; 6.74; 5.86; 2.97 G/4L; G/4L; G/4L; G/4L; G/4L
4 POWDER, FOR SOLUTION ORAL ONCE
Qty: 4000 ML | Refills: 0 | Status: SHIPPED | OUTPATIENT
Start: 2024-09-10 | End: 2024-09-10

## 2024-09-11 RX ORDER — FOLIC ACID 1 MG/1
TABLET ORAL
Qty: 30 TABLET | Refills: 1 | Status: SHIPPED | OUTPATIENT
Start: 2024-09-11

## 2024-09-12 RX ORDER — OMEPRAZOLE 20 MG/1
20 CAPSULE, DELAYED RELEASE ORAL
Qty: 30 CAPSULE | Refills: 0 | Status: SHIPPED | OUTPATIENT
Start: 2024-09-12 | End: 2024-10-12

## 2024-09-12 NOTE — TELEPHONE ENCOUNTER
Hepatology Nurse Coordinator Note   Called patient to follow up prescription request for Omeprazole. She states she is now out. She's aware prescription will be sent for only one month by covering physician for Dr. Flores, and then she will need to request again after that. She verbalized understanding.

## 2024-09-13 ENCOUNTER — HOSPITAL ENCOUNTER (OUTPATIENT)
Dept: NEUROLOGY | Facility: HOSPITAL | Age: 59
Discharge: HOME | End: 2024-09-13
Payer: COMMERCIAL

## 2024-09-13 ENCOUNTER — PATIENT MESSAGE (OUTPATIENT)
Dept: PRIMARY CARE | Facility: CLINIC | Age: 59
End: 2024-09-13

## 2024-09-13 DIAGNOSIS — M54.12 CERVICAL RADICULOPATHY: Primary | ICD-10-CM

## 2024-09-13 DIAGNOSIS — G56.01 CARPAL TUNNEL SYNDROME ON RIGHT: ICD-10-CM

## 2024-09-13 LAB
CYTOLOGY CMNT CVX/VAG CYTO-IMP: NORMAL
HPV HR 12 DNA GENITAL QL NAA+PROBE: NEGATIVE
HPV HR GENOTYPES PNL CVX NAA+PROBE: NEGATIVE
HPV16 DNA SPEC QL NAA+PROBE: NEGATIVE
HPV18 DNA SPEC QL NAA+PROBE: NEGATIVE
LAB AP HPV GENOTYPE QUESTION: YES
LAB AP HPV HR: NORMAL
LABORATORY COMMENT REPORT: NORMAL
PATH REPORT.TOTAL CANCER: NORMAL

## 2024-09-13 PROCEDURE — 95886 MUSC TEST DONE W/N TEST COMP: CPT | Performed by: STUDENT IN AN ORGANIZED HEALTH CARE EDUCATION/TRAINING PROGRAM

## 2024-09-13 PROCEDURE — 95911 NRV CNDJ TEST 9-10 STUDIES: CPT | Performed by: STUDENT IN AN ORGANIZED HEALTH CARE EDUCATION/TRAINING PROGRAM

## 2024-09-16 ENCOUNTER — APPOINTMENT (OUTPATIENT)
Dept: OBSTETRICS AND GYNECOLOGY | Facility: CLINIC | Age: 59
End: 2024-09-16
Payer: COMMERCIAL

## 2024-09-18 ENCOUNTER — HOSPITAL ENCOUNTER (OUTPATIENT)
Dept: RADIOLOGY | Facility: CLINIC | Age: 59
Discharge: HOME | End: 2024-09-18
Payer: COMMERCIAL

## 2024-09-18 ENCOUNTER — OFFICE VISIT (OUTPATIENT)
Dept: PRIMARY CARE | Facility: CLINIC | Age: 59
End: 2024-09-18
Payer: COMMERCIAL

## 2024-09-18 VITALS
WEIGHT: 229 LBS | TEMPERATURE: 97.6 F | HEART RATE: 90 BPM | OXYGEN SATURATION: 96 % | BODY MASS INDEX: 34.82 KG/M2 | SYSTOLIC BLOOD PRESSURE: 160 MMHG | RESPIRATION RATE: 16 BRPM | DIASTOLIC BLOOD PRESSURE: 80 MMHG

## 2024-09-18 DIAGNOSIS — K76.6 PORTAL HYPERTENSION WITH ESOPHAGEAL VARICES (MULTI): ICD-10-CM

## 2024-09-18 DIAGNOSIS — G89.29 CHRONIC LOW BACK PAIN, UNSPECIFIED BACK PAIN LATERALITY, UNSPECIFIED WHETHER SCIATICA PRESENT: ICD-10-CM

## 2024-09-18 DIAGNOSIS — M54.50 CHRONIC LOW BACK PAIN, UNSPECIFIED BACK PAIN LATERALITY, UNSPECIFIED WHETHER SCIATICA PRESENT: ICD-10-CM

## 2024-09-18 DIAGNOSIS — Z00.00 HEALTHCARE MAINTENANCE: ICD-10-CM

## 2024-09-18 DIAGNOSIS — E11.9 TYPE 2 DIABETES MELLITUS WITHOUT COMPLICATION, WITHOUT LONG-TERM CURRENT USE OF INSULIN (MULTI): ICD-10-CM

## 2024-09-18 DIAGNOSIS — M89.8X1 CLAVICLE PAIN: ICD-10-CM

## 2024-09-18 DIAGNOSIS — G89.29 OTHER CHRONIC PAIN: Primary | ICD-10-CM

## 2024-09-18 DIAGNOSIS — I85.00 PORTAL HYPERTENSION WITH ESOPHAGEAL VARICES (MULTI): ICD-10-CM

## 2024-09-18 DIAGNOSIS — R10.13 DYSPEPSIA: ICD-10-CM

## 2024-09-18 DIAGNOSIS — K74.60 CIRRHOSIS OF LIVER WITHOUT ASCITES, UNSPECIFIED HEPATIC CIRRHOSIS TYPE (MULTI): ICD-10-CM

## 2024-09-18 PROCEDURE — 3079F DIAST BP 80-89 MM HG: CPT | Performed by: INTERNAL MEDICINE

## 2024-09-18 PROCEDURE — 3051F HG A1C>EQUAL 7.0%<8.0%: CPT | Performed by: INTERNAL MEDICINE

## 2024-09-18 PROCEDURE — 73030 X-RAY EXAM OF SHOULDER: CPT | Mod: LEFT SIDE | Performed by: RADIOLOGY

## 2024-09-18 PROCEDURE — 73030 X-RAY EXAM OF SHOULDER: CPT | Mod: LT

## 2024-09-18 PROCEDURE — 3077F SYST BP >= 140 MM HG: CPT | Performed by: INTERNAL MEDICINE

## 2024-09-18 PROCEDURE — 99214 OFFICE O/P EST MOD 30 MIN: CPT | Performed by: INTERNAL MEDICINE

## 2024-09-18 ASSESSMENT — ENCOUNTER SYMPTOMS
NECK PAIN: 1
BACK PAIN: 1

## 2024-09-18 NOTE — PROGRESS NOTES
Patient here for a follow up on neck pain, leg pain , knee pain    Subjective   Patient ID: Alejandra Houston is a 58 y.o. female who presents for Neck Pain, Back Pain, and Knee Pain.    The patient reports burning low back pain radiating to the right hip and lower extremity.  When symptoms are moderate to severe, she is unable to leave her bed and finds herself in tears.  The patient has tried taking Percocet resulting in partial benefit.  She is in the process of finding a new Pain Management Specialist.  The patient has undergone a number of corticosteroid injections with little to no relief.     The patient completed left carpal tunnel release on 9/4/2024 with Dr.Adrienne Tipton from Orthopedic Surgery.  She reports ongoing symptoms despite completing the procedure, and is considering whether to continue with right carpal tunnel release.  A recent EMG in 9/2024 shows MG shows superimposed right CTS with right C5-C6 radiculopathy.The patient has an upcoming appointment with  from [facility] [place] in 12/2024.    The patient reports ongoing left knee pain, and was previously following with  from Orthopedic Surgery who is now leaving.  She is scheduled to meet with  from Sports Medicine on 9/18/2024.     The patient has been taking duloxetine 30mg as two tablets once daily, and is not sure whether this has been helping with chronic pain symptoms.    The patient mentions relatively acute onset of left clavicle pain.     Neck Pain     Back Pain    Knee Pain       Review of Systems   Musculoskeletal:  Positive for back pain and neck pain.        Positive for left knee pain, and right hip pain.     Objective   Physical Exam  Constitutional:       Appearance: Normal appearance.   Neck:      Vascular: No carotid bruit.   Cardiovascular:      Rate and Rhythm: Normal rate and regular rhythm.      Heart sounds: Normal heart sounds.   Pulmonary:      Effort: Pulmonary effort is normal.      Breath  sounds: Normal breath sounds.   Abdominal:      General: Bowel sounds are normal.      Palpations: Abdomen is soft.      Tenderness: There is no abdominal tenderness.   Skin:     General: Skin is warm and dry.   Neurological:      General: No focal deficit present.      Mental Status: She is alert and oriented to person, place, and time. Mental status is at baseline.   Psychiatric:         Mood and Affect: Mood normal.         Behavior: Behavior normal.       Assessment/Plan   Problem List Items Addressed This Visit             ICD-10-CM    Chronic pain - Primary G89.29    Relevant Orders    Referral to Pain Medicine    Type 2 diabetes mellitus (Multi) E11.9    Relevant Orders    Hemoglobin A1c    Comprehensive metabolic panel    CBC and Auto Differential     Other Visit Diagnoses         Codes    Clavicle pain     M89.8X1    Relevant Orders    XR shoulder left 2+ views            IMPRESSIONS/PLAN:       Left Clavicle  - Ordered Xray Left Shoulder    Chronic Pain  - Ongoing.  Patient has tried multiple epidural corticosteroid injections with minimal to no relief.  MRI of Lumbar Spine 1/2024 showed multilevel degenerative changes of lumbar spine with moderate canal stenosis.  EMG 9/2024 of right upper extremity shows superimposed CTS with C5-C6 radiculopathy.  CT Cervical Spine 1/2024 showed  Mild to moderate spinal canal stenosis at C4-C5 and moderate bilateral foraminal stenosis.  Patient unable to tolerate chronic Tylenol due to history of liver cirrhosis.  She has been taking her gabapentin and Cymbalta diligently as well.  Previously followed with  from Pain Management.  Ordered referral to Pain Medicine with .    Bilateral Hand Pain  - Ongoing.  C7-T1 epidural steroid injection from 8/21/2024 resulted in minimal relief.  Continue with duloxetine 60mg once daily and gabapentin 300mg as two capsules in morning and afternoon, and three capsules in evening for now.  Following with  from  Orthopedic Surgery.  S/p left carpal tunnel release on 9/4/2024 with  from Orthopedic Surgery.  EMG from 9/13/2024 showed right CTS with superimposed C5 and C6 radiculopathies.  Call the clinic if symptoms persist or worsen, and will consider ordering MRI Cervical Spine as well as referral to Neurosurgery pending results.      Diabetes II   - Last HbA1c 7.2 per 6/2024.  Patient having difficulty remembering to take night time dose of metformin.  Switched patient to metformin XR 500mg as two tablets in the morning once daily, and can increase Trulicity 1.50 mg/0.5mL 1 pen weekly. Previously on glimperide 2mg every day.  Ordered CBC with differential, CMP to be completed in the fasting state, and HbA1c.     /80 in the office today.     Depression    Continue on bupropion 150mg once daily in the morning, discussed adverse effect profile, and therapeutic expectations.  Suspect that may improve with CPAP and with upcoming LES.   Continue duloxetine 30 mg as two tablets once daily. Previously on Lexapro, and Rexulti or Topamax.  No SI or HI.  Call the clinic if symptoms persist or worsen.      Chronic Sinusitis  - CT Sinus without Contrast wnl and unremarkable 1/2024.  Following with  from Otolaryngology.    Pinched Cervical Nerve Root/Left Shoulder Pain   - CT Cervical Spine 1/2024 showed mild to moderate spinal canal stenosis at C4-C5 and moderate bilateral foraminal stenosis.  Prescribed prednisone 10mg taper as directed on packaging. Encouraged patient to schedule appointment with  from Pain Medicine.  Call the clinic if symptoms persist or worsen.    Sleep Apnea   - Completed in-center Sleep Study confirming moderate CADEN.  Previously ordered referral to Sleep Medicine.  She will reach out to them about obtaining the CPAP supplies.    Smoking History   - CT Chest 4/2023 shows redemonstration of groundglass opacities in the posterior bilateral upper lobes, left lower lobe and new  groundglass  opacities in the anterior left upper lobe. Central airways are clear.  Stable noncalcified pulmonary nodule in the right upper lobe (6, 38) and right middle lobe (6, 57) since 5/19/2019.  Discussed with patient that varenicline was recalled.  Stable per 6/2024 repeat of CT Lung Screening.    Liver Cirrhosis  - CT Chest with IV Contrast 10/2023 showed incidental cirrhotic liver morphology with sequela of portal hypertension (splenomegaly and esophageal varices).  Previously ordered referral for Hepatology. Advised the patient to stop taking Tylenol and limit Percocet, and she verbalized understanding.   She has seen Dr. Millard in the past. Last EGD 3/2024.  Following with  from Gastroenterology.     Left Adnexal Mass  - CT Abdomen Pelvis 7/2023 showed incidental finding of A 3.6 cm benign-appearing left adnexal lesion is seen.  In an early post-menopausal woman (<5 years since menopause), follow-up ultrasound at 6-12 months is recommended. Patient has scheduled appointment with Gynecology.     Constipation   - Refilled sodium phosphates (Fleet Enema) 19-7g/118mL enema as 1 enema once daily.  Following with Gastroenterology.  Needs colonoscopy- previously ordered.     Vaginal dryness  - Estradiol cream.    Left Wrist Fracture  - Xray Left Wrist 5/2024 showed comminuted, mildly displaced, intra-articular fracture of the distal radius with associated ulnar styloid fracture.  There is also a questionable avulsion fracture of the triquetrum.  The distal radius fracture has a significant amount of dorsal comminution with a large intra-articular dorsal fragment that is mildly displaced.  Following with  from Orthopedic Surgery.      Low Back Pain/ leg weakness   - Worsening since 4/2023.  Weakness on LLE on exam.  MR Lumbar Spine from 1/2024 showed multilevel degenerative changes of the lumbar spine most prominent at L3-4 where there is moderate canal stenosis, slightly progressed.  Continue  with gabapentin 600 mg TID.  Refilled Naproxen 500 mg BID prn.  Previously on tramadol 50 mg every 6 hours as needed.   Following with  from Pain Management.      Left Knee OA  - s/p left total knee arthroplasty 2/8/2024 with  from Orthopedic Surgery.      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 Underwent complex revision of right TKA. Following with  in Orthopedic Surgery.     Long toenails   - Previously ordered referral to Podiatry with .    Dry Skin  - Cetaphil moisturizing lotion.     Thrombocytopenia  - Last CBC showed platelet count of 77 per 10/2023.  Following with Hematology/Oncology.  Suspect from underlying liver cirrhosis.       Fatigue  - Likely due to untreated sleep apnea.  Encouraged patient to follow-up with Sleep Medicine, and she has appointment for next week.  Vitamin D, Vitamin B12, and TSH wnl per 9/2023.     Health Maintenance   - Routine labs 6/2024.  Last Mammogram 5/2023, ordered repeat for 2024. Last colonoscopy 5/2019, repeat due [id number]/[id number].  Ordered repeat colonoscopy for 2024.     Follow-up in 3 months, call sooner if needed.        Scribe Attestation  By signing my name below, I, Pedro Richard   attest that this documentation has been prepared under the direction and in the presence of Jhoan Eller DO.   Aida Galdamez 09/18/24 4:01 PM

## 2024-09-19 ENCOUNTER — APPOINTMENT (OUTPATIENT)
Dept: OBSTETRICS AND GYNECOLOGY | Facility: CLINIC | Age: 59
End: 2024-09-19
Payer: COMMERCIAL

## 2024-09-19 RX ORDER — GABAPENTIN 300 MG/1
CAPSULE ORAL
Qty: 150 CAPSULE | Refills: 0 | Status: SHIPPED | OUTPATIENT
Start: 2024-09-19

## 2024-09-19 RX ORDER — ALBUTEROL SULFATE 90 UG/1
2 INHALANT RESPIRATORY (INHALATION) 4 TIMES DAILY PRN
Qty: 8.5 G | Refills: 1 | Status: SHIPPED | OUTPATIENT
Start: 2024-09-19

## 2024-09-23 ENCOUNTER — APPOINTMENT (OUTPATIENT)
Dept: RADIOLOGY | Facility: CLINIC | Age: 59
End: 2024-09-23
Payer: COMMERCIAL

## 2024-09-23 DIAGNOSIS — F32.A DEPRESSION, UNSPECIFIED DEPRESSION TYPE: ICD-10-CM

## 2024-09-23 DIAGNOSIS — Z72.0 TOBACCO ABUSE: ICD-10-CM

## 2024-09-24 RX ORDER — OMEPRAZOLE 20 MG/1
20 CAPSULE, DELAYED RELEASE ORAL
Qty: 30 CAPSULE | Refills: 0 | Status: SHIPPED | OUTPATIENT
Start: 2024-09-24 | End: 2024-10-24

## 2024-09-24 RX ORDER — BUPROPION HYDROCHLORIDE 150 MG/1
150 TABLET ORAL EVERY MORNING
Qty: 30 TABLET | Refills: 1 | Status: SHIPPED | OUTPATIENT
Start: 2024-09-24 | End: 2024-11-23

## 2024-09-24 RX ORDER — IBUPROFEN 200 MG
1 TABLET ORAL EVERY 24 HOURS
Qty: 28 PATCH | Refills: 0 | Status: SHIPPED | OUTPATIENT
Start: 2024-09-24 | End: 2024-10-24

## 2024-09-26 ENCOUNTER — APPOINTMENT (OUTPATIENT)
Dept: OBSTETRICS AND GYNECOLOGY | Facility: CLINIC | Age: 59
End: 2024-09-26
Payer: COMMERCIAL

## 2024-09-26 NOTE — TELEPHONE ENCOUNTER
Hepatology Nurse Coordinator Note   Received refill request for Carvedilol for patient from pharmacy. Called patient. She confirmed she has enough of the medication left to cover her until Dr. Flores returns to the office next week. She's aware it will be sent at that time. Patient verbalized understanding.

## 2024-09-28 DIAGNOSIS — K59.00 CONSTIPATION, UNSPECIFIED CONSTIPATION TYPE: ICD-10-CM

## 2024-09-28 DIAGNOSIS — Z72.0 TOBACCO ABUSE: ICD-10-CM

## 2024-09-28 DIAGNOSIS — E55.9 VITAMIN D DEFICIENCY: ICD-10-CM

## 2024-09-28 RX ORDER — CARVEDILOL 6.25 MG/1
6.25 TABLET ORAL DAILY
Qty: 30 TABLET | Refills: 3 | Status: SHIPPED | OUTPATIENT
Start: 2024-09-28 | End: 2025-01-26

## 2024-09-30 ENCOUNTER — HOSPITAL ENCOUNTER (OUTPATIENT)
Dept: RADIOLOGY | Facility: CLINIC | Age: 59
Discharge: HOME | End: 2024-09-30
Payer: COMMERCIAL

## 2024-09-30 ENCOUNTER — APPOINTMENT (OUTPATIENT)
Dept: OBSTETRICS AND GYNECOLOGY | Facility: CLINIC | Age: 59
End: 2024-09-30
Payer: COMMERCIAL

## 2024-09-30 ENCOUNTER — LAB (OUTPATIENT)
Dept: LAB | Facility: LAB | Age: 59
End: 2024-09-30
Payer: COMMERCIAL

## 2024-09-30 VITALS
DIASTOLIC BLOOD PRESSURE: 64 MMHG | HEIGHT: 68 IN | SYSTOLIC BLOOD PRESSURE: 118 MMHG | BODY MASS INDEX: 34.71 KG/M2 | WEIGHT: 229 LBS

## 2024-09-30 DIAGNOSIS — I85.00 PORTAL HYPERTENSION WITH ESOPHAGEAL VARICES (MULTI): ICD-10-CM

## 2024-09-30 DIAGNOSIS — E11.9 TYPE 2 DIABETES MELLITUS WITHOUT COMPLICATION, WITHOUT LONG-TERM CURRENT USE OF INSULIN (MULTI): ICD-10-CM

## 2024-09-30 DIAGNOSIS — K74.60 CIRRHOSIS OF LIVER WITHOUT ASCITES, UNSPECIFIED HEPATIC CIRRHOSIS TYPE (MULTI): ICD-10-CM

## 2024-09-30 DIAGNOSIS — N95.2 VAGINAL ATROPHY: ICD-10-CM

## 2024-09-30 DIAGNOSIS — M62.838 LEVATOR SPASM: ICD-10-CM

## 2024-09-30 DIAGNOSIS — K76.6 PORTAL HYPERTENSION WITH ESOPHAGEAL VARICES (MULTI): ICD-10-CM

## 2024-09-30 DIAGNOSIS — R68.82 DECREASED LIBIDO: Primary | ICD-10-CM

## 2024-09-30 DIAGNOSIS — F32.A DEPRESSION, UNSPECIFIED DEPRESSION TYPE: ICD-10-CM

## 2024-09-30 DIAGNOSIS — N83.202 CYST OF LEFT OVARY: ICD-10-CM

## 2024-09-30 PROCEDURE — 85610 PROTHROMBIN TIME: CPT

## 2024-09-30 PROCEDURE — 99214 OFFICE O/P EST MOD 30 MIN: CPT | Performed by: STUDENT IN AN ORGANIZED HEALTH CARE EDUCATION/TRAINING PROGRAM

## 2024-09-30 PROCEDURE — 3074F SYST BP LT 130 MM HG: CPT | Performed by: STUDENT IN AN ORGANIZED HEALTH CARE EDUCATION/TRAINING PROGRAM

## 2024-09-30 PROCEDURE — 82248 BILIRUBIN DIRECT: CPT

## 2024-09-30 PROCEDURE — 3008F BODY MASS INDEX DOCD: CPT | Performed by: STUDENT IN AN ORGANIZED HEALTH CARE EDUCATION/TRAINING PROGRAM

## 2024-09-30 PROCEDURE — 76830 TRANSVAGINAL US NON-OB: CPT | Performed by: RADIOLOGY

## 2024-09-30 PROCEDURE — 76856 US EXAM PELVIC COMPLETE: CPT

## 2024-09-30 PROCEDURE — 85025 COMPLETE CBC W/AUTO DIFF WBC: CPT

## 2024-09-30 PROCEDURE — 99459 PELVIC EXAMINATION: CPT | Performed by: STUDENT IN AN ORGANIZED HEALTH CARE EDUCATION/TRAINING PROGRAM

## 2024-09-30 PROCEDURE — 80053 COMPREHEN METABOLIC PANEL: CPT

## 2024-09-30 PROCEDURE — 36415 COLL VENOUS BLD VENIPUNCTURE: CPT

## 2024-09-30 PROCEDURE — 3051F HG A1C>EQUAL 7.0%<8.0%: CPT | Performed by: STUDENT IN AN ORGANIZED HEALTH CARE EDUCATION/TRAINING PROGRAM

## 2024-09-30 PROCEDURE — 83036 HEMOGLOBIN GLYCOSYLATED A1C: CPT

## 2024-09-30 PROCEDURE — 76856 US EXAM PELVIC COMPLETE: CPT | Performed by: RADIOLOGY

## 2024-09-30 PROCEDURE — 3078F DIAST BP <80 MM HG: CPT | Performed by: STUDENT IN AN ORGANIZED HEALTH CARE EDUCATION/TRAINING PROGRAM

## 2024-09-30 RX ORDER — BUPROPION HYDROCHLORIDE 150 MG/1
300 TABLET ORAL EVERY MORNING
Qty: 180 TABLET | Refills: 1 | Status: SHIPPED | OUTPATIENT
Start: 2024-09-30 | End: 2025-03-29

## 2024-09-30 RX ORDER — IBUPROFEN 200 MG
1 TABLET ORAL EVERY 24 HOURS
Qty: 28 PATCH | Refills: 0 | Status: SHIPPED | OUTPATIENT
Start: 2024-09-30 | End: 2024-10-30

## 2024-09-30 RX ORDER — CHOLECALCIFEROL (VITAMIN D3) 50 MCG
50 TABLET ORAL DAILY
Qty: 30 TABLET | Refills: 1 | Status: SHIPPED | OUTPATIENT
Start: 2024-09-30

## 2024-09-30 RX ORDER — SODIUM PHOSPHATE,MONO-DIBASIC 19G-7G/118
ENEMA (ML) RECTAL
Qty: 133 ML | Refills: 1 | Status: SHIPPED | OUTPATIENT
Start: 2024-09-30

## 2024-09-30 RX ORDER — ESTRADIOL 0.1 MG/G
CREAM VAGINAL
Qty: 34 G | Refills: 3 | Status: SHIPPED | OUTPATIENT
Start: 2024-09-30

## 2024-09-30 ASSESSMENT — PAIN SCALES - GENERAL: PAINLEVEL: 0-NO PAIN

## 2024-09-30 NOTE — ASSESSMENT & PLAN NOTE
Reviewed physiology of menopause as it pertains to vaginal tissue  Recommend vaginal estrogen - estrace cream ordered and administration instructions reviewed. Response timeline reviewed.

## 2024-09-30 NOTE — PROGRESS NOTES
Subjective   Patient ID: Alejandra Houston is a 58 y.o. female who presents for Follow-up (Discuss HRT///Chaperone Declined: TIMMY Morgan/).    Decreased libido, inability to orgasm, pain with intercourse and dryness for a few years. LMP age 51. No VB since then.     Trying to quit smoking. Has patches at home. Family smokes and severe pain, so difficult to quit. Wants to quit prior to next week.    Currently taking bupropion 150mg/day.    Objective   Physical Exam  Vitals reviewed. Exam conducted with a chaperone present.   Constitutional:       Appearance: Normal appearance.   HENT:      Head: Normocephalic.   Cardiovascular:      Rate and Rhythm: Normal rate and regular rhythm.   Pulmonary:      Effort: Pulmonary effort is normal. No respiratory distress.   Abdominal:      General: There is no distension.      Palpations: Abdomen is soft. There is no mass.      Tenderness: There is no abdominal tenderness. There is no guarding or rebound.   Genitourinary:     Comments: Normal appearing external female genitalia. Vulva without lesions. Vaginal mucosa atrophic appearing. Cervix normal appearing without lesions.    Normal sized nontender uterus and bilateral adnexa without masses. Right levator tenderness and hypertonicity.    Skin:     General: Skin is warm and dry.   Neurological:      General: No focal deficit present.      Mental Status: She is alert.   Psychiatric:         Mood and Affect: Mood normal.         Behavior: Behavior normal.         Thought Content: Thought content normal.         Judgment: Judgment normal.       Assessment/Plan   Problem List Items Addressed This Visit             ICD-10-CM    Depression F32.A    Relevant Medications    buPROPion XL (Wellbutrin XL) 150 mg 24 hr tablet    Decreased libido - Primary R68.82     Discussed the complex psychosomatic relationship between overall health and ability to orgasm  Discussed significant impact of her chronic pain, other health conditions  on her inability to orgasm.   Also taking SSRI duloxetine with possible interference  Expect that urogenital atrophy and levator spasm also contributing, and reivewed that as we continue to address her health problems piece by piece she will likely regain some libido and ability to orgasm  Reviewed the role of hormones, primarily estrogen, in mediation of libido and decrease after menopause  Discussed management options including HRT, SSRI - as patient is currently using tobacco, recommend against HRT at this time  Currently on wellbutrin 150mg daily, discussed possibility of increased dose in an effort to increase libido - patient amenable, Rx sent Wellbutrin 300mg daily for decreased libido  Offered and pt declined referral to GYN Mental health providers  RTC 3-6 months for follow up         Relevant Medications    estradiol (Estrace) 0.01 % (0.1 mg/gram) vaginal cream    Other Relevant Orders    Referral to Physical Therapy    Levator spasm M62.838     Recommend pelvic floor PT - referral sent         Vaginal atrophy N95.2     Reviewed physiology of menopause as it pertains to vaginal tissue  Recommend vaginal estrogen - estrace cream ordered and administration instructions reviewed. Response timeline reviewed.                       Nj Cheema MD 09/30/24 1:54 PM

## 2024-09-30 NOTE — ASSESSMENT & PLAN NOTE
Discussed the complex psychosomatic relationship between overall health and ability to orgasm  Discussed significant impact of her chronic pain, other health conditions on her inability to orgasm.   Also taking SSRI duloxetine with possible interference  Expect that urogenital atrophy and levator spasm also contributing, and reivewed that as we continue to address her health problems piece by piece she will likely regain some libido and ability to orgasm  Reviewed the role of hormones, primarily estrogen, in mediation of libido and decrease after menopause  Discussed management options including HRT, SSRI - as patient is currently using tobacco, recommend against HRT at this time  Currently on wellbutrin 150mg daily, discussed possibility of increased dose in an effort to increase libido - patient amenable, Rx sent Wellbutrin 300mg daily for decreased libido  Offered and pt declined referral to GYN Mental health providers  RTC 3-6 months for follow up

## 2024-10-01 DIAGNOSIS — M54.50 CHRONIC LOW BACK PAIN, UNSPECIFIED BACK PAIN LATERALITY, UNSPECIFIED WHETHER SCIATICA PRESENT: ICD-10-CM

## 2024-10-01 DIAGNOSIS — G89.29 CHRONIC LOW BACK PAIN, UNSPECIFIED BACK PAIN LATERALITY, UNSPECIFIED WHETHER SCIATICA PRESENT: ICD-10-CM

## 2024-10-01 LAB
ALBUMIN SERPL BCP-MCNC: 4.1 G/DL (ref 3.4–5)
ALP SERPL-CCNC: 68 U/L (ref 33–110)
ALT SERPL W P-5'-P-CCNC: 24 U/L (ref 7–45)
ANION GAP SERPL CALC-SCNC: 12 MMOL/L (ref 10–20)
AST SERPL W P-5'-P-CCNC: 22 U/L (ref 9–39)
BASOPHILS # BLD AUTO: 0.03 X10*3/UL (ref 0–0.1)
BASOPHILS NFR BLD AUTO: 0.7 %
BILIRUB DIRECT SERPL-MCNC: 0.2 MG/DL (ref 0–0.3)
BILIRUB SERPL-MCNC: 0.8 MG/DL (ref 0–1.2)
BUN SERPL-MCNC: 14 MG/DL (ref 6–23)
CALCIUM SERPL-MCNC: 9.5 MG/DL (ref 8.6–10.6)
CHLORIDE SERPL-SCNC: 101 MMOL/L (ref 98–107)
CO2 SERPL-SCNC: 28 MMOL/L (ref 21–32)
CREAT SERPL-MCNC: 0.69 MG/DL (ref 0.5–1.05)
EGFRCR SERPLBLD CKD-EPI 2021: >90 ML/MIN/1.73M*2
EOSINOPHIL # BLD AUTO: 0.12 X10*3/UL (ref 0–0.7)
EOSINOPHIL NFR BLD AUTO: 3 %
ERYTHROCYTE [DISTWIDTH] IN BLOOD BY AUTOMATED COUNT: 14.6 % (ref 11.5–14.5)
EST. AVERAGE GLUCOSE BLD GHB EST-MCNC: 126 MG/DL
GLUCOSE SERPL-MCNC: 88 MG/DL (ref 74–99)
HBA1C MFR BLD: 6 %
HCT VFR BLD AUTO: 37.5 % (ref 36–46)
HGB BLD-MCNC: 12.4 G/DL (ref 12–16)
IMM GRANULOCYTES # BLD AUTO: 0.01 X10*3/UL (ref 0–0.7)
IMM GRANULOCYTES NFR BLD AUTO: 0.2 % (ref 0–0.9)
INR PPP: 1.1 (ref 0.9–1.1)
LYMPHOCYTES # BLD AUTO: 0.94 X10*3/UL (ref 1.2–4.8)
LYMPHOCYTES NFR BLD AUTO: 23.3 %
MCH RBC QN AUTO: 30.3 PG (ref 26–34)
MCHC RBC AUTO-ENTMCNC: 33.1 G/DL (ref 32–36)
MCV RBC AUTO: 92 FL (ref 80–100)
MONOCYTES # BLD AUTO: 0.31 X10*3/UL (ref 0.1–1)
MONOCYTES NFR BLD AUTO: 7.7 %
NEUTROPHILS # BLD AUTO: 2.62 X10*3/UL (ref 1.2–7.7)
NEUTROPHILS NFR BLD AUTO: 65.1 %
NRBC BLD-RTO: 0 /100 WBCS (ref 0–0)
PLATELET # BLD AUTO: 72 X10*3/UL (ref 150–450)
POTASSIUM SERPL-SCNC: 4.1 MMOL/L (ref 3.5–5.3)
PROT SERPL-MCNC: 6.7 G/DL (ref 6.4–8.2)
PROTHROMBIN TIME: 12.4 SECONDS (ref 9.8–12.8)
RBC # BLD AUTO: 4.09 X10*6/UL (ref 4–5.2)
SODIUM SERPL-SCNC: 137 MMOL/L (ref 136–145)
WBC # BLD AUTO: 4 X10*3/UL (ref 4.4–11.3)

## 2024-10-02 ENCOUNTER — APPOINTMENT (OUTPATIENT)
Dept: ORTHOPEDIC SURGERY | Facility: CLINIC | Age: 59
End: 2024-10-02
Payer: COMMERCIAL

## 2024-10-02 RX ORDER — FERROUS SULFATE TAB 325 MG (65 MG ELEMENTAL FE) 325 (65 FE) MG
1 TAB ORAL
Qty: 30 TABLET | Refills: 5 | Status: SHIPPED | OUTPATIENT
Start: 2024-10-02

## 2024-10-03 ENCOUNTER — PATIENT MESSAGE (OUTPATIENT)
Dept: PRIMARY CARE | Facility: CLINIC | Age: 59
End: 2024-10-03
Payer: COMMERCIAL

## 2024-10-03 DIAGNOSIS — M54.12 CERVICAL RADICULOPATHY: Primary | ICD-10-CM

## 2024-10-03 RX ORDER — PREDNISONE 5 MG/1
TABLET ORAL
Qty: 30 TABLET | Refills: 0 | Status: SHIPPED | OUTPATIENT
Start: 2024-10-03

## 2024-10-07 ENCOUNTER — APPOINTMENT (OUTPATIENT)
Dept: ORTHOPEDIC SURGERY | Facility: CLINIC | Age: 59
End: 2024-10-07
Payer: COMMERCIAL

## 2024-10-08 ENCOUNTER — APPOINTMENT (OUTPATIENT)
Dept: CARDIOLOGY | Facility: HOSPITAL | Age: 59
End: 2024-10-08
Payer: COMMERCIAL

## 2024-10-08 ENCOUNTER — APPOINTMENT (OUTPATIENT)
Dept: RADIOLOGY | Facility: HOSPITAL | Age: 59
End: 2024-10-08
Payer: COMMERCIAL

## 2024-10-08 ENCOUNTER — HOSPITAL ENCOUNTER (EMERGENCY)
Facility: HOSPITAL | Age: 59
Discharge: OTHER INSTITUTION | End: 2024-10-09
Attending: EMERGENCY MEDICINE
Payer: COMMERCIAL

## 2024-10-08 DIAGNOSIS — R45.851 SUICIDAL IDEATION: Primary | ICD-10-CM

## 2024-10-08 LAB
ALBUMIN SERPL BCP-MCNC: 4.9 G/DL (ref 3.4–5)
ALP SERPL-CCNC: 77 U/L (ref 33–110)
ALT SERPL W P-5'-P-CCNC: 31 U/L (ref 7–45)
AMPHETAMINES UR QL SCN: ABNORMAL
ANION GAP SERPL CALC-SCNC: 12 MMOL/L (ref 10–20)
APAP SERPL-MCNC: <10 UG/ML
APPEARANCE UR: CLEAR
AST SERPL W P-5'-P-CCNC: 29 U/L (ref 9–39)
BARBITURATES UR QL SCN: ABNORMAL
BASOPHILS # BLD AUTO: 0.07 X10*3/UL (ref 0–0.1)
BASOPHILS NFR BLD AUTO: 0.5 %
BENZODIAZ UR QL SCN: ABNORMAL
BILIRUB SERPL-MCNC: 1.3 MG/DL (ref 0–1.2)
BILIRUB UR STRIP.AUTO-MCNC: NEGATIVE MG/DL
BUN SERPL-MCNC: 27 MG/DL (ref 6–23)
BZE UR QL SCN: ABNORMAL
CALCIUM SERPL-MCNC: 10.7 MG/DL (ref 8.6–10.3)
CANNABINOIDS UR QL SCN: ABNORMAL
CARDIAC TROPONIN I PNL SERPL HS: 10 NG/L (ref 0–13)
CARDIAC TROPONIN I PNL SERPL HS: 7 NG/L (ref 0–13)
CHLORIDE SERPL-SCNC: 99 MMOL/L (ref 98–107)
CK SERPL-CCNC: 149 U/L (ref 0–215)
CO2 SERPL-SCNC: 29 MMOL/L (ref 21–32)
COLOR UR: YELLOW
CREAT SERPL-MCNC: 0.8 MG/DL (ref 0.5–1.05)
EGFRCR SERPLBLD CKD-EPI 2021: 87 ML/MIN/1.73M*2
EOSINOPHIL # BLD AUTO: 0.28 X10*3/UL (ref 0–0.7)
EOSINOPHIL NFR BLD AUTO: 2.1 %
ERYTHROCYTE [DISTWIDTH] IN BLOOD BY AUTOMATED COUNT: 14.3 % (ref 11.5–14.5)
ETHANOL SERPL-MCNC: <10 MG/DL
FENTANYL+NORFENTANYL UR QL SCN: ABNORMAL
GLUCOSE SERPL-MCNC: 139 MG/DL (ref 74–99)
GLUCOSE UR STRIP.AUTO-MCNC: NORMAL MG/DL
HCT VFR BLD AUTO: 43.3 % (ref 36–46)
HGB BLD-MCNC: 14.9 G/DL (ref 12–16)
IMM GRANULOCYTES # BLD AUTO: 0.06 X10*3/UL (ref 0–0.7)
IMM GRANULOCYTES NFR BLD AUTO: 0.5 % (ref 0–0.9)
KETONES UR STRIP.AUTO-MCNC: ABNORMAL MG/DL
LEUKOCYTE ESTERASE UR QL STRIP.AUTO: NEGATIVE
LYMPHOCYTES # BLD AUTO: 2.36 X10*3/UL (ref 1.2–4.8)
LYMPHOCYTES NFR BLD AUTO: 18 %
MCH RBC QN AUTO: 30.5 PG (ref 26–34)
MCHC RBC AUTO-ENTMCNC: 34.4 G/DL (ref 32–36)
MCV RBC AUTO: 89 FL (ref 80–100)
METHADONE UR QL SCN: ABNORMAL
MONOCYTES # BLD AUTO: 1.33 X10*3/UL (ref 0.1–1)
MONOCYTES NFR BLD AUTO: 10.1 %
NEUTROPHILS # BLD AUTO: 9.04 X10*3/UL (ref 1.2–7.7)
NEUTROPHILS NFR BLD AUTO: 68.8 %
NITRITE UR QL STRIP.AUTO: NEGATIVE
NRBC BLD-RTO: 0 /100 WBCS (ref 0–0)
OPIATES UR QL SCN: ABNORMAL
OXYCODONE+OXYMORPHONE UR QL SCN: ABNORMAL
PCP UR QL SCN: ABNORMAL
PH UR STRIP.AUTO: 5.5 [PH]
PLATELET # BLD AUTO: 135 X10*3/UL (ref 150–450)
POTASSIUM SERPL-SCNC: 4.4 MMOL/L (ref 3.5–5.3)
PROT SERPL-MCNC: 8 G/DL (ref 6.4–8.2)
PROT UR STRIP.AUTO-MCNC: NEGATIVE MG/DL
RBC # BLD AUTO: 4.89 X10*6/UL (ref 4–5.2)
RBC # UR STRIP.AUTO: NEGATIVE /UL
SALICYLATES SERPL-MCNC: <3 MG/DL
SARS-COV-2 RNA RESP QL NAA+PROBE: NOT DETECTED
SODIUM SERPL-SCNC: 136 MMOL/L (ref 136–145)
SP GR UR STRIP.AUTO: 1.02
UROBILINOGEN UR STRIP.AUTO-MCNC: NORMAL MG/DL
WBC # BLD AUTO: 13.1 X10*3/UL (ref 4.4–11.3)

## 2024-10-08 PROCEDURE — 2500000004 HC RX 250 GENERAL PHARMACY W/ HCPCS (ALT 636 FOR OP/ED)

## 2024-10-08 PROCEDURE — 82550 ASSAY OF CK (CPK): CPT

## 2024-10-08 PROCEDURE — 85025 COMPLETE CBC W/AUTO DIFF WBC: CPT

## 2024-10-08 PROCEDURE — 87635 SARS-COV-2 COVID-19 AMP PRB: CPT

## 2024-10-08 PROCEDURE — 84702 CHORIONIC GONADOTROPIN TEST: CPT | Performed by: REGISTERED NURSE

## 2024-10-08 PROCEDURE — 82248 BILIRUBIN DIRECT: CPT | Performed by: REGISTERED NURSE

## 2024-10-08 PROCEDURE — 93005 ELECTROCARDIOGRAM TRACING: CPT

## 2024-10-08 PROCEDURE — 36415 COLL VENOUS BLD VENIPUNCTURE: CPT

## 2024-10-08 PROCEDURE — 71045 X-RAY EXAM CHEST 1 VIEW: CPT | Performed by: RADIOLOGY

## 2024-10-08 PROCEDURE — 80053 COMPREHEN METABOLIC PANEL: CPT

## 2024-10-08 PROCEDURE — 99285 EMERGENCY DEPT VISIT HI MDM: CPT | Mod: 25,CS

## 2024-10-08 PROCEDURE — 71045 X-RAY EXAM CHEST 1 VIEW: CPT

## 2024-10-08 PROCEDURE — 81003 URINALYSIS AUTO W/O SCOPE: CPT | Mod: 59

## 2024-10-08 PROCEDURE — 84484 ASSAY OF TROPONIN QUANT: CPT

## 2024-10-08 PROCEDURE — 80320 DRUG SCREEN QUANTALCOHOLS: CPT

## 2024-10-08 PROCEDURE — 2500000001 HC RX 250 WO HCPCS SELF ADMINISTERED DRUGS (ALT 637 FOR MEDICARE OP)

## 2024-10-08 PROCEDURE — 80307 DRUG TEST PRSMV CHEM ANLYZR: CPT | Performed by: EMERGENCY MEDICINE

## 2024-10-08 RX ORDER — CYCLOBENZAPRINE HCL 5 MG
5 TABLET ORAL ONCE
Status: COMPLETED | OUTPATIENT
Start: 2024-10-08 | End: 2024-10-08

## 2024-10-08 SDOH — HEALTH STABILITY: MENTAL HEALTH: IN THE PAST FEW WEEKS, HAVE YOU FELT THAT YOU OR YOUR FAMILY WOULD BE BETTER OFF IF YOU WERE DEAD?: YES

## 2024-10-08 SDOH — HEALTH STABILITY: MENTAL HEALTH: ANXIETY SYMPTOMS: GENERALIZED;FEELINGS OF DOOM;UNEXPLAINED FEARS

## 2024-10-08 SDOH — HEALTH STABILITY: MENTAL HEALTH: NEEDS EXPRESSED: EMOTIONAL

## 2024-10-08 SDOH — HEALTH STABILITY: MENTAL HEALTH
DESCRIBE YOUR THOUGHTS OF KILLING YOURSELF RIGHT NOW:: I DONT WANT TO KILL MYSELF BUT I WOULD NOT BE UPSET IF I DID NOT WAKE UP THE NEXT DAY.

## 2024-10-08 SDOH — SOCIAL STABILITY: SOCIAL INSECURITY: FAMILY BEHAVIORS: UNABLE TO ASSESS

## 2024-10-08 SDOH — HEALTH STABILITY: MENTAL HEALTH: HAVE YOU BEEN THINKING ABOUT HOW YOU MIGHT DO THIS?: NO

## 2024-10-08 SDOH — HEALTH STABILITY: MENTAL HEALTH: NON-SPECIFIC ACTIVE SUICIDAL THOUGHTS (PAST 1 MONTH): NO

## 2024-10-08 SDOH — HEALTH STABILITY: MENTAL HEALTH: DELUSIONS: CONTROLLED

## 2024-10-08 SDOH — HEALTH STABILITY: MENTAL HEALTH: SUICIDAL BEHAVIOR (LIFETIME): YES

## 2024-10-08 SDOH — HEALTH STABILITY: MENTAL HEALTH
HAVE YOU STARTED TO WORK OUT OR WORKED OUT THE DETAILS OF HOW TO KILL YOURSELF? DO YOU INTENT TO CARRY OUT THIS PLAN?: NO

## 2024-10-08 SDOH — HEALTH STABILITY: MENTAL HEALTH: HAVE YOU ACTUALLY HAD ANY THOUGHTS OF KILLING YOURSELF?: YES

## 2024-10-08 SDOH — HEALTH STABILITY: MENTAL HEALTH: SUICIDAL BEHAVIOR (3 MONTHS): NO

## 2024-10-08 SDOH — HEALTH STABILITY: MENTAL HEALTH: HAVE YOU WISHED YOU WERE DEAD OR WISHED YOU COULD GO TO SLEEP AND NOT WAKE UP?: YES

## 2024-10-08 SDOH — SOCIAL STABILITY: SOCIAL NETWORK: PARENT/GUARDIAN/SIGNIFICANT OTHER INVOLVEMENT: NO INVOLVEMENT

## 2024-10-08 SDOH — HEALTH STABILITY: MENTAL HEALTH
DEPRESSION SYMPTOMS: CRYING;CHANGE IN ENERGY LEVEL;FEELINGS OF HELPLESSNESS;FEELINGS OF HOPELESSESS;IMPAIRED CONCENTRATION;INCREASED IRRITABILITY;LOSS OF INTEREST;SLEEP DISTURBANCE

## 2024-10-08 SDOH — HEALTH STABILITY: MENTAL HEALTH: SUICIDE ASSESSMENT: ADULT (C-SSRS)

## 2024-10-08 SDOH — HEALTH STABILITY: MENTAL HEALTH: BEHAVIORAL HEALTH(WDL): EXCEPTIONS TO WDL

## 2024-10-08 SDOH — HEALTH STABILITY: MENTAL HEALTH: CONTENT: UNREMARKABLE

## 2024-10-08 SDOH — ECONOMIC STABILITY: GENERAL: FINANCIAL CONCERNS: UNABLE TO OBTAIN NEEDED EQUIPMENT

## 2024-10-08 SDOH — HEALTH STABILITY: MENTAL HEALTH: BEHAVIORS/MOOD: COOPERATIVE;CRYING;SAD;TEARFUL

## 2024-10-08 SDOH — HEALTH STABILITY: MENTAL HEALTH

## 2024-10-08 SDOH — HEALTH STABILITY: MENTAL HEALTH: HAVE YOU EVER DONE ANYTHING, STARTED TO DO ANYTHING, OR PREPARED TO DO ANYTHING TO END YOUR LIFE?: NO

## 2024-10-08 SDOH — HEALTH STABILITY: MENTAL HEALTH: NEEDS EXPRESSED: DENIES

## 2024-10-08 SDOH — HEALTH STABILITY: MENTAL HEALTH: IN THE PAST FEW WEEKS, HAVE YOU WISHED YOU WERE DEAD?: YES

## 2024-10-08 SDOH — HEALTH STABILITY: MENTAL HEALTH: HAVE YOU HAD THESE THOUGHTS AND HAD SOME INTENTION OF ACTING ON THEM?: NO

## 2024-10-08 SDOH — SOCIAL STABILITY: SOCIAL NETWORK: EMOTIONAL SUPPORT GIVEN: REASSURE

## 2024-10-08 SDOH — HEALTH STABILITY: MENTAL HEALTH: WISH TO BE DEAD (PAST 1 MONTH): YES

## 2024-10-08 SDOH — HEALTH STABILITY: MENTAL HEALTH: SUICIDAL BEHAVIOR (DESCRIPTION): PT DID NOT REPORT ANY SUCIDAL BEHAVIOR AT THIS TIME.

## 2024-10-08 SDOH — HEALTH STABILITY: MENTAL HEALTH: IN THE PAST WEEK, HAVE YOU BEEN HAVING THOUGHTS ABOUT KILLING YOURSELF?: YES

## 2024-10-08 SDOH — SOCIAL STABILITY: SOCIAL NETWORK: VISITOR BEHAVIORS: UNABLE TO ASSESS

## 2024-10-08 SDOH — ECONOMIC STABILITY: HOUSING INSECURITY: FEELS SAFE LIVING IN HOME: OTHER (COMMENT)

## 2024-10-08 SDOH — HEALTH STABILITY: MENTAL HEALTH: HAVE YOU EVER TRIED TO KILL YOURSELF?: YES

## 2024-10-08 SDOH — HEALTH STABILITY: MENTAL HEALTH: ARE YOU HAVING THOUGHTS OF KILLING YOURSELF RIGHT NOW?: YES

## 2024-10-08 SDOH — HEALTH STABILITY: MENTAL HEALTH: SLEEP PATTERN: RESTLESSNESS

## 2024-10-08 ASSESSMENT — COLUMBIA-SUICIDE SEVERITY RATING SCALE - C-SSRS
6. HAVE YOU EVER DONE ANYTHING, STARTED TO DO ANYTHING, OR PREPARED TO DO ANYTHING TO END YOUR LIFE?: NO
5. HAVE YOU STARTED TO WORK OUT OR WORKED OUT THE DETAILS OF HOW TO KILL YOURSELF? DO YOU INTEND TO CARRY OUT THIS PLAN?: NO
6. HAVE YOU EVER DONE ANYTHING, STARTED TO DO ANYTHING, OR PREPARED TO DO ANYTHING TO END YOUR LIFE?: NO
1. SINCE LAST CONTACT, HAVE YOU WISHED YOU WERE DEAD OR WISHED YOU COULD GO TO SLEEP AND NOT WAKE UP?: YES
6. HAVE YOU EVER DONE ANYTHING, STARTED TO DO ANYTHING, OR PREPARED TO DO ANYTHING TO END YOUR LIFE?: NO
1. IN THE PAST MONTH, HAVE YOU WISHED YOU WERE DEAD OR WISHED YOU COULD GO TO SLEEP AND NOT WAKE UP?: YES
2. HAVE YOU ACTUALLY HAD ANY THOUGHTS OF KILLING YOURSELF?: NO
2. HAVE YOU ACTUALLY HAD ANY THOUGHTS OF KILLING YOURSELF?: NO
6. HAVE YOU EVER DONE ANYTHING, STARTED TO DO ANYTHING, OR PREPARED TO DO ANYTHING TO END YOUR LIFE?: YES
2. HAVE YOU ACTUALLY HAD ANY THOUGHTS OF KILLING YOURSELF?: YES
1. IN THE PAST MONTH, HAVE YOU WISHED YOU WERE DEAD OR WISHED YOU COULD GO TO SLEEP AND NOT WAKE UP?: YES
6. HAVE YOU EVER DONE ANYTHING, STARTED TO DO ANYTHING, OR PREPARED TO DO ANYTHING TO END YOUR LIFE?: YES

## 2024-10-08 ASSESSMENT — PAIN - FUNCTIONAL ASSESSMENT: PAIN_FUNCTIONAL_ASSESSMENT: 0-10

## 2024-10-08 ASSESSMENT — PAIN SCALES - GENERAL: PAINLEVEL_OUTOF10: 10 - WORST POSSIBLE PAIN

## 2024-10-08 ASSESSMENT — PAIN DESCRIPTION - PAIN TYPE: TYPE: ACUTE PAIN

## 2024-10-08 ASSESSMENT — PAIN DESCRIPTION - LOCATION: LOCATION: NECK

## 2024-10-08 ASSESSMENT — LIFESTYLE VARIABLES
SUBSTANCE_ABUSE_PAST_12_MONTHS: YES
PRESCIPTION_ABUSE_PAST_12_MONTHS: NO

## 2024-10-08 NOTE — ED PROVIDER NOTES
Emergency Department Provider Note        History of Present Illness     History provided by: Patient  Limitations to History: None  External Records Reviewed with Brief Summary: None    HPI:  Alejandra Houston is a 58 y.o. female presenting to the [facility] emergency department with a chief complaint of having pain all over stemming from her original pain in her neck which has been diagnosed as a pinched nerve.  The patient also has a history of fibromyalgia, GERD, type 2 diabetes, and COPD.  The patient has been using Percocet, Neurontin, and prednisone for pain relief and has also been using crack cocaine for pain relief.  The pain for this patient began by feeling the pain in her left collarbone back in October 2023 but is now experiencing the most of her pain from her neck all the way down to her lower back, even though the patient is experiencing pain in her extremities as well.  The patient has a pain management appointment with Dr. Chrissy Merida tomorrow at 9:30 AM she is saying and needs some kind of treatment to get her through until then.    Physical Exam   Triage vitals:  T 36.5 °C (97.7 °F)  HR 88  /76  RR 20  O2 99 % None (Room air)    Physical Exam  Vitals and nursing note reviewed.   Constitutional:       General: She is not in acute distress.     Appearance: Normal appearance. She is not ill-appearing, toxic-appearing or diaphoretic.      Comments: The patient is lying down flat because she says she is experiencing pain all over.  When I have the patient lean forward to listen to her lungs with the stethoscope she was in extreme pain the entire time.   HENT:      Head: Normocephalic and atraumatic.   Eyes:      General: No scleral icterus.        Right eye: No discharge.         Left eye: No discharge.      Extraocular Movements: Extraocular movements intact.      Conjunctiva/sclera: Conjunctivae normal.   Cardiovascular:      Rate and Rhythm: Normal rate and regular rhythm.   Chest:       Chest wall: Tenderness (Patient has tenderness everywhere) present.   Abdominal:      General: There is no distension.      Palpations: Abdomen is soft.      Tenderness: There is abdominal tenderness (Patient has diffuse tenderness everywhere). There is no right CVA tenderness, left CVA tenderness, guarding or rebound.   Skin:     General: Skin is warm and dry.      Capillary Refill: Capillary refill takes less than 2 seconds.   Neurological:      General: No focal deficit present.      Mental Status: She is alert and oriented to person, place, and time.   Psychiatric:      Comments: Patient is very tearful, due to distracting injury and pain all over her body it is difficult to assess the mood and behavior the patient.  The patient did say that she wishes she were dead and has attempted suicide before.  The patient does not have many protective factors due to her daughter kicking her out of her house and the patient use using drugs.        Medical Decision Making & ED Course   Medical Decision Makin y.o. female presenting with pain all over and saying that she has a pain management appointment tomorrow and needs some form of treatment to get her until then.  The patient is also claiming to be suicidal due to the pain being so bad for her and that she has attempted suicide before.    Labs and testing for and EPAT evaluation were began and the patient is given a liter of fluids and some Flexeril to help her pain.    The patient is awaiting her EPAT evaluation and given Flexeril for pain management.  The patient will benefit from this EPAT evaluation due to the fact that she is an active drug user, has been kicked out of her home by her daughter, and is in constant pain with her fibromyalgia and with no pain medication due to her Percocet that she was prescribed is at her daughter's house that she is not allowed to return to.  The patient is going through many stressors right now and said that she is  suicidal.    The patient is signed out to Dr. Le at 7:30 PM.  ----      Differential diagnoses considered include but are not limited to: Fibromyalgia pain exacerbation, opioid withdrawals     Social Determinants of Health which Significantly Impact Care: None identified     EKG Independent Interpretation:  EKG taken in the ED, not digitally recorded    Independent Result Review and Interpretation: Relevant laboratory and radiographic results were reviewed and independently interpreted by myself.  As necessary, they are commented on in the ED Course.    Chronic conditions affecting the patient's care: As documented above in [facility]    The patient was discussed with the following consultants/services: None    Care Considerations: As documented above in [facility]    ED Course:  ED Course as of 10/08/24 2235   Tue Oct 08, 2024   1907 XR chest 1 view  X-ray of the chest shows no acute cardiopulmonary pathologies [LUBNA]   2006 Patient does test positive for cocaine and for opiates.  She admitted to using these.  CK is negative.  Patient is medically cleared for psychiatric evaluation and treatment. [PS]   2205 EPAT evaluated the patient and called to update me on their assessment.  Patient has reported SI multiple times during their interview and EPAT advises placement to inpatient psych. [CH]      ED Course User Index  [CH] Carlos Cruz DO  [LUBNA] Fredi Burroughs DO  [PS] Kenneth Silva DO     Disposition   Patient was signed out to Dr. Le at 7:30 PM pending completion of their work-up.  Please see the next provider's transition of care note for the remainder of the patient's care.     Procedures   Procedures    Patient seen and discussed with ED attending physician.    Fredi Burroughs DO  Emergency Medicine     Fredi Burroughs DO  Resident  10/08/24 3711

## 2024-10-08 NOTE — ED PROCEDURE NOTE
"Procedure  Procedures    Capacity Assessment Tool    \"Capacity\" is the \"ability\" to make a decision.  The decision in question must be specific (one decision), relevant to a patient's current condition (appropriate), and timely (neither prospective nor retrospective).    Capacity varies based on knowledge base (explanation/understanding of clinical information), cognitive processing, acute psychiatric illness, and other clinical conditions.    In order to be deemed \"capacitated\" to make a single decision at one point in time, a patient must demonstrate all 4 of the following elements:    *Ability to consistently communicate a choice (consistent over time with adequate information)  *Ability to understand the relevant information (accurate knowledge of condition)  *Ability to appreciate the situation and its consequences (risks/benefits, pros/cons)  *Ability to reason about treatment options (without undue influence of a person or condition, eg. suicidality or acute psychosis)      Current Decision    Clinical issue:   Suicidal ideation    Did the appropriate team address relevant information with the patient:  Yes    Date: October 8, 2024    If \"NO\" is selected for appropriate team, then please discuss with the appropriate team.  The appropriate team should be encouraged to address relevant information with the patient AND reevaluate capacity when appropriate.    Capacity Evaluation    Patient demonstrates ability to consistently communicate choice:  No     Patient demonstrates ability to understand the relevant information:  No     Patient demonstrates ability to appreciate the situation and its consequences:  No     Patient demonstrates ability to reason about treatment options:  No     If ANY of the above items are answered \"NO,\" the patient LACKS CAPACITY for that specific decision at hand, at that specific time.  Further capacity evaluations can be done as needed.            Fredi Burroughs, " DO  Resident  10/08/24 1936

## 2024-10-08 NOTE — ED PROVIDER NOTES
Emergency Medicine Transition of Care Note.    I received Alejandra Houston in signout from Dr. Burroughs.  Please see the previous ED provider note for all HPI, PE and MDM up to the time of signout at 1900. This is in addition to the primary record.    In brief Alejandra Houston is an 58 y.o. female presenting for   Chief Complaint   Patient presents with    Neck Pain     Pt reports out of pain medication for neck pain. Pain management apt tomorrow.     Suicidal     At the time of signout we were awaiting: EPAT evaluation.  Patient medically clear for EPAT evaluation.    ED Course as of 10/08/24 2039   Tue Oct 08, 2024   1907 XR chest 1 view  X-ray of the chest shows no acute cardiopulmonary pathologies [LUBNA]   2006 Patient does test positive for cocaine and for opiates.  She admitted to using these.  CK is negative.  Patient is medically cleared for psychiatric evaluation and treatment. [PS]      ED Course User Index  [LUBNA] Fredi Burroughs DO  [PS] Kenneth Silva DO       Medical Decision Making      Final diagnoses:   None           Procedure  Procedures    MD Mandy Fregoso MD  Resident  10/08/24 1951       Mandy Le MD  Resident  10/08/24 2000       Mandy Le MD  Resident  10/08/24 2039

## 2024-10-09 ENCOUNTER — TELEPHONE (OUTPATIENT)
Dept: SURGERY | Facility: CLINIC | Age: 59
End: 2024-10-09
Payer: COMMERCIAL

## 2024-10-09 ENCOUNTER — APPOINTMENT (OUTPATIENT)
Dept: PAIN MEDICINE | Facility: CLINIC | Age: 59
End: 2024-10-09
Payer: COMMERCIAL

## 2024-10-09 ENCOUNTER — HOSPITAL ENCOUNTER (INPATIENT)
Facility: HOSPITAL | Age: 59
End: 2024-10-09
Attending: PSYCHIATRY & NEUROLOGY | Admitting: PSYCHIATRY & NEUROLOGY
Payer: COMMERCIAL

## 2024-10-09 VITALS
SYSTOLIC BLOOD PRESSURE: 124 MMHG | BODY MASS INDEX: 34.71 KG/M2 | HEIGHT: 68 IN | HEART RATE: 71 BPM | DIASTOLIC BLOOD PRESSURE: 71 MMHG | RESPIRATION RATE: 18 BRPM | TEMPERATURE: 97.5 F | WEIGHT: 229 LBS | OXYGEN SATURATION: 95 %

## 2024-10-09 DIAGNOSIS — Z00.00 HEALTHCARE MAINTENANCE: ICD-10-CM

## 2024-10-09 DIAGNOSIS — G89.29 CHRONIC PAIN OF RIGHT KNEE: ICD-10-CM

## 2024-10-09 DIAGNOSIS — E51.9 VITAMIN B1 DEFICIENCY: ICD-10-CM

## 2024-10-09 DIAGNOSIS — G62.9 PERIPHERAL NERVE DISEASE: ICD-10-CM

## 2024-10-09 DIAGNOSIS — K21.9 GASTROESOPHAGEAL REFLUX DISEASE WITHOUT ESOPHAGITIS: ICD-10-CM

## 2024-10-09 DIAGNOSIS — J02.9 SORE THROAT: Primary | ICD-10-CM

## 2024-10-09 DIAGNOSIS — E55.9 VITAMIN D DEFICIENCY: ICD-10-CM

## 2024-10-09 DIAGNOSIS — K76.6 PORTAL HYPERTENSION WITH ESOPHAGEAL VARICES (MULTI): ICD-10-CM

## 2024-10-09 DIAGNOSIS — K59.03 DRUG-INDUCED CONSTIPATION: ICD-10-CM

## 2024-10-09 DIAGNOSIS — K74.60 CIRRHOSIS OF LIVER WITHOUT ASCITES, UNSPECIFIED HEPATIC CIRRHOSIS TYPE (MULTI): ICD-10-CM

## 2024-10-09 DIAGNOSIS — R10.13 DYSPEPSIA: ICD-10-CM

## 2024-10-09 DIAGNOSIS — G89.29 CHRONIC LOW BACK PAIN, UNSPECIFIED BACK PAIN LATERALITY, UNSPECIFIED WHETHER SCIATICA PRESENT: ICD-10-CM

## 2024-10-09 DIAGNOSIS — G89.4 CHRONIC PAIN SYNDROME: ICD-10-CM

## 2024-10-09 DIAGNOSIS — M15.9 GENERALIZED OSTEOARTHRITIS: ICD-10-CM

## 2024-10-09 DIAGNOSIS — F33.2 MDD (MAJOR DEPRESSIVE DISORDER), RECURRENT SEVERE, WITHOUT PSYCHOSIS (MULTI): ICD-10-CM

## 2024-10-09 DIAGNOSIS — E11.9 TYPE 2 DIABETES MELLITUS WITHOUT COMPLICATION, WITHOUT LONG-TERM CURRENT USE OF INSULIN (MULTI): ICD-10-CM

## 2024-10-09 DIAGNOSIS — I85.00 PORTAL HYPERTENSION WITH ESOPHAGEAL VARICES (MULTI): ICD-10-CM

## 2024-10-09 DIAGNOSIS — M25.561 CHRONIC PAIN OF RIGHT KNEE: ICD-10-CM

## 2024-10-09 DIAGNOSIS — M54.50 CHRONIC LOW BACK PAIN, UNSPECIFIED BACK PAIN LATERALITY, UNSPECIFIED WHETHER SCIATICA PRESENT: ICD-10-CM

## 2024-10-09 LAB
B-HCG SERPL-ACNC: 3 MIU/ML
GLUCOSE BLD MANUAL STRIP-MCNC: 102 MG/DL (ref 74–99)
GLUCOSE BLD MANUAL STRIP-MCNC: 217 MG/DL (ref 74–99)
GLUCOSE BLD MANUAL STRIP-MCNC: 268 MG/DL (ref 74–99)
HOLD SPECIMEN: NORMAL

## 2024-10-09 PROCEDURE — 2500000001 HC RX 250 WO HCPCS SELF ADMINISTERED DRUGS (ALT 637 FOR MEDICARE OP): Performed by: PSYCHIATRY & NEUROLOGY

## 2024-10-09 PROCEDURE — 2500000001 HC RX 250 WO HCPCS SELF ADMINISTERED DRUGS (ALT 637 FOR MEDICARE OP): Performed by: REGISTERED NURSE

## 2024-10-09 PROCEDURE — 82947 ASSAY GLUCOSE BLOOD QUANT: CPT

## 2024-10-09 PROCEDURE — 2500000002 HC RX 250 W HCPCS SELF ADMINISTERED DRUGS (ALT 637 FOR MEDICARE OP, ALT 636 FOR OP/ED): Performed by: PSYCHIATRY & NEUROLOGY

## 2024-10-09 PROCEDURE — 1140000001 HC PRIVATE PSYCH ROOM DAILY

## 2024-10-09 PROCEDURE — 2500000002 HC RX 250 W HCPCS SELF ADMINISTERED DRUGS (ALT 637 FOR MEDICARE OP, ALT 636 FOR OP/ED): Performed by: REGISTERED NURSE

## 2024-10-09 PROCEDURE — 2500000001 HC RX 250 WO HCPCS SELF ADMINISTERED DRUGS (ALT 637 FOR MEDICARE OP): Performed by: STUDENT IN AN ORGANIZED HEALTH CARE EDUCATION/TRAINING PROGRAM

## 2024-10-09 PROCEDURE — 99222 1ST HOSP IP/OBS MODERATE 55: CPT | Performed by: PSYCHIATRY & NEUROLOGY

## 2024-10-09 PROCEDURE — 99253 IP/OBS CNSLTJ NEW/EST LOW 45: CPT | Performed by: REGISTERED NURSE

## 2024-10-09 PROCEDURE — 97165 OT EVAL LOW COMPLEX 30 MIN: CPT | Mod: GO

## 2024-10-09 RX ORDER — DULOXETIN HYDROCHLORIDE 30 MG/1
30 CAPSULE, DELAYED RELEASE ORAL NIGHTLY
Status: DISCONTINUED | OUTPATIENT
Start: 2024-10-09 | End: 2024-10-10

## 2024-10-09 RX ORDER — FAMOTIDINE 20 MG/1
40 TABLET, FILM COATED ORAL DAILY
Status: DISCONTINUED | OUTPATIENT
Start: 2024-10-09 | End: 2024-10-25 | Stop reason: HOSPADM

## 2024-10-09 RX ORDER — ALUMINUM HYDROXIDE, MAGNESIUM HYDROXIDE, AND SIMETHICONE 1200; 120; 1200 MG/30ML; MG/30ML; MG/30ML
30 SUSPENSION ORAL 4 TIMES DAILY PRN
Status: DISCONTINUED | OUTPATIENT
Start: 2024-10-09 | End: 2024-10-25 | Stop reason: HOSPADM

## 2024-10-09 RX ORDER — ACETAMINOPHEN 325 MG/1
650 TABLET ORAL EVERY 6 HOURS PRN
Status: DISCONTINUED | OUTPATIENT
Start: 2024-10-09 | End: 2024-10-25 | Stop reason: HOSPADM

## 2024-10-09 RX ORDER — ADHESIVE BANDAGE
30 BANDAGE TOPICAL DAILY PRN
Status: DISCONTINUED | OUTPATIENT
Start: 2024-10-09 | End: 2024-10-25 | Stop reason: HOSPADM

## 2024-10-09 RX ORDER — IBUPROFEN 400 MG/1
400 TABLET ORAL EVERY 6 HOURS PRN
Status: DISCONTINUED | OUTPATIENT
Start: 2024-10-09 | End: 2024-10-09 | Stop reason: HOSPADM

## 2024-10-09 RX ORDER — BUPROPION HYDROCHLORIDE 150 MG/1
300 TABLET ORAL EVERY MORNING
Status: DISCONTINUED | OUTPATIENT
Start: 2024-10-09 | End: 2024-10-14

## 2024-10-09 RX ORDER — TRAMADOL HYDROCHLORIDE 50 MG/1
50 TABLET ORAL EVERY 6 HOURS PRN
Status: DISCONTINUED | OUTPATIENT
Start: 2024-10-09 | End: 2024-10-25 | Stop reason: HOSPADM

## 2024-10-09 RX ORDER — ALBUTEROL SULFATE 90 UG/1
2 INHALANT RESPIRATORY (INHALATION) 4 TIMES DAILY PRN
Status: DISCONTINUED | OUTPATIENT
Start: 2024-10-09 | End: 2024-10-25 | Stop reason: HOSPADM

## 2024-10-09 RX ORDER — FOLIC ACID 1 MG/1
1 TABLET ORAL DAILY
Status: DISCONTINUED | OUTPATIENT
Start: 2024-10-09 | End: 2024-10-25 | Stop reason: HOSPADM

## 2024-10-09 RX ORDER — LANOLIN ALCOHOL/MO/W.PET/CERES
100 CREAM (GRAM) TOPICAL DAILY
Status: DISCONTINUED | OUTPATIENT
Start: 2024-10-09 | End: 2024-10-25 | Stop reason: HOSPADM

## 2024-10-09 RX ORDER — PANTOPRAZOLE SODIUM 40 MG/1
40 TABLET, DELAYED RELEASE ORAL
Status: DISCONTINUED | OUTPATIENT
Start: 2024-10-10 | End: 2024-10-25 | Stop reason: HOSPADM

## 2024-10-09 RX ORDER — LIDOCAINE 560 MG/1
1 PATCH PERCUTANEOUS; TOPICAL; TRANSDERMAL DAILY
Status: DISCONTINUED | OUTPATIENT
Start: 2024-10-09 | End: 2024-10-09 | Stop reason: HOSPADM

## 2024-10-09 RX ORDER — FERROUS SULFATE 325(65) MG
1 TABLET ORAL
Status: DISCONTINUED | OUTPATIENT
Start: 2024-10-09 | End: 2024-10-25 | Stop reason: HOSPADM

## 2024-10-09 RX ORDER — TRAZODONE HYDROCHLORIDE 50 MG/1
50 TABLET ORAL NIGHTLY PRN
Status: DISCONTINUED | OUTPATIENT
Start: 2024-10-09 | End: 2024-10-14

## 2024-10-09 RX ORDER — HYDROXYZINE PAMOATE 25 MG/1
50 CAPSULE ORAL EVERY 6 HOURS PRN
Status: DISCONTINUED | OUTPATIENT
Start: 2024-10-09 | End: 2024-10-25 | Stop reason: HOSPADM

## 2024-10-09 RX ORDER — IBUPROFEN 400 MG/1
400 TABLET ORAL EVERY 6 HOURS PRN
Status: DISCONTINUED | OUTPATIENT
Start: 2024-10-09 | End: 2024-10-17

## 2024-10-09 RX ORDER — ACETAMINOPHEN 325 MG/1
650 TABLET ORAL EVERY 6 HOURS PRN
Status: DISCONTINUED | OUTPATIENT
Start: 2024-10-09 | End: 2024-10-09 | Stop reason: HOSPADM

## 2024-10-09 RX ORDER — CARVEDILOL 6.25 MG/1
6.25 TABLET ORAL DAILY
Status: DISCONTINUED | OUTPATIENT
Start: 2024-10-09 | End: 2024-10-25 | Stop reason: HOSPADM

## 2024-10-09 RX ORDER — GABAPENTIN 400 MG/1
400 CAPSULE ORAL 2 TIMES DAILY
Status: DISCONTINUED | OUTPATIENT
Start: 2024-10-09 | End: 2024-10-10

## 2024-10-09 RX ORDER — GABAPENTIN 300 MG/1
600 CAPSULE ORAL 2 TIMES DAILY
Status: DISCONTINUED | OUTPATIENT
Start: 2024-10-09 | End: 2024-10-10

## 2024-10-09 RX ORDER — METFORMIN HYDROCHLORIDE 500 MG/1
1000 TABLET, EXTENDED RELEASE ORAL
Status: DISCONTINUED | OUTPATIENT
Start: 2024-10-10 | End: 2024-10-25 | Stop reason: HOSPADM

## 2024-10-09 RX ORDER — DULOXETIN HYDROCHLORIDE 30 MG/1
60 CAPSULE, DELAYED RELEASE ORAL DAILY
Status: DISCONTINUED | OUTPATIENT
Start: 2024-10-09 | End: 2024-10-10

## 2024-10-09 RX ORDER — INSULIN LISPRO 100 [IU]/ML
0-5 INJECTION, SOLUTION INTRAVENOUS; SUBCUTANEOUS
Status: DISCONTINUED | OUTPATIENT
Start: 2024-10-09 | End: 2024-10-11

## 2024-10-09 SDOH — SOCIAL STABILITY: SOCIAL INSECURITY
WITHIN THE LAST YEAR, HAVE TO BEEN RAPED OR FORCED TO HAVE ANY KIND OF SEXUAL ACTIVITY BY YOUR PARTNER OR EX-PARTNER?: NO

## 2024-10-09 SDOH — HEALTH STABILITY: MENTAL HEALTH: BEHAVIORS/MOOD: SLEEPING

## 2024-10-09 SDOH — SOCIAL STABILITY: SOCIAL INSECURITY
WITHIN THE LAST YEAR, HAVE YOU BEEN RAPED OR FORCED TO HAVE ANY KIND OF SEXUAL ACTIVITY BY YOUR PARTNER OR EX-PARTNER?: NO

## 2024-10-09 SDOH — HEALTH STABILITY: MENTAL HEALTH: HOW OFTEN DO YOU HAVE A DRINK CONTAINING ALCOHOL?: NEVER

## 2024-10-09 SDOH — HEALTH STABILITY: MENTAL HEALTH: BEHAVIORAL HEALTH(WDL): EXCEPTIONS TO WDL

## 2024-10-09 SDOH — SOCIAL STABILITY: SOCIAL INSECURITY: ARE THERE ANY APPARENT SIGNS OF INJURIES/BEHAVIORS THAT COULD BE RELATED TO ABUSE/NEGLECT?: NO

## 2024-10-09 SDOH — ECONOMIC STABILITY: HOUSING INSECURITY: FEELS SAFE LIVING IN HOME: YES

## 2024-10-09 SDOH — ECONOMIC STABILITY: INCOME INSECURITY: IN THE PAST 12 MONTHS HAS THE ELECTRIC, GAS, OIL, OR WATER COMPANY THREATENED TO SHUT OFF SERVICES IN YOUR HOME?: NO

## 2024-10-09 SDOH — HEALTH STABILITY: MENTAL HEALTH: HAVE YOU ACTUALLY HAD ANY THOUGHTS OF KILLING YOURSELF?: YES

## 2024-10-09 SDOH — HEALTH STABILITY: MENTAL HEALTH: HOW OFTEN DO YOU HAVE 6 OR MORE DRINKS ON ONE OCCASION?: NEVER

## 2024-10-09 SDOH — SOCIAL STABILITY: SOCIAL INSECURITY: HAVE YOU HAD ANY THOUGHTS OF HARMING ANYONE ELSE?: NO

## 2024-10-09 SDOH — SOCIAL STABILITY: SOCIAL INSECURITY: WITHIN THE LAST YEAR, HAVE YOU BEEN AFRAID OF YOUR PARTNER OR EX-PARTNER?: NO

## 2024-10-09 SDOH — HEALTH STABILITY: MENTAL HEALTH: ANXIETY SYMPTOMS: GENERALIZED

## 2024-10-09 SDOH — SOCIAL STABILITY: SOCIAL INSECURITY: WITHIN THE LAST YEAR, HAVE YOU BEEN HUMILIATED OR EMOTIONALLY ABUSED IN OTHER WAYS BY YOUR PARTNER OR EX-PARTNER?: NO

## 2024-10-09 SDOH — SOCIAL STABILITY: SOCIAL INSECURITY
WITHIN THE LAST YEAR, HAVE YOU BEEN KICKED, HIT, SLAPPED, OR OTHERWISE PHYSICALLY HURT BY YOUR PARTNER OR EX-PARTNER?: NO

## 2024-10-09 SDOH — ECONOMIC STABILITY: FOOD INSECURITY: WITHIN THE PAST 12 MONTHS, THE FOOD YOU BOUGHT JUST DIDN'T LAST AND YOU DIDN'T HAVE MONEY TO GET MORE.: SOMETIMES TRUE

## 2024-10-09 SDOH — SOCIAL STABILITY: SOCIAL INSECURITY: DO YOU FEEL UNSAFE GOING BACK TO THE PLACE WHERE YOU ARE LIVING?: YES

## 2024-10-09 SDOH — HEALTH STABILITY: MENTAL HEALTH: CONTENT: BLAMING SELF

## 2024-10-09 SDOH — HEALTH STABILITY: MENTAL HEALTH: BEHAVIORS/MOOD: CALM;COOPERATIVE

## 2024-10-09 SDOH — HEALTH STABILITY: MENTAL HEALTH: HAVE YOU EVER TRIED TO KILL YOURSELF?: YES

## 2024-10-09 SDOH — HEALTH STABILITY: MENTAL HEALTH: HOW OFTEN DO YOU HAVE SIX OR MORE DRINKS ON ONE OCCASION?: NEVER

## 2024-10-09 SDOH — HEALTH STABILITY: MENTAL HEALTH: HAVE YOU BEEN THINKING ABOUT HOW YOU MIGHT DO THIS?: NO

## 2024-10-09 SDOH — HEALTH STABILITY: MENTAL HEALTH: IN THE PAST FEW WEEKS, HAVE YOU FELT THAT YOU OR YOUR FAMILY WOULD BE BETTER OFF IF YOU WERE DEAD?: YES

## 2024-10-09 SDOH — ECONOMIC STABILITY: FOOD INSECURITY: WITHIN THE PAST 12 MONTHS, YOU WORRIED THAT YOUR FOOD WOULD RUN OUT BEFORE YOU GOT MONEY TO BUY MORE.: SOMETIMES TRUE

## 2024-10-09 SDOH — HEALTH STABILITY: MENTAL HEALTH: IN THE PAST FEW WEEKS, HAVE YOU WISHED YOU WERE DEAD?: YES

## 2024-10-09 SDOH — SOCIAL STABILITY: SOCIAL INSECURITY: DOES ANYONE TRY TO KEEP YOU FROM HAVING/CONTACTING OTHER FRIENDS OR DOING THINGS OUTSIDE YOUR HOME?: NO

## 2024-10-09 SDOH — SOCIAL STABILITY: SOCIAL INSECURITY: ABUSE: ADULT

## 2024-10-09 SDOH — HEALTH STABILITY: MENTAL HEALTH

## 2024-10-09 SDOH — HEALTH STABILITY: MENTAL HEALTH: HAVE YOU WISHED YOU WERE DEAD OR WISHED YOU COULD GO TO SLEEP AND NOT WAKE UP?: YES

## 2024-10-09 SDOH — SOCIAL STABILITY: SOCIAL INSECURITY: HAS ANYONE EVER THREATENED TO HURT YOUR FAMILY OR YOUR PETS?: NO

## 2024-10-09 SDOH — HEALTH STABILITY: MENTAL HEALTH: DEPRESSION SYMPTOMS: ISOLATIVE;SLEEP DISTURBANCE;CRYING

## 2024-10-09 SDOH — SOCIAL STABILITY: SOCIAL INSECURITY: WERE YOU ABLE TO COMPLETE ALL THE BEHAVIORAL HEALTH SCREENINGS?: YES

## 2024-10-09 SDOH — ECONOMIC STABILITY: FOOD INSECURITY
WITHIN THE PAST 12 MONTHS, YOU WORRIED THAT YOUR FOOD WOULD RUN OUT BEFORE YOU GOT THE MONEY TO BUY MORE.: SOMETIMES TRUE

## 2024-10-09 SDOH — HEALTH STABILITY: MENTAL HEALTH: ARE YOU HAVING THOUGHTS OF KILLING YOURSELF RIGHT NOW?: YES

## 2024-10-09 SDOH — HEALTH STABILITY: MENTAL HEALTH: HAVE YOU EVER DONE ANYTHING, STARTED TO DO ANYTHING, OR PREPARED TO DO ANYTHING TO END YOUR LIFE?: NO

## 2024-10-09 SDOH — ECONOMIC STABILITY: INCOME INSECURITY: IN THE PAST 12 MONTHS, HAS THE ELECTRIC, GAS, OIL, OR WATER COMPANY THREATENED TO SHUT OFF SERVICE IN YOUR HOME?: NO

## 2024-10-09 SDOH — HEALTH STABILITY: MENTAL HEALTH: HAVE YOU HAD THESE THOUGHTS AND HAD SOME INTENTION OF ACTING ON THEM?: NO

## 2024-10-09 SDOH — HEALTH STABILITY: MENTAL HEALTH: SUICIDE ASSESSMENT: ADULT (C-SSRS)

## 2024-10-09 SDOH — SOCIAL STABILITY: SOCIAL INSECURITY: DO YOU FEEL ANYONE HAS EXPLOITED OR TAKEN ADVANTAGE OF YOU FINANCIALLY OR OF YOUR PERSONAL PROPERTY?: NO

## 2024-10-09 SDOH — HEALTH STABILITY: MENTAL HEALTH: SLEEP PATTERN: DIFFICULTY FALLING ASLEEP

## 2024-10-09 SDOH — HEALTH STABILITY: MENTAL HEALTH: HOW MANY STANDARD DRINKS CONTAINING ALCOHOL DO YOU HAVE ON A TYPICAL DAY?: PATIENT DOES NOT DRINK

## 2024-10-09 SDOH — HEALTH STABILITY: MENTAL HEALTH: IN THE PAST WEEK, HAVE YOU BEEN HAVING THOUGHTS ABOUT KILLING YOURSELF?: YES

## 2024-10-09 SDOH — HEALTH STABILITY: MENTAL HEALTH: HOW MANY DRINKS CONTAINING ALCOHOL DO YOU HAVE ON A TYPICAL DAY WHEN YOU ARE DRINKING?: PATIENT DOES NOT DRINK

## 2024-10-09 SDOH — SOCIAL STABILITY: SOCIAL INSECURITY: ARE YOU OR HAVE YOU BEEN THREATENED OR ABUSED PHYSICALLY, EMOTIONALLY, OR SEXUALLY BY ANYONE?: YES

## 2024-10-09 ASSESSMENT — ACTIVITIES OF DAILY LIVING (ADL)
HEARING - RIGHT EAR: FUNCTIONAL
BATHING: INDEPENDENT
ADEQUATE_TO_COMPLETE_ADL: YES
PATIENT'S MEMORY ADEQUATE TO SAFELY COMPLETE DAILY ACTIVITIES?: YES
JUDGMENT_ADEQUATE_SAFELY_COMPLETE_DAILY_ACTIVITIES: YES
ASSISTIVE_DEVICE: WALKER
HEARING - LEFT EAR: FUNCTIONAL
WALKS IN HOME: INDEPENDENT
DRESSING YOURSELF: INDEPENDENT
FEEDING YOURSELF: INDEPENDENT
GROOMING: INDEPENDENT
TOILETING: INDEPENDENT

## 2024-10-09 ASSESSMENT — LIFESTYLE VARIABLES
PAROXYSMAL SWEATS: NO SWEAT VISIBLE
TREMOR: NO TREMOR
AUDIT-C TOTAL SCORE: 0
PULSE: 78
SKIP TO QUESTIONS 9-10: 1
AGITATION: NORMAL ACTIVITY
ANXIETY: NO ANXIETY, AT EASE
VISUAL DISTURBANCES: NOT PRESENT
CIWA TOTAL SCORE: 0
HEADACHE, FULLNESS IN HEAD: NOT PRESENT
ORIENTATION AND CLOUDING OF SENSORIUM: ORIENTED AND CAN DO SERIAL ADDITIONS
NAUSEA AND VOMITING: NO NAUSEA AND NO VOMITING
AUDITORY DISTURBANCES: NOT PRESENT
TOTAL_SCORE: 0
BLOOD PRESSURE: 142/74

## 2024-10-09 ASSESSMENT — PAIN DESCRIPTION - DESCRIPTORS: DESCRIPTORS: ACHING

## 2024-10-09 ASSESSMENT — COLUMBIA-SUICIDE SEVERITY RATING SCALE - C-SSRS
6. HAVE YOU EVER DONE ANYTHING, STARTED TO DO ANYTHING, OR PREPARED TO DO ANYTHING TO END YOUR LIFE?: NO
5. HAVE YOU STARTED TO WORK OUT OR WORKED OUT THE DETAILS OF HOW TO KILL YOURSELF? DO YOU INTEND TO CARRY OUT THIS PLAN?: NO
5. HAVE YOU STARTED TO WORK OUT OR WORKED OUT THE DETAILS OF HOW TO KILL YOURSELF? DO YOU INTEND TO CARRY OUT THIS PLAN?: NO
1. SINCE LAST CONTACT, HAVE YOU WISHED YOU WERE DEAD OR WISHED YOU COULD GO TO SLEEP AND NOT WAKE UP?: NO
2. HAVE YOU ACTUALLY HAD ANY THOUGHTS OF KILLING YOURSELF?: NO
5. HAVE YOU STARTED TO WORK OUT OR WORKED OUT THE DETAILS OF HOW TO KILL YOURSELF? DO YOU INTEND TO CARRY OUT THIS PLAN?: NO
6. HAVE YOU EVER DONE ANYTHING, STARTED TO DO ANYTHING, OR PREPARED TO DO ANYTHING TO END YOUR LIFE?: NO
6. HAVE YOU EVER DONE ANYTHING, STARTED TO DO ANYTHING, OR PREPARED TO DO ANYTHING TO END YOUR LIFE?: NO
2. HAVE YOU ACTUALLY HAD ANY THOUGHTS OF KILLING YOURSELF?: YES
1. SINCE LAST CONTACT, HAVE YOU WISHED YOU WERE DEAD OR WISHED YOU COULD GO TO SLEEP AND NOT WAKE UP?: YES
1. SINCE LAST CONTACT, HAVE YOU WISHED YOU WERE DEAD OR WISHED YOU COULD GO TO SLEEP AND NOT WAKE UP?: NO
2. HAVE YOU ACTUALLY HAD ANY THOUGHTS OF KILLING YOURSELF?: NO

## 2024-10-09 ASSESSMENT — PAIN DESCRIPTION - LOCATION
LOCATION: HIP
LOCATION: NECK
LOCATION: BACK

## 2024-10-09 ASSESSMENT — PAIN SCALES - GENERAL
PAINLEVEL_OUTOF10: 10 - WORST POSSIBLE PAIN
PAINLEVEL_OUTOF10: 5 - MODERATE PAIN
PAINLEVEL_OUTOF10: 9
PAINLEVEL_OUTOF10: 5 - MODERATE PAIN
PAINLEVEL_OUTOF10: 8
PAINLEVEL_OUTOF10: 9
PAINLEVEL_OUTOF10: 7

## 2024-10-09 ASSESSMENT — PAIN - FUNCTIONAL ASSESSMENT: PAIN_FUNCTIONAL_ASSESSMENT: 0-10

## 2024-10-09 ASSESSMENT — PAIN DESCRIPTION - ORIENTATION
ORIENTATION: MID;LOWER
ORIENTATION: MID;LOWER

## 2024-10-09 NOTE — TELEPHONE ENCOUNTER
Left a detailed message with patient that her colonoscopy has been rescheduled to December 4 at the same location as a result of Dr. Walker being out of town on November 6.  Gave my direct phone number for return phone call if that date doesn't work for patient.

## 2024-10-09 NOTE — CONSULTS
"Consults    Reason For Consult  Adult medical examination and optimization for behavioral health unit      History Of Present Illness  Alejandra Houston is a 58 y.o. female presented with suicidal ideations due to her chronic pain.  Glucose 139, sodium 136, potassium 4.4, creatinine 0.80, WBCs 13.1, A1c 5.9, hemoglobin hematocrit stable, platelets 135.  Platelets are consistently low ranging from 76-72.  Urine toxicology positive for cannabis, opiates and cocaine.  Patient is medically cleared for EPAT evaluation.  Hospitalist team is consulted for adult medical examination optimization for behavioral unit.    Patient examined and seen. Alert and oriented x3, explained to patient assessment, right to , and the right to decline assessment. Patient verbalized understanding and agreed to assessment with RN as . Patient denies chest pain, shortness of breath, palpitations, abdominal pain, fever or chills.  Patient reports she has chronic pain from her neck to her back Sary bilateral knees and uses a walker.  Gait is slow and steady.  Poor eye contact is noted, patient minimizes her suicidal ideations as well as her chronic use of drug such as cocaine.  \"I only used it for my pain.\"  She has refused lidoderm patches, acetaminophen and ibuprofen to help with her chronic pain.  Requested from nursing staff IV morphine stating this is the only medication that is beneficial. We will give her tramadol for now, no history of seizures. Recommend follow up with Pain management.     Hospitalist to evaluate medical optimization for psychiatric treatment and evaluation.  Neurological evaluation completed.  No acute or chronic medical issues identified at this time that could be contributing to underlying psychiatric symptoms. Pt is medically optimized for inpatient behavioral health evaluation and treatment.     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 Left adnexal mass, pinched cervical nerve root, liver cirrhosis, chronic sinusititis, CTS radiculopathy    Surgical History  She has a past surgical history that includes Total knee arthroplasty (2018); Other surgical history (06/10/2019);  section, classic (02/15/2018); Hip Arthroplasty; Uvulopalatopharyngoplasty;  section, classic; and Knee Arthroplasty.     Social History  She reports that she has been smoking cigarettes. She started smoking about 44 years ago. She has a 22.4 pack-year smoking history. She has never used smokeless tobacco. She reports that she does not currently use alcohol. She reports current drug use. Drug: \"Crack\" cocaine.    Family History  Family History   Problem Relation Name Age of Onset    Hypertension Mother      Diabetes Mother      COPD Mother      Heart disease Mother      Lymphoma Mother      Multiple myeloma Mother      Cancer Other fam hx     Diabetes Other fam hx     Hypertension Other fam hx     Heart disease Other fam hx         Allergies  Patient has no known allergies.       Physical Exam  Constitutional: Well developed, awake/alert/oriented x3, no distress, cooperative  Eyes: PERRL, EOMI, clear sclera  ENMT: mucous membranes moist, no apparent injury, no lesions seen  Head/Neck: Neck supple, no apparent injury, thyroid without mass or tenderness  Respiratory/Thorax: Patent airways,  normal breath sounds   Cardiovascular: Regular, rate and rhythm, no murmurs,  normal S 1and S 2  Gastrointestinal: Nondistended, soft, " non-tender,    Genitourinary: denies CVA tenderness  or suprapubic tenderness,  voiding freely   Musculoskeletal: ROM intact, no joint swelling,   Extremities: normal extremities,  no contusions or wounds seen   Skin: warm, dry, intact  Neurological: alert/oriented x 3, speech clear, cranial nerves II through XII  grossly intact  Psychiatric:  tearful, guarded, minimizing symptoms of drug use and suicidal ideations  Cranial Nerve Exam: II, III, IV, VI: Visual acuity within normal limits bilaterally, visual fields normal in all quadrants, DORENE, EOMI  Cranial Nerve exam: V: Facial sensations intact bilaterally to dull, sharp, and light touch stimuli  Cranial Nerve exam VII: Facial muscle strength normal/equal bilaterally  Cranial Nerve Exam VIII: Hearing is normal bilaterally  Cranial Nerve IX,X: Palate and Uvula symmetrical, voice is normal  Cranial Nerve XI: Shoulder shrug strong, equal bilaterally  Cranial Nerve XII: Tongue moves symmetrically  Motor : Good muscle tone, Strength equal upper and lower extremities unless otherwise stated above  Cerebellar: normal gait unless otherwise stated above       Last Recorded Vitals  /74   Pulse 78   Temp 36.1 °C (97 °F) (Temporal)   Resp 18   Wt 104 kg (228 lb 15.9 oz)   SpO2 96%     Relevant Results  Scheduled medications  buPROPion XL, 300 mg, oral, q AM  DULoxetine, 30 mg, oral, Nightly  DULoxetine, 60 mg, oral, Daily  famotidine, 40 mg, oral, Daily  ferrous sulfate (325 mg ferrous sulfate), 1 tablet, oral, BID  folic acid, 1 mg, oral, Daily  gabapentin, 400 mg, oral, BID  gabapentin, 600 mg, oral, BID  insulin lispro, 0-5 Units, subcutaneous, TID  [START ON 10/10/2024] pantoprazole, 40 mg, oral, Daily before breakfast  thiamine, 100 mg, oral, Daily      Continuous medications     PRN medications  PRN medications: acetaminophen, albuterol, alum-mag hydroxide-simeth, hydrOXYzine pamoate, ibuprofen, magnesium hydroxide, traZODone    Results for orders placed or  performed during the hospital encounter of 10/09/24 (from the past 24 hour(s))   POCT GLUCOSE   Result Value Ref Range    POCT Glucose 102 (H) 74 - 99 mg/dL   POCT GLUCOSE   Result Value Ref Range    POCT Glucose 217 (H) 74 - 99 mg/dL          Assessment/Plan     # Suicidal Ideations   Worsening Depression   Admit to Behavioral Health Unit  Contract for Safety   Safety Protocols  Medications to be determined by psychiatry   Vital Signs BID  Group Therapy as appropriate  Social Work for outpatient therapy   Encourage Healthy Lifestyle and Exercise as appropriate     #Substance Abuse - Cocaine Use / Cannabis Use Nicotine Use   Cessation discussed  Risks discussed  Encourage abstinence  Inpatient/Outpatient treatment therapies    # Diabetes Mellitus Type 2  Continue home medications  Diet Cardiac/Diabetic  Continue Sliding Scale SSI AC/HS   A1C 5.9  Encourage healthy lifestyle changes  Continue home medications     # Chronic Pain   Bilateral Knee Replacement 2/2024  Sees Pain management by Dr. Kaminski   She reports she has a pinched nerve to her neck  Refusing Tylenol, motrin or lidocaine patches  Requesting Morphine      # Obesity  Encourage healthy lifestyle changes  BMI  34    Thank you for consult  MEDICINE TO SIGN OFF  cALL FOR ACUTE NEEDS    Time spent  46 minutes obtaining labs, imaging, recommendations, interview, assessment, examination, medication review/ordering, and EMR review.    Plan of care was discussed extensively with patient, RN and primary team  Patient verbalized understanding through teach back method. All questions and concerns addressed upon examination.     Of note, this documentation is completed using the Dragon Dictation system (voice recognition software). There may be spelling and/or grammatical errors that were not corrected prior to final submission.        Jodie Shaver, APRN-CNP

## 2024-10-09 NOTE — PROGRESS NOTES
Occupational Therapy    OT Behavioral Health Evaluation    Patient Name: Alejandra Houston  MRN: [id number]  Department: [address]  Room: 560560-A  Today's Date: 10/9/2024  Time Calculation  Start Time: 0918  Stop Time: 0939  Time Calculation (min): 21 min    OT Intervention Plan:  Skilled OT interventions to include: therapeutic use of self, therapeutic use of occupations and activities, therapeutic groups (including task skill groups, life skill groups, coping skill groups, anger/grief management groups, and ADL/IADL groups), and 1:1 sessions focused on individualized goals for mental health management to improve ADL/IADL performance.      Subjective   Current Problem:  No diagnosis found.  General:  General  Reason for Referral: ADL/IADL impairment  Referred By: Dr. Blakely  Past Medical History Relevant to Rehab: anxiety, depression, fibromyalgia, DM II, GERD, COPD, L TKA 06/2024, hand injury in May/carpal tunnel  Prior to Session Communication: Bedside nurse  Patient Position Received: Up in chair  General Comment: 59 y/o female w/ increased depressive symptoms & SI related to chronic pain. Pt came to ED initially for medical issues related to chronic pain. At multiple times during assessment in ED, pt voiced desire not to live anymore due to her pain, stating she often wishes she won't wake up. Upon assessment today, pt denies intent to harm herself, naming her 2 grandchildren & her dog as reasons why she would not do so. Pt does endorse recent increase in depressive symptoms due to increase in pain. Per pt report, pt has been dealing w/ chronic pain for a long time. Earlier this year, began experiencing terrible neck pain radiating down to her lower back. Pt was diagnosed w/ pinched nerve. Pt reports that pinched nerve has significantly impaired her ability to engage in ADLs & IADLs, leisure activities, & ambulation. Pt currently w/ difficulty walking, using FWW & small steps to ambulate. Additionally, pt has been  "engaging in illicit drug use to help manage pain, which she says she only started doing a few years ago. Recently pt has been using opiates, Percocet, & crack cocaine. This has led to interpersonal conflict as pt was living w/ daughter but daughter caught pt smoking crack & kicked her out. Pt currently homeless. Pt voices a lot of frustration about being admitted, explaining that she had an appointment w/ a pain management specialist today that was \"the most important thing to her\". Pt had been looking forward to seeing specialist to plan next steps to manage her pain, but appointment had to be rescheduled w/ pt's admission to [facility] & pt feels very defeated by this. Pt continues to say, \"I don't want to live w/ this pain\", but emphasizes she would not end her life. Denies SI, HI, & A/V hallucinations.    Precautions:   Pt does not have any formal precautions. However, pt is noted to be somewhat unsteady during functional transfers w/ FWW. Pt completes STS transfer from chair using FWW w/ SBA + MOD. INC. time. No LOB noted, pt takes time & has good safety awareness. Recommend SBA whenever possible at this time.     Meaningful Occupations:  Grandmother, mother, wife. Enjoys playing w/ her grandchildren, playing games on tablet, & watching TV.      Sources of Support:    daughter, son,      Prior Level of Function/Living Situation:    Activities of Daily Living/Self Care  Living Situation: homeless  Pt reports being recently homeless. Was living w/ her daughter & grandchildren;  resides there as well & is still living there at present. Pt's daughter caught her smoking crack & kicked her out.   Hygiene/Grooming: minimal assist  Bathing: minimal assist  Dressing: minimal assist  Toileting: minimal assist  Continence: minimal assist  Feeding: minimal assist  Functional Mobility: modified independence/SBA  Medication Use/Follows Medical Advice: compliant  ADL Comment: Pt reports requiring her 's help " "w/ almost all ADLs/IADLs since pinched nerve pain began. Ambulates w/ FWW w/ MOD. Transfers are more difficult; MOD I-SBA.      Instrumental Activities of Daily Living  Parenting/: N/a - pt's children grown   Driving/Community Mobility: impaired  Financial Management: impaired  Physical Self-care : impaired  Emotional Self-care: impaired  Home Care/Chores: impaired  Cooking: impaired  Safety Judgement: WFL  Rest/Sleep: impaired  Education:   HS diploma or GED  Work/Volunteering:   unemployed  IADL/Daily Routine Comment:   Pt reports requiring her 's help w/ almost all ADLs/IADLs since pinched nerve pain began. Pain has also impacted pt's ability to play w/ her grandchildren which is a valuable activity for her. Pt receives disability income.     Social Participation/Community Involvement:  Socializing: Pt tearful throughout assessment w/ depressed affect & poor eye contact. Vocalizes frustration w/ admission but remains in control.   Balanced Relationships:  Pt reports  has been helpful to her, but they are not currently living together as he is still at daughter's house where pt was kicked out. Pt's relationship w/ daughter negatively impacted by daughter discovering pt's drug use & kicking her out. Pt is understanding of daughter's choice but doesn't know what to do. Hasn't spoken to daughter since this happened as she wants to \"give her space.\"    Emotional Regulation Skills:  Emotional Expression:  Affect: constricted  Speech: regular rhythm, rate, volume, & tone  Mood/Impulse Modulation: poor anxiety management and poor depression management  Coping Skills:  Helpful: social support (including support groups) and other: playing games on tablet  Harmful: substance use, substance abuse, and suicidal or homicidal ideation  Pt reports illicit drug use began a few years ago as part of pt's effort to manage pain. She has recently been using crack cocaine in addition to opiates, percocet.   Pt " has 1 previous psychiatric hospitalization on file from 1980, admitted for self-harm. Pt reports she has not engaged in self-harm since.     Cognitive & Task Performance Skills:  Orientation: person, place, time, and situation  Attention Span: selective  Problem-solving: impaired  Decision-making: WFL  Thought Content: WFL  Thought Processes: coherent  Organizational Skills: thought processes are linear and organized  Short Term Memory: WFL  Remote Memory: WFL  Safety Judgement: WFL  Perseveration:  present  Insight/Judgement:  impaired    Communication and Interpersonal Skills:  Boundaries: Continue to assess  Appropriate Disclosure: WFL  Assertion: Tends toward passive, difficulty asking for help   Communication/Interpersonal Comment: Pt could benefit from assertive communication training.      Goals:   Encounter Problems       Encounter Problems (Active)       OT Goals       Pt will explore, identify, and appropriately utilize effective coping strategies to cope with daily stressors and manage emotions with independence prior to discharge.        Start:  10/09/24    Expected End:  11/06/24            Pt will demonstrate ability to appropriately modulate emotions and impulses with independence prior to discharge.        Start:  10/09/24    Expected End:  11/06/24            Pt will explore, identify, and appropriately utilize effective communication strategies to express feelings, wants, and needs with independence prior to discharge.        Start:  10/09/24    Expected End:  11/06/24            Pt will complete Relapse Prevention Plan in collaboration w/ OT to identify potential stressors in home environment & plan healthy coping skills as alternates to substance use prior to discharge.        Start:  10/09/24    Expected End:  11/06/24                 Education:  Pt provided overview of stay on inpatient psychiatric unit, including general expectations, occupational therapy's role, and interdisciplinary approach  to care. Pt verbalized understanding.

## 2024-10-09 NOTE — PROGRESS NOTES
"Social Work Assessment and Discharge Planning Note     10/09/24 0831   Arrival Details   Admission Type   (inpatient psych)   History of Present Illness   Admission Reason Death wish   HPI Pt here from [facility] ED due to not wanting to live.  Pt has hx of depression and anxiety and reports chronic (back, knee)  pain \"keeps building\" over the past 1.5 year.  \"Pain is taking over my life. \"  Pt is tearful and also complains of recent pinched nerve in her neck leading to crack use for relief of pain, which in turn, resulted in her being evicted from daughter's home.  She reports pain while sitting today and pain interferring with her ADLs and IADLs. States she has a death wish due to the pain and not wanting to live like this.  Indicates death wish and drug use will disapper if pain is addressed. \" I didn't use to be this way, was always strong.\"   Pt denies current SI, HI, AH and VH.   Psychiatric Symptoms   Anxiety Symptoms Generalized   Depression Symptoms Isolative;Sleep disturbance;Crying   Cassandra Symptoms No problems reported or observed.   Psychosis Symptoms   Hallucination Type No problems reported or observed.   Delusion Type No problems reported or observed.   Past Psychiatric History/Meds/Treatments   Past Psychiatric History IP - pt denied hx of WLW - 1980, reports cut wrist 1981 (when fed up with pain per chart) - [facility] [place] [facility],hx [facility]. OP-  Pt reports hx of Alejandra Mendiola \"15 -20 years.\"  CD- hx IOP and CATs, mom- \"alcoholic\"   Past Psychiatric Meds/Treatments see MAR   Past Violence/Victimization History Denies hx of violence.  Reports stepfather was \"strict, mean, verbally abusive.\"   Current Mental Health Contacts    Name/Phone Number Alejandra Mendiola Agency - counselor    Last Appointment Date new counselor - end of August 2024   Provider Name/Phone Number Alejandra Mendiola   Provider Last Appointment Date ([facility])   Support System   Support System " "  (Initially stated just herself, but did sign CARTER for son Angela (38) and notes comforting interaction with dog at daughter Adrienne's home. Formal support through Alejandra Mendiola and primary care doctor.)   Living Arrangement   Living Arrangement   (Homeless . Was kicked out 2 days ago due to crack pipe - then stayed up all night and went to hospital.  Was living with  at daughter Adrienne's with daughter's fiance, grand kids 13 & 8, 1 dog, up to 10 cats (2 new litters of kittens))   Home Safety   Feels Safe Living in Home Yes   Income Information   Income Source   (SSD $943, no food stamps)   Income/Expense Information Income meets expenses   [place] Service/Education History   Current or Previous  Service None   Education Level   (finished at least to 8th grad.  \"I was in special school on [address]. I was suicidal and quit. \")   History of Learning Problems No   Social/Cultural History   Social History Pt is heterosexual female,  34 yrs to Hai, together 40 years.  Has daughter Jodi(34) in [facility], son Carlos (38) in [facility], and daughter Adrienne with whom she has always lived until kicked out a few days ago.  Pt was an only child raised by mother and step-dad whom she says were \"alcoholics\".  (Bio dad had told mom to get an  and was not known to pt.) Pt views step dad as dad, but indicates he was strict and verbally abusive.  Pt states her childhood was \"weird\" and she was \"alone alot.\"  Stayed with grandparents alot.  Grandparents, stepdad and mom are .   Cultural Requests During Hospitalization none   Spiritual Requests During Hospitalization none  (states is Zoroastrian)   Important Activities   (time with grandkids, tv, movies, \"my tablet - play for hours\")   Legal   Legal Concerns none   Drug Screening   Have you used any substances (canabis, cocaine, heroin, hallucinogens, inhalants, etc.) in the past 12 months?   (crack - 2 days ago)   Have you used " "any prescription drugs other than prescribed in the past 12 months?   (tox also + for opiates and oxy - Pt reports pecocets were from [facility].)   Stage of Change   Stage of Change Precontemplation   History of Treatment   (IOP - on [place],  CATS -inpt)   Type of Treatment Offered   (any/all)   Treatment Offered Declined   Duration of Substance Use Reports only uses crack for pain - \"it removes the pressure.\"   Age of First Substance Use etoh - 14, THC - never, Crack -55 (per EPAT)   Behavioral Health   Behaviors/Mood Tearful;Cooperative;Crying   Affect   (constricted affect - seemingly related to pain.)   Thought Process   Content Blaming self  (blames pain. re eviction: \"My daughter threw me out due to finding crack pipe.  I had it coming. I don't blame my daughter at all. \")       Pt with hx of depression and anxiety, here due to death wish concurrent with chronic pain.  Pt states she is \"depressed. Always have been, for years.\"  However, she states \"I don't want to hurt myself - (have)  no intention to hurt myself.\" Pt had appt for today for pain consult and was given the number and phone to reschedule.       Pt's stressors include symptoms, chronic pain, adjustment to health status/decline in function, eviction from daughter's/ housing insecurity, relationship issues (Pt's perception that  favors daughter over her), impact of drug use on housing and anniversary of friend's death 10/18/24.  As noted above, pt has had multiple changes/losses and unresolved grief.      Likely pt will return home, unless PT/OT placement  needs are identified. Back up plan:   If pt's daughter will not allow her to return post-hospitalization, plan is for shelter vs as pt arranges. (Pt refused to sign CARTER for daughter Adrienne or  Hai with whom she lived) Pt currently refuses to consider sober living/chem dep tx.   Mental health follow up to be with her providers at [facility].   "

## 2024-10-09 NOTE — H&P
History Of Present Illness  Alejandra Houston is a 58 y.o. year old female patient with past psych history of major depressive disorder, anxiety and past medical history of fibromyalgia, GERD, type 2 diabetes, and COPD.  Patient initially presented to the ED seeking treatment for her chronic pain.  Patient expressed frustration and stated she did not want to live.  Patient states he has been dealing with chronic pain for 10 years.  Reports most of the pain is in her neck due to impingement, also has pain in knees, feet, and shoulders.  Patient has worked with pain management in the past, has had epidural treatments, but states they have not been that effective.  Patient currently prescribed oxycodone.  Reports history of crack cocaine use, states she uses monthly for the past 2 years to help with her pain.  Patient lives with her , daughter, and 2 grandchildren.  She identifies her grandchildren as her biggest protective factor that would prevent her from ever taking her own life.  Patient does have history of suicide attempt in 1980 in which she tried to slice her wrist and was taken to the hospital and [facility].  Patient currently prescribed Cymbalta and Wellbutrin.     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  Attitude: Calm, cooperative.  Behavior: Appropriate eye contact.  Motor activity: No agitation or retardation. no EPS.  Unsteady gait.  Speech: Regular rate, rhythm, volume and tone.  Mood: Depressed  Affect: Flat  Thought process: Organized, linear, goal-directed.  Associations are logical.  Thought content: Does not endorse suicidal or homicidal ideation, no delusions elicited.  Thought perception: Did not endorse auditory or visual hallucinations.  Cognition: Alert, oriented x3.  no deficit in memory or attention.  Insight: Fair.  Judgment: Fair.    Psychiatric Risk Assessment  Violence Risk Assessment: major mental illness  Acute Risk of Harm to Others is Considered: low   Suicide Risk Assessment: , chronic medical illness, chronic pain, prior suicide attempt, and suicidal ideations  Protective Factors against Suicide: adherence to  treatment, child-related concerns/living with children at home < 18 yrs, hopefulness/future orientation, positive family relationships, and social support/connectedness  Acute Risk of Harm to Self is Considered: moderate    Last Recorded Vitals  Vitals:    10/09/24 1042   BP:    Pulse:    Resp: 18   Temp: 36.1 °C (97 °F)   SpO2: 96%        OARRS Reviewed:yes    Relevant Results  Scheduled medications  famotidine, 40 mg, oral, Daily  ferrous sulfate (325 mg ferrous sulfate), 1 tablet, oral, BID  folic acid, 1 mg, oral, Daily  insulin lispro, 0-5 Units, subcutaneous, TID  [START ON 10/10/2024] pantoprazole, 40 mg, oral, Daily before breakfast  thiamine, 100 mg, oral, Daily      Continuous medications     PRN medications  PRN medications: acetaminophen, albuterol, alum-mag hydroxide-simeth, hydrOXYzine pamoate, ibuprofen, magnesium hydroxide, traZODone     Results for orders placed or performed during the hospital encounter of 10/09/24 (from the past 96 hour(s))   POCT GLUCOSE   Result Value Ref Range    POCT  Glucose 102 (H) 74 - 99 mg/dL         Assessment/Plan   Principal Problem:  MDD, severe without psychosis    Patient is a 58-year-old female with past psych history of depression and anxiety and past medical history of chronic pain.  Presented to the ED stating she cannot go on anymore with her pain.  Patient does identify her 2 grandchildren as protective factors that would prevent her from ever taking her own life.  Patient currently prescribed Cymbalta and Wellbutrin.  Will continue Wellbutrin, will increase Cymbalta to 90 mg total daily dose.  Will also adjust patient's gabapentin dosing to 4 times a day.    Impression:     Labs and Chart: reviewed   Case discussed with treatment team members  Encouraged patient to attend group and other mileu activity  Collateral from family - pending  Discharge planing  Medication:       -Cymbalta 60 mg oral daily in the morning, 30 mg oral daily at night       -Wellbutrin  mg oral daily    Medication Consent  Medication Consent: risks, benefits, side effects reviewed for all ordered meds and patient expressed understanding and consent obtained    JUNAID Borrego-CNP

## 2024-10-09 NOTE — NURSING NOTE
.Pt arrived to [facility] accompanied by security. Wand check completed by x2 staff members. Pt's belongings collected and annotated. Vital signs obtained. Pt oriented to area and made aware of unit expectations. Initiated 15 minute safety checks initiated.  Dontae the  reports that a pair of scissors and a knife was confiscated out of belongings and will be locked up with security.

## 2024-10-09 NOTE — SIGNIFICANT EVENT
Application for Emergency Admission      Ready for Transfer?  Is the patient medically cleared for transfer to inpatient psychiatry: Yes  Has the patient been accepted to an inpatient psychiatric hospital: Yes    Application for Emergency Admission  IN ACCORDANCE WITH [place] 5122[place]10 [place]  The Chief Clinical Officer of: [facility] June 10/9/2024 .4:36 AM    Reason for Hospitalization  The undersigned has reason to believe that: Alejandra Houston Is a mentally ill person subject to hospitalization by court order under division B Section 5122.01 of the Revised Code, i.e., this person:    1.Yes  Represents a substantial risk of physical harm to self as manifested by evidence of threats of, or attempts at, suicide or serious self-inflicted bodily harm    2.No Represents a substantial risk of physical harm to others as manifested by evidence of recent homicidal or other violent behavior, evidence of recent threats that place another in reasonable fear of violent behavior and serious physical harm, or other evidence of present dangerousness    3.Yes Represents a substantial and immediate risk of serious physical impairment or injury to self as manifested by  evidence that the person is unable to provide for and is not providing for the person's basic physical needs because of the person's mental illness and that appropriate provision for those needs cannot be made  immediately available in the community    4.Yes Would benefit from treatment in a hospital for his mental illness and is in need of such treatment as manifested by evidence of behavior that creates a grave and imminent risk to substantial rights of others or  himself.    5.No Would benefit from treatment as manifested by evidence of behavior that indicates all of the following:       (a) The person is unlikely to survive safely in the community without supervision, based on a clinical determination.       (b) The person has a history of lack of compliance with  treatment for mental illness and one of the following applies:      (i) At least twice within the thirty-six months prior to the filing of an affidavit seeking court-ordered treatment of the person under section 5122.111 of the Revised Code, the lack of compliance has been a significant factor in necessitating hospitalization in a hospital or receipt of services in a forensic or other mental health unit of a correctional facility, provided that the thirty-six-month period shall be extended by the length of any hospitalization or incarceration of the person that occurred within the thirty-six-month period.      (ii) Within the forty-eight months prior to the filing of an affidavit seeking court-ordered treatment of the person under section 5122.111 of the Revised Code, the lack of compliance resulted in one or more acts of serious violent behavior toward self or others or threats of, or attempts at, serious physical harm to self or others, provided that the forty-eight-month period shall be extended by the length of any hospitalization or incarceration of the person that occurred within the forty-eight-month period.      (c) The person, as a result of mental illness, is unlikely to voluntarily participate in necessary treatment.       (d) In view of the person's treatment history and current behavior, the person is in need of treatment in order to prevent a relapse or deterioration that would be likely to result in substantial risk of serious harm to the person or others.    (e) Represents a substantial risk of physical harm to self or others if allowed to remain at liberty pending examination.    Therefore, it is requested that said person be admitted to the above named facility.    STATEMENT OF BELIEF    Must be filled out by one of the following: a psychiatrist, licensed physician, licensed clinical psychologist, health or ,  or .  (Statement shall include the circumstances under  which the individual was taken into custody and the reason for the person's belief that hospitalization is necessary. The statement shall also include a reference to efforts made to secure the individual's property at his residence if he was taken into custody there. Every reasonable and appropriate effort should be made to take this person into custody in the least conspicuous manner possible.)    Patient meets inpatient psych criteria     Santos Martines MD 10/9/2024     _____________________________________________________________   Place of Employment: [facility] ED    STATEMENT OF OBSERVATION BY PSYCHIATRIST, LICENSED PHYSICIAN, OR LICENSED CLINICAL PSYCHOLOGIST, IF APPLICABLE    Place of Observation (e.g., [facility] mental [facility] center, general hospital, office, emergency facility)  (If applicable, please complete)    Santos Martines MD 10/9/2024    _____________________________________________________________

## 2024-10-09 NOTE — CARE PLAN
Problem: Diabetes  Goal: Achieve decreasing blood glucose levels by end of shift  Reactivated  Goal: Increase stability of blood glucose readings by end of shift  Reactivated  Goal: Decrease in ketones present in urine by end of shift  Reactivated  Goal: Maintain electrolyte levels within acceptable range throughout shift  Reactivated  Goal: Maintain glucose levels >70mg/dl to <250mg/dl throughout shift  Reactivated  Goal: No changes in neurological exam by end of shift  Reactivated  Goal: Learn about and adhere to nutrition recommendations by end of shift  Reactivated  Goal: Vital signs within normal range for age by end of shift  Reactivated  Goal: Increase self care and/or family involovement by end of shift  Reactivated  Goal: Receive DSME education by end of shift  Reactivated     Problem: Fall/Injury  Goal: Not fall by end of shift  Reactivated  Goal: Be free from injury by end of the shift  Reactivated  Goal: Verbalize understanding of personal risk factors for fall in the hospital  Reactivated  Goal: Verbalize understanding of risk factor reduction measures to prevent injury from fall in the home  Reactivated  Goal: Use assistive devices by end of the shift  Reactivated  Goal: Pace activities to prevent fatigue by end of the shift  Reactivated     Problem: Pain  Goal: Takes deep breaths with improved pain control throughout the shift  Reactivated  Goal: Turns in bed with improved pain control throughout the shift  Reactivated  Goal: Walks with improved pain control throughout the shift  Reactivated  Goal: Performs ADL's with improved pain control throughout shift  Reactivated  Goal: Participates in PT with improved pain control throughout the shift  Reactivated  Goal: Free from opioid side effects throughout the shift  Reactivated  Goal: Free from acute confusion related to pain meds throughout the shift  Reactivated   The patient's goals for the shift include manage pain    The clinical goals for the  shift include maintain safety, complete admission    Over the shift, the patient did not make progress toward the following goals. Barriers to progression include depression, pain. Recommendations to address these barriers include medications as prescribed. Encourage activity and monitor blood sugars.  Hospitalist notified of admission, blood sugar obtained per nursing judgement, No s/s of hyper/hypoglycemia. Tearful during assessment. Said she should have never cancelled hand surgery and additionally will be unable to attend 0930 pain management appointment. Gave pt phone number and phone in order to reschedule pain management appointment. Denies SI/HI/AVH, said she only said that because she is so tired of the pain.

## 2024-10-09 NOTE — PROGRESS NOTES
EPAT - Social Work Psychiatric Assessment    Arrival Details  Mode of Arrival: Ambulance  Admission Source: Home  Admission Type: Voluntary  EPAT Assessment Start Date: 10/08/24  EPAT Assessment Start Time: 2020  Name of : Lindsay Tipton    History of Present Illness  Admission Reason:     HPI: Patient is 59 yo , female presenting to the ER with a history of depression and anxiety disorder. This  reviewed the triage and the provider note. This pt was brought in by EMS for medical reason and upon them triaging the pt she stated that she feels a lot of the time that she does not want to live. The provider then put in an EPAT referral to have this pt assessed. Pt states she is always in a lot of physical pain and states she has been in pain on a consent basis for over 10 years. Pt reports when asked where she is experiencing pain pt states all over. This pt reports that in the pass she was diagnosed with depression and anxiety.   She admits that she is suicidal due to her being in pain on a consent basis. Pt also reports taking crack cocaine and opiates and Percocet's.  This pt states she does not feel that she needs any substance treatment just with behavior health to deal with her depression and anxiety issues. The BAL and Utox labs are all complete.    SW Readmission Information   Readmission within 30 Days: No    Psychiatric Symptoms  Anxiety Symptoms: Generalized, Feelings of doom, Unexplained fears  Depression Symptoms: Crying, Change in energy level, Feelings of helplessness, Feelings of hopelessess, Impaired concentration, Increased irritability, Loss of interest, Sleep disturbance    Psychosis Symptoms  Hallucination Type: No problems reported or observed.  Delusion Type: No problems reported or observed.    Additional Symptoms - Adult  Generalized Anxiety Disorder: Difficult to control worry, Difficulity concentrating, Easily fatigued, Excessive anxiety/worry, Irritability, Restlessness,  Sleep disturbance  Obsessive Compulsive Disorder: No problems reported or observed.  Panic Attack: No problems reported or observed.  Post Traumatic Stress Disorder: Traumatic event, Irritability  Delirium: No problems reported or observed.    Past Psychiatric History/Meds/Treatments  Past Psychiatric History: Past psychiatric diagnosis: depression disorder, Anxiety   Outpatient mental health care: This pt reports she has a [facility], however she just started with her and met her once and did not know her name.   History of inpatient psych admissions: pt stated not since 1980 when she tried to harm herself by cutting her wrist.   Medication: Bupropron 150mg daily  History of violence: Pt reports no history of violence.     Current Mental Health Contacts   Name/Phone Number: /[facility] at [facility]   Last Appointment Date: Last appointment end of August 2024  Provider Name/Phone Number: No provider at this time.  Provider Last Appointment Date: Unknown pt states she cant remember    Support System: Immediate family (daughters and son.)    Living Arrangement: Homeless    Home Safety  Feels Safe Living in Home: Other (Comment)    Income Information  Employment Status for: Patient  Employment Status: Disabled  Income Source: Disability  Current/Previous Occupation:  (None)  Income/Expense Information: Income meets expenses  Financial Concerns: Unable to Obtain Needed Equipment  Who Manages Finances if Patient Unable: Pt reported her son would take over all her expensice    [place] Service/Education History  Current or Previous  Service: None  Education Level: Less than high school  History of Learning Problems: No  History of School Behavior Problems: Yes (Pt was depressed and suicidal in highschool)    Social/Cultural History  Cultural Requests During Hospitalization: does not have any culture reqeu  Spiritual Requests During Hospitalization: Does not  have any spiritual  Important Activities: Hobbies (Pt enjoys spending time and playing with her gradchildren when she is able.)    Legal  Legal Considerations: Patient/ Family Capacity to Make Sound Judgments  Assistance with Managing/Advocating Healthcare Needs:  (Pt is her own Legal Guardian)  Legal Concerns: No legal concerns reported    Drug Screening  Have you used any substances (canabis, cocaine, heroin, hallucinogens, inhalants, etc.) in the past 12 months?: Yes (Pt report she useses, crack Cocaine, procets,)  Have you used any prescription drugs other than prescribed in the past 12 months?: No  Is a toxicology screen needed?: Yes    Stage of Change  Stage of Change: Precontemplation  History of Treatment: IOP (Pt reported she went to [facility] and recieved  chmical treatment)  Treatment Offered: Declined (this pt was offered [facility] services however the pt declined.)  Duration of Substance Use: pt has been using crack cocain for 2 years  Frequency of Substance Use: Pt stated she was age 55 years old  Age of First Substance Use: 55 years    Behavioral Health  Behaviors/Mood:  (crying and tearful the whole evaluation)         General Appearance  Motor Activity: Unremarkable  Speech Pattern: Excessively soft  General Attitude: Withdrawn    Thought Process  Coherency: [place] thinking (wnl)  Content: Unremarkable (not altered)  Delusions: Other (Comment) (pt did not report any delusions)  Perception: Not altered  Hallucination: None  Judgment/Insight: Poor  Cognition: Appropriate attention/concentration    Sleep Pattern  Sleep Pattern: Restlessness, Disturbed/interrupted sleep    Risk Factors  Self Harm/Suicidal Ideation Plan: Pt states she does not have a plan just tired of hurting and feels that she does not want to live anymore.  Previous Self Harm/Suicidal Plans: Pt reports she cut her wrist in the past when she was feeling the same way she has been feeling the last week or so.  Risk Factors: Substance abuse,  Major mental illness    Violence Risk Assessment  Assessment of Violence: Greater than 12 months  Thoughts of Harm to Others: No    Ability to Assess Risk Screen  Risk Screen - Ability to Assess: Unable to assess  Ask Suicide-Screening Questions  1. In the past few weeks, have you wished you were dead?: Yes  2. In the past few weeks, have you felt that you or your family would be better off if you were dead?: Yes  3. In the past week, have you been having thoughts about killing yourself?: Yes  4. Have you ever tried to kill yourself?: Yes (Pt stated in 1980 she cut her wrist because she was in a lot of emotional pain,)  How did you try to kill yourself?: year 1980  When did you try to kill yourself?: Year 1980  5. Are you having thoughts of killing yourself right now?: Yes  Describe your thoughts of killing yourself right now:: I dont want to kill myself but I would not be upset if I did not wake up the next day.  Calculated Risk Score: Imminent Risk  [place] Suicide Severity Rating Scale (Screener/Recent Self-Report)  1. Wish to be Dead (Past 1 Month): Yes  2. Non-Specific Active Suicidal Thoughts (Past 1 Month): No  6. Suicidal Behavior (Lifetime): Yes  6. Suicidal Behavior (3 Months): No  6. Suicidal Behavior (Description): Pt did not report any sucidal behavior at this time.  Calculated C-SSRS Risk Score (Lifetime/Recent): Moderate Risk  Step 1: Risk Factors  Current & Past Psychiatric Dx: Mood disorder (Depression and anxiety)  Presenting Symptoms: Impulsivity  Family History: Other (Comment) (Pt reports not family history of SI)  Precipitants/Stressors: Chronic physical pain or other acute medical problem (e.g. CNS disorders)  Change in Treatment: Hopeless or dissatisfied with provider or treatment  Access to Lethal Methods : No  Step 2: Protective Factors   Protective Factors Internal: Identifies reasons for living (Pt stated she only have one desire to live and ira is to see her grandchildren grow  up.)  Step 3: Suicidal Ideation Intensity  Most Severe Suicidal Ideation Identified: Pt stated 1980 when she was depressed and cut her wrist  How Many Times Have You Had These Thoughts: Daily or almost daily  When You Have the Thoughts How Long do They Last : 4-8 hours/most of the day  Could/Can You Stop Thinking About Killing Yourself or Wanting to Die if You Want to: Unable to control thoughts  Are There Things - Anyone or Anything - That Stopped You From Wanting to Die or Acting on: Deterrents probably stopped you  What Sort of Reasons Did You Have For Thinking About Wanting to Die or Killing Yourself: Mostly to end or stop the pain (you couldn't go on living with the pain or how you were feeling)  Total Score: 19  Step 5: Documentation  Risk Level: Moderate suicide risk    Psychiatric Impression and Plan of Care  Assessment and Plan:     Assessment:   The patient is a 58 yr old  female presenting with a history chronic pain, depression and anxiety, Pt reports that she has been struggling hurting herself due to the consent physical pain she is consent reporting that she is feeling.  The pt stated she thinks about harming herself every day. Pt reports that she is SI but she is not HI, AH or VH. This pt does have a history of trying to harm herself in 1980 due to her being fed-up with being in pain pt states she tried to slice her wrist and was taken to the hospital where they admitted her to [facility].  The pt denied a desire or a thought about harming anyone else other than herself. This pt was having a hard time identifying a reason she wants to live when asked by the  the pt reported only to see my grandchildren grow up. The pt stated she does not have a plan she just is very clear she can't keep going through this pain on a day to day basis. The pt reports her daily symptoms are crying spells, feeling helpless, feeling domed, feeling scared, isolating herself because and all she does  was talk about her pain and how she wish she would not wake up the next day. Pt reports she is not eating well only one to two meals a day. Pt stated she only sleeps well when she takes her of sleeping aid she use to go to sleep. Pt stated if she does not take it she will not fall asleep all night long. This pt stated her pain in her body is effecting every aspect of her life including her mental health stability.        Plan: Due to all the aforementioned circumstances and the history of this pt reporting to this  that she is SI and have a prior attempt of trying to harm herself in the past due to her chronic pain she reports she feels in her body on a consent basis. Due to this pt reporting that she is actively thinking of hurting herself to stop the physical pain she is feeling on a daily. Due to these indicators this  recommendation is that the pt is placed into an inpatient psychiatric facility for medication stabilization and safety measures.      Diagnosis: Severe Depression and Anxiety.           CM Notified: None to notify at this time.  PHP/IOP Recommended: PT refuse to talk to [facility] at this time.  Plan Comments: informed the pt of the  recommendation pt seemed to be in agreeement fo the         Social Work Note

## 2024-10-09 NOTE — NURSING NOTE
"Denies SI/HI/Avh, occasionally tearful. Expresses frustration r/t hospitalization. States\" I thought I could see the pain doctor faster if I went through the ER.\" Said she does not have anywhere to go and she really wants to see the pain doctor. SARAH Shaver in to assess, She explained to her that she has to address the cocaine use in addition to the pain issues. Patient said she uses cocaine to use the pain.  "

## 2024-10-09 NOTE — ED NOTES
[facility]     Danna Rowland, RN  10/08/24 2044

## 2024-10-10 LAB
ATRIAL RATE: 80 BPM
BILIRUB DIRECT SERPL-MCNC: 0.2 MG/DL (ref 0–0.3)
GLUCOSE BLD MANUAL STRIP-MCNC: 120 MG/DL (ref 74–99)
GLUCOSE BLD MANUAL STRIP-MCNC: 206 MG/DL (ref 74–99)
GLUCOSE BLD MANUAL STRIP-MCNC: 297 MG/DL (ref 74–99)
GLUCOSE BLD MANUAL STRIP-MCNC: 96 MG/DL (ref 74–99)
P AXIS: 43 DEGREES
P OFFSET: 195 MS
P ONSET: 137 MS
PR INTERVAL: 156 MS
Q ONSET: 215 MS
QRS COUNT: 14 BEATS
QRS DURATION: 94 MS
QT INTERVAL: 396 MS
QTC CALCULATION(BAZETT): 456 MS
QTC FREDERICIA: 436 MS
R AXIS: -6 DEGREES
T AXIS: 44 DEGREES
T OFFSET: 413 MS
VENTRICULAR RATE: 80 BPM

## 2024-10-10 PROCEDURE — 2500000001 HC RX 250 WO HCPCS SELF ADMINISTERED DRUGS (ALT 637 FOR MEDICARE OP): Performed by: REGISTERED NURSE

## 2024-10-10 PROCEDURE — 97530 THERAPEUTIC ACTIVITIES: CPT | Mod: GO

## 2024-10-10 PROCEDURE — 2500000002 HC RX 250 W HCPCS SELF ADMINISTERED DRUGS (ALT 637 FOR MEDICARE OP, ALT 636 FOR OP/ED): Performed by: PSYCHIATRY & NEUROLOGY

## 2024-10-10 PROCEDURE — 97161 PT EVAL LOW COMPLEX 20 MIN: CPT | Mod: GP | Performed by: PHYSICAL THERAPIST

## 2024-10-10 PROCEDURE — 99232 SBSQ HOSP IP/OBS MODERATE 35: CPT | Performed by: PSYCHIATRY & NEUROLOGY

## 2024-10-10 PROCEDURE — 87081 CULTURE SCREEN ONLY: CPT | Mod: ELYLAB | Performed by: REGISTERED NURSE

## 2024-10-10 PROCEDURE — 99232 SBSQ HOSP IP/OBS MODERATE 35: CPT | Performed by: REGISTERED NURSE

## 2024-10-10 PROCEDURE — 1140000001 HC PRIVATE PSYCH ROOM DAILY

## 2024-10-10 PROCEDURE — 2500000001 HC RX 250 WO HCPCS SELF ADMINISTERED DRUGS (ALT 637 FOR MEDICARE OP): Performed by: PSYCHIATRY & NEUROLOGY

## 2024-10-10 PROCEDURE — 82947 ASSAY GLUCOSE BLOOD QUANT: CPT

## 2024-10-10 PROCEDURE — 2500000004 HC RX 250 GENERAL PHARMACY W/ HCPCS (ALT 636 FOR OP/ED): Performed by: REGISTERED NURSE

## 2024-10-10 PROCEDURE — 2500000002 HC RX 250 W HCPCS SELF ADMINISTERED DRUGS (ALT 637 FOR MEDICARE OP, ALT 636 FOR OP/ED): Performed by: REGISTERED NURSE

## 2024-10-10 RX ORDER — PREDNISONE 10 MG/1
10 TABLET ORAL DAILY
Status: COMPLETED | OUTPATIENT
Start: 2024-10-14 | End: 2024-10-15

## 2024-10-10 RX ORDER — PREDNISONE 10 MG/1
20 TABLET ORAL DAILY
Status: COMPLETED | OUTPATIENT
Start: 2024-10-10 | End: 2024-10-11

## 2024-10-10 RX ORDER — PREDNISONE 10 MG/1
5 TABLET ORAL DAILY
Status: COMPLETED | OUTPATIENT
Start: 2024-10-16 | End: 2024-10-17

## 2024-10-10 RX ORDER — GABAPENTIN 300 MG/1
600 CAPSULE ORAL 4 TIMES DAILY
Status: DISCONTINUED | OUTPATIENT
Start: 2024-10-10 | End: 2024-10-25 | Stop reason: HOSPADM

## 2024-10-10 RX ORDER — PREDNISONE 10 MG/1
15 TABLET ORAL DAILY
Status: COMPLETED | OUTPATIENT
Start: 2024-10-12 | End: 2024-10-13

## 2024-10-10 RX ORDER — DULOXETIN HYDROCHLORIDE 30 MG/1
60 CAPSULE, DELAYED RELEASE ORAL 2 TIMES DAILY
Status: DISCONTINUED | OUTPATIENT
Start: 2024-10-10 | End: 2024-10-25 | Stop reason: HOSPADM

## 2024-10-10 RX ORDER — CIPROFLOXACIN AND DEXAMETHASONE 3; 1 MG/ML; MG/ML
4 SUSPENSION/ DROPS AURICULAR (OTIC) 2 TIMES DAILY
Status: DISPENSED | OUTPATIENT
Start: 2024-10-10 | End: 2024-10-17

## 2024-10-10 ASSESSMENT — COLUMBIA-SUICIDE SEVERITY RATING SCALE - C-SSRS
2. HAVE YOU ACTUALLY HAD ANY THOUGHTS OF KILLING YOURSELF?: NO
5. HAVE YOU STARTED TO WORK OUT OR WORKED OUT THE DETAILS OF HOW TO KILL YOURSELF? DO YOU INTEND TO CARRY OUT THIS PLAN?: NO
5. HAVE YOU STARTED TO WORK OUT OR WORKED OUT THE DETAILS OF HOW TO KILL YOURSELF? DO YOU INTEND TO CARRY OUT THIS PLAN?: NO
1. SINCE LAST CONTACT, HAVE YOU WISHED YOU WERE DEAD OR WISHED YOU COULD GO TO SLEEP AND NOT WAKE UP?: NO
1. SINCE LAST CONTACT, HAVE YOU WISHED YOU WERE DEAD OR WISHED YOU COULD GO TO SLEEP AND NOT WAKE UP?: NO
6. HAVE YOU EVER DONE ANYTHING, STARTED TO DO ANYTHING, OR PREPARED TO DO ANYTHING TO END YOUR LIFE?: NO
6. HAVE YOU EVER DONE ANYTHING, STARTED TO DO ANYTHING, OR PREPARED TO DO ANYTHING TO END YOUR LIFE?: NO
2. HAVE YOU ACTUALLY HAD ANY THOUGHTS OF KILLING YOURSELF?: NO

## 2024-10-10 ASSESSMENT — COGNITIVE AND FUNCTIONAL STATUS - GENERAL
WALKING IN HOSPITAL ROOM: A LITTLE
CLIMB 3 TO 5 STEPS WITH RAILING: A LITTLE
STANDING UP FROM CHAIR USING ARMS: A LITTLE
TURNING FROM BACK TO SIDE WHILE IN FLAT BAD: A LITTLE
MOVING TO AND FROM BED TO CHAIR: A LITTLE
MOBILITY SCORE: 19

## 2024-10-10 ASSESSMENT — PAIN DESCRIPTION - LOCATION
LOCATION: NECK

## 2024-10-10 ASSESSMENT — PAIN SCALES - GENERAL
PAINLEVEL_OUTOF10: 7
PAINLEVEL_OUTOF10: 7
PAINLEVEL_OUTOF10: 10 - WORST POSSIBLE PAIN
PAINLEVEL_OUTOF10: 10 - WORST POSSIBLE PAIN
PAINLEVEL_OUTOF10: 8

## 2024-10-10 ASSESSMENT — PAIN - FUNCTIONAL ASSESSMENT: PAIN_FUNCTIONAL_ASSESSMENT: 0-10

## 2024-10-10 NOTE — PROGRESS NOTES
"Occupational Therapy     REHAB Therapy Assessment & Treatment    Patient Name: Alejandra Houston  MRN: [id number]  Today's Date: 10/10/2024    Attendance:  Attendance  Activity: Discussion/reminisce (OT 1:1 Ther. Act: \"6 Components of (+) Stress Mgt\" papers reviewed)  Participation: Active participation    1:1 OT Therapeutic Activity  Treatment Approach  Approach : 1 to1 Therapy sessions  Patient Stated Goals: nonestated  Cognition: Attention (Pt attentive & focused for duration of session. Statements & contributions to discussion are linear, organized, & on-topic.)  Social Skills: Isolative - does not initiate involvement in social activity (Pt appropriately stimulated & demonstrates social skills w/OT in 1:1. She attempted group setting, but left after 7mins w/o reason or returning.)  Emotional: Mood (Pt mood depressed, but more euthymic in 1:1 setting w/OT.)  Treatment Approach Comments: Ther. Act: \"Clinical Depression\" paper reviewed Pt scored low with lack of balance in productive time the day due to severe anxiety & depression. She spoke about the conflict with her daughter Lay who evicted Pt re: drug paraphenalia. \"I called [facility] to see if my  could help me, but otherwise I'm all alone.\" OT consulted with PT who stated that they had no further recommendations for Pt. OT told PT that Ot was going to educate Pt on A.E. to whcich PT agreed. OT discussed this w/Pt & encouraged Pt to pursue C.D. inpatient services to get sober from Crack Cocaine, but Pt only minimized her usage. Overall, Pt was receptive to OT's discussion about Adaptive equipment which Pt can purchase at any of the [facility] throughout [place]. Pt left her \"Adopting a Healthy Lifestyle\" paper, thus OT will review it w/Pt tomorrow.      Encounter Problems       Encounter Problems (Active)       OT Goals       Pt will explore, identify, and appropriately utilize effective coping strategies to cope with daily stressors and manage " emotions with independence prior to discharge.  (Progressing)       Start:  10/09/24    Expected End:  11/06/24            Pt will demonstrate ability to appropriately modulate emotions and impulses with independence prior to discharge.  (Progressing)       Start:  10/09/24    Expected End:  11/06/24            Pt will explore, identify, and appropriately utilize effective communication strategies to express feelings, wants, and needs with independence prior to discharge.  (Progressing)       Start:  10/09/24    Expected End:  11/06/24            Pt will complete Relapse Prevention Plan in collaboration w/ OT to identify potential stressors in home environment & plan healthy coping skills as alternates to substance use prior to discharge.  (Progressing)       Start:  10/09/24    Expected End:  11/06/24

## 2024-10-10 NOTE — CARE PLAN
Problem: Diabetes  Goal: Achieve decreasing blood glucose levels by end of shift  Outcome: Progressing  Goal: Increase stability of blood glucose readings by end of shift  Outcome: Progressing  Goal: Decrease in ketones present in urine by end of shift  Outcome: Progressing  Goal: Maintain electrolyte levels within acceptable range throughout shift  Outcome: Progressing  Goal: Maintain glucose levels >70mg/dl to <250mg/dl throughout shift  Outcome: Progressing  Goal: No changes in neurological exam by end of shift  Outcome: Progressing  Goal: Learn about and adhere to nutrition recommendations by end of shift  Outcome: Progressing  Goal: Vital signs within normal range for age by end of shift  Outcome: Progressing  Goal: Increase self care and/or family involovement by end of shift  Outcome: Progressing  Goal: Receive DSME education by end of shift  Outcome: Progressing   The patient's goals for the shift include sleep    The clinical goals for the shift include sleep    Over the shift, the patient did not make progress toward the following goals. Barriers to progression include clinical presentation. Recommendations to address these barriers include encourage medication compliance.    Pt reports her pain level is always very high due to failed interventions with pain mangement. Pt reports this causes severe anxiety and depression. Pt denies self harm thoughts. Pt given PRN Atarax for c/o anxiety. Pt ate snack and took HS medications. Pt rested in bed all night.

## 2024-10-10 NOTE — CARE PLAN
Problem: Diabetes  Goal: Achieve decreasing blood glucose levels by end of shift  Outcome: Progressing  Goal: Increase stability of blood glucose readings by end of shift  Outcome: Progressing  Goal: Decrease in ketones present in urine by end of shift  Outcome: Progressing  Goal: Maintain electrolyte levels within acceptable range throughout shift  Outcome: Progressing  Goal: Maintain glucose levels >70mg/dl to <250mg/dl throughout shift  Outcome: Progressing  Goal: No changes in neurological exam by end of shift  Outcome: Progressing  Goal: Learn about and adhere to nutrition recommendations by end of shift  Outcome: Progressing  Goal: Vital signs within normal range for age by end of shift  Outcome: Progressing  Goal: Increase self care and/or family involovement by end of shift  Outcome: Progressing  Goal: Receive DSME education by end of shift  Outcome: Progressing     Problem: Fall/Injury  Goal: Not fall by end of shift  Outcome: Progressing  Goal: Be free from injury by end of the shift  Outcome: Progressing  Goal: Verbalize understanding of personal risk factors for fall in the hospital  Outcome: Progressing  Goal: Verbalize understanding of risk factor reduction measures to prevent injury from fall in the home  Outcome: Progressing  Goal: Use assistive devices by end of the shift  Outcome: Progressing  Goal: Pace activities to prevent fatigue by end of the shift  Outcome: Progressing     Problem: Fall/Injury  Goal: Not fall by end of shift  Outcome: Progressing  Goal: Be free from injury by end of the shift  Outcome: Progressing  Goal: Verbalize understanding of personal risk factors for fall in the hospital  Outcome: Progressing  Goal: Verbalize understanding of risk factor reduction measures to prevent injury from fall in the home  Outcome: Progressing  Goal: Use assistive devices by end of the shift  Outcome: Progressing  Goal: Pace activities to prevent fatigue by end of the shift  Outcome:  Progressing     Problem: Pain  Goal: Takes deep breaths with improved pain control throughout the shift  Outcome: Progressing  Goal: Turns in bed with improved pain control throughout the shift  Outcome: Progressing  Goal: Walks with improved pain control throughout the shift  Outcome: Progressing  Goal: Performs ADL's with improved pain control throughout shift  Outcome: Progressing  Goal: Participates in PT with improved pain control throughout the shift  Outcome: Progressing  Goal: Free from opioid side effects throughout the shift  Outcome: Progressing  Goal: Free from acute confusion related to pain meds throughout the shift  Outcome: Progressing   The patient's goals for the shift include call my doctor    The clinical goals for the shift include pain control    Over the shift, the patient did not make progress toward the following goals. Barriers to progression include anxiety. Recommendations to address these barriers include continue medication administration. Denies SI/HI/AVH, Mood stable

## 2024-10-10 NOTE — PROGRESS NOTES
Physical Therapy    Physical Therapy Evaluation    Patient Name: Alejandra Houston  MRN: [id number]  Today's Date: 10/10/2024   Time Calculation  Start Time: 1100  Stop Time: 1120  Time Calculation (min): 20 min  560/560-A    Assessment/Plan   PT Assessment  PT Assessment Results: Pain, Decreased mobility  Rehab Prognosis: Fair (limited by pain)  Barriers to Discharge: Pain  Medical Staff Made Aware: Yes  Barriers to Participation: Comorbidities  End of Session Communication: PCT/NA/CTA, Bedside nurse (OT. Staff reports Pt. has been amb on unit without A with FWW including going to dining room for meals.)  Assessment Comment: Pt.'s functional mobility appears to be significantly limited by her reported severe pain. Pt. reports severe cervical pain radiating to her whole body. From the way Pt. describes her pain, the pain sounds more neurological and not musculoskeletal.Pt. may benefit from additional medical workup including ortho, neurosurgery or pain management.  End of Session Patient Position: Alarm off, not on at start of session (sitting on EOB)  IP OR SWING BED PT PLAN  Inpatient or Swing Bed: Inpatient  PT Plan  PT Plan: PT Eval only  PT Eval Only Reason: No acute PT needs identified  PT Frequency: PT eval only  PT Discharge Recommendations: No further acute PT (Please re-consult if need arises)  PT Recommended Transfer Status:  (Supervision to MOD I)  Physical Therapy eval completed per MD requisition. P.T. recommendations as outlined above. Recommend D/C from acute care when medically appropriate as deemed by medical staff.    Subjective       General Visit Information:  General  Reason for Referral: impaired mobility  Referred By: KEVIN Shaver CNP (PT 10/9)  Past Medical History Relevant to Rehab: includes: anxiety, depression, fibromyalgia, DM II, GERD, COPD, L TKA 06/2024,  RTKA, hand injury in May/carpal tunnel, hip surgery, thrombocytopenia, bipolar, chronic pain, liver cirrhosis, h/o suicide  "attempt.  Family/Caregiver Present: No  Prior to Session Communication: Bedside nurse  Patient Position Received: Bed, 0 rail up, Alarm off, not on at start of session  Preferred Learning Style: auditory, verbal  General Comment: Pt. is a 57yo who presented to [facility] ED on 10/8/2024 with c/o pain all over coming from a pinched nerve in her neck. Pt was transferred to [address] with increased depressive symptoms & SI related to chronic pain.    Home Living:  Home Living  Home Living Comments: Pt. stated she lived with her spouse and her dtr at her dtr's house but her dtr recently kicked her out so now she is homeless Per Pt, her dtr's house is a 3 level house with 3 SAMMIE with HR but the Pt.'s bed/bath are on 1st floor with tub shower without a seat or grab bars.    Prior Level of Function:  Prior Function Per Pt/Caregiver Report  Prior Function Comments: Pt. stated she amb with RW PTA. Pt. stated she was I with ADLs and IADLs but pain has progressively been lmiting her ability to complete IADLs over last 6 months. Pt. denied falls in last 3 months. Pt. does not drive.    Precautions:  Precautions  Medical Precautions:  (Suicide precautions, 15 minute rolling safety checks)  Precautions Comment: Per EMR: High fall risk      Objective     Pain:  Pain Assessment  Pain Assessment: 0-10  0-10 (Numeric) Pain Score: 10 - Worst possible pain  Pain Type: Chronic pain  Pain Location:  (Neck, back, knees, \"all over\")  Pain Interventions: Repositioned    Cognition:  Cognition  Overall Cognitive Status: Within Functional Limits    General Assessments:  General Observation  General Observation: Pt. c/o severe B cervical pain L>R that radiates to her lower back, knees, and whole body. Pt. states that her chronic pain has been progressively limiting her ability to care for herself. Pt. required A for bed mobility but completed sit/stand transfers and amb without physical A. Pt. reported increased pain with supine to sit tranfsfer but " reported no imcrease in pain with amb. Pt. amb with FWW for longer distance and was able to complete commode transfer without AD.   Activity Tolerance  Endurance: Decreased tolerance for upright activites                 Dynamic Sitting Balance  Dynamic Sitting-Comments: Good static and dynamic sitting balance  Dynamic Standing Balance  Dynamic Standing-Comments: Good- static and dynamic standing balance    Functional Assessments:     Bed Mobility  Bed Mobility: Yes  Bed Mobility 1  Bed Mobility 1: Supine to sitting  Level of Assistance 1: Minimum assistance  Bed Mobility Comments 1: A to maneuver LEs over EOB and to elevate trunk from bed via logroll. PAin limited Pt.'s ability to A with transfer  Transfers  Transfer: Yes  Transfer 1  Technique 1: Sit to stand  Transfer Device 1:  (FWW)  Transfer Level of Assistance 1: Contact guard  Trials/Comments 1: CGA for safety. Increased time and effot to stand form low bed due to pain.  Transfers 2  Technique 2: Stand to sit  Transfer Device 2:  (FWW)  Transfer Level of Assistance 2: Modified independent  Trials/Comments 2: A to control descent. VC's for hand placement.  Transfers 3  Transfer to 3: Toilet  Transfer Device 3:  (No AD)  Transfer Level of Assistance 3: Modified independent  Trials/Comments 3: Pt. declined using FWW. Pt. reached for walls to hold on  Ambulation/Gait Training  Ambulation/Gait Training Performed: Yes  Ambulation/Gait Training 1  Surface 1: Level tile  Device 1: Rolling walker  Assistance 1: Distant supervision  Quality of Gait 1: Narrow base of support (Pt. amb with a very slow, step-to gait pattern with extremely short step lengths progressing to larger steps (but still small) reciprocal gait with slightly quicker cammie.)  Comments/Distance (ft) 1: Supervision for safety.  Stairs  Stairs: No       Extremity/Trunk Assessments:  RUE   RUE :  (Shoulder elevation limited to ~ 90 degrees due to reported pain, elbow, wrist ROM WFL; strength not  assessed due to reported pain)  LUE   LUE:  (Shoulder elevation limited to ~ 90 degrees due to reported pain, elbow, wrist ROM WFL; strength not assessed due to reported pain)  RLE   RLE :  (AROM WFL; unable to formally assess strength due to reported pain and decreased effort by Pt. Strength WFL for amb.)  LLE   LLE :  (AROM WFL; unable to formally assess strength due to reported pain and decreased effort by Pt. Strength WFL for amb.)    Outcome Measures:     [facility] Basic Mobility  Turning from your back to your side while in a flat bed without using bedrails: None  Moving from lying on your back to sitting on the side of a flat bed without using bedrails: A little  Moving to and from bed to chair (including a wheelchair): A little  Standing up from a chair using your arms (e.g. wheelchair or bedside chair): A little  To walk in hospital room: A little  Climbing 3-5 steps with railing: A little  Basic Mobility - Total Score: 19                                                           Education Documentation  Mobility Training, taught by Lincoln Bro PT at 10/10/2024  1:55 PM.  Learner: Patient  Readiness: Acceptance  Method: Explanation  Response: Verbalizes Understanding  Comment: Role of PT

## 2024-10-10 NOTE — PROGRESS NOTES
"Alejandra Houston is a 58 y.o. female on day 1 of admission presenting with MDD (major depressive disorder), recurrent severe, without psychosis (Multi).      Subjective   Patient examined and seen. Alert and oriented x3, explained to patient assessment, right to , and the right to decline assessment. Patient verbalized understanding and agreed to assessment with TERE Roland as . Patient denies chest pain, shortness of breath, palpitations, abdominal pain, fever or chills.      She does report ear \"fullness\" x 2 or 3 months. She does clean her ears out with peroxide and qtips. Ear canal with erythema noted. No signs of infection or bulging noted from TM. No excessive cerumen. She also complains of a sore throat x 1 week worse when she swallows. We will get Throat culture to rule out strep.    In regards to her chronic pain, she stated that since starting the Gabapentin, it is helping. She can continue her Tramadol at this time. Encourage patient to ambulate and do stretches to assist with pain and notify staff of any changes. Will consult Ortho.        Objective     Last Recorded Vitals  /60   Pulse 68   Temp 35.9 °C (96.6 °F) (Temporal)   Resp 18   Wt 104 kg (228 lb 15.9 oz)   SpO2 97%   Intake/Output last 3 Shifts:  No intake or output data in the 24 hours ending 10/10/24 1503    Admission Weight  Weight: 104 kg (228 lb 15.9 oz) (10/09/24 1042)    Daily Weight  10/09/24 : 104 kg (228 lb 15.9 oz)    Image Results  ECG 12 lead  Normal sinus rhythm  Minimal voltage criteria for LVH, may be normal variant  Inferior infarct , age undetermined  Cannot rule out Anteroseptal infarct , age undetermined  Abnormal ECG  When compared with ECG of 13-DEC-2023 10:52,  Fusion complexes are no longer Present  Premature ventricular complexes are no longer Present  Minimal criteria for Anteroseptal infarct are now Present  Inferior infarct is now Present      Physical Exam  Constitutional: Well developed, " appears older than stated age awake/alert/oriented x3, , cooperative  Eyes: PERRL, EOMI, clear sclera, TM pearly gray, canal with erythema no edema, cerumen not impacted + fullness   ENMT: mucous membranes moist, no apparent injury, no lesions seen , denies cough, + sore throat pain with swallowing   Head/Neck: Neck supple, no apparent injury, thyroid without mass or tenderness, tenderness to back of neck  with palpation - chronic   Respiratory/Thorax: Patent airways,  normal breath sounds  Cardiovascular: Regular, normal S 1and S 2  Musculoskeletal: ROM intact, no joint swelling,   Skin: warm, dry, intact  Neurological: alert/oriented x 3, speech clear,   Psychiatric: appropriate mood and behavior, mood has improved, ambulating with walker     Relevant Results  Scheduled medications  buPROPion XL, 300 mg, oral, q AM  carvedilol, 6.25 mg, oral, Daily  ciprofloxacin-dexamethasone, 4 drop, Each Ear, BID  [START ON 10/13/2024] dulaglutide, 1.5 mg, subcutaneous, Every Sunday  DULoxetine, 60 mg, oral, BID  famotidine, 40 mg, oral, Daily  ferrous sulfate (325 mg ferrous sulfate), 1 tablet, oral, BID  folic acid, 1 mg, oral, Daily  gabapentin, 600 mg, oral, 4x daily  insulin lispro, 0-5 Units, subcutaneous, TID  metFORMIN XR, 1,000 mg, oral, Daily with breakfast  pantoprazole, 40 mg, oral, Daily before breakfast  predniSONE, 20 mg, oral, Daily   Followed by  [START ON 10/12/2024] predniSONE, 15 mg, oral, Daily   Followed by  [START ON 10/14/2024] predniSONE, 10 mg, oral, Daily   Followed by  [START ON 10/16/2024] predniSONE, 5 mg, oral, Daily  thiamine, 100 mg, oral, Daily      Continuous medications     PRN medications  PRN medications: acetaminophen, albuterol, alum-mag hydroxide-simeth, benzocaine-menthol, hydrOXYzine pamoate, ibuprofen, magnesium hydroxide, traMADol, traZODone    Results for orders placed or performed during the hospital encounter of 10/09/24 (from the past 24 hour(s))   POCT GLUCOSE   Result Value Ref  Range    POCT Glucose 217 (H) 74 - 99 mg/dL   POCT GLUCOSE   Result Value Ref Range    POCT Glucose 268 (H) 74 - 99 mg/dL   POCT GLUCOSE   Result Value Ref Range    POCT Glucose 120 (H) 74 - 99 mg/dL   POCT GLUCOSE   Result Value Ref Range    POCT Glucose 96 74 - 99 mg/dL          Assessment/Plan       # Otitis Externa  Education regarding ear cleaning provided  Start  Cipro Dexam Otic drops x 7 days to each ear   Tylenol as needed    # Mild Pharyngitis  X 1 week  Sore Throat Lozenge  Get Strep Culture and R/O    Supportive care     # Diabetes Mellitus Type 2  Continue home medications  Diet Cardiac/Diabetic  Continue Sliding Scale SSI AC/HS   A1C 5.9  Encourage healthy lifestyle changes  Continue home medications      # Chronic Pain   Bilateral Knee Replacement 2/2024  Sees Pain management by Dr. Kaminski   She reports she has a pinched nerve to her neck  Refusing Tylenol, motrin or lidocaine patches  Requesting Morphine  Tramadol ordered  Pt requests Orthopedics Consult due to worsening pain   Started on Steroids to continue medications as previously ordered  Caution due to diabetes       # Obesity  Encourage healthy lifestyle changes  BMI  34     Thank you for consult  MEDICINE TO SIGN OFF  CALL FOR ACUTE NEEDS     Time spent  36 minutes obtaining labs, imaging, recommendations, interview, assessment, examination, medication review/ordering, and EMR review.      Plan of care was discussed extensively with patient, RN and primary team  Patient verbalized understanding through teach back method. All questions and concerns addressed upon examination.      Of note, this documentation is completed using the Dragon Dictation system (voice recognition software). There may be spelling and/or grammatical errors that were not corrected prior to final submission.         Jodie Shaver, APRN-CNP

## 2024-10-10 NOTE — NURSING NOTE
Per CNP orders, larynx swabbed and sent to lab for Strep Throat. Tramadol given at 1540 for 8/10 neck pain. Patient currently at art therapy, Observed smiling and interacting with staff and peers. ATB qtts ordered for bilateral ear pain. Faxed [facility] Application per pt request But unsuccessful. TERE MORA emailed Darell at [facility] with success noted. Will have patient follow up in a.m. Patient continues to request frequent pain medications. Encouraged and provided distraction activities. Consult for inpatient Orthopedics by CNP placed. Lab results pending.  1838- patient sleeping at this time, No distress noted.

## 2024-10-10 NOTE — PROGRESS NOTES
"Social Work Note   DOROTA Bassett reported pt appeared distressed and asked LISW to visit pt.  LISW  approached pt who was visiting with student nurse.       Pt appeared tearful and  indicates medical CNP did not prescribe as pt had hoped. Pt conveys she is upset that it is assumed she is doing better, when basically she is powering through pain to walk, etc.      Pt tearful about unknown disposition at discharge.  LISW pointed out shelter list and also alcohol/drug packet with c d resources, [facility] and [facility] info.  Pt continues to say, \"I don't need that. Only if I  need it to keep a roof over my head.  I don't even like that (cocaine). It helped me relax, it helped me relax my back.\"       In course of discussion pt made several negative comments.  LISW normalized the tendency to do that when struggling with depression and anxiety, but encouraged  switching thought focus.       Both student nurse and LISW encouraged positive coping techniques such as focusing on even small good things and taking things one step at a time. LISW also encouraged pt to be as independent as she can be in her function.      Pt smiled 3x in brief meeting - about quitting smoking and playing cards and other activities.  LISW capitalized on pride pt has in embracing not smoking.  Also pointed out that focusing on other activities helps even for a few seconds/minutes to give her body a break from focus on pain.    "

## 2024-10-10 NOTE — PROGRESS NOTES
"Alejandra Houston is a 58 y.o. female on day 1 of admission presenting with MDD.    Subjective   Patient still very depressed this morning.  Reports some pain in her throat when swallowing.  Will be seen by medical staff.  Patient tolerated increase in Cymbalta well with no adverse effects.  Will increase total dose to 60 mg oral twice daily.  Patient in agreement with plan.  Patient has been actively unit, participating in group therapy.    Objective     MSE  General: Appropriately groomed and dressed.  Appearance: Appears stated age.  Attitude: Calm, cooperative.  Behavior: Appropriate eye contact.  Motor activity: No agitation or retardation. no EPS.  Impaired gait  Speech: Regular rate, rhythm, volume and tone.  Mood: Depressed  Affect: Flat  Thought process: Organized, linear, goal-directed.  Associations are logical.  Thought content: Does not endorse suicidal or homicidal ideation, no delusions elicited.  Thought perception: Did not endorse auditory or visual hallucinations.  Cognition: Alert, oriented x3.  no deficit in memory or attention.  Insight: Poor  Judgment: Fair.    Last Recorded Vitals  /60   Pulse 68   Temp 35.9 °C (96.6 °F) (Temporal)   Resp 18   Ht 1.727 m (5' 7.99\")   Wt 104 kg (228 lb 15.9 oz)   SpO2 97%   BMI 34.83 kg/m²      Relevant Results  Scheduled medications  buPROPion XL, 300 mg, oral, q AM  carvedilol, 6.25 mg, oral, Daily  [START ON 10/13/2024] dulaglutide, 1.5 mg, subcutaneous, Every Sunday  DULoxetine, 60 mg, oral, BID  famotidine, 40 mg, oral, Daily  ferrous sulfate (325 mg ferrous sulfate), 1 tablet, oral, BID  folic acid, 1 mg, oral, Daily  gabapentin, 600 mg, oral, 4x daily  insulin lispro, 0-5 Units, subcutaneous, TID  metFORMIN XR, 1,000 mg, oral, Daily with breakfast  pantoprazole, 40 mg, oral, Daily before breakfast  predniSONE, 20 mg, oral, Daily   Followed by  [START ON 10/12/2024] predniSONE, 15 mg, oral, Daily   Followed by  [START ON 10/14/2024] predniSONE, " 10 mg, oral, Daily   Followed by  [START ON 10/16/2024] predniSONE, 5 mg, oral, Daily  thiamine, 100 mg, oral, Daily      Continuous medications     PRN medications  PRN medications: acetaminophen, albuterol, alum-mag hydroxide-simeth, hydrOXYzine pamoate, ibuprofen, magnesium hydroxide, traMADol, traZODone    Results for orders placed or performed during the hospital encounter of 10/09/24 (from the past 24 hour(s))   POCT GLUCOSE   Result Value Ref Range    POCT Glucose 217 (H) 74 - 99 mg/dL   POCT GLUCOSE   Result Value Ref Range    POCT Glucose 268 (H) 74 - 99 mg/dL   POCT GLUCOSE   Result Value Ref Range    POCT Glucose 120 (H) 74 - 99 mg/dL   POCT GLUCOSE   Result Value Ref Range    POCT Glucose 96 74 - 99 mg/dL        Assessment/Plan   Diagnosis:  MDD, severe without psychosis    Impression:     Labs and Chart: reviewed  Case discussed with treatment team members  Encouraged patient to attend group and other mileu activity  Collateral from family - pending  Discharge planing  Medication:       -Cymbalta 60 mg oral twice daily       - Wellbutrin  mg oral daily     Medication Consent  Medication Consent: risks, benefits, side effects reviewed for all ordered meds and patient expressed understanding and consent obtained    JUNAID Borrego-CNP

## 2024-10-10 NOTE — PROGRESS NOTES
"Social Work Note    Pt came to nurses station, was not in O T's  group.  LISW inquired about pt's interface with her family and discharge plans.  Pt indicates 's phone wasn't working, so she called daughter Jodi's phone to request daughter Lay to secure pt's meds from [employer], and also to permit  pt to speak to [employer] \"for tomorrow\". ( Pt explained tomorrow is her own birthday. ) Pt reports Lay would not allow conversation with the grandkids and call was ended.      Discussed with pt shelter vs sobriety tx.  RE shelters, pt voiced that [facility] [place] [facility] [place] [place] shelter.  LISW agreed to give list of area shelters, but cautioned that sometimes [facility] is all that is available.          Discussed with pt whether she was interested in chem dep tx/sober living. Pt said \"only if they can help me with my mental health.\"  Discussed with pt that focus would be more on sobriety.  BERNABE agreed to give packet of resources, explained that if pt would like to pursue, she would make the first call and LISW assist with getting collateral to them.    Addendum 10/10/24 5408  Pt had returned to group. BERNABE left informational cd  packet along with numbers for [facility] and [facility] (with  [employer]) and shelter list at bedside.   "

## 2024-10-10 NOTE — PROGRESS NOTES
"ERROR    Physical Exam    Last Recorded Vitals  Blood pressure 103/60, pulse 68, temperature 35.9 °C (96.6 °F), temperature source Temporal, resp. rate 18, height 1.727 m (5' 7.99\"), weight 104 kg (228 lb 15.9 oz), SpO2 97%.  Intake/Output last 3 Shifts:  No intake/output data recorded.    Relevant Results                              Assessment/Plan   Assessment & Plan  MDD (major depressive disorder), recurrent severe, without psychosis (Multi)               BERNABE Christopher      "

## 2024-10-11 ENCOUNTER — APPOINTMENT (OUTPATIENT)
Dept: RADIOLOGY | Facility: HOSPITAL | Age: 59
End: 2024-10-11
Payer: COMMERCIAL

## 2024-10-11 LAB
GLUCOSE BLD MANUAL STRIP-MCNC: 127 MG/DL (ref 74–99)
GLUCOSE BLD MANUAL STRIP-MCNC: 202 MG/DL (ref 74–99)
GLUCOSE BLD MANUAL STRIP-MCNC: 300 MG/DL (ref 74–99)
GLUCOSE BLD MANUAL STRIP-MCNC: 325 MG/DL (ref 74–99)

## 2024-10-11 PROCEDURE — 2500000002 HC RX 250 W HCPCS SELF ADMINISTERED DRUGS (ALT 637 FOR MEDICARE OP, ALT 636 FOR OP/ED): Performed by: REGISTERED NURSE

## 2024-10-11 PROCEDURE — 82947 ASSAY GLUCOSE BLOOD QUANT: CPT

## 2024-10-11 PROCEDURE — 72141 MRI NECK SPINE W/O DYE: CPT | Performed by: RADIOLOGY

## 2024-10-11 PROCEDURE — 2500000001 HC RX 250 WO HCPCS SELF ADMINISTERED DRUGS (ALT 637 FOR MEDICARE OP): Performed by: PSYCHIATRY & NEUROLOGY

## 2024-10-11 PROCEDURE — 1140000001 HC PRIVATE PSYCH ROOM DAILY

## 2024-10-11 PROCEDURE — 2500000001 HC RX 250 WO HCPCS SELF ADMINISTERED DRUGS (ALT 637 FOR MEDICARE OP): Performed by: REGISTERED NURSE

## 2024-10-11 PROCEDURE — 99232 SBSQ HOSP IP/OBS MODERATE 35: CPT | Performed by: PSYCHIATRY & NEUROLOGY

## 2024-10-11 PROCEDURE — 72141 MRI NECK SPINE W/O DYE: CPT

## 2024-10-11 PROCEDURE — 99221 1ST HOSP IP/OBS SF/LOW 40: CPT | Performed by: ORTHOPAEDIC SURGERY

## 2024-10-11 PROCEDURE — 99231 SBSQ HOSP IP/OBS SF/LOW 25: CPT

## 2024-10-11 PROCEDURE — 2500000002 HC RX 250 W HCPCS SELF ADMINISTERED DRUGS (ALT 637 FOR MEDICARE OP, ALT 636 FOR OP/ED): Performed by: PSYCHIATRY & NEUROLOGY

## 2024-10-11 PROCEDURE — 2500000002 HC RX 250 W HCPCS SELF ADMINISTERED DRUGS (ALT 637 FOR MEDICARE OP, ALT 636 FOR OP/ED): Performed by: NURSE PRACTITIONER

## 2024-10-11 PROCEDURE — 2500000004 HC RX 250 GENERAL PHARMACY W/ HCPCS (ALT 636 FOR OP/ED): Performed by: REGISTERED NURSE

## 2024-10-11 RX ORDER — INSULIN LISPRO 100 [IU]/ML
0-5 INJECTION, SOLUTION INTRAVENOUS; SUBCUTANEOUS
Status: DISCONTINUED | OUTPATIENT
Start: 2024-10-11 | End: 2024-10-25 | Stop reason: HOSPADM

## 2024-10-11 ASSESSMENT — PAIN SCALES - GENERAL
PAINLEVEL_OUTOF10: 10 - WORST POSSIBLE PAIN
PAINLEVEL_OUTOF10: 7
PAINLEVEL_OUTOF10: 8
PAINLEVEL_OUTOF10: 8
PAINLEVEL_OUTOF10: 3
PAINLEVEL_OUTOF10: 3

## 2024-10-11 ASSESSMENT — COLUMBIA-SUICIDE SEVERITY RATING SCALE - C-SSRS
1. SINCE LAST CONTACT, HAVE YOU WISHED YOU WERE DEAD OR WISHED YOU COULD GO TO SLEEP AND NOT WAKE UP?: NO
6. HAVE YOU EVER DONE ANYTHING, STARTED TO DO ANYTHING, OR PREPARED TO DO ANYTHING TO END YOUR LIFE?: NO
1. SINCE LAST CONTACT, HAVE YOU WISHED YOU WERE DEAD OR WISHED YOU COULD GO TO SLEEP AND NOT WAKE UP?: NO
2. HAVE YOU ACTUALLY HAD ANY THOUGHTS OF KILLING YOURSELF?: NO
6. HAVE YOU EVER DONE ANYTHING, STARTED TO DO ANYTHING, OR PREPARED TO DO ANYTHING TO END YOUR LIFE?: NO
5. HAVE YOU STARTED TO WORK OUT OR WORKED OUT THE DETAILS OF HOW TO KILL YOURSELF? DO YOU INTEND TO CARRY OUT THIS PLAN?: NO
2. HAVE YOU ACTUALLY HAD ANY THOUGHTS OF KILLING YOURSELF?: NO

## 2024-10-11 ASSESSMENT — PAIN DESCRIPTION - LOCATION
LOCATION: NECK

## 2024-10-11 ASSESSMENT — PAIN SCALES - PAIN ASSESSMENT IN ADVANCED DEMENTIA (PAINAD): TOTALSCORE: MEDICATION (SEE MAR)

## 2024-10-11 ASSESSMENT — PAIN - FUNCTIONAL ASSESSMENT
PAIN_FUNCTIONAL_ASSESSMENT: 0-10
PAIN_FUNCTIONAL_ASSESSMENT: 0-10

## 2024-10-11 NOTE — PROGRESS NOTES
"Alejandra Houston is a 59 y.o. female on day 2 of admission presenting with MDD.    Subjective   Patient remains depressed.  It is her birthday today, she does not believe that her family is going to call her or reach out to her due to them being upset with her.  She states that she misses her grandchildren.  Patient still in a lot of pain.  Patient seen by orthopedics, recs appreciated.  Will get MRI today.  Patient tolerating medications well with no adverse effects.    Objective     MSE  General: Appropriately groomed and dressed.  Appearance: Appears stated age.  Attitude: Calm, cooperative.  Behavior: Appropriate eye contact.  Motor activity: No agitation or retardation. no EPS.  Normal gait.  Speech: Regular rate, rhythm, volume and tone.  Mood: Depressed  Affect: Flat  Thought process: Organized, linear, goal-directed.  Associations are logical.  Thought content: Does not endorse suicidal or homicidal ideation, no delusions elicited.  Thought perception: Did not endorse auditory or visual hallucinations.  Cognition: Alert, oriented x3.  no deficit in memory or attention.  Insight: Poor  Judgment: Fair.    Last Recorded Vitals  /55   Pulse 75   Temp 36.6 °C (97.9 °F)   Resp 15   Ht 1.727 m (5' 7.99\")   Wt 104 kg (228 lb 15.9 oz)   SpO2 94%   BMI 34.83 kg/m²      Relevant Results  Scheduled medications  buPROPion XL, 300 mg, oral, q AM  carvedilol, 6.25 mg, oral, Daily  ciprofloxacin-dexamethasone, 4 drop, Each Ear, BID  [START ON 10/13/2024] dulaglutide, 1.5 mg, subcutaneous, Every Sunday  DULoxetine, 60 mg, oral, BID  famotidine, 40 mg, oral, Daily  ferrous sulfate (325 mg ferrous sulfate), 1 tablet, oral, BID  folic acid, 1 mg, oral, Daily  gabapentin, 600 mg, oral, 4x daily  insulin lispro, 0-5 Units, subcutaneous, TID  metFORMIN XR, 1,000 mg, oral, Daily with breakfast  pantoprazole, 40 mg, oral, Daily before breakfast  [START ON 10/12/2024] predniSONE, 15 mg, oral, Daily   Followed by  [START " ON 10/14/2024] predniSONE, 10 mg, oral, Daily   Followed by  [START ON 10/16/2024] predniSONE, 5 mg, oral, Daily  thiamine, 100 mg, oral, Daily      Continuous medications     PRN medications  PRN medications: acetaminophen, albuterol, alum-mag hydroxide-simeth, benzocaine-menthol, hydrOXYzine pamoate, ibuprofen, magnesium hydroxide, traMADol, traZODone    Results for orders placed or performed during the hospital encounter of 10/09/24 (from the past 24 hour(s))   POCT GLUCOSE   Result Value Ref Range    POCT Glucose 206 (H) 74 - 99 mg/dL   POCT GLUCOSE   Result Value Ref Range    POCT Glucose 297 (H) 74 - 99 mg/dL   POCT GLUCOSE   Result Value Ref Range    POCT Glucose 127 (H) 74 - 99 mg/dL   POCT GLUCOSE   Result Value Ref Range    POCT Glucose 202 (H) 74 - 99 mg/dL        Assessment/Plan   Diagnosis:  MDD, severe without psychosis    Impression:     Labs and Chart: reviewed  Case discussed with treatment team members  Encouraged patient to attend group and other mileu activity  Collateral from family - pending  Discharge planing  Medication:       - Reviewed. To continue as ordered    Medication Consent  Medication Consent: no medication changes necessary for review    Kirk Shin, APRN-CNP

## 2024-10-11 NOTE — DISCHARGE INSTR - APPOINTMENTS
Alejandra Mendiola with Marlena Vick  10/28/2024 at 11am   [address], [place]   [phone]  Please bring Insurance Card, Photo ID and Hospital Discharge Paperwork.    Follow up PCP after discharge as soon as you are able  GENEVA Fernández   [phone]

## 2024-10-11 NOTE — CARE PLAN
"The patient's goals for the shift include go to group    The clinical goals for the shift include Patient will attend group    Over the shift, the patient did not make progress toward the following goals. Patient was calm and cooperative throughout the shift. She denies any SI or HI but is very depressed and tearful today. Today is her birthday and she was hoping someone in her family would call her but they didn't. The staff and patients did sing the patient happy birthday at dinner which made her tearful. She was complaint with medication and requested PRN tramadol for pain which she doesn't feel helps stating, \"It makes me sleepy and I sleep but it doesn't take the pain away.\" This nurse offered to reach out to hospitalist to get something additional to help with pain like a patch or cream but patient declined stating, \"I want stronger narcotics.\" This nurse empathized with the patient regarding her pain and listened to her concerns. Pain management was consulted and message was sent to Dr. Henson who called and stated patient was already connected with pain management and that he wouldn't recommend anything more then what is being done at this time. Patient made aware of conversation with pain management and became tearful. Patient completed MR today and is awaiting results from provider. Her strep results are still pending. She didn't take a shower or change her clothes today but did eat all three meals. She was out of her room on and off throughout the day interacting with staff and peers. She didn't attend group today due to having pain after returning from MR. Nursing will continue to monitor and encourage.   "

## 2024-10-11 NOTE — PROGRESS NOTES
Occupational Therapy     REHAB Therapy Assessment & Treatment    Patient Name: Alejandra Houston  MRN: [id number]  Today's Date: 10/11/2024    Attendance:  Attendance  Activity: Discussion/reminisce  Participation: Refused    Therapeutic OT Grp  Treatment Approach  Approach : 1 to1 Therapy sessions  Patient Stated Goals: nonestated  Cognition: Attention (Pt attentive & focused for duration of session. Statements & contributions to discussion are linear, organized, & on-topic.)  Social Skills: Isolative - does not initiate involvement in social activity (Pt appropriately stimulated & demonstrates social skills w/OT in 1:1. She attempted group setting, but left after 7mins w/o reason or returning.)  Emotional: Mood (Pt mood depressed, but more euthymic in 1:1 setting w/OT.)  Treatment Approach Comments: Pt sat in the Day room eating a snack as OT gathered folks for grp. Pt got up 2x for drinks and ice cream and even answered 2 questions from the grp paper before leaving saying that she couldn't tolerate any more time out of bed.      Encounter Problems       Encounter Problems (Active)       OT Goals       Pt will explore, identify, and appropriately utilize effective coping strategies to cope with daily stressors and manage emotions with independence prior to discharge.  (Progressing)       Start:  10/09/24    Expected End:  11/06/24            Pt will demonstrate ability to appropriately modulate emotions and impulses with independence prior to discharge.  (Progressing)       Start:  10/09/24    Expected End:  11/06/24            Pt will explore, identify, and appropriately utilize effective communication strategies to express feelings, wants, and needs with independence prior to discharge.  (Progressing)       Start:  10/09/24    Expected End:  11/06/24            Pt will complete Relapse Prevention Plan in collaboration w/ OT to identify potential stressors in home environment & plan healthy coping skills as  alternates to substance use prior to discharge.  (Progressing)       Start:  10/09/24    Expected End:  11/06/24

## 2024-10-11 NOTE — PROGRESS NOTES
MRI cervical spine results reviewed and discussed with Dr. Balderrama.  IMPRESSION:  Moderate facet arthropathy at the right C2-C3 level with surrounding  STIR hyperintensity suggestive of degenerative edema. Mild right  neural foraminal narrowing at this level.      Combination of congenital spinal canal narrowing and cervical  spondylosis resulting in moderate spinal canal stenosis at C3-C4 and  C4-C5 as well as mild-to-moderate spinal canal stenosis at C5-C6.  There is ventral cord flattening/indentation with no definite cord  signal abnormality.      Suggestion of moderate to severe bilateral neural foraminal narrowing  at C4-C5.    Continue pain control, prednisone taper and tramadol.     Continue PT/OT. Okay to WBAT.      Would advise that patient optimize her health and eliminate the use of illicit drugs and smoking prior to upcoming appointment.     Patient is scheduled to follow up with spine surgeon in December in which MRI will be available.     Orthopedics to sign off. Please do not hesitate to reach out with any further questions or concerns.

## 2024-10-11 NOTE — CONSULTS
"Reason For Consult  Chronic cervical spine pain    History Of Present Illness  Alejandra Houston is a 59 y.o. female presenting with history of severe arthritis cervical spine    Patient has a complicated past history including treatment with epidural injections CT scan at the [facility] showing moderate to severe arthritis C3-7    Patient also had EMG studies done in 2024 that shows essentially moderate carpal tunnel right side with some mild C4-5 radiculopathy right side only    Patient has had ongoing orthopedic appointments multiple ones have been canceled    She is set up to see spine surgery Dr. Gaspar on 2024 she is [facility] and is requested orthopedic consultation.     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    Surgical History  She has a past surgical history that includes Total knee arthroplasty (2018); Other surgical history (06/10/2019);  section, classic (02/15/2018); Hip Arthroplasty; Uvulopalatopharyngoplasty;  section, classic; and Knee Arthroplasty.     Social History  She reports that she has been smoking cigarettes. She started smoking about 44 years ago. She has a 22.4 pack-year smoking history. She has never used smokeless tobacco. She reports that she does not currently use alcohol. She reports current drug use. Drug: \"Crack\" cocaine.    Family History  Family History   Problem Relation Name Age of Onset    Hypertension Mother      Diabetes Mother      COPD Mother      Heart disease Mother      Lymphoma Mother      Multiple myeloma Mother   " "   Cancer Other fam hx     Diabetes Other fam hx     Hypertension Other fam hx     Heart disease Other fam hx         Allergies  Patient has no known allergies.    Review of Systems  Noncontributory     Physical Exam  Head normocephalic atraumatic  Heart regular rate rhythm  Lungs are clear to auscultation percussion  Abdominal exam nontender nondistended  Stiffness cervical spine right with flexion extension and head rotation  Patient complains of diffuse numbness and paresthesias throughout bilateral upper extremity not following any specific radicular pattern the patient is motor intact C5-T1 bilaterally    The patient occasionally complains of pain in the lower extremity but does not follow any radicular pattern over the thighs and into her legs she can plantarflex dorsiflex her toes and foot and ankle she can straight leg raise strength in the upper extremities is relatively symmetric although due to pain in the cervical spine with limited motion she has a lot of guarding over the trapezius and shoulder areas there is good capillary from pulses distally there is no redness no warmth no sign of infection no prior incisions around the neck or shoulder area  But no history of recent trauma     Last Recorded Vitals  Blood pressure 127/55, pulse 75, temperature 36.6 °C (97.9 °F), resp. rate 15, height 1.727 m (5' 7.99\"), weight 104 kg (228 lb 15.9 oz), SpO2 94%.    Relevant Results      Scheduled medications  buPROPion XL, 300 mg, oral, q AM  carvedilol, 6.25 mg, oral, Daily  ciprofloxacin-dexamethasone, 4 drop, Each Ear, BID  [START ON 10/13/2024] dulaglutide, 1.5 mg, subcutaneous, Every Sunday  DULoxetine, 60 mg, oral, BID  famotidine, 40 mg, oral, Daily  ferrous sulfate (325 mg ferrous sulfate), 1 tablet, oral, BID  folic acid, 1 mg, oral, Daily  gabapentin, 600 mg, oral, 4x daily  insulin lispro, 0-5 Units, subcutaneous, TID  metFORMIN XR, 1,000 mg, oral, Daily with breakfast  pantoprazole, 40 mg, oral, Daily " before breakfast  predniSONE, 20 mg, oral, Daily   Followed by  [START ON 10/12/2024] predniSONE, 15 mg, oral, Daily   Followed by  [START ON 10/14/2024] predniSONE, 10 mg, oral, Daily   Followed by  [START ON 10/16/2024] predniSONE, 5 mg, oral, Daily  thiamine, 100 mg, oral, Daily      Continuous medications     PRN medications  PRN medications: acetaminophen, albuterol, alum-mag hydroxide-simeth, benzocaine-menthol, hydrOXYzine pamoate, ibuprofen, magnesium hydroxide, traMADol, traZODone  Results for orders placed or performed during the hospital encounter of 10/09/24 (from the past 24 hour(s))   POCT GLUCOSE   Result Value Ref Range    POCT Glucose 120 (H) 74 - 99 mg/dL   POCT GLUCOSE   Result Value Ref Range    POCT Glucose 96 74 - 99 mg/dL   POCT GLUCOSE   Result Value Ref Range    POCT Glucose 206 (H) 74 - 99 mg/dL   POCT GLUCOSE   Result Value Ref Range    POCT Glucose 297 (H) 74 - 99 mg/dL        Assessment/Plan     Cervical spine osteoarthritic change  EMG from September 2024 showing a mild radiculopathy pattern right upper extremity with some underlying carpal tunnel  CT scan from [facility] showing multiple areas of arthritic changes C3-7    Recommended treatment plan includes:    Steroid Dosepak low-dose medication for pain control including tramadol  Consider pain management appropriate  MRI imaging study will be obtained this will help facilitate her visit with her spine surgeon and follow-up  Outpatient therapy including moist heat and traction can be appropriate in this clinical setting      Mamadou Balderrama MD

## 2024-10-12 DIAGNOSIS — E11.9 TYPE 2 DIABETES MELLITUS WITHOUT COMPLICATION, WITHOUT LONG-TERM CURRENT USE OF INSULIN (MULTI): ICD-10-CM

## 2024-10-12 DIAGNOSIS — J30.9 ALLERGIC RHINITIS, UNSPECIFIED SEASONALITY, UNSPECIFIED TRIGGER: ICD-10-CM

## 2024-10-12 DIAGNOSIS — M54.50 CHRONIC LOW BACK PAIN, UNSPECIFIED BACK PAIN LATERALITY, UNSPECIFIED WHETHER SCIATICA PRESENT: ICD-10-CM

## 2024-10-12 DIAGNOSIS — G89.29 CHRONIC LOW BACK PAIN, UNSPECIFIED BACK PAIN LATERALITY, UNSPECIFIED WHETHER SCIATICA PRESENT: ICD-10-CM

## 2024-10-12 DIAGNOSIS — R10.13 DYSPEPSIA: ICD-10-CM

## 2024-10-12 LAB
GLUCOSE BLD MANUAL STRIP-MCNC: 172 MG/DL (ref 74–99)
GLUCOSE BLD MANUAL STRIP-MCNC: 234 MG/DL (ref 74–99)
GLUCOSE BLD MANUAL STRIP-MCNC: 288 MG/DL (ref 74–99)
GLUCOSE BLD MANUAL STRIP-MCNC: 95 MG/DL (ref 74–99)

## 2024-10-12 PROCEDURE — 97150 GROUP THERAPEUTIC PROCEDURES: CPT | Mod: GO

## 2024-10-12 PROCEDURE — 99232 SBSQ HOSP IP/OBS MODERATE 35: CPT | Performed by: PSYCHIATRY & NEUROLOGY

## 2024-10-12 PROCEDURE — 82947 ASSAY GLUCOSE BLOOD QUANT: CPT

## 2024-10-12 PROCEDURE — 1140000001 HC PRIVATE PSYCH ROOM DAILY

## 2024-10-12 PROCEDURE — 2500000001 HC RX 250 WO HCPCS SELF ADMINISTERED DRUGS (ALT 637 FOR MEDICARE OP): Performed by: REGISTERED NURSE

## 2024-10-12 PROCEDURE — 2500000001 HC RX 250 WO HCPCS SELF ADMINISTERED DRUGS (ALT 637 FOR MEDICARE OP): Performed by: PSYCHIATRY & NEUROLOGY

## 2024-10-12 PROCEDURE — 2500000004 HC RX 250 GENERAL PHARMACY W/ HCPCS (ALT 636 FOR OP/ED): Performed by: REGISTERED NURSE

## 2024-10-12 PROCEDURE — 2500000002 HC RX 250 W HCPCS SELF ADMINISTERED DRUGS (ALT 637 FOR MEDICARE OP, ALT 636 FOR OP/ED): Performed by: NURSE PRACTITIONER

## 2024-10-12 PROCEDURE — 2500000002 HC RX 250 W HCPCS SELF ADMINISTERED DRUGS (ALT 637 FOR MEDICARE OP, ALT 636 FOR OP/ED): Performed by: PSYCHIATRY & NEUROLOGY

## 2024-10-12 ASSESSMENT — COLUMBIA-SUICIDE SEVERITY RATING SCALE - C-SSRS
2. HAVE YOU ACTUALLY HAD ANY THOUGHTS OF KILLING YOURSELF?: NO
6. HAVE YOU EVER DONE ANYTHING, STARTED TO DO ANYTHING, OR PREPARED TO DO ANYTHING TO END YOUR LIFE?: NO
1. SINCE LAST CONTACT, HAVE YOU WISHED YOU WERE DEAD OR WISHED YOU COULD GO TO SLEEP AND NOT WAKE UP?: NO

## 2024-10-12 ASSESSMENT — PAIN SCALES - WONG BAKER
WONGBAKER_NUMERICALRESPONSE: NO HURT
WONGBAKER_NUMERICALRESPONSE: HURTS WHOLE LOT

## 2024-10-12 ASSESSMENT — PAIN SCALES - GENERAL
PAINLEVEL_OUTOF10: 7
PAINLEVEL_OUTOF10: 9
PAINLEVEL_OUTOF10: 9
PAINLEVEL_OUTOF10: 6
PAINLEVEL_OUTOF10: 7
PAINLEVEL_OUTOF10: 0 - NO PAIN
PAINLEVEL_OUTOF10: 9
PAINLEVEL_OUTOF10: 6

## 2024-10-12 ASSESSMENT — PAIN - FUNCTIONAL ASSESSMENT
PAIN_FUNCTIONAL_ASSESSMENT: 0-10

## 2024-10-12 ASSESSMENT — PAIN DESCRIPTION - LOCATION
LOCATION: OTHER (COMMENT)
LOCATION: BACK

## 2024-10-12 NOTE — CARE PLAN
Problem: Diabetes  Goal: Achieve decreasing blood glucose levels by end of shift  Outcome: Progressing  Goal: Increase stability of blood glucose readings by end of shift  Outcome: Progressing  Goal: Decrease in ketones present in urine by end of shift  Outcome: Progressing  Goal: Maintain electrolyte levels within acceptable range throughout shift  Outcome: Progressing  Goal: Maintain glucose levels >70mg/dl to <250mg/dl throughout shift  Outcome: Progressing  Goal: No changes in neurological exam by end of shift  Outcome: Progressing  Goal: Learn about and adhere to nutrition recommendations by end of shift  Outcome: Progressing  Goal: Vital signs within normal range for age by end of shift  Outcome: Progressing  Goal: Increase self care and/or family involovement by end of shift  Outcome: Progressing  Goal: Receive DSME education by end of shift  Outcome: Progressing     Problem: Fall/Injury  Goal: Not fall by end of shift  Outcome: Progressing  Goal: Be free from injury by end of the shift  Outcome: Progressing  Goal: Verbalize understanding of personal risk factors for fall in the hospital  Outcome: Progressing  Goal: Verbalize understanding of risk factor reduction measures to prevent injury from fall in the home  Outcome: Progressing  Goal: Use assistive devices by end of the shift  Outcome: Progressing  Goal: Pace activities to prevent fatigue by end of the shift  Outcome: Progressing     Problem: Fall/Injury  Goal: Not fall by end of shift  Outcome: Progressing  Goal: Be free from injury by end of the shift  Outcome: Progressing  Goal: Verbalize understanding of personal risk factors for fall in the hospital  Outcome: Progressing  Goal: Verbalize understanding of risk factor reduction measures to prevent injury from fall in the home  Outcome: Progressing  Goal: Use assistive devices by end of the shift  Outcome: Progressing  Goal: Pace activities to prevent fatigue by end of the shift  Outcome:  Progressing     Problem: Pain  Goal: Takes deep breaths with improved pain control throughout the shift  Outcome: Progressing  Goal: Turns in bed with improved pain control throughout the shift  Outcome: Progressing  Goal: Walks with improved pain control throughout the shift  Outcome: Progressing  Goal: Performs ADL's with improved pain control throughout shift  Outcome: Progressing  Goal: Participates in PT with improved pain control throughout the shift  Outcome: Progressing  Goal: Free from opioid side effects throughout the shift  Outcome: Progressing  Goal: Free from acute confusion related to pain meds throughout the shift  Outcome: Progressing   The patient's goals for the shift include pain control      The clinical goals for the shift include rest and safety          Patient A&Ox2.  She denies SI/HI and AVH. Her behavior is in control. She calm and approachable. Patient witnessed by staff having a tearful conversation with her family on the phone. Patient offered verbal comfort she stated that she was ok. She focused on pain control. Medication reviewed and patient is adherent to medications as ordered. She reports pain 7/10. She refuses PRN pain medication when offered and encouraged. PRN sleep aide and anxiety medication administered Will continue to monitor.    Sleep Hours: 7    0600 Patient c/o pain PRN ultram provided

## 2024-10-12 NOTE — PROGRESS NOTES
"Alejandra Houston is a 59 y.o. female on day 3 of admission presenting with MDD.    Subjective   Patient still very depressed  Pt believe that her medication is not working especially the pain meds  Cymbalta dose was adjusted  Tearful that nobody believes her  On steroid and ultram, motrin  Pt has passive SI, hopeless and worthless    Objective     MSE  General: Appropriately groomed and dressed.  Appearance: Appears stated age.  Attitude: Calm, cooperative.  Behavior: Appropriate eye contact.  Motor activity: No agitation or retardation. no EPS.  Impaired gait  Speech: Regular rate, rhythm, volume and tone.  Mood: Depressed  Affect: Flat  Thought process: Organized, linear, goal-directed.  Associations are logical.  Thought content: passive SI  Hopeless feeling  Thought perception: Did not endorse auditory or visual hallucinations.  Cognition: Alert, oriented x3.  no deficit in memory or attention.  Insight: Poor  Judgment: Fair.    Last Recorded Vitals  /80   Pulse 67   Temp 36.1 °C (97 °F)   Resp 18   Ht 1.727 m (5' 7.99\")   Wt 104 kg (228 lb 15.9 oz)   SpO2 98%   BMI 34.83 kg/m²      Relevant Results  Scheduled medications  buPROPion XL, 300 mg, oral, q AM  carvedilol, 6.25 mg, oral, Daily  ciprofloxacin-dexamethasone, 4 drop, Each Ear, BID  [START ON 10/13/2024] dulaglutide, 1.5 mg, subcutaneous, Every Sunday  DULoxetine, 60 mg, oral, BID  famotidine, 40 mg, oral, Daily  ferrous sulfate (325 mg ferrous sulfate), 1 tablet, oral, BID  folic acid, 1 mg, oral, Daily  gabapentin, 600 mg, oral, 4x daily  insulin lispro, 0-5 Units, subcutaneous, With meals & nightly  metFORMIN XR, 1,000 mg, oral, Daily with breakfast  pantoprazole, 40 mg, oral, Daily before breakfast  predniSONE, 15 mg, oral, Daily   Followed by  [START ON 10/14/2024] predniSONE, 10 mg, oral, Daily   Followed by  [START ON 10/16/2024] predniSONE, 5 mg, oral, Daily  thiamine, 100 mg, oral, Daily      Continuous medications     PRN " medications  PRN medications: acetaminophen, albuterol, alum-mag hydroxide-simeth, benzocaine-menthol, hydrOXYzine pamoate, ibuprofen, magnesium hydroxide, traMADol, traZODone    Results for orders placed or performed during the hospital encounter of 10/09/24 (from the past 24 hour(s))   POCT GLUCOSE   Result Value Ref Range    POCT Glucose 202 (H) 74 - 99 mg/dL   POCT GLUCOSE   Result Value Ref Range    POCT Glucose 300 (H) 74 - 99 mg/dL   POCT GLUCOSE   Result Value Ref Range    POCT Glucose 325 (H) 74 - 99 mg/dL   POCT GLUCOSE   Result Value Ref Range    POCT Glucose 95 74 - 99 mg/dL        Assessment/Plan   Diagnosis:  MDD, severe without psychosis    Impression:     Labs and Chart: reviewed  Case discussed with treatment team members  Encouraged patient to attend group and other mileu activity  Collateral from family - pending  Discharge planing  Medication:       -Cymbalta 60 mg oral twice daily       - Wellbutrin  mg oral daily     Medication Consent  Medication Consent: risks, benefits, side effects reviewed for all ordered meds and patient expressed understanding and consent obtained    Lawrence Yu MD

## 2024-10-12 NOTE — CARE PLAN
"The patient's goals for the shift include \"Go to groups\"    The clinical goals for the shift include Will have reduced pain score by end of shift    Over the shift, the patient did not make progress toward the following goals  of attending groups. Barriers to progression include pain and continued depression.  Recommendations to address these barriers include continued hospitalization and med review.        Pt up to dayroom at intervals working on puzzles,  sudokus, crosswords. Pt states that this and shows at home are a lot of what  she does fill her day with due to all of the pain that she  is in. Pt talked about her pain through the last 15 years, states that at first the injections did help, started working less and less. Pt states  that she has been to [facility] and then  and has just been referred to new pain and spine dr and hopeful about them. Pt at first downplayed the cocaine use. Pt then said that she used it for pain relief,  pt then said she had been using with friends and wanted help to quit, did not want to end  up not being able to see the grand kids anymore. Pt talked about loss of three grandkids taken from her son that were adopted out. Pt  says   that she wants to get help to prevent her grandkids from having one more loss in their life as well. Pt talked about  problems she will have if at a residential but said she will have nowhere else to go and wants to be sober. Pt had a tearful episode, talked with nurse  and then napped and awoke and was social with peers and completed puzzles and talked about things she enjoys doing. Pt is now  up in dayroom with peers.     "

## 2024-10-12 NOTE — PROGRESS NOTES
"Occupational Therapy     REHAB Therapy Assessment & Treatment    Patient Name: Alejandra Houston  MRN: [id number]  Today's Date: 10/12/2024      Activity:  Positive Self-Talk Group     Attendance:  Attendance  Activity: Discussion/reminisce, Arts/crafts  Participation: Active participation    Treatment Approach  Approach : Group therapy sessions  Patient Stated Goals: none stated  Cognition: Attention, Directions (Pt alert, oriented, & attentive. Follows complex directions w/ MIN VCs.)  Social Skills: Demonstrates ability to listen to others, Cooperates with others in group activity  Emotional: Mood (Pt mood calm, affect constricted)  Treatment Approach Comments: Pt attended & participated in afternoon therapy group focused on positive self-talk & creative expression. Pt educated on concept of “self-talk” & qualities of positive vs. negative self-talk. Pt educated on ways that self-talk can cause a self-fulfilling prophecy, using “The Cognitive Triangle” to illustrate the connections between thoughts, emotions, & behaviors. Next, pt participated in discussion of ways negative vs. positive self-talk can impact meaningful roles & occupations, including social interactions. Then, pt educated on 2 ways to make self-talk more positive. First option included using strategies from earlier “Untangling Thinking” group to identify & challenge cognitive distortions in negative self-talk statements. Second option involved use of positive affirmations. Pt given a list of positive affirmations & asked to select at least 3 they feel they need the most right now. Pt selected affirmations including: \"I have many reasons to smile\". Finally, pt given materials to create a collage to remind themselves of their selected positive affirmations. Pt spent remainder of group creating collage. At end of group, pt shared their selected affirmations & collages w/ the group, as well as how they are connected. Pt demonstrates good " understanding.      Encounter Problems       Encounter Problems (Active)       OT Goals       Pt will explore, identify, and appropriately utilize effective coping strategies to cope with daily stressors and manage emotions with independence prior to discharge.  (Progressing)       Start:  10/09/24    Expected End:  11/06/24            Pt will demonstrate ability to appropriately modulate emotions and impulses with independence prior to discharge.  (Progressing)       Start:  10/09/24    Expected End:  11/06/24            Pt will explore, identify, and appropriately utilize effective communication strategies to express feelings, wants, and needs with independence prior to discharge.  (Progressing)       Start:  10/09/24    Expected End:  11/06/24            Pt will complete Relapse Prevention Plan in collaboration w/ OT to identify potential stressors in home environment & plan healthy coping skills as alternates to substance use prior to discharge.  (Progressing)       Start:  10/09/24    Expected End:  11/06/24                 Education:  Pt given educational handout on self-talk & “The Cognitive Triangle”. Reviewed & discussed. Pt demonstrates good understanding.

## 2024-10-12 NOTE — PROGRESS NOTES
"Occupational Therapy     REHAB Therapy Assessment & Treatment    Patient Name: Alejandra Houston  MRN: [id number]  Today's Date: 10/12/2024      Activity:  Untangle Thinking Group     Attendance:  Attendance  Activity: Discussion/reminisce  Participation: Active participation    Treatment Approach  Approach : Group therapy sessions  Patient Stated Goals: none stated  Cognition: Attention, Directions (Pt alert, oriented, & attentive. Struggles to follow complex directions related to self-reflection on negative thoughts.)  Social Skills: Isolative - does not initiate involvement in social activity  Emotional: Mood (Pt mood depressed, affect congruent, tearful at times)  Treatment Approach Comments: Pt came late to day room for morning therapy group focused on \"Untangling Thinking\". Pt present for last approx. 15 minutes of group session. Pt educated on strategies for challenging distorted thinking patterns, including positive self-talk, shades of grey, & reality checking. Pt attempted to practice applying these strategies to some of her own irrational thinking patterns using handout provided, but had difficulty. Pt able to identify various negative thoughts, but did not challenge them. Attempted to educate pt by suggesting healthier ways to interpret these statements. Pt not particularly receptive, insisting statements such as, \"Things are hopeless\" are factual & rejecting alternates such as, \"Things feel hopeless right now but I am getting help.\" Pt became tearful talking about frustrations & discharge plan.  Pt demonstrates minimal understanding this date & would benefit from reinforced education.      Encounter Problems       Encounter Problems (Active)       OT Goals       Pt will explore, identify, and appropriately utilize effective coping strategies to cope with daily stressors and manage emotions with independence prior to discharge.  (Progressing)       Start:  10/09/24    Expected End:  11/06/24            Pt " will demonstrate ability to appropriately modulate emotions and impulses with independence prior to discharge.  (Progressing)       Start:  10/09/24    Expected End:  11/06/24            Pt will explore, identify, and appropriately utilize effective communication strategies to express feelings, wants, and needs with independence prior to discharge.  (Progressing)       Start:  10/09/24    Expected End:  11/06/24            Pt will complete Relapse Prevention Plan in collaboration w/ OT to identify potential stressors in home environment & plan healthy coping skills as alternates to substance use prior to discharge.  (Progressing)       Start:  10/09/24    Expected End:  11/06/24                 Education:  Pt given educational handouts on “Untangling Thinking” by challenging irrational thoughts. Reviewed, discussed, & practiced. Pt demonstrates limited understanding & could benefit from reinforced education.

## 2024-10-13 VITALS
OXYGEN SATURATION: 97 % | SYSTOLIC BLOOD PRESSURE: 159 MMHG | BODY MASS INDEX: 34.71 KG/M2 | RESPIRATION RATE: 18 BRPM | HEART RATE: 75 BPM | DIASTOLIC BLOOD PRESSURE: 91 MMHG | HEIGHT: 68 IN | TEMPERATURE: 97.3 F | WEIGHT: 228.99 LBS

## 2024-10-13 LAB
GLUCOSE BLD MANUAL STRIP-MCNC: 119 MG/DL (ref 74–99)
GLUCOSE BLD MANUAL STRIP-MCNC: 158 MG/DL (ref 74–99)
GLUCOSE BLD MANUAL STRIP-MCNC: 159 MG/DL (ref 74–99)
GLUCOSE BLD MANUAL STRIP-MCNC: 253 MG/DL (ref 74–99)
S PYO THROAT QL CULT: NORMAL

## 2024-10-13 PROCEDURE — 1140000001 HC PRIVATE PSYCH ROOM DAILY

## 2024-10-13 PROCEDURE — 2500000001 HC RX 250 WO HCPCS SELF ADMINISTERED DRUGS (ALT 637 FOR MEDICARE OP): Performed by: REGISTERED NURSE

## 2024-10-13 PROCEDURE — 82947 ASSAY GLUCOSE BLOOD QUANT: CPT

## 2024-10-13 PROCEDURE — 2500000001 HC RX 250 WO HCPCS SELF ADMINISTERED DRUGS (ALT 637 FOR MEDICARE OP): Performed by: PSYCHIATRY & NEUROLOGY

## 2024-10-13 PROCEDURE — 97150 GROUP THERAPEUTIC PROCEDURES: CPT | Mod: GO

## 2024-10-13 PROCEDURE — 2500000004 HC RX 250 GENERAL PHARMACY W/ HCPCS (ALT 636 FOR OP/ED): Performed by: REGISTERED NURSE

## 2024-10-13 PROCEDURE — 2500000002 HC RX 250 W HCPCS SELF ADMINISTERED DRUGS (ALT 637 FOR MEDICARE OP, ALT 636 FOR OP/ED): Performed by: NURSE PRACTITIONER

## 2024-10-13 PROCEDURE — 97530 THERAPEUTIC ACTIVITIES: CPT | Mod: GO

## 2024-10-13 PROCEDURE — 2500000002 HC RX 250 W HCPCS SELF ADMINISTERED DRUGS (ALT 637 FOR MEDICARE OP, ALT 636 FOR OP/ED): Performed by: PSYCHIATRY & NEUROLOGY

## 2024-10-13 PROCEDURE — 99232 SBSQ HOSP IP/OBS MODERATE 35: CPT | Performed by: PSYCHIATRY & NEUROLOGY

## 2024-10-13 ASSESSMENT — COLUMBIA-SUICIDE SEVERITY RATING SCALE - C-SSRS
5. HAVE YOU STARTED TO WORK OUT OR WORKED OUT THE DETAILS OF HOW TO KILL YOURSELF? DO YOU INTEND TO CARRY OUT THIS PLAN?: NO
6. HAVE YOU EVER DONE ANYTHING, STARTED TO DO ANYTHING, OR PREPARED TO DO ANYTHING TO END YOUR LIFE?: NO
2. HAVE YOU ACTUALLY HAD ANY THOUGHTS OF KILLING YOURSELF?: NO
1. SINCE LAST CONTACT, HAVE YOU WISHED YOU WERE DEAD OR WISHED YOU COULD GO TO SLEEP AND NOT WAKE UP?: YES
2. HAVE YOU ACTUALLY HAD ANY THOUGHTS OF KILLING YOURSELF?: NO
5. HAVE YOU STARTED TO WORK OUT OR WORKED OUT THE DETAILS OF HOW TO KILL YOURSELF? DO YOU INTEND TO CARRY OUT THIS PLAN?: NO
6. HAVE YOU EVER DONE ANYTHING, STARTED TO DO ANYTHING, OR PREPARED TO DO ANYTHING TO END YOUR LIFE?: NO
1. SINCE LAST CONTACT, HAVE YOU WISHED YOU WERE DEAD OR WISHED YOU COULD GO TO SLEEP AND NOT WAKE UP?: YES
2. HAVE YOU ACTUALLY HAD ANY THOUGHTS OF KILLING YOURSELF?: NO
5. HAVE YOU STARTED TO WORK OUT OR WORKED OUT THE DETAILS OF HOW TO KILL YOURSELF? DO YOU INTEND TO CARRY OUT THIS PLAN?: NO
1. SINCE LAST CONTACT, HAVE YOU WISHED YOU WERE DEAD OR WISHED YOU COULD GO TO SLEEP AND NOT WAKE UP?: NO
6. HAVE YOU EVER DONE ANYTHING, STARTED TO DO ANYTHING, OR PREPARED TO DO ANYTHING TO END YOUR LIFE?: NO

## 2024-10-13 ASSESSMENT — PAIN SCALES - GENERAL
PAINLEVEL_OUTOF10: 9
PAINLEVEL_OUTOF10: 8
PAINLEVEL_OUTOF10: 9
PAINLEVEL_OUTOF10: 8
PAINLEVEL_OUTOF10: 8
PAINLEVEL_OUTOF10: 9
PAINLEVEL_OUTOF10: 10 - WORST POSSIBLE PAIN
PAINLEVEL_OUTOF10: 8
PAINLEVEL_OUTOF10: 8

## 2024-10-13 ASSESSMENT — PAIN - FUNCTIONAL ASSESSMENT
PAIN_FUNCTIONAL_ASSESSMENT: 0-10

## 2024-10-13 ASSESSMENT — PAIN DESCRIPTION - LOCATION
LOCATION: NECK

## 2024-10-13 ASSESSMENT — PAIN DESCRIPTION - DESCRIPTORS
DESCRIPTORS: ACHING;SHARP
DESCRIPTORS: ACHING;SHARP
DESCRIPTORS: ACHING;SPASM
DESCRIPTORS: ACHING;SHARP
DESCRIPTORS: ACHING;SHOOTING
DESCRIPTORS: ACHING;SHARP

## 2024-10-13 NOTE — PROGRESS NOTES
Occupational Therapy     REHAB Therapy Assessment & Treatment    Patient Name: Alejandra Houston  MRN: [id number]  Today's Date: 10/13/2024      Attendance:  Attendance  Activity: One-to-one  Participation: Active participation    Therapeutic Recreation:  Treatment Approach  Approach : 1 to1 Therapy sessions  Patient Stated Goals: No goal identified at this time  Cognition:  (Alert and oriented, attentive.)  Social Skills:  (Interacting appropriately with therapist in 1:1 setting)  Emotional:  (Depressed, tearful, frustrated.)  Treatment Approach Comments: Pt met with therapist in 1:1 setting to discuss handout and information from morning OT session on managing mental health and goal setting.  Pt processed admission into hospital and stressors related to chronic pain, management of mental health, and recent discord between self and adult daughter who pt shared kicked her out of the house prior to her admission due to drug use.  Pt tearful throughout session.  Currently stating that she feels safe on unit, however making statements that she does not know how much longer she can go on being in chronic pain.  Pt discussed lack of support from family members and discussed with therapist increasing support through community mental health agencies.  Pt verbalized her grandchildren as her only current protective factors at this time.  Encouraged to attend afternoon OT session and increase time spent in milieu with peers in pain allows.      Encounter Problems       Encounter Problems (Active)       OT Goals       Pt will explore, identify, and appropriately utilize effective coping strategies to cope with daily stressors and manage emotions with independence prior to discharge.  (Progressing)       Start:  10/09/24    Expected End:  11/06/24            Pt will demonstrate ability to appropriately modulate emotions and impulses with independence prior to discharge.  (Progressing)       Start:  10/09/24    Expected End:   11/06/24            Pt will explore, identify, and appropriately utilize effective communication strategies to express feelings, wants, and needs with independence prior to discharge.  (Progressing)       Start:  10/09/24    Expected End:  11/06/24            Pt will complete Relapse Prevention Plan in collaboration w/ OT to identify potential stressors in home environment & plan healthy coping skills as alternates to substance use prior to discharge.  (Progressing)       Start:  10/09/24    Expected End:  11/06/24                     Education Documentation  No documentation found.  Education Comments  No comments found.          Additional Comments:

## 2024-10-13 NOTE — PROGRESS NOTES
Occupational Therapy     REHAB Therapy Assessment & Treatment    Patient Name: Alejandra Houston  MRN: [id number]  Today's Date: 10/13/2024           Attendance:  Attendance  Activity: Discussion/reminisce (Managing Mental Health)  Participation: Active participation    Therapeutic Recreation:  Treatment Approach  Approach : Group therapy sessions (70 minutes)  Patient Stated Goals: No goal identified at this time  Cognition: Attention, Directions, Orientation (Pt alert and oriented, attentive throughout duration of session.  Redirection required at times due to monopolizing group conversation and tangential conversations.)  Social Skills: Cooperates with others in group activity, Demonstrates ability to listen to others, Interacts independently in social activity  Emotional:  (Depressed, frustrated.  Tearful at times.)  Treatment Approach Comments: Pt attended group with active participation. Attentive and focused throughout duration of session.  Requiring redirection at times due to monopolizing group conversation and tangential conversations, easily redirected.  Pt displayed decreased insight when discussing topics related to managing mental health, particularly when discussing use of crack cocaine to which pt did not appear concerned of her usage or finds it problematic.  Pt verbalized frustration with her history of chronic pain treatment and lack of support from family members.  Receptive of support provided by peers and therapist.      Encounter Problems       Encounter Problems (Active)       OT Goals       Pt will explore, identify, and appropriately utilize effective coping strategies to cope with daily stressors and manage emotions with independence prior to discharge.  (Progressing)       Start:  10/09/24    Expected End:  11/06/24            Pt will demonstrate ability to appropriately modulate emotions and impulses with independence prior to discharge.  (Progressing)       Start:  10/09/24    Expected  End:  11/06/24            Pt will explore, identify, and appropriately utilize effective communication strategies to express feelings, wants, and needs with independence prior to discharge.  (Progressing)       Start:  10/09/24    Expected End:  11/06/24            Pt will complete Relapse Prevention Plan in collaboration w/ OT to identify potential stressors in home environment & plan healthy coping skills as alternates to substance use prior to discharge.  (Progressing)       Start:  10/09/24    Expected End:  11/06/24                     Education Documentation  No documentation found.  Education Comments  No comments found.          Additional Comments:

## 2024-10-13 NOTE — CARE PLAN
The patient's goals for the shift include Pt reports that she would like doctors to help her with her pain issues.    The clinical goals for the shift include Pt will sleep > 6 hours tonight.    Pt reports she's having severe pain issues in her neck and back.  Pt reports feeling hopeless because doctors aren't taking her seriously.  Pt reports high levels of depression and moderate levels of anxiety.  When asked about suicidal thoughts, pt states that she can't go on living with pain.  Pt denies having any active plan or intent to suicide.  Pt denies HI and A/V hallucinations.  Pt affect is blunted.  Pt A & O x 4.  Pt encouraged to ambulate with walker.  Pt compliant with scheduled medications.  Pt given PRN vistaril for anxiety and PRN trazodone for sleep.    Pt was up throughout the night and appeared to sleep little.    Problem: Diabetes  Goal: Achieve decreasing blood glucose levels by end of shift  Outcome: Progressing     Problem: Fall/Injury  Goal: Not fall by end of shift  Outcome: Progressing  Goal: Be free from injury by end of the shift  Outcome: Progressing  Goal: Verbalize understanding of personal risk factors for fall in the hospital  Outcome: Progressing     Problem: Pain  Goal: Performs ADL's with improved pain control throughout shift  Outcome: Progressing     Problem: Fall/Injury  Goal: Use assistive devices by end of the shift  Outcome: Met

## 2024-10-13 NOTE — CARE PLAN
"Patient stating \"don't want to die but I just can't stand the constant pain\" Doesn't feel she would ever take her life- wants to live for grandchildren.  Patient very focused on pain-chronic neck and back- talks about no doctors giving me pain meds but nothing else works- frustrated about this.  Admits to using crack cocaine about 2x/month because it \"numbs everything\" Says had a \"wake up call\" when left crack pipe in bathroom and daughter found it and kicked her out due to endangering the kids- patient expressed regret and guilt over doing this.  States plans to quit using.  Denies anxiety or depression- just wants pain to stop. Missed pain mgt appointment as she went to the ER instead.  Has \"back doctor\" apt in December.  Patient med compliant, visible on unit attends groups.  Calm and cooperative        The patient's goals for the shift include have less pain    The clinical goals for the shift include Patient will attend group    Problem: Fall/Injury  Goal: Not fall by end of shift  Outcome: Progressing     Problem: Pain  Goal: Walks with improved pain control throughout the shift  Outcome: Progressing     Problem: Potential for Harm to Self or Others  Goal: Participates in unit activities  Outcome: Progressing  Goal: Identifies stressors that lead to harmful behaviors  Outcome: Progressing     Problem: Ineffective Coping  Goal: Identifies ineffective coping skills  Outcome: Progressing  Goal: Participates in unit activities  Outcome: Progressing     "

## 2024-10-13 NOTE — PROGRESS NOTES
"Alejandra Houston is a 59 y.o. female on day 4 of admission presenting with MDD.    Subjective   No change in her presentation   Patient still very depressed  Focused on pain issue, interested in getting an early appointment with spine surgeon  Cymbalta dose was adjusted  Tearful that nobody believes her  On steroid and ultram, motrin  Pt has passive SI, hopeless and worthless    Objective     MSE  General: Appropriately groomed and dressed.  Appearance: Appears stated age.  Attitude: Calm, cooperative.  Behavior: Appropriate eye contact.  Motor activity: No agitation or retardation. no EPS.  Impaired gait  Speech: Regular rate, rhythm, volume and tone.  Mood: Depressed  Affect: Flat  Thought process: Organized, linear, goal-directed.  Associations are logical.  Thought content: passive SI  Hopeless feeling  Thought perception: Did not endorse auditory or visual hallucinations.  Cognition: Alert, oriented x3.  no deficit in memory or attention.  Insight: Poor  Judgment: Fair.    Last Recorded Vitals  /82   Pulse 64   Temp 35.8 °C (96.4 °F)   Resp 16   Ht 1.727 m (5' 7.99\")   Wt 104 kg (228 lb 15.9 oz)   SpO2 98%   BMI 34.83 kg/m²      Relevant Results  Scheduled medications  buPROPion XL, 300 mg, oral, q AM  carvedilol, 6.25 mg, oral, Daily  ciprofloxacin-dexamethasone, 4 drop, Each Ear, BID  dulaglutide, 1.5 mg, subcutaneous, Every Sunday  DULoxetine, 60 mg, oral, BID  famotidine, 40 mg, oral, Daily  ferrous sulfate (325 mg ferrous sulfate), 1 tablet, oral, BID  folic acid, 1 mg, oral, Daily  gabapentin, 600 mg, oral, 4x daily  insulin lispro, 0-5 Units, subcutaneous, With meals & nightly  metFORMIN XR, 1,000 mg, oral, Daily with breakfast  pantoprazole, 40 mg, oral, Daily before breakfast  [START ON 10/14/2024] predniSONE, 10 mg, oral, Daily   Followed by  [START ON 10/16/2024] predniSONE, 5 mg, oral, Daily  thiamine, 100 mg, oral, Daily      Continuous medications     PRN medications  PRN medications: " acetaminophen, albuterol, alum-mag hydroxide-simeth, benzocaine-menthol, hydrOXYzine pamoate, ibuprofen, magnesium hydroxide, traMADol, traZODone    Results for orders placed or performed during the hospital encounter of 10/09/24 (from the past 24 hour(s))   POCT GLUCOSE   Result Value Ref Range    POCT Glucose 172 (H) 74 - 99 mg/dL   POCT GLUCOSE   Result Value Ref Range    POCT Glucose 234 (H) 74 - 99 mg/dL   POCT GLUCOSE   Result Value Ref Range    POCT Glucose 288 (H) 74 - 99 mg/dL   POCT GLUCOSE   Result Value Ref Range    POCT Glucose 119 (H) 74 - 99 mg/dL        Assessment/Plan   Diagnosis:  MDD, severe without psychosis    Impression:     Labs and Chart: reviewed  Case discussed with treatment team members  Encouraged patient to attend group and other mileu activity  Collateral from family - pending  Discharge planing  Medication:       -Cymbalta 60 mg oral twice daily       - Wellbutrin  mg oral daily     Medication Consent  Medication Consent: risks, benefits, side effects reviewed for all ordered meds and patient expressed understanding and consent obtained    Lawrence Yu MD

## 2024-10-14 LAB
GLUCOSE BLD MANUAL STRIP-MCNC: 115 MG/DL (ref 74–99)
GLUCOSE BLD MANUAL STRIP-MCNC: 133 MG/DL (ref 74–99)
GLUCOSE BLD MANUAL STRIP-MCNC: 156 MG/DL (ref 74–99)
GLUCOSE BLD MANUAL STRIP-MCNC: 293 MG/DL (ref 74–99)

## 2024-10-14 PROCEDURE — 2500000002 HC RX 250 W HCPCS SELF ADMINISTERED DRUGS (ALT 637 FOR MEDICARE OP, ALT 636 FOR OP/ED): Performed by: NURSE PRACTITIONER

## 2024-10-14 PROCEDURE — 2500000002 HC RX 250 W HCPCS SELF ADMINISTERED DRUGS (ALT 637 FOR MEDICARE OP, ALT 636 FOR OP/ED): Performed by: PSYCHIATRY & NEUROLOGY

## 2024-10-14 PROCEDURE — 82947 ASSAY GLUCOSE BLOOD QUANT: CPT

## 2024-10-14 PROCEDURE — 1140000001 HC PRIVATE PSYCH ROOM DAILY

## 2024-10-14 PROCEDURE — 2500000001 HC RX 250 WO HCPCS SELF ADMINISTERED DRUGS (ALT 637 FOR MEDICARE OP): Performed by: PSYCHIATRY & NEUROLOGY

## 2024-10-14 PROCEDURE — 99232 SBSQ HOSP IP/OBS MODERATE 35: CPT | Performed by: PSYCHIATRY & NEUROLOGY

## 2024-10-14 PROCEDURE — 2500000001 HC RX 250 WO HCPCS SELF ADMINISTERED DRUGS (ALT 637 FOR MEDICARE OP): Performed by: REGISTERED NURSE

## 2024-10-14 PROCEDURE — 2500000004 HC RX 250 GENERAL PHARMACY W/ HCPCS (ALT 636 FOR OP/ED): Performed by: REGISTERED NURSE

## 2024-10-14 RX ORDER — FLUTICASONE PROPIONATE 50 MCG
SPRAY, SUSPENSION (ML) NASAL
Qty: 16 G | Refills: 3 | Status: SHIPPED | OUTPATIENT
Start: 2024-10-14

## 2024-10-14 RX ORDER — TRAZODONE HYDROCHLORIDE 50 MG/1
100 TABLET ORAL NIGHTLY
Status: DISCONTINUED | OUTPATIENT
Start: 2024-10-14 | End: 2024-10-15 | Stop reason: ALTCHOICE

## 2024-10-14 RX ORDER — BUPROPION HYDROCHLORIDE 150 MG/1
450 TABLET ORAL EVERY MORNING
Status: DISCONTINUED | OUTPATIENT
Start: 2024-10-14 | End: 2024-10-25 | Stop reason: HOSPADM

## 2024-10-14 RX ORDER — CALCIUM CITRATE/VITAMIN D3 200MG-6.25
TABLET ORAL
Qty: 100 STRIP | Refills: 5 | Status: SHIPPED | OUTPATIENT
Start: 2024-10-14

## 2024-10-14 RX ORDER — VITAMIN B COMPLEX
TABLET ORAL
Qty: 30 TABLET | Refills: 3 | Status: SHIPPED | OUTPATIENT
Start: 2024-10-14

## 2024-10-14 RX ORDER — TRAZODONE HYDROCHLORIDE 50 MG/1
100 TABLET ORAL NIGHTLY PRN
Status: DISCONTINUED | OUTPATIENT
Start: 2024-10-14 | End: 2024-10-14

## 2024-10-14 RX ORDER — OMEPRAZOLE 20 MG/1
20 CAPSULE, DELAYED RELEASE ORAL
Qty: 30 CAPSULE | Refills: 0 | Status: SHIPPED | OUTPATIENT
Start: 2024-10-14 | End: 2024-11-13

## 2024-10-14 RX ORDER — FOLIC ACID 1 MG/1
TABLET ORAL
Qty: 30 TABLET | Refills: 1 | Status: SHIPPED | OUTPATIENT
Start: 2024-10-14

## 2024-10-14 RX ORDER — POLYETHYLENE GLYCOL 3350 17 G/17G
17 POWDER, FOR SOLUTION ORAL DAILY
Status: DISCONTINUED | OUTPATIENT
Start: 2024-10-14 | End: 2024-10-25 | Stop reason: HOSPADM

## 2024-10-14 ASSESSMENT — PAIN - FUNCTIONAL ASSESSMENT: PAIN_FUNCTIONAL_ASSESSMENT: 0-10

## 2024-10-14 ASSESSMENT — PAIN SCALES - WONG BAKER: WONGBAKER_NUMERICALRESPONSE: HURTS EVEN MORE

## 2024-10-14 ASSESSMENT — PAIN DESCRIPTION - LOCATION
LOCATION: OTHER (COMMENT)
LOCATION: NECK
LOCATION: OTHER (COMMENT)

## 2024-10-14 ASSESSMENT — COLUMBIA-SUICIDE SEVERITY RATING SCALE - C-SSRS
2. HAVE YOU ACTUALLY HAD ANY THOUGHTS OF KILLING YOURSELF?: NO
6. HAVE YOU EVER DONE ANYTHING, STARTED TO DO ANYTHING, OR PREPARED TO DO ANYTHING TO END YOUR LIFE?: NO
2. HAVE YOU ACTUALLY HAD ANY THOUGHTS OF KILLING YOURSELF?: NO
1. SINCE LAST CONTACT, HAVE YOU WISHED YOU WERE DEAD OR WISHED YOU COULD GO TO SLEEP AND NOT WAKE UP?: NO
6. HAVE YOU EVER DONE ANYTHING, STARTED TO DO ANYTHING, OR PREPARED TO DO ANYTHING TO END YOUR LIFE?: NO
5. HAVE YOU STARTED TO WORK OUT OR WORKED OUT THE DETAILS OF HOW TO KILL YOURSELF? DO YOU INTEND TO CARRY OUT THIS PLAN?: NO
1. SINCE LAST CONTACT, HAVE YOU WISHED YOU WERE DEAD OR WISHED YOU COULD GO TO SLEEP AND NOT WAKE UP?: YES

## 2024-10-14 ASSESSMENT — PAIN SCALES - GENERAL
PAINLEVEL_OUTOF10: 9
PAINLEVEL_OUTOF10: 8
PAINLEVEL_OUTOF10: 7
PAINLEVEL_OUTOF10: 9

## 2024-10-14 NOTE — CARE PLAN
Problem: Fall/Injury  Goal: Not fall by end of shift  Outcome: Progressing     Problem: Pain  Goal: Free from acute confusion related to pain meds throughout the shift  Outcome: Progressing     Problem: Potential for Harm to Self or Others  Goal: Participates in unit activities  Outcome: Progressing     Problem: Anxiety  Goal: Implements measures to reduce anxiety  Outcome: Progressing   The patient's goals for the shift include attend group    The clinical goals for the shift include be out of room, discuss results mri with      Pt has been out of room today. Pt up to groups and doing puzzles and coloring in dayroom with peers. Pt discussed diet and how what she eats has  an affect on her blood sugar. Pt was tearful this morning about what would happen with housing and her pain, that she did not know where she would go. Pt did talk to more family this evening and seemed to be more hopeful about situation though did not have a solid plan on discharge. Continued to talk to pt about sobriety and getting to her pain management appointment at [facility]. Pt states that she is not suicidal now but that if she was to be discharged she would be. Pt denies homicidal thoughts. Denies auditory and visual hallucinations.

## 2024-10-14 NOTE — CARE PLAN
The patient's goals for the shift include Pt reports frustration and hopelessness about pain problems and the desire to see pain management.    The clinical goals for the shift include Pt will sleep > 6 hours tonight.    Pt continues to ruminate on issues regarding pain.  Pt tearful at times and expresses frustration and hopelessness about living with pain.  Pt continues to state that she can't live with the pain she's experiencing.  Pt reports severe levels of depression and anxiety.  Pt suggests having passive suicidal thoughts but denies plan or intent.  Pt affect remain blunted and sometimes tearful.  Pt compliant with scheduled medications.  Pt given PRN trazodone for sleep and PRN vistaril for anxiety.    Pt appeared to sleep > 6 hours tonight.    Problem: Diabetes  Goal: Achieve decreasing blood glucose levels by end of shift  Outcome: Progressing     Problem: Fall/Injury  Goal: Not fall by end of shift  Outcome: Progressing  Goal: Be free from injury by end of the shift  Outcome: Progressing  Goal: Verbalize understanding of personal risk factors for fall in the hospital  Outcome: Progressing     Problem: Pain  Goal: Performs ADL's with improved pain control throughout shift  Outcome: Progressing     Problem: Altered Thought Processes as Evidenced by  Goal: STG - Desires improvement in ability to think and concentrate  Outcome: Progressing     Problem: Potential for Harm to Self or Others  Goal: Identifies deescalation techniques  Outcome: Progressing  Goal: Identifies stressors that lead to harmful behaviors  Outcome: Progressing  Goal: Notifies staff when experiencing harmful thoughts toward self/others  Outcome: Progressing     Problem: Ineffective Coping  Goal: Identifies ineffective coping skills  Outcome: Progressing  Goal: Identifies healthy coping skills  Outcome: Progressing  Goal: Demonstrates healthy coping skills  Outcome: Progressing     Problem: Alteration in Sleep  Goal: STG - Informs staff if  unable to sleep  Outcome: Progressing     Problem: Anxiety  Goal: Attempts to manage anxiety with help  Outcome: Progressing  Goal: Verbalizes ways to manage anxiety  Outcome: Progressing  Goal: Implements measures to reduce anxiety  Outcome: Progressing     Problem: Self Care Deficit  Goal: STG - Patient completes hygiene  Outcome: Progressing     Problem: Potential for Harm to Self or Others  Goal: Denies harm toward self or others  Outcome: Met     Problem: Alteration in Sleep  Goal: STG - Reports nightly sleep, duration, and quality  Outcome: Met  Goal: STG - Identifies sleep hygiene aids  Outcome: Met     Problem: Self Care Deficit  Goal: Accepts need for medications  Outcome: Met

## 2024-10-14 NOTE — PROGRESS NOTES
"Alejandra Houston is a 59 y.o. female on day 5 of admission presenting with MDD.    Subjective   Patient still very depressed, still struggling with pain in her neck.  Despite this patient has been active on the unit, spending time in the day room and attending group therapy.  Patient is tolerating medication well with no adverse effects.    Objective     MSE  General: Appropriately groomed and dressed.  Appearance: Appears stated age.  Attitude: Calm, cooperative.  Behavior: Appropriate eye contact.  Motor activity: No agitation or retardation. no EPS.  Normal gait.  Speech: Regular rate, rhythm, volume and tone.  Mood: Depressed  Affect: Flat  Thought process: Organized, linear, goal-directed.  Associations are logical.  Thought content: Does not endorse suicidal or homicidal ideation, no delusions elicited.  Thought perception: Did not endorse auditory or visual hallucinations.  Cognition: Alert, oriented x3.  no deficit in memory or attention.  Insight: Fair.  Judgment: Fair.    Last Recorded Vitals  /90   Pulse 68   Temp 36.3 °C (97.3 °F) (Temporal)   Resp 20   Ht 1.727 m (5' 7.99\")   Wt 104 kg (228 lb 15.9 oz)   SpO2 99%   BMI 34.83 kg/m²      Relevant Results  Scheduled medications  buPROPion XL, 450 mg, oral, q AM  carvedilol, 6.25 mg, oral, Daily  ciprofloxacin-dexamethasone, 4 drop, Each Ear, BID  [Held by provider] dulaglutide, 1.5 mg, subcutaneous, Every Sunday  DULoxetine, 60 mg, oral, BID  famotidine, 40 mg, oral, Daily  ferrous sulfate (325 mg ferrous sulfate), 1 tablet, oral, BID  folic acid, 1 mg, oral, Daily  gabapentin, 600 mg, oral, 4x daily  insulin lispro, 0-5 Units, subcutaneous, With meals & nightly  metFORMIN XR, 1,000 mg, oral, Daily with breakfast  pantoprazole, 40 mg, oral, Daily before breakfast  polyethylene glycol, 17 g, oral, Daily  predniSONE, 10 mg, oral, Daily   Followed by  [START ON 10/16/2024] predniSONE, 5 mg, oral, Daily  thiamine, 100 mg, oral, " Daily      Continuous medications     PRN medications  PRN medications: acetaminophen, albuterol, alum-mag hydroxide-simeth, benzocaine-menthol, hydrOXYzine pamoate, ibuprofen, magnesium hydroxide, phenyleph-min oil-petrolatum, traMADol, traZODone    Results for orders placed or performed during the hospital encounter of 10/09/24 (from the past 24 hour(s))   POCT GLUCOSE   Result Value Ref Range    POCT Glucose 253 (H) 74 - 99 mg/dL   POCT GLUCOSE   Result Value Ref Range    POCT Glucose 159 (H) 74 - 99 mg/dL   POCT GLUCOSE   Result Value Ref Range    POCT Glucose 115 (H) 74 - 99 mg/dL   POCT GLUCOSE   Result Value Ref Range    POCT Glucose 133 (H) 74 - 99 mg/dL        Assessment/Plan   Diagnosis:  MDD, severe without psychosis    Impression:     Labs and Chart: reviewed  Case discussed with treatment team members  Encouraged patient to attend group and other mileu activity  Collateral from family - pending  Discharge planing  Medication:       - Reviewed. To continue as ordered    Medication Consent  Medication Consent: no medication changes necessary for review    JUNAID Borrego-CNP

## 2024-10-15 ENCOUNTER — APPOINTMENT (OUTPATIENT)
Dept: NEUROLOGY | Facility: HOSPITAL | Age: 59
End: 2024-10-15
Payer: COMMERCIAL

## 2024-10-15 LAB
ATRIAL RATE: 80 BPM
GLUCOSE BLD MANUAL STRIP-MCNC: 117 MG/DL (ref 74–99)
GLUCOSE BLD MANUAL STRIP-MCNC: 157 MG/DL (ref 74–99)
GLUCOSE BLD MANUAL STRIP-MCNC: 163 MG/DL (ref 74–99)
GLUCOSE BLD MANUAL STRIP-MCNC: 254 MG/DL (ref 74–99)
P AXIS: 43 DEGREES
P OFFSET: 195 MS
P ONSET: 137 MS
PR INTERVAL: 156 MS
Q ONSET: 215 MS
QRS COUNT: 14 BEATS
QRS DURATION: 94 MS
QT INTERVAL: 396 MS
QTC CALCULATION(BAZETT): 456 MS
QTC FREDERICIA: 436 MS
R AXIS: -6 DEGREES
T AXIS: 44 DEGREES
T OFFSET: 413 MS
VENTRICULAR RATE: 80 BPM

## 2024-10-15 PROCEDURE — 99232 SBSQ HOSP IP/OBS MODERATE 35: CPT | Performed by: PSYCHIATRY & NEUROLOGY

## 2024-10-15 PROCEDURE — 2500000004 HC RX 250 GENERAL PHARMACY W/ HCPCS (ALT 636 FOR OP/ED): Performed by: REGISTERED NURSE

## 2024-10-15 PROCEDURE — 82947 ASSAY GLUCOSE BLOOD QUANT: CPT

## 2024-10-15 PROCEDURE — 2500000001 HC RX 250 WO HCPCS SELF ADMINISTERED DRUGS (ALT 637 FOR MEDICARE OP): Performed by: PSYCHIATRY & NEUROLOGY

## 2024-10-15 PROCEDURE — 2500000002 HC RX 250 W HCPCS SELF ADMINISTERED DRUGS (ALT 637 FOR MEDICARE OP, ALT 636 FOR OP/ED): Performed by: NURSE PRACTITIONER

## 2024-10-15 PROCEDURE — 2500000001 HC RX 250 WO HCPCS SELF ADMINISTERED DRUGS (ALT 637 FOR MEDICARE OP): Performed by: REGISTERED NURSE

## 2024-10-15 PROCEDURE — 2500000002 HC RX 250 W HCPCS SELF ADMINISTERED DRUGS (ALT 637 FOR MEDICARE OP, ALT 636 FOR OP/ED): Performed by: PSYCHIATRY & NEUROLOGY

## 2024-10-15 PROCEDURE — 1140000001 HC PRIVATE PSYCH ROOM DAILY

## 2024-10-15 RX ORDER — QUETIAPINE FUMARATE 25 MG/1
25 TABLET, FILM COATED ORAL NIGHTLY
Status: DISCONTINUED | OUTPATIENT
Start: 2024-10-15 | End: 2024-10-16

## 2024-10-15 ASSESSMENT — PAIN - FUNCTIONAL ASSESSMENT: PAIN_FUNCTIONAL_ASSESSMENT: 0-10

## 2024-10-15 ASSESSMENT — COLUMBIA-SUICIDE SEVERITY RATING SCALE - C-SSRS
1. SINCE LAST CONTACT, HAVE YOU WISHED YOU WERE DEAD OR WISHED YOU COULD GO TO SLEEP AND NOT WAKE UP?: NO
2. HAVE YOU ACTUALLY HAD ANY THOUGHTS OF KILLING YOURSELF?: YES
6. HAVE YOU EVER DONE ANYTHING, STARTED TO DO ANYTHING, OR PREPARED TO DO ANYTHING TO END YOUR LIFE?: NO
2. HAVE YOU ACTUALLY HAD ANY THOUGHTS OF KILLING YOURSELF?: YES
1. SINCE LAST CONTACT, HAVE YOU WISHED YOU WERE DEAD OR WISHED YOU COULD GO TO SLEEP AND NOT WAKE UP?: NO
6. HAVE YOU EVER DONE ANYTHING, STARTED TO DO ANYTHING, OR PREPARED TO DO ANYTHING TO END YOUR LIFE?: NO
5. HAVE YOU STARTED TO WORK OUT OR WORKED OUT THE DETAILS OF HOW TO KILL YOURSELF? DO YOU INTEND TO CARRY OUT THIS PLAN?: NO

## 2024-10-15 ASSESSMENT — PAIN SCALES - GENERAL
PAINLEVEL_OUTOF10: 10 - WORST POSSIBLE PAIN
PAINLEVEL_OUTOF10: 5 - MODERATE PAIN
PAINLEVEL_OUTOF10: 7
PAINLEVEL_OUTOF10: 10 - WORST POSSIBLE PAIN

## 2024-10-15 ASSESSMENT — PAIN DESCRIPTION - LOCATION: LOCATION: GENERALIZED

## 2024-10-15 NOTE — PROGRESS NOTES
"Alejandra Houston is a 59 y.o. female on day 6 of admission presenting with MDD.    Subjective   Patient still very depressed, complaining of chronic pain. Advised patient will be worked to get her outpatient pain management appointment and appointment with her spinal surgeon moved up if possible.  Encouraged patient to continue to remain active, she has been participating in group therapy.  Increasing Wellbutrin to 450 mg oral daily today.  Starting Seroquel 25 mg for for sleep and mood stabilization.    Objective     MSE  General: Appropriately groomed and dressed.  Appearance: Appears older than stated age.  Attitude: Calm, cooperative.  Behavior: Appropriate eye contact.  Motor activity: No agitation or retardation. no EPS.  Normal gait.  Speech: Regular rate, rhythm, volume and tone.  Mood: Depressed  Affect: Flat  Thought process: Organized, linear, goal-directed.  Associations are logical.  Thought content: Does not endorse suicidal or homicidal ideation, no delusions elicited.  Thought perception: Did not endorse auditory or visual hallucinations.  Cognition: Alert, oriented x3.  no deficit in memory or attention.  Insight: Fair.  Judgment: Fair.    Last Recorded Vitals  /90   Pulse 68   Temp 35.9 °C (96.6 °F) (Temporal)   Resp 18   Ht 1.727 m (5' 7.99\")   Wt 104 kg (228 lb 15.9 oz)   SpO2 98%   BMI 34.83 kg/m²      Relevant Results  Scheduled medications  buPROPion XL, 450 mg, oral, q AM  carvedilol, 6.25 mg, oral, Daily  ciprofloxacin-dexamethasone, 4 drop, Each Ear, BID  [Held by provider] dulaglutide, 1.5 mg, subcutaneous, Every Sunday  DULoxetine, 60 mg, oral, BID  famotidine, 40 mg, oral, Daily  ferrous sulfate (325 mg ferrous sulfate), 1 tablet, oral, BID  folic acid, 1 mg, oral, Daily  gabapentin, 600 mg, oral, 4x daily  insulin lispro, 0-5 Units, subcutaneous, With meals & nightly  metFORMIN XR, 1,000 mg, oral, Daily with breakfast  pantoprazole, 40 mg, oral, Daily before " breakfast  polyethylene glycol, 17 g, oral, Daily  [START ON 10/16/2024] predniSONE, 5 mg, oral, Daily  QUEtiapine, 25 mg, oral, Nightly  thiamine, 100 mg, oral, Daily      Continuous medications     PRN medications  PRN medications: acetaminophen, albuterol, alum-mag hydroxide-simeth, benzocaine-menthol, hydrOXYzine pamoate, ibuprofen, magnesium hydroxide, phenyleph-min oil-petrolatum, traMADol    Results for orders placed or performed during the hospital encounter of 10/09/24 (from the past 24 hour(s))   POCT GLUCOSE   Result Value Ref Range    POCT Glucose 293 (H) 74 - 99 mg/dL   POCT GLUCOSE   Result Value Ref Range    POCT Glucose 156 (H) 74 - 99 mg/dL   POCT GLUCOSE   Result Value Ref Range    POCT Glucose 117 (H) 74 - 99 mg/dL   POCT GLUCOSE   Result Value Ref Range    POCT Glucose 157 (H) 74 - 99 mg/dL        Assessment/Plan   Diagnosis:  MDD, severe without psychosis    Impression:     Labs and Chart: reviewed  Case discussed with treatment team members  Encouraged patient to attend group and other mileu activity  Collateral from family - pending  Discharge planing  Medication:       - Reviewed. To continue as ordered    Medication Consent  Medication Consent: risks, benefits, side effects reviewed for all ordered meds and patient expressed understanding and consent obtained    JUNAID Borrego-CNP

## 2024-10-15 NOTE — CARE PLAN
Problem: Diabetes  Goal: Achieve decreasing blood glucose levels by end of shift  Outcome: Progressing  Goal: Increase stability of blood glucose readings by end of shift  Outcome: Progressing  Goal: Decrease in ketones present in urine by end of shift  Outcome: Progressing  Goal: Maintain electrolyte levels within acceptable range throughout shift  Outcome: Progressing  Goal: Maintain glucose levels >70mg/dl to <250mg/dl throughout shift  Outcome: Progressing  Goal: No changes in neurological exam by end of shift  Outcome: Progressing  Goal: Learn about and adhere to nutrition recommendations by end of shift  Outcome: Progressing  Goal: Vital signs within normal range for age by end of shift  Outcome: Progressing  Goal: Increase self care and/or family involovement by end of shift  Outcome: Progressing  Goal: Receive DSME education by end of shift  Outcome: Progressing     Problem: Fall/Injury  Goal: Not fall by end of shift  Outcome: Progressing  Goal: Be free from injury by end of the shift  Outcome: Progressing  Goal: Verbalize understanding of personal risk factors for fall in the hospital  Outcome: Progressing  Goal: Verbalize understanding of risk factor reduction measures to prevent injury from fall in the home  Outcome: Progressing  Goal: Pace activities to prevent fatigue by end of the shift  Outcome: Progressing     Problem: Fall/Injury  Goal: Not fall by end of shift  Outcome: Progressing  Goal: Be free from injury by end of the shift  Outcome: Progressing  Goal: Verbalize understanding of personal risk factors for fall in the hospital  Outcome: Progressing  Goal: Verbalize understanding of risk factor reduction measures to prevent injury from fall in the home  Outcome: Progressing  Goal: Use assistive devices by end of the shift  Outcome: Progressing  Goal: Pace activities to prevent fatigue by end of the shift  Outcome: Progressing     Problem: Fall/Injury  Goal: Not fall by end of shift  Outcome:  Progressing  Goal: Be free from injury by end of the shift  Outcome: Progressing  Goal: Verbalize understanding of personal risk factors for fall in the hospital  Outcome: Progressing  Goal: Verbalize understanding of risk factor reduction measures to prevent injury from fall in the home  Outcome: Progressing  Goal: Use assistive devices by end of the shift  Outcome: Progressing  Goal: Pace activities to prevent fatigue by end of the shift  Outcome: Progressing     Problem: Pain  Goal: Takes deep breaths with improved pain control throughout the shift  Outcome: Progressing  Goal: Turns in bed with improved pain control throughout the shift  Outcome: Progressing  Goal: Walks with improved pain control throughout the shift  Outcome: Progressing  Goal: Performs ADL's with improved pain control throughout shift  Outcome: Progressing  Goal: Participates in PT with improved pain control throughout the shift  Outcome: Progressing  Goal: Free from opioid side effects throughout the shift  Outcome: Progressing  Goal: Free from acute confusion related to pain meds throughout the shift  Outcome: Progressing     Problem: Altered Thought Processes as Evidenced by  Goal: STG - Desires improvement in ability to think and concentrate  Outcome: Progressing  Goal: STG - Participates in Occupational Therapy and other cognitive assessments  Outcome: Progressing     Problem: Potential for Harm to Self or Others  Goal: Participates in unit activities  Outcome: Progressing  Goal: Patient/Family participate in treatment and discharge plans  Outcome: Progressing  Goal: Identifies deescalation techniques  Outcome: Progressing  Goal: Understands least restrictive measures  Outcome: Progressing  Goal: Identifies stressors that lead to harmful behaviors  Outcome: Progressing  Goal: Notifies staff when experiencing harmful thoughts toward self/others  Outcome: Progressing     Problem: Educational/Scholastic Disruption  Goal: Meets educational  requirements during hospitalization  Outcome: Progressing  Goal: Attends class without disruptive behavior  Outcome: Progressing  Goal: Completes daily assignments  Outcome: Progressing     Problem: Ineffective Coping  Goal: Cooperates with admission process  Outcome: Progressing  Goal: Identifies ineffective coping skills  Outcome: Progressing  Goal: Identifies healthy coping skills  Outcome: Progressing  Goal: Demonstrates healthy coping skills  Outcome: Progressing  Goal: Participates in unit activities  Outcome: Progressing  Goal: Patient/Family participate in treatment and discharge plans  Outcome: Progressing  Goal: Patient/Family verbalizes awareness of resources  Outcome: Progressing  Goal: Understands least restrictive measures  Outcome: Progressing  Goal: Free from restraint events  Outcome: Progressing     Problem: Alteration in Sleep  Goal: STG - Informs staff if unable to sleep  Outcome: Progressing  Goal: STG - Attends breathing and relaxation group  Outcome: Progressing     Problem: Potential for Substance Withdrawal  Goal: Verbalizes signs/symptoms of withdrawal  Outcome: Progressing  Goal: Reports signs/symptoms of withdrawal  Outcome: Progressing  Goal: Free of withdrawal symptoms  Outcome: Progressing   The patient's goals for the shift include pain control    The clinical goals for the shift include comfort rest         Patient A&Ox3. She denies SI/HI and AVH. She is medication compliant. PRN pain medication given no further needs at this time.

## 2024-10-15 NOTE — PROGRESS NOTES
Occupational Therapy     REHAB Therapy Assessment & Treatment    Patient Name: Alejandra Houston  MRN: [id number]  Today's Date: 10/15/2024      Activity:  Stress Management Group     Attendance:  Attendance  Activity: Discussion/reminisce  Participation: Active participation    Treatment Approach  Approach : Group therapy sessions  Patient Stated Goals: none stated  Cognition: Attention, Directions (Pt alert, oriented, & attentive. Follows complex directions w/ MIN. VCs.)  Social Skills: Cooperates with others in group activity  Emotional: Mood (Pt mood depressed, affect tearful at times as she talks about frustration w/ her pain issues & loss of social support.)  Treatment Approach Comments: Pt attended & participated in morning therapy group focused on stress management. Pt absent for portion of group, as she left near the beginning to use bathroom & was gone for some time before returning. Pt educated on stress, including definition, positive vs. negative stressors, & why stress management is crucial to mental wellness. Next, pt educated on 6 components of stress management, including balanced daily routine, assertive communication, wellness, time management, challenging irrational thoughts, & relaxation/positive coping. Finally, pt guided through completing “Stress Bucket” activity, which includes identification & analysis of various personal components of stress management. Pt completed handout by identifying predisposing factors to their stress management, everyday stressors, major stressors, & stress relievers. Pt shared some of her stress relievers, including playing w/ her grandchildren. Pt educated on using the Stress Bucket & results of stress quiz to help identify where they can make the most meaningful changes in their stress management routines. Pt demonstrates emerging understanding & could benefit from reinforced education.      Encounter Problems       Encounter Problems (Active)       OT Goals        Pt will explore, identify, and appropriately utilize effective coping strategies to cope with daily stressors and manage emotions with independence prior to discharge.  (Progressing)       Start:  10/09/24    Expected End:  11/06/24            Pt will demonstrate ability to appropriately modulate emotions and impulses with independence prior to discharge.  (Progressing)       Start:  10/09/24    Expected End:  11/06/24            Pt will explore, identify, and appropriately utilize effective communication strategies to express feelings, wants, and needs with independence prior to discharge.  (Progressing)       Start:  10/09/24    Expected End:  11/06/24            Pt will complete Relapse Prevention Plan in collaboration w/ OT to identify potential stressors in home environment & plan healthy coping skills as alternates to substance use prior to discharge.  (Progressing)       Start:  10/09/24    Expected End:  11/06/24                 Education:  Pt given educational handouts on stress management. Reviewed & discussed. Pt demonstrates emerging understanding & could benefit from reinforced education.

## 2024-10-15 NOTE — PROGRESS NOTES
Occupational Therapy     REHAB Therapy Assessment & Treatment    Patient Name: Alejandra Houston  MRN: [id number]  Today's Date: 10/15/2024      Activity:  Anxiety & Relaxation Group     Attendance:  Attendance  Activity: Relaxation therapy, Discussion/reminisce  Participation: Passive participation    Treatment Approach  Approach : Group therapy sessions  Patient Stated Goals: none stated  Cognition: Attention (Pt alert & oriented, periodically attentive but often attentive to other things)  Social Skills:  (Somewhat disruptive of peers during relaxation session; pt kept opening food items & making noises despite encouragement to keep environment quiet)  Emotional: Mood (Pt mood depressed, affect constricted)  Stress Management/Relaxation Training: Able to identify stress of pain levels  Treatment Approach Comments: Pt attended afternoon therapy group focused on anxiety & relaxation strategies. First, pt educated on anxiety, including symptoms, treatments, & the “Cycle of Anxiety” where avoiding anxiety-provoking triggers makes us more fearful. Pt educated on strategies for reducing anxiety, including relaxation strategies, taking prescribed medications, & types of therapy including CBT & exposure therapy. Then, pt educated on specific relaxation strategies to reduce physical symptoms of stress & anxiety through “Letting Go of Stress” video. Pt guided through strategies including self-massage/acupressure, stretching, deep breathing, & progressive muscle relaxation. Pt participation throughout group minimal. Pt did not follow along w/ exercises nor participate in discussion & left group early. Pt could benefit from reinforced education.      Encounter Problems       Encounter Problems (Active)       OT Goals       Pt will explore, identify, and appropriately utilize effective coping strategies to cope with daily stressors and manage emotions with independence prior to discharge.  (Progressing)       Start:  10/09/24     Expected End:  11/06/24            Pt will demonstrate ability to appropriately modulate emotions and impulses with independence prior to discharge.  (Progressing)       Start:  10/09/24    Expected End:  11/06/24            Pt will explore, identify, and appropriately utilize effective communication strategies to express feelings, wants, and needs with independence prior to discharge.  (Progressing)       Start:  10/09/24    Expected End:  11/06/24            Pt will complete Relapse Prevention Plan in collaboration w/ OT to identify potential stressors in home environment & plan healthy coping skills as alternates to substance use prior to discharge.  (Progressing)       Start:  10/09/24    Expected End:  11/06/24                 Education:  Pt given educational handouts on anxiety & relaxation. Reviewed, discussed, & practiced relaxation strategies. Pt could benefit from reinforced education.

## 2024-10-16 ENCOUNTER — APPOINTMENT (OUTPATIENT)
Dept: PRIMARY CARE | Facility: CLINIC | Age: 59
End: 2024-10-16
Payer: COMMERCIAL

## 2024-10-16 LAB
GLUCOSE BLD MANUAL STRIP-MCNC: 102 MG/DL (ref 74–99)
GLUCOSE BLD MANUAL STRIP-MCNC: 140 MG/DL (ref 74–99)
GLUCOSE BLD MANUAL STRIP-MCNC: 182 MG/DL (ref 74–99)
GLUCOSE BLD MANUAL STRIP-MCNC: 217 MG/DL (ref 74–99)

## 2024-10-16 PROCEDURE — 2500000004 HC RX 250 GENERAL PHARMACY W/ HCPCS (ALT 636 FOR OP/ED): Performed by: REGISTERED NURSE

## 2024-10-16 PROCEDURE — 2500000002 HC RX 250 W HCPCS SELF ADMINISTERED DRUGS (ALT 637 FOR MEDICARE OP, ALT 636 FOR OP/ED): Performed by: NURSE PRACTITIONER

## 2024-10-16 PROCEDURE — 2500000001 HC RX 250 WO HCPCS SELF ADMINISTERED DRUGS (ALT 637 FOR MEDICARE OP): Performed by: PSYCHIATRY & NEUROLOGY

## 2024-10-16 PROCEDURE — 99232 SBSQ HOSP IP/OBS MODERATE 35: CPT | Performed by: PSYCHIATRY & NEUROLOGY

## 2024-10-16 PROCEDURE — 1140000001 HC PRIVATE PSYCH ROOM DAILY

## 2024-10-16 PROCEDURE — 2500000002 HC RX 250 W HCPCS SELF ADMINISTERED DRUGS (ALT 637 FOR MEDICARE OP, ALT 636 FOR OP/ED): Performed by: PSYCHIATRY & NEUROLOGY

## 2024-10-16 PROCEDURE — 82947 ASSAY GLUCOSE BLOOD QUANT: CPT

## 2024-10-16 PROCEDURE — 2500000001 HC RX 250 WO HCPCS SELF ADMINISTERED DRUGS (ALT 637 FOR MEDICARE OP): Performed by: REGISTERED NURSE

## 2024-10-16 RX ORDER — QUETIAPINE FUMARATE 25 MG/1
50 TABLET, FILM COATED ORAL NIGHTLY
Status: DISCONTINUED | OUTPATIENT
Start: 2024-10-16 | End: 2024-10-17

## 2024-10-16 ASSESSMENT — COLUMBIA-SUICIDE SEVERITY RATING SCALE - C-SSRS
5. HAVE YOU STARTED TO WORK OUT OR WORKED OUT THE DETAILS OF HOW TO KILL YOURSELF? DO YOU INTEND TO CARRY OUT THIS PLAN?: NO
6. HAVE YOU EVER DONE ANYTHING, STARTED TO DO ANYTHING, OR PREPARED TO DO ANYTHING TO END YOUR LIFE?: NO
6. HAVE YOU EVER DONE ANYTHING, STARTED TO DO ANYTHING, OR PREPARED TO DO ANYTHING TO END YOUR LIFE?: NO
2. HAVE YOU ACTUALLY HAD ANY THOUGHTS OF KILLING YOURSELF?: YES
1. SINCE LAST CONTACT, HAVE YOU WISHED YOU WERE DEAD OR WISHED YOU COULD GO TO SLEEP AND NOT WAKE UP?: YES
1. SINCE LAST CONTACT, HAVE YOU WISHED YOU WERE DEAD OR WISHED YOU COULD GO TO SLEEP AND NOT WAKE UP?: YES
2. HAVE YOU ACTUALLY HAD ANY THOUGHTS OF KILLING YOURSELF?: YES
5. HAVE YOU STARTED TO WORK OUT OR WORKED OUT THE DETAILS OF HOW TO KILL YOURSELF? DO YOU INTEND TO CARRY OUT THIS PLAN?: NO

## 2024-10-16 ASSESSMENT — PAIN SCALES - GENERAL
PAINLEVEL_OUTOF10: 10 - WORST POSSIBLE PAIN

## 2024-10-16 ASSESSMENT — PAIN - FUNCTIONAL ASSESSMENT
PAIN_FUNCTIONAL_ASSESSMENT: 0-10

## 2024-10-16 ASSESSMENT — PAIN DESCRIPTION - LOCATION: LOCATION: GENERALIZED

## 2024-10-16 NOTE — PROGRESS NOTES
Occupational Therapy     REHAB Therapy Assessment & Treatment    Patient Name: Alejandra Houston  MRN: [id number]  Today's Date: 10/16/2024      Activity:  Untangle Thinking Group     Attendance:  Attendance  Activity: Discussion/reminisce  Participation: Active participation    Treatment Approach  Approach : Group therapy sessions  Patient Stated Goals: none stated  Cognition: Attention, Directions (Pt alert, oriented, & mostly attentive but distracted at times by pain. Follows simple directions independently.)  Social Skills: Isolative - does not initiate involvement in social activity  Emotional: Mood (Pt mood depressed, affect constricted, tearful at times)  Stress Management/Relaxation Training: Able to identify stress of pain levels  Treatment Approach Comments: Pt attended & participated in morning therapy group focused on “Untangling Thinking” by challenging irrational thoughts. Pt educated on types of irrational thinking & ways irrational thinking patterns can negatively impact meaningful occupations. Then, pt educated on different cognitive distortions, including black-and-white thinking, catastrophizing, & jumping to conclusions. Pt demonstrated fair understanding through participation in activity where pt was given a statement, read aloud, identified as rational vs. irrational, & identified cognitive distortions present for irrational statements. Pt successfully identified rational statements. Shortly after this, during education on strategies for challenging distorted thinking, pt became tearful & left group, stating that she was in too much pain. Pt left to speak w/ nurse & rest in room. Pt demonstrates fair understanding.      Encounter Problems       Encounter Problems (Active)       OT Goals       Pt will explore, identify, and appropriately utilize effective coping strategies to cope with daily stressors and manage emotions with independence prior to discharge.  (Progressing)       Start:  10/09/24     Expected End:  11/06/24            Pt will demonstrate ability to appropriately modulate emotions and impulses with independence prior to discharge.  (Progressing)       Start:  10/09/24    Expected End:  11/06/24            Pt will explore, identify, and appropriately utilize effective communication strategies to express feelings, wants, and needs with independence prior to discharge.  (Progressing)       Start:  10/09/24    Expected End:  11/06/24            Pt will complete Relapse Prevention Plan in collaboration w/ OT to identify potential stressors in home environment & plan healthy coping skills as alternates to substance use prior to discharge.  (Progressing)       Start:  10/09/24    Expected End:  11/06/24                 Education:  Pt given educational handouts on “Untangling Thinking” by challenging irrational thoughts. Reviewed, discussed, & practiced. Pt demonstrates fair understanding. Pt group participation inhibited by pain.

## 2024-10-16 NOTE — PROGRESS NOTES
"Alejandra Houston is a 59 y.o. female on day 7 of admission presenting with MDD.    Subjective   Patient still depressed.  Reporting pain mostly centered around her neck.  Will discuss with medical team, treatment options for patient.  Patient denies any active SI, but states she is not sure if she can continue dealing with her chronic pain.  Patient does have upcoming pain management appointment on 10/25.  Attempted to move appointment up, but they are unable to at this time.  Patient ports some difficulty with sleep.  Will increase Seroquel to 50 mg oral daily at bedtime tonight.  Patient agreement with plan.  Patient encouraged to remain active on the unit.    Objective     MSE  General: Appropriately groomed and dressed.  Appearance: Appears older than stated age.  Attitude: Calm, cooperative.  Behavior: Appropriate eye contact.  Motor activity: No agitation or retardation. no EPS.  Normal gait.  Speech: Regular rate, rhythm, volume and tone.  Mood: Depressed  Affect: Flat  Thought process: Organized, linear, goal-directed.  Associations are logical.  Thought content:Passive SI  Thought perception: Did not endorse auditory or visual hallucinations.  Cognition: Alert, oriented x3.  no deficit in memory or attention.  Insight: Poor  Judgment: Fair.    Last Recorded Vitals  /87   Pulse 80   Temp 35.5 °C (95.9 °F) (Tympanic)   Resp 18   Ht 1.727 m (5' 7.99\")   Wt 104 kg (228 lb 15.9 oz)   SpO2 96%   BMI 34.83 kg/m²      Relevant Results  Scheduled medications  buPROPion XL, 450 mg, oral, q AM  carvedilol, 6.25 mg, oral, Daily  ciprofloxacin-dexamethasone, 4 drop, Each Ear, BID  [Held by provider] dulaglutide, 1.5 mg, subcutaneous, Every Sunday  DULoxetine, 60 mg, oral, BID  famotidine, 40 mg, oral, Daily  ferrous sulfate (325 mg ferrous sulfate), 1 tablet, oral, BID  folic acid, 1 mg, oral, Daily  gabapentin, 600 mg, oral, 4x daily  insulin lispro, 0-5 Units, subcutaneous, With meals & nightly  metFORMIN " XR, 1,000 mg, oral, Daily with breakfast  pantoprazole, 40 mg, oral, Daily before breakfast  polyethylene glycol, 17 g, oral, Daily  predniSONE, 5 mg, oral, Daily  QUEtiapine, 50 mg, oral, Nightly  thiamine, 100 mg, oral, Daily      Continuous medications     PRN medications  PRN medications: acetaminophen, albuterol, alum-mag hydroxide-simeth, benzocaine-menthol, hydrOXYzine pamoate, ibuprofen, magnesium hydroxide, phenyleph-min oil-petrolatum, traMADol    Results for orders placed or performed during the hospital encounter of 10/09/24 (from the past 24 hours)   POCT GLUCOSE   Result Value Ref Range    POCT Glucose 254 (H) 74 - 99 mg/dL   POCT GLUCOSE   Result Value Ref Range    POCT Glucose 163 (H) 74 - 99 mg/dL   POCT GLUCOSE   Result Value Ref Range    POCT Glucose 102 (H) 74 - 99 mg/dL   POCT GLUCOSE   Result Value Ref Range    POCT Glucose 182 (H) 74 - 99 mg/dL     *Note: Due to a large number of results and/or encounters for the requested time period, some results have not been displayed. A complete set of results can be found in Results Review.        Assessment/Plan   Diagnosis:  MDD, severe without psychosis    Impression:     Labs and Chart: reviewed  Case discussed with treatment team members  Encouraged patient to attend group and other mileu activity  Collateral from family - pending  Discharge planing  Medication:       - Reviewed. To continue as ordered    Medication Consent  Medication Consent: risks, benefits, side effects reviewed for all ordered meds and patient expressed understanding and consent obtained    JUNAID Borrego-CNP

## 2024-10-16 NOTE — CARE PLAN
Problem: Diabetes  Goal: Achieve decreasing blood glucose levels by end of shift  Outcome: Progressing  Goal: Increase stability of blood glucose readings by end of shift  Outcome: Progressing  Goal: Decrease in ketones present in urine by end of shift  Outcome: Progressing  Goal: Maintain electrolyte levels within acceptable range throughout shift  Outcome: Progressing  Goal: Maintain glucose levels >70mg/dl to <250mg/dl throughout shift  Outcome: Progressing  Goal: No changes in neurological exam by end of shift  Outcome: Progressing  Goal: Learn about and adhere to nutrition recommendations by end of shift  Outcome: Progressing  Goal: Vital signs within normal range for age by end of shift  Outcome: Progressing  Goal: Increase self care and/or family involovement by end of shift  Outcome: Progressing  Goal: Receive DSME education by end of shift  Outcome: Progressing     Problem: Fall/Injury  Goal: Not fall by end of shift  Outcome: Progressing  Goal: Be free from injury by end of the shift  Outcome: Progressing  Goal: Verbalize understanding of personal risk factors for fall in the hospital  Outcome: Progressing  Goal: Verbalize understanding of risk factor reduction measures to prevent injury from fall in the home  Outcome: Progressing  Goal: Pace activities to prevent fatigue by end of the shift  Outcome: Progressing     Problem: Pain  Goal: Takes deep breaths with improved pain control throughout the shift  Outcome: Progressing  Goal: Turns in bed with improved pain control throughout the shift  Outcome: Progressing  Goal: Walks with improved pain control throughout the shift  Outcome: Progressing  Goal: Performs ADL's with improved pain control throughout shift  Outcome: Progressing  Goal: Participates in PT with improved pain control throughout the shift  Outcome: Progressing  Goal: Free from opioid side effects throughout the shift  Outcome: Progressing  Goal: Free from acute confusion related to pain  meds throughout the shift  Outcome: Progressing     Problem: Fall/Injury  Goal: Not fall by end of shift  Outcome: Progressing  Goal: Be free from injury by end of the shift  Outcome: Progressing  Goal: Verbalize understanding of personal risk factors for fall in the hospital  Outcome: Progressing  Goal: Verbalize understanding of risk factor reduction measures to prevent injury from fall in the home  Outcome: Progressing  Goal: Use assistive devices by end of the shift  Outcome: Progressing  Goal: Pace activities to prevent fatigue by end of the shift  Outcome: Progressing     Problem: Fall/Injury  Goal: Not fall by end of shift  Outcome: Progressing  Goal: Be free from injury by end of the shift  Outcome: Progressing  Goal: Verbalize understanding of personal risk factors for fall in the hospital  Outcome: Progressing  Goal: Verbalize understanding of risk factor reduction measures to prevent injury from fall in the home  Outcome: Progressing  Goal: Use assistive devices by end of the shift  Outcome: Progressing  Goal: Pace activities to prevent fatigue by end of the shift  Outcome: Progressing     Problem: Altered Thought Processes as Evidenced by  Goal: STG - Desires improvement in ability to think and concentrate  Outcome: Progressing  Goal: STG - Participates in Occupational Therapy and other cognitive assessments  Outcome: Progressing     Problem: Potential for Harm to Self or Others  Goal: Participates in unit activities  Outcome: Progressing  Goal: Patient/Family participate in treatment and discharge plans  Outcome: Progressing  Goal: Identifies deescalation techniques  Outcome: Progressing  Goal: Understands least restrictive measures  Outcome: Progressing  Goal: Identifies stressors that lead to harmful behaviors  Outcome: Progressing  Goal: Notifies staff when experiencing harmful thoughts toward self/others  Outcome: Progressing     Problem: Educational/Scholastic Disruption  Goal: Meets educational  requirements during hospitalization  Outcome: Progressing  Goal: Attends class without disruptive behavior  Outcome: Progressing  Goal: Completes daily assignments  Outcome: Progressing     Problem: Ineffective Coping  Goal: Cooperates with admission process  Outcome: Progressing  Goal: Identifies ineffective coping skills  Outcome: Progressing  Goal: Identifies healthy coping skills  Outcome: Progressing  Goal: Demonstrates healthy coping skills  Outcome: Progressing  Goal: Participates in unit activities  Outcome: Progressing  Goal: Patient/Family participate in treatment and discharge plans  Outcome: Progressing  Goal: Patient/Family verbalizes awareness of resources  Outcome: Progressing  Goal: Understands least restrictive measures  Outcome: Progressing  Goal: Free from restraint events  Outcome: Progressing     Problem: Alteration in Sleep  Goal: STG - Informs staff if unable to sleep  Outcome: Progressing  Goal: STG - Attends breathing and relaxation group  Outcome: Progressing   The patient's goals for the shift include sleep    The clinical goals for the shift include sleep    Pt has been visible on the unit and watch tv with peers and ate a snack. Pt currently denies homicidal ideation and denies auditory and visual hallucinations. Pt states she having suicidal thoughts and  if she is discharged without getting medical help for her pain that when she goes home she will do something. Pt affect is liable and mood is anxious and depressed and pt took bed time meds. Will continue to monitor.

## 2024-10-16 NOTE — PROGRESS NOTES
Occupational Therapy     REHAB Therapy Assessment & Treatment    Patient Name: Alejandra Houston  MRN: [id number]  Today's Date: 10/16/2024      Activity:  Assertive Communication Group     Attendance:  Attendance  Activity: Discussion/reminisce  Participation: Passive participation    Treatment Approach  Approach : Group therapy sessions  Patient Stated Goals: none stated  Cognition: Attention, Directions (Pt alert, oriented, & attentive while present. Follows simple directions independently.)  Social Skills: Demonstrates ability to listen to others  Emotional: Mood (Pt mood depressed, affect constricted)  Stress Management/Relaxation Training: Able to identify stress of pain levels  Treatment Approach Comments: Pt attended part of afternoon therapy group focused on assertive communication skills. Began by taking communication style quiz. Pt completed & shared that quiz results indicate they are mostly a passive/passive-aggressive communicator. Pt educated in 4 primary styles of communication, including passive, aggressive, passive-aggressive, & assertive. Pt left shortly after this due to pain issues & did not return. Minimal evidence of learning due to pt only being able to tolerate a small portion of group session. Pt could benefit from reinforced education.      Encounter Problems       Encounter Problems (Active)       OT Goals       Pt will explore, identify, and appropriately utilize effective coping strategies to cope with daily stressors and manage emotions with independence prior to discharge.  (Progressing)       Start:  10/09/24    Expected End:  11/06/24            Pt will demonstrate ability to appropriately modulate emotions and impulses with independence prior to discharge.  (Progressing)       Start:  10/09/24    Expected End:  11/06/24            Pt will explore, identify, and appropriately utilize effective communication strategies to express feelings, wants, and needs with independence prior to  discharge.  (Progressing)       Start:  10/09/24    Expected End:  11/06/24            Pt will complete Relapse Prevention Plan in collaboration w/ OT to identify potential stressors in home environment & plan healthy coping skills as alternates to substance use prior to discharge.  (Progressing)       Start:  10/09/24    Expected End:  11/06/24                 Education:  Pt given educational handouts on assertive communication. Reviewed, discussed, & practiced. Minimal evidence of learning this date, pt could benefit from reinforced education.

## 2024-10-16 NOTE — CARE PLAN
"Patient continues to endorse severe pain in neck and back. Patient given PRN tramadol throughout the day, but does not relieve pain. Patient continues to endorse SI if discharged here with continued pain, this information was communicated with providers. Patient denies HI, or AVH. Patient continues to move well with walker around unit, eats meals in dayroom, and remains cooperative on the unit.    The patient's goals for the shift include \"Do something about my pain\"    The clinical goals for the shift include Patient will attend group today    Problem: Diabetes  Goal: Maintain glucose levels >70mg/dl to <250mg/dl throughout shift  Outcome: Met  Problem: Fall/Injury  Goal: Not fall by end of shift  Outcome: Met  Problem: Pain  Goal: Takes deep breaths with improved pain control throughout the shift  Outcome: Not Progressing  Goal: Turns in bed with improved pain control throughout the shift  Outcome: Not Progressing  Goal: Walks with improved pain control throughout the shift  Outcome: Not Progressing  Goal: Performs ADL's with improved pain control throughout shift  Outcome: Not Progressing     "

## 2024-10-17 LAB
25(OH)D3 SERPL-MCNC: 31 NG/ML (ref 30–100)
ANION GAP SERPL CALC-SCNC: 12 MMOL/L (ref 10–20)
BUN SERPL-MCNC: 19 MG/DL (ref 6–23)
CALCIUM SERPL-MCNC: 9.8 MG/DL (ref 8.6–10.3)
CHLORIDE SERPL-SCNC: 100 MMOL/L (ref 98–107)
CO2 SERPL-SCNC: 30 MMOL/L (ref 21–32)
CREAT SERPL-MCNC: 0.99 MG/DL (ref 0.5–1.05)
EGFRCR SERPLBLD CKD-EPI 2021: 67 ML/MIN/1.73M*2
GLUCOSE BLD MANUAL STRIP-MCNC: 146 MG/DL (ref 74–99)
GLUCOSE BLD MANUAL STRIP-MCNC: 220 MG/DL (ref 74–99)
GLUCOSE BLD MANUAL STRIP-MCNC: 83 MG/DL (ref 74–99)
GLUCOSE SERPL-MCNC: 159 MG/DL (ref 74–99)
HOLD SPECIMEN: NORMAL
HOLD SPECIMEN: NORMAL
POTASSIUM SERPL-SCNC: 4.1 MMOL/L (ref 3.5–5.3)
SODIUM SERPL-SCNC: 138 MMOL/L (ref 136–145)
VIT B12 SERPL-MCNC: 380 PG/ML (ref 211–911)

## 2024-10-17 PROCEDURE — 80048 BASIC METABOLIC PNL TOTAL CA: CPT | Performed by: REGISTERED NURSE

## 2024-10-17 PROCEDURE — 97530 THERAPEUTIC ACTIVITIES: CPT | Mod: GO

## 2024-10-17 PROCEDURE — 2500000001 HC RX 250 WO HCPCS SELF ADMINISTERED DRUGS (ALT 637 FOR MEDICARE OP): Performed by: PSYCHIATRY & NEUROLOGY

## 2024-10-17 PROCEDURE — 82607 VITAMIN B-12: CPT | Performed by: REGISTERED NURSE

## 2024-10-17 PROCEDURE — 99232 SBSQ HOSP IP/OBS MODERATE 35: CPT | Performed by: PSYCHIATRY & NEUROLOGY

## 2024-10-17 PROCEDURE — 36415 COLL VENOUS BLD VENIPUNCTURE: CPT | Performed by: REGISTERED NURSE

## 2024-10-17 PROCEDURE — 82306 VITAMIN D 25 HYDROXY: CPT | Performed by: REGISTERED NURSE

## 2024-10-17 PROCEDURE — 2500000004 HC RX 250 GENERAL PHARMACY W/ HCPCS (ALT 636 FOR OP/ED): Performed by: REGISTERED NURSE

## 2024-10-17 PROCEDURE — 2500000002 HC RX 250 W HCPCS SELF ADMINISTERED DRUGS (ALT 637 FOR MEDICARE OP, ALT 636 FOR OP/ED): Performed by: NURSE PRACTITIONER

## 2024-10-17 PROCEDURE — 2500000001 HC RX 250 WO HCPCS SELF ADMINISTERED DRUGS (ALT 637 FOR MEDICARE OP): Performed by: REGISTERED NURSE

## 2024-10-17 PROCEDURE — 1140000001 HC PRIVATE PSYCH ROOM DAILY

## 2024-10-17 PROCEDURE — 2500000005 HC RX 250 GENERAL PHARMACY W/O HCPCS: Performed by: REGISTERED NURSE

## 2024-10-17 PROCEDURE — 2500000002 HC RX 250 W HCPCS SELF ADMINISTERED DRUGS (ALT 637 FOR MEDICARE OP, ALT 636 FOR OP/ED): Performed by: PSYCHIATRY & NEUROLOGY

## 2024-10-17 PROCEDURE — 97150 GROUP THERAPEUTIC PROCEDURES: CPT | Mod: GO

## 2024-10-17 PROCEDURE — 82947 ASSAY GLUCOSE BLOOD QUANT: CPT

## 2024-10-17 RX ORDER — LIDOCAINE 560 MG/1
3 PATCH PERCUTANEOUS; TOPICAL; TRANSDERMAL DAILY
Status: DISCONTINUED | OUTPATIENT
Start: 2024-10-17 | End: 2024-10-25 | Stop reason: HOSPADM

## 2024-10-17 RX ORDER — KETOROLAC TROMETHAMINE 10 MG/1
10 TABLET, FILM COATED ORAL 2 TIMES DAILY PRN
Status: DISPENSED | OUTPATIENT
Start: 2024-10-17 | End: 2024-10-22

## 2024-10-17 RX ORDER — QUETIAPINE FUMARATE 25 MG/1
75 TABLET, FILM COATED ORAL NIGHTLY
Status: DISCONTINUED | OUTPATIENT
Start: 2024-10-17 | End: 2024-10-20

## 2024-10-17 RX ORDER — METHOCARBAMOL 500 MG/1
750 TABLET, FILM COATED ORAL EVERY 8 HOURS SCHEDULED
Status: DISPENSED | OUTPATIENT
Start: 2024-10-17 | End: 2024-10-22

## 2024-10-17 ASSESSMENT — PAIN - FUNCTIONAL ASSESSMENT: PAIN_FUNCTIONAL_ASSESSMENT: 0-10

## 2024-10-17 ASSESSMENT — COLUMBIA-SUICIDE SEVERITY RATING SCALE - C-SSRS
1. SINCE LAST CONTACT, HAVE YOU WISHED YOU WERE DEAD OR WISHED YOU COULD GO TO SLEEP AND NOT WAKE UP?: NO
2. HAVE YOU ACTUALLY HAD ANY THOUGHTS OF KILLING YOURSELF?: NO
6. HAVE YOU EVER DONE ANYTHING, STARTED TO DO ANYTHING, OR PREPARED TO DO ANYTHING TO END YOUR LIFE?: NO
5. HAVE YOU STARTED TO WORK OUT OR WORKED OUT THE DETAILS OF HOW TO KILL YOURSELF? DO YOU INTEND TO CARRY OUT THIS PLAN?: NO

## 2024-10-17 ASSESSMENT — PAIN SCALES - GENERAL
PAINLEVEL_OUTOF10: 9
PAINLEVEL_OUTOF10: 7
PAINLEVEL_OUTOF10: 7
PAINLEVEL_OUTOF10: 9
PAINLEVEL_OUTOF10: 9

## 2024-10-17 ASSESSMENT — PAIN DESCRIPTION - LOCATION
LOCATION: OTHER (COMMENT)
LOCATION: OTHER (COMMENT)

## 2024-10-17 ASSESSMENT — PAIN SCALES - PAIN ASSESSMENT IN ADVANCED DEMENTIA (PAINAD): TOTALSCORE: MEDICATION (SEE MAR)

## 2024-10-17 NOTE — CARE PLAN
Problem: Diabetes  Goal: Achieve decreasing blood glucose levels by end of shift  Outcome: Progressing  Goal: Increase stability of blood glucose readings by end of shift  Outcome: Progressing  Goal: Decrease in ketones present in urine by end of shift  Outcome: Progressing  Goal: Maintain electrolyte levels within acceptable range throughout shift  Outcome: Progressing  Goal: No changes in neurological exam by end of shift  Outcome: Progressing  Goal: Learn about and adhere to nutrition recommendations by end of shift  Outcome: Progressing  Goal: Vital signs within normal range for age by end of shift  Outcome: Progressing  Goal: Increase self care and/or family involovement by end of shift  Outcome: Progressing  Goal: Receive DSME education by end of shift  Outcome: Progressing     Problem: Fall/Injury  Goal: Be free from injury by end of the shift  Outcome: Progressing  Goal: Verbalize understanding of personal risk factors for fall in the hospital  Outcome: Progressing  Goal: Verbalize understanding of risk factor reduction measures to prevent injury from fall in the home  Outcome: Progressing  Goal: Pace activities to prevent fatigue by end of the shift  Outcome: Progressing     Problem: Pain  Goal: Takes deep breaths with improved pain control throughout the shift  Outcome: Progressing  Goal: Turns in bed with improved pain control throughout the shift  Outcome: Progressing  Goal: Walks with improved pain control throughout the shift  Outcome: Progressing  Goal: Performs ADL's with improved pain control throughout shift  Outcome: Progressing  Goal: Participates in PT with improved pain control throughout the shift  Outcome: Progressing  Goal: Free from opioid side effects throughout the shift  Outcome: Progressing  Goal: Free from acute confusion related to pain meds throughout the shift  Outcome: Progressing     Problem: Fall/Injury  Goal: Not fall by end of shift  Outcome: Progressing  Goal: Be free from  injury by end of the shift  Outcome: Progressing  Goal: Verbalize understanding of personal risk factors for fall in the hospital  Outcome: Progressing  Goal: Verbalize understanding of risk factor reduction measures to prevent injury from fall in the home  Outcome: Progressing  Goal: Use assistive devices by end of the shift  Outcome: Progressing  Goal: Pace activities to prevent fatigue by end of the shift  Outcome: Progressing     Problem: Fall/Injury  Goal: Not fall by end of shift  Outcome: Progressing  Goal: Be free from injury by end of the shift  Outcome: Progressing  Goal: Verbalize understanding of personal risk factors for fall in the hospital  Outcome: Progressing  Goal: Verbalize understanding of risk factor reduction measures to prevent injury from fall in the home  Outcome: Progressing  Goal: Use assistive devices by end of the shift  Outcome: Progressing  Goal: Pace activities to prevent fatigue by end of the shift  Outcome: Progressing     Problem: Altered Thought Processes as Evidenced by  Goal: STG - Desires improvement in ability to think and concentrate  Outcome: Progressing  Goal: STG - Participates in Occupational Therapy and other cognitive assessments  Outcome: Progressing     Problem: Potential for Harm to Self or Others  Goal: Participates in unit activities  Outcome: Progressing  Goal: Patient/Family participate in treatment and discharge plans  Outcome: Progressing  Goal: Identifies deescalation techniques  Outcome: Progressing  Goal: Understands least restrictive measures  Outcome: Progressing  Goal: Identifies stressors that lead to harmful behaviors  Outcome: Progressing  Goal: Notifies staff when experiencing harmful thoughts toward self/others  Outcome: Progressing     Problem: Educational/Scholastic Disruption  Goal: Meets educational requirements during hospitalization  Outcome: Progressing  Goal: Attends class without disruptive behavior  Outcome: Progressing  Goal: Completes  daily assignments  Outcome: Progressing     Problem: Ineffective Coping  Goal: Cooperates with admission process  Outcome: Progressing  Goal: Identifies ineffective coping skills  Outcome: Progressing  Goal: Identifies healthy coping skills  Outcome: Progressing  Goal: Demonstrates healthy coping skills  Outcome: Progressing  Goal: Participates in unit activities  Outcome: Progressing  Goal: Patient/Family participate in treatment and discharge plans  Outcome: Progressing  Goal: Patient/Family verbalizes awareness of resources  Outcome: Progressing  Goal: Understands least restrictive measures  Outcome: Progressing  Goal: Free from restraint events  Outcome: Progressing     Problem: Alteration in Sleep  Goal: STG - Informs staff if unable to sleep  Outcome: Progressing  Goal: STG - Attends breathing and relaxation group  Outcome: Progressing     Problem: Potential for Substance Withdrawal  Goal: Verbalizes signs/symptoms of withdrawal  Outcome: Progressing  Goal: Reports signs/symptoms of withdrawal  Outcome: Progressing  Goal: Free of withdrawal symptoms  Outcome: Progressing   The patient's goals for the shift include sleep    The clinical goals for the shift include sleep    Pt has been visible on the unit and social with peers and ate a snack and watch tv. Pt currently having suicidal thoughts pt continues to state if she is discharged without getting help for her pain  she will do something when she gets home. Pt denies homicidal ideation and denies any auditory or visual hallucinations. Pt affect is flat and mood is anxious and depressed and pt teary eyed at times. Pt took bed time meds and will continue to monitor.

## 2024-10-17 NOTE — SIGNIFICANT EVENT
"Internal Medicine -   Spoke with Dr. Henson with Pain Management  Recommend Toradol BID 10mg x 5 days, no other NSAIDS and continue Tramadol  Will add Lidoderm patches and PT  Pt complains of chronic neck pain  MRI was reviewed Ortho, no acute needs at this time - follow up outpatient.   Continue with PT  Add Robaxin     Patient noted to be walking in day room without walker, movement is improved from previous assessments. Appears to be in good spirit at that time of observation.     Discussion with patient along with RIVERA Bradley regarding recommendations form Orthopedics including MRI results and their recommendations. We also discussed at length the recommendations of Pain Management and continued CBT therapy while inpatient. Patient is pain focused and tearful.  Therapeutic communication provided. Encouragement to try current regiment including heat/ice as appropriate along with recommended pharmaceutical interventions. Pt reports she cannot stop being depressed unless she is without pain.\" Advised patient we are assisting in her coping with her pain through a holistic approach including pain management as well as psychiatric management of her depression. Pt reports she understands and is more willing to try the recommendations as per pain management. She will also remain focused on improving her mental health and well-being. Advised patient that our goals are to facility the tools for her to cope with her chronic pain, however, we may never be able to eliminate her pain. She verbalizes understanding through teach back method.     IMPRESSION:  Moderate facet arthropathy at the right C2-C3 level with surrounding  STIR hyperintensity suggestive of degenerative edema. Mild right  neural foraminal narrowing at this level.      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  There is ventral cord flattening/indentation with no definite cord  signal abnormality.      Suggestion of moderate to severe bilateral neural foraminal narrowing  at C4-C5.      Indeterminate 1.3 cm lesion within the superficial aspect of the left  parotid gland as characterized on 2021 ultrasound examination.          Kirk STAFFORD at bedside when discussing with patient plan of care.       10/17/24 at 2:38 PM - JUNAID Aiken-CNP

## 2024-10-17 NOTE — PROGRESS NOTES
"Occupational Therapy     REHAB Therapy Assessment & Treatment    Patient Name: Alejandra Houston  MRN: [id number]  Today's Date: 10/17/2024    Attendance:  Attendance  Activity: Discussion/reminisce, One-to-one (OT 1:1 Ther. Act: Assertiveness skills/OT GRP: \"Practice Makes Better\" relaxation skills 2-sided paper.)  Participation: Active participation    OT 1:1 Therapeutic Activity/OT GRP:  Treatment Approach  Approach : 30 to 45 minutes, 1 to1 Therapy sessions, 15 to 25 minutes, Group therapy sessions  Patient Stated Goals: Pt’s goal: To be \"...more assertive w/my family to get support from them,\" per Pt.  Cognition: Attention (Pt's statements & contributions to discussion are linear, organized, & on-topic. Pt follows complex multi-step directions independently.)  Social Skills: Cooperates with others in group activity, Demonstrates ability to listen to others (appropriately stimulated & demonstrates social skills, Pt is appropriately spontaneous in her interactions.)  Emotional: Mood (Pt mood depressed, affect tearful in 1:1, but mood improved more euthymic by end of group session.)  Stress Management/Relaxation Training: Identifies benefits of stress management/relaxation techniques, Identifies difficulty adjusting to illness, hospitalization, or pain, Performs stress management/relaxation techniques  Treatment Approach Comments: OT 1:1 Ther. Act: \"Developing a Balanced Daily Routine\" paper was reviewed w/Pt identifying current chroinc pain as her biggest obstacle to overcome. At one poiunt Pt became tearful stating that neither of her daughters were willing to \"...hold my hand and say that they loved me and supported me in all I'm going through,'' per Pt. She verbalized remorse for being catch with my crack pipe, because that isn't good for the 8 or 12 yo to know or see,\" per Pt. When asked if Pt was ready to give up drugs, she changed the subject to, \"I have a lot of chronic pain & no one wants to help me or even " "just recognize tht I'm suffering.\" OT verbalized compassion for Pt's chronic pain while offering Pt info on spoke at length. accepted (+) coping tools to prevent relapse while gaining a balance between quality sleep, adequate self-care, productive work & healthy leisure. Pt participated in relaxation techniques of PLB, Guided Imagery, Progressive Muscle tensing/release, Mindful Meditation & Acupressure techniques to safely decrease anger & to safely decrease panic. Pt set a goal to create a balanced daily routine to increase productivity & esteem. Re: Assertiveness, Pt admitted needing to work on communication after a high score in passive aggressiveness & noted this as a problem in a relationship w/family. She noted prior [place] work in OT on the use of “I feel/need…” phrases, compromise, delegate, prioritizing, saying No and setting limits as assertive skills to improve esteem and gaining self-control, putting stress in perspective. Pt continued to c/o no support from her family. OT reminded pt that she can only work on herself, no control over Pt's dauters or spouse & Pt agreed. OT group: Pt accurately practiced the use of relaxation skills of PLB, Guided Imagery and Progressive Muscle tensing/release to safely decrease panic. Pt appeared more open to practicing (+) stress mgt tools to gain control over depression.      Encounter Problems       Encounter Problems (Active)       OT Goals       Pt will explore, identify, and appropriately utilize effective coping strategies to cope with daily stressors and manage emotions with independence prior to discharge.  (Progressing)       Start:  10/09/24    Expected End:  11/06/24            Pt will demonstrate ability to appropriately modulate emotions and impulses with independence prior to discharge.  (Progressing)       Start:  10/09/24    Expected End:  11/06/24            Pt will explore, identify, and appropriately utilize effective communication strategies to " express feelings, wants, and needs with independence prior to discharge.  (Progressing)       Start:  10/09/24    Expected End:  11/06/24            Pt will complete Relapse Prevention Plan in collaboration w/ OT to identify potential stressors in home environment & plan healthy coping skills as alternates to substance use prior to discharge.  (Progressing)       Start:  10/09/24    Expected End:  11/06/24

## 2024-10-17 NOTE — CARE PLAN
Problem: Diabetes  Goal: Maintain electrolyte levels within acceptable range throughout shift  Outcome: Progressing     Problem: Fall/Injury  Goal: Be free from injury by end of the shift  Outcome: Progressing     Problem: Pain  Goal: Walks with improved pain control throughout the shift  Outcome: Progressing     Problem: Pain  Goal: Performs ADL's with improved pain control throughout shift  Outcome: Progressing     Problem: Potential for Harm to Self or Others  Goal: Participates in unit activities  Outcome: Progressing   The patient's goals for the shift include attend group    The clinical goals for the shift include no med side effects, be out of room    Pt up today to talk to this nurse. Pt states that she continues to worry about what will happen with her pain. Talked to pt about thinking positive and trying to focus on what can be done in the future and working towards those goals. Talked about staying sober because she cannot have any surgeries if positive for cocaine. Discussed reaching out to family to see if they could all talk together for a plan for her if she is making these steps for help, being here, going to pain management-ie would they let her come home after pain management to follow that drs treatment plan. Pt did met with hospitalist today and discussed meds. PT talked to NP about plan. This nurse heard pt telling another staff person that she was going to cancel her pain management appointment and stay here at the hospital till her December appointment because staff wanted her to get stronger. Talked to pt about this and told her not to cancel her appointment and we still have time to work on a discharge plan for her. As having this conversation pts daughter called and left message with  that she wanted to visit pt. PT told this and then said she did not want to talk to anyone but her  and did not want to see her. Talked to pt about having to talk to her because at some  point she would need some support and if she set it up now it would be better for her. Pt has been up in dayroom today, pt states that she does feel walking well at times. Pt says trying to watch BS better. Pt up to dayroom watching tv now. Pt denies any suicidal thoughts, denies any homicidal thoughts, denies any auditory or visual hallucinations.

## 2024-10-17 NOTE — GROUP NOTE
Group Topic: Feeling Awareness/Expression   Group Date: 10/17/2024  Start Time: 0100  End Time: 1335  Facilitators: BERNABE Christopher   Department: [place] [address] Behavioral Health    Number of Participants: 6   Group Focus: coping skills  Treatment Modality: Psychoeducation  Interventions utilized were group exercise  Purpose: coping skills    Name: Alejandra Houston YOB: 1965   MR: [id number]      Facilitator:   Level of Participation:  Pt finished eating, left for a while, came back, worked on worksheet and left again. Later said she left early due to back and neck pain.  Quality of Participation: appropriate/pleasant, attention seeking at times.  Interactions with others: appropriate  Mood/Affect: appropriate  Cognition:  insightful at times  Progress: Gaining insight or knowledge  Comments: Recognizes being outside with the dog could help her manage feelings.  Did segue into discussion of past walking dog and breaking her wrist.   Plan: continue with services

## 2024-10-17 NOTE — PROGRESS NOTES
"Alejandra Houston is a 59 y.o. female on day 8 of admission presenting with MDD.    Subjective   Patient still depressed.  Medical team did discuss patient's case with pain management, they recommended short-term treatment of patient's chronic pain with Toradol.  Patient has to follow-up with pain management on 10/25.  Patient tolerating Seroquel well, will adjust nighttime dose to 75 mg.  Patient has been active on the unit, socializing with staff and peers.    Objective     MSE  General: Appropriately groomed and dressed.  Appearance: Appears stated age.  Attitude: Calm, cooperative.  Behavior: Appropriate eye contact.  Motor activity: No agitation or retardation. no EPS.  Normal gait.  Speech: Regular rate, rhythm, volume and tone.  Mood: Depressed   Affect: Flat  Thought process: Organized, linear, goal-directed.  Associations are logical.  Thought content: Does not endorse suicidal or homicidal ideation, no delusions elicited.  Thought perception: Did not endorse auditory or visual hallucinations.  Cognition: Alert, oriented x3.  no deficit in memory or attention.  Insight: Fair.  Judgment: Fair.    Last Recorded Vitals  /86   Pulse 61   Temp 35.8 °C (96.4 °F) (Temporal)   Resp 18   Ht 1.727 m (5' 7.99\")   Wt 104 kg (228 lb 15.9 oz)   SpO2 99%   BMI 34.83 kg/m²      Relevant Results  Scheduled medications  buPROPion XL, 450 mg, oral, q AM  carvedilol, 6.25 mg, oral, Daily  ciprofloxacin-dexamethasone, 4 drop, Each Ear, BID  [Held by provider] dulaglutide, 1.5 mg, subcutaneous, Every Sunday  DULoxetine, 60 mg, oral, BID  famotidine, 40 mg, oral, Daily  ferrous sulfate (325 mg ferrous sulfate), 1 tablet, oral, BID  folic acid, 1 mg, oral, Daily  gabapentin, 600 mg, oral, 4x daily  insulin lispro, 0-5 Units, subcutaneous, With meals & nightly  metFORMIN XR, 1,000 mg, oral, Daily with breakfast  pantoprazole, 40 mg, oral, Daily before breakfast  polyethylene glycol, 17 g, oral, Daily  QUEtiapine, 75 mg, " oral, Nightly  thiamine, 100 mg, oral, Daily      Continuous medications     PRN medications  PRN medications: acetaminophen, albuterol, alum-mag hydroxide-simeth, benzocaine-menthol, hydrOXYzine pamoate, ketorolac, magnesium hydroxide, phenyleph-min oil-petrolatum, traMADol    Results for orders placed or performed during the hospital encounter of 10/09/24 (from the past 24 hours)   POCT GLUCOSE   Result Value Ref Range    POCT Glucose 140 (H) 74 - 99 mg/dL   POCT GLUCOSE   Result Value Ref Range    POCT Glucose 217 (H) 74 - 99 mg/dL   POCT GLUCOSE   Result Value Ref Range    POCT Glucose 83 74 - 99 mg/dL   Basic Metabolic Panel   Result Value Ref Range    Glucose 159 (H) 74 - 99 mg/dL    Sodium 138 136 - 145 mmol/L    Potassium 4.1 3.5 - 5.3 mmol/L    Chloride 100 98 - 107 mmol/L    Bicarbonate 30 21 - 32 mmol/L    Anion Gap 12 10 - 20 mmol/L    Urea Nitrogen 19 6 - 23 mg/dL    Creatinine 0.99 0.50 - 1.05 mg/dL    eGFR 67 >60 mL/min/1.73m*2    Calcium 9.8 8.6 - 10.3 mg/dL   Lavender Top   Result Value Ref Range    Extra Tube Hold for add-ons.    SST TOP   Result Value Ref Range    Extra Tube Hold for add-ons.      *Note: Due to a large number of results and/or encounters for the requested time period, some results have not been displayed. A complete set of results can be found in Results Review.        Assessment/Plan   Diagnosis:  MDD, severe without psychosis    Impression:     Labs and Chart: reviewed  Case discussed with treatment team members  Encouraged patient to attend group and other mileu activity  Collateral from family - pending  Discharge planing  Medication:       - Reviewed. To continue as ordered    Medication Consent  Medication Consent: risks, benefits, side effects reviewed for all ordered meds and patient expressed understanding and consent obtained    JUNAID Borrego-CNP

## 2024-10-18 LAB
GLUCOSE BLD MANUAL STRIP-MCNC: 106 MG/DL (ref 74–99)
GLUCOSE BLD MANUAL STRIP-MCNC: 121 MG/DL (ref 74–99)
GLUCOSE BLD MANUAL STRIP-MCNC: 273 MG/DL (ref 74–99)

## 2024-10-18 PROCEDURE — 97150 GROUP THERAPEUTIC PROCEDURES: CPT | Mod: GO

## 2024-10-18 PROCEDURE — 97530 THERAPEUTIC ACTIVITIES: CPT | Mod: GO

## 2024-10-18 PROCEDURE — 2500000002 HC RX 250 W HCPCS SELF ADMINISTERED DRUGS (ALT 637 FOR MEDICARE OP, ALT 636 FOR OP/ED): Performed by: NURSE PRACTITIONER

## 2024-10-18 PROCEDURE — 2500000001 HC RX 250 WO HCPCS SELF ADMINISTERED DRUGS (ALT 637 FOR MEDICARE OP): Performed by: PSYCHIATRY & NEUROLOGY

## 2024-10-18 PROCEDURE — 1240000001 HC SEMI-PRIVATE BH ROOM DAILY

## 2024-10-18 PROCEDURE — 99232 SBSQ HOSP IP/OBS MODERATE 35: CPT | Performed by: PSYCHIATRY & NEUROLOGY

## 2024-10-18 PROCEDURE — 2500000004 HC RX 250 GENERAL PHARMACY W/ HCPCS (ALT 636 FOR OP/ED): Performed by: REGISTERED NURSE

## 2024-10-18 PROCEDURE — 2500000005 HC RX 250 GENERAL PHARMACY W/O HCPCS: Performed by: REGISTERED NURSE

## 2024-10-18 PROCEDURE — 2500000001 HC RX 250 WO HCPCS SELF ADMINISTERED DRUGS (ALT 637 FOR MEDICARE OP): Performed by: REGISTERED NURSE

## 2024-10-18 PROCEDURE — 2500000002 HC RX 250 W HCPCS SELF ADMINISTERED DRUGS (ALT 637 FOR MEDICARE OP, ALT 636 FOR OP/ED): Performed by: PSYCHIATRY & NEUROLOGY

## 2024-10-18 PROCEDURE — 82947 ASSAY GLUCOSE BLOOD QUANT: CPT

## 2024-10-18 ASSESSMENT — COLUMBIA-SUICIDE SEVERITY RATING SCALE - C-SSRS
6. HAVE YOU EVER DONE ANYTHING, STARTED TO DO ANYTHING, OR PREPARED TO DO ANYTHING TO END YOUR LIFE?: NO
1. SINCE LAST CONTACT, HAVE YOU WISHED YOU WERE DEAD OR WISHED YOU COULD GO TO SLEEP AND NOT WAKE UP?: NO
1. SINCE LAST CONTACT, HAVE YOU WISHED YOU WERE DEAD OR WISHED YOU COULD GO TO SLEEP AND NOT WAKE UP?: NO
5. HAVE YOU STARTED TO WORK OUT OR WORKED OUT THE DETAILS OF HOW TO KILL YOURSELF? DO YOU INTEND TO CARRY OUT THIS PLAN?: NO
6. HAVE YOU EVER DONE ANYTHING, STARTED TO DO ANYTHING, OR PREPARED TO DO ANYTHING TO END YOUR LIFE?: NO
5. HAVE YOU STARTED TO WORK OUT OR WORKED OUT THE DETAILS OF HOW TO KILL YOURSELF? DO YOU INTEND TO CARRY OUT THIS PLAN?: NO
5. HAVE YOU STARTED TO WORK OUT OR WORKED OUT THE DETAILS OF HOW TO KILL YOURSELF? DO YOU INTEND TO CARRY OUT THIS PLAN?: NO
2. HAVE YOU ACTUALLY HAD ANY THOUGHTS OF KILLING YOURSELF?: NO
2. HAVE YOU ACTUALLY HAD ANY THOUGHTS OF KILLING YOURSELF?: NO
1. SINCE LAST CONTACT, HAVE YOU WISHED YOU WERE DEAD OR WISHED YOU COULD GO TO SLEEP AND NOT WAKE UP?: NO
2. HAVE YOU ACTUALLY HAD ANY THOUGHTS OF KILLING YOURSELF?: NO
6. HAVE YOU EVER DONE ANYTHING, STARTED TO DO ANYTHING, OR PREPARED TO DO ANYTHING TO END YOUR LIFE?: NO

## 2024-10-18 ASSESSMENT — PAIN SCALES - GENERAL
PAINLEVEL_OUTOF10: 3
PAINLEVEL_OUTOF10: 7
PAINLEVEL_OUTOF10: 0 - NO PAIN
PAINLEVEL_OUTOF10: 9

## 2024-10-18 ASSESSMENT — PAIN SCALES - PAIN ASSESSMENT IN ADVANCED DEMENTIA (PAINAD): TOTALSCORE: MEDICATION (SEE MAR)

## 2024-10-18 ASSESSMENT — PAIN DESCRIPTION - LOCATION
LOCATION: NECK
LOCATION: NECK
LOCATION: BACK
LOCATION: NECK

## 2024-10-18 ASSESSMENT — PAIN - FUNCTIONAL ASSESSMENT: PAIN_FUNCTIONAL_ASSESSMENT: 0-10

## 2024-10-18 NOTE — PROGRESS NOTES
"Occupational Therapy     REHAB Therapy Assessment & Treatment    Patient Name: Alejandra Houston  MRN: [id number]  Today's Date: 10/18/2024    Attendance:  Attendance  Activity: Discussion/reminisce, One-to-one (OT Ther. Act: Relapse prevention reviewed/OT GRP:  (+) SELF-Soothing skills)  Participation: Active participation    OT GRP/OT Therapeutic Activity  Treatment Approach  Approach : 30 to 45 minutes, 15 to 25 minutes, 1 to1 Therapy sessions, Group therapy sessions  Patient Stated Goals: \"I want to take my time before leaving here to get my head straight.''  Cognition: Attention (Pt's statements & contributions to discussion are linear, organized, & on-topic. Pt follows complex multi-step directions independently. Pt attentive, alert, & oriented. Pt demonstrates good, organized understanding of taught material.)  Social Skills: Interacts independently in social activity, Demonstrates ability to listen to others, Cooperates with others in group activity (Pt is appropriately spontaneous in her interactions WFL through active listening, turn-taking, calm & cooperative behavior, and good eye contact..)  Emotional: Mood (Pt affect animated & appropriate as observed in her phone call to her grandchild and later to her ramakrishna Milton re: daughter's visit. Pt declined the daughter's visit staing, \"I need time for me right now,\" per pt.)  Stress Management/Relaxation Training: Performs stress management/relaxation techniques, Utilizes stress management/relaxation techniques (Pt enjoyed using the Life Saver Technique because it decresaed the dry mouth s/s of her meds as well as safely decreasing anxiety after her call tro her daughter(as OT observed and later discussed w/Pt))  Treatment Approach Comments: OT Ther. Act: Relapse prevention reviewed Pt w/active listening while verbalizing (+) problem solving tips to avoid relapse by assertiveness in use of compromise, delegate, prioritizing, saying No and setting limits to " "improve esteem and gaining self-control, putting stress in perspective. Pt reviewed irrational vs rational problem solving skills citing from the “Short Circuiting Stress” paper the use of 2 of 5 tips including: seeking out a fresh point of view from her counselor or a friend & the use of healthy (+) SELF-TALK\" noting self-defeating comments and put-downs added to low self-esteem & increased stress/anxiety.Pt role played the use of \"reframing one's thoughts\" to decrease anxiety for good insight. Pt spoke at length about her daughters not caring for Pt. OT asked Pt, \"What would it look like to care for y ourself?\" Pt responded that being sober and indep in her ADLS/IADLs would be a start, but that her family should be there for her as Pt was for her mom. OT reminded Pt that Pt has NO control over others while Pt is in fact a role model that the grandchildrena dnkids will follow. OT pointed out the importance of good mental & physical health starts with (+) coping tools and compliance with 's orders and counseling to [facility] pt agreed. She occasionally reverted back to \"victim\" role, but thanked OT for listening. OT observed Pt easily ambulating about her room after Indep showering and grooming herself. Pt noted, \"I feel so much more relaxed after a shower.\" Pt indep walked 10ft w/her wheeled walker then sat for breakfast and later for lunch. OT GROUP: Pt w/active listening demonstrates 3 relaxation skills while verbalizing use of these to avoid relapse & decrease the cravings.      Encounter Problems       Encounter Problems (Active)       OT Goals       Pt will explore, identify, and appropriately utilize effective coping strategies to cope with daily stressors and manage emotions with independence prior to discharge.  (Progressing)       Start:  10/09/24    Expected End:  11/06/24            Pt will demonstrate ability to appropriately modulate emotions and impulses with independence prior to discharge.  " (Progressing)       Start:  10/09/24    Expected End:  11/06/24            Pt will explore, identify, and appropriately utilize effective communication strategies to express feelings, wants, and needs with independence prior to discharge.  (Progressing)       Start:  10/09/24    Expected End:  11/06/24            Pt will complete Relapse Prevention Plan in collaboration w/ OT to identify potential stressors in home environment & plan healthy coping skills as alternates to substance use prior to discharge.  (Progressing)       Start:  10/09/24    Expected End:  11/06/24

## 2024-10-18 NOTE — PROGRESS NOTES
Alejandra Houston is a 59 y.o. female on day 9 of admission presenting with severe depression secondary to pain described her mood as depressed.  Today is her best friend's death anniversary and will be 1 year since she lost her.  They both grew up together.  She has been sleeping better at night with the addition of Seroquel and is tolerating it at 75 mg.  She continues to endorse suicidal ideations.  Patient is tolerating the meraz milieu well.   Patient is attending the groups and meetings and finds them useful in developing more understanding of their symptoms and developing coping skills. Patient is tolerating medications well.   Labs Reviewed.  Vitals Reviewed.   Nursing Notes Reviewed.   No EPS, TD, vitals stable.      MSE: Patient was alert, oriented to time, place, person and situation. Patient appears well groomed and clad in climate appropriate clothes. Patient is cooperative on approach. Recent and remote memory within normal limits. Memory registration and recall within normal limits. Attention and concentration within normal limits. Speech normal in rate, rhythm and volume. Good eye contact. Thought process Linear. Intact associations. Good fund of knowledge. Mood sad and affect constricted. Patient did not endorse any delusions. Patient denied any auditory visual hallucinations. Patient has active suicidal  ideations and is not future oriented.  Patient has fair insight, fair judgment and good impulse control.     Musculoskeletal: Normal gait, no Parkinsonism, no Dystonia, no Akathisia, no TD. Psychomotor activity within normal limits.     Diagnosis: Major depression severe    Assessment and Plan:     Continue current treatment  Need for Inpatient Care: Medication titration, monitoring for side effects mood and impulsivity.  Plan for inpatient psychiatric care: Continue with psychiatric care in [facility].  Continue with current treatment and medication adjustments. Patient is agreeable with continued  "medication management and adjustments discussed.         Last Recorded Vitals  /84   Pulse 63   Temp 35.9 °C (96.6 °F) (Temporal)   Resp 18   Ht 1.727 m (5' 7.99\")   Wt 104 kg (228 lb 15.9 oz)   SpO2 98%   BMI 34.83 kg/m²      Recent Results (from the past 24 hours)   POCT GLUCOSE    Collection Time: 10/17/24  5:13 PM   Result Value Ref Range    POCT Glucose 146 (H) 74 - 99 mg/dL   POCT GLUCOSE    Collection Time: 10/17/24  7:17 PM   Result Value Ref Range    POCT Glucose 220 (H) 74 - 99 mg/dL          Current Facility-Administered Medications:     acetaminophen (Tylenol) tablet 650 mg, 650 mg, oral, q6h PRN, Luis Blakely MD, 650 mg at 10/15/24 1210    albuterol 90 mcg/actuation inhaler 2 puff, 2 puff, inhalation, 4x daily PRN, ELIANE Aiken    alum-mag hydroxide-simeth (Mylanta) 200-200-20 mg/5 mL oral suspension 30 mL, 30 mL, oral, 4x daily PRN, Luis Blakely MD    benzocaine-menthol (Cepastat Sore Throat) lozenge 1 lozenge, 1 lozenge, Mouth/Throat, q2h PRN, ELIANE Aiken    buPROPion XL (Wellbutrin XL) 24 hr tablet 450 mg, 450 mg, oral, q AM, Luis Blakely MD, 450 mg at 10/18/24 0917    carvedilol (Coreg) tablet 6.25 mg, 6.25 mg, oral, Daily, ELIANE Aiken, 6.25 mg at 10/18/24 0917    [Held by provider] dulaglutide (Trulicity) injection 1.5 mg, 1.5 mg, subcutaneous, Every Sunday, ELIANE Aiken    DULoxetine (Cymbalta) DR capsule 60 mg, 60 mg, oral, BID, Luis Blakely MD, 60 mg at 10/18/24 0917    famotidine (Pepcid) tablet 40 mg, 40 mg, oral, Daily, ELIANE Aiekn, 40 mg at 10/18/24 0917    ferrous sulfate (325 mg ferrous sulfate) tablet 325 mg, 1 tablet, oral, BID, ELIANE Aiken, 325 mg at 10/18/24 0918    folic acid (Folvite) tablet 1 mg, 1 mg, oral, Daily, ELIANE Aiken, 1 mg at 10/18/24 0917    gabapentin (Neurontin) capsule 600 mg, 600 mg, oral, 4x daily, Luis Blakely MD, 600 mg at 10/18/24 0600    hydrOXYzine " pamoate (Vistaril) capsule 50 mg, 50 mg, oral, q6h PRN, Luis Blakely MD, 50 mg at 10/14/24 2039    insulin lispro (HumaLOG) injection 0-5 Units, 0-5 Units, subcutaneous, With meals & nightly, ELIANE Liang, 2 Units at 10/17/24 2125    ketorolac (Toradol) tablet 10 mg, 10 mg, oral, BID PRN, ELIANE Aiken, 10 mg at 10/18/24 0928    lidocaine 4 % patch 3 patch, 3 patch, transdermal, Daily, ELIANE Aiken, 2 patch at 10/18/24 0918    magnesium hydroxide (Milk of Magnesia) 400 mg/5 mL suspension 30 mL, 30 mL, oral, Daily PRN, Luis Blakely MD, 30 mL at 10/16/24 1513    metFORMIN XR (Glucophage-XR) 24 hr tablet 1,000 mg, 1,000 mg, oral, Daily with breakfast, ELIANE Aiken, 1,000 mg at 10/18/24 0918    methocarbamol (Robaxin) tablet 750 mg, 750 mg, oral, q8h SHARRON, ELIANE Aiken, 750 mg at 10/18/24 0600    pantoprazole (ProtoNix) EC tablet 40 mg, 40 mg, oral, Daily before breakfast, ELIANE Aiken, 40 mg at 10/18/24 0600    phenyleph-min oil-petrolatum (Preparation H) 0.25-14-74.9 % rectal ointment, , rectal, 4x daily PRN, ELIANE Borrego, Given at 10/16/24 1611    polyethylene glycol (Glycolax, Miralax) packet 17 g, 17 g, oral, Daily, ELIANE Borrego, 17 g at 10/18/24 0918    QUEtiapine (SEROquel) tablet 75 mg, 75 mg, oral, Nightly, Luis Blakely MD, 75 mg at 10/17/24 2126    thiamine (Vitamin B-1) tablet 100 mg, 100 mg, oral, Daily, ELIANE Aiken, 100 mg at 10/18/24 0917    traMADol (Ultram) tablet 50 mg, 50 mg, oral, q6h PRN, Jodie Shaver APRN-CNP, 50 mg at 10/17/24 7708       Luis Blakely MD

## 2024-10-18 NOTE — CARE PLAN
"Pt states anxiety and depression is 7/10. She states that she has had decreased anxiety and it will \"keep going down on that number as long as my pain keep coming\". Pt asked me to \"wake me up when my pain med is due, my  pain will be a 9/10 so just wake me up for it\". Pt had no complaints of pain, increased anxiety or depression while awake. She was witnessed walking back and forth to the day room without difficulty.                     The patient's goals for the shift include attend group    The clinical goals for the shift include no med side effects, be out of room    Over the shift, the patient did not make progress toward the following goals. Barriers to progression include clinical presentation. Recommendations to address these barriers include medication compliance.    "

## 2024-10-18 NOTE — CARE PLAN
The patient's goals for the shift include go to group    The clinical goals for the shift include Patient will attend group    Over the shift, the patient did make progress toward the following goals. Patient was calm and cooperative throughout the shift. She was out of her room interacting with staff and peers. She denies any SI or HI at this time. She was noted to be smiling and laughing with staff throughout the shift. She declined going to the 2 pm group due to having her friend that passed away on her mind. She did attend the morning and late afternoon group. She ate all three meals. She declined taking a shower but did change her clothes and bed linen. She was given PRN medication to help with pain and was noted to be ambulating on the unit with and without her walker throughout the shift. She was given PRN milk of magnesium due to patient being constipated. She was given PRN preparation H x2 to help with hemorrhoid pain.  Nursing will continue to monitor and encourage.

## 2024-10-18 NOTE — PROGRESS NOTES
Physical Therapy                 Therapy Communication Note    Patient Name: Alejandra Houston  MRN: [id number]  Department: [address]  Room: [address] [place], [place] [address]  Today's Date: 10/18/2024     Discipline: Physical Therapy    Comment: New PT orders noted from CNP. Contacted behavioral health OT on unit and determined pt continues to be independent with mobility. Pt evaluated 10/10 with no needs. Follows with pain management for back pain. No new needs at this time. Discontinue new orders.

## 2024-10-19 LAB
GLUCOSE BLD MANUAL STRIP-MCNC: 119 MG/DL (ref 74–99)
GLUCOSE BLD MANUAL STRIP-MCNC: 166 MG/DL (ref 74–99)
GLUCOSE BLD MANUAL STRIP-MCNC: 318 MG/DL (ref 74–99)
GLUCOSE BLD MANUAL STRIP-MCNC: 99 MG/DL (ref 74–99)

## 2024-10-19 PROCEDURE — 2500000002 HC RX 250 W HCPCS SELF ADMINISTERED DRUGS (ALT 637 FOR MEDICARE OP, ALT 636 FOR OP/ED): Performed by: PSYCHIATRY & NEUROLOGY

## 2024-10-19 PROCEDURE — 2500000001 HC RX 250 WO HCPCS SELF ADMINISTERED DRUGS (ALT 637 FOR MEDICARE OP): Performed by: PSYCHIATRY & NEUROLOGY

## 2024-10-19 PROCEDURE — 1240000001 HC SEMI-PRIVATE BH ROOM DAILY

## 2024-10-19 PROCEDURE — 2500000005 HC RX 250 GENERAL PHARMACY W/O HCPCS: Performed by: REGISTERED NURSE

## 2024-10-19 PROCEDURE — 2500000002 HC RX 250 W HCPCS SELF ADMINISTERED DRUGS (ALT 637 FOR MEDICARE OP, ALT 636 FOR OP/ED): Performed by: NURSE PRACTITIONER

## 2024-10-19 PROCEDURE — 2500000001 HC RX 250 WO HCPCS SELF ADMINISTERED DRUGS (ALT 637 FOR MEDICARE OP): Performed by: REGISTERED NURSE

## 2024-10-19 PROCEDURE — 99232 SBSQ HOSP IP/OBS MODERATE 35: CPT | Performed by: PSYCHIATRY & NEUROLOGY

## 2024-10-19 PROCEDURE — 2500000004 HC RX 250 GENERAL PHARMACY W/ HCPCS (ALT 636 FOR OP/ED): Performed by: REGISTERED NURSE

## 2024-10-19 PROCEDURE — 97150 GROUP THERAPEUTIC PROCEDURES: CPT | Mod: GO

## 2024-10-19 PROCEDURE — 82947 ASSAY GLUCOSE BLOOD QUANT: CPT

## 2024-10-19 ASSESSMENT — PAIN SCALES - GENERAL
PAINLEVEL_OUTOF10: 8
PAINLEVEL_OUTOF10: 5 - MODERATE PAIN
PAINLEVEL_OUTOF10: 9
PAINLEVEL_OUTOF10: 3
PAINLEVEL_OUTOF10: 10 - WORST POSSIBLE PAIN
PAINLEVEL_OUTOF10: 8
PAINLEVEL_OUTOF10: 7
PAINLEVEL_OUTOF10: 9

## 2024-10-19 ASSESSMENT — PAIN - FUNCTIONAL ASSESSMENT: PAIN_FUNCTIONAL_ASSESSMENT: 0-10

## 2024-10-19 ASSESSMENT — PAIN DESCRIPTION - LOCATION
LOCATION: BACK
LOCATION: NECK
LOCATION: HEAD
LOCATION: OTHER (COMMENT)
LOCATION: BACK
LOCATION: NECK
LOCATION: OTHER (COMMENT)

## 2024-10-19 ASSESSMENT — COLUMBIA-SUICIDE SEVERITY RATING SCALE - C-SSRS
1. SINCE LAST CONTACT, HAVE YOU WISHED YOU WERE DEAD OR WISHED YOU COULD GO TO SLEEP AND NOT WAKE UP?: NO
2. HAVE YOU ACTUALLY HAD ANY THOUGHTS OF KILLING YOURSELF?: NO
6. HAVE YOU EVER DONE ANYTHING, STARTED TO DO ANYTHING, OR PREPARED TO DO ANYTHING TO END YOUR LIFE?: NO
1. SINCE LAST CONTACT, HAVE YOU WISHED YOU WERE DEAD OR WISHED YOU COULD GO TO SLEEP AND NOT WAKE UP?: NO
6. HAVE YOU EVER DONE ANYTHING, STARTED TO DO ANYTHING, OR PREPARED TO DO ANYTHING TO END YOUR LIFE?: NO
5. HAVE YOU STARTED TO WORK OUT OR WORKED OUT THE DETAILS OF HOW TO KILL YOURSELF? DO YOU INTEND TO CARRY OUT THIS PLAN?: NO
2. HAVE YOU ACTUALLY HAD ANY THOUGHTS OF KILLING YOURSELF?: NO
5. HAVE YOU STARTED TO WORK OUT OR WORKED OUT THE DETAILS OF HOW TO KILL YOURSELF? DO YOU INTEND TO CARRY OUT THIS PLAN?: NO

## 2024-10-19 NOTE — CARE PLAN
Problem: Diabetes  Goal: Achieve decreasing blood glucose levels by end of shift  Outcome: Progressing  Goal: Increase stability of blood glucose readings by end of shift  Outcome: Progressing  Goal: Decrease in ketones present in urine by end of shift  Outcome: Progressing  Goal: Maintain electrolyte levels within acceptable range throughout shift  Outcome: Progressing  Goal: No changes in neurological exam by end of shift  Outcome: Progressing  Goal: Learn about and adhere to nutrition recommendations by end of shift  Outcome: Progressing  Goal: Vital signs within normal range for age by end of shift  Outcome: Progressing  Goal: Increase self care and/or family involovement by end of shift  Outcome: Progressing  Goal: Receive DSME education by end of shift  Outcome: Progressing   The patient's goals for the shift include sleep    The clinical goals for the shift include sleep    Over the shift, the patient did not make progress toward the following goals. Barriers to progression include clinical presentation. Recommendations to address these barriers include encourage medication compliance.    Pt withdrawn to room most of evening. Pt reports 7/10 depression persists with no self harm thoughts at this time. Pt ate snack and took HS medications. Pt rested in bed most of night, up several times with c/o pain and was medicated per orders.

## 2024-10-19 NOTE — PROGRESS NOTES
Alejandra Houston is a 59 y.o. female on day 10 of admission misses her best friend.  She also received a phone call from her daughter who stated initially that she will come and visit her but finally decided not to which also upset Brittany quite a bit yesterday.  She continues to have suicidal thoughts and has been endorsing.  She also has been struggling with pain although it feels better seems better controlled.  She is sleeping better which is helping her depression and anxiety as per her.  Patient is tolerating the [facility] milieu well.   Patient is attending the groups and meetings and finds them useful in developing more understanding of their symptoms and developing coping skills. Patient is tolerating medications well.   Labs Reviewed.  Vitals Reviewed.   Nursing Notes Reviewed.   No EPS, TD, vitals stable.      MSE: Patient was alert, oriented to time, place, person and situation. Patient appears well groomed and clad in climate appropriate clothes. Patient is cooperative on approach. Recent and remote memory within normal limits. Memory registration and recall within normal limits. Attention and concentration within normal limits. Speech normal in rate, rhythm and volume. Good eye contact. Thought process Linear. Intact associations. Good fund of knowledge. Mood sad and affect constricted. Patient did not endorse any delusions. Patient denied any auditory visual hallucinations. Patient has active suicidal  ideations and is not future oriented.  Patient has fair insight, fair judgment and good impulse control.      Musculoskeletal: Normal gait, no Parkinsonism, no Dystonia, no Akathisia, no TD. Psychomotor activity within normal limits.      Diagnosis: Major depression severe     Assessment and Plan:      Continue current treatment  Need for Inpatient Care: Medication titration, monitoring for side effects mood and impulsivity.  Plan for inpatient psychiatric care: Continue with psychiatric care in  [facility]  Continue with current treatment and medication adjustments. Patient is agreeable with continued medication management and adjustments discussed      Last Recorded Vitals  /73   Pulse 68   Temp 36.3 °C (97.3 °F) (Temporal)   Resp 16   Ht 1.727 m (5' 7.99\")   Wt 104 kg (228 lb 15.9 oz)   SpO2 97%   BMI 34.83 kg/m²      Recent Results (from the past 24 hours)   POCT GLUCOSE    Collection Time: 10/18/24 11:44 AM   Result Value Ref Range    POCT Glucose 121 (H) 74 - 99 mg/dL   POCT GLUCOSE    Collection Time: 10/18/24  4:40 PM   Result Value Ref Range    POCT Glucose 106 (H) 74 - 99 mg/dL   POCT GLUCOSE    Collection Time: 10/18/24  7:48 PM   Result Value Ref Range    POCT Glucose 273 (H) 74 - 99 mg/dL   POCT GLUCOSE    Collection Time: 10/19/24  7:28 AM   Result Value Ref Range    POCT Glucose 99 74 - 99 mg/dL          Current Facility-Administered Medications:     acetaminophen (Tylenol) tablet 650 mg, 650 mg, oral, q6h PRN, Luis Blakely MD, 650 mg at 10/19/24 0113    albuterol 90 mcg/actuation inhaler 2 puff, 2 puff, inhalation, 4x daily PRN, ELIANE Aiken    alum-mag hydroxide-simeth (Mylanta) 200-200-20 mg/5 mL oral suspension 30 mL, 30 mL, oral, 4x daily PRN, Luis Blakely MD    benzocaine-menthol (Cepastat Sore Throat) lozenge 1 lozenge, 1 lozenge, Mouth/Throat, q2h PRN, ELIANE Aiken    buPROPion XL (Wellbutrin XL) 24 hr tablet 450 mg, 450 mg, oral, q AM, Luis Blakely MD, 450 mg at 10/18/24 0917    carvedilol (Coreg) tablet 6.25 mg, 6.25 mg, oral, Daily, ELIANE Aiken, 6.25 mg at 10/18/24 0917    [Held by provider] dulaglutide (Trulicity) injection 1.5 mg, 1.5 mg, subcutaneous, Every Sunday, JUNAID Aiken-CNP    DULoxetine (Cymbalta) DR capsule 60 mg, 60 mg, oral, BID, Luis Blakely MD, 60 mg at 10/18/24 2054    famotidine (Pepcid) tablet 40 mg, 40 mg, oral, Daily, ELIANE Aiken, 40 mg at 10/18/24 0917    ferrous sulfate (325 mg " ferrous sulfate) tablet 325 mg, 1 tablet, oral, BID, ELIANE Aiken, 325 mg at 10/18/24 1748    folic acid (Folvite) tablet 1 mg, 1 mg, oral, Daily, ELIANE Aiken, 1 mg at 10/18/24 0917    gabapentin (Neurontin) capsule 600 mg, 600 mg, oral, 4x daily, Luis Blakely MD, 600 mg at 10/18/24 2055    hydrOXYzine pamoate (Vistaril) capsule 50 mg, 50 mg, oral, q6h PRN, Luis Blakely MD, 50 mg at 10/14/24 2039    insulin lispro (HumaLOG) injection 0-5 Units, 0-5 Units, subcutaneous, With meals & nightly, ELIANE Liang, 3 Units at 10/18/24 2057    ketorolac (Toradol) tablet 10 mg, 10 mg, oral, BID PRN, ELIANE Aiken, 10 mg at 10/19/24 0543    lidocaine 4 % patch 3 patch, 3 patch, transdermal, Daily, ELIANE Aiken, 2 patch at 10/18/24 0918    magnesium hydroxide (Milk of Magnesia) 400 mg/5 mL suspension 30 mL, 30 mL, oral, Daily PRN, Luis Blakely MD, 30 mL at 10/18/24 1621    metFORMIN XR (Glucophage-XR) 24 hr tablet 1,000 mg, 1,000 mg, oral, Daily with breakfast, ELIANE Aiken, 1,000 mg at 10/18/24 0918    methocarbamol (Robaxin) tablet 750 mg, 750 mg, oral, q8h SHARRON, ELIANE Aiken, 750 mg at 10/19/24 0535    pantoprazole (ProtoNix) EC tablet 40 mg, 40 mg, oral, Daily before breakfast, ELINAE Aiken, 40 mg at 10/18/24 0600    phenyleph-min oil-petrolatum (Preparation H) 0.25-14-74.9 % rectal ointment, , rectal, 4x daily PRN, ELIANE Borrego, Given at 10/18/24 1620    polyethylene glycol (Glycolax, Miralax) packet 17 g, 17 g, oral, Daily, JUNAID Borrego-CNP, 17 g at 10/18/24 0918    QUEtiapine (SEROquel) tablet 75 mg, 75 mg, oral, Nightly, Luis Blakely MD, 75 mg at 10/18/24 2054    thiamine (Vitamin B-1) tablet 100 mg, 100 mg, oral, Daily, ELIANE Aiken, 100 mg at 10/18/24 0917    traMADol (Ultram) tablet 50 mg, 50 mg, oral, q6h PRN, ELIANE Aiken, 50 mg at 10/19/24 0560       Luis VALDOVINOS  MD Reddy

## 2024-10-19 NOTE — PROGRESS NOTES
"Occupational Therapy     REHAB Therapy Assessment & Treatment    Patient Name: Alejandra Houston  MRN: [id number]  Today's Date: 10/19/2024      Activity: Distorted Thinking Group    Attendance:  Attendance  Activity: Discussion/reminisce  Participation: Active participation    Therapeutic Recreation:  Treatment Approach  Approach : 30 to 45 minutes, Group therapy sessions  Patient Stated Goals: None stated.  Cognition: Attention, Directions, Orientation (Pt alert, oriented, & attentive for duration of session. Pt participates actively in discussion with some tangential disclosure and requires some redirection. Follows simple directions independently.)  Social Skills: Demonstrates ability to listen to others, Cooperates with others in group activity  Emotional: Mood, Behaviors (Pt exhibits depressed mood and perseverates on her strained relationship with daughter, repeatedly stating, \"she doesn't understand\" and \"she thinks I am a bad mom.\")  Stress Management/Relaxation Training: Identifies benefits of stress management/relaxation techniques  Treatment Approach Comments: Distorted Thinking Group Therapy: Pt attended and actively engaged in discussion of distorted thinking, its definition, and identifying different types of distortions. Pt listened attentively while peer read distorted thinking situation card aloud and identified correct type of distorted thought as well as other appropriate type of distorted thought. Pt verbalized and demonstrated some understanding of group message but will benefit from reinforcement that distorted thinking comes in many forms and leads to increased sadness and negative feelings.      Encounter Problems       Encounter Problems (Active)       OT Goals       Pt will explore, identify, and appropriately utilize effective coping strategies to cope with daily stressors and manage emotions with independence prior to discharge.  (Progressing)       Start:  10/09/24    Expected End:  " 11/06/24            Pt will demonstrate ability to appropriately modulate emotions and impulses with independence prior to discharge.  (Progressing)       Start:  10/09/24    Expected End:  11/06/24            Pt will explore, identify, and appropriately utilize effective communication strategies to express feelings, wants, and needs with independence prior to discharge.  (Progressing)       Start:  10/09/24    Expected End:  11/06/24            Pt will complete Relapse Prevention Plan in collaboration w/ OT to identify potential stressors in home environment & plan healthy coping skills as alternates to substance use prior to discharge.  (Progressing)       Start:  10/09/24    Expected End:  11/06/24                 Education:  Pt given educational handouts on types of distorted thinking. Reviewed, discussed, and practiced. Pt demonstrates emerging understanding and would benefit from reinforced education.
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
## 2024-10-20 LAB
GLUCOSE BLD MANUAL STRIP-MCNC: 125 MG/DL (ref 74–99)
GLUCOSE BLD MANUAL STRIP-MCNC: 158 MG/DL (ref 74–99)
GLUCOSE BLD MANUAL STRIP-MCNC: 168 MG/DL (ref 74–99)
GLUCOSE BLD MANUAL STRIP-MCNC: 89 MG/DL (ref 74–99)

## 2024-10-20 PROCEDURE — 2500000002 HC RX 250 W HCPCS SELF ADMINISTERED DRUGS (ALT 637 FOR MEDICARE OP, ALT 636 FOR OP/ED): Performed by: NURSE PRACTITIONER

## 2024-10-20 PROCEDURE — 2500000005 HC RX 250 GENERAL PHARMACY W/O HCPCS: Performed by: REGISTERED NURSE

## 2024-10-20 PROCEDURE — 97150 GROUP THERAPEUTIC PROCEDURES: CPT | Mod: GO

## 2024-10-20 PROCEDURE — 99232 SBSQ HOSP IP/OBS MODERATE 35: CPT | Performed by: PSYCHIATRY & NEUROLOGY

## 2024-10-20 PROCEDURE — 1240000001 HC SEMI-PRIVATE BH ROOM DAILY

## 2024-10-20 PROCEDURE — 2500000001 HC RX 250 WO HCPCS SELF ADMINISTERED DRUGS (ALT 637 FOR MEDICARE OP): Performed by: PSYCHIATRY & NEUROLOGY

## 2024-10-20 PROCEDURE — 2500000002 HC RX 250 W HCPCS SELF ADMINISTERED DRUGS (ALT 637 FOR MEDICARE OP, ALT 636 FOR OP/ED): Performed by: PSYCHIATRY & NEUROLOGY

## 2024-10-20 PROCEDURE — 82947 ASSAY GLUCOSE BLOOD QUANT: CPT

## 2024-10-20 PROCEDURE — 2500000001 HC RX 250 WO HCPCS SELF ADMINISTERED DRUGS (ALT 637 FOR MEDICARE OP): Performed by: REGISTERED NURSE

## 2024-10-20 RX ORDER — QUETIAPINE FUMARATE 100 MG/1
100 TABLET, FILM COATED ORAL NIGHTLY
Status: DISCONTINUED | OUTPATIENT
Start: 2024-10-20 | End: 2024-10-22

## 2024-10-20 ASSESSMENT — PAIN - FUNCTIONAL ASSESSMENT
PAIN_FUNCTIONAL_ASSESSMENT: 0-10

## 2024-10-20 ASSESSMENT — COLUMBIA-SUICIDE SEVERITY RATING SCALE - C-SSRS
2. HAVE YOU ACTUALLY HAD ANY THOUGHTS OF KILLING YOURSELF?: NO
1. SINCE LAST CONTACT, HAVE YOU WISHED YOU WERE DEAD OR WISHED YOU COULD GO TO SLEEP AND NOT WAKE UP?: NO
5. HAVE YOU STARTED TO WORK OUT OR WORKED OUT THE DETAILS OF HOW TO KILL YOURSELF? DO YOU INTEND TO CARRY OUT THIS PLAN?: NO
6. HAVE YOU EVER DONE ANYTHING, STARTED TO DO ANYTHING, OR PREPARED TO DO ANYTHING TO END YOUR LIFE?: NO
1. SINCE LAST CONTACT, HAVE YOU WISHED YOU WERE DEAD OR WISHED YOU COULD GO TO SLEEP AND NOT WAKE UP?: NO
5. HAVE YOU STARTED TO WORK OUT OR WORKED OUT THE DETAILS OF HOW TO KILL YOURSELF? DO YOU INTEND TO CARRY OUT THIS PLAN?: NO
6. HAVE YOU EVER DONE ANYTHING, STARTED TO DO ANYTHING, OR PREPARED TO DO ANYTHING TO END YOUR LIFE?: NO
2. HAVE YOU ACTUALLY HAD ANY THOUGHTS OF KILLING YOURSELF?: NO

## 2024-10-20 ASSESSMENT — PAIN DESCRIPTION - LOCATION
LOCATION: NECK
LOCATION: NECK

## 2024-10-20 ASSESSMENT — PAIN SCALES - GENERAL
PAINLEVEL_OUTOF10: 7
PAINLEVEL_OUTOF10: 0 - NO PAIN
PAINLEVEL_OUTOF10: 0 - NO PAIN
PAINLEVEL_OUTOF10: 7
PAINLEVEL_OUTOF10: 8

## 2024-10-20 ASSESSMENT — ACTIVITIES OF DAILY LIVING (ADL): EFFECT OF PAIN ON DAILY ACTIVITIES: SLEEPING

## 2024-10-20 NOTE — CARE PLAN
The patient's goals for the shift include sleep    The clinical goals for the shift include Patient will sleep at least 6 hours    Over the shift, the patient did not make progress toward the following goals. Patient was calm and cooperative throughout the shift. She denies any SI, HI or hallucinations at this time. She was out of her room interacting with staff and peers. She was compliant with HS medication and requested PRN tramadol and Toradol throughout the shift for pain. She slept on and off throughout the night getting 4-5 hours of broken sleep. Patient was noted to be at the nurses station interacting with staff laughing and smiling. Nursing will continue to monitor and encourage.

## 2024-10-20 NOTE — PROGRESS NOTES
Alejandra Houston is a 59 y.o. female on day 11 of admission described her mood as somewhat better.  She had a 4 times during the night when she woke up but could not fall back asleep after a while fell asleep again.  Seroquel has been well-tolerated and the dose would be adjusted further.  It will be increased to 100 mg at bedtime.  She was made aware of need to hydrate during the daytime and be careful getting up at night due to fall risk.    She had a good talk with her  yesterday where she said that she wishes to live with them but that daughter will have to find her own place.  They may have to find their own new place.  She is not complaining of pain as much although it still does happen.  I offered her psychoeducation active listening and counseling.  Patient is tolerating medications well.   Labs Reviewed.  Vitals Reviewed.   Nursing Notes Reviewed.   No EPS, TD, vitals stable.      MSE: Patient was alert, oriented to time, place, person and situation. Patient appears well groomed and clad in climate appropriate clothes. Patient is cooperative on approach. Recent and remote memory within normal limits. Memory registration and recall within normal limits. Attention and concentration within normal limits. Speech normal in rate, rhythm and volume. Good eye contact. Thought process Linear. Intact associations. Good fund of knowledge. Mood sad and affect constricted. Patient did not endorse any delusions. Patient denied any auditory visual hallucinations. Patient has now denied any active suicidal  ideations and did not have it yesterday and is somewhat more future oriented.  Patient has fair insight, fair judgment and good impulse control.      Musculoskeletal: Normal gait, no Parkinsonism, no Dystonia, no Akathisia, no TD. Psychomotor activity within normal limits.      Diagnosis: Major depression severe     Assessment and Plan:      Increase Seroquel 100 mg bedtime    Need for Inpatient Care: Medication  "titration, monitoring for side effects mood and impulsivity.  Plan for inpatient psychiatric care: Continue with psychiatric care in [facility].  Continue with current treatment and medication adjustments. Patient is agreeable with continued medication management and adjustments discussed       Last Recorded Vitals  /80   Pulse 66   Temp 35.9 °C (96.6 °F)   Resp 16   Ht 1.727 m (5' 7.99\")   Wt 104 kg (228 lb 15.9 oz)   SpO2 96%   BMI 34.83 kg/m²      Recent Results (from the past 24 hours)   POCT GLUCOSE    Collection Time: 10/19/24 12:22 PM   Result Value Ref Range    POCT Glucose 119 (H) 74 - 99 mg/dL   POCT GLUCOSE    Collection Time: 10/19/24  4:36 PM   Result Value Ref Range    POCT Glucose 166 (H) 74 - 99 mg/dL   POCT GLUCOSE    Collection Time: 10/19/24  7:07 PM   Result Value Ref Range    POCT Glucose 318 (H) 74 - 99 mg/dL   POCT GLUCOSE    Collection Time: 10/20/24  7:55 AM   Result Value Ref Range    POCT Glucose 125 (H) 74 - 99 mg/dL          Current Facility-Administered Medications:     acetaminophen (Tylenol) tablet 650 mg, 650 mg, oral, q6h PRN, Luis Blakely MD, 650 mg at 10/19/24 0113    albuterol 90 mcg/actuation inhaler 2 puff, 2 puff, inhalation, 4x daily PRN, ELIANE Aiken    alum-mag hydroxide-simeth (Mylanta) 200-200-20 mg/5 mL oral suspension 30 mL, 30 mL, oral, 4x daily PRN, Luis Blakely MD    benzocaine-menthol (Cepastat Sore Throat) lozenge 1 lozenge, 1 lozenge, Mouth/Throat, q2h PRN, ELIANE Aiken    buPROPion XL (Wellbutrin XL) 24 hr tablet 450 mg, 450 mg, oral, q AM, Luis Blakely MD, 450 mg at 10/19/24 0925    carvedilol (Coreg) tablet 6.25 mg, 6.25 mg, oral, Daily, ELIANE Aiken, 6.25 mg at 10/19/24 0925    [Held by provider] dulaglutide (Trulicity) injection 1.5 mg, 1.5 mg, subcutaneous, Every Sunday, Jodie Shaver, APRN-CNP    DULoxetine (Cymbalta) DR capsule 60 mg, 60 mg, oral, BID, Luis Blakely MD, 60 mg at 10/19/24 2002    " famotidine (Pepcid) tablet 40 mg, 40 mg, oral, Daily, ELIANE Aiken, 40 mg at 10/19/24 0924    ferrous sulfate (325 mg ferrous sulfate) tablet 325 mg, 1 tablet, oral, BID, ELIANE Aiken, 325 mg at 10/19/24 1715    folic acid (Folvite) tablet 1 mg, 1 mg, oral, Daily, ELIANE Aiken, 1 mg at 10/19/24 0924    gabapentin (Neurontin) capsule 600 mg, 600 mg, oral, 4x daily, Luis Blakely MD, 600 mg at 10/20/24 0602    hydrOXYzine pamoate (Vistaril) capsule 50 mg, 50 mg, oral, q6h PRN, Luis Blakely MD, 50 mg at 10/19/24 1319    insulin lispro (HumaLOG) injection 0-5 Units, 0-5 Units, subcutaneous, With meals & nightly, ELIANE Liang, 4 Units at 10/19/24 2003    ketorolac (Toradol) tablet 10 mg, 10 mg, oral, BID PRN, ELIANE Aiken, 10 mg at 10/20/24 0553    lidocaine 4 % patch 3 patch, 3 patch, transdermal, Daily, ELIANE Aiken, 3 patch at 10/19/24 0928    magnesium hydroxide (Milk of Magnesia) 400 mg/5 mL suspension 30 mL, 30 mL, oral, Daily PRN, Luis Blakely MD, 30 mL at 10/18/24 1621    metFORMIN XR (Glucophage-XR) 24 hr tablet 1,000 mg, 1,000 mg, oral, Daily with breakfast, ELIANE Aiken, 1,000 mg at 10/19/24 0923    methocarbamol (Robaxin) tablet 750 mg, 750 mg, oral, q8h SHARRON, ELIANE Aiken, 750 mg at 10/20/24 0551    pantoprazole (ProtoNix) EC tablet 40 mg, 40 mg, oral, Daily before breakfast, ELIANE Aiken, 40 mg at 10/20/24 0602    phenyleph-min oil-petrolatum (Preparation H) 0.25-14-74.9 % rectal ointment, , rectal, 4x daily PRN, ELIANE Borrego, Given at 10/18/24 1620    polyethylene glycol (Glycolax, Miralax) packet 17 g, 17 g, oral, Daily, ELIANE Borrego, 17 g at 10/19/24 0922    QUEtiapine (SEROquel) tablet 100 mg, 100 mg, oral, Nightly, Luis Blakely MD    thiamine (Vitamin B-1) tablet 100 mg, 100 mg, oral, Daily, ELIANE Aiken, 100 mg at 10/19/24 0924    traMADol  (Ultram) tablet 50 mg, 50 mg, oral, q6h PRN, Jodie Shaver, APRN-CNP, 50 mg at 10/19/24 3454       Luis Blakely MD

## 2024-10-20 NOTE — NURSING NOTE
Pt ambulated with ease to and from breakfast at times carrying her own tray. Pt seemed in good spirits when she was given her morning meds. When this nurse checked in on the pt during rounds she was sitting up reading a book on the side of the bed comfortably. The pt requested a shower and the PCA assisted her with that task.

## 2024-10-20 NOTE — PROGRESS NOTES
"Occupational Therapy     REHAB Therapy Assessment & Treatment    Patient Name: Alejandra Houston  MRN: [id number]  Today's Date: 10/20/2024           Attendance:  Attendance  Activity: Discussion/reminisce (Mental Health Discussion/Goals)  Participation: Passive participation    Therapeutic Recreation:  Treatment Approach  Approach : Group therapy sessions (50 minutes)  Patient Stated Goals: Pt idenified goals related to \"getting the mental help I need and getting help with the drugs.\"  Cognition:  (Pt appeared drowsy during group, eyes closed at times.  Independently completed written handout.  Contributions made to discussion were linear, organized and on topic.)  Social Skills:  (Pt did not appear attentive to peers and therapist at this time.  Limited socialization with other peers, however at end of group pt reached out to support a tearful peer and told her \"I'm here if you ever need someone to talk to.\")  Emotional:  (Depressed, verbalizing feelings of hopelessness.)  Treatment Approach Comments: Pt attended morning OT session with encouragement from therapist.  Pt independently completed written handout but chose not to share with peers at this time.  Later pt joined the discussion and stated that she was feeling hopeless and that her family was not supportive at this time.  Pt was not able to identify a current plan for DC at this time.  When asked if she has been able to recognize any improvement in her mood or symptoms she reported that sometimes she feels \"a little bit better.\"  Supportive to a peer at end of group and offered to sit with her if she needed to talk.  Encouraged to continue to attend OT and other milieu programming.      Encounter Problems       Encounter Problems (Active)       OT Goals       Pt will explore, identify, and appropriately utilize effective coping strategies to cope with daily stressors and manage emotions with independence prior to discharge.  (Progressing)       Start:  " 10/09/24    Expected End:  11/06/24            Pt will demonstrate ability to appropriately modulate emotions and impulses with independence prior to discharge.  (Progressing)       Start:  10/09/24    Expected End:  11/06/24            Pt will explore, identify, and appropriately utilize effective communication strategies to express feelings, wants, and needs with independence prior to discharge.  (Progressing)       Start:  10/09/24    Expected End:  11/06/24            Pt will complete Relapse Prevention Plan in collaboration w/ OT to identify potential stressors in home environment & plan healthy coping skills as alternates to substance use prior to discharge.  (Progressing)       Start:  10/09/24    Expected End:  11/06/24                     Education Documentation  No documentation found.  Education Comments  No comments found.          Additional Comments:

## 2024-10-21 LAB
GLUCOSE BLD MANUAL STRIP-MCNC: 128 MG/DL (ref 74–99)
GLUCOSE BLD MANUAL STRIP-MCNC: 135 MG/DL (ref 74–99)
GLUCOSE BLD MANUAL STRIP-MCNC: 145 MG/DL (ref 74–99)
GLUCOSE BLD MANUAL STRIP-MCNC: 187 MG/DL (ref 74–99)

## 2024-10-21 PROCEDURE — 2500000001 HC RX 250 WO HCPCS SELF ADMINISTERED DRUGS (ALT 637 FOR MEDICARE OP): Performed by: PSYCHIATRY & NEUROLOGY

## 2024-10-21 PROCEDURE — 2500000004 HC RX 250 GENERAL PHARMACY W/ HCPCS (ALT 636 FOR OP/ED): Performed by: REGISTERED NURSE

## 2024-10-21 PROCEDURE — 82947 ASSAY GLUCOSE BLOOD QUANT: CPT

## 2024-10-21 PROCEDURE — 97150 GROUP THERAPEUTIC PROCEDURES: CPT | Mod: GO

## 2024-10-21 PROCEDURE — 2500000005 HC RX 250 GENERAL PHARMACY W/O HCPCS: Performed by: REGISTERED NURSE

## 2024-10-21 PROCEDURE — 99232 SBSQ HOSP IP/OBS MODERATE 35: CPT | Performed by: PSYCHIATRY & NEUROLOGY

## 2024-10-21 PROCEDURE — 2500000002 HC RX 250 W HCPCS SELF ADMINISTERED DRUGS (ALT 637 FOR MEDICARE OP, ALT 636 FOR OP/ED): Performed by: PSYCHIATRY & NEUROLOGY

## 2024-10-21 PROCEDURE — 2500000001 HC RX 250 WO HCPCS SELF ADMINISTERED DRUGS (ALT 637 FOR MEDICARE OP): Performed by: REGISTERED NURSE

## 2024-10-21 PROCEDURE — 1240000001 HC SEMI-PRIVATE BH ROOM DAILY

## 2024-10-21 PROCEDURE — 2500000002 HC RX 250 W HCPCS SELF ADMINISTERED DRUGS (ALT 637 FOR MEDICARE OP, ALT 636 FOR OP/ED): Performed by: NURSE PRACTITIONER

## 2024-10-21 ASSESSMENT — PAIN SCALES - GENERAL
PAINLEVEL_OUTOF10: 0 - NO PAIN
PAINLEVEL_OUTOF10: 0 - NO PAIN
PAINLEVEL_OUTOF10: 9
PAINLEVEL_OUTOF10: 0 - NO PAIN
PAINLEVEL_OUTOF10: 7
PAINLEVEL_OUTOF10: 9
PAINLEVEL_OUTOF10: 7

## 2024-10-21 ASSESSMENT — COLUMBIA-SUICIDE SEVERITY RATING SCALE - C-SSRS
2. HAVE YOU ACTUALLY HAD ANY THOUGHTS OF KILLING YOURSELF?: NO
1. SINCE LAST CONTACT, HAVE YOU WISHED YOU WERE DEAD OR WISHED YOU COULD GO TO SLEEP AND NOT WAKE UP?: NO
6. HAVE YOU EVER DONE ANYTHING, STARTED TO DO ANYTHING, OR PREPARED TO DO ANYTHING TO END YOUR LIFE?: NO
5. HAVE YOU STARTED TO WORK OUT OR WORKED OUT THE DETAILS OF HOW TO KILL YOURSELF? DO YOU INTEND TO CARRY OUT THIS PLAN?: NO
6. HAVE YOU EVER DONE ANYTHING, STARTED TO DO ANYTHING, OR PREPARED TO DO ANYTHING TO END YOUR LIFE?: NO
5. HAVE YOU STARTED TO WORK OUT OR WORKED OUT THE DETAILS OF HOW TO KILL YOURSELF? DO YOU INTEND TO CARRY OUT THIS PLAN?: NO
2. HAVE YOU ACTUALLY HAD ANY THOUGHTS OF KILLING YOURSELF?: NO
1. SINCE LAST CONTACT, HAVE YOU WISHED YOU WERE DEAD OR WISHED YOU COULD GO TO SLEEP AND NOT WAKE UP?: NO

## 2024-10-21 ASSESSMENT — PAIN - FUNCTIONAL ASSESSMENT
PAIN_FUNCTIONAL_ASSESSMENT: 0-10

## 2024-10-21 ASSESSMENT — PAIN DESCRIPTION - LOCATION
LOCATION: NECK
LOCATION: NECK

## 2024-10-21 NOTE — PROGRESS NOTES
"Alejandra Houston is a 59 y.o. female on day 12 of admission presenting with MDD.    Subjective   Patient still depressed, but improving.  Patient looking into residential [facility] treatment once discharged from the hospital.  Has been making calls and sending in applications.  Patient tolerated medication well, with no adverse effects.  Patient has been speaking with family members, working to repair her relationships with them.    Objective     MSE  General: Appropriately groomed and dressed.  Appearance: Appears stated age.  Attitude: Calm, cooperative.  Behavior: Appropriate eye contact.  Motor activity: No agitation or retardation. no EPS.  Unsteady gait, uses walker  Speech: Regular rate, rhythm, volume and tone.  Mood: Less depressed  Affect: Appropriate range  Thought process: Organized, linear, goal-directed.  Associations are logical.  Thought content: Does not endorse suicidal or homicidal ideation, no delusions elicited.  Thought perception: Did not endorse auditory or visual hallucinations.  Cognition: Alert, oriented x3.  no deficit in memory or attention.  Insight: Poor  Judgment: Impaired    Last Recorded Vitals  /81 (Patient Position: Lying)   Pulse 69   Temp 36.1 °C (97 °F) (Temporal)   Resp 18   Ht 1.727 m (5' 7.99\")   Wt 104 kg (228 lb 15.9 oz)   SpO2 98%   BMI 34.83 kg/m²      Relevant Results  Scheduled medications  buPROPion XL, 450 mg, oral, q AM  carvedilol, 6.25 mg, oral, Daily  [Held by provider] dulaglutide, 1.5 mg, subcutaneous, Every Sunday  DULoxetine, 60 mg, oral, BID  famotidine, 40 mg, oral, Daily  ferrous sulfate (325 mg ferrous sulfate), 1 tablet, oral, BID  folic acid, 1 mg, oral, Daily  gabapentin, 600 mg, oral, 4x daily  insulin lispro, 0-5 Units, subcutaneous, With meals & nightly  lidocaine, 3 patch, transdermal, Daily  metFORMIN XR, 1,000 mg, oral, Daily with breakfast  methocarbamol, 750 mg, oral, q8h SHARRON  pantoprazole, 40 mg, oral, Daily before " breakfast  polyethylene glycol, 17 g, oral, Daily  QUEtiapine, 100 mg, oral, Nightly  thiamine, 100 mg, oral, Daily      Continuous medications     PRN medications  PRN medications: acetaminophen, albuterol, alum-mag hydroxide-simeth, benzocaine-menthol, hydrOXYzine pamoate, ketorolac, magnesium hydroxide, phenyleph-min oil-petrolatum, traMADol    Results for orders placed or performed during the hospital encounter of 10/09/24 (from the past 24 hours)   POCT GLUCOSE   Result Value Ref Range    POCT Glucose 158 (H) 74 - 99 mg/dL   POCT GLUCOSE   Result Value Ref Range    POCT Glucose 168 (H) 74 - 99 mg/dL   POCT GLUCOSE   Result Value Ref Range    POCT Glucose 135 (H) 74 - 99 mg/dL   POCT GLUCOSE   Result Value Ref Range    POCT Glucose 145 (H) 74 - 99 mg/dL     *Note: Due to a large number of results and/or encounters for the requested time period, some results have not been displayed. A complete set of results can be found in Results Review.        Assessment/Plan   Diagnosis:  MDD, severe without psychosis    Impression:     Labs and Chart: reviewed  Case discussed with treatment team members  Encouraged patient to attend group and other mileu activity  Collateral from family - pending  Discharge planing  Medication:       - Reviewed. To continue as ordered    Medication Consent  Medication Consent: no medication changes necessary for review    JUNAID Borrego-CNP

## 2024-10-21 NOTE — PROGRESS NOTES
"Occupational Therapy     REHAB Therapy Assessment & Treatment    Patient Name: Alejandra Houston  MRN: [id number]  Today's Date: 10/21/2024      Activity:  Self-Esteem Group     Attendance:  Attendance  Activity: Discussion/reminisce  Participation: Active participation    Treatment Approach  Approach : Group therapy sessions  Patient Stated Goals: none stated  Cognition: Attention, Directions (Pt alert, oriented, & attentive. Follows complex directions w/ MIN VCs.)  Social Skills: Demonstrates ability to listen to others, Cooperates with others in group activity  Emotional: Mood (Pt mood still depressed but improvements noted through conversation)  Stress Management/Relaxation Training: Able to identify stress of pain levels, Identifies benefits of stress management/relaxation techniques  Treatment Approach Comments: Pt attended & participated in morning therapy group focused on self-esteem. Began by filling out “Self-Esteem Checklist” self-assessment, which is used to help participants further understand the components of self-esteem they should prioritize. Pt educated on concept of self-esteem. Pt attentive during discussion about ways that positive vs. negative self-esteem can impact participation in meaningful roles & occupations. Then, pt educated on tips for improving self-esteem, including spending time w/ oneself, positive self-talk, emulating role models, & gratitude. Next, pt participated in practice of 2 strategies aimed at increasing self-esteem. During “Core Beliefs” exercise, pt able to identify one of their negative core beliefs as, \"I'm undeserving.\" Initially pt was filling in evidence sections w/ reasons why she believed this. Pt able to better understand instructions after some additional guidance. During “Strengths Use Plan” exercise, pt able to identify personal strengths, including kindness, love of learning, & adventurousness. Then, pt developed a plan of activities that utilize these " strengths, w/ MIN A. from OT via verbal cues/suggestions. Pt demonstrates fair understanding & improving motivation to apply skills.      Encounter Problems       Encounter Problems (Active)       OT Goals       Pt will explore, identify, and appropriately utilize effective coping strategies to cope with daily stressors and manage emotions with independence prior to discharge.  (Progressing)       Start:  10/09/24    Expected End:  11/06/24            Pt will demonstrate ability to appropriately modulate emotions and impulses with independence prior to discharge.  (Progressing)       Start:  10/09/24    Expected End:  11/06/24            Pt will explore, identify, and appropriately utilize effective communication strategies to express feelings, wants, and needs with independence prior to discharge.  (Progressing)       Start:  10/09/24    Expected End:  11/06/24            Pt will complete Relapse Prevention Plan in collaboration w/ OT to identify potential stressors in home environment & plan healthy coping skills as alternates to substance use prior to discharge.  (Progressing)       Start:  10/09/24    Expected End:  11/06/24                 Education:  Pt given educational handouts on self-esteem, core beliefs, & strengths. Reviewed, discussed, & practiced. Pt demonstrates fair understanding; may benefit from reinforced education.

## 2024-10-21 NOTE — NURSING NOTE
[facility] called back and cannot take pt while on neurontin, vistaril and ultram. They gave the [employer] project as an alternative to try. Phone [phone] email [email or web address] . Passed  info onto pt so she could call to inquire.

## 2024-10-21 NOTE — CARE PLAN
Problem: Diabetes  Goal: Achieve decreasing blood glucose levels by end of shift  Outcome: Progressing     Problem: Fall/Injury  Goal: Be free from injury by end of the shift  Outcome: Progressing     Problem: Pain  Goal: Walks with improved pain control throughout the shift  Outcome: Progressing     Problem: Altered Thought Processes as Evidenced by  Goal: STG - Desires improvement in ability to think and concentrate  Outcome: Progressing     Problem: Potential for Harm to Self or Others  Goal: Patient/Family participate in treatment and discharge plans  Outcome: Progressing   The patient's goals for the shift include talk to residentials    The clinical goals for the shift include attend group, no med side effects, contact residentials    Pt up today to talk about how she has been feeling, says that her pain continues to improve, continues to deny any suicidal thoughts, denies any homicidal thoughts, denies any auditory or visual hallucinations. Pt given info on residential's to call. Pt did spend day reaching out to residentials. Many do not want to take pt due to meds. Applications pending at some. Pt has been in contact with family. Pt states hopeful to get call back and be accepted. Pt up to dayroom, social.

## 2024-10-21 NOTE — CARE PLAN
The patient's goals for the shift include sleep    The clinical goals for the shift include Patient will sleep at least 6 hours    Over the shift, the patient did make progress toward the following goals. Patient was calm and cooperative throughout the shift. She was noted to be out of her room in the day room interacting with staff and peers. She is ambulating on the unit with ease. She complained of pain to neck and was given Toradol before bed and tramadol at 2 am and was able to fall asleep shortly after dose was given. She was complaint with HS medication. She denies any SI, HI or hallucinations at this time. Nursing will continue to monitor and encourage.

## 2024-10-22 LAB
GLUCOSE BLD MANUAL STRIP-MCNC: 111 MG/DL (ref 74–99)
GLUCOSE BLD MANUAL STRIP-MCNC: 131 MG/DL (ref 74–99)
GLUCOSE BLD MANUAL STRIP-MCNC: 133 MG/DL (ref 74–99)
GLUCOSE BLD MANUAL STRIP-MCNC: 182 MG/DL (ref 74–99)

## 2024-10-22 PROCEDURE — 2500000002 HC RX 250 W HCPCS SELF ADMINISTERED DRUGS (ALT 637 FOR MEDICARE OP, ALT 636 FOR OP/ED): Performed by: PSYCHIATRY & NEUROLOGY

## 2024-10-22 PROCEDURE — 2500000002 HC RX 250 W HCPCS SELF ADMINISTERED DRUGS (ALT 637 FOR MEDICARE OP, ALT 636 FOR OP/ED): Performed by: NURSE PRACTITIONER

## 2024-10-22 PROCEDURE — 97150 GROUP THERAPEUTIC PROCEDURES: CPT | Mod: GO

## 2024-10-22 PROCEDURE — 82947 ASSAY GLUCOSE BLOOD QUANT: CPT

## 2024-10-22 PROCEDURE — 2500000004 HC RX 250 GENERAL PHARMACY W/ HCPCS (ALT 636 FOR OP/ED): Performed by: REGISTERED NURSE

## 2024-10-22 PROCEDURE — 2500000005 HC RX 250 GENERAL PHARMACY W/O HCPCS: Performed by: REGISTERED NURSE

## 2024-10-22 PROCEDURE — 1240000001 HC SEMI-PRIVATE BH ROOM DAILY

## 2024-10-22 PROCEDURE — 2500000001 HC RX 250 WO HCPCS SELF ADMINISTERED DRUGS (ALT 637 FOR MEDICARE OP): Performed by: REGISTERED NURSE

## 2024-10-22 PROCEDURE — 99232 SBSQ HOSP IP/OBS MODERATE 35: CPT | Performed by: PSYCHIATRY & NEUROLOGY

## 2024-10-22 PROCEDURE — 2500000001 HC RX 250 WO HCPCS SELF ADMINISTERED DRUGS (ALT 637 FOR MEDICARE OP): Performed by: PSYCHIATRY & NEUROLOGY

## 2024-10-22 ASSESSMENT — COLUMBIA-SUICIDE SEVERITY RATING SCALE - C-SSRS
5. HAVE YOU STARTED TO WORK OUT OR WORKED OUT THE DETAILS OF HOW TO KILL YOURSELF? DO YOU INTEND TO CARRY OUT THIS PLAN?: NO
1. SINCE LAST CONTACT, HAVE YOU WISHED YOU WERE DEAD OR WISHED YOU COULD GO TO SLEEP AND NOT WAKE UP?: NO
2. HAVE YOU ACTUALLY HAD ANY THOUGHTS OF KILLING YOURSELF?: NO
2. HAVE YOU ACTUALLY HAD ANY THOUGHTS OF KILLING YOURSELF?: NO
1. SINCE LAST CONTACT, HAVE YOU WISHED YOU WERE DEAD OR WISHED YOU COULD GO TO SLEEP AND NOT WAKE UP?: NO
6. HAVE YOU EVER DONE ANYTHING, STARTED TO DO ANYTHING, OR PREPARED TO DO ANYTHING TO END YOUR LIFE?: NO
6. HAVE YOU EVER DONE ANYTHING, STARTED TO DO ANYTHING, OR PREPARED TO DO ANYTHING TO END YOUR LIFE?: NO

## 2024-10-22 ASSESSMENT — PAIN DESCRIPTION - LOCATION
LOCATION: NECK
LOCATION: OTHER (COMMENT)

## 2024-10-22 ASSESSMENT — PAIN - FUNCTIONAL ASSESSMENT
PAIN_FUNCTIONAL_ASSESSMENT: 0-10

## 2024-10-22 ASSESSMENT — PAIN SCALES - GENERAL
PAINLEVEL_OUTOF10: 9
PAINLEVEL_OUTOF10: 7
PAINLEVEL_OUTOF10: 7
PAINLEVEL_OUTOF10: 0 - NO PAIN
PAINLEVEL_OUTOF10: 9
PAINLEVEL_OUTOF10: 9

## 2024-10-22 NOTE — CARE PLAN
The patient's goals for the shift include sleep    The clinical goals for the shift include Patient will sleep at least 6 hours    Over the shift, the patient did make progress toward the following goals. Patient was calm and cooperative throughout the shift. She was out of her room interacting with staff and peers. She was compliant with HS medication and did require pain medication twice throughout the shift. She denies any SI, HI or hallucinations at this time. She was noted to be smiling and laughing with the staff. She continues to ambulate with ease on the unit with and without the walker. She slept on and off throughout the night waking twice. Nursing will continue to monitor and encourage.

## 2024-10-22 NOTE — CARE PLAN
Problem: Potential for Harm to Self or Others  Goal: Participates in unit activities  Outcome: Progressing     Problem: Potential for Harm to Self or Others  Goal: Identifies stressors that lead to harmful behaviors  Outcome: Progressing     Problem: Ineffective Coping  Goal: Patient/Family verbalizes awareness of resources  Outcome: Progressing   The patient's goals for the shift include attend groups, find residential    The clinical goals for the shift include attend group, get pain to a 7    Over the shift, the patient did not make progress toward the following goals of reduced pain, attending groups. Barriers to progression include new anxiety over discharge. Recommendations to address these barriers include continued hospitallization and med review.        Ptup this morning to talk to nurse, states slept fair. Pt denies suicidal thoughts, denies homicidal thoughts, denies auditory and visual hallucinations. Pt met with [facility] and was accepted into [facility]. See notes entered earlier today. Pt has been tearful on and off today, anxious about decision and going to [place]. Pt encouraged to take advantage of this to be sober for herself and family. Pt has been up for meals today but missed group at times. Pt did become upset when one temporary  pain med was discontinued but drs discussed this with pt. Pt is now up to AA group.

## 2024-10-22 NOTE — PROGRESS NOTES
"Alejandra Houston is a 59 y.o. female on day 13 of admission presenting with MDD.    Subjective   Patient still depressed, but she did receive good news that she has been accepted to [facility].  They are able to take her on Friday. Valerie, peer supporter from [facility], able to transfer patient to her pain management appointment and then to treatment.  Patient still having some difficulty with sleep, will increase Seroquel to 125 mg oral daily at bedtime.    Objective     MSE  General: Appropriately groomed and dressed.  Appearance: Appears stated age.  Attitude: Calm, cooperative.  Behavior: Appropriate eye contact.  Motor activity: No agitation or retardation. no EPS.  Normal gait.  Speech: Regular rate, rhythm, volume and tone.  Mood: Depressed  Affect: Flat  Thought process: Organized, linear, goal-directed.  Associations are logical.  Thought content: Does not endorse suicidal or homicidal ideation, no delusions elicited.  Thought perception: Did not endorse auditory or visual hallucinations.  Cognition: Alert, oriented x3.  no deficit in memory or attention.  Insight: Fair.  Judgment: Fair.    Last Recorded Vitals  /69   Pulse 65   Temp 36 °C (96.8 °F)   Resp 18   Ht 1.727 m (5' 7.99\")   Wt 104 kg (228 lb 15.9 oz)   SpO2 99%   BMI 34.83 kg/m²      Relevant Results  Scheduled medications  buPROPion XL, 450 mg, oral, q AM  carvedilol, 6.25 mg, oral, Daily  [Held by provider] dulaglutide, 1.5 mg, subcutaneous, Every Sunday  DULoxetine, 60 mg, oral, BID  famotidine, 40 mg, oral, Daily  ferrous sulfate (325 mg ferrous sulfate), 1 tablet, oral, BID  folic acid, 1 mg, oral, Daily  gabapentin, 600 mg, oral, 4x daily  insulin lispro, 0-5 Units, subcutaneous, With meals & nightly  lidocaine, 3 patch, transdermal, Daily  metFORMIN XR, 1,000 mg, oral, Daily with breakfast  pantoprazole, 40 mg, oral, Daily before breakfast  polyethylene glycol, 17 g, oral, Daily  QUEtiapine, 125 mg, " oral, Nightly  thiamine, 100 mg, oral, Daily      Continuous medications     PRN medications  PRN medications: acetaminophen, albuterol, alum-mag hydroxide-simeth, benzocaine-menthol, hydrOXYzine pamoate, magnesium hydroxide, phenyleph-min oil-petrolatum, traMADol    Results for orders placed or performed during the hospital encounter of 10/09/24 (from the past 24 hours)   POCT GLUCOSE   Result Value Ref Range    POCT Glucose 128 (H) 74 - 99 mg/dL   POCT GLUCOSE   Result Value Ref Range    POCT Glucose 187 (H) 74 - 99 mg/dL   POCT GLUCOSE   Result Value Ref Range    POCT Glucose 111 (H) 74 - 99 mg/dL   POCT GLUCOSE   Result Value Ref Range    POCT Glucose 131 (H) 74 - 99 mg/dL     *Note: Due to a large number of results and/or encounters for the requested time period, some results have not been displayed. A complete set of results can be found in Results Review.        Assessment/Plan   Diagnosis:  MDD, severe without psychosis    Impression:     Labs and Chart: reviewed  Case discussed with treatment team members  Encouraged patient to attend group and other [facility]u activity  Collateral from family - pending  Discharge planing  Medication:       - Reviewed. To continue as ordered    Medication Consent  Medication Consent: risks, benefits, side effects reviewed for all ordered meds and patient expressed understanding and consent obtained    JUNAID Borrego-CNP

## 2024-10-22 NOTE — PROGRESS NOTES
"Occupational Therapy     REHAB Therapy Assessment & Treatment    Patient Name: Alejandra Houston  MRN: [id number]  Today's Date: 10/22/2024      Attendance:  Attendance  Activity: Discussion/reminisce (\"Balanced Daily Routine as a (+) stress mgt tool\"  Paper reviewed)  Participation: Active participation    OT Therapeutic Grp  Treatment Approach  Approach : 15 to 25 minutes, Group therapy sessions  Patient Stated Goals: none  Cognition: Attention (Pt attentive & focused, statements & contributions to discussion are linear, organized, & on-topic. Pt follows complex multi-step directions independently.)  Social Skills: Interacts independently in social activity (Pt appropriately stimulated & demonstrates social skills, especially with another Pt who hadn't been to OT groups Pt is active listening, turn-taking, calm & cooperative behavior, and good eye contact.)  Emotional: Mood (Pt affect animated & appropriate with another Pt who'd been self isolating. Pt was assetive and smiling to this other female for increased (+) bonding between the 2 ladies.)  Stress Management/Relaxation Training: Performs stress management/relaxation techniques, Utilizes stress management/relaxation techniques (Pt enjoyed using the Life Saver Technique because it decresaed the dry mouth s/s of her meds as well as safely decreasing anxiety after her call tro her daughter(as OT observed and later discussed w/Pt))  Treatment Approach Comments: OT GRP: Clinical Depression info was reviewed. Pt scored low with lack of balance in productive time the day due to severe anxiety & depression while trying to care for 2 babies. Pt accepted a handout which depicted several (+) coping tools to incorporate in her day to gain balance between quality sleep, adequate self-care, productive work & healthy leisure. Pt bonded w/another Pt as Brittany dicssed her nneds for AA mgts, but dislike of them. Both Pts opened up about  their lack of social support for post D/C. " They appeared calm and at ease with each other as the 2 played a card game Tish Brennan after grp ended.      Encounter Problems       Encounter Problems (Active)       OT Goals       Pt will explore, identify, and appropriately utilize effective coping strategies to cope with daily stressors and manage emotions with independence prior to discharge.  (Progressing)       Start:  10/09/24    Expected End:  11/06/24            Pt will demonstrate ability to appropriately modulate emotions and impulses with independence prior to discharge.  (Progressing)       Start:  10/09/24    Expected End:  11/06/24            Pt will explore, identify, and appropriately utilize effective communication strategies to express feelings, wants, and needs with independence prior to discharge.  (Progressing)       Start:  10/09/24    Expected End:  11/06/24            Pt will complete Relapse Prevention Plan in collaboration w/ OT to identify potential stressors in home environment & plan healthy coping skills as alternates to substance use prior to discharge.  (Progressing)       Start:  10/09/24    Expected End:  11/06/24

## 2024-10-22 NOTE — NURSING NOTE
Valerie with [facility] has found pt a bed at [facility]. Pt will need 30 days of meds. Valerie will pick pt up on Friday at 6:50am to take her to her follow up for pain management and then take her to [facility].

## 2024-10-23 LAB
GLUCOSE BLD MANUAL STRIP-MCNC: 116 MG/DL (ref 74–99)
GLUCOSE BLD MANUAL STRIP-MCNC: 126 MG/DL (ref 74–99)
GLUCOSE BLD MANUAL STRIP-MCNC: 132 MG/DL (ref 74–99)
GLUCOSE BLD MANUAL STRIP-MCNC: 170 MG/DL (ref 74–99)

## 2024-10-23 PROCEDURE — 82947 ASSAY GLUCOSE BLOOD QUANT: CPT

## 2024-10-23 PROCEDURE — 1240000001 HC SEMI-PRIVATE BH ROOM DAILY

## 2024-10-23 PROCEDURE — 2500000001 HC RX 250 WO HCPCS SELF ADMINISTERED DRUGS (ALT 637 FOR MEDICARE OP): Performed by: REGISTERED NURSE

## 2024-10-23 PROCEDURE — 2500000001 HC RX 250 WO HCPCS SELF ADMINISTERED DRUGS (ALT 637 FOR MEDICARE OP): Performed by: PSYCHIATRY & NEUROLOGY

## 2024-10-23 PROCEDURE — 2500000002 HC RX 250 W HCPCS SELF ADMINISTERED DRUGS (ALT 637 FOR MEDICARE OP, ALT 636 FOR OP/ED): Performed by: NURSE PRACTITIONER

## 2024-10-23 PROCEDURE — 2500000002 HC RX 250 W HCPCS SELF ADMINISTERED DRUGS (ALT 637 FOR MEDICARE OP, ALT 636 FOR OP/ED): Performed by: PSYCHIATRY & NEUROLOGY

## 2024-10-23 PROCEDURE — 2500000005 HC RX 250 GENERAL PHARMACY W/O HCPCS: Performed by: REGISTERED NURSE

## 2024-10-23 PROCEDURE — 99232 SBSQ HOSP IP/OBS MODERATE 35: CPT | Performed by: PSYCHIATRY & NEUROLOGY

## 2024-10-23 ASSESSMENT — PAIN DESCRIPTION - ORIENTATION
ORIENTATION: POSTERIOR

## 2024-10-23 ASSESSMENT — PAIN DESCRIPTION - DESCRIPTORS: DESCRIPTORS: ACHING;SHARP;PRESSURE

## 2024-10-23 ASSESSMENT — COLUMBIA-SUICIDE SEVERITY RATING SCALE - C-SSRS
1. SINCE LAST CONTACT, HAVE YOU WISHED YOU WERE DEAD OR WISHED YOU COULD GO TO SLEEP AND NOT WAKE UP?: NO
6. HAVE YOU EVER DONE ANYTHING, STARTED TO DO ANYTHING, OR PREPARED TO DO ANYTHING TO END YOUR LIFE?: NO
2. HAVE YOU ACTUALLY HAD ANY THOUGHTS OF KILLING YOURSELF?: NO
2. HAVE YOU ACTUALLY HAD ANY THOUGHTS OF KILLING YOURSELF?: NO
6. HAVE YOU EVER DONE ANYTHING, STARTED TO DO ANYTHING, OR PREPARED TO DO ANYTHING TO END YOUR LIFE?: NO
1. SINCE LAST CONTACT, HAVE YOU WISHED YOU WERE DEAD OR WISHED YOU COULD GO TO SLEEP AND NOT WAKE UP?: NO
5. HAVE YOU STARTED TO WORK OUT OR WORKED OUT THE DETAILS OF HOW TO KILL YOURSELF? DO YOU INTEND TO CARRY OUT THIS PLAN?: NO

## 2024-10-23 ASSESSMENT — PAIN DESCRIPTION - LOCATION
LOCATION: NECK
LOCATION: NECK
LOCATION: BACK
LOCATION: BACK

## 2024-10-23 ASSESSMENT — PAIN - FUNCTIONAL ASSESSMENT: PAIN_FUNCTIONAL_ASSESSMENT: 0-10

## 2024-10-23 ASSESSMENT — PAIN SCALES - GENERAL
PAINLEVEL_OUTOF10: 9
PAINLEVEL_OUTOF10: 9
PAINLEVEL_OUTOF10: 8
PAINLEVEL_OUTOF10: 10 - WORST POSSIBLE PAIN
PAINLEVEL_OUTOF10: 9
PAINLEVEL_OUTOF10: 8

## 2024-10-23 NOTE — CARE PLAN
The patient's goals for the shift include Pt reports frustration with her toradol being d/c'd.    The clinical goals for the shift include Pt will sleep > 6 hours tonight.    Pt reports she continues to feel very anxious and depressed.  Pt remains focused on issues related to pain.  Pt voices frustration because her toradol was d/c'd.  Pt denies SI, HI, and A/V hallucinations.  Pt affect remains flat.  Pt appears anxious and depressed.  Pt compliant with scheduled medications.    Pt appeared to sleep throughout the night.    Problem: Diabetes  Goal: Achieve decreasing blood glucose levels by end of shift  Outcome: Progressing     Problem: Fall/Injury  Goal: Be free from injury by end of the shift  Outcome: Progressing  Goal: Pace activities to prevent fatigue by end of the shift  Outcome: Progressing     Problem: Pain  Goal: Performs ADL's with improved pain control throughout shift  Outcome: Progressing     Problem: Fall/Injury  Goal: Not fall by end of shift  Outcome: Progressing  Goal: Be free from injury by end of the shift  Outcome: Progressing     Problem: Altered Thought Processes as Evidenced by  Goal: STG - Desires improvement in ability to think and concentrate  Outcome: Progressing     Problem: Potential for Harm to Self or Others  Goal: Identifies stressors that lead to harmful behaviors  Outcome: Progressing  Goal: Notifies staff when experiencing harmful thoughts toward self/others  Outcome: Progressing     Problem: Ineffective Coping  Goal: Identifies ineffective coping skills  Outcome: Progressing  Goal: Identifies healthy coping skills  Outcome: Progressing  Goal: Demonstrates healthy coping skills  Outcome: Progressing     Problem: Alteration in Sleep  Goal: STG - Informs staff if unable to sleep  Outcome: Progressing     Problem: Anxiety  Goal: Attempts to manage anxiety with help  Outcome: Progressing  Goal: Verbalizes ways to manage anxiety  Outcome: Progressing  Goal: Implements measures to  reduce anxiety  Outcome: Progressing     Problem: Self Care Deficit  Goal: STG - Patient completes hygiene  Outcome: Progressing     Problem: Fall/Injury  Goal: Verbalize understanding of personal risk factors for fall in the hospital  Outcome: Met     Problem: Fall/Injury  Goal: Use assistive devices by end of the shift  Outcome: Met

## 2024-10-23 NOTE — PROGRESS NOTES
Occupational Therapy     REHAB Therapy Assessment & Treatment    Patient Name: Alejandra Houston  MRN: [id number]  Today's Date: 10/23/2024      Activity:  Mindfulness Group     Attendance:  Attendance  Activity: Discussion/reminisce, Relaxation therapy  Participation: Active participation    Treatment Approach  Approach : Group therapy sessions  Patient Stated Goals: none stated  Cognition: Attention (Pt alert, oriented, & attentive)  Social Skills: Demonstrates ability to listen to others, Cooperates with others in group activity  Emotional: Mood (Pt mood & affect depressed as pt pain increased)  Stress Management/Relaxation Training: Able to identify stress of pain levels, Performs stress management/relaxation techniques  Treatment Approach Comments: Pt attended & participated part of afternoon therapy group focused on mindfulness. Pt educated on mindfulness including definition; benefits including reduced rumination & increase emotion regulation; & methods of practice including meditation, body scan, mindfulness walk, mindful eating, & sensory strategies. Prior to practicing guided meditations exercises, pt left room due to increasing pain & feeling dizzy. Pt monitored while leaving & w/ staff in hallway to ensure safety.      Encounter Problems       Encounter Problems (Active)       OT Goals       Pt will explore, identify, and appropriately utilize effective coping strategies to cope with daily stressors and manage emotions with independence prior to discharge.  (Progressing)       Start:  10/09/24    Expected End:  11/06/24            Pt will demonstrate ability to appropriately modulate emotions and impulses with independence prior to discharge.  (Progressing)       Start:  10/09/24    Expected End:  11/06/24            Pt will explore, identify, and appropriately utilize effective communication strategies to express feelings, wants, and needs with independence prior to discharge.  (Progressing)       Start:   10/09/24    Expected End:  11/06/24            Pt will complete Relapse Prevention Plan in collaboration w/ OT to identify potential stressors in home environment & plan healthy coping skills as alternates to substance use prior to discharge.  (Progressing)       Start:  10/09/24    Expected End:  11/06/24                 Education:  Pt given educational handouts on mindfulness & mindfulness practices. Reviewed, discussed, & practiced. Pt demonstrates emerging understanding; could benefit from reinforced education.

## 2024-10-23 NOTE — PROGRESS NOTES
"Alejandra Houston is a 59 y.o. female on day 14 of admission presenting with MDD.    Subjective   Patient still depressed, dealing with chronic pain. Medical team reached out to Dr. Henson with pain management, awaiting response. Patient did sleep better and is tolerating meds with no adverse effects.De    Objective     MSE  General: Appropriately groomed and dressed.  Appearance: Appears stated age.  Attitude: Calm, cooperative.  Behavior: Appropriate eye contact.  Motor activity: No agitation or retardation. no EPS.    Speech: Regular rate, rhythm, volume and tone.  Mood: Depressed  Affect: Flat  Thought process: Organized, linear, goal-directed.  Associations are logical.  Thought content: Does not endorse suicidal or homicidal ideation, no delusions elicited.  Thought perception: Did not endorse auditory or visual hallucinations.  Cognition: Alert, oriented x3.  no deficit in memory or attention.  Insight: Fair.  Judgment: Fair.    Last Recorded Vitals  /89   Pulse 64   Temp 35.8 °C (96.4 °F) (Temporal)   Resp 19   Ht 1.727 m (5' 7.99\")   Wt 104 kg (228 lb 15.9 oz)   SpO2 98%   BMI 34.83 kg/m²      Relevant Results  Scheduled medications  buPROPion XL, 450 mg, oral, q AM  carvedilol, 6.25 mg, oral, Daily  [Held by provider] dulaglutide, 1.5 mg, subcutaneous, Every Sunday  DULoxetine, 60 mg, oral, BID  famotidine, 40 mg, oral, Daily  ferrous sulfate (325 mg ferrous sulfate), 1 tablet, oral, BID  folic acid, 1 mg, oral, Daily  gabapentin, 600 mg, oral, 4x daily  insulin lispro, 0-5 Units, subcutaneous, With meals & nightly  lidocaine, 3 patch, transdermal, Daily  metFORMIN XR, 1,000 mg, oral, Daily with breakfast  pantoprazole, 40 mg, oral, Daily before breakfast  polyethylene glycol, 17 g, oral, Daily  QUEtiapine, 125 mg, oral, Nightly  thiamine, 100 mg, oral, Daily      Continuous medications     PRN medications  PRN medications: acetaminophen, albuterol, alum-mag hydroxide-simeth, benzocaine-menthol, " hydrOXYzine pamoate, magnesium hydroxide, phenyleph-min oil-petrolatum, traMADol    Results for orders placed or performed during the hospital encounter of 10/09/24 (from the past 24 hours)   POCT GLUCOSE   Result Value Ref Range    POCT Glucose 182 (H) 74 - 99 mg/dL   POCT GLUCOSE   Result Value Ref Range    POCT Glucose 116 (H) 74 - 99 mg/dL   POCT GLUCOSE   Result Value Ref Range    POCT Glucose 132 (H) 74 - 99 mg/dL   POCT GLUCOSE   Result Value Ref Range    POCT Glucose 126 (H) 74 - 99 mg/dL     *Note: Due to a large number of results and/or encounters for the requested time period, some results have not been displayed. A complete set of results can be found in Results Review.        Assessment/Plan   Diagnosis:  MDD, severe without psychosis     Impression:     Labs and Chart: reviewed  Case discussed with treatment team members  Encouraged patient to attend group and other mileu activity  Collateral from family - pending  Discharge planing  Medication:       - Reviewed. To continue as ordered    Medication Consent  Medication Consent: no medication changes necessary for review    JUNAID Borrego-CNP

## 2024-10-23 NOTE — PROGRESS NOTES
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    Avascular necrosis of bone of left hip    Sleep apnea     no cpap    Suicidal ideations     Thrombocytopenia (CMS-HCC)         Psychiatric History: depression and anxiety    Social History:   Social History     Socioeconomic History    Marital status:      Spouse name: Not on file    Number of children: Not on file    Years of education: Not on file    Highest education level: Not on file   Occupational History     Employer: NONE   Tobacco Use    Smoking status: Every Day     Current packs/day: 0.50     Average packs/day: 0.5 packs/day for 44.8 years (22.4 ttl pk-yrs)     Types: Cigarettes     Start date: 1980    Smokeless tobacco: Never   Vaping Use    Vaping status: Never Used   Substance and Sexual Activity    Alcohol use: Not Currently     Comment: social    Drug use: Yes     Types: \"Crack\" cocaine    Sexual activity: Defer   Other Topics Concern    Not on file   Social History Narrative    Not on file     Social Drivers of Health     Financial Resource Strain: Medium Risk (10/9/2024)    Overall Financial Resource Strain (CARDIA)     Difficulty of Paying Living Expenses: Somewhat hard   Food Insecurity: Food Insecurity Present (10/9/2024)    Hunger Vital Sign     Worried About Running Out of Food in the Last Year: Sometimes true     Ran Out of Food in the Last Year: Sometimes true   Transportation Needs: No Transportation Needs (2/8/2024)    PRAPARE - " "Transportation     Lack of Transportation (Medical): No     Lack of Transportation (Non-Medical): No   Physical Activity: Not on file   Stress: Not on file   Social Connections: Feeling Socially Integrated (11/15/2023)    OASIS : Social Isolation     Frequency of experiencing loneliness or isolation: Never   Intimate Partner Violence: Not At Risk (10/9/2024)    Humiliation, Afraid, Rape, and Kick questionnaire     Fear of Current or Ex-Partner: No     Emotionally Abused: No     Physically Abused: No     Sexually Abused: No   Housing Stability: High Risk (10/9/2024)    Housing Stability Vital Sign     Unable to Pay for Housing in the Last Year: No     Number of Times Moved in the Last Year: 3     Homeless in the Last Year: Yes        UDS / Tox panel results:   NA    Substance(s) used: Crack. Amount: NA. Frequency/Route: SMOKE a couple times a month. Last Used: NA.    Brief Summary of Assessment:   SUNS talked with pt. Pt is going to [facility] on Friday from [facility]. Pt lives and is from the [facility]. Pt said they have  a lot of \"dirty laundry they need to talk about\". Pt said they struggle with pin and they smoke crack because of pain. Pt has their first pain management apt on Friday. [facility] connected pt with [facility]. Valerie from [facility] is taking pt to pain management apt on Friday then to [place] in [facility]. Pt said their relationship right now with their daughter isn't good and their daughter may not want her back in the home. SUNS suggested pt go to a sober living home after 30 treatment. Pt said the longest they have been sober from crack cocaine was 10 months. Pt said they went to [facility] and that helped them get sober. Pt is , on [place], and does not drive. SUNS gave pt info on [facility] as pt requested.       Diagnosis / Diagnostic Impression:   COCAINE ABUSE DISORDER     Summary / Plan:  SUNS is going to stay I touch with pt. "     Patient interested in treatment: Yes, going [facility] on 10/25.         Transportation Provided:

## 2024-10-23 NOTE — CARE PLAN
"Alejandra tearful today several times, is focused on pain medication.  She states that she needs morphine and that \"Ultram is for babies\".  Spoke with psychiatrist and NP and medical NP, referring back to pain .  Patient verbalizes high levels of depression and anxiety related to her neck and back pain.  Patient making comments about \"I may as well jump in front of a train\", denies having any active suicidal thoughts, states that she feels safe on the unit and will not do anything to harm herself here.  Will continue to closely monitor.        The patient's goals for the shift include \"I need something for this pain\" - Met - Pt. spoke with psychiatrist and NP and medical NP, staying with the Ultram.  Patient wants morphine, but is not being ordered.  Medical NP reached out to pain management physician that is on consult, waiting for response.       The clinical goals for the shift include Participate in group therapy meetings - Partially Met - Pt. participated in some group meetings.      Problem: Diabetes  Goal: Achieve decreasing blood glucose levels by end of shift  Outcome: Progressing  Goal: Increase stability of blood glucose readings by end of shift  Outcome: Progressing  Goal: Decrease in ketones present in urine by end of shift  Outcome: Progressing  Goal: Maintain electrolyte levels within acceptable range throughout shift  Outcome: Progressing  Goal: No changes in neurological exam by end of shift  Outcome: Progressing  Goal: Learn about and adhere to nutrition recommendations by end of shift  Outcome: Progressing  Goal: Vital signs within normal range for age by end of shift  Outcome: Progressing  Goal: Increase self care and/or family involovement by end of shift  Outcome: Progressing  Goal: Receive DSME education by end of shift  Outcome: Progressing     Problem: Fall/Injury  Goal: Be free from injury by end of the shift  Outcome: Progressing  Goal: Verbalize understanding of risk " factor reduction measures to prevent injury from fall in the home  Outcome: Progressing  Goal: Pace activities to prevent fatigue by end of the shift  Outcome: Progressing     Problem: Pain  Goal: Takes deep breaths with improved pain control throughout the shift  Outcome: Progressing  Goal: Turns in bed with improved pain control throughout the shift  Outcome: Progressing  Goal: Walks with improved pain control throughout the shift  Outcome: Progressing  Goal: Performs ADL's with improved pain control throughout shift  Outcome: Progressing  Goal: Participates in PT with improved pain control throughout the shift  Outcome: Progressing  Goal: Free from opioid side effects throughout the shift  Outcome: Progressing  Goal: Free from acute confusion related to pain meds throughout the shift  Outcome: Progressing     Problem: Fall/Injury  Goal: Not fall by end of shift  Outcome: Progressing  Goal: Be free from injury by end of the shift  Outcome: Progressing  Goal: Verbalize understanding of personal risk factors for fall in the hospital  Outcome: Progressing  Goal: Verbalize understanding of risk factor reduction measures to prevent injury from fall in the home  Outcome: Progressing  Goal: Pace activities to prevent fatigue by end of the shift  Outcome: Progressing     Problem: Fall/Injury  Goal: Not fall by end of shift  Outcome: Progressing  Goal: Be free from injury by end of the shift  Outcome: Progressing  Goal: Verbalize understanding of personal risk factors for fall in the hospital  Outcome: Progressing  Goal: Verbalize understanding of risk factor reduction measures to prevent injury from fall in the home  Outcome: Progressing  Goal: Use assistive devices by end of the shift  Outcome: Progressing  Goal: Pace activities to prevent fatigue by end of the shift  Outcome: Progressing     Problem: Altered Thought Processes as Evidenced by  Goal: STG - Desires improvement in ability to think and concentrate  Outcome:  Progressing  Goal: STG - Participates in Occupational Therapy and other cognitive assessments  Outcome: Progressing     Problem: Potential for Harm to Self or Others  Goal: Participates in unit activities  Outcome: Progressing  Goal: Patient/Family participate in treatment and discharge plans  Outcome: Progressing  Goal: Identifies deescalation techniques  Outcome: Progressing  Goal: Understands least restrictive measures  Outcome: Progressing  Goal: Identifies stressors that lead to harmful behaviors  Outcome: Progressing  Goal: Notifies staff when experiencing harmful thoughts toward self/others  Outcome: Progressing     Problem: Educational/Scholastic Disruption  Goal: Meets educational requirements during hospitalization  Outcome: Progressing  Goal: Attends class without disruptive behavior  Outcome: Progressing  Goal: Completes daily assignments  Outcome: Progressing     Problem: Ineffective Coping  Goal: Cooperates with admission process  Outcome: Progressing  Goal: Identifies ineffective coping skills  Outcome: Progressing  Goal: Identifies healthy coping skills  Outcome: Progressing  Goal: Demonstrates healthy coping skills  Outcome: Progressing  Goal: Participates in unit activities  Outcome: Progressing  Goal: Patient/Family participate in treatment and discharge plans  Outcome: Progressing  Goal: Patient/Family verbalizes awareness of resources  Outcome: Progressing  Goal: Understands least restrictive measures  Outcome: Progressing  Goal: Free from restraint events  Outcome: Progressing     Problem: Alteration in Sleep  Goal: STG - Informs staff if unable to sleep  Outcome: Progressing  Goal: STG - Attends breathing and relaxation group  Outcome: Progressing     Problem: Potential for Substance Withdrawal  Goal: Verbalizes signs/symptoms of withdrawal  Outcome: Progressing  Goal: Reports signs/symptoms of withdrawal  Outcome: Progressing  Goal: Free of withdrawal symptoms  Outcome: Progressing

## 2024-10-24 ENCOUNTER — PHARMACY VISIT (OUTPATIENT)
Dept: PHARMACY | Facility: CLINIC | Age: 59
End: 2024-10-24
Payer: MEDICAID

## 2024-10-24 VITALS
WEIGHT: 228.99 LBS | BODY MASS INDEX: 34.71 KG/M2 | DIASTOLIC BLOOD PRESSURE: 72 MMHG | OXYGEN SATURATION: 97 % | RESPIRATION RATE: 18 BRPM | TEMPERATURE: 97 F | HEIGHT: 68 IN | SYSTOLIC BLOOD PRESSURE: 149 MMHG | HEART RATE: 71 BPM

## 2024-10-24 LAB
GLUCOSE BLD MANUAL STRIP-MCNC: 113 MG/DL (ref 74–99)
GLUCOSE BLD MANUAL STRIP-MCNC: 206 MG/DL (ref 74–99)
GLUCOSE BLD MANUAL STRIP-MCNC: 210 MG/DL (ref 74–99)
GLUCOSE BLD MANUAL STRIP-MCNC: 85 MG/DL (ref 74–99)

## 2024-10-24 PROCEDURE — 2500000005 HC RX 250 GENERAL PHARMACY W/O HCPCS: Performed by: REGISTERED NURSE

## 2024-10-24 PROCEDURE — 1240000001 HC SEMI-PRIVATE BH ROOM DAILY

## 2024-10-24 PROCEDURE — 2500000001 HC RX 250 WO HCPCS SELF ADMINISTERED DRUGS (ALT 637 FOR MEDICARE OP): Performed by: REGISTERED NURSE

## 2024-10-24 PROCEDURE — 97165 OT EVAL LOW COMPLEX 30 MIN: CPT | Mod: GO

## 2024-10-24 PROCEDURE — 99232 SBSQ HOSP IP/OBS MODERATE 35: CPT | Performed by: PSYCHIATRY & NEUROLOGY

## 2024-10-24 PROCEDURE — 97150 GROUP THERAPEUTIC PROCEDURES: CPT | Mod: GO

## 2024-10-24 PROCEDURE — 2500000001 HC RX 250 WO HCPCS SELF ADMINISTERED DRUGS (ALT 637 FOR MEDICARE OP): Performed by: PSYCHIATRY & NEUROLOGY

## 2024-10-24 PROCEDURE — 82947 ASSAY GLUCOSE BLOOD QUANT: CPT

## 2024-10-24 PROCEDURE — RXMED WILLOW AMBULATORY MEDICATION CHARGE

## 2024-10-24 PROCEDURE — 2500000002 HC RX 250 W HCPCS SELF ADMINISTERED DRUGS (ALT 637 FOR MEDICARE OP, ALT 636 FOR OP/ED): Performed by: NURSE PRACTITIONER

## 2024-10-24 PROCEDURE — 2500000002 HC RX 250 W HCPCS SELF ADMINISTERED DRUGS (ALT 637 FOR MEDICARE OP, ALT 636 FOR OP/ED): Performed by: PSYCHIATRY & NEUROLOGY

## 2024-10-24 RX ORDER — NALOXONE HYDROCHLORIDE 4 MG/.1ML
4 SPRAY NASAL AS NEEDED
Qty: 2 EACH | Refills: 11 | Status: SHIPPED | OUTPATIENT
Start: 2024-10-24

## 2024-10-24 RX ORDER — FOLIC ACID 1 MG/1
1 TABLET ORAL DAILY
Qty: 30 TABLET | Refills: 0 | Status: SHIPPED | OUTPATIENT
Start: 2024-10-24 | End: 2024-11-23

## 2024-10-24 RX ORDER — PANTOPRAZOLE SODIUM 40 MG/1
40 TABLET, DELAYED RELEASE ORAL
Qty: 30 TABLET | Refills: 0 | Status: SHIPPED | OUTPATIENT
Start: 2024-10-25 | End: 2024-11-24

## 2024-10-24 RX ORDER — GABAPENTIN 600 MG/1
600 TABLET ORAL DAILY
Qty: 30 TABLET | Refills: 0 | Status: SHIPPED | OUTPATIENT
Start: 2024-10-24 | End: 2024-11-23

## 2024-10-24 RX ORDER — DULOXETIN HYDROCHLORIDE 60 MG/1
60 CAPSULE, DELAYED RELEASE ORAL 2 TIMES DAILY
Qty: 60 CAPSULE | Refills: 0 | Status: SHIPPED | OUTPATIENT
Start: 2024-10-24 | End: 2024-11-23

## 2024-10-24 RX ORDER — IBUPROFEN 600 MG/1
600 TABLET ORAL EVERY 6 HOURS PRN
Status: DISCONTINUED | OUTPATIENT
Start: 2024-10-24 | End: 2024-10-25 | Stop reason: HOSPADM

## 2024-10-24 RX ORDER — POLYETHYLENE GLYCOL 3350 17 G/17G
17 POWDER, FOR SOLUTION ORAL DAILY
Qty: 30 PACKET | Refills: 0 | Status: SHIPPED | OUTPATIENT
Start: 2024-10-25 | End: 2024-11-24

## 2024-10-24 RX ORDER — TRAMADOL HYDROCHLORIDE 50 MG/1
50 TABLET ORAL EVERY 6 HOURS PRN
Qty: 12 TABLET | Refills: 0 | Status: SHIPPED | OUTPATIENT
Start: 2024-10-24 | End: 2024-10-27

## 2024-10-24 RX ORDER — MULTIVIT WITH MINERALS/HERBS
1 TABLET ORAL DAILY
Qty: 30 TABLET | Refills: 0 | Status: SHIPPED | OUTPATIENT
Start: 2024-10-24 | End: 2024-11-23

## 2024-10-24 RX ORDER — METFORMIN HYDROCHLORIDE 500 MG/1
1000 TABLET, EXTENDED RELEASE ORAL
Qty: 60 TABLET | Refills: 0 | Status: SHIPPED | OUTPATIENT
Start: 2024-10-24 | End: 2024-11-23

## 2024-10-24 RX ORDER — CHOLECALCIFEROL (VITAMIN D3) 50 MCG
50 TABLET ORAL DAILY
Qty: 30 TABLET | Refills: 0 | Status: SHIPPED | OUTPATIENT
Start: 2024-10-24 | End: 2024-11-23

## 2024-10-24 RX ORDER — FERROUS SULFATE 325(65) MG
1 TABLET ORAL
Qty: 60 TABLET | Refills: 0 | Status: SHIPPED | OUTPATIENT
Start: 2024-10-24 | End: 2024-11-23

## 2024-10-24 RX ORDER — GABAPENTIN 300 MG/1
600 CAPSULE ORAL 4 TIMES DAILY
Qty: 240 CAPSULE | Refills: 0 | Status: SHIPPED | OUTPATIENT
Start: 2024-10-24 | End: 2024-10-24 | Stop reason: HOSPADM

## 2024-10-24 RX ORDER — LIDOCAINE 560 MG/1
3 PATCH PERCUTANEOUS; TOPICAL; TRANSDERMAL DAILY
Qty: 90 PATCH | Refills: 0 | Status: SHIPPED | OUTPATIENT
Start: 2024-10-25 | End: 2024-11-24

## 2024-10-24 RX ORDER — ALBUTEROL SULFATE 90 UG/1
2 INHALANT RESPIRATORY (INHALATION) 4 TIMES DAILY PRN
Qty: 8.5 G | Refills: 1 | Status: SHIPPED | OUTPATIENT
Start: 2024-10-24 | End: 2024-11-23

## 2024-10-24 RX ORDER — CARVEDILOL 6.25 MG/1
6.25 TABLET ORAL DAILY
Qty: 30 TABLET | Refills: 0 | Status: SHIPPED | OUTPATIENT
Start: 2024-10-24 | End: 2024-11-23

## 2024-10-24 RX ORDER — BUPROPION HYDROCHLORIDE 450 MG/1
450 TABLET, FILM COATED, EXTENDED RELEASE ORAL EVERY MORNING
Qty: 30 TABLET | Refills: 0 | Status: SHIPPED | OUTPATIENT
Start: 2024-10-25 | End: 2024-11-24

## 2024-10-24 RX ORDER — LANOLIN ALCOHOL/MO/W.PET/CERES
100 CREAM (GRAM) TOPICAL DAILY
Qty: 30 TABLET | Refills: 0 | Status: SHIPPED | OUTPATIENT
Start: 2024-10-24 | End: 2024-11-23

## 2024-10-24 RX ORDER — FAMOTIDINE 40 MG/1
40 TABLET, FILM COATED ORAL DAILY
Qty: 30 TABLET | Refills: 0 | Status: SHIPPED | OUTPATIENT
Start: 2024-10-24 | End: 2024-11-23

## 2024-10-24 RX ORDER — QUETIAPINE FUMARATE 50 MG/1
150 TABLET, FILM COATED ORAL NIGHTLY
Qty: 90 TABLET | Refills: 0 | Status: SHIPPED | OUTPATIENT
Start: 2024-10-24

## 2024-10-24 ASSESSMENT — PAIN SCALES - GENERAL
PAINLEVEL_OUTOF10: 0 - NO PAIN
PAINLEVEL_OUTOF10: 9
PAINLEVEL_OUTOF10: 7
PAINLEVEL_OUTOF10: 7
PAINLEVEL_OUTOF10: 10 - WORST POSSIBLE PAIN
PAINLEVEL_OUTOF10: 8

## 2024-10-24 ASSESSMENT — PAIN SCALES - WONG BAKER
WONGBAKER_NUMERICALRESPONSE: NO HURT
WONGBAKER_NUMERICALRESPONSE: HURTS LITTLE MORE

## 2024-10-24 ASSESSMENT — COLUMBIA-SUICIDE SEVERITY RATING SCALE - C-SSRS
1. SINCE LAST CONTACT, HAVE YOU WISHED YOU WERE DEAD OR WISHED YOU COULD GO TO SLEEP AND NOT WAKE UP?: NO
2. HAVE YOU ACTUALLY HAD ANY THOUGHTS OF KILLING YOURSELF?: NO
2. HAVE YOU ACTUALLY HAD ANY THOUGHTS OF KILLING YOURSELF?: NO
6. HAVE YOU EVER DONE ANYTHING, STARTED TO DO ANYTHING, OR PREPARED TO DO ANYTHING TO END YOUR LIFE?: NO
5. HAVE YOU STARTED TO WORK OUT OR WORKED OUT THE DETAILS OF HOW TO KILL YOURSELF? DO YOU INTEND TO CARRY OUT THIS PLAN?: NO
6. HAVE YOU EVER DONE ANYTHING, STARTED TO DO ANYTHING, OR PREPARED TO DO ANYTHING TO END YOUR LIFE?: NO
1. SINCE LAST CONTACT, HAVE YOU WISHED YOU WERE DEAD OR WISHED YOU COULD GO TO SLEEP AND NOT WAKE UP?: NO

## 2024-10-24 ASSESSMENT — PAIN DESCRIPTION - ORIENTATION: ORIENTATION: POSTERIOR

## 2024-10-24 ASSESSMENT — PAIN - FUNCTIONAL ASSESSMENT: PAIN_FUNCTIONAL_ASSESSMENT: 0-10

## 2024-10-24 ASSESSMENT — PAIN DESCRIPTION - LOCATION: LOCATION: NECK

## 2024-10-24 NOTE — PROGRESS NOTES
Occupational Therapy     REHAB Therapy Group Treatment(p.m.)    Patient Name: Alejandra Houston  MRN: [id number]  Today's Date: 10/24/2024  Time in13:03  Time out 13:50  47 min    Activity Assessment:   Pt demonstrated good initiation for OT group attendance and participation without labilty/verbalization of depression, although affect remains flat/blunted    OT Interventions group/1:1 : Therapeutic use of self/activities, OT Task Skills Group, OT Life Skills Management Skills, Grief Management/Anger Management ,  ADL/I ADL  , Positive Coping Skills/Stress Management, Community Re-Entry, Relaxation, Self Esteem, Communication Skills, Time Management Skills, Addiction education/prevention        Attendance:  Attendance  Activity: Discussion/reminisce  Participation: Active participation    Therapeutic Recreation:  Treatment Approach  Approach :  (47 minutes)  Patient Stated Goals: To feel better  Cognition: Attention, Directions  Social Skills: Cooperates with others in group activity  Community Reintegration: Awareness, Safety  Emotional: Behaviors, Mood  Stress Management/Relaxation Training: Identifies benefits of stress management/relaxation techniques  Treatment Approach Comments: Pt  was present in OT group setting , and  wanted to participate through writing(  filling out self esteem positive affitrmations handout .Self esteem education was provided  through awareness/thought-behavior management. Pt improved participation through  verbal interaction with peers/OT group facilitator in reading answers when prompted  with answering questions from handout. Improved affect noted. Pt demonstrated fair plus eye contact without suicidal ideation or mood instability.Pt reports stable pain levels.      Encounter Problems       Encounter Problems (Active)       OT Goals       Pt will explore, identify, and appropriately utilize effective coping strategies to cope with daily stressors and manage emotions with independence  prior to discharge.  (Progressing)       Start:  10/09/24    Expected End:  11/06/24            Pt will demonstrate ability to appropriately modulate emotions and impulses with independence prior to discharge.  (Progressing)       Start:  10/09/24    Expected End:  11/06/24            Pt will explore, identify, and appropriately utilize effective communication strategies to express feelings, wants, and needs with independence prior to discharge.  (Progressing)       Start:  10/09/24    Expected End:  11/06/24            Pt will complete Relapse Prevention Plan in collaboration w/ OT to identify potential stressors in home environment & plan healthy coping skills as alternates to substance use prior to discharge.  (Progressing)       Start:  10/09/24    Expected End:  11/06/24                     Education Documentation  Body Mechanics, taught by Kenia Trinh OT at 10/24/2024  7:35 PM.  Learner: Patient  Readiness: Acceptance  Method: Demonstration  Response: Demonstrated Understanding, Needs Reinforcement    Precautions, taught by Kenia Trinh OT at 10/24/2024  7:35 PM.  Learner: Patient  Readiness: Acceptance  Method: Demonstration  Response: Demonstrated Understanding, Needs Reinforcement    ADL Training, taught by Kenia Trinh OT at 10/24/2024  7:35 PM.  Learner: Patient  Readiness: Acceptance  Method: Demonstration  Response: Demonstrated Understanding, Needs Reinforcement    Body Mechanics, taught by Kenia Trinh OT at 10/24/2024  6:11 PM.  Learner: Patient  Readiness: Acceptance  Method: Demonstration  Response: Demonstrated Understanding, Needs Reinforcement    Precautions, taught by Kenia Trinh OT at 10/24/2024  6:11 PM.  Learner: Patient  Readiness: Acceptance  Method: Demonstration  Response: Demonstrated Understanding, Needs Reinforcement    ADL Training, taught by Kenia Trinh OT at 10/24/2024  6:11 PM.  Learner: Patient  Readiness: Acceptance  Method:  Demonstration  Response: Demonstrated Understanding, Needs Reinforcement    Education Comments  No comments found.          Additional Comments:

## 2024-10-24 NOTE — CARE PLAN
"Alejandra continues to be focused on pain medication and is anxious about discharge tomorrow.  She understands that she needs substance abuse treatment so that she can move back in with her family.  Alejandra verbalizes moderate levels of depression, denies having any active suicidal ideations.  Plan for discharge at 650 am tomorrow morning, when [facility] is supposed to pick patient up to take her to her pain management appointment and then to her house to  medications and then to [facility] for substance abuse inpatient treatment.  Most of patient's medications filled with meds to bed program and put with patient's belongings.  Some of the medications were too early to refill the script.  Patient needs to  her Glucometer and testing supplies, Coreg, Metformin, Folic Acid, B12, Feosol, and Gabapentin from home.     The patient's goals for the shift include \"I need to talk to the doctors\" - Met    The clinical goals for the shift include Participate in group therapy meetings - Met    Problem: Diabetes  Goal: Achieve decreasing blood glucose levels by end of shift  Outcome: Progressing  Goal: Increase stability of blood glucose readings by end of shift  Outcome: Progressing  Goal: Decrease in ketones present in urine by end of shift  Outcome: Progressing  Goal: Maintain electrolyte levels within acceptable range throughout shift  Outcome: Progressing  Goal: No changes in neurological exam by end of shift  Outcome: Progressing  Goal: Learn about and adhere to nutrition recommendations by end of shift  Outcome: Progressing  Goal: Vital signs within normal range for age by end of shift  Outcome: Progressing  Goal: Increase self care and/or family involovement by end of shift  Outcome: Progressing  Goal: Receive DSME education by end of shift  Outcome: Progressing     Problem: Fall/Injury  Goal: Be free from injury by end of the shift  Outcome: Progressing  Goal: Verbalize understanding of risk factor " reduction measures to prevent injury from fall in the home  Outcome: Progressing  Goal: Pace activities to prevent fatigue by end of the shift  Outcome: Progressing     Problem: Pain  Goal: Takes deep breaths with improved pain control throughout the shift  Outcome: Progressing  Goal: Turns in bed with improved pain control throughout the shift  Outcome: Progressing  Goal: Walks with improved pain control throughout the shift  Outcome: Progressing  Goal: Performs ADL's with improved pain control throughout shift  Outcome: Progressing  Goal: Participates in PT with improved pain control throughout the shift  Outcome: Progressing  Goal: Free from opioid side effects throughout the shift  Outcome: Progressing  Goal: Free from acute confusion related to pain meds throughout the shift  Outcome: Progressing     Problem: Fall/Injury  Goal: Not fall by end of shift  Outcome: Progressing  Goal: Be free from injury by end of the shift  Outcome: Progressing  Goal: Verbalize understanding of personal risk factors for fall in the hospital  Outcome: Progressing  Goal: Verbalize understanding of risk factor reduction measures to prevent injury from fall in the home  Outcome: Progressing  Goal: Pace activities to prevent fatigue by end of the shift  Outcome: Progressing     Problem: Fall/Injury  Goal: Not fall by end of shift  Outcome: Progressing  Goal: Be free from injury by end of the shift  Outcome: Progressing  Goal: Verbalize understanding of personal risk factors for fall in the hospital  Outcome: Progressing  Goal: Verbalize understanding of risk factor reduction measures to prevent injury from fall in the home  Outcome: Progressing  Goal: Use assistive devices by end of the shift  Outcome: Progressing  Goal: Pace activities to prevent fatigue by end of the shift  Outcome: Progressing     Problem: Altered Thought Processes as Evidenced by  Goal: STG - Desires improvement in ability to think and concentrate  Outcome:  Progressing  Goal: STG - Participates in Occupational Therapy and other cognitive assessments  Outcome: Progressing     Problem: Potential for Harm to Self or Others  Goal: Participates in unit activities  Outcome: Progressing  Goal: Patient/Family participate in treatment and discharge plans  Outcome: Progressing  Goal: Identifies deescalation techniques  Outcome: Progressing  Goal: Understands least restrictive measures  Outcome: Progressing  Goal: Identifies stressors that lead to harmful behaviors  Outcome: Progressing  Goal: Notifies staff when experiencing harmful thoughts toward self/others  Outcome: Progressing     Problem: Educational/Scholastic Disruption  Goal: Meets educational requirements during hospitalization  Outcome: Progressing  Goal: Attends class without disruptive behavior  Outcome: Progressing  Goal: Completes daily assignments  Outcome: Progressing     Problem: Ineffective Coping  Goal: Cooperates with admission process  Outcome: Progressing  Goal: Identifies ineffective coping skills  Outcome: Progressing  Goal: Identifies healthy coping skills  Outcome: Progressing  Goal: Demonstrates healthy coping skills  Outcome: Progressing  Goal: Participates in unit activities  Outcome: Progressing  Goal: Patient/Family participate in treatment and discharge plans  Outcome: Progressing  Goal: Patient/Family verbalizes awareness of resources  Outcome: Progressing  Goal: Understands least restrictive measures  Outcome: Progressing  Goal: Free from restraint events  Outcome: Progressing     Problem: Alteration in Sleep  Goal: STG - Informs staff if unable to sleep  Outcome: Progressing  Goal: STG - Attends breathing and relaxation group  Outcome: Progressing     Problem: Potential for Substance Withdrawal  Goal: Verbalizes signs/symptoms of withdrawal  Outcome: Progressing  Goal: Reports signs/symptoms of withdrawal  Outcome: Progressing  Goal: Free of withdrawal symptoms  Outcome: Progressing

## 2024-10-24 NOTE — CARE PLAN
The patient's goals for the shift include Pt states she feels helpless because he pain needs are not being addressed.  Pt states she wants to see pain management.    The clinical goals for the shift include Pt will sleep > 6 hours tonight.    Pt reports she's feeling upset and helpless because her doctors are not addressing her pain problems.  Pt states that she wants to speak with pain management.  Pt informed that call has been placed to pain management provider and that we're waiting to hear back.  Pt continues to reports severe levels of depression and anxiety.  Pt denies SI, HI, and A/V hallucinations.  Pt affect remains flat.  Pt continues to focus on pain and pain medications.  Pt compliant with scheduled medications.  Pt given PRN ultram for pain.    Pt appeared to sleep throughout the night.    Problem: Diabetes  Goal: Achieve decreasing blood glucose levels by end of shift  Outcome: Progressing     Problem: Fall/Injury  Goal: Be free from injury by end of the shift  Outcome: Progressing     Problem: Pain  Goal: Performs ADL's with improved pain control throughout shift  Outcome: Progressing     Problem: Fall/Injury  Goal: Not fall by end of shift  Outcome: Progressing     Problem: Altered Thought Processes as Evidenced by  Goal: STG - Desires improvement in ability to think and concentrate  Outcome: Progressing     Problem: Potential for Harm to Self or Others  Goal: Identifies stressors that lead to harmful behaviors  Outcome: Progressing  Goal: Notifies staff when experiencing harmful thoughts toward self/others  Outcome: Progressing     Problem: Ineffective Coping  Goal: Identifies ineffective coping skills  Outcome: Progressing  Goal: Identifies healthy coping skills  Outcome: Progressing  Goal: Demonstrates healthy coping skills  Outcome: Progressing     Problem: Alteration in Sleep  Goal: STG - Informs staff if unable to sleep  Outcome: Progressing     Problem: Anxiety  Goal: Attempts to manage  anxiety with help  Outcome: Progressing  Goal: Verbalizes ways to manage anxiety  Outcome: Progressing  Goal: Implements measures to reduce anxiety  Outcome: Progressing     Problem: Self Care Deficit  Goal: STG - Patient completes hygiene  Outcome: Progressing

## 2024-10-24 NOTE — SIGNIFICANT EVENT
Internal Medicine  Patient to be discharge in AM  Request for home medications to be completed  Sent Rx for Tramadol   Review of chart shows it is PRN Q6 hours, patient is not taking Q6 hrs in last 3 days.   10/21 patient took at 0200, 1241, 2149   10/22 she took 0825 and 1726  10/23 she took  0619, 1254, 2048    10/24 times are 0613 1330     Encourage patient to use heat, ice, lidocaine patches  Encourage that if pain is severe, tramadol is ordered Q6 hours   Above discussed with Primary NP an RN     Defer further pain management due to chronic to Chronic Pain Management Specialist  Chronic Pain management is on consult       10/24/24 at 2:00 PM - JUNAID Aiken-CNP

## 2024-10-24 NOTE — PROGRESS NOTES
Social Work Note  Discharge plan continues to be going to appointment and [place] via [facility] from [facility] early tomorrow morning.  Meds secured thru [facility].

## 2024-10-24 NOTE — PROGRESS NOTES
Occupational Therapy     REHAB Therapy Group Treatment    Patient Name: Alejandra Houston  MRN: [id number]  Today's Date: 10/24/2024  Time in 10:50  Time out 11:35  45 min    Activity Assessment:   Pt improved mood/participation during OT group intervention from decreasing mood lability (crying)/refusal for active participation to initiating active participation without mood instability. No suicidal ideation was expressed this date.    OT Interventions group/1:1 : Therapeutic use of self/activities, OT Task Skills Group, OT Life Skills Management Skills, Grief Management/Anger Management ,  ADL/I ADL  , Positive Coping Skills/Stress Management, Community Re-Entry, Relaxation, Self Esteem, Communication Skills, Time Management Skills, Addiction education/prevention        Attendance:  Attendance  Activity: Discussion/reminisce  Participation: Active participation    Therapeutic Recreation:  Treatment Approach  Approach : 30 to 45 minutes  Patient Stated Goals: To feel better  Cognition: Attention, Directions  Social Skills: Demonstrates ability to listen to others  Emotional: Mood  Stress Management/Relaxation Training: Identifies benefits of stress management/relaxation techniques  Treatment Approach Comments: Pt attended OT group demonstrating good comprehension and focus.Pt was educated in eleven positive coping strategies promoting successful thought/behavior management through problem solving vs worrying - handout provided. Pt believed that keeping perpective of the stressful situation was a challenge for herself. Pt was interactive , demonstrated good motivation to attend group and participate actively. Pt's eye contact was fair /fair plus throughout entire OT group.Pt exhibited period of crying this date due to pain. Pt was provided with a pillow and educated in relaxation (diaphragmatic breathing and visual imagery) . Pt verbalized a decrease in pain after OT group from10/10 to 7/10 , with control of emotions  noted. Improved particiaption noted, with at first declining to readout loud from handout to actively seeking turn to read aloud. No suicidal ideation was verbalized this date.      Encounter Problems       Encounter Problems (Active)       OT Goals       Pt will explore, identify, and appropriately utilize effective coping strategies to cope with daily stressors and manage emotions with independence prior to discharge.  (Progressing)       Start:  10/09/24    Expected End:  11/06/24            Pt will demonstrate ability to appropriately modulate emotions and impulses with independence prior to discharge.  (Progressing)       Start:  10/09/24    Expected End:  11/06/24            Pt will explore, identify, and appropriately utilize effective communication strategies to express feelings, wants, and needs with independence prior to discharge.  (Progressing)       Start:  10/09/24    Expected End:  11/06/24            Pt will complete Relapse Prevention Plan in collaboration w/ OT to identify potential stressors in home environment & plan healthy coping skills as alternates to substance use prior to discharge.  (Progressing)       Start:  10/09/24    Expected End:  11/06/24                     Education Documentation  Body Mechanics, taught by Kenia Trinh OT at 10/24/2024  6:11 PM.  Learner: Patient  Readiness: Acceptance  Method: Demonstration  Response: Demonstrated Understanding, Needs Reinforcement    Precautions, taught by Kenia Trinh OT at 10/24/2024  6:11 PM.  Learner: Patient  Readiness: Acceptance  Method: Demonstration  Response: Demonstrated Understanding, Needs Reinforcement    ADL Training, taught by Kenia Trinh OT at 10/24/2024  6:11 PM.  Learner: Patient  Readiness: Acceptance  Method: Demonstration  Response: Demonstrated Understanding, Needs Reinforcement    Education Comments  No comments found.          Additional Comments:

## 2024-10-24 NOTE — DISCHARGE INSTRUCTIONS
Thank you for choosing [facility]. It has been a pleasure taking part in your medical care. Please follow up with your primary care provider as instructed. If your symptoms should persist or worsen, please contact your primary care physician, or in the case of an emergency proceed to the nearest Emergency Room for further care. If you have any questions about the care you received, please call [facility] at [phone]. Thank you again! MERRY Feliciano  *Please note the information above is to be used as a guide, follow up with your PCP for specific information related to your needs *    You are being prescribed a narcotic   Ultram - do not exceed recommended dosing  Do not take with other narcotics or illicit drugs  No driving   No operating heavy machinery

## 2024-10-24 NOTE — PROGRESS NOTES
"Alejandra Houston is a 59 y.o. female on day 15 of admission presenting with MDD.    Subjective   Patient feeling better today, still dealing with some pain.  No suicidal ideation.  Looking forward to her pain management appointment tomorrow.  Patient peer supporter going to help transport patient to her pain management appointment tomorrow, and then take her to residential [facility] treatment.  Patient tolerating medication well with no adverse effects.    Objective     MSE  General: Appropriately groomed and dressed.  Appearance: Appears stated age.  Attitude: Calm, cooperative.  Behavior: Appropriate eye contact.  Motor activity: No agitation or retardation. no EPS.  Speech: Regular rate, rhythm, volume and tone.  Mood: Less depressed  Affect: Appropriate range  Thought process: Organized, linear, goal-directed.  Associations are logical.  Thought content: Does not endorse suicidal or homicidal ideation, no delusions elicited.  Thought perception: Did not endorse auditory or visual hallucinations.  Cognition: Alert, oriented x3.  no deficit in memory or attention.  Insight: Fair.  Judgment: Fair.    Last Recorded Vitals  /87 (Patient Position: Lying)   Pulse 61   Temp 36 °C (96.8 °F) (Temporal)   Resp 18   Ht 1.727 m (5' 7.99\")   Wt 104 kg (228 lb 15.9 oz)   SpO2 97%   BMI 34.83 kg/m²      Relevant Results  Scheduled medications  buPROPion XL, 450 mg, oral, q AM  carvedilol, 6.25 mg, oral, Daily  [Held by provider] dulaglutide, 1.5 mg, subcutaneous, Every Sunday  DULoxetine, 60 mg, oral, BID  famotidine, 40 mg, oral, Daily  ferrous sulfate (325 mg ferrous sulfate), 1 tablet, oral, BID  folic acid, 1 mg, oral, Daily  gabapentin, 600 mg, oral, 4x daily  insulin lispro, 0-5 Units, subcutaneous, With meals & nightly  lidocaine, 3 patch, transdermal, Daily  metFORMIN XR, 1,000 mg, oral, Daily with breakfast  pantoprazole, 40 mg, oral, Daily before breakfast  polyethylene glycol, 17 g, oral, Daily  QUEtiapine, 150 " mg, oral, Nightly  thiamine, 100 mg, oral, Daily      Continuous medications     PRN medications  PRN medications: acetaminophen, albuterol, alum-mag hydroxide-simeth, benzocaine-menthol, hydrOXYzine pamoate, ibuprofen, magnesium hydroxide, phenyleph-min oil-petrolatum, traMADol    Results for orders placed or performed during the hospital encounter of 10/09/24 (from the past 24 hours)   POCT GLUCOSE   Result Value Ref Range    POCT Glucose 126 (H) 74 - 99 mg/dL   POCT GLUCOSE   Result Value Ref Range    POCT Glucose 170 (H) 74 - 99 mg/dL   POCT GLUCOSE   Result Value Ref Range    POCT Glucose 113 (H) 74 - 99 mg/dL   POCT GLUCOSE   Result Value Ref Range    POCT Glucose 85 74 - 99 mg/dL     *Note: Due to a large number of results and/or encounters for the requested time period, some results have not been displayed. A complete set of results can be found in Results Review.        Assessment/Plan   Diagnosis:  MDD, severe without psychosis    Impression:     Labs and Chart: reviewed  Case discussed with treatment team members  Encouraged patient to attend group and other mileu activity  Collateral from family - pending  Discharge planing  Medication:       - Reviewed. To continue as ordered    Medication Consent  Medication Consent: no medication changes necessary for review    JUNAID Borrego-CNP

## 2024-10-25 ENCOUNTER — OFFICE VISIT (OUTPATIENT)
Dept: PAIN MEDICINE | Facility: CLINIC | Age: 59
End: 2024-10-25
Payer: COMMERCIAL

## 2024-10-25 VITALS
DIASTOLIC BLOOD PRESSURE: 76 MMHG | OXYGEN SATURATION: 96 % | RESPIRATION RATE: 18 BRPM | HEART RATE: 66 BPM | TEMPERATURE: 96.1 F | SYSTOLIC BLOOD PRESSURE: 124 MMHG

## 2024-10-25 DIAGNOSIS — M47.812 CERVICAL SPONDYLOSIS WITHOUT MYELOPATHY: Primary | ICD-10-CM

## 2024-10-25 DIAGNOSIS — G89.29 OTHER CHRONIC PAIN: ICD-10-CM

## 2024-10-25 PROCEDURE — 3044F HG A1C LEVEL LT 7.0%: CPT | Performed by: PAIN MEDICINE

## 2024-10-25 PROCEDURE — 3074F SYST BP LT 130 MM HG: CPT | Performed by: PAIN MEDICINE

## 2024-10-25 PROCEDURE — 99204 OFFICE O/P NEW MOD 45 MIN: CPT | Performed by: PAIN MEDICINE

## 2024-10-25 PROCEDURE — 3078F DIAST BP <80 MM HG: CPT | Performed by: PAIN MEDICINE

## 2024-10-25 PROCEDURE — 99239 HOSP IP/OBS DSCHRG MGMT >30: CPT | Performed by: PSYCHIATRY & NEUROLOGY

## 2024-10-25 PROCEDURE — 99214 OFFICE O/P EST MOD 30 MIN: CPT | Performed by: PAIN MEDICINE

## 2024-10-25 RX ORDER — CYCLOBENZAPRINE HCL 10 MG
10 TABLET ORAL 3 TIMES DAILY PRN
Qty: 90 TABLET | Refills: 2 | Status: SHIPPED | OUTPATIENT
Start: 2024-10-25

## 2024-10-25 ASSESSMENT — COLUMBIA-SUICIDE SEVERITY RATING SCALE - C-SSRS
1. IN THE PAST MONTH, HAVE YOU WISHED YOU WERE DEAD OR WISHED YOU COULD GO TO SLEEP AND NOT WAKE UP?: NO
2. HAVE YOU ACTUALLY HAD ANY THOUGHTS OF KILLING YOURSELF?: NO

## 2024-10-25 ASSESSMENT — PAIN SCALES - GENERAL: PAINLEVEL_OUTOF10: 8

## 2024-10-25 NOTE — CARE PLAN
Problem: Diabetes  Goal: Achieve decreasing blood glucose levels by end of shift  10/25/2024 0728 by Noemi Holt RN  Outcome: Met  10/25/2024 0613 by Noemi Holt RN  Outcome: Progressing  Goal: Increase stability of blood glucose readings by end of shift  10/25/2024 0728 by Noemi Holt RN  Outcome: Met  10/25/2024 0613 by Noemi Holt RN  Outcome: Progressing  Goal: Decrease in ketones present in urine by end of shift  10/25/2024 0728 by Noemi Holt RN  Outcome: Met  10/25/2024 0613 by Noemi Holt RN  Outcome: Progressing  Goal: Maintain electrolyte levels within acceptable range throughout shift  10/25/2024 0728 by Noemi Holt RN  Outcome: Met  10/25/2024 0613 by Noemi Holt RN  Outcome: Progressing  Goal: No changes in neurological exam by end of shift  10/25/2024 0728 by Noemi Holt RN  Outcome: Met  10/25/2024 0613 by Noemi Holt RN  Outcome: Progressing  Goal: Learn about and adhere to nutrition recommendations by end of shift  10/25/2024 0728 by Noemi Holt RN  Outcome: Met  10/25/2024 0613 by Noemi Holt RN  Outcome: Progressing  Goal: Vital signs within normal range for age by end of shift  10/25/2024 0728 by Noemi Holt RN  Outcome: Met  10/25/2024 0613 by Noemi Holt RN  Outcome: Progressing  Goal: Increase self care and/or family involovement by end of shift  10/25/2024 0728 by Noemi Holt RN  Outcome: Met  10/25/2024 0613 by Noemi Holt RN  Outcome: Progressing  Goal: Receive DSME education by end of shift  10/25/2024 0728 by Noemi Holt RN  Outcome: Met  10/25/2024 0613 by Noemi Holt RN  Outcome: Progressing     Problem: Fall/Injury  Goal: Not fall by end of shift  10/25/2024 0728 by Noemi Holt RN  Outcome: Met  10/25/2024 0613 by Noemi Holt RN  Outcome: Progressing  Goal: Be free from injury by end of the shift  10/25/2024 0728 by Noemi Brown, RN  Outcome: Met  10/25/2024 0613 by Noemi Holt, RN  Outcome:  Progressing  Goal: Verbalize understanding of personal risk factors for fall in the hospital  10/25/2024 0728 by Noemi Holt RN  Outcome: Met  10/25/2024 0613 by Noemi Holt RN  Outcome: Progressing  Goal: Verbalize understanding of risk factor reduction measures to prevent injury from fall in the home  10/25/2024 0728 by Noemi Holt RN  Outcome: Met  10/25/2024 0613 by Noemi Holt RN  Outcome: Progressing  Goal: Pace activities to prevent fatigue by end of the shift  10/25/2024 0728 by Noemi Holt RN  Outcome: Met  10/25/2024 0613 by Noemi Holt RN  Outcome: Progressing     Problem: Fall/Injury  Goal: Not fall by end of shift  10/25/2024 0728 by Noemi Holt RN  Outcome: Met  10/25/2024 0613 by Noemi Holt RN  Outcome: Progressing  Goal: Be free from injury by end of the shift  10/25/2024 0728 by Noemi Holt RN  Outcome: Met  10/25/2024 0613 by Noemi Holt RN  Outcome: Progressing  Goal: Verbalize understanding of personal risk factors for fall in the hospital  10/25/2024 0728 by Noemi Holt RN  Outcome: Met  10/25/2024 0613 by Noemi Holt RN  Outcome: Progressing  Goal: Verbalize understanding of risk factor reduction measures to prevent injury from fall in the home  10/25/2024 0728 by Noemi Holt RN  Outcome: Met  10/25/2024 0613 by Noemi Holt RN  Outcome: Progressing  Goal: Use assistive devices by end of the shift  10/25/2024 0728 by Noemi Holt RN  Outcome: Met  10/25/2024 0613 by Noemi Holt RN  Outcome: Progressing  Goal: Pace activities to prevent fatigue by end of the shift  10/25/2024 0728 by Noemi Holt RN  Outcome: Met  10/25/2024 0613 by Noemi Holt RN  Outcome: Progressing     Problem: Pain  Goal: Takes deep breaths with improved pain control throughout the shift  10/25/2024 0728 by Noemi Holt RN  Outcome: Met  10/25/2024 0613 by Noemi Holt RN  Outcome: Progressing  Goal: Turns in bed with improved pain control  throughout the shift  10/25/2024 0728 by Noemi Holt RN  Outcome: Met  10/25/2024 0613 by Noemi Holt RN  Outcome: Progressing  Goal: Walks with improved pain control throughout the shift  10/25/2024 0728 by Noemi Holt RN  Outcome: Met  10/25/2024 0613 by Noemi Holt RN  Outcome: Progressing  Goal: Performs ADL's with improved pain control throughout shift  10/25/2024 0728 by Noemi Holt RN  Outcome: Met  10/25/2024 0613 by Noemi Holt RN  Outcome: Progressing  Goal: Participates in PT with improved pain control throughout the shift  10/25/2024 0728 by Noemi Holt RN  Outcome: Met  10/25/2024 0613 by Noemi Holt RN  Outcome: Progressing  Goal: Free from opioid side effects throughout the shift  10/25/2024 0728 by Noemi Holt RN  Outcome: Met  10/25/2024 0613 by Noemi Holt RN  Outcome: Progressing  Goal: Free from acute confusion related to pain meds throughout the shift  10/25/2024 0728 by Noemi Holt RN  Outcome: Met  10/25/2024 0613 by Noemi Hotl RN  Outcome: Progressing     Problem: Potential for Harm to Self or Others  Goal: Participates in unit activities  10/25/2024 0728 by Noemi Holt RN  Outcome: Met  10/25/2024 0613 by Noemi Holt RN  Outcome: Progressing  Goal: Patient/Family participate in treatment and discharge plans  10/25/2024 0728 by Noemi Holt RN  Outcome: Met  10/25/2024 0613 by Noemi Holt RN  Outcome: Progressing  Goal: Identifies deescalation techniques  10/25/2024 0728 by Noemi Holt RN  Outcome: Met  10/25/2024 0613 by Noemi Holt RN  Outcome: Progressing  Goal: Understands least restrictive measures  10/25/2024 0728 by Noemi Holt RN  Outcome: Met  10/25/2024 0613 by Noemi Holt RN  Outcome: Progressing  Goal: Identifies stressors that lead to harmful behaviors  10/25/2024 0728 by Noemi Holt RN  Outcome: Met  10/25/2024 0613 by Noemi Brown, RN  Outcome: Progressing  Goal: Notifies staff when  experiencing harmful thoughts toward self/others  10/25/2024 0728 by Noemi Holt RN  Outcome: Met  10/25/2024 0613 by Noemi Holt RN  Outcome: Progressing     Problem: Altered Thought Processes as Evidenced by  Goal: STG - Desires improvement in ability to think and concentrate  10/25/2024 0728 by Noemi Holt RN  Outcome: Met  10/25/2024 0613 by Noemi Holt RN  Outcome: Progressing  Goal: STG - Participates in Occupational Therapy and other cognitive assessments  10/25/2024 0728 by Noemi Holt RN  Outcome: Met  10/25/2024 0613 by Noemi Holt RN  Outcome: Progressing   The patient's goals for the shift include sleep    The clinical goals for the shift include medication compliance, safety    0655 Patient left unit with belongings in her possession as well as her AVS. She is stable no signs of distress patient left ambulatory.

## 2024-10-25 NOTE — CARE PLAN
Problem: Diabetes  Goal: Achieve decreasing blood glucose levels by end of shift  Outcome: Progressing  Goal: Increase stability of blood glucose readings by end of shift  Outcome: Progressing  Goal: Decrease in ketones present in urine by end of shift  Outcome: Progressing  Goal: Maintain electrolyte levels within acceptable range throughout shift  Outcome: Progressing  Goal: No changes in neurological exam by end of shift  Outcome: Progressing  Goal: Learn about and adhere to nutrition recommendations by end of shift  Outcome: Progressing  Goal: Vital signs within normal range for age by end of shift  Outcome: Progressing  Goal: Increase self care and/or family involovement by end of shift  Outcome: Progressing  Goal: Receive DSME education by end of shift  Outcome: Progressing     Problem: Fall/Injury  Goal: Not fall by end of shift  Outcome: Progressing  Goal: Be free from injury by end of the shift  Outcome: Progressing  Goal: Verbalize understanding of personal risk factors for fall in the hospital  Outcome: Progressing  Goal: Verbalize understanding of risk factor reduction measures to prevent injury from fall in the home  Outcome: Progressing  Goal: Pace activities to prevent fatigue by end of the shift  Outcome: Progressing     Problem: Fall/Injury  Goal: Not fall by end of shift  Outcome: Progressing  Goal: Be free from injury by end of the shift  Outcome: Progressing  Goal: Verbalize understanding of personal risk factors for fall in the hospital  Outcome: Progressing  Goal: Verbalize understanding of risk factor reduction measures to prevent injury from fall in the home  Outcome: Progressing  Goal: Pace activities to prevent fatigue by end of the shift  Outcome: Progressing   The patient's goals for the shift include sleep    The clinical goals for the shift include medication compliance, safety    Patient A&Ox3.She denies SI/HI and AVH. She is social omn the unit. C/o pain 7/10 medicated per MAR.  No  further needs at this time.

## 2024-10-25 NOTE — PROGRESS NOTES
Here for neck pain. Has recent MRI of neck and back. Has been seen by pain management Dr Pinto previously receiving injections that did not help the pain and has had PT that has not helped. Is not seeing Dr Hardin anymore because they said she went to [facility].  The [facility] visit was [place] [facility] for pain and received a shot of toradol and Morphine for the pain and that is the only time she has had pain relief. Just discharged today from [facility] psychiatric unit ( she was admitted to the day after going to [place] [facility]), states was given Ultram while inpatient, but has not helped the pain.

## 2024-10-25 NOTE — H&P
History Of Present Illness  Alejandra Houston is a 59 y.o. female presenting with    neck pain. Has recent MRI of neck and back. Has been seen by pain management Dr Pinto previously receiving injections that did not help the pain and has had PT that has not helped. Is not seeing Dr Hardin anymore because they said she went to [facility].  The [facility] visit was [place] [facility] for pain and received a shot of toradol and Morphine for the pain and that is the only time she has had pain relief. Just discharged today from [facility] psychiatric unit ( she was admitted to the day after going to [place] [facility]), states was given Ultram while inpatient, but has not helped the pain   Currently the patient is complaining of pain in the neck area and in the lower back the pain in the neck radiates towards her head and to his the left side of her shoulder and left clavicle she did not have any significant improvement with the cervical epidural steroid injection received in the past last intervention was in August rating her pain between 8-9 out of 10 continues to be on the gabapentin and continues to be on ibuprofen and Tylenol with minimal improvement describing the back pain as a burning sensation and numbness that travels from her lumbar spine area towards her groin describing her pain as being constant not relieved by sitting and if she stand up and walk then the pain gets worse.  Physical therapy was completed in August and she did not describe any significant improvement with it she continues to be on Cymbalta Wellbutrin and Seroquel.  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 She started smoking about 44 years ago. She has a 22.4 pack-year smoking history. She has never used smokeless tobacco. She reports that she does not currently use alcohol. She reports current drug use. Drug: \"Crack\" cocaine.    Family History  Family History   Problem Relation Name Age of Onset    Hypertension Mother      Diabetes Mother      COPD Mother      Heart disease Mother      Lymphoma Mother      Multiple myeloma Mother      Cancer Other fam hx     Diabetes Other fam hx     Hypertension Other fam hx     Heart disease Other fam hx         Allergies  No Known Allergies  Review of Systems   12 Systems have been reviewed as follows.   Constitutional: Fever, weight gain, weight loss, appetite change, night sweats, fatigue, chills.  Eyes : blurry, double vision, vision, loss, tearing, redness, pain, sensitivity to light, glaucoma.  Ears, nose, mouth, and throat: Hearing loss, ringing in the ears, ear pain, nasal congestion, nasal drainage, nosebleeds, mouth, throat, irritation tooth problem.  Cardiovascular :chest pain, pressure, heart tracing,palpaitations , sweating, leg swelling, high or low blood pressure  Pulmonary: Cough, yellow or green sputum, blood and sputum, shortness of breath, wheezing  Gastrointestinal: Nause, vomiting, diarrhea, constipation, pain, blood in stool, or vomitus, " heartburn, difficulty swallowing  Genitourinary: incontinence, abnormal bleeding, abnormal discharge, urinary frequency, urinary hesitancy, pain, impotence sexual problem, infection, urinary retention  Musculoskeletal: Pain, stiffness, joint, redness or warmth, arthritis, back pain, weakness, muscle wasting, sprain or fracture  Neuro: Weight weakness, dizziness, change in voice, change in taste change in vision, change in hearing, loss, or change of sensation, trouble walking, balance problems coordination problems, shaking, speech problem  Endocrine , cold or heat intolerance, blood sugar problem, weight gain or loss missed periods hot flashes, sweats, change in body hair, change in libido, increased thirst, increased urination  Heme/lymph: Swelling, bleeding, problem anemia, bruising, enlarged lymph nodes  Allergic/immunologic: H. plus nasal drip, watery itchy eyes, nasal drainage, immunosuppressed  The above, were reviewed and noted negative except as noted.  Describing positive incontinence with the urine and weakness in the legs    Physical Exam   Vital signs reviewed, documented in chart     General:  Appears well, does not look in any major distress  Alert    HEENT:  Head atraumatic  Eyes normal inspection  PERRL  Normal ENT inspection  No signs of dehydration    NECK:  Normal inspection  Range of motion limited and provocative of pain palpation of the cervical facet created tenderness worse on the left side    RESPIRATORY:  No respiratory distress    CVS:  Heart rate and rhythm regular    ABDOMEN/GI  Soft  Non-tender  No distention  No organomegaly      BACK:  Normal inspection, flexion and extension limited and provocative of pain  Positive tenderness upon the palpation of the facet joint  Si joints none tender to palpations     EXTREMITIES:  Non-Tender  Full ROM  Normal appearance  No Pedal edema  Power symmetrical , sensory examination preserved.    NEURO:  Alert and oriented X 3  CNS normal as tested  without focal neurological deficit   Sensation normal  Motor ambulates with the assistance of a walker  reflexes diminished in the lower extremity    PSYCH:  Mood normal  Affect normal    SKIN:  Color normal  No rash  Warm  Dry  no sign of skin marking supportive of IV drug usage /abuse.    Last Recorded Vitals  Blood pressure 124/76, pulse 66, temperature 35.6 °C (96.1 °F), resp. rate 18, SpO2 96%, not currently breastfeeding.  MR cervical spine wo IV contrast    Result Date: 10/11/2024  Interpreted By:  Mamadou Negron, STUDY: MR CERVICAL SPINE WO IV CONTRAST;  10/11/2024 1:24 pm   INDICATION: Signs/Symptoms:severe neck pain. numbness/tingling hands.   COMPARISON: Neck ultrasound performed 08/03/2021.   ACCESSION NUMBER(S): [id number]   ORDERING CLINICIAN: NOEMI REED   TECHNIQUE: Multiplanar multisequence noncontrast MR imaging was performed through the cervical spine. Noncontrast sagittal T1, T2, STIR, axial T1 and axial T2/GE weighted images were acquired through the cervical spine.   FINDINGS: Cord: Mild multilevel degenerative cord deformation suggested with no definite focal cord signal abnormality or volume loss.   Epidural fluid: None.   Alignment: Straightening of the expected cervical lordosis.   Vertebral bodies: Cervical vertebral body heights are grossly maintained.   Marrow signal: There is STIR hyperintensity centered about the right C2-C3 facet joint with corresponding facet arthropathy, favor a degenerative etiology. No additional focal STIR hyperintensity/marrow edema within the cervical spine. Minimal scattered type 2 Modic endplate signal change.   Intervertebral Discs: Mild scattered degenerative disc height loss with relatively diffuse disc desiccation.     Degenerative change:   C1-C2:  Mild arthrosis centered about the dens. No spinal canal stenosis.   C2-C3:  Moderate right and mild left facet arthropathy. Mild bilateral uncovertebral spurring. Mild disc bulge. Mild spinal canal  stenosis. Mild right and no significant left neural foraminal narrowing.   C3-C4:  Element of congenital spinal canal narrowing. Mild right facet arthropathy. Disc bulge and bilateral uncovertebral spurring. Moderate spinal canal stenosis with ventral cord flattening/indentation. Mild bilateral neural foraminal narrowing.   C4-C5:  Element of congenital spinal canal narrowing. Minimal/mild facet arthropathy. Disc bulge with bilateral uncovertebral spurring. Moderate spinal canal stenosis with ventral cord flattening/indentation. Moderate to severe bilateral neural foraminal narrowing suggested.   C5-C6:  Mild right facet arthropathy. Mild disc bulge with uncovertebral spurring. Mild-to-moderate spinal canal stenosis. Minimal/mild bilateral neural foraminal narrowing.   C6-C7:  Mild disc bulge and uncovertebral spurring. Mild spinal canal stenosis. Minimal/mild bilateral neural foraminal narrowing.   C7-T1:  Mild bilateral facet arthropathy. Minimal disc bulge. No spinal canal stenosis. No neural foraminal narrowing.   Soft tissues: Incidental note of retropharyngeal course of the carotid vessels. Indeterminate 1.3 cm lesion within the superficial aspect of the left parotid gland (series 10, image 2). The prevertebral and posterior paraspinous soft tissues are otherwise within normal limits.       Moderate facet arthropathy at the right C2-C3 level with surrounding STIR hyperintensity suggestive of degenerative edema. Mild right neural foraminal narrowing at this level.   Combination of congenital spinal canal narrowing and cervical spondylosis resulting in moderate spinal canal stenosis at C3-C4 and C4-C5 as well as mild-to-moderate spinal canal stenosis at C5-C6. There is ventral cord flattening/indentation with no definite cord signal abnormality.   Suggestion of moderate to severe bilateral neural foraminal narrowing at C4-C5.   Indeterminate 1.3 cm lesion within the superficial aspect of the left parotid gland  as characterized on 2021 ultrasound examination.   MACRO: None   Signed by: Mamadou Negron 10/11/2024 2:30 PM Dictation workstation:   [id number]    CT cervical spine wo IV contrast    Result Date: 10/6/2024  * * *Final Report* * * DATE OF EXAM: Oct  6 2024  2:42PM   [facility]   0505  -  CT CERVICAL SPINE WO IVCON  / ACCESSION #  [id number] PROCEDURE REASON: Spine fracture, cervical, traumatic      * * * * Physician Interpretation * * * *  EXAMINATION:  CT CERVICAL SPINE WO IVCON CLINICAL HISTORY:  Spine fracture, cervical, traumatic TECHNIQUE:  Spiral, high resolution axial unenhanced images were obtained from the skull base to the cervicothoracic junction with sagittal and coronal planar reconstructions.  MQ: CTCSPWO_5 CT Radiation dose: Integrated CT Dose-Length Product (DLP) for this visit =  1375 mGy*cm CT Dose Reduction Employed: Automated exposure control (AEC) COMPARISON: 01/06/2024 CT cervical spine RESULT: Images through C1 are degraded by motion artifact. Counting reference:  Craniocervical junction.   Anatomic Variants:  None.  (topogram) images:  No additional findings. Alignment:    Alignment is anatomic. Craniocervical junction:    Craniocervical junction is normal. Osseous structures/fracture:    No evidence of a lytic or blastic process in the visualized spine.  No evidence of acute or chronic fracture. Cervical soft tissues:    The paraspinal soft tissues are within normal limits. Degenerative changes: There is degenerative arthrosis of the atlantodental articulation.  Degenerative disk disease with disk height loss and endplate spondylosis at all cervical levels.  Degenerative arthrosis of the cervical facet joints.  Stable central canal stenosis at C3-4 through C6-7. There is bony neural foraminal narrowing at the lateral C3-4 through C5-6.    IMPRESSION: No acute cervical spine fracture. Degenerative changes as described. Anatomic Variant:  None.  Assume 7 cervical vertebrae with counting from  the craniocervical junction. : BRIEN   Transcribe Date/Time: Oct  6 2024  4:08P Dictated by : LAYNE QUIROGA MD This examination was interpreted and the report reviewed and electronically signed by: LAYNE QUIROGA MD on Oct  6 2024  4:14PM  EST    XR lumbar spine 2-3 views    Result Date: 5/1/2024  * * *Final Report* * * DATE OF EXAM: May  1 2024  8:53AM   [facility]   5228  -  XR LUMBAR 3V AP/LAT/L5-S1  / ACCESSION #  [id number] PROCEDURE REASON: Back pain      * * * * Physician Interpretation * * * *  TECHNIQUE: Lumbosacral spine series 3 views HISTORY:  Low back pain COMPARISON STUDY: 04/29/2023 RESULT:  For the purpose of this report the iliac crest will be considered L4/L5 level. The vertebral bodies are within normal limits in height.  There is disc space narrowing at multiple levels most prominent at the L3/L4 and L4/L5.  There is associated vacuum disc phenomenon and.  There is sclerosis involving the lower lumbar facets.  There is questionable canal stenosis at the L3/L4 level.    IMPRESSION:  1. Multilevel degenerative changes and questionable canal stenosis demonstrating interval progression. : [facility]   Transcribe Date/Time: May  1 2024  9:16A Dictated by : DIANA POLANCO MD This examination was interpreted and the report reviewed and electronically signed by: DIANA POLANCO MD on May  1 2024  9:18AM  EST    MR lumbar spine wo IV contrast    Result Date: 1/15/2024  Interpreted By:  Justice Pretty, STUDY: MR LUMBAR SPINE WO IV CONTRAST   INDICATION: Signs/Symptoms:Worsening low back pain- failed home PT   COMPARISON: Lumbar spine MRI 11/20/2018   ACCESSION NUMBER(S): [id number]   ORDERING CLINICIAN: TRAVIS HART   TECHNIQUE: Multiplanar multisequence MRI of the lumbar spine was performed without the administration of intravenous contrast, according to standard protocol.   FINDINGS: ALIGNMENT: Levoconvex curvature of the lumbar spine centered at L3-4.   VERTEBRAE: The  vertebral bodies are normal in height. There is no fracture or aggressive osseous lesion. Schmorl's node in the superior endplate of L2 in the inferior endplate of L4.   DISCS: Multilevel disc desiccation. Mild disc height loss at L3-4.   CONUS MEDULLARIS AND CAUDA EQUINA: The conus medullaris terminates at L2. Mild crowding of the cauda equina nerve roots at L3-4 secondary to degenerate changes detailed further below   PARAVERTEBRAL SOFT TISSUES AND VISUALIZED RETROPERITONEUM: The visualized paravertebral soft tissues appear within normal limits.   EVALUATION OF INDIVIDUAL LEVELS: L1-2: Shallow disc bulge and facet hypertrophy results in mild narrowing of the spinal canal. Neural foramina are patent.   L2-3: Shallow disc bulge with facet hypertrophy results in mild narrowing of the spinal canal. Neural foramina are patent.   L3-4: Disc bulge with superimposed left paracentral disc protrusion and epidural lipomatosis. There is moderate narrowing of the spinal canal, slightly progressed compared to previous MRI 2018. There is mild bilateral foraminal stenosis.   L4-5: Shallow disc bulge with facet hypertrophy, infolding of ligamentum flavum, and epidural lipomatosis. Mild narrowing of the spinal canal and bilateral foramina.   L5-S1: Bilateral facet hypertrophy and shallow disc bulge results in mild foraminal stenosis. Spinal canal remains patent.   LIMITED EVALUATION OF UPPER SACRUM AND SACROILIAC JOINTS: Mild degenerative changes of the sacroiliac joints.       Multilevel degenerative changes of the lumbar spine most prominent at L3-4 where there is moderate canal stenosis, slightly progressed compared to previous MRI 2018. Additional multilevel mild degenerative changes as detailed, similar to previous MRI.   Signed by: Justice Pretty 1/15/2024 7:44 PM Dictation workstation:   [id number]    CT cervical spine wo IV contrast    Result Date: 1/6/2024  * * *Final Report* * * DATE OF EXAM: Jan 6 2024  5:52PM   [facility]    0505  -  CT CERVICAL SPINE WO IVCON  / ACCESSION #  [id number] PROCEDURE REASON: Spinal stenosis, cervical      * * * * Physician Interpretation * * * *  EXAMINATION:  CT CERVICAL SPINE WO IVCON CLINICAL HISTORY:  Spinal stenosis, cervical.  Neck pain. TECHNIQUE:  Spiral, high resolution axial unenhanced images were obtained from the skull base to the cervicothoracic junction with sagittal and coronal planar reconstructions.  MQ: CTCSPWO_5 CT Radiation dose: Integrated CT Dose-Length Product (DLP) for this visit =  603 mGy*cm CT Dose Reduction Employed: Iterative recon and mAs-kVp adjusted using patient size-age COMPARISON: None. RESULT: Counting reference:  Craniocervical junction.   Anatomic Variants:  None.  (topogram) images:  No additional findings. Alignment:    Straightening of the normal cervical lordosis. Craniocervical junction:    Craniocervical junction is normal. Osseous structures/fracture:    No evidence of a lytic or blastic process in the visualized spine.  No evidence of acute or chronic fracture. Cervical soft tissues:    The paraspinal soft tissues are within normal limits. Degenerative changes: Mild to moderate spondylosis.  Mild spinal canal stenosis at C3-C4 secondary disc osteophyte complex.  Mild right and no significant left foraminal stenosis at this level.  Mild to moderate spinal canal stenosis at C4-C5 secondary disc osteophyte complex.   Moderate bilateral foraminal stenosis secondary to uncovertebral joint degenerative hypertrophy.  At least mild spinal canal stenosis at C5-C6 and C6-C7.    IMPRESSION: No acute findings. Mild to moderate spondylosis as discussed.  Mild to moderate spinal canal stenosis at C4-C5 and moderate bilateral foraminal stenosis at this level. Anatomic Variant:  None.  Assume 7 cervical vertebrae with counting from the craniocervical junction. : BRIEN   Transcribe Date/Time: Jan 6 2024  6:00P Dictated by : MANSI MARIE, DO This examination  was interpreted and the report reviewed and electronically signed by: MANSI MAREI DO on Jan 6 2024  6:02PM  EST    XR cervical spine 2-3 views    Result Date: 1/6/2024  * * *Final Report* * * DATE OF EXAM: Jan 6 2024  4:18PM   [facility]   5308  -  XR CERVICAL  2V AP/LAT  / ACCESSION #  [id number] PROCEDURE REASON: Neck pain      * * * * Physician Interpretation * * * *  EXAMINATION:  XR CERVICAL  2V AP/LAT CLINICAL HISTORY:   pt states lt shoulder pain and neck pain for months   denies injury Technique:   XR CERVICAL  2V AP/LAT -- NOT APPLICABLE with 2 c spine 2 chest views on 4 c spine 3 chest images Comparison: None RESULT: Cervical spine: Counting reference:  Craniocervical junction.   Anatomic Variants:  None.. Straightening of normal cervical lordosis. Mild multilevel degenerative disc changes with narrowing and endplate osteophytes. Mild to moderate facet and uncovertebral arthropathy. No erosions or syndesmophytes.  No fracture or compression defect.   Prevertebral soft tissues are unremarkable.  Lung apices are clear.    IMPRESSION: Degenerative changes in the cervical spine as described without acute osseous findings. : [facility]   Transcribe Date/Time: Jan 6 2024  4:49P Dictated by : TJ WOOTEN MD This examination was interpreted and the report reviewed and electronically signed by: TJ WOOTEN MD on Jan 6 2024  4:50PM  EST     Assessment/Plan   59 years old with history and physical examination supportive of chronic back pain and neck pain with lumbago lumbar spondylosis failed epidural steroid injection physical therapy and nonsteroidal anti-inflammatory with persistent neck and back pain    Plan  Knowing that the patient had failed the epidural and her imaging study is confirming presence of the facet hypertrophy I would recommend for the patient a diagnostic medial nerve branch block starting with the left C2-C3 C3-C4 to be performed under fluoroscopic guidance and then to the right  C2-C3 C3-C4 if the patient have positive response at that time could proceed with the denervation with a radiofrequency ablation of the medial nerve branches C2-C3 C3-C4 under fluoroscopic guidance same technique could be applied to the lumbar spine area targeting the L3-L4 L4-L5 to be performed under fluoroscopic guidance I will be starting the patient on Flexeril 10 mg 3 times per day to assist her with her muscle spasm pain I will reevaluate the patient after the performance of the block for further recommendation as her case progress benefits and risk were discussed with the patient and she would like to proceed      The above clinical summary has been dictated with voice recognition software. It has not been proofread for grammatical errors, typographical mistakes, or other semantic inconsistencies.    Thank you for visiting our office today. It was our pleasure to take part in your healthcare.     Please do not hesitate to contact the pain clinic after your visit with any questions or concerns at  M-F 8-4 pm       Chrissy Merida M.D.  Medical Director , Division of Pain Medicine [facility]   of Anesthesiology and Pain Medicine  [facility] [employer] [place] [address]     Office: [phone]  Fax: [phone]      Chrissy Merida MD

## 2024-10-25 NOTE — DISCHARGE SUMMARY
Discharge Diagnosis:  MDD (major depressive disorder), recurrent severe, without psychosis (Multi)    Hospital Course:  Patient is a  58 y.o. year old female with a history of major depressive disorder, anxiety and past medical history of fibromyalgia, GERD, type 2 diabetes, and COPD who was admitted to [facility] for suicidal ideation. Due to acutely elevated and imminent risk for self-harm/harm to others, patient required a level of care equivalent to inpatient hospitalization for safety, evaluation, treatment and stabilization.     The patient was admitted to [facility] under the care of Dr. Blakely, restricted to the meraz and placed on suicide, behavior and elopement precautions.  At the beginning of hospitalization, patient presented to the ED seeking treatment for her chronic pain.  Patient expressed frustration and stated she did not want to live.  Patient stated he has been dealing with chronic pain for 10 years.  Reported most of the pain is in her neck due to impingement, also has pain in knees, feet, and shoulders.  Patient has worked with pain management in the past, has had epidural treatments, but states they have not been that effective.  Patient currently prescribed oxycodone.  Reported history of crack cocaine use, stated she uses monthly for the past 2 years to help with her pain.  Patient lives with her , daughter, and 2 grandchildren.  She identified her grandchildren as her biggest protective factor that would prevent her from ever taking her own life.  Patient does have history of suicide attempt in 1980 in which she tried to slice her wrist and was taken to the hospital and admitted to [facility].  Patient currently prescribed Cymbalta and Wellbutrin.       The treatment team made the following interventions: medication, group/milieu therapy, individual therapy    Over the course of hospitalization, patient reported improvement and objective signs of  improvement were noted by staff.  Patient reported improved mood and sleep.  Patient active participant in group/individual therapy sessions on the unit.  Patient seen by orthopedics, recommendations made by pain management while she was here.  Looking forward to going to her outpatient pain management appointment today and determine a long-term plan to treat her chronic pain.  Patient going to [facility] after her pain management appointment.  Patient exhibiting good insight she understood the importance of following up with outpatient psych services and maintaining medication compliance.    Psychiatric Medications: Wellbutrin  mg oral daily, Seroquel 125 mg oral daily HS, Cymbalta 60 mg oral BID.  Patient tolerated medications without side effects.    Prior to the date of discharge, patient was able to contract for safety and stated they felt safe and appropriate for discharge.  The treatment team found the patient not to be an imminent danger to self or others.  The patient denied suicidal or homicidal ideation and did not endorse auditory and visual hallucinations.  The patient's condition at the time of discharge was stable and initial symptoms improved over the course of hospitalization.    The patient was discharged home under the supervision of family with a 30-day supply of Wellbutrin  mg oral daily, Seroquel 125 mg oral daily HS, Cymbalta 60 mg oral BID.      The patient was instructed to call the patient's outpatient provider in the event of worsening symptoms or medication side effects.  Should the patient be unable to maintain their personal safety or the safety of others, instructions were provided to dial 9-1-1 or go to the closest emergency room.    Discharge Mental Status Exam:  General:  Patient is awake, alert, and oriented to person, place, time, and situation.    Appearance:  Appears well-hydrated, well-nourished, and well-groomed and approximately stated age.   Attitude:   Patient was calm and cooperative throughout the interview, which is appropriate to the context of the interview and the topics discussed.   Behavior:  Eye contact is appropriate with topics of discussion.   Motor Activity:  Motor activity is normal. No psychomotor disturbances or abnormal involuntary movements were noted, including psychomotor agitation, psychomotor retardation, involuntary movements, extrapyramidal symptoms, akathisia, or tardive dyskinesia. Gait is normal.   Speech:  Speech is spontaneous, coherent, fluent and of appropriate quantity, rate, volume, and tone and non-vulgar/vulgar.  Speech and mannerisms are consistent with topics of discussion.   Affect:  Euthymic with a full range, mood congruent and appropriate to content.   Thought Process:  Thought process was linear, organized, and goal-directed and devoid of loose associations, flight of ideas, thought blocking or tangents.   Thought Content:  Thought content was devoid of suicidal ideation or intent, homicidal ideation or intent, self-harm ideation or intent, delusions, illusions, obsessions, or paranoia.   Thought Perception:  Did not endorse auditory or visual hallucinations. Patient did not appear to be internally distracted or preoccupied.   Cognition:  Knowledge and intelligence are believed to be average.  Recent and remote memory, fund of knowledge, and abstract reasoning appear grossly intact, appropriate for age and education, and there are no impairments in attention, concentration, or language.   Insight:  Insight regarding psychiatric conditions is good.   Judgment:  Judgment is good.    Recent Labs:  Results for orders placed or performed during the hospital encounter of 10/09/24 (from the past 24 hours)   POCT GLUCOSE   Result Value Ref Range    POCT Glucose 210 (H) 74 - 99 mg/dL   POCT GLUCOSE   Result Value Ref Range    POCT Glucose 206 (H) 74 - 99 mg/dL     *Note: Due to a large number of results and/or encounters for the  requested time period, some results have not been displayed. A complete set of results can be found in Results Review.        Risk Assessment at Discharge:  Violence Risk Assessment: major mental illness  Acute Risk of Harm to Others is Considered: low   Risk Mitigated by: Adherence to treatment, strong therapeutic alliance. Follow-up.    Suicide Risk Assessment: , chronic medical illness, chronic pain, and current psychiatric illness  Protective Factors against Suicide: adherence to  treatment, hopefulness/future orientation, positive family relationships, and social support/connectedness  Acute Risk of Harm to Self is Considered: low  Risk Mitigated by: Adherence to treatment, strong therapeutic alliance. Follow-up.      Outpatient Follow-up Appointments:  Future Appointments   Date Time Provider Department Center   11/4/2024  2:30 PM Antonio Feliz PA-C [id number] [facility]   11/21/2024  8:45 AM [facility] [id number] PAIN MGMT PROCEDURE ROOM [id number] [facility]   12/4/2024  2:50 PM [facility] OR [id number] [id number] [facility]   12/6/2024  2:30 PM Randy Gaspar MD [id number] [facility]   2/4/2025  1:00 PM Jhoan Eller DO [id number] [facility]   6/17/2025 10:00 AM Cj Parker MD [id number] [facility]       Jhoan Eller DO  [address]  [facility], [facility] [address]  [facility] [address]  [phone]    Follow up in 1 week(s)  hospita follow up    Ofelia Henson MD  [address] [facility] [address]  [facility] [id number] [phone]    Schedule an appointment as soon as possible for a visit in 1 week(s)  Pain Management    Marcial Balderas PA-C  [address]  [facility], [address] [place] [address] [place] [id number] [phone]    Schedule an appointment as soon as possible for a visit in 2 week(s)      ORthopedics Spine Surgery    Call in 3 day(s)  Follow up  with your current provider      Kirk Shin, JUNAID-CNP

## 2024-10-28 RX ORDER — GABAPENTIN 300 MG/1
CAPSULE ORAL
Qty: 150 CAPSULE | Refills: 0 | OUTPATIENT
Start: 2024-10-28

## 2024-11-04 ENCOUNTER — APPOINTMENT (OUTPATIENT)
Dept: SLEEP MEDICINE | Facility: CLINIC | Age: 59
End: 2024-11-04
Payer: COMMERCIAL

## 2024-11-05 ENCOUNTER — PATIENT OUTREACH (OUTPATIENT)
Dept: HOME HEALTH SERVICES | Age: 59
End: 2024-11-05
Payer: COMMERCIAL

## 2024-11-13 ENCOUNTER — TELEPHONE (OUTPATIENT)
Dept: CASE MANAGEMENT | Facility: HOSPITAL | Age: 59
End: 2024-11-13
Payer: COMMERCIAL

## 2024-11-13 NOTE — TELEPHONE ENCOUNTER
This is an audio only call. This audio call was conducted in the [facility].     Substance Use Navigator Specialist (GISELLE) performed an audio only follow-up call with Alejandra SMITH Skzak at 11:18 AM     Was SUNS able to reach patient? Yes    If No, did GISELLE leave a voicemail message? NA    Brief summary of call:  GISELLE returned pt VM. Pt said they are 34 days sober and recently got discharged from [facility]. Pt said their daughter said they can longer live with pt for the month of November. Pt said they are currently depressed but have no SI/HI. Pt said they have not connected with a outpatient mental health agency. Pt said they currently have no where to go. Pt is interested in finding a sober home.     Follow-up / Next steps:   GISELLE emailed pt a list  of [facility] sober homes and [facility] sober homes with phone numbers.     End of call time: 10:43 AM    Duration of audio encounter: SUNS Audio call - Minutes: 5-10 minutes

## 2024-11-21 ENCOUNTER — HOSPITAL ENCOUNTER (OUTPATIENT)
Dept: PAIN MEDICINE | Facility: CLINIC | Age: 59
Discharge: HOME | End: 2024-11-21
Payer: COMMERCIAL

## 2024-11-21 VITALS
SYSTOLIC BLOOD PRESSURE: 124 MMHG | RESPIRATION RATE: 20 BRPM | TEMPERATURE: 97.3 F | DIASTOLIC BLOOD PRESSURE: 67 MMHG | HEART RATE: 63 BPM | OXYGEN SATURATION: 97 %

## 2024-11-21 DIAGNOSIS — M47.812 CERVICAL SPONDYLOSIS WITHOUT MYELOPATHY: ICD-10-CM

## 2024-11-21 PROCEDURE — 2500000004 HC RX 250 GENERAL PHARMACY W/ HCPCS (ALT 636 FOR OP/ED): Performed by: PAIN MEDICINE

## 2024-11-21 PROCEDURE — 64490 INJ PARAVERT F JNT C/T 1 LEV: CPT | Performed by: PAIN MEDICINE

## 2024-11-21 PROCEDURE — 64492 INJ PARAVERT F JNT C/T 3 LEV: CPT | Performed by: PAIN MEDICINE

## 2024-11-21 PROCEDURE — 64491 INJ PARAVERT F JNT C/T 2 LEV: CPT | Performed by: PAIN MEDICINE

## 2024-11-21 RX ORDER — LIDOCAINE HYDROCHLORIDE 20 MG/ML
INJECTION, SOLUTION EPIDURAL; INFILTRATION; INTRACAUDAL; PERINEURAL AS NEEDED
Status: DISCONTINUED | OUTPATIENT
Start: 2024-11-21 | End: 2024-11-22 | Stop reason: HOSPADM

## 2024-11-21 RX ORDER — LIDOCAINE HYDROCHLORIDE 10 MG/ML
INJECTION, SOLUTION EPIDURAL; INFILTRATION; INTRACAUDAL; PERINEURAL AS NEEDED
Status: DISCONTINUED | OUTPATIENT
Start: 2024-11-21 | End: 2024-11-22 | Stop reason: HOSPADM

## 2024-11-21 NOTE — DISCHARGE INSTRUCTIONS
Post-injection instructions FOR FACET BLOCK      Pay attention to how much pain relief (what percentage compared to before the procedure) you get and for how long it lasts.     THIS IS A TEMPORARY NUMBING OF PAIN THIS IS BEING USED AS A DIAGNOSTIC INDICATOR IF THIS IS THE SOURCE OF YOUR PAIN    Activity:  RETURN TO NORMAL ACTIVITY  SEE IF YOU CAN APPRECIATE AN IMPROVEMENT IN THE PAIN    Bandages: Remove after 24 hours     Showering/Bathing: You may shower after bandage is removed     Follow up: CALL OFFICE NEXT BUSINESS -[phone] LEAVE MESSAGE ABOUT THE % OF RELIEF THAT WAS OBTAINED AND FOR HOW MANY HOURS      Call the OFFICE immediately: if you notice:     Excessive bleeding from procedure site (brisk bright red bleeding from the site or bleeding that soaks the bandages or does not stop)   Severe headache  Inability to walk, leg or arm weakness or numbness that is worse after the procedure   Uncontrolled pain   New urinary or fecal incontinence   Signs of infection: Fever above 101.5F, redness, swelling, pus or drainage from the site

## 2024-11-22 ENCOUNTER — PATIENT MESSAGE (OUTPATIENT)
Dept: PRIMARY CARE | Facility: CLINIC | Age: 59
End: 2024-11-22
Payer: COMMERCIAL

## 2024-11-22 ENCOUNTER — TELEPHONE (OUTPATIENT)
Dept: PAIN MEDICINE | Facility: CLINIC | Age: 59
End: 2024-11-22
Payer: COMMERCIAL

## 2024-11-22 DIAGNOSIS — M47.812 CERVICAL SPONDYLOSIS WITHOUT MYELOPATHY: Primary | ICD-10-CM

## 2024-11-25 DIAGNOSIS — E11.9 TYPE 2 DIABETES MELLITUS WITHOUT COMPLICATION, WITHOUT LONG-TERM CURRENT USE OF INSULIN (MULTI): Primary | ICD-10-CM

## 2024-12-02 ENCOUNTER — APPOINTMENT (OUTPATIENT)
Dept: PHARMACY | Facility: HOSPITAL | Age: 59
End: 2024-12-02
Payer: COMMERCIAL

## 2024-12-02 DIAGNOSIS — M54.50 CHRONIC LOW BACK PAIN, UNSPECIFIED BACK PAIN LATERALITY, UNSPECIFIED WHETHER SCIATICA PRESENT: ICD-10-CM

## 2024-12-02 DIAGNOSIS — E11.9 TYPE 2 DIABETES MELLITUS WITHOUT COMPLICATION, WITHOUT LONG-TERM CURRENT USE OF INSULIN (MULTI): Primary | ICD-10-CM

## 2024-12-02 DIAGNOSIS — G89.29 CHRONIC LOW BACK PAIN, UNSPECIFIED BACK PAIN LATERALITY, UNSPECIFIED WHETHER SCIATICA PRESENT: ICD-10-CM

## 2024-12-02 RX ORDER — FERROUS SULFATE 325(65) MG
325 TABLET, DELAYED RELEASE (ENTERIC COATED) ORAL
COMMUNITY

## 2024-12-02 RX ORDER — FOLIC ACID 1 MG/1
1 TABLET ORAL DAILY
COMMUNITY

## 2024-12-02 RX ORDER — BLOOD-GLUCOSE SENSOR
EACH MISCELLANEOUS
Qty: 3 EACH | Refills: 1 | Status: SHIPPED | OUTPATIENT
Start: 2024-12-02

## 2024-12-02 RX ORDER — ACETAMINOPHEN 500 MG
2000 TABLET ORAL DAILY
COMMUNITY

## 2024-12-02 RX ORDER — BLOOD-GLUCOSE,RECEIVER,CONT
EACH MISCELLANEOUS
Qty: 1 EACH | Refills: 0 | Status: SHIPPED | OUTPATIENT
Start: 2024-12-02

## 2024-12-02 RX ORDER — MULTIVITAMIN/IRON/FOLIC ACID 18MG-0.4MG
1 TABLET ORAL DAILY
COMMUNITY

## 2024-12-02 RX ORDER — BISMUTH SUBSALICYLATE 262 MG
1 TABLET,CHEWABLE ORAL DAILY
COMMUNITY

## 2024-12-02 NOTE — PROGRESS NOTES
Clinical Pharmacy Appointment    Patient ID: Alejandra Houston is a 59 y.o. female who presents for Diabetes.    Pt is here for First appointment.     Referring Provider: Jhoan Eller DO  PCP: Jhoan Eller DO   Last visit with PCP: 09/18/2024   Next visit with PCP: 12/05/2024      Subjective     Interval History  Patient's most recent A1c was well controlled at 6.0% on 11/14/24  Patient reached out to Dr. Eller on 11/24 to let him know she was having symptoms of dry mouth and dizziness, when she checked her blood sugar it was 309 mg/dL   When she was at the rehab center and hospital they were giving her insulin, she wondered if she should use the insulin she had at home but she does not currently have pens/needles to administer the insulin   Notes she has been sober from crack cocaine ~30 days     HPI  DIABETES MELLITUS TYPE 2:    Diagnosed with diabetes: ~15-20 years ago. Known diabetic complications: obesity.  Does patient follow with Endocrinology: No  Last optometry exam: has been a while, is due for an appointment   Most recent visit in Podiatry: does not follow with podiatry -- patient denies sores or cuts on feet today      Current diabetic medications include:  Trulicity 1.5 mg once weekly  Metformin  mg 2 tablets twice daily     Past diabetic medications include:  None      Adverse Effects:   None reported today     Glucose Readings:  Glucometer/CGM Type: True Metrix Glucometer  Patient tests BG every other day     Current home BG readings: States was in the 250s yesterday, last week was at 309 mg/dL; no other readings available     Any episodes of hypoglycemia? No, denies signs/symptoms of low blood sugar .  Does pt have proteinuria? No, UACR 3.8 ug/mg crt 03/25/22    Lifestyle:  Diet: 3 meals/day.   Breakfast: cereal with 2% milk   Lunch: depends on what is available, typically doesn't eat much; yesterday had a grilled cheese sandwich   Dinner: whatever her daughter buys for dinner,  yesterday had pizza  Snacks: does not snack   Drinks: coffee (with cream and zero sugar sweetener), may have tea, bottle of water  Physical Activity:   With the weather being colder has not been able to walk outside  Does not get any activity in currently     Secondary Prevention:  Statin? No  ACE-I/ARB? No  Aspirin? No    Pertinent PMH Review:  PMH of Pancreatitis: No  PMH of Retinopathy: No  PMH of Urinary Tract Infections: Yes, had one ~3 months ago, this is the first instance in a while though does not typically get them frequently   PMH of MTC: No    Immunizations:  Influenza? 10/09/22  COVID? 06/25/21  Pneumonia? PCV 13 05/05/21, PPSV23 03/02/06  Shingles? Only one dose recorded - 05/05/21    Drug Interactions  The following drug interactions were noted:    Carvedilol x Albuterol: non-selective beta blockers can reduce the effectiveness of beta agonists     Medication System Management  Adherence/Organization: used to miss doses of her medication every other day but since she was discharged from the hospital has been taking as prescribed daily; keeps her bottles in a [employer] bag to help organize, does not use a pill box   Affordability/Accessibility: none reported today     Patient's preferred pharmacy:     [facility]/pharmacy #[id number] - [address] AT [place] [address]  Phone: [phone] Fax: [phone]    [facility] - [address] [address]  Phone: [phone] Fax: [phone]    [facility]  [address]  [facility] [address]  Phone: [phone] Fax: [phone]    [facility]  [address]  [facility] [address]  Phone: [phone] Fax: [phone]     Objective   No Known Allergies  Social History     Social History Narrative    Not on file      Medication Reconciliation:  Added:   Vitamain B Complex daily    Cholecalciferol 2000 units daily   Ferrous sulfate 325 mg daily   Folic acid 1 mg daily   Multivitamin daily     Medication Review  Current Outpatient Medications   Medication Instructions    acetaminophen (TYLENOL) 500 mg, oral, Every 8 hours PRN    albuterol 90 mcg/actuation inhaler 2 puffs, inhalation, 4 times daily PRN    alcohol swabs pads, medicated Apply topically. 70% pad, 4 x daily; use as directed    b complex 0.4 mg tablet 1 tablet, Daily    blood-glucose meter (True Metrix Glucose Meter) misc USE AS DIRECTED EVERY DAY    blood-glucose meter,continuous (Dexcom G7 ) misc Use as instructed with sensors for continuous blood sugar monitoring    blood-glucose sensor (Dexcom G7 Sensor) device Apply 1 sensor to the back of the arm for continuous monitoring of blood sugar. Remove and apply new sensor every 10 days.    buPROPion XL (FORFIVO XL) 450 mg, oral, Every morning, Do not crush, chew, or split.    carvedilol (COREG) 6.25 mg, oral, Daily    cholecalciferol (VITAMIN D-3) 2,000 Units, Daily    cyclobenzaprine (FLEXERIL) 10 mg, oral, 3 times daily PRN    dulaglutide 3 mg, subcutaneous, Weekly    DULoxetine (CYMBALTA) 60 mg, oral, 2 times daily, Do not crush or chew.    EASY COMFORT LANCETS MISC 3 times daily    famotidine (PEPCID) 40 mg, oral, Daily    ferrous sulfate 325 mg, Daily with breakfast    folic acid (FOLVITE) 1 mg, Daily    FreeStyle glucose monitoring kit True Metrix Blood Glucose test in vitro strip; Use as directed 4-5 times daily    gabapentin (Neurontin) 300 mg capsule TAKE 2 CAPSULES BY MOUTH IN THE MORNING AND AFTERNOON AND TAKE 3 CAPSULES IN THE EVENING.    gabapentin (NEURONTIN) 600 mg, oral, Daily    ibuprofen 600 mg, oral, Every 8 hours PRN    lancets (Easy Comfort Lancets) 30 gauge misc USE AS DIRECTED ONCE DAILY TO CHECK BLOOD SUGAR    metFORMIN XR (GLUCOPHAGE-XR) 1,000 mg, oral, Daily with breakfast, Do not crush, chew, or split.    multivitamin tablet 1 tablet, Daily     "naloxone (NARCAN) 4 mg, nasal, As needed, May repeat every 2-3 minutes if needed, alternating nostrils, until medical assistance becomes available.    pantoprazole (PROTONIX) 40 mg, oral, Daily before breakfast, Do not crush, chew, or split.    QUEtiapine (SEROQUEL) 150 mg, oral, Nightly    True Metrix Glucose Test Strip strip USE AS DIRECTED 4-5 TIMES DAILY      Vitals  BP Readings from Last 2 Encounters:   11/21/24 124/67   10/25/24 124/76     BMI Readings from Last 1 Encounters:   10/09/24 34.83 kg/m²      Labs  A1C  Lab Results   Component Value Date    HGBA1C 6.0 (H) 11/14/2024    HGBA1C 5.9 (H) 10/07/2024    HGBA1C 6.0 (H) 09/30/2024     BMP  Lab Results   Component Value Date    CALCIUM 9.8 10/17/2024     10/17/2024    K 4.1 10/17/2024    CO2 30 10/17/2024     10/17/2024    BUN 19 10/17/2024    CREATININE 0.99 10/17/2024    EGFR 67 10/17/2024     LFTs  Lab Results   Component Value Date    ALT 31 10/08/2024    AST 29 10/08/2024    ALKPHOS 77 10/08/2024    BILITOT 1.3 (H) 10/08/2024     FLP  Lab Results   Component Value Date    TRIG 129 05/03/2023    CHOL 149 05/03/2023    LDLF 76 05/03/2023    HDL 47.2 05/03/2023     Urine Microalbumin  Lab Results   Component Value Date    MICROALBCREA 3.8 03/25/2022     Weight Management  Wt Readings from Last 3 Encounters:   10/09/24 104 kg (228 lb 15.9 oz)   10/08/24 104 kg (229 lb)   09/30/24 104 kg (229 lb)      Estimated body mass index is 34.83 kg/m² as calculated from the following:    Height as of 10/9/24: 1.727 m (5' 7.99\").    Weight as of 10/9/24: 104 kg (228 lb 15.9 oz).     Assessment/Plan   Problem List Items Addressed This Visit       Type 2 diabetes mellitus - Primary     Patient's goal A1c is < 7%.  Is pt at goal? Yes, based on A1c of 6.0% on 11/14/24 however patient has had a few significantly elevated readings at home  Patient's SMBGs are 309 mg/dL last week and in the 250s yesterday 12/01 both post-prandial    Rationale for plan:   Patient " states that she was on insulin when she was in the hospital and it seemed to help her blood sugars and is wondering if this is a good option for further therapy  She has never taken more than 1.5 mg once weekly of Trulicity   Discussed that ideally we would prefer other options over insulin therapy due to risk of hypoglycemia and weight gain  It also is hard to dose insulin with such few readings  Patient is agreeable to increasing up her Trulicity for now and then we will meet in-person to setup a CGM to help us better see when her blood sugars are elevated     Medication Changes:  CONTINUE:  Metformin  mg 2 tablets by mouth twice daily   INCREASE  Trulicity 3 mg under the skin once weekly (increased from 1.5 mg)     Future Considerations:  If blood sugars still elevated could increase Trulicity further up to 4.5 mg once weekly   Could also start an SGLT2 inhibitor such as Jardiance once daily instead of adding on insulin therapy     Monitoring and Education:  Discussed with patient that there are other options before we would need to resort to insulin and she was agreeable to seeing if her blood sugars would come down through increasing Trulicity  She will come in office in ~1.5 weeks for in-person education on the Dexcom G7 CGM system  She also notes she's struggled using her new lancing device. She will bring this in as well and we will walk through how to use it at that time.  Patient also mentioned that she needs refills on gabapentin and said that Dr. Eller typically prescribes it. Message sent to Dr. Eller regarding refill request.     We will follow-up in ~1.5 weeks to get patient setup with CGM system. She was provided with my contact information and encouraged to reach out with any questions and/or concerns prior to then.            Relevant Medications    dulaglutide 3 mg/0.5 mL pen injector    blood-glucose sensor (Dexcom G7 Sensor) device    blood-glucose meter,continuous (Dexcom G7 )  misc    Other Relevant Orders    Referral to Clinical Pharmacy     Pharmacy Follow-Up: 12/11/24  PCP Follow-Up: 12/05/24    Continue all meds under the continuation of care with the referring provider and clinical pharmacy team.    Thank you,    Ethan Tirado, PharmD   Clinical Pharmacist    Verbal consent to manage patient's drug therapy was obtained from the patient. They were informed they may decline to participate or withdraw from participation in pharmacy services at any time.

## 2024-12-02 NOTE — Clinical Note
Good afternoon Dr. Eller!  Patient mentioned she is out of Gabapentin and stated that you typically prescribe it for her. If appropriate are you able to refill for her? If you have any questions let me know!  Thank you!  Ethan Tirado, SebastianD  Dahlia Larios (Attending)

## 2024-12-02 NOTE — ASSESSMENT & PLAN NOTE
Patient's goal A1c is < 7%.  Is pt at goal? Yes, based on A1c of 6.0% on 11/14/24 however patient has had a few significantly elevated readings at home  Patient's SMBGs are 309 mg/dL last week and in the 250s yesterday 12/01 both post-prandial    Rationale for plan:   Patient states that she was on insulin when she was in the hospital and it seemed to help her blood sugars and is wondering if this is a good option for further therapy  She has never taken more than 1.5 mg once weekly of Trulicity   Discussed that ideally we would prefer other options over insulin therapy due to risk of hypoglycemia and weight gain  It also is hard to dose insulin with such few readings  Patient is agreeable to increasing up her Trulicity for now and then we will meet in-person to setup a CGM to help us better see when her blood sugars are elevated     Medication Changes:  CONTINUE:  Metformin  mg 2 tablets by mouth twice daily   INCREASE  Trulicity 3 mg under the skin once weekly (increased from 1.5 mg)     Future Considerations:  If blood sugars still elevated could increase Trulicity further up to 4.5 mg once weekly   Could also start an SGLT2 inhibitor such as Jardiance once daily instead of adding on insulin therapy     Monitoring and Education:  Discussed with patient that there are other options before we would need to resort to insulin and she was agreeable to seeing if her blood sugars would come down through increasing Trulicity  She will come in office in ~1.5 weeks for in-person education on the Dexcom G7 CGM system  She also notes she's struggled using her new lancing device. She will bring this in as well and we will walk through how to use it at that time.  Patient also mentioned that she needs refills on gabapentin and said that Dr. Eller typically prescribes it. Message sent to Dr. Eller regarding refill request.     We will follow-up in ~1.5 weeks to get patient setup with CGM system. She was provided with my  contact information and encouraged to reach out with any questions and/or concerns prior to then.

## 2024-12-03 ENCOUNTER — TELEPHONE (OUTPATIENT)
Dept: SURGERY | Facility: CLINIC | Age: 59
End: 2024-12-03
Payer: COMMERCIAL

## 2024-12-03 DIAGNOSIS — R10.13 DYSPEPSIA: ICD-10-CM

## 2024-12-03 DIAGNOSIS — G62.9 PERIPHERAL NERVE DISEASE: ICD-10-CM

## 2024-12-03 DIAGNOSIS — F33.2 MDD (MAJOR DEPRESSIVE DISORDER), RECURRENT SEVERE, WITHOUT PSYCHOSIS (MULTI): ICD-10-CM

## 2024-12-03 RX ORDER — PANTOPRAZOLE SODIUM 40 MG/1
40 TABLET, DELAYED RELEASE ORAL
Qty: 30 TABLET | Refills: 0 | OUTPATIENT
Start: 2024-12-03 | End: 2025-01-02

## 2024-12-03 RX ORDER — GABAPENTIN 600 MG/1
600 TABLET ORAL DAILY
Qty: 30 TABLET | Refills: 0 | OUTPATIENT
Start: 2024-12-03 | End: 2025-01-02

## 2024-12-03 RX ORDER — GABAPENTIN 300 MG/1
CAPSULE ORAL
Qty: 150 CAPSULE | Refills: 0 | Status: SHIPPED | OUTPATIENT
Start: 2024-12-03

## 2024-12-03 RX ORDER — QUETIAPINE FUMARATE 50 MG/1
150 TABLET, FILM COATED ORAL NIGHTLY
Qty: 90 TABLET | Refills: 0 | OUTPATIENT
Start: 2024-12-03 | End: 2025-01-02

## 2024-12-03 RX ORDER — BUPROPION HYDROCHLORIDE 450 MG/1
450 TABLET, FILM COATED, EXTENDED RELEASE ORAL EVERY MORNING
Qty: 30 TABLET | Refills: 0 | OUTPATIENT
Start: 2024-12-03 | End: 2025-01-02

## 2024-12-03 RX ORDER — DULOXETIN HYDROCHLORIDE 60 MG/1
60 CAPSULE, DELAYED RELEASE ORAL 2 TIMES DAILY
Qty: 60 CAPSULE | Refills: 0 | OUTPATIENT
Start: 2024-12-03 | End: 2025-01-02

## 2024-12-03 NOTE — TELEPHONE ENCOUNTER
Left a detailed message with patient inquiring as to when the last dose was of Trulicity.  Explained that it would require a seven day hold prior to procedure.  Upon return call will address this appropriately.

## 2024-12-05 ENCOUNTER — APPOINTMENT (OUTPATIENT)
Dept: PRIMARY CARE | Facility: CLINIC | Age: 59
End: 2024-12-05
Payer: COMMERCIAL

## 2024-12-06 ENCOUNTER — APPOINTMENT (OUTPATIENT)
Dept: NEUROSURGERY | Facility: CLINIC | Age: 59
End: 2024-12-06
Payer: COMMERCIAL

## 2024-12-06 VITALS
TEMPERATURE: 97.2 F | WEIGHT: 261 LBS | DIASTOLIC BLOOD PRESSURE: 78 MMHG | HEART RATE: 78 BPM | HEIGHT: 68 IN | SYSTOLIC BLOOD PRESSURE: 136 MMHG | BODY MASS INDEX: 39.56 KG/M2 | RESPIRATION RATE: 20 BRPM

## 2024-12-06 DIAGNOSIS — F33.2 MDD (MAJOR DEPRESSIVE DISORDER), RECURRENT SEVERE, WITHOUT PSYCHOSIS (MULTI): ICD-10-CM

## 2024-12-06 DIAGNOSIS — R10.13 DYSPEPSIA: ICD-10-CM

## 2024-12-06 DIAGNOSIS — G62.9 PERIPHERAL NERVE DISEASE: ICD-10-CM

## 2024-12-06 DIAGNOSIS — M48.061 SPINAL STENOSIS OF LUMBAR REGION WITHOUT NEUROGENIC CLAUDICATION: ICD-10-CM

## 2024-12-06 DIAGNOSIS — M54.12 CERVICAL RADICULOPATHY: ICD-10-CM

## 2024-12-06 DIAGNOSIS — M47.812 CERVICAL SPONDYLOSIS WITHOUT MYELOPATHY: Primary | ICD-10-CM

## 2024-12-06 PROCEDURE — 99205 OFFICE O/P NEW HI 60 MIN: CPT | Performed by: NEUROLOGICAL SURGERY

## 2024-12-06 PROCEDURE — 3078F DIAST BP <80 MM HG: CPT | Performed by: NEUROLOGICAL SURGERY

## 2024-12-06 PROCEDURE — 3044F HG A1C LEVEL LT 7.0%: CPT | Performed by: NEUROLOGICAL SURGERY

## 2024-12-06 PROCEDURE — 3075F SYST BP GE 130 - 139MM HG: CPT | Performed by: NEUROLOGICAL SURGERY

## 2024-12-06 PROCEDURE — 3008F BODY MASS INDEX DOCD: CPT | Performed by: NEUROLOGICAL SURGERY

## 2024-12-06 RX ORDER — QUETIAPINE FUMARATE 50 MG/1
150 TABLET, FILM COATED ORAL NIGHTLY
Qty: 90 TABLET | Refills: 0 | OUTPATIENT
Start: 2024-12-06 | End: 2025-01-05

## 2024-12-06 RX ORDER — DULOXETIN HYDROCHLORIDE 60 MG/1
60 CAPSULE, DELAYED RELEASE ORAL 2 TIMES DAILY
Qty: 60 CAPSULE | Refills: 0 | OUTPATIENT
Start: 2024-12-06 | End: 2025-01-05

## 2024-12-06 RX ORDER — BUPROPION HYDROCHLORIDE 450 MG/1
450 TABLET, FILM COATED, EXTENDED RELEASE ORAL EVERY MORNING
Qty: 30 TABLET | Refills: 0 | OUTPATIENT
Start: 2024-12-06 | End: 2025-01-05

## 2024-12-06 RX ORDER — GABAPENTIN 600 MG/1
600 TABLET ORAL DAILY
Qty: 30 TABLET | Refills: 0 | OUTPATIENT
Start: 2024-12-06 | End: 2025-01-05

## 2024-12-06 RX ORDER — PANTOPRAZOLE SODIUM 40 MG/1
40 TABLET, DELAYED RELEASE ORAL
Qty: 30 TABLET | Refills: 0 | OUTPATIENT
Start: 2024-12-06 | End: 2025-01-05

## 2024-12-06 ASSESSMENT — PAIN SCALES - GENERAL: PAINLEVEL_OUTOF10: 7

## 2024-12-06 NOTE — PROGRESS NOTES
[facility] [place]  Department of Neurological Surgery  New Patient Visit    History of Present Illness:  Alejandra Houston is a 59 y.o. year old female who presents to the spine clinic with her  complaining of severe neck pain severe low back pain severe thigh pain and knee pain on the left.  She has had back pain for over 30 years.  She has been told she has fibromyalgia 20 years ago.  The neck pain started about a year ago and she is having excruciating pain in her hands especially in the third and fourth digits.  She is able to tie her shoelaces, she is able to button her buttons but at night her hands cramp up    She tells me her biggest problem is her low back because it is always painful when she is standing walking lying or sitting it never gets better.  I asked if there were any symptoms that got worse when she was standing or walking that were relieved by sitting and she says that there are not.  Initially I did not know she had the diagnosis of fibromyalgia but when I examined her I told her that she was acting more like a patient who had a pain syndrome and that is when she told me she had the diagnosis.  She has moderate stenosis in the lumbar spine but she does not have any symptoms of claudication.  She has hypersensitivity to touch and I cannot treat that with surgery.  She told me she is enrolled with Dr. Acevedo  at [facility] and perhaps he can help her more comprehensively.  I told her there is no surgery that we will treat her fibromyalgia and in fact it could be made worse.  I told her that she needs to completely stop smoking and she needs to lose additional weight.  All of that will help with her pain from her moderate stenosis.  I spent an hour with the patient and her  today but I do not have surgery that will help her condition.    Prior Spine Surgeries: No    Physical Therapy: Yes  Diabetic: Yes but her hemoglobin A1c is less than 7  Osteoporosis:  Unknown  Patient's BMI is Body mass index is 39.68 kg/m².    14/14 systems reviewed and negative other than what is listed in the history of present illness    Patient Active Problem List   Diagnosis    Acute sinusitis    Chronic cough    Depression    Effusion of right knee    Elevated liver enzymes    Fatigue    Fatty liver disease, nonalcoholic    GERD (gastroesophageal reflux disease)    Chronic pain    Fibromyalgia    Headache    Increased frequency of urination    Instability of prosthetic knee (CMS-HCC)    Insomnia    Leg weakness    Low back pain    Mass of left parotid gland    Nicotine dependence    Otalgia of both ears    Chronic knee pain after total replacement of right knee joint    Pain due to total right knee replacement (CMS-HCC)    Pancytopenia    Parotid nodule    Personal history of COVID-19    Primary osteoarthritis of left knee    Status post revision of total replacement of right knee    Stress incontinence, female    Type 2 diabetes mellitus    Vaginal dryness, menopausal    Vitamin D deficiency    Class 2 severe obesity with serious comorbidity and body mass index (BMI) of 39.0 to 39.9 in adult    Class 2 obesity with body mass index (BMI) of 38.0 to 38.9 in adult    Constipation    Lung nodule    Morbid obesity with BMI of 40.0-44.9, adult (Multi)    Sleep apnea    Anxiety    Cellulitis of right lower extremity    Cellulitis    Chronic obstructive lung disease (Multi)    Closed fracture of lateral malleolus    Dermatophytosis of nail    Dyslipidemia    Disorder of sacrum    Essential hypertension, benign    Fever    Generalized osteoarthritis    Genital herpes    Hyperlipidemia    Hypertension    Hypomagnesemia    Leg edema, left    Lumbar spondylosis    Lumbosacral spondylosis without myelopathy    Non-intractable vomiting with nausea    Obstructive sleep apnea, adult    Other specified abnormal findings of blood chemistry    Hereditary and idiopathic peripheral neuropathy    Peripheral  nerve disease    Spinal stenosis of lumbar region without neurogenic claudication    Primary localized osteoarthrosis, lower leg    Arthritis of both hips    Disorder of iron metabolism    Chronic pain of right knee    Restless leg syndrome    Thrombocytopenia (CMS-HCC)    Menopausal symptoms    Decreased libido    Levator spasm    Vaginal atrophy    MDD (major depressive disorder), recurrent severe, without psychosis (Multi)    Cervical spondylosis without myelopathy    Cervical radiculopathy     Past Medical History:   Diagnosis Date    Abnormal levels of other serum enzymes 2022    Elevated liver enzymes    Acid reflux     Anxiety     Bipolar disorder     COPD (chronic obstructive pulmonary disease) (Multi)     Depression     DM (diabetes mellitus) (Multi)     Fibromyalgia, primary     Glaucoma     Hypertension     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    Avascular necrosis of bone of left hip    Sleep apnea     no cpap    Suicidal ideations     Thrombocytopenia (CMS-HCC)      Past Surgical History:   Procedure Laterality Date     SECTION, CLASSIC  02/15/2018     Section     SECTION, CLASSIC      HIP ARTHROPLASTY      KNEE ARTHROPLASTY      OTHER SURGICAL HISTORY  06/10/2019    Hip replacement    TOTAL KNEE ARTHROPLASTY  2018    Knee Replacement    UVULOPALATOPHARYNGOPLASTY       Social History     Tobacco Use    Smoking status: Every Day     Current packs/day: 0.50     Average packs/day: 0.5 packs/day for 44.9 years (22.5 ttl pk-yrs)     Types: Cigarettes     Start date:     Smokeless tobacco: Never   Substance Use Topics    Alcohol use: Not Currently     Comment: social     family history includes COPD in her mother; Cancer in an other family member; Diabetes in her mother and another family member; Heart disease in her mother and another family member; Hypertension in her mother and another family member; Lymphoma in her mother; Multiple myeloma in  her mother.    Current Outpatient Medications:     acetaminophen (Tylenol) 500 mg tablet, Take 1 tablet (500 mg) by mouth every 8 hours if needed for mild pain (1 - 3)., Disp: 30 tablet, Rfl: 3    albuterol 90 mcg/actuation inhaler, Inhale 2 puffs 4 times a day as needed for shortness of breath or wheezing., Disp: 8.5 g, Rfl: 1    alcohol swabs pads, medicated, Apply topically. 70% pad, 4 x daily; use as directed, Disp: , Rfl:     b complex 0.4 mg tablet, Take 1 tablet by mouth once daily., Disp: , Rfl:     blood-glucose meter (True Metrix Glucose Meter) misc, USE AS DIRECTED EVERY DAY, Disp: 1 each, Rfl: 0    blood-glucose meter,continuous (Dexcom G7 ) misc, Use as instructed with sensors for continuous blood sugar monitoring, Disp: 1 each, Rfl: 0    blood-glucose sensor (Dexcom G7 Sensor) device, Apply 1 sensor to the back of the arm for continuous monitoring of blood sugar. Remove and apply new sensor every 10 days., Disp: 3 each, Rfl: 1    carvedilol (Coreg) 6.25 mg tablet, Take 1 tablet (6.25 mg) by mouth once daily., Disp: 30 tablet, Rfl: 0    cholecalciferol (Vitamin D-3) 50 mcg (2,000 unit) capsule, Take 1 capsule (50 mcg) by mouth once daily., Disp: , Rfl:     cyclobenzaprine (Flexeril) 10 mg tablet, Take 1 tablet (10 mg) by mouth 3 times a day as needed for muscle spasms., Disp: 90 tablet, Rfl: 2    dulaglutide 3 mg/0.5 mL pen injector, Inject 3 mg under the skin 1 (one) time per week., Disp: 2 mL, Rfl: 1    DULoxetine (Cymbalta) 60 mg DR capsule, Take 1 capsule (60 mg) by mouth 2 times a day. Do not crush or chew., Disp: 60 capsule, Rfl: 0    EASY COMFORT LANCETS MISC, in the morning, at noon, and at bedtime. Use to test, Disp: , Rfl:     famotidine (Pepcid) 40 mg tablet, Take 1 tablet (40 mg) by mouth once daily., Disp: 30 tablet, Rfl: 0    ferrous sulfate 325 (65 Fe) MG EC tablet, Take 1 tablet by mouth once daily with breakfast. Do not crush, chew, or split., Disp: , Rfl:     folic acid  (Folvite) 1 mg tablet, Take 1 tablet (1 mg) by mouth once daily., Disp: , Rfl:     FreeStyle glucose monitoring kit, True Metrix Blood Glucose test in vitro strip; Use as directed 4-5 times daily, Disp: , Rfl:     gabapentin (Neurontin) 300 mg capsule, TAKE 2 CAPSULES BY MOUTH IN THE MORNING AND AFTERNOON AND TAKE 3 CAPSULES IN THE EVENING., Disp: 150 capsule, Rfl: 0    ibuprofen 600 mg tablet, Take 1 tablet (600 mg) by mouth every 8 hours if needed for mild pain (1 - 3)., Disp: 30 tablet, Rfl: 3    lancets (Easy Comfort Lancets) 30 gauge misc, USE AS DIRECTED ONCE DAILY TO CHECK BLOOD SUGAR, Disp: 100 each, Rfl: 0    metFORMIN XR (Glucophage-XR) 500 mg 24 hr tablet, Take 2 tablets (1,000 mg) by mouth once daily with breakfast. Do not crush, chew, or split., Disp: 60 tablet, Rfl: 0    multivitamin tablet, Take 1 tablet by mouth once daily., Disp: , Rfl:     pantoprazole (ProtoNix) 40 mg EC tablet, Take 1 tablet (40 mg) by mouth once daily in the morning. Take before meals. Do not crush, chew, or split., Disp: 30 tablet, Rfl: 0    QUEtiapine (SEROquel) 50 mg tablet, Take 3 tablets (150 mg) by mouth once daily at bedtime., Disp: 90 tablet, Rfl: 0    True Metrix Glucose Test Strip strip, USE AS DIRECTED 4-5 TIMES DAILY, Disp: 100 strip, Rfl: 5    buPROPion XL (Forfivo XL) 450 mg 24 hr tablet, Take 1 tablet (450 mg) by mouth once daily in the morning. Do not crush, chew, or split. (Patient not taking: Reported on 12/6/2024), Disp: 30 tablet, Rfl: 0    gabapentin (Neurontin) 600 mg tablet, Take 1 tablet (600 mg) by mouth once daily. (Patient not taking: Reported on 12/6/2024), Disp: 30 tablet, Rfl: 0    naloxone (Narcan) 4 mg/0.1 mL nasal spray, Administer 1 spray (4 mg) into affected nostril(s) if needed for opioid reversal or respiratory depression. May repeat every 2-3 minutes if needed, alternating nostrils, until medical assistance becomes available. (Patient not taking: Reported on 12/6/2024), Disp: 2 each, Rfl:  11  No Known Allergies    Physical Examination      General: NAD, AOx 3,  no aphasia or dysarthria, normal fund of knowledge  Cranial Nerves II-XII: VFF, PERRL, EOMI, Face Symm, Facial SILT, Palate/Tongue midline and symmetric, edentulous  Motor: 5/5 Throughout all extremities but she gave way with every muscle group because just touching her skin causes her to react dramatically from the hypersensitivity,  No drift, no dysmetria on finger to nose  Sensation: SILT and PP throughout all extremities  DTRS: 2+ Throughout, No Hoffmans or Clonus    Results    I personally reviewed and interpreted the imaging results which included the MRI of her cervical spine and her lumbar spine that were done a couple months ago.    Assessment and Plan:    Alejandra Houston is a 59 y.o. year old female who presents to the spine clinic with her  complaining of severe neck pain severe low back pain severe thigh pain and knee pain on the left.  She has had back pain for over 30 years.  She has been told she has fibromyalgia 20 years ago.  The neck pain started about a year ago and she is having excruciating pain in her hands especially in the third and fourth digits.  She is able to tie her shoelaces, she is able to button her buttons but at night her hands cramp up    She tells me her biggest problem is her low back because it is always painful when she is standing walking lying or sitting it never gets better.  I asked if there were any symptoms that got worse when she was standing or walking that were relieved by sitting and she says that there are not.  Initially I did not know she had the diagnosis of fibromyalgia but when I examined her I told her that she was acting more like a patient who had a pain syndrome and that is when she told me she had the diagnosis.  She has moderate stenosis in the lumbar spine but she does not have any symptoms of claudication.  She has hypersensitivity to touch and I cannot treat that with  surgery.  She told me she is enrolled with Dr. Acevedo  at [facility] and perhaps he can help her more comprehensively.  I told her there is no surgery that we will treat her fibromyalgia and in fact it could be made worse.  I told her that she needs to completely stop smoking and she needs to lose additional weight.  All of that will help with her pain from her moderate stenosis.  I spent an hour with the patient and her  today but I do not have surgery that will help her condition.      I have reviewed all prior documentation and reviewed the electronic medical record since admission. I have personally have reviewed all advanced imaging not just the reports and used my interpretation as documented as the relevant findings. I have reviewed the risks and benefits of all treatment recommendations listed in this note with the patient and family.       The above clinical summary has been dictated with voice recognition software. It has not been proofread for grammatical errors, typographical mistakes, or other semantic inconsistencies.    Thank you for visiting our office today. It was our pleasure to take part in your healthcare.     Do not hesitate to call with any questions regarding your plan of care after leaving. My office can be reached at [phone] M-G 8am-4pm.     To clinicians, thank you very much for this kind referral. It is a privilege to partner with you in the care of your patients. My office would be delighted to assist you with any further consultations or with questions regarding the plan of care outlined. Do not hesitate to call the office or contact me directly.     Sincerely,    Randy Gaspar MD, FAANS, FACS  Board Certified Neurological Surgeon  , Department of Neurological Surgery  [facility] [employer]    [facility]  [address] [place] [address]., [address]  [facility] [address]  [address]  [place]  [address] [facility]  [address] [place] [id number]    Phone: [phone]  Fax: [phone]

## 2024-12-09 DIAGNOSIS — M54.2 NECK PAIN: ICD-10-CM

## 2024-12-09 DIAGNOSIS — M54.50 CHRONIC LOW BACK PAIN, UNSPECIFIED BACK PAIN LATERALITY, UNSPECIFIED WHETHER SCIATICA PRESENT: ICD-10-CM

## 2024-12-09 DIAGNOSIS — G89.29 CHRONIC LOW BACK PAIN, UNSPECIFIED BACK PAIN LATERALITY, UNSPECIFIED WHETHER SCIATICA PRESENT: ICD-10-CM

## 2024-12-09 RX ORDER — IBUPROFEN 600 MG/1
600 TABLET ORAL EVERY 8 HOURS PRN
Qty: 30 TABLET | Refills: 3 | Status: SHIPPED | OUTPATIENT
Start: 2024-12-09

## 2024-12-10 RX ORDER — GABAPENTIN 300 MG/1
CAPSULE ORAL
Qty: 150 CAPSULE | Refills: 0 | Status: SHIPPED | OUTPATIENT
Start: 2024-12-10

## 2024-12-11 ENCOUNTER — APPOINTMENT (OUTPATIENT)
Dept: PRIMARY CARE | Facility: CLINIC | Age: 59
End: 2024-12-11
Payer: COMMERCIAL

## 2024-12-11 ENCOUNTER — TELEPHONE (OUTPATIENT)
Dept: CASE MANAGEMENT | Facility: HOSPITAL | Age: 59
End: 2024-12-11

## 2024-12-11 ENCOUNTER — PATIENT MESSAGE (OUTPATIENT)
Dept: PRIMARY CARE | Facility: CLINIC | Age: 59
End: 2024-12-11

## 2024-12-11 DIAGNOSIS — R05.9 COUGH, UNSPECIFIED TYPE: Primary | ICD-10-CM

## 2024-12-11 NOTE — TELEPHONE ENCOUNTER
This is an audio only call. This audio call was conducted in the [facility].     Substance Use Navigator Specialist (GISELLE) performed an audio only follow-up call with Alejandra Houston at 10:25 AM     Was SUNS able to reach patient? No    If No, did GISELLE leave a voicemail message? Yes SUNS left call back number and asked for a call back    Brief summary of call:    Follow-up / Next steps:     End of call time:     Duration of audio encounter:

## 2024-12-12 RX ORDER — GUAIFENESIN 1200 MG/1
1200 TABLET, EXTENDED RELEASE ORAL EVERY 12 HOURS PRN
Qty: 30 TABLET | Refills: 0 | Status: SHIPPED | OUTPATIENT
Start: 2024-12-12

## 2024-12-12 RX ORDER — GUAIFENESIN 1200 MG/1
1200 TABLET, EXTENDED RELEASE ORAL EVERY 12 HOURS PRN
Qty: 30 TABLET | Refills: 0 | Status: SHIPPED | OUTPATIENT
Start: 2024-12-12 | End: 2024-12-12 | Stop reason: SDUPTHER

## 2024-12-20 ENCOUNTER — APPOINTMENT (OUTPATIENT)
Dept: PRIMARY CARE | Facility: CLINIC | Age: 59
End: 2024-12-20
Payer: COMMERCIAL

## 2025-01-01 PROBLEM — S52.502A CLOSED FRACTURE OF LEFT DISTAL RADIUS: Status: ACTIVE | Noted: 2025-01-01

## 2025-01-02 ENCOUNTER — APPOINTMENT (OUTPATIENT)
Dept: SLEEP MEDICINE | Facility: CLINIC | Age: 60
End: 2025-01-02
Payer: COMMERCIAL

## 2025-01-03 DIAGNOSIS — F33.2 MDD (MAJOR DEPRESSIVE DISORDER), RECURRENT SEVERE, WITHOUT PSYCHOSIS (MULTI): ICD-10-CM

## 2025-01-03 RX ORDER — QUETIAPINE FUMARATE 50 MG/1
150 TABLET, FILM COATED ORAL NIGHTLY
Qty: 90 TABLET | Refills: 0 | Status: SHIPPED | OUTPATIENT
Start: 2025-01-03 | End: 2025-02-02

## 2025-01-03 NOTE — TELEPHONE ENCOUNTER
I am going to refill this time but further refills need to come from her psychiatrist.  Please make sure she has a follow-up appointment set up with them.

## 2025-01-06 ENCOUNTER — PATIENT MESSAGE (OUTPATIENT)
Dept: PRIMARY CARE | Facility: CLINIC | Age: 60
End: 2025-01-06
Payer: COMMERCIAL

## 2025-01-06 DIAGNOSIS — J34.89 NOSE IRRITATION: Primary | ICD-10-CM

## 2025-01-07 ENCOUNTER — HOSPITAL ENCOUNTER (OUTPATIENT)
Dept: RADIOLOGY | Facility: CLINIC | Age: 60
Discharge: HOME | End: 2025-01-07
Payer: COMMERCIAL

## 2025-01-07 ENCOUNTER — OFFICE VISIT (OUTPATIENT)
Dept: ORTHOPEDIC SURGERY | Facility: CLINIC | Age: 60
End: 2025-01-07
Payer: COMMERCIAL

## 2025-01-07 DIAGNOSIS — M25.562 LEFT KNEE PAIN, UNSPECIFIED CHRONICITY: ICD-10-CM

## 2025-01-07 DIAGNOSIS — E11.9 TYPE 2 DIABETES MELLITUS WITHOUT COMPLICATION, WITHOUT LONG-TERM CURRENT USE OF INSULIN (MULTI): Primary | ICD-10-CM

## 2025-01-07 DIAGNOSIS — Z96.659 LOOSENING OF KNEE JOINT PROSTHESIS, INITIAL ENCOUNTER (CMS-HCC): ICD-10-CM

## 2025-01-07 DIAGNOSIS — T84.038A LOOSENING OF KNEE JOINT PROSTHESIS, INITIAL ENCOUNTER (CMS-HCC): ICD-10-CM

## 2025-01-07 PROCEDURE — 73562 X-RAY EXAM OF KNEE 3: CPT | Mod: LT

## 2025-01-07 PROCEDURE — 99203 OFFICE O/P NEW LOW 30 MIN: CPT | Performed by: ORTHOPAEDIC SURGERY

## 2025-01-07 PROCEDURE — 99213 OFFICE O/P EST LOW 20 MIN: CPT | Performed by: ORTHOPAEDIC SURGERY

## 2025-01-07 PROCEDURE — 73562 X-RAY EXAM OF KNEE 3: CPT | Mod: LEFT SIDE | Performed by: STUDENT IN AN ORGANIZED HEALTH CARE EDUCATION/TRAINING PROGRAM

## 2025-01-07 RX ORDER — MUPIROCIN 20 MG/G
OINTMENT TOPICAL
Qty: 30 G | Refills: 1 | Status: SHIPPED | OUTPATIENT
Start: 2025-01-07 | End: 2025-01-17

## 2025-01-07 NOTE — PROGRESS NOTES
History of Present Illness:   Patient presents today for evaluation of left knee pain.  She is 11 months status post total knee arthroplasty with Dr. Kaminski.  She denies any injury or trauma but has had persistent pain predominately medial.  She denies any fever or chills.    Review of Systems   GENERAL: Negative for malaise, significant weight loss, fever  MUSCULOSKELETAL: see HPI  NEURO:  Negative    Physical Examination:  Left knee  Healthy incision, no significant warmth erythema noted  No significant effusion  Tolerates range of motion  No laxity varus valgus stress  Normal neurovascular exam distally    Imaging:  Lucency noted around tibial component predominately medial interval change since films last March    Assessment:   Patient with painful left TKA from outside hospital    Plan:  We reviewed the imaging with the patient we discussed the possibility of aseptic loosening of the knee.  Recommend three-phase bone scan may also need further lab workup ESR CRP etc.

## 2025-01-08 ENCOUNTER — APPOINTMENT (OUTPATIENT)
Dept: PHARMACY | Facility: HOSPITAL | Age: 60
End: 2025-01-08
Payer: COMMERCIAL

## 2025-01-08 ENCOUNTER — APPOINTMENT (OUTPATIENT)
Dept: ORTHOPEDIC SURGERY | Facility: CLINIC | Age: 60
End: 2025-01-08
Payer: COMMERCIAL

## 2025-01-08 DIAGNOSIS — S52.592A OTHER CLOSED FRACTURE OF DISTAL END OF LEFT RADIUS, INITIAL ENCOUNTER: Primary | ICD-10-CM

## 2025-01-09 DIAGNOSIS — M54.50 CHRONIC LOW BACK PAIN, UNSPECIFIED BACK PAIN LATERALITY, UNSPECIFIED WHETHER SCIATICA PRESENT: ICD-10-CM

## 2025-01-09 DIAGNOSIS — G89.29 CHRONIC LOW BACK PAIN, UNSPECIFIED BACK PAIN LATERALITY, UNSPECIFIED WHETHER SCIATICA PRESENT: ICD-10-CM

## 2025-01-09 DIAGNOSIS — M47.812 CERVICAL SPONDYLOSIS WITHOUT MYELOPATHY: ICD-10-CM

## 2025-01-09 RX ORDER — CYCLOBENZAPRINE HCL 10 MG
10 TABLET ORAL 3 TIMES DAILY PRN
Qty: 90 TABLET | Refills: 0 | Status: SHIPPED | OUTPATIENT
Start: 2025-01-09

## 2025-01-10 RX ORDER — GABAPENTIN 300 MG/1
CAPSULE ORAL
Qty: 150 CAPSULE | Refills: 0 | Status: SHIPPED | OUTPATIENT
Start: 2025-01-10

## 2025-01-13 ENCOUNTER — OFFICE VISIT (OUTPATIENT)
Dept: ORTHOPEDIC SURGERY | Facility: CLINIC | Age: 60
End: 2025-01-13
Payer: COMMERCIAL

## 2025-01-13 ENCOUNTER — HOSPITAL ENCOUNTER (OUTPATIENT)
Dept: RADIOLOGY | Facility: CLINIC | Age: 60
Discharge: HOME | End: 2025-01-13
Payer: COMMERCIAL

## 2025-01-13 DIAGNOSIS — S52.572A OTHER CLOSED INTRA-ARTICULAR FRACTURE OF DISTAL END OF LEFT RADIUS, INITIAL ENCOUNTER: ICD-10-CM

## 2025-01-13 DIAGNOSIS — S52.572A OTHER CLOSED INTRA-ARTICULAR FRACTURE OF DISTAL END OF LEFT RADIUS, INITIAL ENCOUNTER: Primary | ICD-10-CM

## 2025-01-13 PROCEDURE — 99213 OFFICE O/P EST LOW 20 MIN: CPT | Performed by: ORTHOPAEDIC SURGERY

## 2025-01-13 PROCEDURE — 73110 X-RAY EXAM OF WRIST: CPT | Mod: LEFT SIDE

## 2025-01-13 PROCEDURE — 73110 X-RAY EXAM OF WRIST: CPT | Mod: LT

## 2025-01-13 NOTE — PROGRESS NOTES
Subjective    Patient ID: Alejandra Houston is a 59 y.o. female.    Chief Complaint: Pain of the Left Wrist     Last Surgery: No surgery found  Last Surgery Date: No surgery found      The patient is a 59-year-old right-hand-dominant female who comes in having sustained an injury to her left wrist in May 2024.  She had this treated conservatively.  She did undergo a left carpal tunnel release by different surgeon.  However she has been prescribed just therapy for her left wrist.  She comes in for another opinion.      Objective   Ortho Exam  Patient is in no acute distress.  Exam of her left hand and wrist reveals her skin envelope is intact.  She has a well-healed carpal tunnel release incision.  She has 80 degrees of supination 80 degrees of pronation.  She still has mild tenderness near the fracture site.  She has about 100 degree arc of flexion and extension.    Image Results:  X-rays of her left wrist were personally reviewed today.  She does have evidence of a fracture that is healed as a malunion at the distal radius.  She is now ulnar positive variance.  However she is neutral on the lateral view.    Assessment/Plan   Encounter Diagnoses:  Other closed intra-articular fracture of distal end of left radius, initial encounter    Orders Placed This Encounter    XR wrist left 3+ views     The patient has a left distal radius malunion that is 7 months out from injury.  I explained to the patient that treatment for this would require a full takedown at the fracture site and fixation with possible bone graft.  I explained her this would have a high risk of not healing given her multiple medical problems where she could actually be worse off than she is now.  At this time she will continue with her therapy.

## 2025-01-14 ENCOUNTER — HOSPITAL ENCOUNTER (OUTPATIENT)
Dept: PAIN MEDICINE | Facility: CLINIC | Age: 60
Discharge: HOME | End: 2025-01-14
Payer: COMMERCIAL

## 2025-01-14 VITALS
HEART RATE: 75 BPM | DIASTOLIC BLOOD PRESSURE: 86 MMHG | SYSTOLIC BLOOD PRESSURE: 170 MMHG | TEMPERATURE: 95.9 F | OXYGEN SATURATION: 97 % | RESPIRATION RATE: 18 BRPM

## 2025-01-14 DIAGNOSIS — M47.812 CERVICAL SPONDYLOSIS WITHOUT MYELOPATHY: ICD-10-CM

## 2025-01-14 PROCEDURE — 2500000004 HC RX 250 GENERAL PHARMACY W/ HCPCS (ALT 636 FOR OP/ED): Performed by: PAIN MEDICINE

## 2025-01-14 PROCEDURE — 64492 INJ PARAVERT F JNT C/T 3 LEV: CPT | Performed by: PAIN MEDICINE

## 2025-01-14 PROCEDURE — 64491 INJ PARAVERT F JNT C/T 2 LEV: CPT | Performed by: PAIN MEDICINE

## 2025-01-14 PROCEDURE — 64490 INJ PARAVERT F JNT C/T 1 LEV: CPT | Performed by: PAIN MEDICINE

## 2025-01-14 RX ORDER — LIDOCAINE HYDROCHLORIDE 10 MG/ML
INJECTION, SOLUTION EPIDURAL; INFILTRATION; INTRACAUDAL; PERINEURAL AS NEEDED
Status: DISCONTINUED | OUTPATIENT
Start: 2025-01-14 | End: 2025-01-15 | Stop reason: HOSPADM

## 2025-01-14 RX ORDER — BUPIVACAINE HYDROCHLORIDE 5 MG/ML
INJECTION, SOLUTION EPIDURAL; INTRACAUDAL AS NEEDED
Status: DISCONTINUED | OUTPATIENT
Start: 2025-01-14 | End: 2025-01-15 | Stop reason: HOSPADM

## 2025-01-14 RX ADMIN — LIDOCAINE HYDROCHLORIDE 5 ML: 10 INJECTION, SOLUTION EPIDURAL; INFILTRATION; INTRACAUDAL; PERINEURAL at 13:32

## 2025-01-14 RX ADMIN — BUPIVACAINE HYDROCHLORIDE 10 ML: 5 INJECTION, SOLUTION EPIDURAL; INTRACAUDAL; PERINEURAL at 13:32

## 2025-01-14 ASSESSMENT — PAIN - FUNCTIONAL ASSESSMENT: PAIN_FUNCTIONAL_ASSESSMENT: 0-10

## 2025-01-14 ASSESSMENT — PAIN SCALES - GENERAL: PAINLEVEL_OUTOF10: 6

## 2025-01-14 NOTE — DISCHARGE INSTRUCTIONS
Post-injection instructions FOR FACET BLOCK      Pay attention to how much pain relief (what percentage compared to before the procedure) you get and for how long it lasts.     THIS IS A TEMPORARY NUMBING OF PAIN THIS IS BEING USED AS A DIAGNOSTIC INDICATOR IF THIS IS THE SOURCE OF YOUR PAIN    Activity:  RETURN TO NORMAL ACTIVITY  SEE IF YOU CAN APPRECIATE AN IMPROVEMENT IN THE PAIN    Bandages: Remove after 24 hours     Showering/Bathing: You may shower after bandage is removed     Follow up: CALL OFFICE NEXT BUSINESS [phone] LEAVE MESSAGE ABOUT THE % OF RELIEF THAT WAS OBTAINED AND FOR HOW MANY HOURS      Call the OFFICE immediately: if you notice:     Excessive bleeding from procedure site (brisk bright red bleeding from the site or bleeding that soaks the bandages or does not stop)   Severe headache  Inability to walk, leg or arm weakness or numbness that is worse after the procedure   Uncontrolled pain   New urinary or fecal incontinence   Signs of infection: Fever above 101.5F, redness, swelling, pus or drainage from the site

## 2025-01-15 DIAGNOSIS — M47.812 CERVICAL SPONDYLOSIS WITHOUT MYELOPATHY: Primary | ICD-10-CM

## 2025-01-15 NOTE — TELEPHONE ENCOUNTER
Calls in and does report at least 80% improvement after the injection this lasted for 3 hours after the injection

## 2025-01-22 ENCOUNTER — APPOINTMENT (OUTPATIENT)
Dept: PRIMARY CARE | Facility: CLINIC | Age: 60
End: 2025-01-22
Payer: COMMERCIAL

## 2025-01-23 DIAGNOSIS — K74.60 CIRRHOSIS OF LIVER WITHOUT ASCITES, UNSPECIFIED HEPATIC CIRRHOSIS TYPE (MULTI): ICD-10-CM

## 2025-01-27 ENCOUNTER — OFFICE VISIT (OUTPATIENT)
Dept: PAIN MEDICINE | Facility: CLINIC | Age: 60
End: 2025-01-27
Payer: COMMERCIAL

## 2025-01-27 DIAGNOSIS — M48.062 SPINAL STENOSIS OF LUMBAR REGION WITH NEUROGENIC CLAUDICATION: Primary | ICD-10-CM

## 2025-01-27 DIAGNOSIS — F33.2 MDD (MAJOR DEPRESSIVE DISORDER), RECURRENT SEVERE, WITHOUT PSYCHOSIS (MULTI): ICD-10-CM

## 2025-01-27 PROCEDURE — 99214 OFFICE O/P EST MOD 30 MIN: CPT | Performed by: PAIN MEDICINE

## 2025-01-27 RX ORDER — DULOXETIN HYDROCHLORIDE 60 MG/1
60 CAPSULE, DELAYED RELEASE ORAL 2 TIMES DAILY
Qty: 60 CAPSULE | Refills: 11 | Status: SHIPPED | OUTPATIENT
Start: 2025-01-27

## 2025-01-27 RX ORDER — GABAPENTIN 800 MG/1
800 TABLET ORAL EVERY 6 HOURS
Qty: 120 TABLET | Refills: 3 | Status: SHIPPED | OUTPATIENT
Start: 2025-01-27 | End: 2026-01-27

## 2025-01-27 ASSESSMENT — PAIN SCALES - GENERAL: PAINLEVEL_OUTOF10: 9

## 2025-01-27 ASSESSMENT — PAIN - FUNCTIONAL ASSESSMENT: PAIN_FUNCTIONAL_ASSESSMENT: 0-10

## 2025-01-27 ASSESSMENT — PAIN DESCRIPTION - DESCRIPTORS: DESCRIPTORS: DULL;PRESSURE

## 2025-01-27 NOTE — H&P
"History Of Present Illness  Alejandra Houston is a 59 y.o. female   Patient saw back doctor who said she doesn't need surgery at this time. She is here to discuss options and medications.   And evaluated by Dr. Gaspar from neurosurgery who did not offer her any specific surgery she continues to be on a combination of gabapentin and taking the Flexeril on as needed basis she ran out of the duloxetine and she requesting refill she is also requesting an adjustment of the dose of the gabapentin.  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 She started smoking about 45 years ago. She has a 22.5 pack-year smoking history. She has never used smokeless tobacco. She reports that she does not currently use alcohol. She reports that she does not currently use drugs after having used the following drugs: \"Crack\" cocaine.    Family History  Family History   Problem Relation Name Age of Onset    " Hypertension Mother      Diabetes Mother      COPD Mother      Heart disease Mother      Lymphoma Mother      Multiple myeloma Mother      Cancer Other fam hx     Diabetes Other fam hx     Hypertension Other fam hx     Heart disease Other fam hx         Allergies  No Known Allergies  Review of Systems   All 13 systems were reviewed and are within normal levels except as noted below or per HPI. Positive and pertinent negative responses are noted below or in the HPI   Denied any fever or chills. No weight loss and no night sweats. No cough or sputum production. No diarrhea   No constipation  No bladder and bowel incontinence and no other changes in bladder and bowel. No skin changes.   Denied opioids diversion and abuse and denies alcoholism. Denies overuse of  pain medications.    Physical Exam       Past medical history no interval changes has been noted    On physical examination    General   Alert, oriented x3 pleasant and cooperative. Does not look in any major distress.    HEENT  Pupils normal in size. Ears, nose, mouth, and throat appear to be in normal condition.  Head atraumatic      No signs of sedation or signs of withdrawal apparent.    Psychiatric   No signs of depression apparent.    Neuro  Weakness noted on the flexion of the left knee patient ambulating also with a limp favoring the left side      Respiratory  No respiratory distress     Abdomen  no distention     Skin  No skin markings supportive of recent IV drug usage .    Cardiovascular  Regular rate and rhythm     Last Recorded Vitals  not currently breastfeeding.    Assessment/Plan   59 years old with history and physical examination supportive of chronic neck pain and back pain with cervical and lumbar stenosis tried and failed conservative management was seen and evaluated by spine surgeon and offering her any surgical intervention    Plan  Advised the patient that I will be increasing the dose of the gabapentin to 800 mg 4 times per day I  would be refilling her Cymbalta at 60 mg twice per day if the patient did not have any significant improvement with the adjustment of the dose of the gabapentin I will be switching her to Lyrica I will be checking today on her vitamin D level and if she is deficient I will be replacing it with a supplement patient was advised to follow-up with the pain clinic within 2 months to reassess her improvement      The above clinical summary has been dictated with voice recognition software. It has not been proofread for grammatical errors, typographical mistakes, or other semantic inconsistencies.    Thank you for visiting our office today. It was our pleasure to take part in your healthcare.     Please do not hesitate to contact the pain clinic after your visit with any questions or concerns at  M-F 8-4 pm       Chrissy Merida M.D.  Medical Director , Division of Pain Medicine [facility]   of Anesthesiology and Pain Medicine  [facility] [place] [address]     Office: [phone]  Fax: [phone]      Chrissy Merida MD

## 2025-01-27 NOTE — PROGRESS NOTES
Patient saw back doctor who said she doesn't need surgery at this time. She is here to discuss options and medications.

## 2025-01-28 ENCOUNTER — HOSPITAL ENCOUNTER (OUTPATIENT)
Dept: RADIOLOGY | Facility: HOSPITAL | Age: 60
Discharge: HOME | End: 2025-01-28
Payer: COMMERCIAL

## 2025-01-28 ENCOUNTER — APPOINTMENT (OUTPATIENT)
Dept: RADIOLOGY | Facility: CLINIC | Age: 60
End: 2025-01-28
Payer: COMMERCIAL

## 2025-01-28 DIAGNOSIS — T84.038A LOOSENING OF KNEE JOINT PROSTHESIS, INITIAL ENCOUNTER (CMS-HCC): ICD-10-CM

## 2025-01-28 DIAGNOSIS — Z96.659 LOOSENING OF KNEE JOINT PROSTHESIS, INITIAL ENCOUNTER (CMS-HCC): ICD-10-CM

## 2025-01-28 DIAGNOSIS — M25.562 LEFT KNEE PAIN, UNSPECIFIED CHRONICITY: ICD-10-CM

## 2025-01-28 PROCEDURE — A9503 TC99M MEDRONATE: HCPCS | Performed by: ORTHOPAEDIC SURGERY

## 2025-01-28 PROCEDURE — 78315 BONE IMAGING 3 PHASE: CPT | Performed by: STUDENT IN AN ORGANIZED HEALTH CARE EDUCATION/TRAINING PROGRAM

## 2025-01-28 PROCEDURE — 78315 BONE IMAGING 3 PHASE: CPT

## 2025-01-28 PROCEDURE — 3430000001 HC RX 343 DIAGNOSTIC RADIOPHARMACEUTICALS: Performed by: ORTHOPAEDIC SURGERY

## 2025-01-28 RX ADMIN — TECHNETIUM TC 99M MEDRONATE 24.9 MILLICURIE: 25 INJECTION, POWDER, FOR SOLUTION INTRAVENOUS at 11:53

## 2025-01-29 LAB
25(OH)D3+25(OH)D2 SERPL-MCNC: 46 NG/ML (ref 30–100)
AFP-TM SERPL-MCNC: 3 NG/ML
ALBUMIN SERPL-MCNC: 4.3 G/DL (ref 3.6–5.1)
ALBUMIN/GLOB SERPL: 1.3 (CALC) (ref 1–2.5)
ALP SERPL-CCNC: 79 U/L (ref 37–153)
ALT SERPL-CCNC: 18 U/L (ref 6–29)
ANION GAP SERPL CALCULATED.4IONS-SCNC: 11 MMOL/L (CALC) (ref 7–17)
AST SERPL-CCNC: 19 U/L (ref 10–35)
BASOPHILS # BLD AUTO: 21 CELLS/UL (ref 0–200)
BASOPHILS NFR BLD AUTO: 0.4 %
BILIRUB DIRECT SERPL-MCNC: 0.1 MG/DL
BILIRUB INDIRECT SERPL-MCNC: 0.6 MG/DL (CALC) (ref 0.2–1.2)
BILIRUB SERPL-MCNC: 0.7 MG/DL (ref 0.2–1.2)
BUN SERPL-MCNC: 13 MG/DL (ref 7–25)
BUN/CREAT SERPL: NORMAL (CALC) (ref 6–22)
CALCIUM SERPL-MCNC: 9.7 MG/DL (ref 8.6–10.4)
CHLORIDE SERPL-SCNC: 102 MMOL/L (ref 98–110)
CO2 SERPL-SCNC: 25 MMOL/L (ref 20–32)
CREAT SERPL-MCNC: 0.67 MG/DL (ref 0.5–1.03)
EGFRCR SERPLBLD CKD-EPI 2021: 102 ML/MIN/1.73M2
EOSINOPHIL # BLD AUTO: 239 CELLS/UL (ref 15–500)
EOSINOPHIL NFR BLD AUTO: 4.6 %
ERYTHROCYTE [DISTWIDTH] IN BLOOD BY AUTOMATED COUNT: 14.1 % (ref 11–15)
GLOBULIN SER CALC-MCNC: 3.2 G/DL (CALC) (ref 1.9–3.7)
GLUCOSE SERPL-MCNC: 94 MG/DL (ref 65–99)
HCT VFR BLD AUTO: 40.7 % (ref 35–45)
HGB BLD-MCNC: 13.7 G/DL (ref 11.7–15.5)
INR PPP: 1.1
LYMPHOCYTES # BLD AUTO: 1149 CELLS/UL (ref 850–3900)
LYMPHOCYTES NFR BLD AUTO: 22.1 %
MCH RBC QN AUTO: 30.2 PG (ref 27–33)
MCHC RBC AUTO-ENTMCNC: 33.7 G/DL (ref 32–36)
MCV RBC AUTO: 89.8 FL (ref 80–100)
MONOCYTES # BLD AUTO: 536 CELLS/UL (ref 200–950)
MONOCYTES NFR BLD AUTO: 10.3 %
NEUTROPHILS # BLD AUTO: 3255 CELLS/UL (ref 1500–7800)
NEUTROPHILS NFR BLD AUTO: 62.6 %
PLATELET # BLD AUTO: 86 THOUSAND/UL (ref 140–400)
PMV BLD REES-ECKER: 11.8 FL (ref 7.5–12.5)
POTASSIUM SERPL-SCNC: 4.1 MMOL/L (ref 3.5–5.3)
PROT SERPL-MCNC: 7.5 G/DL (ref 6.1–8.1)
PROTHROMBIN TIME: 11.4 SEC (ref 9–11.5)
RBC # BLD AUTO: 4.53 MILLION/UL (ref 3.8–5.1)
SERVICE CMNT-IMP: ABNORMAL
SODIUM SERPL-SCNC: 138 MMOL/L (ref 135–146)
WBC # BLD AUTO: 5.2 THOUSAND/UL (ref 3.8–10.8)

## 2025-02-03 DIAGNOSIS — K74.60 CIRRHOSIS OF LIVER WITHOUT ASCITES, UNSPECIFIED HEPATIC CIRRHOSIS TYPE (MULTI): ICD-10-CM

## 2025-02-04 ENCOUNTER — HOSPITAL ENCOUNTER (OUTPATIENT)
Dept: RADIOLOGY | Facility: CLINIC | Age: 60
End: 2025-02-04
Payer: COMMERCIAL

## 2025-02-04 ENCOUNTER — APPOINTMENT (OUTPATIENT)
Dept: PRIMARY CARE | Facility: CLINIC | Age: 60
End: 2025-02-04
Payer: COMMERCIAL

## 2025-02-04 NOTE — PROGRESS NOTES
Shelley Call MD   Adult Reconstruction and Joint Replacement Surgery  Phone: [phone]     Fax: [phone]       Name: Alejandra Houston  Age: 59 y.o.   : 1965   Date of Visit: 2/10/2025    INITIAL CONSULTATION    CC: Left knee pain    Clinical History:  This patient presents with {Numbers; 1-10:80902} {weeks, months, years:88330} of {LEFT RIGHT BILATERAL:48871} knee pain.     Patient has tried the following {treatments tried:84766}. Patient {DOES/DOES NOT:74822} have pain at night. Patient {DOES/DOES NOT:73323} report falls related to this problem. Patient is able to walk {Blocks: 57723}. Patient is currently using {assistive device:21656} as assistive device. Primarily complains of {pain location:22029} pain. Patient has difficulty with {activity limitations:62160}. The pain is significantly impacting their ability to perform activities of daily living. Patient reports no longer able to do activities such as ***.     ***The patient reports no fever, chills or night sweats. ***No wound drainage. ***No interval trauma. ***No recent hospitalizations or infections. ***No recent dental work.     Implants in place: ***  Date of initial surgery: ***  Date of most recent surgery: ***    Focused History  PMH: {FOCUSEDHX:05295}  PSH: {FOCUSEDSHX:89967}  SHx: {SOCHX:10393}  Jehovah´s Witness: no***  Meds: {MEDSHX:44885}  Allergies: {MEDSHX:31513}  Dental Hx: {DENTAL:12564}  FH: ***No family history of any bleeding or clotting disorders.    PROMs/HISTORY  ***    Review of Systems: Review of systems completed with medical assistant intake. Please refer to this note.     Physical Exam:  BMI: ***.    General: The patient is well appearing and has an appropriate affect.     Neurological Examination: ***SILT in SPN/DPN/Sural/Saphenous/Tibial nerves. ***5/5 EHL, FHL, Tibial anterior, Gastrocnemius. ***Coordination grossly intact.     Cardiovascular Exam: ***Capillary refill <2 seconds. ***2+ DP. ***No edema.  "***No varicose veins.     Lymphatic Examination: There is no obvious lymphatic swelling*** present around the involved joint.    Skin Exam: Skin around the pertinent joint is without evidence of infection or ***rash.    Gait: The patient ambulates with an antalgic*** gait.     Lumbar spine:    No tenderness to palpation midline.    ***Negative straight leg raise bilaterally.    Right Hip Examination:  ***The skin is intact over the hip.     There is no tenderness over the greater trochanter.    Range of motion is full extension to 100 degrees of flexion.    The hip is stable without subluxation or dislocation.    The hip internally rotates to 15 degrees and externally rotates to 45 degrees.    There is no pain with hip motion.    Left Hip Examination:  ***The skin is intact over the hip.    There is no tenderness over the greater trochanter.    Range of motion is full extension to 100 degrees of flexion.    The hip is stable without subluxation or dislocation.    The hip internally rotates to 15 degrees and externally rotates to 45 degrees.    There is no pain with hip motion.    Left Knee Examination:  ***Examination of the left knee reveals the skin to be intact. Prior incision: ***, well healed.    There is {:96547::\"no\",\"a small\",\"a large\",\"a moderate\"} effusion in the knee.    The alignment of the knee is {:91426::\"Valgus\",\"neutral\",\"Varus\"}.    This deformity {:28441::\"is not\",\"is\"} correctable.    There is tenderness to palpation over the joint line.    There is significant quadriceps atrophy.    Range of Motion: {rom3:25327:: \"20\",\"15\",\"10\",\"5\",\"full extension\"} to {rom4:61125:: \"less than 90\",\"90\",\"100\",\"110\",\"120\"} degrees of flexion.    The knee is stable to varus-valgus stress and anterior-posterior stress.     There is {no/mild/moderate/severe:19664} grinding with range of motion.    There is {no/mild/moderate/severe:90517} patellofemoral crepitus.    Right Knee Examination:  ***Examination of the right " knee reveals the skin to be intact.     There is no obvious swelling.    There is a no effusion in the knee.     The alignment of the knee is normal.    There is no tenderness to palpation over the joint line.    There is no significant quadriceps atrophy.    Range of motion is full extension to 120 degrees of flexion.    The knee is stable to varus-valgus stress and anterior-posterior stress.     There is {no/mild/moderate/severe:19664} grinding with range of motion.    There is {no/mild/moderate/severe:19664} patellofemoral crepitus.    Prior Labs:   ***    Radiographs:  Radiographs were personally reviewed today with the patient. There is *** effusion in the knee. There is a *** total knee arthroplasty in place. Femoral component is ***well-aligned and ***without obvious evidence of patricia-implant lucency, osteolysis, fracture or gross failure. ***Femoral component has supplementary fixation, which includes a *** stem in *** alignment and without obvious evidence of patricia-implant lucency, osteolysis, fracture or gross failure.     Tibial component is in *** alignment and ***without obvious evidence of patricia-implant lucency, osteolysis, fracture or gross failure. ***Tibial component has supplementary fixation, which includes a *** stem in *** alignment and without obvious evidence of patricia-implant lucency, osteolysis, fracture or gross failure.     Tibial bearing looks to be *** in design. Patellar height is ***. Patellar component is ***well-aligned and ***without obvious evidence of patricia-implant lucency, osteolysis, fracture or gross failure.     ***No higher-level imaging available for review.    Impression:  This patient presents with {AMB MILD/MODERATE/SEVERE:96380} {LEFT RIGHT BILATERAL:93603} knee osteoarthritis {with or without:42830} bone on bone apposition. ***Patient has tried and failed appropriate conservative measures and now has limitation in ADL's. ***The patient is not a candidate for surgery at this  time.    Diagnosis:  ***    Recommendations / Plan:    Clinical findings and imaging results were reviewed with the patient today. ***    I have discussed the options in detail with the patient. We have discussed anti-inflammatory medication, activity modification, physical therapy, and revision knee replacement surgery.    The risks and benefits of all these treatment options have been discussed in detail. The patient has tried at least 3 months of the above conservative treatments and continues to have disabling pain, impaired activities of daily living and worsened quality of life. ***The patient is a candidate for a revision {LEFT RIGHT BILATERAL:48857} knee replacement.     We discussed the hospital stay, postoperative rehab and follow up required as well as the potential risks of surgery including bleeding, infection, need for reoperation, failure of the operation to provide symptomatic relief, need for multiple revision surgeries in the setting of bleeding, wear, infection, instability, stiffness (meaning loss of flexion and/or loss of extension) requiring closed and/or open manipulation and/or revision, damage to major neurovascular structures, venous thrombolic disease, complications of regional and/or general anesthesia, as well as death. The patient also understands that a good result is not guaranteed and that poor outcomes can at times be unavoidable. The patient may also have persistent and chronic pain that could require further intervention.  The patient confirms their understanding of the risks as well as the benefits of surgery. I have explained that although knee replacement generally provides excellent pain relief and improvement in function, some patients continue to have a feeling of stiffness in the knee that can be permanent.    ***For valgus knees, specifically discussed the possibility of peroneal nerve palsy resulting from correction of valgus deformity in the setting of total knee  replacement, which can manifest as numbness over the dorsum of the foot or weakness in dorsiflexion or even foot drop. These changes can sometimes be permanent.      We have reviewed the typical recovery after surgery and have discussed the fact that full recovery can take up to 1 year or even longer.     The patient does not have any of the following contraindications to arthroplasty including active infection of the knee joint, systemic bacteremia, active skin infection or an open wound at the surgical site, neuropathic arthritis or severe, rapidly progressive neurological disease.     Patient will consider proceeding with revision {LEFT RIGHT BILATERAL:51452} knee replacement. All questions were answered today. If the patient chooses to move forward with surgical scheduling, they will be enrolled in a preoperative arthroplasty teaching class and the pre-admission testing pathway. The patient was encouraged to contact their primary care physician*** to discuss fitness for surgery.     Social support after surgery: {Social:39315}  Pain medication plan: {Pain Plan:48031}  Additional preoperative considerations: {Preop:83329}    Diagnosis: {Surg DX:18393}   Procedure: {Surg Procedure:24302}  Surgery Location: {Surg Location:39705}   Equipment Requests: {Revision implants:62106}  Anesthesia: {Anes:21861}  Discharge: {DISCHARGE:65781}    Large Joint Aspiration: {LEFT RIGHT BILATERAL:61731} knee  Consent given by: Patient  Timeout: Immediately prior to procedure the correct patient, procedure, and site was verified.   Indications: Knee pain and joint swelling.   Location: {LEFT RIGHT BILATERAL:13425} knee  Needle size: 18 G  Approach: Lateral    Aspirate amount: ***  Aspirate Appearance: ***   ***Analysis: Fluid sent for laboratory analysis, including cell count, differential, culture, crystal, AFB  Patient tolerance: Dressing applied. Patient tolerated the procedure well with no immediate complications.    ***Further  workup of painful total joint was reviewed.  ***The patient was instructed to undergo a lab draw for evaluation of inflammatory markers including ESR and CRP. ***Aspiration of the joint was performed today with procedure note below for workup of infection. ***Plain films of *** were ordered to assess for ***.  ***Long-leg alignment plain films were ordered to evaluate for ***. ***CT knee was ordered to evaluate for ***.  ***CT Weeks protocol was ordered to evaluate femoral component rotation. ***MR knee was ordered to evaluate for ***. ***Nuclear med bone scan was ordered to evaluate component loosening.    ***Reviewed steps for optimization of prosthetic joint function. Currently their BMI is ***.  Each pound of weight loss offloads their hip and knee joints by 3-6 pounds.  The most effective of these options is weight loss mainly through restricting caloric intake. ***They would need to lose significant weight before being scheduled for surgery. A referral to a nutritionist was offered***. A referral to bariatric surgery was offered***.     ***A physical therapy prescription was ordered for the patient with attention to ***.  ***Patient will continue their home exercise program. ***Strategies for pain management using over-the-counter anti-inflammatory medications reviewed.  ***The patient was prescribed ***for pain management. ***The patient elects for a steroid injection, which was provided according to procedure note below. ***The patient was fit for a brace today. Encouraged them to maintain range of motion and strength around the knee joints.  They will continue to implement these strategies in addressing their pain.      ***Patient was advised to seek further refills for prescription anti-inflammatory medications (e.g. Celebrex, Meloxicam) from their primary care physician as careful monitoring of kidney function, heart function, blood pressure, and other health considerations is important while on these  medications.     ***Recommend the patient continue optimizing nonsurgical treatment interventions as outlined above for management of their joint.  ***I would be happy to see them again at any point to review progress with treatment as above. The patient verbalizes understanding with the recommendations and treatment plan as outlined above and is in agreement.  Questions were addressed.      _____________  Shelley Call MD   Attending Orthopaedic Surgeon  [facility]    [facility]       This note was transcribed with dictation software.  Please excuse any typographical errors, program misunderstandings leading to inadvertent insertions or deletions of inappropriate wording, pronoun errors and other unintentional transcription errors not noticed on proof-reading.

## 2025-02-05 DIAGNOSIS — F33.2 MDD (MAJOR DEPRESSIVE DISORDER), RECURRENT SEVERE, WITHOUT PSYCHOSIS (MULTI): ICD-10-CM

## 2025-02-05 RX ORDER — QUETIAPINE FUMARATE 50 MG/1
150 TABLET, FILM COATED ORAL NIGHTLY
Qty: 90 TABLET | Refills: 0 | Status: SHIPPED | OUTPATIENT
Start: 2025-02-05 | End: 2025-03-07

## 2025-02-05 NOTE — TELEPHONE ENCOUNTER
This needs to come from her psychiatrist.  Also she has cancelled all her hospital follow-ups with me- she needs to see me

## 2025-02-10 ENCOUNTER — APPOINTMENT (OUTPATIENT)
Dept: ORTHOPEDIC SURGERY | Facility: HOSPITAL | Age: 60
End: 2025-02-10
Payer: COMMERCIAL

## 2025-02-10 DIAGNOSIS — Z96.652 PAIN DUE TO TOTAL LEFT KNEE REPLACEMENT, INITIAL ENCOUNTER (CMS-HCC): Primary | ICD-10-CM

## 2025-02-10 DIAGNOSIS — T84.84XA PAIN DUE TO TOTAL LEFT KNEE REPLACEMENT, INITIAL ENCOUNTER (CMS-HCC): Primary | ICD-10-CM

## 2025-02-11 ENCOUNTER — PHARMACY VISIT (OUTPATIENT)
Dept: PHARMACY | Facility: CLINIC | Age: 60
End: 2025-02-11
Payer: MEDICAID

## 2025-02-11 ENCOUNTER — CLINICAL SUPPORT (OUTPATIENT)
Dept: PRIMARY CARE | Facility: CLINIC | Age: 60
End: 2025-02-11
Payer: COMMERCIAL

## 2025-02-11 ENCOUNTER — APPOINTMENT (OUTPATIENT)
Dept: PRIMARY CARE | Facility: CLINIC | Age: 60
End: 2025-02-11
Payer: COMMERCIAL

## 2025-02-11 ENCOUNTER — HOSPITAL ENCOUNTER (OUTPATIENT)
Dept: RADIOLOGY | Facility: CLINIC | Age: 60
Discharge: HOME | End: 2025-02-11
Payer: COMMERCIAL

## 2025-02-11 VITALS
RESPIRATION RATE: 16 BRPM | OXYGEN SATURATION: 97 % | DIASTOLIC BLOOD PRESSURE: 70 MMHG | BODY MASS INDEX: 40.45 KG/M2 | TEMPERATURE: 97.5 F | HEART RATE: 75 BPM | SYSTOLIC BLOOD PRESSURE: 128 MMHG | WEIGHT: 266 LBS

## 2025-02-11 DIAGNOSIS — K74.60 CIRRHOSIS OF LIVER WITHOUT ASCITES, UNSPECIFIED HEPATIC CIRRHOSIS TYPE (MULTI): ICD-10-CM

## 2025-02-11 DIAGNOSIS — K59.00 CONSTIPATION, UNSPECIFIED CONSTIPATION TYPE: Primary | ICD-10-CM

## 2025-02-11 DIAGNOSIS — Z00.00 HEALTHCARE MAINTENANCE: ICD-10-CM

## 2025-02-11 DIAGNOSIS — R10.13 DYSPEPSIA: ICD-10-CM

## 2025-02-11 DIAGNOSIS — J02.9 SORE THROAT: ICD-10-CM

## 2025-02-11 DIAGNOSIS — K76.6 PORTAL HYPERTENSION WITH ESOPHAGEAL VARICES (MULTI): ICD-10-CM

## 2025-02-11 DIAGNOSIS — E11.9 TYPE 2 DIABETES MELLITUS WITHOUT COMPLICATION, WITHOUT LONG-TERM CURRENT USE OF INSULIN (MULTI): ICD-10-CM

## 2025-02-11 DIAGNOSIS — E11.9 TYPE 2 DIABETES MELLITUS WITHOUT COMPLICATION, WITHOUT LONG-TERM CURRENT USE OF INSULIN (MULTI): Primary | ICD-10-CM

## 2025-02-11 DIAGNOSIS — I85.00 PORTAL HYPERTENSION WITH ESOPHAGEAL VARICES (MULTI): ICD-10-CM

## 2025-02-11 DIAGNOSIS — H60.513 ACUTE ACTINIC OTITIS EXTERNA OF BOTH EARS: ICD-10-CM

## 2025-02-11 DIAGNOSIS — J34.0 NASAL SEPTUM ULCERATION: ICD-10-CM

## 2025-02-11 PROCEDURE — 3078F DIAST BP <80 MM HG: CPT | Performed by: INTERNAL MEDICINE

## 2025-02-11 PROCEDURE — RXMED WILLOW AMBULATORY MEDICATION CHARGE

## 2025-02-11 PROCEDURE — 76705 ECHO EXAM OF ABDOMEN: CPT

## 2025-02-11 PROCEDURE — 3074F SYST BP LT 130 MM HG: CPT | Performed by: INTERNAL MEDICINE

## 2025-02-11 PROCEDURE — 76705 ECHO EXAM OF ABDOMEN: CPT | Performed by: RADIOLOGY

## 2025-02-11 PROCEDURE — 99214 OFFICE O/P EST MOD 30 MIN: CPT | Performed by: INTERNAL MEDICINE

## 2025-02-11 RX ORDER — ISOPROPYL ALCOHOL 70 ML/100ML
SWAB TOPICAL
Qty: 100 EACH | Refills: 3 | Status: SHIPPED | OUTPATIENT
Start: 2025-02-11

## 2025-02-11 RX ORDER — ALBUTEROL SULFATE 90 UG/1
2 INHALANT RESPIRATORY (INHALATION) 4 TIMES DAILY PRN
Qty: 8.5 G | Refills: 1 | Status: SHIPPED | OUTPATIENT
Start: 2025-02-11 | End: 2025-03-13

## 2025-02-11 RX ORDER — PREDNISONE 5 MG/1
TABLET ORAL
Qty: 30 TABLET | Refills: 0 | Status: SHIPPED | OUTPATIENT
Start: 2025-02-11 | End: 2025-02-11 | Stop reason: ALTCHOICE

## 2025-02-11 RX ORDER — AMOXICILLIN AND CLAVULANATE POTASSIUM 875; 125 MG/1; MG/1
875 TABLET, FILM COATED ORAL 2 TIMES DAILY
Qty: 20 TABLET | Refills: 0 | Status: SHIPPED | OUTPATIENT
Start: 2025-02-11 | End: 2025-02-21

## 2025-02-11 RX ORDER — NYSTATIN 100000 [USP'U]/ML
500000 SUSPENSION ORAL 4 TIMES DAILY
Qty: 280 ML | Refills: 0 | Status: SHIPPED | OUTPATIENT
Start: 2025-02-11 | End: 2025-02-25

## 2025-02-11 RX ORDER — PANTOPRAZOLE SODIUM 40 MG/1
40 TABLET, DELAYED RELEASE ORAL
Qty: 30 TABLET | Refills: 0 | Status: SHIPPED | OUTPATIENT
Start: 2025-02-11 | End: 2025-03-13

## 2025-02-11 RX ORDER — POLYETHYLENE GLYCOL 3350 17 G/17G
17 POWDER, FOR SOLUTION ORAL DAILY
Qty: 100 EACH | Refills: 1 | Status: SHIPPED | OUTPATIENT
Start: 2025-02-11 | End: 2025-10-09

## 2025-02-11 RX ORDER — PREDNISONE 5 MG/1
TABLET ORAL
Qty: 30 TABLET | Refills: 0 | Status: SHIPPED | OUTPATIENT
Start: 2025-02-11

## 2025-02-11 RX ORDER — OFLOXACIN 3 MG/ML
5 SOLUTION AURICULAR (OTIC) 2 TIMES DAILY
Qty: 10 ML | Refills: 0 | Status: SHIPPED | OUTPATIENT
Start: 2025-02-11 | End: 2025-02-21

## 2025-02-11 ASSESSMENT — ENCOUNTER SYMPTOMS
TROUBLE SWALLOWING: 1
CONSTIPATION: 1
SORE THROAT: 1
SLEEP DISTURBANCE: 0

## 2025-02-11 NOTE — PATIENT INSTRUCTIONS
Bring your dexcom g7 , sensors and glucometer to your next visit    Alexandro Goncalves, PharmD  [phone]

## 2025-02-11 NOTE — PROGRESS NOTES
"Patient is sent at the request of Jhoan Eller DO for my opinion regarding Type 2 diabetes.  My final recommendations will be communicated back to the requesting provider by way of shared medical record.    Subjective     Alejandra Houston is a 59 y.o. year old female patient with type two diabetes mellitus, class III obesity (BMI 40.45 kg/m2), hypertension, obstructive sleep apnea, hyperlipidemia referred to pharmacy clinic. Patient presents today for a walk-in appointment regarding use of Dexcom G7 sensors and reader. She is currently prescribed Trulicity 3 mg once weekly, Metformin  mg (2 tablets) daily for a total of 1000 mg/day in the setting of TDM2. Patient is accompanied by her spouse.      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    Past Surgical History:  She has a past surgical history that includes Total knee arthroplasty (2018); Other surgical history (06/10/2019);  section, classic (02/15/2018); Hip Arthroplasty; Uvulopalatopharyngoplasty;  section, classic; and Knee Arthroplasty.    Social History:  She reports that she has been smoking cigarettes. She started smoking about 45 years ago. She has a 22.6 pack-year smoking history. She has never used smokeless tobacco. She reports that she does not currently use alcohol. She reports that she does not currently use drugs after having used the following drugs: \"Crack\" cocaine.    Family History:  Family History   Problem Relation Name Age of Onset    Hypertension Mother      Diabetes Mother      COPD Mother      Heart disease Mother      Lymphoma " Mother      Multiple myeloma Mother      Cancer Other fam hx     Diabetes Other fam hx     Hypertension Other fam hx     Heart disease Other fam hx      Allergies:  Patient has no known allergies.    DIABETES MELLITUS TYPE 2:    Does patient follow with Endocrinology: No  Does pt have proteinuria? No  Diagnosed with diabetes: ~15-20 years ago. Known diabetic complications: obesity.  Does patient follow with Endocrinology: No  Last optometry exam: Due for updated appointment - Advised to schedule  Most recent visit in Podiatry: does not follow with podiatry -- patient denies sores or cuts on feet today   Does pt have proteinuria? No    Current diabetic medications include:  Trulicity 3 mg: Inject 3 mg under the skin once weekly on Sundays (Doses remainin; Last injection on 2025)  Metformin  mg: Take 2 tablets by mouth once daily in the morning      Past diabetic medications include:  None       Adverse Effects:   No reported worsening adverse effects with dose titration to Trulicity 3 mg  Denies vomiting, diarrhea, nausea, upset stomach.  Does not report daily use of OTC products for chronic constipation. Discussed OTC options including Miralax, Docusate, Senna.      Glucose Readings:  Glucometer/CGM Type: True Metrix Glucometer per dispense report --> Accu-chek guide meter reported by patient.     Glucose reading not available for interpretation - Glucometer not present during encounter.    Any episodes of hypoglycemia? No, denies signs/symptoms of low blood sugar .     Primary Prevention:  Statin? No  ACE-I/ARB? No  Aspirin? No     Pertinent PMH Review:  PMH of Pancreatitis: No  PMH of Retinopathy: No  PMH of Urinary Tract Infections: Yes  PMH of MTC/MEN type II: No       Medication System Management  Adherence/Organization:   Pillbox: No  Pillbox Prepared by: N/A  Medications are kept on top of tote - Then moved after taking to a location to know that medications were administered.   She has utilized  adherence packaging in the past, but noted issues with knowing which medication was which.   Reported frequency of missed doses:   Adherent to weekly Trulicity injections - One dose missed   Endorses periods of missing oral therapy during bouts of depression.   Affordability/Accessibility:   No reported cost issues     Objective     Last Recorded Vitals:  BP Readings from Last 6 Encounters:   02/11/25 128/70   01/14/25 170/86   12/06/24 136/78   11/21/24 124/67   10/25/24 124/76   10/24/24 149/72      Wt Readings from Last 6 Encounters:   02/11/25 121 kg (266 lb)   12/06/24 118 kg (261 lb)   10/09/24 104 kg (228 lb 15.9 oz)   10/08/24 104 kg (229 lb)   09/30/24 104 kg (229 lb)   09/18/24 104 kg (229 lb)     BMI Readings from Last 6 Encounters:   02/11/25 40.45 kg/m²   12/06/24 39.68 kg/m²   10/09/24 34.83 kg/m²   10/08/24 34.82 kg/m²   09/30/24 34.82 kg/m²   09/18/24 34.82 kg/m²     Lab Review  Lab Results   Component Value Date    BILITOT 0.7 01/28/2025    CALCIUM 9.7 01/28/2025    CO2 25 01/28/2025     01/28/2025    CREATININE 0.67 01/28/2025    GLUCOSE 94 01/28/2025    ALKPHOS 79 01/28/2025    K 4.1 01/28/2025    PROT 7.5 01/28/2025     01/28/2025    AST 19 01/28/2025    ALT 18 01/28/2025    BUN 13 01/28/2025    ANIONGAP 11 01/28/2025    PHOS 3.5 11/01/2021    ALBUMIN 4.3 01/28/2025    GFRF 85 05/03/2023     Lab Results   Component Value Date    TRIG 129 05/03/2023    CHOL 149 05/03/2023    HDL 47.2 05/03/2023     Lab Results   Component Value Date    HGBA1C 6.0 (H) 11/14/2024    HGBA1C 5.9 (H) 10/07/2024    HGBA1C 6.0 (H) 09/30/2024     Component      Latest Ref Rng 3/25/2022   ALBUMIN (MG/L) IN URINE      Not Established mg/L 8.9    Albumin/Creatinine Ratio      0.0 - 30.0 ug/mg crt 3.8    Creatinine, Urine Random      20.0 - 320.0 mg/dL 234.0      The 10-year ASCVD risk score (Concetta MCLAIN, et al., 2019) is: 9.1%    Values used to calculate the score:      Age: 59 years      Sex: Female      Is  Non- : No      Diabetic: Yes      Tobacco smoker: Yes      Systolic Blood Pressure: 128 mmHg      Is BP treated: No      HDL Cholesterol: 47.2 mg/dL      Total Cholesterol: 149 mg/dL    Assessment/Plan   Problem List Items Addressed This Visit       Type 2 diabetes mellitus     Patient's goal A1c is < 7%.  Is pt at goal? Yes, based on A1c of 6.0% on 11/14/24. Home glucose readings were not available during the encounter. Patient did not have her glucometer present during the visit. A walk in visit was completed for Dexcom G7 setup and connection to  device. Diabetes pharmacotherapy reviewed during the encounter. Following the visit a medication reconciliation was completed with the patient's preferred pharmacy to review her medication dispense report. Advised patient to bring her glucometer, testing supplies, Dexcom G7 sensor and  and medications to her follow-up pharmacy visit. Therapy continued with Metformin and Trulicity as prescribed at this time. She does not evidence proteinuria based on 2022 UACR. She is not prescribed primary prevention with a moderate intensity statin or ACEi/ARB. BP <130/80 mmHg on 2/11/25.     Dexcom G7 Education:  During appointment today, I completed the patient training, application and startup of the Dexcom G7 sensor and .    Pt cleaned left arm, applied sensor  Set up Dexcom G7   Provided education on the following  Frequency of sensor changes  Frequency of transmitter changes  Availability of sensors covers through Dexcom at no cost to pt  Need to keep  of cell phone close to person so glucose levels are recorded  Discussed difference between serum glucose and blood glucose levels  Discussed what to do if pt feels like she is having hypoglycemic symptoms but CGM is not showing a hypoglycemic episode  Pt can enter data when looking at glucose reading such as food, exercise, insulin  Reviewed data that is available  through  and target goals  Avg glucoses  Time in range  Sensor capture rate  Answered all of patient questions.          Relevant Medications    alcohol swabs pads, medicated    Other Relevant Orders    Referral to Clinical Pharmacy     Compliance at present is estimated to be fair.   Follow-up: I recommend diabetes care be two weeks.  PCP Follow-Up: 5/21/2025    Continue all meds under the continuation of care with the referring provider and clinical pharmacy team.

## 2025-02-11 NOTE — Clinical Note
Pharmacy follow up visit scheduled for two weeks from today at [place]. Advised patient to bring her glucometer, dexcom g7 sensor and reader and medications to visit. I created a reminder for myself to inform her. Dispense report was provided by her pharmacy after the visit. If any notable differences are noted we can discuss.  Thank you.

## 2025-02-11 NOTE — PROGRESS NOTES
Patient here for a 6 month follow up - discuss ENT for nose and back of throat sore     Subjective   Patient ID: Alejandra Houston is a 59 y.o. female who presents for Follow-up.  She is accompanied by her  who offers some of the history.     The patient reports ongoing neck and back pain.  She is following with pain management.    The patient mentions intermittent constipation, and inquires about management options.  She has been taking Miralax with partial benefit.    The patient notes soreness of the nose, throat, and the back of the tongue.  She has been waking up with significantly dry nose and throat in the mornings since 12/2024.  This is affecting her ability to swallow and eat as well.  The patient's son has similar symptoms, and she suspects that he may have strep throat.  She is four months sober from crack cocaine (smoking) on 2/15/2025.  She notes a worsening sore inside her nose as well on the right.    The patient is currently an outpatient at a recovery facility, and is in the process of trying to find a Psychiatrist.  She would like to establish with Counseling soon, if possible.  The patient is currently maintained with Seroquel 150 mg once nightly and believes that this regimen is working very well- though she is only taking 100 mg currently.  There have been no other changes to her medication.  She states that sleep quality has significantly improved.  The patient has an improved relationship with her daughter, and is living back at home.      Review of Systems   HENT:  Positive for sore throat and trouble swallowing.         Positive for nasal soreness.   Gastrointestinal:  Positive for constipation.   Psychiatric/Behavioral:  Negative for sleep disturbance.        Objective   Physical Exam  Constitutional:       Appearance: Normal appearance.   Neck:      Vascular: No carotid bruit.   Cardiovascular:      Rate and Rhythm: Normal rate and regular rhythm.      Heart sounds: Normal heart  sounds.   Pulmonary:      Effort: Pulmonary effort is normal.      Breath sounds: Normal breath sounds.   Abdominal:      General: Bowel sounds are normal.      Palpations: Abdomen is soft.      Tenderness: There is no abdominal tenderness.   Skin:     General: Skin is warm and dry.   Neurological:      General: No focal deficit present.      Mental Status: She is alert and oriented to person, place, and time. Mental status is at baseline.   Psychiatric:         Mood and Affect: Mood normal.         Behavior: Behavior normal.       Assessment/Plan   Problem List Items Addressed This Visit             ICD-10-CM    Constipation - Primary K59.00    Relevant Medications    polyethylene glycol (Glycolax, Miralax) 17 gram packet     Other Visit Diagnoses         Codes    Healthcare maintenance     Z00.00    Relevant Medications    albuterol 90 mcg/actuation inhaler    Dyspepsia     R10.13    Relevant Medications    pantoprazole (ProtoNix) 40 mg EC tablet    Sore throat     J02.9    Relevant Medications    amoxicillin-pot clavulanate (Augmentin) 875-125 mg tablet    nystatin (Mycostatin) 100,000 unit/mL suspension    predniSONE (Deltasone) 5 mg tablet    Nasal septum ulceration     J34.0    Relevant Orders    Referral to ENT    Acute actinic otitis externa of both ears     H60.513    Relevant Medications    ofloxacin (Floxin) 0.3 % otic solution            IMPRESSIONS/PLAN:     Nasal Septum Ulceration  - Mild erosions present on nasal septum.  Ordered referral to Otolaryngology, and patient agreed to set this up as soon as possible.    Sore Throat  - Suspect candidiasis.  Prescribed nystatin 100,000 unit/mL suspension as 5mL four times daily.  Also prescribed amoxicillin pot-clavulanate 875-125mg BID to be taken with food to avoid adverse effects.  Prescribed prednisone 5mg taper to be taken as directed, and explained that this may help with neck pain as well.  Call the clinic if symptoms persist or worsen.      Constipation  - Prescribed Miralax 17g once daily.  Advised patient to try Colace as well if necessary.  Call the clinic if symptoms persist or worsen.     Dyspepsia  - Refilled pantoprazole 40mg once daily in the mornings.    Acute Bilateral Actinic Otitis Externa  - Prescribed ofloxacin 0.3% otic solution to be applied as 5 drops twice daily.  Call the clinic if symptoms persist or worsen.     Depression   - Significantly improved since leaving the hospital. Ordered [facility].  Continue on quetiapine 50mg once daily, bupropion 150mg once daily in the morning, and duloxetine 60mg BID.  Suspect that may improve with CPAP and with upcoming LES.  Previously on Rexulti and Topamax.  No SI or HI.  Call the clinic if symptoms persist or worsen.     /70 in the office today.     Diabetes II   - Last HbA1c 6.0 per 11/2024.  Patient having difficulty remembering to take night time dose of metformin.  Switched patient to metformin XR 500mg as two tablets in the morning once daily, and can increase Trulicity 1.50 mg/0.5mL 1 pen weekly. Previously on glimperide 2mg every day.     Chronic Sinusitis  - CT Sinus without Contrast wnl and unremarkable 1/2024.  Following with  from Otolaryngology.     Pinched Cervical Nerve Root/Left Shoulder Pain   - CT Cervical Spine 1/2024 showed mild to moderate spinal canal stenosis at C4-C5 and moderate bilateral foraminal stenosis.  Prescribed prednisone 10mg taper as directed on packaging. Encouraged patient to schedule appointment with  from Pain Medicine.  Call the clinic if symptoms persist or worsen.     Sleep Apnea   - Completed in-center Sleep Study confirming moderate CADEN.  Previously ordered referral to Sleep Medicine.  She will reach out to them about obtaining the CPAP supplies.     Smoking History   - CT Chest 4/2023 shows redemonstration of groundglass opacities in the posterior bilateral upper lobes, left lower  lobe and new groundglass  opacities in the anterior left upper lobe. Central airways are clear.  Stable noncalcified pulmonary nodule in the right upper lobe (6, 38) and right middle lobe (6, 57) since 5/19/2019.  Discussed with patient that varenicline was recalled.  Stable per 6/2024 repeat of CT Lung Screening.     Liver Cirrhosis  - CT Chest with IV Contrast 10/2023 showed incidental cirrhotic liver morphology with sequela of portal hypertension (splenomegaly and esophageal varices).  Previously ordered referral for Hepatology. Advised the patient to stop taking Tylenol and limit Percocet, and she verbalized understanding.   She has seen Dr. Millard in the past. Last EGD 3/2024.  Following with  from Gastroenterology.     Left Adnexal Mass  - CT Abdomen Pelvis 7/2023 showed incidental finding of A 3.6 cm benign-appearing left adnexal lesion is seen.  In an early post-menopausal woman (<5 years since menopause), follow-up ultrasound at 6-12 months is recommended. Patient has scheduled appointment with Gynecology.     Constipation   - Refilled sodium phosphates (Fleet Enema) 19-7g/118mL enema as 1 enema once daily.  Following with Gastroenterology.  Needs colonoscopy- previously ordered.     Vaginal dryness  - Estradiol cream.     Left Wrist Fracture  - Xray Left Wrist 5/2024 showed comminuted, mildly displaced, intra-articular fracture of the distal radius with associated ulnar styloid fracture.  There is also a questionable avulsion fracture of the triquetrum.  The distal radius fracture has a significant amount of dorsal comminution with a large intra-articular dorsal fragment that is mildly displaced.  Following with  from Orthopedic Surgery.      Bilateral Hand Pain  - Ongoing.  C7-T1 epidural steroid injection from 8/21/2024 resulted in minimal relief.  Continue with duloxetine 60mg once daily and gabapentin 300mg as two capsules in morning and afternoon, and three capsules in evening for  now.  Following with  from Orthopedic Surgery.  S/p left carpal tunnel release on 9/4/2024 with  from Orthopedic Surgery.  EMG from 9/13/2024 showed right CTS with superimposed C5 and C6 radiculopathies.  Call the clinic if symptoms persist or worsen, and will consider ordering MRI Cervical Spine as well as referral to Neurosurgery pending results.     Left Clavicle  - Xray Left Shoulder 9/2024 showed Mild left acromioclavicular osteoarthrosis. Glenohumeral joint is  maintained.    Low Back Pain/ leg weakness   - Worsening since 4/2023.  Weakness on LLE on exam.  MR Lumbar Spine from 1/2024 showed multilevel degenerative changes of the lumbar spine most prominent at L3-4 where there is moderate canal stenosis, slightly progressed.  Continue with gabapentin 600 mg TID.  Refilled Naproxen 500 mg BID prn.  Previously on tramadol 50 mg every 6 hours as needed.   Following with  from Pain Management.      Left Knee OA  - s/p left total knee arthroplasty 2/8/2024 with  from Orthopedic Surgery.      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 Underwent complex revision of right TKA. Following with  in Orthopedic Surgery.     Chronic Pain  - Ongoing.  Patient has tried multiple epidural corticosteroid injections with minimal to no relief.  MRI of Lumbar Spine 1/2024 showed multilevel degenerative changes of lumbar spine with moderate canal stenosis.  EMG 9/2024 of right upper extremity shows superimposed CTS with C5-C6 radiculopathy.  CT Cervical Spine 1/2024 showed  Mild to moderate spinal canal stenosis at C4-C5 and moderate bilateral foraminal stenosis.  Patient unable to tolerate chronic Tylenol due to history of liver cirrhosis.  She has been taking her gabapentin and Cymbalta diligently as well.  Previously followed with  from Pain Management.  Previously ordered referral to Pain Medicine with .    David  toenails   - Previously ordered referral to Podiatry with .     Dry Skin  - Cetaphil moisturizing lotion.      Thrombocytopenia  - Last CBC showed platelet count of 77 per 10/2023.  Following with Hematology/Oncology.  Suspect from underlying liver cirrhosis.       Fatigue  - Likely due to untreated sleep apnea.  Encouraged patient to follow-up with Sleep Medicine, and she has appointment for next week.  Vitamin D, Vitamin B12, and TSH wnl per 9/2023.     Health Maintenance   - Routine labs 6/2024.  Last Mammogram 5/2023, ordered repeat for 2024. Last colonoscopy 5/2019, repeat due [id number]/[id number].  Ordered repeat colonoscopy for 2024.     Follow-up in 3 months, call sooner if needed.        Scribe Attestation  By signing my name below, I, Aida Galdamez, Scribe   attest that this documentation has been prepared under the direction and in the presence of Jhoan Eller DO.   Aida Galdamez 02/11/25 11:30 AM

## 2025-02-12 NOTE — ASSESSMENT & PLAN NOTE
Patient's goal A1c is < 7%.  Is pt at goal? Yes, based on A1c of 6.0% on 11/14/24. Home glucose readings were not available during the encounter. Patient did not have her glucometer present during the visit. A walk in visit was completed for Dexcom G7 setup and connection to  device. Diabetes pharmacotherapy reviewed during the encounter. Following the visit a medication reconciliation was completed with the patient's preferred pharmacy to review her medication dispense report. Advised patient to bring her glucometer, testing supplies, Dexcom G7 sensor and  and medications to her follow-up pharmacy visit. Therapy continued with Metformin and Trulicity as prescribed at this time. She does not evidence proteinuria based on 2022 UACR. She is not prescribed primary prevention with a moderate intensity statin or ACEi/ARB. BP <130/80 mmHg on 2/11/25.     Dexcom G7 Education:  During appointment today, I completed the patient training, application and startup of the Dexcom G7 sensor and .    Pt cleaned left arm, applied sensor  Set up Dexcom G7   Provided education on the following  Frequency of sensor changes  Frequency of transmitter changes  Availability of sensors covers through Dexcom at no cost to pt  Need to keep  of cell phone close to person so glucose levels are recorded  Discussed difference between serum glucose and blood glucose levels  Discussed what to do if pt feels like she is having hypoglycemic symptoms but CGM is not showing a hypoglycemic episode  Pt can enter data when looking at glucose reading such as food, exercise, insulin  Reviewed data that is available through  and target goals  Avg glucoses  Time in range  Sensor capture rate  Answered all of patient questions.

## 2025-02-12 NOTE — PROGRESS NOTES
Facial Plastic & Reconstructive Surgery    Referring Provider: Jhoan Eller DO  CC: Jhoan Eller,     Chief complaint: Nasal obstruction/Septal perforation    YOHANNES Houston is a pleasant 59 y.o. 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  The patient reports obstructed breathing, which is constant, present year round, does not fluctuate. It affects the patients ability to sleep, exercise, and is troubling during the day.   Symptoms began over three months ago.  The patient has used nasal steroid sprays for greater than 8 weeks without any benefit. The patient has trailed nasal cones/breathe right strips.      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    Past Surgical History  She has a past surgical history that includes Total knee arthroplasty (2018); Other surgical history (06/10/2019);  section, classic (02/15/2018); Hip Arthroplasty; Uvulopalatopharyngoplasty;  section, classic; and Knee Arthroplasty.     Social History  She reports that she has been smoking cigarettes. She started smoking about 45 years ago. She has a 22.6 pack-year smoking history. She has never used smokeless tobacco. She reports that  "she does not currently use alcohol. She reports that she does not currently use drugs after having used the following drugs: \"Crack\" cocaine.    Review of Systems:    11 point ROS was performed. Pertinent positives are for above complaint.  All other systems were negative.     Family History  Family History   Problem Relation Name Age of Onset    Hypertension Mother      Diabetes Mother      COPD Mother      Heart disease Mother      Lymphoma Mother      Multiple myeloma Mother      Cancer Other fam hx     Diabetes Other fam hx     Hypertension Other fam hx     Heart disease Other fam hx         Allergies  Patient has no known allergies.    Physical exam    There were no vitals filed for this visit.    General: No acute distress or pain   Head: Normal cephalic, atraumatic  Eyes: EOMI; PERRL; Normal lids, sclera, conjunctiva, and cornea; No epiphora; No nystagmus   Ears: Normal pinnae without any external deformities  Nose and Sinuses:  See Endoscopy procedure note below for findings  Oral Cavity: Good dentition; Normal gums, tongue, floor of the mouth, retromolar trigone, buccal mucosa, hard palate are normal; Tonsillar fossae, palate, and uvula are normal without lesions or masses; Symmetric palatal elevation; FOM is soft; Tongue is midline upon protrusion without restriction, and without masses or lesions  Neck: Soft, supple; No lymphadenopathy; No thyromegaly or masses  Neuro: Cranial nerve 2-12 intact; Alert and oriented x3  Skin: Warm and well perfused; no lesions or rashes  Respiratory: Chest symmetric with bilateral expansion   Cardiac: No peripheral edema, no varicosities.     A comprehensive facial exam was performed with the following highlights:    Narrow airway bilaterally.  Deep inspiration /  Moderate inspiration produces dynamic collapse of the {Desc; internal/external:08882} valves.   Modified Ale Maneuver: Performed with a cotton tipped applicator, markedly improves breathing bilaterally. "     Nasal analysis:     Radiographic Studies: A CT scan of the sinuses performed. {REVIEWED RECORDS:91771}.     Intranasal endoscopic examination performed with limited view on anterior rhinoscopy.    Data reviewed:  {[facility] Reviewed Lab/Imaging/Path Statement:27123}    Procedures:    Procedure: Rigid diagnostic nasal endoscopy  Surgeon:  Karina Gamble MD  Findings:  A 0-degree rigid endoscope was passed through the patient's bilateral naris via a 3 pass maneuver.  The first pass was along the floor of the nose to the nasopharynx.  The second pass was to the area of the middle meatus.  The third pass was to the sphenoethmoidal recess.    Septum:  Deviation is towards the {LEFT RIGHT BOTH:91543} {caudal, posterior:64239} with nasal obstruction approximately {Numbers; %:59810} on the {LEFT RIGHT BOTH:75586} side/sides, no perforations, synechia.   Internal Nasal Valve: Angle is reduced, narrowing the nasal airway bilaterally.    Right nasal cavity:      Inferior turbinate:  2+    Inferior meatus:  Clear, no discharge, no polyps/masses/lesions    Middle meatus:  Clear, no discharge, no polyps/masses/lesions   Left nasal cavity:    Inferior turbinate:  2+    Inferior meatus:  Clear, no discharge, no polyps/masses/lesions    Middle meatus:  Clear, no discharge, no polyps/masses/lesions   Nasopharynx:  Clear, no discharge, no masses/lesions    Assessment/Plan:     Alejandra Houston is a pleasant 59 y.o. female presents with significant mechanical nasal obstruction. The cause is multifactorial, with an obvious septal deviation, turbinate hypertrophy, and static internal nasal valve collapse. There is dynamic nasal valve collapse as well. Correction of this nasal obstruction will require a septoplasty, repair of nasal valve collapse with structural grafting, bilateral turbinate reduction and out fracturing, possible harvest of septal/auricular/or rib cartilage or use of cadaveric rib cartilage. Additionally,  the patient's cosmetic concerns were addressed as well. We discussed the medical necessity of this at length, as well as the risks and limitations of the procedures. All questions were answered.     Karina Gamble MD      Division of Facial Plastic & Reconstructive Surgery  Department of Otolaryngology - Head and Neck Surgery        P: [phone]  F: [phone]

## 2025-02-13 ENCOUNTER — APPOINTMENT (OUTPATIENT)
Dept: SLEEP MEDICINE | Facility: CLINIC | Age: 60
End: 2025-02-13
Payer: COMMERCIAL

## 2025-02-14 ENCOUNTER — TELEPHONE (OUTPATIENT)
Dept: PRIMARY CARE | Facility: CLINIC | Age: 60
End: 2025-02-14
Payer: COMMERCIAL

## 2025-02-14 NOTE — TELEPHONE ENCOUNTER
Patient called in to state that her blood sugar is 278, advised Dr. Eller, and he stated that she should go to ER if it goes above 400. Patient was made aware, and she was advised if she gets worse to go to ER. Patient understood. MARTA.

## 2025-02-17 ENCOUNTER — OFFICE VISIT (OUTPATIENT)
Dept: ORTHOPEDIC SURGERY | Facility: HOSPITAL | Age: 60
End: 2025-02-17
Payer: COMMERCIAL

## 2025-02-17 ENCOUNTER — TELEPHONE (OUTPATIENT)
Dept: PRIMARY CARE | Facility: CLINIC | Age: 60
End: 2025-02-17

## 2025-02-17 DIAGNOSIS — T84.84XA PAIN DUE TO TOTAL LEFT KNEE REPLACEMENT, INITIAL ENCOUNTER (CMS-HCC): Primary | ICD-10-CM

## 2025-02-17 DIAGNOSIS — Z96.652 PAIN DUE TO TOTAL LEFT KNEE REPLACEMENT, INITIAL ENCOUNTER (CMS-HCC): Primary | ICD-10-CM

## 2025-02-17 PROCEDURE — 99215 OFFICE O/P EST HI 40 MIN: CPT | Performed by: STUDENT IN AN ORGANIZED HEALTH CARE EDUCATION/TRAINING PROGRAM

## 2025-02-17 NOTE — TELEPHONE ENCOUNTER
Pt called- having some issues with blood sugars. Pt noticed she is not eating much and drinking lots of coffee. She has been trying to fix this but her sugars are up. Pt would like to speak with Dr. Eller    Did mention that appt may be needed due to multiple concerns

## 2025-02-17 NOTE — TELEPHONE ENCOUNTER
Left message for patient to call back to schedule a virtual appointment with Dr. Eller on Thursday if possible. Asked patient to call back.

## 2025-02-17 NOTE — PROGRESS NOTES
Shelley Call MD   Adult Reconstruction and Joint Replacement Surgery  Phone: [phone]     Fax: [phone]       Name: Alejandra Houston  Age: 59 y.o.   : 1965   Date of Visit: 2025    INITIAL CONSULTATION    CC: Left knee pain    Clinical History:  This patient presents with 2 years of LEFT knee pain.     Patient has tried the following Activity modification, NSAIDs, Tylenol (arthritis dosing) , Opioids, Physical therapy, Ice, and Xray. Patient does have pain at night. Patient does not report falls related to this problem. Patient is able to walk indoors only. Patient is currently using walker as assistive device. Primarily complains of medial pain. Patient has difficulty with climbing stairs, walking , getting up from a chair, and prolonged sitting . The pain is significantly impacting their ability to perform activities of daily living. Patient reports no longer able to do activities such as sit for prolonged periods, walking long distances.     The patient reports no fever, chills or night sweats. No wound drainage. No interval trauma. No recent hospitalizations or infections. No recent dental work. No startup pain.     Implants in place: press fit, suspect depuy   Date of initial surgery: Dr. Cates - 2024    Focused History  PMH: Reviewed, PE/DVT: no, Diabetes Mellitus, and Fibromyalgia.  Endorses mental health concerns and most recently in an inpatient unit.  PSH: Reviewed , Hip/Knee replacement: As above, Hip/Knee surgery: as above, Anesthesia complications: no, Spine surgery: no, Surgical infection: no, and Weight loss surgery: no  SHx: Reviewed, Occupation: Disabled, Current smoker: 4 cigarettes daily, EtOH intake weekly: none, Social support: Endorses, and Preferred physical activities: unk  Jehovah´s Witness: no  Meds: Reviewed, Current Anticoagulants: no, Weight loss medication: no, and Current Opioids: no  Allergies: Reviewed  and The patient reports no  contraindications or allergies to cephalosporins, aspirin, NSAIDs or opioids, except as noted above.  Dental Hx: Last routine cleaning: unk and All invasive dental work must be completed 3+ months prior to joint replacement surgery. Dental cleaning must be completed 6+ weeks prior to joint replacement surgery. Patient are to avoid any invasive dental work 3-6 months post-surgically.   FH: No family history of any bleeding or clotting disorders.    PROMs/HISTORY  PROMs   Koos Jr-Knee Injury And Osteoarthritis Outcome Score For Joint Replacement    4/21/2024 10:45 AM EDT - Filed by Patient   Instructions    The following question concerns the amount of joint stiffness you have experienced during the last week in your knee. Stiffness is a sensation of restriction or slowness in the ease with which you move your knee joint.   How severe is your knee stiffness after first wakening in the morning? Extreme   What amount of knee pain have you experienced the last week during the following activities?   Twisting/pivoting on your knee Severe   Straightening knee fully Severe   Going up or down stairs Severe   Standing upright Extreme   The following questions concern your physical function. By this we mean your ability to move around and to look after yourself. For each of the following activities please indicate the degree of difficulty you have experienced in the last week due to your knee.   Rising from sitting Extreme   Bending to floor/ an object Extreme   Koos Jr Scoring (range: 0 - 100) 20.94     Ort [facility] 12 Item Health Survey (Vr-12)    12/6/2024  9:08 AM EST - Filed by Patient   In general, would you say your health is: Fair   The following questions are about activities you might do during a typical day. Does your health now limit you in these activities?  If so, how much?   Moderate activities, such as moving a table, pushing a vacuum , bowling, or playing golf? Yes, Limited A Lot   Climbing  several flights of stairs? Yes, Limited A Lot   During the past 4 weeks, have you had any of the following problems with your work or other regular daily activities as a result of your physical health?   Accomplished less than you would like. Yes, All Of The Time   Were limited in the kind of work or other activities. Yes, All Of The Time   During the past 4 weeks, have you had any of the following problems with your work or other regular daily activities as a result of any emotional problems (such as feeling depressed or anxious)?   Accomplished less than you would like Yes, Most Of The Time   Didn't do work or other activities as carefully as usual Yes, Most Of The Time   During the past 4 weeks, how much did pain interfere with your normal work (including both work outside the home and house work)? Extremely   How much of the time during the past 4 weeks:   Have you felt calm and peaceful? Some Of The Time   Did you have a lot of energy? Some Of The Time   Have you felt downhearted and blue? Some Of The Time   During the past 4 weeks, how much of the time has your physical health or emotional problems interfered with your social activities (like visiting with friends, relatives, etc.)? Some Of The Time   Compared to one year ago, how would you rate your physical health in general now? Much Worse   Compared to one year ago, how would you rate your emotional problems (such as feeling anxious, depressed or irritable) now? 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    Avascular necrosis of bone of left hip    Sleep apnea     no cpap    Suicidal ideations     Thrombocytopenia (CMS-HCC)        Past Medical History:   Diagnosis Date    Abnormal levels of other serum enzymes 2022    Elevated liver enzymes    Acid reflux     Anxiety     Bipolar disorder     COPD (chronic obstructive pulmonary disease) (Multi)     Depression     DM (diabetes mellitus) (Multi)     Fibromyalgia, primary     Glaucoma     Hypertension     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     Past Surgical History:   Procedure Laterality Date     SECTION, CLASSIC  02/15/2018     Section     SECTION, CLASSIC      HIP ARTHROPLASTY      KNEE ARTHROPLASTY      OTHER SURGICAL HISTORY  06/10/2019    Hip replacement    TOTAL KNEE ARTHROPLASTY  2018    Knee Replacement    UVULOPALATOPHARYNGOPLASTY         Allergies: She has No Known Allergies.     Medications:  Current Outpatient Medications   Medication Instructions    acetaminophen (TYLENOL) 500 mg, oral, Every 8 hours PRN    albuterol 90 mcg/actuation inhaler 2 puffs, inhalation, 4 times daily PRN    alcohol swabs pads, medicated Use as directed three times daily.    amoxicillin-pot clavulanate (Augmentin) 875-125 mg tablet 875 mg, oral, 2 times daily    b complex 0.4 mg tablet 1 tablet, Daily    blood-glucose meter (True Metrix Glucose Meter) misc USE AS DIRECTED EVERY DAY    blood-glucose meter,continuous (Dexcom G7 ) misc Use as instructed with sensors for continuous blood sugar monitoring    blood-glucose sensor (Dexcom G7 Sensor) device Apply 1 sensor to the back of the arm for continuous monitoring of blood sugar. Remove and apply new sensor every 10 days.    buPROPion XL (FORFIVO XL) 450 mg, oral, Every morning, Do not crush,  chew, or split.    carvedilol (COREG) 6.25 mg, oral, Daily    cholecalciferol (VITAMIN D-3) 2,000 Units, Daily    cyclobenzaprine (FLEXERIL) 10 mg, oral, 3 times daily PRN    DULoxetine (CYMBALTA) 60 mg, oral, 2 times daily, Do not crush or chew.    EASY COMFORT LANCETS MISC 3 times daily    famotidine (PEPCID) 40 mg, oral, Daily    ferrous sulfate 325 mg, Daily with breakfast    folic acid (FOLVITE) 1 mg, Daily    FreeStyle glucose monitoring kit True Metrix Blood Glucose test in vitro strip; Use as directed 4-5 times daily    gabapentin (NEURONTIN) 800 mg, oral, Every 6 hours    ibuprofen 600 mg, oral, Every 8 hours PRN    lancets (Easy Comfort Lancets) 30 gauge misc USE AS DIRECTED ONCE DAILY TO CHECK BLOOD SUGAR    metFORMIN XR (GLUCOPHAGE-XR) 1,000 mg, oral, Daily with breakfast, Do not crush, chew, or split.    Mucinex 1,200 mg, oral, Every 12 hours PRN    naloxone (NARCAN) 4 mg, nasal, As needed, May repeat every 2-3 minutes if needed, alternating nostrils, until medical assistance becomes available.    nystatin (MYCOSTATIN) 500,000 Units, oral, 4 times daily, Swish in mouth and swallow.    ofloxacin (Floxin) 0.3 % otic solution 5 drops, Each Ear, 2 times daily    pantoprazole (PROTONIX) 40 mg, oral, Daily before breakfast, Do not crush, chew, or split.    polyethylene glycol (GLYCOLAX, MIRALAX) 17 g, oral, Daily, Mix 1 cap (17g) into 8 ounces of fluid.    predniSONE (Deltasone) 5 mg tablet Take 5 tabs (25 mg) by mouth daily for 2 days, then 4 tabs (20 mg) daily for 2 days, then 3 tabs (15 mg) daily for 2 days, then 2 tabs (10 mg) daily for 2 days, then 1 tab (5 mg) daily for 2 days.    QUEtiapine (SEROQUEL) 150 mg, oral, Nightly    True Metrix Glucose Test Strip strip USE AS DIRECTED 4-5 TIMES DAILY    Trulicity 3 mg, subcutaneous, Once Weekly       Family History   Problem Relation Name Age of Onset    Hypertension Mother      Diabetes Mother      COPD Mother      Heart disease Mother      Lymphoma Mother       Multiple myeloma Mother      Cancer Other fam hx     Diabetes Other fam hx     Hypertension Other fam hx     Heart disease Other fam hx      Documented in chart and reviewed.     Social History     Tobacco Use    Smoking status: Every Day     Current packs/day: 0.50     Average packs/day: 0.5 packs/day for 45.1 years (22.6 ttl pk-yrs)     Types: Cigarettes     Start date: 1980    Smokeless tobacco: Never   Substance Use Topics    Alcohol use: Not Currently     Comment: social        Review of Systems: Review of systems completed with medical assistant intake. Please refer to this note.     Physical Exam:  BMI: 41.    General: The patient is well appearing and has an appropriate affect.     Neurological Examination: SILT in SPN/DPN/Sural/Saphenous/Tibial nerves. 5/5 EHL, FHL, Tibial anterior, Gastrocnemius. Coordination grossly intact.     Cardiovascular Exam: Capillary refill <2 seconds.     Lymphatic Examination: There is no obvious lymphatic swelling present around the involved joint.    Skin Exam: Skin around the pertinent joint is without evidence of infection or rash.    Gait: The patient ambulates with an antalgic gait.     Lumbar spine:    No tenderness to palpation midline.    Negative straight leg raise bilaterally.    Right Hip Examination:  The skin is intact over the hip.     There is no tenderness over the greater trochanter.    Range of motion is full extension to 100 degrees of flexion.    The hip is stable without subluxation or dislocation.    The hip internally rotates to 15 degrees and externally rotates to 45 degrees.    There is no pain with hip motion.    Left Hip Examination:  The skin is intact over the hip.    There is no tenderness over the greater trochanter.    Range of motion is full extension to 100 degrees of flexion.    The hip is stable without subluxation or dislocation.    The hip internally rotates to 15 degrees and externally rotates to 45 degrees.    There is no pain with hip  motion.    Left Knee Examination:  Examination of the left knee reveals the skin to be intact. Prior incision: Anterior, well healed.    There is a small effusion in the knee.    The alignment of the knee is neutral.    This deformity is correctable.    There is tenderness to palpation over the joint line.    There is significant quadriceps atrophy.    Range of Motion: 5 to 125 degrees of flexion.    The knee is stable to varus-valgus stress and anterior-posterior stress.     There is no grinding with range of motion.    There is no patellofemoral crepitus.    Right Knee Examination:  Examination of the right knee reveals the skin to be intact. Two healed longitudinal anterior incisions over anterior and anterior medial knees.      Range of motion is full extension to 100 degrees of flexion.    The hip is stable without subluxation or dislocation.    The hip internally rotates to 15 degrees and externally rotates to 45 degrees.    There is no pain with hip motion.    Left Hip Examination:  The skin is intact over the hip.    There is no tenderness over the greater trochanter.    Range of motion is full extension to 100 degrees of flexion.    The hip is stable without subluxation or dislocation.    The hip internally rotates to 15 degrees and externally rotates to 45 degrees.    There is no pain with hip motion.    Left Knee Examination:  Examination of the left knee reveals the skin to be intact. Prior incision: Anterior, well healed.    There is a small effusion in the knee.    The alignment of the knee is neutral.    This deformity is correctable.    There is tenderness to palpation over the joint line.    There is significant quadriceps atrophy.    Range of Motion: 5 to 125 degrees of flexion.    The knee is stable to varus-valgus stress and anterior-posterior stress.     There is no grinding with range of motion.    There is no patellofemoral crepitus.    Prior Labs:   Prior Labs:   Lab Results   Component Value  Date    WBC 5.2 01/28/2025    HGB 13.7 01/28/2025    HCT 40.7 01/28/2025    MCV 89.8 01/28/2025    PLT 86 (L) 01/28/2025      Lab Results   Component Value Date    INR 1.1 01/28/2025    INR 1.1 09/30/2024    INR 1.1 06/03/2024    PROTIME 11.4 01/28/2025    PROTIME 12.4 09/30/2024    PROTIME 12.5 06/03/2024         Lab Results   Component Value Date    GLUCOSE 94 01/28/2025    CALCIUM 9.7 01/28/2025     01/28/2025    K 4.1 01/28/2025    CO2 25 01/28/2025     01/28/2025    BUN 13 01/28/2025    CREATININE 0.67 01/28/2025      Lab Results   Component Value Date    CKTOTAL 149 10/08/2024      Lab Results   Component Value Date    HGBA1C 6.0 (H) 11/14/2024           Lab Results   Component Value Date    CRP 0.40 02/27/2018      Lab Results   Component Value Date    SEDRATE 16 02/27/2018          Radiographs:  Radiographs were personally reviewed today with the patient. There is moderate effusion in the knee. There is a primary uncemented total knee arthroplasty in place. Femoral component is well-aligned. There is mild osteolysis underneath the anterior flange of the femoral component. Femoral component is posterior stabilized design.    Tibial component is in slight varus alignment.  Tibial component is press-fit in design.  There is osteolysis adjacent to the tibial component, most apparent under the anterior baseplate best seen on the lateral as well as along medial and lateral plateau best seen on the AP.  There is additionally osteolysis apparent around the tibial keel as well as tibial pegs.    Tibial bearing looks to be primary constraint in design. Patellar height is appropriate.  There is a lateral tilt of the patella relative to trochlea.       Patellar component is well-aligned and without obvious evidence of patricia-implant lucency, osteolysis, fracture or gross failure.     Nuclear med bone scan of left replaced knee dated 1/20/2025 was reviewed personally today with the patient.  There is evidence  of increased perfusion in all 3 phases, most specific to the femoral component.    Impression:  This patient presents with painful left knee placement with clinical findings and imaging studies suspicious for septic versus aseptic loosening.    Diagnosis:  Pain due to total left knee replacement, initial encounter (CMS-HCC)     Recommendations / Plan:    Clinical findings and imaging results were reviewed with the patient today.  The patient has some findings suggestive of septic versus aseptic loosening of the tibial and possible femoral components.  There is no apparent bone compromise at this point.  Range of motion is preserved.    I have discussed the options in detail with the patient. We have discussed anti-inflammatory medication, activity modification, physical therapy, and revision knee replacement surgery.    Further workup of painful total joint was reviewed.  The patient was instructed to undergo a lab draw for evaluation of inflammatory markers including ESR and CRP. If inflammatory labs are elevated, knee aspiration will be considered.  Previous bone scan suggestive of implant loosening as described above.    Reviewed steps for optimization of prosthetic joint function. Currently their BMI is 41.  Each pound of weight loss offloads their hip and knee joints by 3-6 pounds.  The most effective of these options is weight loss mainly through restricting caloric intake. They would need to lose significant weight before being considered for surgery.     Additionally, the patient is actively smoking.  She understands she will need to be tobacco free for minimum 6 weeks prior to considering the surgery.    Encouraged physical therapy the meantime.  A physical therapy prescription was ordered for the patient with attention to strengthening and range of motion of hip and knee joints bilaterally.  Patient will continue their home exercise program. Strategies for pain management using over-the-counter  anti-inflammatory medications reviewed.  Encouraged them to maintain range of motion and strength around the knee joints.  They will continue to implement these strategies in addressing their pain.      Recommend the patient continue optimizing nonsurgical treatment interventions as outlined above for management of their joint.  I would be happy to see them again at any point to review progress with treatment as above. The patient verbalizes understanding with the recommendations and treatment plan as outlined above and is in agreement.  Questions were addressed.      _____________  Shelley Call MD   Attending Orthopaedic Surgeon  [facility]    [facility]       This note was transcribed with dictation software.  Please excuse any typographical errors, program misunderstandings leading to inadvertent insertions or deletions of inappropriate wording, pronoun errors and other unintentional transcription errors not noticed on proof-reading.

## 2025-02-19 ENCOUNTER — APPOINTMENT (OUTPATIENT)
Facility: CLINIC | Age: 60
End: 2025-02-19
Payer: COMMERCIAL

## 2025-02-19 VITALS — HEIGHT: 68 IN | WEIGHT: 260 LBS | BODY MASS INDEX: 39.4 KG/M2

## 2025-02-19 DIAGNOSIS — J34.89 NASAL LESION: ICD-10-CM

## 2025-02-19 DIAGNOSIS — J34.0 NASAL SEPTUM ULCERATION: Primary | ICD-10-CM

## 2025-02-19 PROCEDURE — 99214 OFFICE O/P EST MOD 30 MIN: CPT | Performed by: STUDENT IN AN ORGANIZED HEALTH CARE EDUCATION/TRAINING PROGRAM

## 2025-02-19 PROCEDURE — 11104 PUNCH BX SKIN SINGLE LESION: CPT | Performed by: STUDENT IN AN ORGANIZED HEALTH CARE EDUCATION/TRAINING PROGRAM

## 2025-02-19 PROCEDURE — 31575 DIAGNOSTIC LARYNGOSCOPY: CPT | Performed by: STUDENT IN AN ORGANIZED HEALTH CARE EDUCATION/TRAINING PROGRAM

## 2025-02-19 PROCEDURE — 3008F BODY MASS INDEX DOCD: CPT | Performed by: STUDENT IN AN ORGANIZED HEALTH CARE EDUCATION/TRAINING PROGRAM

## 2025-02-19 ASSESSMENT — ENCOUNTER SYMPTOMS
NECK PAIN: 1
COUGH: 1
ABDOMINAL PAIN: 1
SORE THROAT: 1
HEADACHES: 1

## 2025-02-19 ASSESSMENT — PATIENT HEALTH QUESTIONNAIRE - PHQ9
1. LITTLE INTEREST OR PLEASURE IN DOING THINGS: SEVERAL DAYS
SUM OF ALL RESPONSES TO PHQ9 QUESTIONS 1 AND 2: 2
2. FEELING DOWN, DEPRESSED OR HOPELESS: SEVERAL DAYS

## 2025-02-20 ENCOUNTER — TELEMEDICINE (OUTPATIENT)
Dept: PRIMARY CARE | Facility: CLINIC | Age: 60
End: 2025-02-20
Payer: COMMERCIAL

## 2025-02-20 ENCOUNTER — OFFICE VISIT (OUTPATIENT)
Dept: ORTHOPEDIC SURGERY | Facility: CLINIC | Age: 60
End: 2025-02-20
Payer: COMMERCIAL

## 2025-02-20 ENCOUNTER — HOSPITAL ENCOUNTER (OUTPATIENT)
Dept: RADIOLOGY | Facility: CLINIC | Age: 60
Discharge: HOME | End: 2025-02-20
Payer: COMMERCIAL

## 2025-02-20 VITALS — BODY MASS INDEX: 37.89 KG/M2 | WEIGHT: 250 LBS | HEIGHT: 68 IN

## 2025-02-20 DIAGNOSIS — J34.89 NOSE IRRITATION: ICD-10-CM

## 2025-02-20 DIAGNOSIS — Z96.652 PAIN DUE TO TOTAL LEFT KNEE REPLACEMENT, INITIAL ENCOUNTER (CMS-HCC): ICD-10-CM

## 2025-02-20 DIAGNOSIS — T84.84XA PAIN DUE TO TOTAL LEFT KNEE REPLACEMENT, INITIAL ENCOUNTER (CMS-HCC): ICD-10-CM

## 2025-02-20 DIAGNOSIS — Z96.652 PAIN DUE TO TOTAL LEFT KNEE REPLACEMENT, INITIAL ENCOUNTER (CMS-HCC): Primary | ICD-10-CM

## 2025-02-20 DIAGNOSIS — T84.84XA PAIN DUE TO TOTAL LEFT KNEE REPLACEMENT, INITIAL ENCOUNTER (CMS-HCC): Primary | ICD-10-CM

## 2025-02-20 DIAGNOSIS — E11.9 TYPE 2 DIABETES MELLITUS WITHOUT COMPLICATION, WITHOUT LONG-TERM CURRENT USE OF INSULIN (MULTI): Primary | ICD-10-CM

## 2025-02-20 PROCEDURE — 3008F BODY MASS INDEX DOCD: CPT | Performed by: STUDENT IN AN ORGANIZED HEALTH CARE EDUCATION/TRAINING PROGRAM

## 2025-02-20 PROCEDURE — 73564 X-RAY EXAM KNEE 4 OR MORE: CPT | Mod: LT

## 2025-02-20 PROCEDURE — 99214 OFFICE O/P EST MOD 30 MIN: CPT | Performed by: STUDENT IN AN ORGANIZED HEALTH CARE EDUCATION/TRAINING PROGRAM

## 2025-02-20 PROCEDURE — 99213 OFFICE O/P EST LOW 20 MIN: CPT | Performed by: INTERNAL MEDICINE

## 2025-02-20 RX ORDER — MUPIROCIN 20 MG/G
OINTMENT TOPICAL
Qty: 22 G | Refills: 1 | Status: SHIPPED | OUTPATIENT
Start: 2025-02-20 | End: 2025-03-02

## 2025-02-20 NOTE — PROGRESS NOTES
Department of Orthopaedic Surgery  Division of Adult Reconstruction    Chief Complaint: Left knee pain    DOS: 2/8/2024 left total knee arthroplasty, attune uncemented, Dr. Kaminski    HPI:  Alejandra Houston is a pleasant 59 y.o. year-old female who is seen today for evaluation of left knee pain.  Patient underwent uncemented left total knee arthroplasty with Dr. Kaminski 1 year ago.  She reports never doing very well from that surgery.  She complains of a patch of skin over the medial aspect with hyperalgesia.  She has also had increasing pain with initiating gait.  The first few steps are very painful, after which it gets a bit better.  Walking long distances is difficult to.  She uses a rollator walker for ambulation.  She denies incision healing complications, concerns for infection at index surgery.  She has tried activity modification, Voltaren gel, NSAIDs, physical therapy with no appreciable relief.  Patient smokes cigarettes.  Well-controlled diabetes.    Review of Symptoms:  The patient denies any fever, chills, chest pain, shortness of breath or difficulty breathing.  Patient denies any numbness, tingling, or radicular symptoms.      Adult patient history sheet was filled out by the patient today in clinic and will be scanned into the EMR.  I personally reviewed this form which will be scanned into the EMR.  This includes Past Medical History, Past Surgical History, Medications, Allergies, Social History, Family History and 12 point review of systems.    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    Avascular necrosis of bone of left hip     Sleep apnea     no cpap    Suicidal ideations     Thrombocytopenia (CMS-HCC)        Past Surgical History:    Past Surgical History:   Procedure Laterality Date     SECTION, CLASSIC  02/15/2018     Section     SECTION, CLASSIC      HIP ARTHROPLASTY      KNEE ARTHROPLASTY      OTHER SURGICAL HISTORY  06/10/2019    Hip replacement    TOTAL KNEE ARTHROPLASTY  2018    Knee Replacement    UVULOPALATOPHARYNGOPLASTY       Allergies:    No Known Allergies    Medications:    Current Outpatient Medications on File Prior to Visit   Medication Sig Dispense Refill    acetaminophen (Tylenol) 500 mg tablet Take 1 tablet (500 mg) by mouth every 8 hours if needed for mild pain (1 - 3). 30 tablet 3    albuterol 90 mcg/actuation inhaler Inhale 2 puffs 4 times a day as needed for shortness of breath or wheezing. 8.5 g 1    alcohol swabs pads, medicated Use as directed three times daily. 100 each 3    amoxicillin-pot clavulanate (Augmentin) 875-125 mg tablet Take 1 tablet (875 mg) by mouth 2 times a day for 10 days. 20 tablet 0    b complex 0.4 mg tablet Take 1 tablet by mouth once daily.      blood-glucose meter (True Metrix Glucose Meter) misc USE AS DIRECTED EVERY DAY 1 each 0    blood-glucose meter,continuous (Dexcom G7 ) misc Use as instructed with sensors for continuous blood sugar monitoring 1 each 0    blood-glucose sensor (Dexcom G7 Sensor) device Apply 1 sensor to the back of the arm for continuous monitoring of blood sugar. Remove and apply new sensor every 10 days. 3 each 1    buPROPion XL (Forfivo XL) 450 mg 24 hr tablet Take 1 tablet (450 mg) by mouth once daily in the morning. Do not crush, chew, or split. 30 tablet 0    carvedilol (Coreg) 6.25 mg tablet Take 1 tablet (6.25 mg) by mouth once daily. 30 tablet 0    cholecalciferol (Vitamin D-3) 50 mcg (2,000 unit) capsule Take 1 capsule (50 mcg) by mouth once daily.      cyclobenzaprine (Flexeril) 10 mg tablet TAKE 1 TABLET (10 MG)  BY MOUTH 3 TIMES A DAY AS NEEDED FOR MUSCLE SPASMS. 90 tablet 0    DULoxetine (Cymbalta) 60 mg DR capsule Take 1 capsule (60 mg) by mouth 2 times a day. Do not crush or chew. 60 capsule 11    EASY COMFORT LANCETS MISC in the morning, at noon, and at bedtime. Use to test      famotidine (Pepcid) 40 mg tablet Take 1 tablet (40 mg) by mouth once daily. 30 tablet 0    ferrous sulfate 325 (65 Fe) MG EC tablet Take 1 tablet by mouth once daily with breakfast. Do not crush, chew, or split.      folic acid (Folvite) 1 mg tablet Take 1 tablet (1 mg) by mouth once daily.      FreeStyle glucose monitoring kit True Metrix Blood Glucose test in vitro strip; Use as directed 4-5 times daily      gabapentin (Neurontin) 800 mg tablet Take 1 tablet (800 mg) by mouth every 6 hours. 120 tablet 3    guaiFENesin (Mucinex) 1,200 mg tablet extended release 12hr Take 1 tablet (1,200 mg) by mouth every 12 hours if needed (Cough). 30 tablet 0    ibuprofen 600 mg tablet TAKE 1 TABLET (600 MG) BY MOUTH EVERY 8 HOURS IF NEEDED FOR MILD PAIN (1 - 3). 30 tablet 3    lancets (Easy Comfort Lancets) 30 gauge misc USE AS DIRECTED ONCE DAILY TO CHECK BLOOD SUGAR 100 each 0    metFORMIN XR (Glucophage-XR) 500 mg 24 hr tablet Take 2 tablets (1,000 mg) by mouth once daily with breakfast. Do not crush, chew, or split. 60 tablet 0    naloxone (Narcan) 4 mg/0.1 mL nasal spray Administer 1 spray (4 mg) into affected nostril(s) if needed for opioid reversal or respiratory depression. May repeat every 2-3 minutes if needed, alternating nostrils, until medical assistance becomes available. 2 each 11    nystatin (Mycostatin) 100,000 unit/mL suspension Take 5 mL (500,000 Units) by mouth 4 times a day for 14 days. Swish in mouth and swallow. 280 mL 0    ofloxacin (Floxin) 0.3 % otic solution Administer 5 drops into each ear 2 times a day for 7 days. 10 mL 0    pantoprazole (ProtoNix) 40 mg EC tablet Take 1 tablet (40 mg) by mouth once daily in the morning. Take  "before meals. Do not crush, chew, or split. 30 tablet 0    polyethylene glycol (Glycolax, Miralax) 17 gram packet Take 17 g by mouth once daily. Mix 1 cap (17g) into 8 ounces of fluid. 100 each 1    predniSONE (Deltasone) 5 mg tablet Take 5 tabs (25 mg) by mouth daily for 2 days, then 4 tabs (20 mg) daily for 2 days, then 3 tabs (15 mg) daily for 2 days, then 2 tabs (10 mg) daily for 2 days, then 1 tab (5 mg) daily for 2 days. (Patient not taking: Reported on 2/20/2025) 30 tablet 0    QUEtiapine (SEROquel) 50 mg tablet TAKE 3 TABLETS (150 MG) BY MOUTH ONCE DAILY AT BEDTIME. 90 tablet 0    True Metrix Glucose Test Strip strip USE AS DIRECTED 4-5 TIMES DAILY 100 strip 5    Trulicity 3 mg/0.5 mL injection INJECT 3 MG UNDER THE SKIN 1 (ONE) TIME PER WEEK. 2 mL 0    [DISCONTINUED] mupirocin (Bactroban) 2 % ointment Apply topically 3 times a day for 10 days. 30 g 1     No current facility-administered medications on file prior to visit.       Social History:    Social History     Occupational History     Employer: NONE   Tobacco Use    Smoking status: Every Day     Current packs/day: 0.50     Average packs/day: 0.5 packs/day for 45.1 years (22.6 ttl pk-yrs)     Types: Cigarettes     Start date: 1980    Smokeless tobacco: Never   Vaping Use    Vaping status: Never Used   Substance and Sexual Activity    Alcohol use: Not Currently     Comment: social    Drug use: Not Currently     Types: \"Crack\" cocaine    Sexual activity: Defer     Family History:    Family History   Problem Relation Name Age of Onset    Hypertension Mother      Diabetes Mother      COPD Mother      Heart disease Mother      Lymphoma Mother      Multiple myeloma Mother      Cancer Other fam hx     Diabetes Other fam hx     Hypertension Other fam hx     Heart disease Other fam hx      Exam:  General: Well-appearing female in no acute distress.  Awake, alert and oriented.  Pleasant and cooperative.  Respiratory: Non-labored breathing  Mood: Euthymic   Gait: " Antalgic  Assistive Device: None     Affected Left Knee  Limb Alignment: Neutral  ROM: 0-110  Stable to varus and valgus stress at full extension and 30 degrees of flexion  Skin: Intact, no abrasions or draining sinuses  Effusion: None  Quad Strength: 5/5  Hamstring Strength: 5/5  Patella Crepitus: None  Tenderness and hyperalgesia to the medial knee  Sensation: Intact to light touch distally  Motor function: Able to fire TA, EHL, G/S  Pulses: Palpable DP pulse    Imaging interpretation:   X-rays of the left knee demonstrate uncemented left total knee arthroplasty in acceptable alignment.  Possible lucency at the anterior flange of the femoral component.  Subtle lucency at the medial aspect of the tibial baseplate, the medial peg, and the keel.    Assessment and Plan:  59-year-old female with left knee pain in the setting of an uncemented left total knee arthroplasty with history and radiographic exam concerning for possible loosening.  We discussed the nature of this problem and the potential for infection.  I ordered a CRP and ESR to evaluate for septic loosening.  We discussed the nature of revision knee surgery and the importance of optimization.  We discussed that smoking is incompatible with safe joint surgery.  I encouraged her to complete the labs to make sure there is no infection and also to work on smoking cessation, which will make her a better candidate for elective revision surgery.    Carlos Cox MD  Department of Orthopaedic Surgery  Division of Adult Reconstruction  , [facility]

## 2025-02-20 NOTE — PROGRESS NOTES
Patient doing a virtual visit for high blood sugars. Blood sugar 187  Virtual or Telephone Consent    An interactive audio and video telecommunication system which permits real time communications between the patient (at the originating site) and provider (at the distant site) was utilized to provide this telehealth service.   Verbal consent was requested and obtained from Alejandra Houston on this date, 02/20/25 for a telehealth visit.      Subjective   Patient ID: Alejandra Houston is a 59 y.o. female who presents for Follow-up.    The patient met with  from Otolaryngology to discuss nasal septum ulceration, and was pleased with the care she received.  The patient is currently awaiting results of a punch biopsy following an otherwise negative laryngoscopy.  She also has pending imaging studies including CT Head without contrast and CT Soft Tissue Neck with Contrast.    The patient reports ongoing left knee pain affecting her quality of life.  She is scheduled for an upcoming appointment with Orthopedic Surgery later today.    The patient states that home blood glucose readings have been averaging 280-300mg/dL per her CGM device.  She is currently maintained with metformin XR 500mg as two tablets in the morning once daily and Trulicity 3mg once weekly.  The patient is following with Pharmacy.      Review of Systems   HENT:          Positive for nasal ulcer.   Musculoskeletal:         Positive for left knee pain.       Objective   Physical Exam  Comfortable appearing in NAD.    Assessment/Plan   Problem List Items Addressed This Visit             ICD-10-CM    Type 2 diabetes mellitus - Primary E11.9    Relevant Orders    Hemoglobin A1c         IMPRESSIONS/PLAN:      Diabetes II   - Last HbA1c 6.0 per 11/2024.  Recent home readings elevated in the 200-300 mg/dL range per patient.   Advised patient taper up metformin 500mg as two tablets QAM and 1 tablet QPM for one week, and then two tablets BID thereafter.   Can consider increasing Trulicity to 4.5 mg weekly as well.  Previously on glimperide 2mg every day.  Ordered HbA1c.  Call the clinic with any concerns.     Depression   - Significantly improved since leaving the hospital. [facility] ordered [facility]  Continue on quetiapine 50mg once daily, bupropion 150mg once daily in the morning, and duloxetine 60mg BID.  Suspect that may improve with CPAP and with upcoming LES.  Previously on Rexulti and Topamax.  No SI or HI.  Call the clinic if symptoms persist or worsen.     Chronic Sinusitis  - CT Sinus without Contrast wnl and unremarkable 1/2024.  Following with  from Otolaryngology.     Nasal Septum Ulceration  - Mild erosions present on nasal septum.  Patient awaiting biopsy results.  Following  from Otolaryngology.     Acute Bilateral Actinic Otitis Externa  - Prescribed ofloxacin 0.3% otic solution to be applied as 5 drops twice daily.  Call the clinic if symptoms persist or worsen.     Sore Throat  - Suspect candidiasis.  Prescribed nystatin 100,000 unit/mL suspension as 5mL four times daily.  Also prescribed amoxicillin pot-clavulanate 875-125mg BID to be taken with food to avoid adverse effects.  Prescribed prednisone 5mg taper to be taken as directed, and explained that this may help with neck pain as well.  Call the clinic if symptoms persist or worsen.     Pinched Cervical Nerve Root/Left Shoulder Pain   - CT Cervical Spine 1/2024 showed mild to moderate spinal canal stenosis at C4-C5 and moderate bilateral foraminal stenosis.  Prescribed prednisone 10mg taper as directed on packaging. Encouraged patient to schedule appointment with  from Pain Medicine.  Call the clinic if symptoms persist or worsen.     Sleep Apnea   - Completed in-center Sleep Study confirming moderate CADEN.  Previously ordered referral to Sleep Medicine.  She will reach out to them about obtaining the CPAP supplies.      Smoking History   - CT Chest 4/2023 shows redemonstration of groundglass opacities in the posterior bilateral upper lobes, left lower lobe and new groundglass  opacities in the anterior left upper lobe. Central airways are clear.  Stable noncalcified pulmonary nodule in the right upper lobe (6, 38) and right middle lobe (6, 57) since 5/19/2019.  Discussed with patient that varenicline was recalled.  Stable per 6/2024 repeat of CT Lung Screening.      Dyspepsia  - Refilled pantoprazole 40mg once daily in the mornings.    Liver Cirrhosis  - CT Chest with IV Contrast 10/2023 showed incidental cirrhotic liver morphology with sequela of portal hypertension (splenomegaly and esophageal varices).  Previously ordered referral for Hepatology. Advised the patient to stop taking Tylenol and limit Percocet, and she verbalized understanding.   She has seen Dr. Millard in the past. Last EGD 3/2024.  Following with  from Gastroenterology.     Left Adnexal Mass  - CT Abdomen Pelvis 7/2023 showed incidental finding of A 3.6 cm benign-appearing left adnexal lesion is seen.  In an early post-menopausal woman (<5 years since menopause), follow-up ultrasound at 6-12 months is recommended. Patient has scheduled appointment with Gynecology.     Constipation  - Prescribed Miralax 17g once daily.  Advised patient to try Colace as well if necessary.  Call the clinic if symptoms persist or worsen.     Vaginal dryness  - Estradiol cream.     Left Wrist Fracture  - Xray Left Wrist 5/2024 showed comminuted, mildly displaced, intra-articular fracture of the distal radius with associated ulnar styloid fracture.  There is also a questionable avulsion fracture of the triquetrum.  The distal radius fracture has a significant amount of dorsal comminution with a large intra-articular dorsal fragment that is mildly displaced.  Following with  from Orthopedic Surgery.      Bilateral Hand Pain  - Ongoing.  C7-T1 epidural steroid  injection from 8/21/2024 resulted in minimal relief.  Continue with duloxetine 60mg once daily and gabapentin 300mg as two capsules in morning and afternoon, and three capsules in evening for now.  Following with  from Orthopedic Surgery.  S/p left carpal tunnel release on 9/4/2024 with  from Orthopedic Surgery.  EMG from 9/13/2024 showed right CTS with superimposed C5 and C6 radiculopathies.  Call the clinic if symptoms persist or worsen, and will consider ordering MRI Cervical Spine as well as referral to Neurosurgery pending results.      Left Clavicle  - Xray Left Shoulder 9/2024 showed Mild left acromioclavicular osteoarthrosis. Glenohumeral joint is  maintained.     Low Back Pain/ leg weakness   - Worsening since 4/2023.  Weakness on LLE on exam.  MR Lumbar Spine from 1/2024 showed multilevel degenerative changes of the lumbar spine most prominent at L3-4 where there is moderate canal stenosis, slightly progressed.  Continue with gabapentin 600 mg TID.  Refilled Naproxen 500 mg BID prn.  Previously on tramadol 50 mg every 6 hours as needed.   Following with  from Pain Management.      Left Knee OA  - s/p left total knee arthroplasty 2/8/2024 with  from Orthopedic Surgery.       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 Underwent complex revision of right TKA. Following with  in Orthopedic Surgery.     Chronic Pain  - Ongoing.  Patient has tried multiple epidural corticosteroid injections with minimal to no relief.  MRI of Lumbar Spine 1/2024 showed multilevel degenerative changes of lumbar spine with moderate canal stenosis.  EMG 9/2024 of right upper extremity shows superimposed CTS with C5-C6 radiculopathy.  CT Cervical Spine 1/2024 showed  Mild to moderate spinal canal stenosis at C4-C5 and moderate bilateral foraminal stenosis.  Patient unable to tolerate chronic Tylenol due to history of liver cirrhosis.  She  has been taking her gabapentin and Cymbalta diligently as well.  Previously followed with  from Pain Management.  Previously ordered referral to Pain Medicine with .     Long toenails   - Previously ordered referral to Podiatry with .     Dry Skin  - Cetaphil moisturizing lotion.      Thrombocytopenia  - Last CBC showed platelet count of 77 per 10/2023.  Following with Hematology/Oncology.  Suspect from underlying liver cirrhosis.       Fatigue  - Likely due to untreated sleep apnea.  Encouraged patient to follow-up with Sleep Medicine, and she has appointment for next week.  Vitamin D, Vitamin B12, and TSH wnl per 9/2023.     Health Maintenance   - Routine labs 6/2024.  Last Mammogram 5/2023, ordered repeat for 2024. Last colonoscopy 5/2019, repeat due [id number].  Ordered repeat colonoscopy for 2024.     Follow-up in 3 months, call sooner if needed.        Scribe Attestation  By signing my name below, I, Aida Galdamez, Pedro   attest that this documentation has been prepared under the direction and in the presence of Jhoan Eller DO.   Aida Galdamez 02/20/25 10:46 AM

## 2025-02-25 ENCOUNTER — APPOINTMENT (OUTPATIENT)
Dept: PAIN MEDICINE | Facility: CLINIC | Age: 60
End: 2025-02-25
Payer: COMMERCIAL

## 2025-02-26 ENCOUNTER — APPOINTMENT (OUTPATIENT)
Dept: SLEEP MEDICINE | Facility: CLINIC | Age: 60
End: 2025-02-26
Payer: COMMERCIAL

## 2025-02-26 LAB
LABORATORY COMMENT REPORT: NORMAL
PATH REPORT.FINAL DX SPEC: NORMAL
PATH REPORT.GROSS SPEC: NORMAL
PATH REPORT.RELEVANT HX SPEC: NORMAL
PATH REPORT.TOTAL CANCER: NORMAL

## 2025-02-27 ENCOUNTER — TELEPHONE (OUTPATIENT)
Dept: PAIN MEDICINE | Facility: CLINIC | Age: 60
End: 2025-02-27
Payer: COMMERCIAL

## 2025-03-03 ENCOUNTER — APPOINTMENT (OUTPATIENT)
Dept: RADIOLOGY | Facility: CLINIC | Age: 60
End: 2025-03-03
Payer: COMMERCIAL

## 2025-03-03 DIAGNOSIS — R10.13 DYSPEPSIA: ICD-10-CM

## 2025-03-04 DIAGNOSIS — E11.9 TYPE 2 DIABETES MELLITUS WITHOUT COMPLICATION, WITHOUT LONG-TERM CURRENT USE OF INSULIN (MULTI): ICD-10-CM

## 2025-03-04 RX ORDER — DULAGLUTIDE 3 MG/.5ML
3 INJECTION, SOLUTION SUBCUTANEOUS
Qty: 2 ML | Refills: 0 | Status: SHIPPED | OUTPATIENT
Start: 2025-03-04

## 2025-03-04 RX ORDER — PANTOPRAZOLE SODIUM 40 MG/1
40 TABLET, DELAYED RELEASE ORAL
Qty: 30 TABLET | Refills: 2 | Status: SHIPPED | OUTPATIENT
Start: 2025-03-04 | End: 2025-04-03

## 2025-03-05 ENCOUNTER — APPOINTMENT (OUTPATIENT)
Dept: PRIMARY CARE | Facility: CLINIC | Age: 60
End: 2025-03-05
Payer: COMMERCIAL

## 2025-03-06 ENCOUNTER — HOSPITAL ENCOUNTER (OUTPATIENT)
Dept: RADIOLOGY | Facility: CLINIC | Age: 60
Discharge: HOME | End: 2025-03-06
Payer: COMMERCIAL

## 2025-03-06 DIAGNOSIS — J34.0 NASAL SEPTUM ULCERATION: ICD-10-CM

## 2025-03-06 PROCEDURE — 70470 CT HEAD/BRAIN W/O & W/DYE: CPT

## 2025-03-06 PROCEDURE — 2550000001 HC RX 255 CONTRASTS: Performed by: STUDENT IN AN ORGANIZED HEALTH CARE EDUCATION/TRAINING PROGRAM

## 2025-03-06 PROCEDURE — 70491 CT SOFT TISSUE NECK W/DYE: CPT

## 2025-03-06 RX ADMIN — IOHEXOL 80 ML: 350 INJECTION, SOLUTION INTRAVENOUS at 14:02

## 2025-03-07 LAB
AFP-TM SERPL-MCNC: NORMAL NG/ML
ALBUMIN SERPL-MCNC: 3.9 G/DL (ref 3.6–5.1)
ALBUMIN/GLOB SERPL: 1.3 (CALC) (ref 1–2.5)
ALP SERPL-CCNC: 68 U/L (ref 37–153)
ALT SERPL-CCNC: 21 U/L (ref 6–29)
ANION GAP SERPL CALCULATED.4IONS-SCNC: 10 MMOL/L (CALC) (ref 7–17)
AST SERPL-CCNC: 23 U/L (ref 10–35)
BASOPHILS # BLD AUTO: 19 CELLS/UL (ref 0–200)
BASOPHILS NFR BLD AUTO: 0.6 %
BILIRUB DIRECT SERPL-MCNC: 0.1 MG/DL
BILIRUB INDIRECT SERPL-MCNC: 0.3 MG/DL (CALC) (ref 0.2–1.2)
BILIRUB SERPL-MCNC: 0.4 MG/DL (ref 0.2–1.2)
BUN SERPL-MCNC: 12 MG/DL (ref 7–25)
BUN/CREAT SERPL: ABNORMAL (CALC) (ref 6–22)
CALCIUM SERPL-MCNC: 9.1 MG/DL (ref 8.6–10.4)
CHLORIDE SERPL-SCNC: 104 MMOL/L (ref 98–110)
CO2 SERPL-SCNC: 21 MMOL/L (ref 20–32)
CREAT SERPL-MCNC: 0.84 MG/DL (ref 0.5–1.03)
CRP SERPL-MCNC: <3 MG/L
EGFRCR SERPLBLD CKD-EPI 2021: 81 ML/MIN/1.73M2
EOSINOPHIL # BLD AUTO: 147 CELLS/UL (ref 15–500)
EOSINOPHIL NFR BLD AUTO: 4.6 %
ERYTHROCYTE [DISTWIDTH] IN BLOOD BY AUTOMATED COUNT: 13.7 % (ref 11–15)
ERYTHROCYTE [SEDIMENTATION RATE] IN BLOOD BY WESTERGREN METHOD: 11 MM/H
EST. AVERAGE GLUCOSE BLD GHB EST-MCNC: 154 MG/DL
EST. AVERAGE GLUCOSE BLD GHB EST-SCNC: 8.5 MMOL/L
GLOBULIN SER CALC-MCNC: 3 G/DL (CALC) (ref 1.9–3.7)
GLUCOSE SERPL-MCNC: 163 MG/DL (ref 65–99)
HBA1C MFR BLD: 7 % OF TOTAL HGB
HCT VFR BLD AUTO: 38.2 % (ref 35–45)
HGB BLD-MCNC: 12.7 G/DL (ref 11.7–15.5)
INR PPP: 1.1
LYMPHOCYTES # BLD AUTO: 870 CELLS/UL (ref 850–3900)
LYMPHOCYTES NFR BLD AUTO: 27.2 %
MCH RBC QN AUTO: 30.2 PG (ref 27–33)
MCHC RBC AUTO-ENTMCNC: 33.2 G/DL (ref 32–36)
MCV RBC AUTO: 90.7 FL (ref 80–100)
MONOCYTES # BLD AUTO: 298 CELLS/UL (ref 200–950)
MONOCYTES NFR BLD AUTO: 9.3 %
NEUTROPHILS # BLD AUTO: 1866 CELLS/UL (ref 1500–7800)
NEUTROPHILS NFR BLD AUTO: 58.3 %
PLATELET # BLD AUTO: 68 THOUSAND/UL (ref 140–400)
PMV BLD REES-ECKER: 12.2 FL (ref 7.5–12.5)
POTASSIUM SERPL-SCNC: 4.3 MMOL/L (ref 3.5–5.3)
PROT SERPL-MCNC: 6.9 G/DL (ref 6.1–8.1)
PROTHROMBIN TIME: 11.2 SEC (ref 9–11.5)
RBC # BLD AUTO: 4.21 MILLION/UL (ref 3.8–5.1)
SERVICE CMNT-IMP: ABNORMAL
SODIUM SERPL-SCNC: 135 MMOL/L (ref 135–146)
WBC # BLD AUTO: 3.2 THOUSAND/UL (ref 3.8–10.8)

## 2025-03-10 LAB
AFP-TM SERPL-MCNC: 4 NG/ML
ALBUMIN SERPL-MCNC: 3.9 G/DL (ref 3.6–5.1)
ALBUMIN/GLOB SERPL: 1.3 (CALC) (ref 1–2.5)
ALP SERPL-CCNC: 68 U/L (ref 37–153)
ALT SERPL-CCNC: 21 U/L (ref 6–29)
ANION GAP SERPL CALCULATED.4IONS-SCNC: 10 MMOL/L (CALC) (ref 7–17)
AST SERPL-CCNC: 23 U/L (ref 10–35)
BASOPHILS # BLD AUTO: 19 CELLS/UL (ref 0–200)
BASOPHILS NFR BLD AUTO: 0.6 %
BILIRUB DIRECT SERPL-MCNC: 0.1 MG/DL
BILIRUB INDIRECT SERPL-MCNC: 0.3 MG/DL (CALC) (ref 0.2–1.2)
BILIRUB SERPL-MCNC: 0.4 MG/DL (ref 0.2–1.2)
BUN SERPL-MCNC: 12 MG/DL (ref 7–25)
BUN/CREAT SERPL: ABNORMAL (CALC) (ref 6–22)
CALCIUM SERPL-MCNC: 9.1 MG/DL (ref 8.6–10.4)
CHLORIDE SERPL-SCNC: 104 MMOL/L (ref 98–110)
CO2 SERPL-SCNC: 21 MMOL/L (ref 20–32)
CREAT SERPL-MCNC: 0.84 MG/DL (ref 0.5–1.03)
EGFRCR SERPLBLD CKD-EPI 2021: 81 ML/MIN/1.73M2
EOSINOPHIL # BLD AUTO: 147 CELLS/UL (ref 15–500)
EOSINOPHIL NFR BLD AUTO: 4.6 %
ERYTHROCYTE [DISTWIDTH] IN BLOOD BY AUTOMATED COUNT: 13.7 % (ref 11–15)
GLOBULIN SER CALC-MCNC: 3 G/DL (CALC) (ref 1.9–3.7)
GLUCOSE SERPL-MCNC: 163 MG/DL (ref 65–99)
HCT VFR BLD AUTO: 38.2 % (ref 35–45)
HGB BLD-MCNC: 12.7 G/DL (ref 11.7–15.5)
INR PPP: 1.1
LYMPHOCYTES # BLD AUTO: 870 CELLS/UL (ref 850–3900)
LYMPHOCYTES NFR BLD AUTO: 27.2 %
MCH RBC QN AUTO: 30.2 PG (ref 27–33)
MCHC RBC AUTO-ENTMCNC: 33.2 G/DL (ref 32–36)
MCV RBC AUTO: 90.7 FL (ref 80–100)
MONOCYTES # BLD AUTO: 298 CELLS/UL (ref 200–950)
MONOCYTES NFR BLD AUTO: 9.3 %
NEUTROPHILS # BLD AUTO: 1866 CELLS/UL (ref 1500–7800)
NEUTROPHILS NFR BLD AUTO: 58.3 %
PLATELET # BLD AUTO: 68 THOUSAND/UL (ref 140–400)
PMV BLD REES-ECKER: 12.2 FL (ref 7.5–12.5)
POTASSIUM SERPL-SCNC: 4.3 MMOL/L (ref 3.5–5.3)
PROT SERPL-MCNC: 6.9 G/DL (ref 6.1–8.1)
PROTHROMBIN TIME: 11.2 SEC (ref 9–11.5)
RBC # BLD AUTO: 4.21 MILLION/UL (ref 3.8–5.1)
SERVICE CMNT-IMP: ABNORMAL
SODIUM SERPL-SCNC: 135 MMOL/L (ref 135–146)
WBC # BLD AUTO: 3.2 THOUSAND/UL (ref 3.8–10.8)

## 2025-03-11 ENCOUNTER — APPOINTMENT (OUTPATIENT)
Dept: PRIMARY CARE | Facility: CLINIC | Age: 60
End: 2025-03-11
Payer: COMMERCIAL

## 2025-03-13 ENCOUNTER — APPOINTMENT (OUTPATIENT)
Dept: RADIOLOGY | Facility: CLINIC | Age: 60
End: 2025-03-13
Payer: COMMERCIAL

## 2025-03-14 ENCOUNTER — PATIENT MESSAGE (OUTPATIENT)
Dept: PRIMARY CARE | Facility: CLINIC | Age: 60
End: 2025-03-14
Payer: COMMERCIAL

## 2025-03-14 DIAGNOSIS — K59.00 CONSTIPATION, UNSPECIFIED CONSTIPATION TYPE: Primary | ICD-10-CM

## 2025-03-14 DIAGNOSIS — Z72.0 TOBACCO ABUSE: ICD-10-CM

## 2025-03-14 RX ORDER — IBUPROFEN 200 MG
1 TABLET ORAL EVERY 24 HOURS
Qty: 30 PATCH | Refills: 0 | Status: SHIPPED | OUTPATIENT
Start: 2025-03-14 | End: 2025-04-13

## 2025-03-14 RX ORDER — DOCUSATE SODIUM 100 MG/1
100 CAPSULE, LIQUID FILLED ORAL 2 TIMES DAILY PRN
Qty: 60 CAPSULE | Refills: 0 | Status: SHIPPED | OUTPATIENT
Start: 2025-03-14

## 2025-03-27 ENCOUNTER — HOSPITAL ENCOUNTER (OUTPATIENT)
Dept: PAIN MEDICINE | Facility: CLINIC | Age: 60
Discharge: HOME | End: 2025-03-27
Payer: COMMERCIAL

## 2025-03-27 VITALS
RESPIRATION RATE: 16 BRPM | OXYGEN SATURATION: 96 % | HEART RATE: 71 BPM | DIASTOLIC BLOOD PRESSURE: 75 MMHG | TEMPERATURE: 96.4 F | SYSTOLIC BLOOD PRESSURE: 124 MMHG

## 2025-03-27 DIAGNOSIS — M47.812 CERVICAL SPONDYLOSIS WITHOUT MYELOPATHY: ICD-10-CM

## 2025-03-27 PROCEDURE — 2500000004 HC RX 250 GENERAL PHARMACY W/ HCPCS (ALT 636 FOR OP/ED): Performed by: PAIN MEDICINE

## 2025-03-27 PROCEDURE — 99152 MOD SED SAME PHYS/QHP 5/>YRS: CPT | Performed by: PAIN MEDICINE

## 2025-03-27 PROCEDURE — 64633 DESTROY CERV/THOR FACET JNT: CPT | Performed by: PAIN MEDICINE

## 2025-03-27 PROCEDURE — 64634 DESTROY C/TH FACET JNT ADDL: CPT | Performed by: PAIN MEDICINE

## 2025-03-27 RX ORDER — BUPIVACAINE HYDROCHLORIDE 5 MG/ML
INJECTION, SOLUTION EPIDURAL; INTRACAUDAL; PERINEURAL AS NEEDED
Status: COMPLETED | OUTPATIENT
Start: 2025-03-27 | End: 2025-03-27

## 2025-03-27 RX ORDER — MIDAZOLAM HYDROCHLORIDE 1 MG/ML
INJECTION, SOLUTION INTRAMUSCULAR; INTRAVENOUS AS NEEDED
Status: COMPLETED | OUTPATIENT
Start: 2025-03-27 | End: 2025-03-27

## 2025-03-27 RX ORDER — LIDOCAINE HYDROCHLORIDE 10 MG/ML
INJECTION, SOLUTION EPIDURAL; INFILTRATION; INTRACAUDAL; PERINEURAL AS NEEDED
Status: COMPLETED | OUTPATIENT
Start: 2025-03-27 | End: 2025-03-27

## 2025-03-27 RX ADMIN — MIDAZOLAM 2 MG: 1 INJECTION INTRAMUSCULAR; INTRAVENOUS at 09:34

## 2025-03-27 RX ADMIN — LIDOCAINE HYDROCHLORIDE 5 ML: 10 INJECTION, SOLUTION EPIDURAL; INFILTRATION; INTRACAUDAL; PERINEURAL at 09:39

## 2025-03-27 RX ADMIN — BUPIVACAINE HYDROCHLORIDE 10 ML: 5 INJECTION, SOLUTION EPIDURAL; INTRACAUDAL; PERINEURAL at 09:39

## 2025-03-27 ASSESSMENT — PAIN - FUNCTIONAL ASSESSMENT: PAIN_FUNCTIONAL_ASSESSMENT: 0-10

## 2025-03-27 ASSESSMENT — PAIN SCALES - GENERAL: PAINLEVEL_OUTOF10: 5 - MODERATE PAIN

## 2025-03-27 ASSESSMENT — PAIN DESCRIPTION - DESCRIPTORS: DESCRIPTORS: STABBING;CRAMPING

## 2025-03-27 NOTE — DISCHARGE INSTRUCTIONS
Post-injection instructions FOR Radiofrequency Ablation    Your radiofrequency ablation was completed today is not unusual to have some exacerbation of the pain before you get better.  Radiofrequency ablation takes an average of 2 to 3 weeks before it completely starts showing full results      Activity:  RETURN TO NORMAL ACTIVITY once the sedation is completely worn off do not sign any legal document for the first 24 hours do not drive or operate machinery for the first 24 hours until the sedation effect is completely worn off    Bandages: Remove after 24 hours     Showering/Bathing: You may shower after bandage is removed     May use ice at the site of the injection for the first 24 hours      Call the OFFICE immediately: if you notice:     Excessive bleeding from procedure site (brisk bright red bleeding from the site or bleeding that soaks the bandages or does not stop)   Severe headache  Inability to walk, leg or arm weakness or numbness that is worse after the procedure   Uncontrolled pain   New urinary or fecal incontinence   Signs of infection: Fever above 101.5F, redness, swelling, pus or drainage from the site    Please contact the pain clinic at [phone]  in 3 weeks to report results or with any further questions or concerns

## 2025-03-27 NOTE — H&P
"History Of Present Illness  Alejandra Houston is a 59 y.o. 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 She started smoking about 45 years ago. She has a 22.6 pack-year smoking history. She has never used smokeless tobacco. She reports that she does not currently use alcohol. She reports that she does not currently use drugs after having used the following drugs: \"Crack\" cocaine.    Family History  Family History   Problem Relation Name Age of Onset    Hypertension Mother      Diabetes Mother      COPD Mother      Heart disease Mother      Lymphoma Mother      Multiple myeloma Mother      Cancer Other fam hx     Diabetes Other fam hx     Hypertension Other fam hx     Heart disease Other fam hx         Allergies  No Known Allergies  Review of Systems   All 13 systems were reviewed and are within normal levels except as noted below or per HPI. Positive and pertinent negative responses are noted below or in the HPI   Denied any fever or chills. No weight loss and no night sweats. " No cough or sputum production. No diarrhea   No constipation  No bladder and bowel incontinence and no other changes in bladder and bowel. No skin changes.   Denied opioids diversion and abuse and denies alcoholism. Denies overuse of  pain medications.    Physical Exam      Past medical history no interval changes has been noted    On physical examination    General   Alert, oriented x3 pleasant and cooperative. Does not look in any major distress.    HEENT  Pupils normal in size. Ears, nose, mouth, and throat appear to be in normal condition.  Head atraumatic      No signs of sedation or signs of withdrawal apparent.    Psychiatric   No signs of depression apparent.    Neuro   No focal neurological deficit apparent. Ambulation at baseline.      Respiratory  No respiratory distress     Abdomen  no distention     Skin  No skin markings supportive of recent IV drug usage .    Cardiovascular  Regular rate and rhythm     Last Recorded Vitals  Blood pressure 102/70, pulse 73, temperature 35.8 °C (96.4 °F), temperature source Temporal, resp. rate 20, SpO2 95%, not currently breastfeeding.    Assessment/Plan   Here for rf cervical      Chrissy Merida MD

## 2025-03-31 DIAGNOSIS — K74.60 CIRRHOSIS OF LIVER WITHOUT ASCITES, UNSPECIFIED HEPATIC CIRRHOSIS TYPE (MULTI): ICD-10-CM

## 2025-03-31 DIAGNOSIS — M54.2 NECK PAIN: ICD-10-CM

## 2025-03-31 DIAGNOSIS — E11.9 TYPE 2 DIABETES MELLITUS WITHOUT COMPLICATION, WITHOUT LONG-TERM CURRENT USE OF INSULIN: ICD-10-CM

## 2025-03-31 DIAGNOSIS — J34.89 NOSE IRRITATION: ICD-10-CM

## 2025-03-31 RX ORDER — IBUPROFEN 600 MG/1
600 TABLET ORAL EVERY 8 HOURS PRN
Qty: 30 TABLET | Refills: 0 | Status: SHIPPED | OUTPATIENT
Start: 2025-03-31

## 2025-03-31 RX ORDER — DULAGLUTIDE 3 MG/.5ML
3 INJECTION, SOLUTION SUBCUTANEOUS
Qty: 2 ML | Refills: 0 | Status: SHIPPED | OUTPATIENT
Start: 2025-03-31

## 2025-03-31 RX ORDER — MUPIROCIN 20 MG/G
OINTMENT TOPICAL
Qty: 22 G | Refills: 0 | Status: SHIPPED | OUTPATIENT
Start: 2025-03-31 | End: 2025-04-10

## 2025-03-31 RX ORDER — CALCIUM CITRATE/VITAMIN D3 200MG-6.25
TABLET ORAL
Qty: 300 STRIP | Refills: 3 | Status: SHIPPED | OUTPATIENT
Start: 2025-03-31

## 2025-04-02 ENCOUNTER — APPOINTMENT (OUTPATIENT)
Dept: GASTROENTEROLOGY | Facility: HOSPITAL | Age: 60
End: 2025-04-02
Payer: COMMERCIAL

## 2025-04-03 ENCOUNTER — APPOINTMENT (OUTPATIENT)
Dept: ORTHOPEDIC SURGERY | Facility: CLINIC | Age: 60
End: 2025-04-03
Payer: COMMERCIAL

## 2025-04-03 ENCOUNTER — PATIENT MESSAGE (OUTPATIENT)
Dept: PRIMARY CARE | Facility: CLINIC | Age: 60
End: 2025-04-03
Payer: COMMERCIAL

## 2025-04-03 DIAGNOSIS — F32.A DEPRESSION, UNSPECIFIED DEPRESSION TYPE: ICD-10-CM

## 2025-04-03 RX ORDER — BUPROPION HYDROCHLORIDE 150 MG/1
150 TABLET ORAL EVERY MORNING
Qty: 30 TABLET | Refills: 1 | Status: SHIPPED | OUTPATIENT
Start: 2025-04-03 | End: 2025-06-02

## 2025-04-03 NOTE — TELEPHONE ENCOUNTER
This is going to have to come from her psychiatrist like we recommended several times- I usually do not go higher than 300 mg once daily for this

## 2025-04-03 NOTE — TELEPHONE ENCOUNTER
Can you please see what pharmacy she fills this at and confirm with them and the last time she filled it?

## 2025-04-04 DIAGNOSIS — Z00.00 HEALTHCARE MAINTENANCE: ICD-10-CM

## 2025-04-05 RX ORDER — ALBUTEROL SULFATE 90 UG/1
2 INHALANT RESPIRATORY (INHALATION) 4 TIMES DAILY PRN
Qty: 8.5 G | Refills: 1 | Status: SHIPPED | OUTPATIENT
Start: 2025-04-05 | End: 2025-05-05

## 2025-04-08 ENCOUNTER — APPOINTMENT (OUTPATIENT)
Dept: ORTHOPEDIC SURGERY | Facility: CLINIC | Age: 60
End: 2025-04-08
Payer: COMMERCIAL

## 2025-04-08 DIAGNOSIS — K59.00 CONSTIPATION, UNSPECIFIED CONSTIPATION TYPE: ICD-10-CM

## 2025-04-08 DIAGNOSIS — Z72.0 TOBACCO ABUSE: ICD-10-CM

## 2025-04-09 PROCEDURE — RXMED WILLOW AMBULATORY MEDICATION CHARGE

## 2025-04-09 RX ORDER — IBUPROFEN 200 MG
1 TABLET ORAL EVERY 24 HOURS
Qty: 30 PATCH | Refills: 0 | Status: SHIPPED | OUTPATIENT
Start: 2025-04-09 | End: 2025-05-09

## 2025-04-09 RX ORDER — DOCUSATE SODIUM 100 MG/1
100 CAPSULE, LIQUID FILLED ORAL 2 TIMES DAILY PRN
Qty: 60 CAPSULE | Refills: 0 | Status: SHIPPED | OUTPATIENT
Start: 2025-04-09

## 2025-04-14 ENCOUNTER — PHARMACY VISIT (OUTPATIENT)
Dept: PHARMACY | Facility: CLINIC | Age: 60
End: 2025-04-14
Payer: MEDICAID

## 2025-04-16 DIAGNOSIS — Z72.0 TOBACCO ABUSE: ICD-10-CM

## 2025-04-16 DIAGNOSIS — K59.00 CONSTIPATION, UNSPECIFIED CONSTIPATION TYPE: ICD-10-CM

## 2025-04-16 RX ORDER — IBUPROFEN 200 MG
1 TABLET ORAL EVERY 24 HOURS
Qty: 28 PATCH | Refills: 0 | Status: SHIPPED | OUTPATIENT
Start: 2025-04-16 | End: 2025-05-16

## 2025-04-16 RX ORDER — DOCUSATE SODIUM 100 MG/1
100 CAPSULE, LIQUID FILLED ORAL 2 TIMES DAILY PRN
Qty: 60 CAPSULE | Refills: 0 | Status: SHIPPED | OUTPATIENT
Start: 2025-04-16

## 2025-04-17 ENCOUNTER — HOSPITAL ENCOUNTER (OUTPATIENT)
Dept: RADIOLOGY | Facility: CLINIC | Age: 60
Discharge: HOME | End: 2025-04-17
Payer: COMMERCIAL

## 2025-04-17 ENCOUNTER — OFFICE VISIT (OUTPATIENT)
Dept: ORTHOPEDIC SURGERY | Facility: CLINIC | Age: 60
End: 2025-04-17
Payer: COMMERCIAL

## 2025-04-17 DIAGNOSIS — T84.84XA PAIN DUE TO TOTAL LEFT KNEE REPLACEMENT, INITIAL ENCOUNTER: ICD-10-CM

## 2025-04-17 DIAGNOSIS — T84.84XA PAIN DUE TO TOTAL LEFT KNEE REPLACEMENT, INITIAL ENCOUNTER: Primary | ICD-10-CM

## 2025-04-17 DIAGNOSIS — Z96.652 PAIN DUE TO TOTAL LEFT KNEE REPLACEMENT, INITIAL ENCOUNTER: ICD-10-CM

## 2025-04-17 DIAGNOSIS — Z96.652 PAIN DUE TO TOTAL LEFT KNEE REPLACEMENT, INITIAL ENCOUNTER: Primary | ICD-10-CM

## 2025-04-17 PROCEDURE — 99214 OFFICE O/P EST MOD 30 MIN: CPT | Performed by: STUDENT IN AN ORGANIZED HEALTH CARE EDUCATION/TRAINING PROGRAM

## 2025-04-17 PROCEDURE — 73560 X-RAY EXAM OF KNEE 1 OR 2: CPT | Mod: 50

## 2025-04-17 PROCEDURE — 73560 X-RAY EXAM OF KNEE 1 OR 2: CPT | Mod: LT

## 2025-04-17 NOTE — PROGRESS NOTES
Department of Orthopaedic Surgery  Division of Adult Reconstruction    Chief Complaint: Left knee pain    DOS: 2/8/2024 left total knee arthroplasty, attune uncemented, Dr. Kaminski    HPI:  Patient returns to clinic to discuss left knee pain.  The pain left knee has been relatively stable since she was last seen.  She still has an element of start up pain.  She recently quit smoking.    Presenting HPI:  Alejandra Houston is a pleasant 59 y.o. year-old female who is seen today for evaluation of left knee pain.  Patient underwent uncemented left total knee arthroplasty with Dr. Kaminski 1 year ago.  She reports never doing very well from that surgery.  She complains of a patch of skin over the medial aspect with hyperalgesia.  She has also had increasing pain with initiating gait.  The first few steps are very painful, after which it gets a bit better.  Walking long distances is difficult to.  She uses a rollator walker for ambulation.  She denies incision healing complications, concerns for infection at index surgery.  She has tried activity modification, Voltaren gel, NSAIDs, physical therapy with no appreciable relief.  Patient smokes cigarettes.  Well-controlled diabetes.    Review of Symptoms:  The patient denies any fever, chills, chest pain, shortness of breath or difficulty breathing.  Patient denies any numbness, tingling, or radicular symptoms.      Adult patient history sheet was filled out by the patient today in clinic and will be scanned into the EMR.  I personally reviewed this form which will be scanned into the EMR.  This includes Past Medical History, Past Surgical History, Medications, Allergies, Social History, Family History and 12 point review of systems.    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    Avascular necrosis of bone of left hip    Sleep apnea     no cpap    Suicidal ideations     Thrombocytopenia (CMS-HCC)        Past Surgical History:    Past Surgical History:   Procedure Laterality Date     SECTION, CLASSIC  02/15/2018     Section     SECTION, CLASSIC      HIP ARTHROPLASTY      KNEE ARTHROPLASTY      OTHER SURGICAL HISTORY  06/10/2019    Hip replacement    TOTAL KNEE ARTHROPLASTY  2018    Knee Replacement    UVULOPALATOPHARYNGOPLASTY       Allergies:    No Known Allergies    Medications:    Current Outpatient Medications on File Prior to Visit   Medication Sig Dispense Refill    acetaminophen (Tylenol) 500 mg tablet Take 1 tablet (500 mg) by mouth every 8 hours if needed for mild pain (1 - 3). 30 tablet 3    albuterol 90 mcg/actuation inhaler INHALE 2 PUFFS 4 TIMES A DAY AS NEEDED FOR SHORTNESS OF BREATH OR WHEEZING. 8.5 g 1    alcohol swabs pads, medicated Use as directed three times daily. 100 each 3    b complex 0.4 mg tablet Take 1 tablet by mouth once daily.      blood sugar diagnostic (True Metrix Glucose Test Strip) Use to check blood sugars three times a day 300 strip 3    blood-glucose meter (True Metrix Glucose Meter) misc USE AS DIRECTED EVERY DAY 1 each 0    blood-glucose meter,continuous (Dexcom G7 ) misc Use as instructed with sensors for continuous blood sugar monitoring 1 each 0    blood-glucose sensor (Dexcom G7 Sensor) device Apply 1 sensor to the back of the arm for continuous monitoring of blood sugar. Remove and apply new sensor every 10 days. 3 each 1    buPROPion XL (Forfivo XL) 450 mg 24 hr tablet Take 1 tablet (450 mg) by mouth once daily in the morning. Do not crush, chew, or split. 30 tablet 0    buPROPion XL (Wellbutrin XL) 150 mg 24 hr tablet TAKE 1  TABLET (150 MG) BY MOUTH ONCE DAILY IN THE MORNING. DO NOT CRUSH, CHEW, OR SPLIT. 30 tablet 1    carvedilol (Coreg) 6.25 mg tablet Take 1 tablet (6.25 mg) by mouth once daily. 30 tablet 0    cholecalciferol (Vitamin D-3) 50 mcg (2,000 unit) capsule Take 1 capsule (50 mcg) by mouth once daily.      cyclobenzaprine (Flexeril) 10 mg tablet TAKE 1 TABLET (10 MG) BY MOUTH 3 TIMES A DAY AS NEEDED FOR MUSCLE SPASMS. 90 tablet 0    docusate sodium (Colace) 100 mg capsule TAKE 1 CAPSULE (100 MG) BY MOUTH 2 TIMES A DAY AS NEEDED FOR CONSTIPATION. 60 capsule 0    DULoxetine (Cymbalta) 60 mg DR capsule Take 1 capsule (60 mg) by mouth 2 times a day. Do not crush or chew. 60 capsule 11    EASY COMFORT LANCETS MISC in the morning, at noon, and at bedtime. Use to test      famotidine (Pepcid) 40 mg tablet Take 1 tablet (40 mg) by mouth once daily. 30 tablet 0    ferrous sulfate 325 (65 Fe) MG EC tablet Take 1 tablet by mouth once daily with breakfast. Do not crush, chew, or split.      folic acid (Folvite) 1 mg tablet Take 1 tablet (1 mg) by mouth once daily.      FreeStyle glucose monitoring kit True Metrix Blood Glucose test in vitro strip; Use as directed 4-5 times daily      gabapentin (Neurontin) 800 mg tablet Take 1 tablet (800 mg) by mouth every 6 hours. 120 tablet 3    guaiFENesin (Mucinex) 1,200 mg tablet extended release 12hr Take 1 tablet (1,200 mg) by mouth every 12 hours if needed (Cough). 30 tablet 0    ibuprofen 600 mg tablet TAKE 1 TABLET (600 MG) BY MOUTH EVERY 8 HOURS IF NEEDED FOR MILD PAIN (1 - 3). 30 tablet 0    lancets (Easy Comfort Lancets) 30 gauge misc USE AS DIRECTED ONCE DAILY TO CHECK BLOOD SUGAR 100 each 0    metFORMIN XR (Glucophage-XR) 500 mg 24 hr tablet Take 2 tablets (1,000 mg) by mouth once daily with breakfast. Do not crush, chew, or split. 60 tablet 0    [] mupirocin (Bactroban) 2 % ointment Apply topically 3 times a day for 10 days. 22 g 0    naloxone (Narcan) 4 mg/0.1 mL nasal spray  "Administer 1 spray (4 mg) into affected nostril(s) if needed for opioid reversal or respiratory depression. May repeat every 2-3 minutes if needed, alternating nostrils, until medical assistance becomes available. 2 each 11    nicotine (Nicoderm CQ) 21 mg/24 hr patch PLACE 1 PATCH OVER 24 HOURS ON THE SKIN ONCE EVERY 24 HOURS. 28 patch 0    pantoprazole (ProtoNix) 40 mg EC tablet TAKE 1 TABLET (40 MG) BY MOUTH ONCE DAILY IN THE MORNING. TAKE BEFORE MEALS. DO NOT CRUSH, CHEW, OR SPLIT. 30 tablet 2    polyethylene glycol (Glycolax, Miralax) 17 gram packet Take 17 g by mouth once daily. Mix 1 cap (17g) into 8 ounces of fluid. 100 each 1    predniSONE (Deltasone) 5 mg tablet Take 5 tabs (25 mg) by mouth daily for 2 days, then 4 tabs (20 mg) daily for 2 days, then 3 tabs (15 mg) daily for 2 days, then 2 tabs (10 mg) daily for 2 days, then 1 tab (5 mg) daily for 2 days. (Patient not taking: Reported on 2/20/2025) 30 tablet 0    QUEtiapine (SEROquel) 50 mg tablet TAKE 3 TABLETS (150 MG) BY MOUTH ONCE DAILY AT BEDTIME. 90 tablet 0    Trulicity 3 mg/0.5 mL injection INJECT 3 MG UNDER THE SKIN 1 (ONE) TIME PER WEEK. 2 mL 0    [DISCONTINUED] docusate sodium (Colace) 100 mg capsule TAKE 1 CAPSULE (100 MG) BY MOUTH 2 TIMES A DAY AS NEEDED FOR CONSTIPATION. 60 capsule 0    [DISCONTINUED] nicotine (Nicoderm CQ) 21 mg/24 hr patch PLACE 1 PATCH OVER 24 HOURS ON THE SKIN ONCE EVERY 24 HOURS. 30 patch 0     No current facility-administered medications on file prior to visit.       Social History:    Social History     Occupational History     Employer: NONE   Tobacco Use    Smoking status: Every Day     Current packs/day: 0.50     Average packs/day: 0.5 packs/day for 45.3 years (22.6 ttl pk-yrs)     Types: Cigarettes     Start date: 1980    Smokeless tobacco: Never   Vaping Use    Vaping status: Never Used   Substance and Sexual Activity    Alcohol use: Not Currently     Comment: social    Drug use: Not Currently     Types: \"Crack\" " cocaine    Sexual activity: Defer     Family History:    Family History   Problem Relation Name Age of Onset    Hypertension Mother      Diabetes Mother      COPD Mother      Heart disease Mother      Lymphoma Mother      Multiple myeloma Mother      Cancer Other fam hx     Diabetes Other fam hx     Hypertension Other fam hx     Heart disease Other fam hx      Exam:  General: Well-appearing female in no acute distress.  Awake, alert and oriented.  Pleasant and cooperative.  Respiratory: Non-labored breathing  Mood: Euthymic   Gait: Antalgic  Assistive Device: None     Affected Left Knee  Limb Alignment: Neutral  ROM: 0-110  Stable to varus and valgus stress at full extension and 30 degrees of flexion  Skin: Intact, no abrasions or draining sinuses  Effusion: None  Quad Strength: 5/5  Hamstring Strength: 5/5  Patella Crepitus: None  Tenderness and hyperalgesia to the medial knee  Sensation: Intact to light touch distally  Motor function: Able to fire TA, EHL, G/S  Pulses: Palpable DP pulse    Imaging interpretation:   X-rays of the left knee demonstrate stable appearance of uncemented left total knee arthroplasty with possible lucency at the anterior tibial baseplate and surrounding the keel.    Assessment and Plan:  59-year-old female with left knee pain in the setting of an uncemented left total knee arthroplasty.  Inflammatory labs were normal.  Further discussion about the possibility of revision surgery for possible loosening.  The implant has a stable appearance over the past several months, and I would suspect that eventually if the components are loose we see much clear pseudo cortex at the bone-implant interface.  Patient is still working on weight loss and we would wait at least 6 weeks prior to any surgery after her smoking cessation date.  Encouraged her to continue optimizing herself for possible surgery later and recommended continued radiographic and clinical follow-up.    Carlos Cox MD  Department of  Orthopaedic Surgery  Division of Adult Reconstruction  , [facility]

## 2025-04-22 ENCOUNTER — TELEPHONE (OUTPATIENT)
Dept: PRIMARY CARE | Facility: CLINIC | Age: 60
End: 2025-04-22
Payer: COMMERCIAL

## 2025-04-22 NOTE — TELEPHONE ENCOUNTER
Patient was in ER last night, due to severe problems with neck, stomach and dry mouth, and wondered if Dr. Eller could get her in any sooner. Patient left ER without being seen, because she was there for four hours, and wasn't seen. Please advise.

## 2025-04-25 ENCOUNTER — OFFICE VISIT (OUTPATIENT)
Dept: PRIMARY CARE | Facility: CLINIC | Age: 60
End: 2025-04-25
Payer: COMMERCIAL

## 2025-04-25 ENCOUNTER — TELEPHONE (OUTPATIENT)
Dept: PRIMARY CARE | Facility: CLINIC | Age: 60
End: 2025-04-25

## 2025-04-25 VITALS
DIASTOLIC BLOOD PRESSURE: 70 MMHG | OXYGEN SATURATION: 95 % | SYSTOLIC BLOOD PRESSURE: 128 MMHG | TEMPERATURE: 97.2 F | RESPIRATION RATE: 16 BRPM | WEIGHT: 264 LBS | BODY MASS INDEX: 40.14 KG/M2 | HEART RATE: 77 BPM

## 2025-04-25 DIAGNOSIS — R68.2 DRY MOUTH: ICD-10-CM

## 2025-04-25 DIAGNOSIS — M54.12 CERVICAL RADICULOPATHY: Primary | ICD-10-CM

## 2025-04-25 DIAGNOSIS — E66.01 MORBID OBESITY (MULTI): ICD-10-CM

## 2025-04-25 DIAGNOSIS — R21 SKIN RASH: ICD-10-CM

## 2025-04-25 PROCEDURE — 99214 OFFICE O/P EST MOD 30 MIN: CPT | Performed by: INTERNAL MEDICINE

## 2025-04-25 PROCEDURE — 3074F SYST BP LT 130 MM HG: CPT | Performed by: INTERNAL MEDICINE

## 2025-04-25 PROCEDURE — 3078F DIAST BP <80 MM HG: CPT | Performed by: INTERNAL MEDICINE

## 2025-04-25 RX ORDER — KETOCONAZOLE 20 MG/G
CREAM TOPICAL 2 TIMES DAILY
Qty: 60 G | Refills: 1 | Status: SHIPPED | OUTPATIENT
Start: 2025-04-25 | End: 2026-04-25

## 2025-04-25 RX ORDER — FLUORIDE TOOTHPASTE
15 TOOTHPASTE DENTAL 3 TIMES DAILY PRN
Qty: 1000 ML | Refills: 1 | Status: SHIPPED | OUTPATIENT
Start: 2025-04-25

## 2025-04-25 RX ORDER — PREDNISONE 5 MG/1
TABLET ORAL
Qty: 30 TABLET | Refills: 0 | Status: SHIPPED | OUTPATIENT
Start: 2025-04-25

## 2025-04-25 ASSESSMENT — ENCOUNTER SYMPTOMS
CONSTIPATION: 1
NECK PAIN: 1

## 2025-04-25 NOTE — TELEPHONE ENCOUNTER
I need to check with our  to make sure it is OK to write letter first.    Would have her talk with psychiatrist about memory.  If she thinks we need neurology referral I can enter.

## 2025-04-25 NOTE — TELEPHONE ENCOUNTER
Pt stopped into office:     Pt would like to know if she could have a letter allowing pt to get a free fan/ air conditioner. Please advise if this is able to be done.     PT also stated she is concerned about her memory and did not know if she should be taking something for this.    *Called for reasoning for air conditioner. Stated it was for her sleep apnea as well as through a summer program. Pt was unsure what the summer program was but will be calling back when she knows who the program is through*

## 2025-04-25 NOTE — PROGRESS NOTES
Patient here for neck and knee pain     Subjective   Patient ID: Alejandra Houston is a 59 y.o. female who presents for Follow-up, Neck Pain, and Knee Pain.  She is accompanied by her  who offers some of the history.     The patient reports an episode of posterior neck pain extending to the top of her head along with lightheadedness which resulted in a visit to the ED at [facility] on 4/22/2025.  This was associated with sore throat and dry cough at the time.  The patient ended up leaving the ER without being seen as she had been waiting for four hours at the time.  She is scheduled for an upcoming appointment with  from Pain Management on 5/8/2025.      The patient notes significant constipation despite taking Miralax and Colace once daily.  She is scheduled for an upcoming appointment with Gastroenterology.    The patient mentions longstanding moderate to severe dry mouth despite drinking plenty of water and remaining well hydrated.  She has been taking cyclobenzaprine and was started on trazodone 50mg as 1 or 2 tablets once nightly, but does not recall any other medication changes.    The patient reports recent onset of difficulty with memory and forgetting daily tasks since 2/2025.  This seems to be associated with stuttering.  The patient is scheduled for an upcoming appointment with Psychiatry on 5/6/2025.  She is currently maintained with bupropion 300mg once daily.     The patient mentions a pruritic rash under the right breast.    The patient is considering bariatric surgery as she has been having difficulty with losing weight despite monitoring her diet.  She is unable to to exercise due to chronic pain in multiple joints, particularly the knees.    Neck Pain     Knee Pain       Review of Systems   HENT:          Positive for dry mouth.   Gastrointestinal:  Positive for constipation.   Musculoskeletal:  Positive for neck pain.        Positive for knee pain.   Skin:  Positive for rash.    Neurological:         Positive for difficulty with memory and stuttering.     Objective   Physical Exam  Constitutional:       Appearance: Normal appearance.   Neck:      Vascular: No carotid bruit.   Cardiovascular:      Rate and Rhythm: Normal rate and regular rhythm.      Heart sounds: Normal heart sounds.   Pulmonary:      Effort: Pulmonary effort is normal.      Breath sounds: Normal breath sounds.   Abdominal:      General: Bowel sounds are normal.      Palpations: Abdomen is soft.      Tenderness: There is no abdominal tenderness.   Skin:     General: Skin is warm and dry.   Neurological:      General: No focal deficit present.      Mental Status: She is alert and oriented to person, place, and time. Mental status is at baseline.   Psychiatric:         Mood and Affect: Mood normal.         Behavior: Behavior normal.         Assessment/Plan       IMPRESSIONS/PLAN:      Pinched Cervical Nerve Root/Left Shoulder Pain   - Flare-up.  Prescribed prednisone 5mg taper to be taken as directed on packaging.  CT Cervical Spine 1/2024 showed mild to moderate spinal canal stenosis at C4-C5 and moderate bilateral foraminal stenosis.  Prescribed prednisone 10mg taper as directed on packaging. Encouraged patient to schedule appointment with  from Pain Medicine.  Call the clinic if symptoms persist or worsen.    Constipation  - Advised the patient to take Miralax BID and Colace BID as directed on packaging.      Dry Mouth  - Prescribed moisturizing mouth (Biotene Dry Mouth Oral Rinse) solution to be used as 15ml up to TID prn.  Call the clinic if symptoms persist or worsen.     Skin Rash Under Breast  - Prescribed ketoconazole 2% BID.  Call the clinic if symptoms persist or worsen.     Morbid Obesity  - Ordered referral to Bariatric Surgery.    /70 in the office today.    Diabetes II   - Last HbA1c 7.0 per 3/2025.  Recent home readings elevated in the 200-300 mg/dL range per patient.   Advised patient taper  up metformin 500mg as two tablets QAM and 1 tablet QPM for one week, and then two tablets BID thereafter.  Can consider increasing Trulicity to 4.5 mg weekly as well.  Previously on glimperide 2mg every day.  Call the clinic with any concerns.     Depression   - Significantly improved since leaving the hospital. Previously ordered referral to [facility].  Continue on quetiapine 50mg once daily, bupropion 150mg once daily in the morning, and duloxetine 60mg BID.  Suspect that may improve with CPAP and with upcoming LES.  Previously on Rexulti and Topamax.  No SI or HI.  Call the clinic if symptoms persist or worsen.      Chronic Sinusitis  - CT Sinus without Contrast wnl and unremarkable 1/2024.  Following with  from Otolaryngology.     Nasal Septum Ulceration  - Mild erosions present on nasal septum.  Patient awaiting biopsy results.  Following  from Otolaryngology.     Acute Bilateral Actinic Otitis Externa  - Prescribed ofloxacin 0.3% otic solution to be applied as 5 drops twice daily.  Call the clinic if symptoms persist or worsen.      Sore Throat  - Suspect candidiasis.  Prescribed nystatin 100,000 unit/mL suspension as 5mL four times daily.  Also prescribed amoxicillin pot-clavulanate 875-125mg BID to be taken with food to avoid adverse effects.  Prescribed prednisone 5mg taper to be taken as directed, and explained that this may help with neck pain as well.  Call the clinic if symptoms persist or worsen.      Sleep Apnea   - Completed in-center Sleep Study confirming moderate CADEN.  Previously ordered referral to Sleep Medicine.  She will reach out to them about obtaining the CPAP supplies.     Smoking History   - CT Chest 4/2023 shows redemonstration of groundglass opacities in the posterior bilateral upper lobes, left lower lobe and new groundglass  opacities in the anterior left upper lobe. Central airways are clear.  Stable noncalcified pulmonary nodule  in the right upper lobe (6, 38) and right middle lobe (6, 57) since 5/19/2019.  Discussed with patient that varenicline was recalled.  Stable per 6/2024 repeat of CT Lung Screening.      Dyspepsia  - Refilled pantoprazole 40mg once daily in the mornings.     Liver Cirrhosis  - CT Chest with IV Contrast 10/2023 showed incidental cirrhotic liver morphology with sequela of portal hypertension (splenomegaly and esophageal varices).  Previously ordered referral for Hepatology. Advised the patient to stop taking Tylenol and limit Percocet, and she verbalized understanding.   She has seen Dr. Millard in the past. Last EGD 3/2024.  Following with  from Gastroenterology.     Left Adnexal Mass  - CT Abdomen Pelvis 7/2023 showed incidental finding of A 3.6 cm benign-appearing left adnexal lesion is seen.  In an early post-menopausal woman (<5 years since menopause), follow-up ultrasound at 6-12 months is recommended. Patient has scheduled appointment with Gynecology.     Constipation  - Prescribed Miralax 17g once daily.  Advised patient to try Colace as well if necessary.  Call the clinic if symptoms persist or worsen.      Vaginal dryness  - Estradiol cream.     Left Wrist Fracture  - Xray Left Wrist 5/2024 showed comminuted, mildly displaced, intra-articular fracture of the distal radius with associated ulnar styloid fracture.  There is also a questionable avulsion fracture of the triquetrum.  The distal radius fracture has a significant amount of dorsal comminution with a large intra-articular dorsal fragment that is mildly displaced.  Following with  from Orthopedic Surgery.      Bilateral Hand Pain  - Ongoing.  C7-T1 epidural steroid injection from 8/21/2024 resulted in minimal relief.  Continue with duloxetine 60mg once daily and gabapentin 300mg as two capsules in morning and afternoon, and three capsules in evening for now.  Following with  from Orthopedic Surgery.  S/p left carpal tunnel  release on 9/4/2024 with  from Orthopedic Surgery.  EMG from 9/13/2024 showed right CTS with superimposed C5 and C6 radiculopathies.  Call the clinic if symptoms persist or worsen, and will consider ordering MRI Cervical Spine as well as referral to Neurosurgery pending results.      Left Clavicle  - Xray Left Shoulder 9/2024 showed Mild left acromioclavicular osteoarthrosis. Glenohumeral joint is  maintained.     Low Back Pain/ leg weakness   - Worsening since 4/2023.  Weakness on LLE on exam.  MR Lumbar Spine from 1/2024 showed multilevel degenerative changes of the lumbar spine most prominent at L3-4 where there is moderate canal stenosis, slightly progressed.  Continue with gabapentin 600 mg TID.  Refilled Naproxen 500 mg BID prn.  Previously on tramadol 50 mg every 6 hours as needed.   Following with  from Pain Management.      Left Knee OA  - s/p left total knee arthroplasty 2/8/2024 with  from Orthopedic Surgery.       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 Underwent complex revision of right TKA. Following with  in Orthopedic Surgery.     Chronic Pain  - Ongoing.  Patient has tried multiple epidural corticosteroid injections with minimal to no relief.  MRI of Lumbar Spine 1/2024 showed multilevel degenerative changes of lumbar spine with moderate canal stenosis.  EMG 9/2024 of right upper extremity shows superimposed CTS with C5-C6 radiculopathy.  CT Cervical Spine 1/2024 showed  Mild to moderate spinal canal stenosis at C4-C5 and moderate bilateral foraminal stenosis.  Patient unable to tolerate chronic Tylenol due to history of liver cirrhosis.  She has been taking her gabapentin and Cymbalta diligently as well.  Previously followed with  from Pain Management.  Previously ordered referral to Pain Medicine with .     Long toenails   - Previously ordered referral to Podiatry with .     Dry  Skin  - Cetaphil moisturizing lotion.      Thrombocytopenia  - Last CBC showed platelet count of 77 per 10/2023.  Following with Hematology/Oncology.  Suspect from underlying liver cirrhosis.       Fatigue  - Likely due to untreated sleep apnea.  Encouraged patient to follow-up with Sleep Medicine, and she has appointment for next week.  Vitamin D, Vitamin B12, and TSH wnl per 9/2023.     Health Maintenance   - Routine labs 3/2025, 6/2024.  Last Mammogram 8/2024. Last colonoscopy 5/2019, repeat due [id number].  Previously ordered repeat colonoscopy for 2024.     Follow-up in 3 months, call sooner if needed.        Scribe Attestation  By signing my name below, I, Aida Galdamez, Pedro   attest that this documentation has been prepared under the direction and in the presence of Jhoan Eller DO.   Aida Galdamez 04/25/25 10:02 AM

## 2025-04-26 DIAGNOSIS — M54.2 NECK PAIN: ICD-10-CM

## 2025-04-26 DIAGNOSIS — E11.9 TYPE 2 DIABETES MELLITUS WITHOUT COMPLICATION, WITHOUT LONG-TERM CURRENT USE OF INSULIN: ICD-10-CM

## 2025-04-26 DIAGNOSIS — N89.8 VAGINAL DRYNESS: Primary | ICD-10-CM

## 2025-04-26 DIAGNOSIS — J34.89 NOSE IRRITATION: ICD-10-CM

## 2025-04-26 DIAGNOSIS — M54.12 CERVICAL RADICULOPATHY: ICD-10-CM

## 2025-04-26 LAB
ANION GAP SERPL CALCULATED.4IONS-SCNC: 10 MMOL/L (CALC) (ref 7–17)
BUN SERPL-MCNC: 19 MG/DL (ref 7–25)
BUN/CREAT SERPL: ABNORMAL (CALC) (ref 6–22)
CALCIUM SERPL-MCNC: 9.9 MG/DL (ref 8.6–10.4)
CHLORIDE SERPL-SCNC: 104 MMOL/L (ref 98–110)
CO2 SERPL-SCNC: 23 MMOL/L (ref 20–32)
CREAT SERPL-MCNC: 0.77 MG/DL (ref 0.5–1.03)
EGFRCR SERPLBLD CKD-EPI 2021: 90 ML/MIN/1.73M2
GLUCOSE SERPL-MCNC: 119 MG/DL (ref 65–99)
POTASSIUM SERPL-SCNC: 4.2 MMOL/L (ref 3.5–5.3)
SODIUM SERPL-SCNC: 137 MMOL/L (ref 135–146)

## 2025-04-26 RX ORDER — ESTRADIOL 0.1 MG/G
2 CREAM VAGINAL DAILY
Qty: 42.5 G | Refills: 11 | Status: SHIPPED | OUTPATIENT
Start: 2025-04-26 | End: 2026-04-26

## 2025-04-28 RX ORDER — DULAGLUTIDE 3 MG/.5ML
3 INJECTION, SOLUTION SUBCUTANEOUS
Qty: 2 ML | Refills: 3 | Status: SHIPPED | OUTPATIENT
Start: 2025-05-04

## 2025-04-28 RX ORDER — MUPIROCIN 20 MG/G
OINTMENT TOPICAL
Qty: 22 G | Refills: 1 | Status: SHIPPED | OUTPATIENT
Start: 2025-04-28 | End: 2025-05-08

## 2025-04-28 RX ORDER — PREDNISONE 5 MG/1
TABLET ORAL
Qty: 30 TABLET | Refills: 1 | Status: SHIPPED | OUTPATIENT
Start: 2025-04-28

## 2025-04-28 RX ORDER — IBUPROFEN 600 MG/1
600 TABLET ORAL EVERY 8 HOURS PRN
Qty: 30 TABLET | Refills: 1 | Status: SHIPPED | OUTPATIENT
Start: 2025-04-28

## 2025-04-30 ENCOUNTER — APPOINTMENT (OUTPATIENT)
Dept: PRIMARY CARE | Facility: CLINIC | Age: 60
End: 2025-04-30
Payer: COMMERCIAL

## 2025-04-30 ENCOUNTER — APPOINTMENT (OUTPATIENT)
Dept: GASTROENTEROLOGY | Facility: HOSPITAL | Age: 60
End: 2025-04-30
Payer: COMMERCIAL

## 2025-05-06 ENCOUNTER — TELEPHONE (OUTPATIENT)
Dept: SLEEP MEDICINE | Facility: CLINIC | Age: 60
End: 2025-05-06
Payer: COMMERCIAL

## 2025-05-06 ENCOUNTER — TELEPHONE (OUTPATIENT)
Dept: PRIMARY CARE | Facility: CLINIC | Age: 60
End: 2025-05-06
Payer: COMMERCIAL

## 2025-05-08 ENCOUNTER — OFFICE VISIT (OUTPATIENT)
Dept: PAIN MEDICINE | Facility: CLINIC | Age: 60
End: 2025-05-08
Payer: COMMERCIAL

## 2025-05-08 DIAGNOSIS — M25.562 CHRONIC PAIN OF BOTH KNEES: Primary | ICD-10-CM

## 2025-05-08 DIAGNOSIS — M25.561 CHRONIC PAIN OF BOTH KNEES: Primary | ICD-10-CM

## 2025-05-08 DIAGNOSIS — G89.29 CHRONIC PAIN OF BOTH KNEES: Primary | ICD-10-CM

## 2025-05-08 PROCEDURE — 99214 OFFICE O/P EST MOD 30 MIN: CPT | Performed by: PAIN MEDICINE

## 2025-05-08 ASSESSMENT — PAIN SCALES - GENERAL: PAINLEVEL_OUTOF10: 10-WORST PAIN EVER

## 2025-05-08 NOTE — PROGRESS NOTES
"[facility]  Followup Visit Note        HISTORY OF PRESENT ILLNESS   Alejandra Houston is a 59 y.o. female who presents to a [facility] for followup.       Current History    On today's visit, the patient reports had difficult year and couldn't start CPAP.    She is now ready to restart CPAP. Goal of CPAP is better sleep and less sleepiness in the daytime.    Previous visit 04/16/2024  OBSTRUCTIVE SLEEP APNEA / SLEEPINESS / FREQUENT WAKING  -Reviewed test results with patient  -Will order new CPAP from [facility] with pressure 5-15 cm H2O EPR 3  -Wants to start with n30  -Discussed mask options and common tolerance issues  -Goal of CPAP includes less daytime sleepiness, better sleep quality    CHRONIC INSOMNIA / DELAYED SLEEP PHASE  -Recommend melatonin 1 mg 4-5 hours prior to intended bedtime  -Try increasing light exposure in the morning and decreasing light exposure in the afternoon  -Continue trazodone    RESTLESS LEG SYNDROME  -Likely worse due to trazodone  -Check ferritin level  -increase evening gabapentin dose to 900 mg     GERD  -continue famotidine 40 mg every day    BMI>35  -Body mass index is 35.88 kg/m².  today  -With sufficient weight loss may no longer require treatment for CADEN     Followup 31-90 days after starting CPAP      PAP Adherence:      Sleep Scales:  ESS: No data recorded     REVIEW OF SYSTEMS    All other systems negative      VITAL SIGNS: /90   Pulse 84   Ht 1.727 m (5' 8\")   Wt 118 kg (260 lb)   LMP  (LMP Unknown)   SpO2 95%   BMI 39.53 kg/m²      CURRENT WEIGHT: [unfilled]  BMI: [unfilled]  PREVIOUS WEIGHTS:  Wt Readings from Last 3 Encounters:   05/09/25 118 kg (260 lb)   04/25/25 120 kg (264 lb)   02/20/25 113 kg (250 lb)       Constitutional: Alert and oriented, cooperative, no obvious distress   Neuromuscular: Cranial nerves grossly intact, EOMs intact    RESULTS/DATA     Iron (ug/dL)   Date Value   06/03/2024 71 "   10/28/2021 128     % Saturation (%)   Date Value   06/03/2024 23 (L)     Iron Saturation (%)   Date Value   10/28/2021 42     TIBC (ug/dL)   Date Value   06/03/2024 303   10/28/2021 304     Ferritin   Date Value   06/03/2024 83 ng/mL   10/28/2021 314 ug/L (H)         ASSESSMENT/PLAN     Ms. Houston is a 59 y.o. female and returns in followup for the following issues:    OBSTRUCTIVE SLEEP APNEA / SLEEPINESS / FREQUENT WAKING  -CPAP [place] [facility] [place] with pressure 5-15 cm H2O EPR on max setting of 3  -Goal of CPAP includes less daytime sleepiness, better sleep quality    BMI>35  -Body mass index is 39.53 kg/m².  today  -With sufficient weight loss may no longer require treatment for CADEN    HYPERTENSION  BP Readings from Last 3 Encounters:   05/09/25 134/90   04/25/25 128/70   03/27/25 124/75      -will benefit from CPAP    Followup 31-90 days after starting CPAP

## 2025-05-08 NOTE — H&P
"History Of Present Illness  Alejandra Houston is a 59 y.o. female presenting with   Left knee  did have a replacement in Feb the pain has increased states she feels helpless  Continues to have neck pain on the Right side   Continues to describe the left knee pain as a chronic 10 out of 10 pain affecting her day-to-day activity unable to walk for any long distances the pain is localized in the medial aspect of the left knee described as a sharp sensation specially during turning denies any nerve block performed on the left knee completed the physical therapy on the knee and was seen and evaluated by orthopedic surgeon operatively confirming that currently the hardware is satisfactory.  She continues to be on ibuprofen Tylenol and gabapentin 800mg  4 times per day reviewed the [place] automated reporting prescription today showing no signs of misuse or abuse of any prescription  Past Medical History  Medical History[1]  Surgical History  Surgical History[2]  Social History  She reports that she has been smoking cigarettes. She started smoking about 45 years ago. She has a 22.7 pack-year smoking history. She has never used smokeless tobacco. She reports that she does not currently use alcohol. She reports that she does not currently use drugs after having used the following drugs: \"Crack\" cocaine.    Family History  Family History[3]     Allergies  Allergies[4]  Review of Systems  All 13 systems were reviewed and are within normal levels except as noted below or per HPI. Positive and pertinent negative responses are noted below or in the HPI   Denied any fever or chills. No weight loss and no night sweats. No cough or sputum production. No diarrhea   The constipation has been responding to fibers and over the counter medications.     No bladder and bowel incontinence and no other changes in bladder and bowel. No skin changes. Reports tiredness and fatigability only if the pain is not controlled.   Denied opioids diversion " and abuse and denies alcoholism. Denies overuse of the pain medications.    Physical Exam       Past medical history no interval changes has been noted    On physical examination    General   Alert, oriented x3 pleasant and cooperative. Does not look in any major distress.    HEENT  Pupils normal in size. Ears, nose, mouth, and throat appear to be in normal condition.  Head atraumatic      No signs of sedation or signs of withdrawal apparent.    Psychiatric   No signs of depression apparent.    Neuro  Unable to fully extend the left knee secondary to the pain  Ambulating with the assistance of a walker  Sensory examination preserved      Respiratory  No respiratory distress     Abdomen  no distention     Skin  No skin markings supportive of recent IV drug usage .    Cardiovascular  Regular rate and rhythm    Last Recorded Vitals  not currently breastfeeding.  XR knee 1-2 views bilateral  XR knee left 1-2 views  Status: Final result     PACS Images     Show images for XR knee 1-2 views bilateral  Signed by    Signed Time Phone Pager   Robin High MD 4/19/2025 06:48 [phone]     Exam Information    Status Exam Begun Exam Ended   Final 4/17/2025 09:48 4/17/2025 09:48     Study Result    Narrative & Impression   Interpreted By:  Robin High,   STUDY:  XR KNEE LEFT 1-2 VIEWS; XR KNEE 1-2 VIEWS BILATERAL      INDICATION:  Signs/Symptoms:S/p TKA; Signs/Symptoms:Pain,operative planning.      COMPARISON:  February 20, 2025      ACCESSION NUMBER(S):  [id number]; [id number]      ORDERING CLINICIAN:  JOSEFA SAVAGE      FINDINGS:  Left total knee arthroplasty without fracture, malalignment, or  periprosthetic lucency.      Long-stem revision right knee arthroplasty grossly satisfactory  without periprosthetic lucency or malalignment.      IMPRESSION:  Satisfactory appearance left total knee arthroplasty.      Satisfactory appearance revision right knee arthroplasty.      Signed by: Robin High 4/19/2025 6:48  AM  Dictation workstation:   [id number]     Assessment/Plan   59 years old with history and physical examination supportive of chronic bilateral knee worse on the left side status post knee replacement    Plan  Advised the patient that I would recommend a genicular nerve block to be performed under fluoroscopic guidance starting with the left knee if the patient described positive response then we could proceed with the radiofrequency ablation of the left genicular nerve  In the meantime will be continued on the current regimen of Tylenol ibuprofen and gabapentin as scheduled benefits and risk of the procedure were discussed with the patient and she would like to proceed      The above clinical summary has been dictated with voice recognition software. It has not been proofread for grammatical errors, typographical mistakes, or other semantic inconsistencies.    Thank you for visiting our office today. It was our pleasure to take part in your healthcare.     Please do not hesitate to contact the pain clinic after your visit with any questions or concerns at  M-F 8-4 pm       Chrissy Merida M.D.  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     Avascular necrosis of bone of left hip    Sleep apnea     no cpap    Suicidal ideations     Thrombocytopenia (CMS-HCC)    [2]   Past Surgical History:  Procedure Laterality Date     SECTION, CLASSIC  02/15/2018     Section     SECTION, CLASSIC      HIP ARTHROPLASTY      KNEE ARTHROPLASTY      OTHER SURGICAL HISTORY  06/10/2019    Hip replacement    TOTAL KNEE ARTHROPLASTY  2018    Knee Replacement    UVULOPALATOPHARYNGOPLASTY     [3]   Family History  Problem Relation Name Age of Onset    Hypertension Mother      Diabetes Mother      COPD Mother      Heart disease Mother      Lymphoma Mother      Multiple myeloma Mother      Cancer Other fam hx     Diabetes Other fam hx     Hypertension Other fam hx     Heart disease Other fam hx    [4] No Known Allergies

## 2025-05-08 NOTE — PROGRESS NOTES
Left knee  did have a replacement in Feb the pain has increased states she feels helpless  Continues to have neck pain on the Right side  States the gabapentin is not helping

## 2025-05-09 ENCOUNTER — OFFICE VISIT (OUTPATIENT)
Dept: SLEEP MEDICINE | Facility: CLINIC | Age: 60
End: 2025-05-09
Payer: COMMERCIAL

## 2025-05-09 VITALS
SYSTOLIC BLOOD PRESSURE: 134 MMHG | HEIGHT: 68 IN | OXYGEN SATURATION: 95 % | BODY MASS INDEX: 39.4 KG/M2 | DIASTOLIC BLOOD PRESSURE: 90 MMHG | HEART RATE: 84 BPM | WEIGHT: 260 LBS

## 2025-05-09 DIAGNOSIS — G47.33 MODERATE OBSTRUCTIVE SLEEP APNEA: Primary | ICD-10-CM

## 2025-05-09 DIAGNOSIS — K59.00 CONSTIPATION, UNSPECIFIED CONSTIPATION TYPE: ICD-10-CM

## 2025-05-09 DIAGNOSIS — R53.83 OTHER FATIGUE: ICD-10-CM

## 2025-05-09 DIAGNOSIS — R10.13 DYSPEPSIA: ICD-10-CM

## 2025-05-09 PROCEDURE — 3008F BODY MASS INDEX DOCD: CPT | Performed by: PHYSICIAN ASSISTANT

## 2025-05-09 PROCEDURE — 99213 OFFICE O/P EST LOW 20 MIN: CPT | Performed by: PHYSICIAN ASSISTANT

## 2025-05-09 PROCEDURE — 3075F SYST BP GE 130 - 139MM HG: CPT | Performed by: PHYSICIAN ASSISTANT

## 2025-05-09 PROCEDURE — 3080F DIAST BP >= 90 MM HG: CPT | Performed by: PHYSICIAN ASSISTANT

## 2025-05-09 RX ORDER — PANTOPRAZOLE SODIUM 40 MG/1
40 TABLET, DELAYED RELEASE ORAL
Qty: 30 TABLET | Refills: 3 | Status: SHIPPED | OUTPATIENT
Start: 2025-05-09 | End: 2025-06-08

## 2025-05-09 RX ORDER — DOCUSATE SODIUM 100 MG/1
100 CAPSULE, LIQUID FILLED ORAL 2 TIMES DAILY PRN
Qty: 60 CAPSULE | Refills: 1 | Status: SHIPPED | OUTPATIENT
Start: 2025-05-09

## 2025-05-09 ASSESSMENT — LIFESTYLE VARIABLES
HOW MANY STANDARD DRINKS CONTAINING ALCOHOL DO YOU HAVE ON A TYPICAL DAY: 1 OR 2
SKIP TO QUESTIONS 9-10: 1
HOW OFTEN DO YOU HAVE A DRINK CONTAINING ALCOHOL: MONTHLY OR LESS
HOW OFTEN DO YOU HAVE SIX OR MORE DRINKS ON ONE OCCASION: NEVER
AUDIT-C TOTAL SCORE: 1

## 2025-05-09 ASSESSMENT — PAIN SCALES - GENERAL: PAINLEVEL_OUTOF10: 8

## 2025-05-12 ENCOUNTER — TELEPHONE (OUTPATIENT)
Dept: PRIMARY CARE | Facility: CLINIC | Age: 60
End: 2025-05-12
Payer: COMMERCIAL

## 2025-05-12 NOTE — TELEPHONE ENCOUNTER
Pt is at [facility] and would like to discuss her treatment options before leaving for her pain. She is wishing to speak with either Erika or Dr Eller directly. I advised pt she would be in their care but she still wished I put this message in to your attn. Please advise. Thank you!

## 2025-05-12 NOTE — PROGRESS NOTES
"Subjective     Date: 5/12/2025 Time: 11:53 AM  Name: Alejandra Houstno  MRN: [id number]    This is a 59 y.o. female with morbid obesity (There is no height or weight on file to calculate BMI.) who presents to clinic for consideration of bariatric surgery. she has attempted and failed multiple diet and exercise regimens for weight loss. Initial Onset of obesity was {Initial Obesity Onset:21121:::0}.  Their goal for surgery is to  {Weight Loss Interest:75631}. The patient has tried multiple diets to lose weight including {Previous Diet Trials:20660}. The patient was most successful with the {Most Successful Diet:31859}. The most pounds lost on this diet were *** lbs. The patient considers their dietary weakness to be {Dietary Weakness:85052} The patient reports a  {Weight Pattern:57238::\"highest weight ever of *** pounds\",\"lowest weight ever of *** pounds\"} {Distribution of Obesity:81748}. Current diet: {Diet:05329}. Compliance: {Compliance:98424} Diet Problems: {Diet Problems:98891} } Dietary Details Include:{Dietary Details:54914} The patient {Exercise Frequency:48984} {Excercise Duration (Optional):01871} {Types of Exercise (Optional):82357}    {Comorbidities:49862}  Problem List[1]    {Procedure Preferred:63877}    {GERDQOL:24264}    PMH: Medical History[2]     PSH: Surgical History[3]     STOPBANG *** (+ ***).   *** personal/family hx of VTE.    FAMILY HISTORY:  Family History[4]     SOCIAL HISTORY:  Social History[5]    MEDICATIONS:  Prior to Admission Medications:  Medication Documentation Review Audit       Reviewed by Antonio Feliz PA-C (Physician Assistant) on 05/09/25 at 1658      Medication Order Taking? Sig Documenting Provider Last Dose Status   acetaminophen (Tylenol) 500 mg tablet [id number] No Take 1 tablet (500 mg) by mouth every 8 hours if needed for mild pain (1 - 3). Jhoan Eller, DO 10/8/2024 Active   albuterol 90 mcg/actuation inhaler [id number]  INHALE 2 PUFFS 4 TIMES A DAY AS NEEDED FOR " SHORTNESS OF BREATH OR WHEEZING. Jhoan Eller DO   25   b complex 0.4 mg tablet [id number]  Take 1 tablet by mouth once daily. Historical Provider, MD  Active   blood-glucose meter (True Metrix Glucose Meter) misc [id number] No USE AS DIRECTED EVERY DAY Jhoan Eller DO Unknown Active   blood-glucose meter,continuous (Dexcom G7 ) misc [id number]  Use as instructed with sensors for continuous blood sugar monitoring Jhoan Eller DO  Active   blood-glucose sensor (Dexcom G7 Sensor) device [id number]  Apply 1 sensor to the back of the arm for continuous monitoring of blood sugar. Remove and apply new sensor every 10 days. Jhoan Eller DO  Active   carvedilol (Coreg) 6.25 mg tablet [id number]  Take 1 tablet (6.25 mg) by mouth once daily. JUNAID Aiken-CNP   25   cholecalciferol (Vitamin D-3) 50 mcg (2,000 unit) capsule [id number]  Take 1 capsule (2,000 Units) by mouth once daily. Historical Provider, MD  Active     Discontinued 25 1306   docusate sodium (Colace) 100 mg capsule [id number]  TAKE 1 CAPSULE (100 MG) BY MOUTH 2 TIMES A DAY AS NEEDED FOR CONSTIPATION. Jhoan Eller DO  Active   dulaglutide (Trulicity) 3 mg/0.5 mL injection [id number]  Inject 3 mg under the skin 1 (one) time per week. Jhoan Eller DO  Active   DULoxetine (Cymbalta) 60 mg DR capsule [id number]  Take 1 capsule (60 mg) by mouth 2 times a day. Do not crush or chew. Chrissy Merida MD  Active   estradiol (Estrace) 0.01 % (0.1 mg/gram) vaginal cream [id number]  Insert 0.5 Applicatorfuls (2 g) into the vagina once daily. Apply to vagina nightly every Monday/Wednesday/Friday. Jhoan Eller DO  Active   famotidine (Pepcid) 40 mg tablet [id number]  Take 1 tablet (40 mg) by mouth once daily. JUNAID Aiken-CNP   25 235   ferrous sulfate 325 (65 Fe) MG EC tablet [id number]  Take 1 tablet by mouth once daily with breakfast. Do not crush, chew, or split.  Historical Provider, MD  Active   folic acid (Folvite) 1 mg tablet [id number]  Take 1 tablet (1 mg) by mouth once daily. Historical Provider, MD  Active   FreeStyle glucose monitoring kit [id number] No True Metrix Blood Glucose test in vitro strip; Use as directed 4-5 times daily Historical Provider, MD Unknown Active   gabapentin (Neurontin) 800 mg tablet [id number]  TAKE 1 TABLET (800 MG) BY MOUTH EVERY 6 HOURS. Chrissy Merida MD  Active   ibuprofen 600 mg tablet [id number]  TAKE 1 TABLET (600 MG) BY MOUTH EVERY 8 HOURS IF NEEDED FOR MILD PAIN (1 - 3). Jhoan Eller, DO  Active   ketoconazole (NIZOral) 2 % cream [id number]  Apply topically 2 times a day. Jhoan Eller DO  Active   lancets (Easy Comfort Lancets) 30 gauge misc [id number] No USE AS DIRECTED ONCE DAILY TO CHECK BLOOD SUGAR Jhoan Eller, DO Unknown Active   metFORMIN XR (Glucophage-XR) 500 mg 24 hr tablet [id number]  Take 2 tablets (1,000 mg) by mouth once daily with breakfast. Do not crush, chew, or split. Jodie Shaver, APRN-CNP   25 2359   moisturizing mouth (Biotene Dry Mouth Oral Rinse) solution [id number]  Swish and spit 15 mL 3 times a day as needed (Dry mouth). Jhoan Eller DO  Active   mupirocin (Bactroban) 2 % ointment [id number]  APPLY TOPICALLY 3 TIMES A DAY FOR 10 DAYS. Jhoan Eller DO   25 2359     Discontinued 25 1306   pantoprazole (ProtoNix) 40 mg EC tablet [id number]  TAKE 1 TABLET (40 MG) BY MOUTH ONCE DAILY IN THE MORNING. TAKE BEFORE MEALS. DO NOT CRUSH, CHEW, OR SPLIT. Jhoan Eller DO  Active   polyethylene glycol (Glycolax, Miralax) 17 gram packet [id number]  Take 17 g by mouth once daily. Mix 1 cap (17g) into 8 ounces of fluid. Jhoan Eller DO  Active   predniSONE (Deltasone) 5 mg tablet [id number]  TAKE 5 TABS BY MOUTH DAILY X 2 DAYS, THEN 4 TABS DAILY X 2 DAYS, THEN 3 TABS DAILY X 2 DAYS, THEN 2 TABS DAILY X 2 DAYS, THEN 1 TAB DAILY X 2 DAYS Jhoan Eller, DO  Active                      ALLERGIES:  Allergies[6]    REVIEW OF SYSTEMS:  GENERAL: Negative for malaise, significant weight loss and fever  HEAD: Negative for headache, swelling.  NECK: Negative for lumps, goiter, pain and significant neck swelling  RESPIRATORY: Negative for cough, wheezing or shortness of breath.  CARDIOVASCULAR: Negative for chest pain, leg swelling or palpitations.  GI: Negative for abdominal discomfort, blood in stools or black stools or change in bowel habits  : No history of dysuria, frequency or incontinence  MUSCULOSKELETAL: Negative for joint pain or swelling, back pain or muscle pain.  SKIN: Negative for lesions, rash, and itching.  PSYCH: Negative for sleep disturbance, mood disorder and recent psychosocial stressors.  ENDOCRINE: Negative for cold or heat intolerance, polyuria, polydipsia and goiter.    Objective   PHYSICAL EXAM:  Visit Vitals  LMP  (LMP Unknown)   OB Status Postmenopausal   Smoking Status Every Day     General appearance: obese, NAD  Neuro: AOx3  Head: EOMI; no swelling or lesions of scalp or face  ENT:  no lumps or lymphadenopathy, thyroid normal to palpation; oropharynx clear, no swelling or erythema  Skin: warm, no erythema or rashes  Lungs: clear to percussion and auscultation  Heart: regular rhythm and S1, S2 normal  Abdomen: soft, non-tender, no masses, no organomegaly  Extremities: Normal exam of the extremities. No swelling or pain.  Psych: no hurried speech, no flight of ideas, normal affect    Assessment/Plan   IMPRESSION:  Alejandra Houston is a 59 y.o. female with a BMI of There is no height or weight on file to calculate BMI. with the following diagnoses and co-morbidities:     Medical History[7]    This patient does meet the criteria for a surgical weight loss procedure according to NIH guidelines.  The risks of sleeve gastrectomy, Yara-en-Y gastric bypass, and duodenal switch surgery including but not limited to bleeding, leak along staple lines, infection,  dehydration, ulcers, internal hernia, DVT/PE, pneumonia, myocardial infarction, prolonged nausea/vomiting, incomplete resolution of associated medical conditions, reflux, weight regain, vitamin/mineral deficiencies, and death have been explained to the patient and Alejandra Houston has expressed understanding and acceptance of them.     We discussed the lifestyle changes necessary to be successful following surgery.    The increased risk of substance and alcohol abuse following bariatric surgery was discussed with the patient, along with the negative consequences of substance/alcohol use after surgery including addiction, worsening of mental health disorders, and injury to the stomach. The risk of smoking and vaping (tobacco or any other substance) after bariatric surgery was explained to the patient. This includes risk of anastamotic ulcers, gastritis, bleeding, perforation, stricture, and PO intolerance.  The patient expressed understanding and acceptance of these risks.    ***The patient was advised not to become pregnant within 12-18 months following bariatric surgery. She was educated on the increased risks to mother and fetus associated with pregnancy within 2 years of bariatric surgery.    The benefits of the above surgeries including weight loss, improvement/resolution of associated medical and mental health conditions, improved mobility, and decreased mortality have been explained the the patient and Alejandra Houston has expressed understanding and acceptance of them.      PLAN:  The plan of treatment for Alejandra Houston is to continue with the consultations and tests ordered today in hopes of qualifying for pre-operative clearance for bariatric surgery. This includes:    Consult Nutrition for education and 6 months of MSWL  Consult Psychology  Consult Cardiology  Consult Pulmonology  Labs ordered  EGD - completed 3/22/24  PCP for medical optimization  CPAP compliance - sleep study 11/19/2023 moderate CADEN, PAP  ordered 5/9/25  Recommend at least *** lbs of weight loss prior to surgery.  ***        *** minutes were spent with patient including history, physical exam, and education.           [1]   Patient Active Problem List  Diagnosis    Acute sinusitis    Chronic cough    Depression    Effusion of right knee    Elevated liver enzymes    Fatigue    Fatty liver disease, nonalcoholic    GERD (gastroesophageal reflux disease)    Chronic pain    Fibromyalgia    Headache    Increased frequency of urination    Instability of prosthetic knee    Insomnia    Leg weakness    Low back pain    Mass of left parotid gland    Nicotine dependence    Otalgia of both ears    Chronic knee pain after total replacement of right knee joint    Pain due to total right knee replacement    Pancytopenia    Parotid nodule    Personal history of COVID-19    Primary osteoarthritis of left knee    Status post revision of total replacement of right knee    Stress incontinence, female    Type 2 diabetes mellitus    Vaginal dryness, menopausal    Vitamin D deficiency    Class 2 severe obesity with serious comorbidity and body mass index (BMI) of 39.0 to 39.9 in adult    Class 2 obesity with body mass index (BMI) of 38.0 to 38.9 in adult    Constipation    Lung nodule    Morbid obesity with BMI of 40.0-44.9, adult (Multi)    Sleep apnea    Anxiety    Cellulitis of right lower extremity    Cellulitis    Chronic obstructive lung disease (Multi)    Closed fracture of lateral malleolus    Dermatophytosis of nail    Dyslipidemia    Disorder of sacrum    Essential hypertension, benign    Fever    Generalized osteoarthritis    Genital herpes    Hyperlipidemia    Hypertension    Hypomagnesemia    Leg edema, left    Lumbar spondylosis    Lumbosacral spondylosis without myelopathy    Non-intractable vomiting with nausea    Obstructive sleep apnea, adult    Other specified abnormal findings of blood chemistry    Hereditary and idiopathic peripheral neuropathy     Peripheral nerve disease    Spinal stenosis of lumbar region without neurogenic claudication    Primary localized osteoarthrosis, lower leg    Arthritis of both hips    Disorder of iron metabolism    Chronic pain of right knee    Restless leg syndrome    Thrombocytopenia (CMS-HCC)    Menopausal symptoms    Decreased libido    Levator spasm    Vaginal atrophy    MDD (major depressive disorder), recurrent severe, without psychosis (Multi)    Cervical spondylosis without myelopathy    Cervical radiculopathy    Closed fracture of left distal radius   [2]   Past Medical History:  Diagnosis Date    Abnormal levels of other serum enzymes 2022    Elevated liver enzymes    Acid reflux     Anxiety     Bipolar disorder     COPD (chronic obstructive pulmonary disease) (Multi)     Depression     DM (diabetes mellitus) (Multi)     Fibromyalgia, primary     Glaucoma     Hypertension     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    Avascular necrosis of bone of left hip    Sleep apnea     no cpap    Suicidal ideations     Thrombocytopenia (CMS-HCC)    [3]   Past Surgical History:  Procedure Laterality Date     SECTION, CLASSIC  02/15/2018     Section     SECTION, CLASSIC      HIP ARTHROPLASTY      KNEE ARTHROPLASTY      OTHER SURGICAL HISTORY  06/10/2019    Hip replacement    TOTAL KNEE ARTHROPLASTY  2018    Knee Replacement    UVULOPALATOPHARYNGOPLASTY     [4]   Family History  Problem Relation Name Age of Onset    Hypertension Mother      Diabetes Mother      COPD Mother      Heart disease Mother      Lymphoma Mother      Multiple myeloma Mother      Cancer Other fam hx     Diabetes Other fam hx     Hypertension Other fam hx     Heart disease Other fam hx    [5]   Social History  Tobacco Use    Smoking status: Every Day     Current packs/day: 0.50     Average packs/day: 0.5 packs/day for 45.4 years (22.7 ttl pk-yrs)     Types: Cigarettes     Start date:      Passive  "exposure: Past    Smokeless tobacco: Never    Tobacco comments:     5-6 a day currently   Vaping Use    Vaping status: Never Used   Substance Use Topics    Alcohol use: Yes     Comment: social    Drug use: Not Currently     Types: \"Crack\" cocaine     Comment: Former crack user stopped 10/08/24   [6] No Known Allergies  [7]   Past Medical History:  Diagnosis Date    Abnormal levels of other serum enzymes 03/25/2022    Elevated liver enzymes    Acid reflux     Anxiety     Bipolar disorder     COPD (chronic obstructive pulmonary disease) (Multi)     Depression     DM (diabetes mellitus) (Multi)     Fibromyalgia, primary     Glaucoma     Hypertension     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

## 2025-05-19 DIAGNOSIS — E11.9 TYPE 2 DIABETES MELLITUS WITHOUT COMPLICATION, WITHOUT LONG-TERM CURRENT USE OF INSULIN: ICD-10-CM

## 2025-05-19 DIAGNOSIS — Z00.00 HEALTHCARE MAINTENANCE: ICD-10-CM

## 2025-05-19 DIAGNOSIS — E55.9 VITAMIN D DEFICIENCY: Primary | ICD-10-CM

## 2025-05-19 DIAGNOSIS — M54.2 NECK PAIN: ICD-10-CM

## 2025-05-19 RX ORDER — METFORMIN HYDROCHLORIDE 500 MG/1
1000 TABLET, EXTENDED RELEASE ORAL
Qty: 60 TABLET | Refills: 0 | Status: SHIPPED | OUTPATIENT
Start: 2025-05-19 | End: 2025-05-21

## 2025-05-19 RX ORDER — IBUPROFEN 600 MG/1
600 TABLET, FILM COATED ORAL EVERY 8 HOURS PRN
Qty: 30 TABLET | Refills: 1 | Status: SHIPPED | OUTPATIENT
Start: 2025-05-19

## 2025-05-19 RX ORDER — ACETAMINOPHEN 500 MG
50 TABLET ORAL DAILY
Qty: 90 CAPSULE | Refills: 1 | Status: SHIPPED | OUTPATIENT
Start: 2025-05-19

## 2025-05-19 RX ORDER — ALBUTEROL SULFATE 90 UG/1
2 INHALANT RESPIRATORY (INHALATION) 4 TIMES DAILY PRN
Qty: 8.5 G | Refills: 1 | Status: SHIPPED | OUTPATIENT
Start: 2025-05-19 | End: 2025-06-18

## 2025-05-20 NOTE — PROGRESS NOTES
"Subjective     Date: 5/20/2025 Time: 3:10 PM  Name: Alejandra Houston  MRN: [id number]    This is a 59 y.o. female with morbid obesity (There is no height or weight on file to calculate BMI.) who presents to clinic for consideration of bariatric surgery. she has attempted and failed multiple diet and exercise regimens for weight loss. Initial Onset of obesity was {Initial Obesity Onset:93627:::0}.  Their goal for surgery is to  {Weight Loss Interest:27237}. The patient has tried multiple diets to lose weight including {Previous Diet Trials:60639}. The patient was most successful with the {Most Successful Diet:82016}. The most pounds lost on this diet were *** lbs. The patient considers their dietary weakness to be {Dietary Weakness:63633} The patient reports a  {Weight Pattern:69521::\"highest weight ever of *** pounds\",\"lowest weight ever of *** pounds\"} {Distribution of Obesity:00832}. Current diet: {Diet:90309}. Compliance: {Compliance:71733} Diet Problems: {Diet Problems:83485} } Dietary Details Include:{Dietary Details:23280} The patient {Exercise Frequency:74384} {Excercise Duration (Optional):29636} {Types of Exercise (Optional):07544}    {Comorbidities:10200}  Problem List[1]    {Procedure Preferred:93736}    {GERDQOL:09575}    PMH: Medical History[2]     PSH: Surgical History[3]     STOPBANG *** (+ ***).   *** personal/family hx of VTE.    FAMILY HISTORY:  Family History[4]     SOCIAL HISTORY:  Social History[5]    MEDICATIONS:  Prior to Admission Medications:  Medication Documentation Review Audit       Reviewed by Antonio Feliz PA-C (Physician Assistant) on 05/09/25 at 1658      Medication Order Taking? Sig Documenting Provider Last Dose Status   acetaminophen (Tylenol) 500 mg tablet [id number] No Take 1 tablet (500 mg) by mouth every 8 hours if needed for mild pain (1 - 3). Jhoan Eller, DO 10/8/2024 Active   albuterol 90 mcg/actuation inhaler [id number]  INHALE 2 PUFFS 4 TIMES A DAY AS NEEDED FOR " SHORTNESS OF BREATH OR WHEEZING. Jhoan Eller DO  Active   b complex 0.4 mg tablet [id number]  Take 1 tablet by mouth once daily. Historical Provider, MD  Active   blood-glucose meter (True Metrix Glucose Meter) misc [id number] No USE AS DIRECTED EVERY DAY Jhoan Eller DO Unknown Active   blood-glucose meter,continuous (Dexcom G7 ) misc [id number]  Use as instructed with sensors for continuous blood sugar monitoring Jhoan Eller DO  Active   blood-glucose sensor (Dexcom G7 Sensor) device [id number]  Apply 1 sensor to the back of the arm for continuous monitoring of blood sugar. Remove and apply new sensor every 10 days. Jhoan Eller DO  Active   carvedilol (Coreg) 6.25 mg tablet [id number]  Take 1 tablet (6.25 mg) by mouth once daily. Jodie Shaver APRN-CNP   25 235   cholecalciferol (Vitamin D-3) 50 mcg (2,000 unit) capsule [id number]  Take 1 capsule (2,000 Units) by mouth once daily. Historical Provider, MD  Active     Discontinued 25 1306   docusate sodium (Colace) 100 mg capsule [id number]  TAKE 1 CAPSULE (100 MG) BY MOUTH 2 TIMES A DAY AS NEEDED FOR CONSTIPATION. Jhoan Eller DO  Active   dulaglutide (Trulicity) 3 mg/0.5 mL injection [id number]  Inject 3 mg under the skin 1 (one) time per week. Jhoan Eller DO  Active   DULoxetine (Cymbalta) 60 mg DR capsule [id number]  Take 1 capsule (60 mg) by mouth 2 times a day. Do not crush or chew. Chrissy Merida MD  Active   estradiol (Estrace) 0.01 % (0.1 mg/gram) vaginal cream [id number]  Insert 0.5 Applicatorfuls (2 g) into the vagina once daily. Apply to vagina nightly every Monday/Wednesday/Friday. Jhoan Eller DO  Active   famotidine (Pepcid) 40 mg tablet [id number]  Take 1 tablet (40 mg) by mouth once daily. Jodie Shaver APRN-CNP   25 2359   ferrous sulfate 325 (65 Fe) MG EC tablet [id number]  Take 1 tablet by mouth once daily with breakfast. Do not crush, chew, or split. Historical  Provider, MD  Active   folic acid (Folvite) 1 mg tablet [id number]  Take 1 tablet (1 mg) by mouth once daily. Historical Provider, MD  Active   FreeStyle glucose monitoring kit [id number] No True Metrix Blood Glucose test in vitro strip; Use as directed 4-5 times daily Historical Provider, MD Unknown Active   gabapentin (Neurontin) 800 mg tablet [id number]  TAKE 1 TABLET (800 MG) BY MOUTH EVERY 6 HOURS. Chrissy Merida MD  Active   ibuprofen 600 mg tablet [id number]  TAKE 1 TABLET (600 MG) BY MOUTH EVERY 8 HOURS IF NEEDED FOR MILD PAIN (1 - 3). Jhoan Eller DO  Active   ketoconazole (NIZOral) 2 % cream [id number]  Apply topically 2 times a day. Jhoan Eller DO  Active   lancets (Easy Comfort Lancets) 30 gauge misc [id number] No USE AS DIRECTED ONCE DAILY TO CHECK BLOOD SUGAR Jhoan Eller DO Unknown Active   metFORMIN XR (Glucophage-XR) 500 mg 24 hr tablet [id number]  Take 2 tablets (1,000 mg) by mouth once daily with breakfast. Do not crush, chew, or split. Jodie Shaver, APRN-CNP  Active   moisturizing mouth (Biotene Dry Mouth Oral Rinse) solution [id number]  Swish and spit 15 mL 3 times a day as needed (Dry mouth). Jhoan Eller DO  Active   mupirocin (Bactroban) 2 % ointment [id number]  APPLY TOPICALLY 3 TIMES A DAY FOR 10 DAYS. Jhoan Eller DO   25 2359     Discontinued 25 1306   pantoprazole (ProtoNix) 40 mg EC tablet [id number]  TAKE 1 TABLET (40 MG) BY MOUTH ONCE DAILY IN THE MORNING. TAKE BEFORE MEALS. DO NOT CRUSH, CHEW, OR SPLIT. Jhoan Eller DO  Active   polyethylene glycol (Glycolax, Miralax) 17 gram packet [id number]  Take 17 g by mouth once daily. Mix 1 cap (17g) into 8 ounces of fluid. Jhoan Eller DO  Active   predniSONE (Deltasone) 5 mg tablet [id number]  TAKE 5 TABS BY MOUTH DAILY X 2 DAYS, THEN 4 TABS DAILY X 2 DAYS, THEN 3 TABS DAILY X 2 DAYS, THEN 2 TABS DAILY X 2 DAYS, THEN 1 TAB DAILY X 2 DAYS Jhoan Eller, DO  Active                      ALLERGIES:  Allergies[6]    REVIEW OF SYSTEMS:  GENERAL: Negative for malaise, significant weight loss and fever  HEAD: Negative for headache, swelling.  NECK: Negative for lumps, goiter, pain and significant neck swelling  RESPIRATORY: Negative for cough, wheezing or shortness of breath.  CARDIOVASCULAR: Negative for chest pain, leg swelling or palpitations.  GI: Negative for abdominal discomfort, blood in stools or black stools or change in bowel habits  : No history of dysuria, frequency or incontinence  MUSCULOSKELETAL: Negative for joint pain or swelling, back pain or muscle pain.  SKIN: Negative for lesions, rash, and itching.  PSYCH: Negative for sleep disturbance, mood disorder and recent psychosocial stressors.  ENDOCRINE: Negative for cold or heat intolerance, polyuria, polydipsia and goiter.    Objective   PHYSICAL EXAM:  Visit Vitals  LMP  (LMP Unknown)   OB Status Postmenopausal   Smoking Status Every Day     General appearance: obese, NAD  Neuro: AOx3  Head: EOMI; no swelling or lesions of scalp or face  ENT:  no lumps or lymphadenopathy, thyroid normal to palpation; oropharynx clear, no swelling or erythema  Skin: warm, no erythema or rashes  Lungs: clear to percussion and auscultation  Heart: regular rhythm and S1, S2 normal  Abdomen: soft, non-tender, no masses, no organomegaly  Extremities: Normal exam of the extremities. No swelling or pain.  Psych: no hurried speech, no flight of ideas, normal affect    Assessment/Plan   IMPRESSION:  Alejandra Houston is a 59 y.o. female with a BMI of There is no height or weight on file to calculate BMI. with the following diagnoses and co-morbidities:     Medical History[7]    This patient does meet the criteria for a surgical weight loss procedure according to NIH guidelines.  The risks of sleeve gastrectomy, Yara-en-Y gastric bypass, and duodenal switch surgery including but not limited to bleeding, leak along staple lines, infection, dehydration, ulcers,  internal hernia, DVT/PE, pneumonia, myocardial infarction, prolonged nausea/vomiting, incomplete resolution of associated medical conditions, reflux, weight regain, vitamin/mineral deficiencies, and death have been explained to the patient and Alejandra Houston has expressed understanding and acceptance of them.     We discussed the lifestyle changes necessary to be successful following surgery.    The increased risk of substance and alcohol abuse following bariatric surgery was discussed with the patient, along with the negative consequences of substance/alcohol use after surgery including addiction, worsening of mental health disorders, and injury to the stomach. The risk of smoking and vaping (tobacco or any other substance) after bariatric surgery was explained to the patient. This includes risk of anastamotic ulcers, gastritis, bleeding, perforation, stricture, and PO intolerance.  The patient expressed understanding and acceptance of these risks.    ***The patient was advised not to become pregnant within 12-18 months following bariatric surgery. She was educated on the increased risks to mother and fetus associated with pregnancy within 2 years of bariatric surgery.    The benefits of the above surgeries including weight loss, improvement/resolution of associated medical and mental health conditions, improved mobility, and decreased mortality have been explained the the patient and Alejandra Houston has expressed understanding and acceptance of them.      PLAN:  The plan of treatment for Alejandra Houston is to continue with the consultations and tests ordered today in hopes of qualifying for pre-operative clearance for bariatric surgery. This includes:    Consult Nutrition for education and 6 months of MSWL  Consult Psychology  Consult Cardiology  Consult Pulmonology  Labs ordered  EGD - completed 3/22/24  PCP for medical optimization  CPAP compliance - sleep study 11/19/2023 moderate CADEN, PAP ordered  5/9/25  Recommend at least *** lbs of weight loss prior to surgery.  ***        *** minutes were spent with patient including history, physical exam, and education.           [1]   Patient Active Problem List  Diagnosis    Acute sinusitis    Chronic cough    Depression    Effusion of right knee    Elevated liver enzymes    Fatigue    Fatty liver disease, nonalcoholic    GERD (gastroesophageal reflux disease)    Chronic pain    Fibromyalgia    Headache    Increased frequency of urination    Instability of prosthetic knee    Insomnia    Leg weakness    Low back pain    Mass of left parotid gland    Nicotine dependence    Otalgia of both ears    Chronic knee pain after total replacement of right knee joint    Pain due to total right knee replacement    Pancytopenia    Parotid nodule    Personal history of COVID-19    Primary osteoarthritis of left knee    Status post revision of total replacement of right knee    Stress incontinence, female    Type 2 diabetes mellitus    Vaginal dryness, menopausal    Vitamin D deficiency    Class 2 severe obesity with serious comorbidity and body mass index (BMI) of 39.0 to 39.9 in adult    Class 2 obesity with body mass index (BMI) of 38.0 to 38.9 in adult    Constipation    Lung nodule    Morbid obesity with BMI of 40.0-44.9, adult (Multi)    Sleep apnea    Anxiety    Cellulitis of right lower extremity    Cellulitis    Chronic obstructive lung disease (Multi)    Closed fracture of lateral malleolus    Dermatophytosis of nail    Dyslipidemia    Disorder of sacrum    Essential hypertension, benign    Fever    Generalized osteoarthritis    Genital herpes    Hyperlipidemia    Hypertension    Hypomagnesemia    Leg edema, left    Lumbar spondylosis    Lumbosacral spondylosis without myelopathy    Non-intractable vomiting with nausea    Obstructive sleep apnea, adult    Other specified abnormal findings of blood chemistry    Hereditary and idiopathic peripheral neuropathy    Peripheral  nerve disease    Spinal stenosis of lumbar region without neurogenic claudication    Primary localized osteoarthrosis, lower leg    Arthritis of both hips    Disorder of iron metabolism    Chronic pain of right knee    Restless leg syndrome    Thrombocytopenia (CMS-HCC)    Menopausal symptoms    Decreased libido    Levator spasm    Vaginal atrophy    MDD (major depressive disorder), recurrent severe, without psychosis (Multi)    Cervical spondylosis without myelopathy    Cervical radiculopathy    Closed fracture of left distal radius   [2]   Past Medical History:  Diagnosis Date    Abnormal levels of other serum enzymes 2022    Elevated liver enzymes    Acid reflux     Anxiety     Bipolar disorder     COPD (chronic obstructive pulmonary disease) (Multi)     Depression     DM (diabetes mellitus) (Multi)     Fibromyalgia, primary     Glaucoma     Hypertension     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    Avascular necrosis of bone of left hip    Sleep apnea     no cpap    Suicidal ideations     Thrombocytopenia (CMS-HCC)    [3]   Past Surgical History:  Procedure Laterality Date     SECTION, CLASSIC  02/15/2018     Section     SECTION, CLASSIC      HIP ARTHROPLASTY      KNEE ARTHROPLASTY      OTHER SURGICAL HISTORY  06/10/2019    Hip replacement    TOTAL KNEE ARTHROPLASTY  2018    Knee Replacement    UVULOPALATOPHARYNGOPLASTY     [4]   Family History  Problem Relation Name Age of Onset    Hypertension Mother      Diabetes Mother      COPD Mother      Heart disease Mother      Lymphoma Mother      Multiple myeloma Mother      Cancer Other fam hx     Diabetes Other fam hx     Hypertension Other fam hx     Heart disease Other fam hx    [5]   Social History  Tobacco Use    Smoking status: Every Day     Current packs/day: 0.50     Average packs/day: 0.5 packs/day for 45.4 years (22.7 ttl pk-yrs)     Types: Cigarettes     Start date:      Passive exposure: Past  "   Smokeless tobacco: Never    Tobacco comments:     5-6 a day currently   Vaping Use    Vaping status: Never Used   Substance Use Topics    Alcohol use: Yes     Comment: social    Drug use: Not Currently     Types: \"Crack\" cocaine     Comment: Former crack user stopped 10/08/24   [6] No Known Allergies  [7]   Past Medical History:  Diagnosis Date    Abnormal levels of other serum enzymes 03/25/2022    Elevated liver enzymes    Acid reflux     Anxiety     Bipolar disorder     COPD (chronic obstructive pulmonary disease) (Multi)     Depression     DM (diabetes mellitus) (Multi)     Fibromyalgia, primary     Glaucoma     Hypertension     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

## 2025-05-20 NOTE — PROGRESS NOTES
Hepatology: Follow up Office Note      Patient: Alejandra Houston, a 59 y.o. year old female presents for follow up of cirrhosis.    PCP: Jhoan Eller, DO     History of Present Illness   Alejandra Houston presents for a follow up visit for cirrhosis. She is accompanied by her spouse for today's visit.      She established care for cirrhosis in Jan 2024. Had been diagnosed with cirrhosis due to steatotic liver disease. Had prior imaging with hepatic steatosis. Prior hx of abnormal liver enzymes.       History of complications their liver disease:   Ascites or Volume overload:  no known hx  Hepatic Encephalopathy:  no known hx  GI (variceal) Bleeding:  no known hx  HCC:  no known hx  Hx of Infections:  no known hx  Portal vein Thrombosis:  no known hx  SMV Thrombosis:  no known hx  Hx of TIPS:  no known hx  Recent Hospitalizations: none due to liver ds     Noted that back in the 1990s she had developed jaundice due to acute hepatitis-she is unaware of what type of viral hepatitis infection she had.  Had noted easy bruisability, no episodes of overt mucosal or GI bleeding.   Due to financial constraints, she noted being dependent on limited food items- ex. Ramen Noodles.  In addition patient noted that she has been experiencing increased abdominal bloating/belching and constipation. Occasional symptoms of heartburn and sensation of pills getting stuck in the retrosternal area.  Denied episodes of food bolus impaction, episodes of regurgitation of food.  Had been experiencing increased postnasal drip.  Denied having inadvertent weight loss.     Today's visit: Here for a follow-up visit.    Denies symptoms of right upper quadrant abdominal pain, nausea, vomiting, jaundice, confusion.   Admitted in Nov 2024 for medical and psychiatric evaluation. Hx of substance use (crack cocaine use, alcohol use hx). Was in rehab in Oct-Dec 2024.   Notes that she is now seeing pain mgmt and working with them for mgmt of knee/joint/back  pain which is significant.   Reports having constipation. Is using a stool softener with little effect. She notes low dietary fiber, due to economic constraints relies on pre-cooked meals/store bought meals. Low vegetable and fruit intake.   Had been on carvedilol but script .      Review of Systems   Constitutional/ General: No fever  Eyes: no yellow discoloration  ENT: Postnasal drip  Cardiovascular: no chest pain, no palpitations  Respiratory: no shortness of breath  Gastrointestinal: denies abdominal pain, nausea, vomiting, + constipation  Neurologic: no weakness or numbness of extremities  Genitourinary: no dysuria, no hematuria     All other systems have been reviewed and are negative except as noted in the HPI and above.     PMHx: HTN, depression, GERD, DM II  PSHx: C section, hip and knee replacement, left TKA 2024  Social hx: Stopped alcohol use, + hx of smoking, denies hx of illicit substance use.   Family hx: denies history of hepatobiliary disease or malignancy in the family.      Medications     Current Outpatient Medications   Medication Instructions    acetaminophen (TYLENOL) 500 mg, oral, Every 8 hours PRN    albuterol 90 mcg/actuation inhaler 2 puffs, inhalation, 4 times daily PRN    b complex 0.4 mg tablet 1 tablet, Daily    B Complex-Vitamin B12 tablet 1 tablet, Daily    blood-glucose meter (True Metrix Glucose Meter) misc USE AS DIRECTED EVERY DAY    blood-glucose meter,continuous (Dexcom G7 ) misc Use as instructed with sensors for continuous blood sugar monitoring    blood-glucose sensor (Dexcom G7 Sensor) device Apply 1 sensor to the back of the arm for continuous monitoring of blood sugar. Remove and apply new sensor every 10 days.    buPROPion XL (WELLBUTRIN XL) 300 mg, Daily RT    carvedilol (COREG) 6.25 mg, oral, 2 times daily    docusate sodium (COLACE) 100 mg, oral, 2 times daily PRN    DULoxetine (CYMBALTA) 60 mg, oral, 2 times daily, Do not crush or chew.     estradiol (ESTRACE) 2 g, vaginal, Daily, Apply to vagina nightly every Monday/Wednesday/Friday.    famotidine (PEPCID) 40 mg, oral, Daily    ferrous sulfate 325 mg, Daily with breakfast    folic acid (FOLVITE) 1 mg, Daily    FreeStyle glucose monitoring kit True Metrix Blood Glucose test in vitro strip; Use as directed 4-5 times daily    gabapentin (NEURONTIN) 800 mg, oral, Every 6 hours    ibuprofen 600 mg, oral, Every 8 hours PRN    Ingrezza 40 mg capsule 1 capsule, Daily RT    ketoconazole (NIZOral) 2 % cream Topical, 2 times daily    lancets (Easy Comfort Lancets) 30 gauge misc USE AS DIRECTED ONCE DAILY TO CHECK BLOOD SUGAR    metFORMIN XR (GLUCOPHAGE-XR) 1,000 mg, oral, Daily with breakfast, Do not crush, chew, or split.    methocarbamol (Robaxin) 500 mg tablet TAKE 2 TABLETS BY MOUTH THREE TIMES A DAY AS NEEDED (MUSCLE SPASM).    methylPREDNISolone (Medrol Dospak) 4 mg tablets Take as directed on package.    moisturizing mouth (Biotene Dry Mouth Oral Rinse) solution 15 mL, Swish & Spit, 3 times daily PRN    pantoprazole (PROTONIX) 40 mg, oral, Daily before breakfast, Do not crush, chew, or split.    polyethylene glycol (GLYCOLAX, MIRALAX) 17 g, oral, Daily, Mix 1 cap (17g) into 8 ounces of fluid.    polyethylene glycol-electrolytes (Nulytely Lemon-Lime) 420 gram solution 4,000 mL, oral, Once    predniSONE (Deltasone) 5 mg tablet TAKE 5 TABS BY MOUTH DAILY X 2 DAYS, THEN 4 TABS DAILY X 2 DAYS, THEN 3 TABS DAILY X 2 DAYS, THEN 2 TABS DAILY X 2 DAYS, THEN 1 TAB DAILY X 2 DAYS    traZODone (Desyrel) 100 mg tablet 2 tablets, Daily (0630)    Trulicity 3 mg, subcutaneous, Once Weekly    Vitamin D3 50 mcg, oral, Daily     Physical Examination      Visit Vitals  BP (!) 143/94   Pulse 72   Temp 36.4 °C (97.5 °F)   Wt 119 kg (261 lb 9.6 oz)   LMP  (LMP Unknown)   SpO2 97%   BMI 39.78 kg/m²   OB Status Postmenopausal   Smoking Status Some Days   BSA 2.39 m²     Constitutional: awake, alert   Eyes: EOMI, anicteric sclera    Respiratory: Bilateral air entry equal no wheezing  Cardiovascular: regular rate and rhythm, no lower extremity edema  Abdomen: soft, non tender, non distended, increased abdominal adiposity, bowel sounds present  Neurologic: gross motor strength intact, no asterixis  Psychiatric: alert, appropriate mood and affect, oriented to time/place/person     Labs     OSH May 2025: AST 39, ALT 44, ALP 77, Bili 0.4, Hb 13, WBC 5.65, plt 85    Lab Results   Component Value Date     04/25/2025    K 4.2 04/25/2025    CREATININE 0.77 04/25/2025    ALBUMIN 3.9 03/06/2025    ALT 21 03/06/2025    AST 23 03/06/2025    ALKPHOS 68 03/06/2025    INR 1.1 03/06/2025    AFP 4.0 03/06/2025    HGB 12.7 03/06/2025    PLT 68 (L) 03/06/2025      MELD 3.0: 10 at 3/6/2025  2:12 PM  MELD-Na: 7 at 3/6/2025  2:12 PM  Calculated from:  Serum Creatinine: 0.84 mg/dL (Using min of 1 mg/dL) at 3/6/2025  2:12 PM  Serum Sodium: 135 mmol/L at 3/6/2025  2:12 PM  Total Bilirubin: 0.4 mg/dL (Using min of 1 mg/dL) at 3/6/2025  2:12 PM  Serum Albumin: 3.9 g/dL (Using max of 3.5 g/dL) at 3/6/2025  2:12 PM  INR(ratio): 1.1 at 3/6/2025  2:12 PM  Age at listing (hypothetical): 59 years  Sex: Female at 3/6/2025  2:12 PM           Lab Results   Component Value Date     HEPCAB Nonreactive 01/11/2024     HEPBSAG Nonreactive 01/11/2024     HEPBSAB <3.1 01/11/2024     HEPBCAB Nonreactive 01/11/2024     HEPATOT Reactive (A) 01/11/2024      Dec 2023: albumin 4.3, ALT 34, AST 35, ALP 82, Bili 0.7, Na 143, creat 0.73, Platelet 102, Hb 15, WBC 8.43  Nov 2023: WILLEM 1:160  July 2023: Plt 88, Hb 13.8, ALT 23, AST 38, ALP 89, Bili 0.7     Imaging   US liver Feb 2025: IMPRESSION: Hepatomegaly with hepatic cirrhotic morphology. No suspicious hepatic lesions are detected.    US liver June 2024: IMPRESSION: Hepatomegaly with increased echogenicity of the hepatic parenchyma. The parenchyma is heterogeneous in appearance. The appearance indicates diffuse hepatocellular disease.  However, no evidence for hepatic mass. The appearance of the liver has not changed significantly since prior examination. Contracted gallbladder with no biliary ductal dilatation. No pancreatic abnormality.     CTAP with contrast Dec 2023: Hepatic steatosis and cirrhotic liver morphology. No splenomegaly.   US abdomen April 2021: Coarsened and hyperechoic hepatic parenchyma, compatible with steatosis or hepatocellular disease.     EGD March 2024: Grade I Evs.      Assessment and Plan    Alejandra Houston, a 59 y.o. year old female presents for evaluation of cirrhosis. I have reviewed pertinent provider notes, labs and imaging studies. Discussed results and their interpretation with the patient today.     Encounter Diagnoses   Name Primary?    Cirrhosis of liver without ascites, unspecified hepatic cirrhosis type (Multi)     Portal hypertension with esophageal varices (Multi)     Constipation, unspecified constipation type     Dyspepsia     Gastroesophageal reflux disease without esophagitis     Screening for colon cancer Yes     Orders Placed This Encounter   Procedures    US abdomen limited liver    Alpha-Fetoprotein    Basic Metabolic Panel    CBC and Auto Differential    Hepatic Function Panel    Protime-INR     # Compensated cirrhosis in the setting of steatotic liver disease likely from metabolic dysfunction.  Clinically significant portal hypertension present as evidenced by small nonbleeding esophageal varices on EGD.  No overt features/symptoms to suggest jaundice, encephalopathy, ascites on exam today.      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     Encourage patient to target weight loss of at least 7 to 10% body weight in the next 6 to 12 months.    Discussed dietary modifications for fatty liver disease, increasing proportion of grains and vegetables, opting for leaner proteins and decreasing proportion of simple carbohydrates.  Discussed  exclusion/elimination of poor caloric value foods including sodas, cakes, candy, ice cream, crackers, chips etc.      Recommendations:  - Labs: Recommend check of MELD labs in 3 months- Aug 2025.   - Have recommended Hep B vaccine.   - Variceal surveillance: Small Evs noted on EGD in March 2024.  Recommend resumption of carvedilol 6.25 mg BID. Given BP today- full dose for portal HTN mgmt is being prescribed.   - HCC screening: Recommend every 6 month AFP check as well as imaging. Next US liver in Aug 2025.   - Patient is advised to avoid hepatotoxic agents including NSAIDs, alcohol and herbal supplements.      -Counseled patient to start use of MiraLAX on a daily basis.  Patient has had inconsistent with use of this in the past. Advised pt that once she has been doing this for 1-2 weeks, then add soluble OTC fiber like metamucil or benefiber 1-2 times a day.   -Recommend colonoscopy for screening. Nulytely bowel prep advised.   Patient agreed to schedule screening for colorectal cancer.       Follow up visit in 4-5 months.

## 2025-05-21 ENCOUNTER — APPOINTMENT (OUTPATIENT)
Dept: PRIMARY CARE | Facility: CLINIC | Age: 60
End: 2025-05-21
Payer: COMMERCIAL

## 2025-05-21 ENCOUNTER — OFFICE VISIT (OUTPATIENT)
Dept: GASTROENTEROLOGY | Facility: HOSPITAL | Age: 60
End: 2025-05-21
Payer: COMMERCIAL

## 2025-05-21 VITALS
HEART RATE: 72 BPM | OXYGEN SATURATION: 97 % | TEMPERATURE: 97.5 F | BODY MASS INDEX: 39.78 KG/M2 | SYSTOLIC BLOOD PRESSURE: 143 MMHG | DIASTOLIC BLOOD PRESSURE: 94 MMHG | WEIGHT: 261.6 LBS

## 2025-05-21 DIAGNOSIS — K59.00 CONSTIPATION, UNSPECIFIED CONSTIPATION TYPE: ICD-10-CM

## 2025-05-21 DIAGNOSIS — R10.13 DYSPEPSIA: ICD-10-CM

## 2025-05-21 DIAGNOSIS — Z12.11 SCREENING FOR COLON CANCER: Primary | ICD-10-CM

## 2025-05-21 DIAGNOSIS — E11.9 TYPE 2 DIABETES MELLITUS WITHOUT COMPLICATION, WITHOUT LONG-TERM CURRENT USE OF INSULIN: ICD-10-CM

## 2025-05-21 DIAGNOSIS — K21.9 GASTROESOPHAGEAL REFLUX DISEASE WITHOUT ESOPHAGITIS: ICD-10-CM

## 2025-05-21 DIAGNOSIS — K76.6 PORTAL HYPERTENSION WITH ESOPHAGEAL VARICES (MULTI): ICD-10-CM

## 2025-05-21 DIAGNOSIS — I85.00 PORTAL HYPERTENSION WITH ESOPHAGEAL VARICES (MULTI): ICD-10-CM

## 2025-05-21 DIAGNOSIS — K74.60 CIRRHOSIS OF LIVER WITHOUT ASCITES, UNSPECIFIED HEPATIC CIRRHOSIS TYPE (MULTI): ICD-10-CM

## 2025-05-21 PROCEDURE — 3080F DIAST BP >= 90 MM HG: CPT | Performed by: STUDENT IN AN ORGANIZED HEALTH CARE EDUCATION/TRAINING PROGRAM

## 2025-05-21 PROCEDURE — 99215 OFFICE O/P EST HI 40 MIN: CPT | Performed by: STUDENT IN AN ORGANIZED HEALTH CARE EDUCATION/TRAINING PROGRAM

## 2025-05-21 PROCEDURE — 3077F SYST BP >= 140 MM HG: CPT | Performed by: STUDENT IN AN ORGANIZED HEALTH CARE EDUCATION/TRAINING PROGRAM

## 2025-05-21 RX ORDER — VITAMIN B COMPLEX
1 TABLET ORAL DAILY
COMMUNITY
Start: 2025-04-15

## 2025-05-21 RX ORDER — PANTOPRAZOLE SODIUM 40 MG/1
40 TABLET, DELAYED RELEASE ORAL
Qty: 30 TABLET | Refills: 3 | Status: SHIPPED | OUTPATIENT
Start: 2025-05-21 | End: 2025-09-18

## 2025-05-21 RX ORDER — METFORMIN HYDROCHLORIDE 500 MG/1
TABLET, EXTENDED RELEASE ORAL
Qty: 60 TABLET | Refills: 1 | Status: SHIPPED | OUTPATIENT
Start: 2025-05-21

## 2025-05-21 RX ORDER — VALBENAZINE 40 MG/1
1 CAPSULE ORAL
COMMUNITY

## 2025-05-21 RX ORDER — FAMOTIDINE 40 MG/1
40 TABLET, FILM COATED ORAL DAILY
Qty: 30 TABLET | Refills: 0 | Status: SHIPPED | OUTPATIENT
Start: 2025-05-21 | End: 2025-06-20

## 2025-05-21 RX ORDER — POLYETHYLENE GLYCOL 3350, SODIUM CHLORIDE, SODIUM BICARBONATE, POTASSIUM CHLORIDE 420; 11.2; 5.72; 1.48 G/4L; G/4L; G/4L; G/4L
4000 POWDER, FOR SOLUTION ORAL ONCE
Qty: 4000 ML | Refills: 0 | Status: SHIPPED | OUTPATIENT
Start: 2025-05-21 | End: 2025-05-27 | Stop reason: HOSPADM

## 2025-05-21 RX ORDER — TRAZODONE HYDROCHLORIDE 100 MG/1
2 TABLET ORAL
COMMUNITY
Start: 2025-04-08

## 2025-05-21 RX ORDER — METHOCARBAMOL 500 MG/1
TABLET, FILM COATED ORAL
COMMUNITY
Start: 2025-05-14

## 2025-05-21 RX ORDER — CARVEDILOL 6.25 MG/1
6.25 TABLET ORAL 2 TIMES DAILY
Qty: 60 TABLET | Refills: 5 | Status: SHIPPED | OUTPATIENT
Start: 2025-05-21 | End: 2025-11-17

## 2025-05-21 RX ORDER — BUPROPION HYDROCHLORIDE 300 MG/1
300 TABLET ORAL
COMMUNITY

## 2025-05-21 RX ORDER — POLYETHYLENE GLYCOL 3350 17 G/17G
17 POWDER, FOR SOLUTION ORAL DAILY
Qty: 90 PACKET | Refills: 0 | Status: SHIPPED | OUTPATIENT
Start: 2025-05-21 | End: 2025-08-19

## 2025-05-21 SDOH — ECONOMIC STABILITY: FOOD INSECURITY: WITHIN THE PAST 12 MONTHS, THE FOOD YOU BOUGHT JUST DIDN'T LAST AND YOU DIDN'T HAVE MONEY TO GET MORE.: OFTEN TRUE

## 2025-05-21 SDOH — ECONOMIC STABILITY: FOOD INSECURITY: WITHIN THE PAST 12 MONTHS, YOU WORRIED THAT YOUR FOOD WOULD RUN OUT BEFORE YOU GOT MONEY TO BUY MORE.: OFTEN TRUE

## 2025-05-21 ASSESSMENT — LIFESTYLE VARIABLES
HOW MANY STANDARD DRINKS CONTAINING ALCOHOL DO YOU HAVE ON A TYPICAL DAY: PATIENT DOES NOT DRINK
HOW OFTEN DO YOU HAVE SIX OR MORE DRINKS ON ONE OCCASION: NEVER
AUDIT-C TOTAL SCORE: 0
HOW OFTEN DO YOU HAVE A DRINK CONTAINING ALCOHOL: NEVER
SKIP TO QUESTIONS 9-10: 1

## 2025-05-21 ASSESSMENT — PATIENT HEALTH QUESTIONNAIRE - PHQ9
SUM OF ALL RESPONSES TO PHQ9 QUESTIONS 1 & 2: 6
10. IF YOU CHECKED OFF ANY PROBLEMS, HOW DIFFICULT HAVE THESE PROBLEMS MADE IT FOR YOU TO DO YOUR WORK, TAKE CARE OF THINGS AT HOME, OR GET ALONG WITH OTHER PEOPLE: SOMEWHAT DIFFICULT
1. LITTLE INTEREST OR PLEASURE IN DOING THINGS: NEARLY EVERY DAY
2. FEELING DOWN, DEPRESSED OR HOPELESS: NEARLY EVERY DAY

## 2025-05-21 ASSESSMENT — PAIN SCALES - GENERAL: PAINLEVEL_OUTOF10: 8

## 2025-05-21 NOTE — PATIENT INSTRUCTIONS
Alejandra Houston,     Thank you for coming in for your hepatology office visit today. As per our discussion, I recommend:     Resume carvedilol 6.25mg twice a day, I am increasing the frequency.    Start taking miralax 1 packet or 17gm/1 scoop with 8-10 oz of water every day. Once you start having an improvement in constipation, add an over the counter fiber supplement like metamucil or benefiber 1-2 times a day.   Your next lab check will be in August 2025.   Next ultrasound liver in August 2025. Call [phone] to schedule this test.  Scheduled a colonoscopy. Nulytely bowel prep has been sent to your pharmacy. Call [phone] to schedule.     I would like to see you back in the office in Sept 2025. Have this scheduled at the desk today or by calling [phone].  If you have any trouble or need assistance with having this done, please reach out to my 's office at [phone] (Ms Eric Miranda) or my nurse coordinator at [phone] (Ms Negrita PATEL).     I look forward to seeing you again. Thank you for allowing me to participate in your care.     Sincerely,  Jamison Flores MD  Hepatology

## 2025-05-23 ENCOUNTER — PATIENT MESSAGE (OUTPATIENT)
Dept: PRIMARY CARE | Facility: CLINIC | Age: 60
End: 2025-05-23
Payer: COMMERCIAL

## 2025-05-23 DIAGNOSIS — H66.90 EAR INFECTION: Primary | ICD-10-CM

## 2025-05-23 RX ORDER — AMOXICILLIN 875 MG/1
875 TABLET, COATED ORAL 2 TIMES DAILY
Qty: 20 TABLET | Refills: 0 | Status: SHIPPED | OUTPATIENT
Start: 2025-05-23 | End: 2025-06-12

## 2025-05-24 DIAGNOSIS — R21 SKIN RASH: ICD-10-CM

## 2025-05-27 ENCOUNTER — HOSPITAL ENCOUNTER (OUTPATIENT)
Dept: PAIN MEDICINE | Facility: CLINIC | Age: 60
Discharge: HOME | End: 2025-05-27
Payer: COMMERCIAL

## 2025-05-27 VITALS
SYSTOLIC BLOOD PRESSURE: 148 MMHG | DIASTOLIC BLOOD PRESSURE: 79 MMHG | TEMPERATURE: 96.4 F | RESPIRATION RATE: 18 BRPM | HEART RATE: 69 BPM | OXYGEN SATURATION: 99 %

## 2025-05-27 DIAGNOSIS — M25.562 CHRONIC PAIN OF BOTH KNEES: ICD-10-CM

## 2025-05-27 DIAGNOSIS — M25.561 CHRONIC PAIN OF BOTH KNEES: ICD-10-CM

## 2025-05-27 DIAGNOSIS — G89.29 CHRONIC PAIN OF BOTH KNEES: ICD-10-CM

## 2025-05-27 PROCEDURE — 2500000004 HC RX 250 GENERAL PHARMACY W/ HCPCS (ALT 636 FOR OP/ED): Performed by: PAIN MEDICINE

## 2025-05-27 PROCEDURE — 64454 NJX AA&/STRD GNCLR NRV BRNCH: CPT | Performed by: PAIN MEDICINE

## 2025-05-27 RX ORDER — LIDOCAINE HYDROCHLORIDE 10 MG/ML
INJECTION, SOLUTION EPIDURAL; INFILTRATION; INTRACAUDAL; PERINEURAL AS NEEDED
Status: COMPLETED | OUTPATIENT
Start: 2025-05-27 | End: 2025-05-27

## 2025-05-27 RX ORDER — METHYLPREDNISOLONE ACETATE 80 MG/ML
INJECTION, SUSPENSION INTRA-ARTICULAR; INTRALESIONAL; INTRAMUSCULAR; SOFT TISSUE AS NEEDED
Status: COMPLETED | OUTPATIENT
Start: 2025-05-27 | End: 2025-05-27

## 2025-05-27 RX ORDER — BUPIVACAINE HYDROCHLORIDE 5 MG/ML
INJECTION, SOLUTION EPIDURAL; INTRACAUDAL; PERINEURAL AS NEEDED
Status: COMPLETED | OUTPATIENT
Start: 2025-05-27 | End: 2025-05-27

## 2025-05-27 RX ORDER — KETOCONAZOLE 20 MG/G
CREAM TOPICAL 2 TIMES DAILY
Qty: 60 G | Refills: 1 | Status: SHIPPED | OUTPATIENT
Start: 2025-05-27 | End: 2026-05-27

## 2025-05-27 RX ADMIN — BUPIVACAINE HYDROCHLORIDE 5 ML: 5 INJECTION, SOLUTION EPIDURAL; INTRACAUDAL; PERINEURAL at 14:15

## 2025-05-27 RX ADMIN — METHYLPREDNISOLONE ACETATE 10 MG: 80 INJECTION, SUSPENSION INTRA-ARTICULAR; INTRALESIONAL; INTRAMUSCULAR; SOFT TISSUE at 14:15

## 2025-05-27 RX ADMIN — LIDOCAINE HYDROCHLORIDE 3 ML: 10 INJECTION, SOLUTION EPIDURAL; INFILTRATION; INTRACAUDAL; PERINEURAL at 14:14

## 2025-05-27 ASSESSMENT — PATIENT HEALTH QUESTIONNAIRE - PHQ9
1. LITTLE INTEREST OR PLEASURE IN DOING THINGS: SEVERAL DAYS
2. FEELING DOWN, DEPRESSED OR HOPELESS: SEVERAL DAYS
SUM OF ALL RESPONSES TO PHQ9 QUESTIONS 1 AND 2: 2
10. IF YOU CHECKED OFF ANY PROBLEMS, HOW DIFFICULT HAVE THESE PROBLEMS MADE IT FOR YOU TO DO YOUR WORK, TAKE CARE OF THINGS AT HOME, OR GET ALONG WITH OTHER PEOPLE: SOMEWHAT DIFFICULT

## 2025-05-27 ASSESSMENT — PAIN - FUNCTIONAL ASSESSMENT: PAIN_FUNCTIONAL_ASSESSMENT: 0-10

## 2025-05-27 ASSESSMENT — PAIN SCALES - GENERAL: PAINLEVEL_OUTOF10: 8

## 2025-05-27 ASSESSMENT — PAIN DESCRIPTION - DESCRIPTORS: DESCRIPTORS: PRESSURE

## 2025-05-29 ENCOUNTER — TELEPHONE (OUTPATIENT)
Dept: PAIN MEDICINE | Facility: CLINIC | Age: 60
End: 2025-05-29
Payer: COMMERCIAL

## 2025-05-29 ENCOUNTER — PATIENT MESSAGE (OUTPATIENT)
Dept: PRIMARY CARE | Facility: CLINIC | Age: 60
End: 2025-05-29
Payer: COMMERCIAL

## 2025-05-29 DIAGNOSIS — G89.29 CHRONIC PAIN OF LEFT KNEE: Primary | ICD-10-CM

## 2025-05-29 DIAGNOSIS — K64.9 HEMORRHOIDS, UNSPECIFIED HEMORRHOID TYPE: Primary | ICD-10-CM

## 2025-05-29 DIAGNOSIS — M25.562 CHRONIC PAIN OF LEFT KNEE: Primary | ICD-10-CM

## 2025-05-29 NOTE — TELEPHONE ENCOUNTER
Patient called to report significant improvement after her genicular nerve block. Would like to proceed with RFA.

## 2025-05-30 ENCOUNTER — APPOINTMENT (OUTPATIENT)
Dept: SURGERY | Facility: CLINIC | Age: 60
End: 2025-05-30
Payer: COMMERCIAL

## 2025-05-30 DIAGNOSIS — M79.7 FIBROMYALGIA: ICD-10-CM

## 2025-05-30 DIAGNOSIS — K21.9 GASTROESOPHAGEAL REFLUX DISEASE WITHOUT ESOPHAGITIS: ICD-10-CM

## 2025-05-30 DIAGNOSIS — G89.4 CHRONIC PAIN SYNDROME: ICD-10-CM

## 2025-05-30 DIAGNOSIS — E55.9 VITAMIN D DEFICIENCY: ICD-10-CM

## 2025-05-30 DIAGNOSIS — K76.0 FATTY LIVER DISEASE, NONALCOHOLIC: ICD-10-CM

## 2025-05-30 DIAGNOSIS — Z13.21 ENCOUNTER FOR SCREENING FOR NUTRITIONAL DISORDER: ICD-10-CM

## 2025-05-30 DIAGNOSIS — J44.9 CHRONIC OBSTRUCTIVE PULMONARY DISEASE, UNSPECIFIED COPD TYPE (MULTI): ICD-10-CM

## 2025-05-30 DIAGNOSIS — G89.29 CHRONIC KNEE PAIN AFTER TOTAL REPLACEMENT OF RIGHT KNEE JOINT: ICD-10-CM

## 2025-05-30 DIAGNOSIS — R74.8 ELEVATED LIVER ENZYMES: ICD-10-CM

## 2025-05-30 DIAGNOSIS — N39.3 STRESS INCONTINENCE, FEMALE: ICD-10-CM

## 2025-05-30 DIAGNOSIS — E66.01 MORBID OBESITY WITH BMI OF 40.0-44.9, ADULT (MULTI): ICD-10-CM

## 2025-05-30 DIAGNOSIS — Z01.812 PRE-OPERATIVE LABORATORY EXAMINATION: ICD-10-CM

## 2025-05-30 DIAGNOSIS — G47.33 OBSTRUCTIVE SLEEP APNEA SYNDROME: ICD-10-CM

## 2025-05-30 DIAGNOSIS — E78.5 DYSLIPIDEMIA: ICD-10-CM

## 2025-05-30 DIAGNOSIS — I10 ESSENTIAL HYPERTENSION, BENIGN: ICD-10-CM

## 2025-05-30 DIAGNOSIS — D69.6 THROMBOCYTOPENIA: ICD-10-CM

## 2025-05-30 DIAGNOSIS — E11.9 TYPE 2 DIABETES MELLITUS WITHOUT COMPLICATION, WITHOUT LONG-TERM CURRENT USE OF INSULIN: ICD-10-CM

## 2025-05-30 DIAGNOSIS — Z98.84 BARIATRIC SURGERY STATUS: ICD-10-CM

## 2025-05-30 DIAGNOSIS — M25.561 CHRONIC KNEE PAIN AFTER TOTAL REPLACEMENT OF RIGHT KNEE JOINT: ICD-10-CM

## 2025-05-30 DIAGNOSIS — Z96.651 CHRONIC KNEE PAIN AFTER TOTAL REPLACEMENT OF RIGHT KNEE JOINT: ICD-10-CM

## 2025-06-02 ENCOUNTER — PATIENT MESSAGE (OUTPATIENT)
Dept: PRIMARY CARE | Facility: CLINIC | Age: 60
End: 2025-06-02
Payer: COMMERCIAL

## 2025-06-03 ENCOUNTER — TELEPHONE (OUTPATIENT)
Dept: PAIN MEDICINE | Facility: CLINIC | Age: 60
End: 2025-06-03
Payer: COMMERCIAL

## 2025-06-04 ENCOUNTER — OFFICE VISIT (OUTPATIENT)
Dept: PAIN MEDICINE | Facility: CLINIC | Age: 60
End: 2025-06-04
Payer: COMMERCIAL

## 2025-06-04 VITALS
DIASTOLIC BLOOD PRESSURE: 89 MMHG | RESPIRATION RATE: 16 BRPM | SYSTOLIC BLOOD PRESSURE: 149 MMHG | OXYGEN SATURATION: 96 % | HEART RATE: 68 BPM | TEMPERATURE: 95.4 F

## 2025-06-04 DIAGNOSIS — G89.29 CHRONIC PAIN OF BOTH KNEES: Primary | ICD-10-CM

## 2025-06-04 DIAGNOSIS — M25.562 CHRONIC PAIN OF BOTH KNEES: Primary | ICD-10-CM

## 2025-06-04 DIAGNOSIS — M25.561 CHRONIC PAIN OF BOTH KNEES: ICD-10-CM

## 2025-06-04 DIAGNOSIS — M25.561 CHRONIC PAIN OF BOTH KNEES: Primary | ICD-10-CM

## 2025-06-04 DIAGNOSIS — G89.29 CHRONIC PAIN OF BOTH KNEES: ICD-10-CM

## 2025-06-04 DIAGNOSIS — M25.562 CHRONIC PAIN OF BOTH KNEES: ICD-10-CM

## 2025-06-04 PROCEDURE — 3077F SYST BP >= 140 MM HG: CPT

## 2025-06-04 PROCEDURE — 3079F DIAST BP 80-89 MM HG: CPT

## 2025-06-04 PROCEDURE — 99213 OFFICE O/P EST LOW 20 MIN: CPT

## 2025-06-04 RX ORDER — PREGABALIN 300 MG/1
300 CAPSULE ORAL 2 TIMES DAILY
Qty: 60 CAPSULE | Refills: 0 | Status: SHIPPED | OUTPATIENT
Start: 2025-06-04 | End: 2025-06-04 | Stop reason: SDUPTHER

## 2025-06-04 RX ORDER — PREGABALIN 300 MG/1
300 CAPSULE ORAL 2 TIMES DAILY
Qty: 60 CAPSULE | Refills: 0 | Status: SHIPPED | OUTPATIENT
Start: 2025-06-04 | End: 2025-07-04

## 2025-06-04 ASSESSMENT — PAIN SCALES - GENERAL: PAINLEVEL_OUTOF10: 9

## 2025-06-04 ASSESSMENT — PAIN - FUNCTIONAL ASSESSMENT: PAIN_FUNCTIONAL_ASSESSMENT: 0-10

## 2025-06-04 ASSESSMENT — PAIN DESCRIPTION - DESCRIPTORS: DESCRIPTORS: ACHING;SHARP

## 2025-06-04 NOTE — PROGRESS NOTES
Subjective   Patient ID: Alejandra Houston is a 59 y.o. female who presents for Pain.  HPI    60 yo female presents today for follow up on left knee pain. She had left genicular nerve block on 05/27 and reports 60- 70% pain reduction from the procedure. Today she is reporting 10/10 left knee pain. States that the pain is so severe that it is impacting her ability to sleep. She is taking 800mg of gabapentin 4 times daily, states that this is not helping. Denies any side effects from the medications. She is also prescribed duloxetine and methocarbamol. NSAID have not been helpful. She states that she has been referred to the comprehensive pain management program at [facility], her appointment is in 3 months.     Review of Systems  All 13 systems were reviewed and are within normal levels except as noted below or per HPI. Positive and pertinent negative responses are noted below or in the HPI   Denied any fever or chills. No weight loss and no night sweats. No cough or sputum production. No diarrhea   Denies constipation.   No bladder and bowel incontinence and no other changes in bladder and bowel. No skin changes. Reports tiredness and fatigability only if the pain is not controlled.   Denied opioids diversion and abuse and denies alcoholism. Denies overuse of the pain medications.      Objective   Physical Exam  General   Alert and oriented x4, pleasant and cooperative.      HEENT  Pupils are equal and normal in size. Ears, nose, mouth, and throat appear to be WNL.  Head atraumatic, symmetric.      No signs of sedation or signs of withdrawal apparent.     Psychiatric   No signs of depression apparent. Appropriate mood and affect.     Neuro   No focal neurological deficit apparent. Ambulation at baseline using wheeled walker.      Respiratory  No respiratory distress, respirations equal and unlabored.     Abdomen  Soft and nontender, no distention noted.     Skin  Warm, dry and intact. No skin markings supportive of recent IV  drug usage .          Assessment/Plan     60 yo female with chronic left knee pain s/p left total knee replacement.     I have personally reviewed the [place] report for this patient. I have considered the risks of abuse, dependence, addiction and diversion. I believe that it is clinically appropriate for this patient to be prescribed this medication based on documented diagnosis.  I believe the benefits of the continuation of the opiate outweighs the risk. Patient has described positive response to the present medication therapy. Patient denies any side effects associated with the usage of the medication. Patient did not elicit any signs supportive of misuse or abuse. The review of the [place] Automated Reporting prescription is not suggestive of any worrisome pattern. Patient believes the usage of this medication has improved the quality of life and allow him to participate in day-to-day activity. Therefore  I recommend the continuation of this medication and I will be refilling the medication prescription.    The patient was counseled regarding diagnostic results, instructions for management, risk factor reductions, prognosis, patient and family education, impressions, risks and benefits of treatment options and importance of compliance with treatment.     Stop taking gabapentin. We will switch to pregabalin 300mg twice daily.     Schedule Left genicular RFA under fluoroscopic guidance.     Follow up in 3 months or as needed.     Explained plan to this patient, and patient verbalized understanding and agreement with the plan.     Please do not hesitate to contact the pain clinic after your visit with any questions or concerns at  M-F 8-4 pm     Patient was reminded not to share medications, not to take prescription medications that were not prescribed to the patient, and not to increase or change dose without consulting the pain clinic. I advised the patient to always take the least amount of medication  needed to keep symptoms under control.            Rosa Kelly, JUNAID-CNP 06/04/25 8:39 AM

## 2025-06-06 ENCOUNTER — APPOINTMENT (OUTPATIENT)
Dept: SURGERY | Facility: CLINIC | Age: 60
End: 2025-06-06
Payer: COMMERCIAL

## 2025-06-06 DIAGNOSIS — Z01.812 PRE-OPERATIVE LABORATORY EXAMINATION: ICD-10-CM

## 2025-06-06 DIAGNOSIS — K21.9 GASTROESOPHAGEAL REFLUX DISEASE WITHOUT ESOPHAGITIS: ICD-10-CM

## 2025-06-06 DIAGNOSIS — I10 ESSENTIAL HYPERTENSION, BENIGN: ICD-10-CM

## 2025-06-06 DIAGNOSIS — G89.4 CHRONIC PAIN SYNDROME: ICD-10-CM

## 2025-06-06 DIAGNOSIS — K76.0 FATTY LIVER DISEASE, NONALCOHOLIC: ICD-10-CM

## 2025-06-06 DIAGNOSIS — I10 PRIMARY HYPERTENSION: ICD-10-CM

## 2025-06-06 DIAGNOSIS — E55.9 VITAMIN D DEFICIENCY: ICD-10-CM

## 2025-06-06 DIAGNOSIS — N39.3 STRESS INCONTINENCE, FEMALE: ICD-10-CM

## 2025-06-06 DIAGNOSIS — M79.7 FIBROMYALGIA: ICD-10-CM

## 2025-06-06 DIAGNOSIS — E78.5 HYPERLIPIDEMIA, UNSPECIFIED HYPERLIPIDEMIA TYPE: ICD-10-CM

## 2025-06-06 DIAGNOSIS — D61.818 PANCYTOPENIA: ICD-10-CM

## 2025-06-06 DIAGNOSIS — Z13.21 ENCOUNTER FOR SCREENING FOR NUTRITIONAL DISORDER: ICD-10-CM

## 2025-06-06 DIAGNOSIS — E11.9 TYPE 2 DIABETES MELLITUS WITHOUT COMPLICATION, WITHOUT LONG-TERM CURRENT USE OF INSULIN: ICD-10-CM

## 2025-06-06 DIAGNOSIS — E66.01 MORBID OBESITY WITH BMI OF 40.0-44.9, ADULT (MULTI): ICD-10-CM

## 2025-06-06 DIAGNOSIS — G47.33 OBSTRUCTIVE SLEEP APNEA, ADULT: ICD-10-CM

## 2025-06-09 PROBLEM — R05.3 CHRONIC COUGH: Status: RESOLVED | Noted: 2023-02-16 | Resolved: 2025-06-09

## 2025-06-09 PROBLEM — R20.8 DECREASED SENSATION OF HAND AND ARM: Status: ACTIVE | Noted: 2024-06-14

## 2025-06-09 PROBLEM — G89.29 CHRONIC MYOFASCIAL PAIN: Status: ACTIVE | Noted: 2024-09-25

## 2025-06-09 PROBLEM — F33.9 MAJOR DEPRESSIVE DISORDER, RECURRENT: Status: ACTIVE | Noted: 2024-11-14

## 2025-06-09 PROBLEM — M54.16 LUMBAR RADICULOPATHY: Status: ACTIVE | Noted: 2025-05-12

## 2025-06-09 PROBLEM — M79.602 PAIN OF LEFT UPPER EXTREMITY: Status: ACTIVE | Noted: 2024-06-14

## 2025-06-09 PROBLEM — S62.102A CLOSED FRACTURE OF LEFT WRIST: Status: ACTIVE | Noted: 2024-05-11

## 2025-06-09 PROBLEM — M25.60 DECREASED RANGE OF MOTION: Status: ACTIVE | Noted: 2024-06-14

## 2025-06-09 PROBLEM — M54.2 NECK PAIN: Status: ACTIVE | Noted: 2025-05-12

## 2025-06-09 PROBLEM — Z86.16 PERSONAL HISTORY OF COVID-19: Status: RESOLVED | Noted: 2023-02-16 | Resolved: 2025-06-09

## 2025-06-09 PROBLEM — Z18.9 RETAINED SUTURE: Status: ACTIVE | Noted: 2024-09-27

## 2025-06-09 PROBLEM — L03.90 CELLULITIS: Status: RESOLVED | Noted: 2017-05-24 | Resolved: 2025-06-09

## 2025-06-09 PROBLEM — G56.02 LEFT CARPAL TUNNEL SYNDROME: Status: ACTIVE | Noted: 2024-08-19

## 2025-06-09 PROBLEM — M47.28: Status: ACTIVE | Noted: 2024-09-25

## 2025-06-09 PROBLEM — T81.89XA RETAINED SUTURE: Status: ACTIVE | Noted: 2024-09-27

## 2025-06-09 PROBLEM — M79.18 CHRONIC MYOFASCIAL PAIN: Status: ACTIVE | Noted: 2024-09-25

## 2025-06-09 PROBLEM — S52.502A CLOSED FRACTURE OF LEFT DISTAL RADIUS: Status: RESOLVED | Noted: 2025-01-01 | Resolved: 2025-06-09

## 2025-06-09 PROBLEM — K64.9 HEMORRHOIDS: Status: ACTIVE | Noted: 2025-06-09

## 2025-06-09 PROBLEM — G57.82: Status: ACTIVE | Noted: 2024-09-25

## 2025-06-09 PROBLEM — R20.0 ARM NUMBNESS: Status: ACTIVE | Noted: 2025-05-12

## 2025-06-09 PROBLEM — E66.01 MORBID OBESITY WITH BMI OF 40.0-44.9, ADULT (MULTI): Status: RESOLVED | Noted: 2023-02-16 | Resolved: 2025-06-09

## 2025-06-09 PROBLEM — M79.18 BILATERAL BUTTOCK PAIN: Status: ACTIVE | Noted: 2025-05-12

## 2025-06-09 PROBLEM — R11.2 NON-INTRACTABLE VOMITING WITH NAUSEA: Status: RESOLVED | Noted: 2017-09-06 | Resolved: 2025-06-09

## 2025-06-09 PROBLEM — S82.63XA CLOSED FRACTURE OF LATERAL MALLEOLUS: Status: RESOLVED | Noted: 2017-01-13 | Resolved: 2025-06-09

## 2025-06-09 PROBLEM — J01.90 ACUTE SINUSITIS: Status: RESOLVED | Noted: 2023-02-16 | Resolved: 2025-06-09

## 2025-06-10 ENCOUNTER — TELEPHONE (OUTPATIENT)
Dept: HEMATOLOGY/ONCOLOGY | Facility: HOSPITAL | Age: 60
End: 2025-06-10

## 2025-06-10 ENCOUNTER — APPOINTMENT (OUTPATIENT)
Dept: SURGERY | Facility: CLINIC | Age: 60
End: 2025-06-10
Payer: COMMERCIAL

## 2025-06-10 NOTE — TELEPHONE ENCOUNTER
Spoke with patient to assist with rescheduling upcoming annual Hem Onc FUV that was cancelled via MyChart as patient's spouse is having a surgical procedure.  Patient reminded to have blood work collected within a week prior to appointment.

## 2025-06-12 ENCOUNTER — OFFICE VISIT (OUTPATIENT)
Dept: SURGERY | Facility: CLINIC | Age: 60
End: 2025-06-12
Payer: COMMERCIAL

## 2025-06-12 VITALS
RESPIRATION RATE: 17 BRPM | OXYGEN SATURATION: 95 % | HEIGHT: 68 IN | HEART RATE: 64 BPM | SYSTOLIC BLOOD PRESSURE: 117 MMHG | TEMPERATURE: 97.7 F | BODY MASS INDEX: 39.56 KG/M2 | WEIGHT: 261 LBS | DIASTOLIC BLOOD PRESSURE: 78 MMHG

## 2025-06-12 DIAGNOSIS — K64.9 HEMORRHOIDS, UNSPECIFIED HEMORRHOID TYPE: Primary | ICD-10-CM

## 2025-06-12 PROCEDURE — 3074F SYST BP LT 130 MM HG: CPT | Performed by: SURGERY

## 2025-06-12 PROCEDURE — 3078F DIAST BP <80 MM HG: CPT | Performed by: SURGERY

## 2025-06-12 PROCEDURE — 99203 OFFICE O/P NEW LOW 30 MIN: CPT | Performed by: SURGERY

## 2025-06-12 PROCEDURE — 3008F BODY MASS INDEX DOCD: CPT | Performed by: SURGERY

## 2025-06-12 RX ORDER — CEFAZOLIN SODIUM 2 G/100ML
2 INJECTION, SOLUTION INTRAVENOUS ONCE
OUTPATIENT
Start: 2025-06-12 | End: 2025-06-12

## 2025-06-12 RX ORDER — HEPARIN SODIUM 5000 [USP'U]/ML
5000 INJECTION, SOLUTION INTRAVENOUS; SUBCUTANEOUS ONCE
OUTPATIENT
Start: 2025-06-12 | End: 2025-06-12

## 2025-06-12 RX ORDER — POLYETHYLENE GLYCOL 3350, SODIUM SULFATE ANHYDROUS, SODIUM BICARBONATE, SODIUM CHLORIDE, POTASSIUM CHLORIDE 236; 22.74; 6.74; 5.86; 2.97 G/4L; G/4L; G/4L; G/4L; G/4L
240 POWDER, FOR SOLUTION ORAL ONCE
Qty: 240 ML | Refills: 0 | Status: SHIPPED | OUTPATIENT
Start: 2025-06-12 | End: 2025-06-12

## 2025-06-12 RX ORDER — METRONIDAZOLE 500 MG/100ML
500 INJECTION, SOLUTION INTRAVENOUS ONCE
OUTPATIENT
Start: 2025-06-12 | End: 2025-06-12

## 2025-06-12 ASSESSMENT — ENCOUNTER SYMPTOMS
CONSTIPATION: 0
NAUSEA: 0
COUGH: 0
HEADACHES: 0
COLOR CHANGE: 0
SPEECH DIFFICULTY: 0
LIGHT-HEADEDNESS: 0
CONFUSION: 0
ABDOMINAL DISTENTION: 0
ABDOMINAL PAIN: 0
CHILLS: 0
VOMITING: 0
RECTAL PAIN: 1
DIZZINESS: 0
JOINT SWELLING: 0
ARTHRALGIAS: 0
DIFFICULTY URINATING: 0
DIARRHEA: 0
WHEEZING: 0
FEVER: 0
SLEEP DISTURBANCE: 0
SHORTNESS OF BREATH: 0
TROUBLE SWALLOWING: 0
UNEXPECTED WEIGHT CHANGE: 0
BLOOD IN STOOL: 0

## 2025-06-12 ASSESSMENT — PAIN SCALES - GENERAL: PAINLEVEL_OUTOF10: 7

## 2025-06-12 NOTE — H&P (VIEW-ONLY)
Subjective   Patient ID: Alejandra Houston is a 59 y.o. female who presents for No chief complaint on file..  HPI  60 YO F BMI 40.    Had been diagnosed with cirrhosis due to steatotic liver disease. Had prior imaging with hepatic steatosis. Prior hx of abnormal liver enzymes.       History of complications their liver disease:   Ascites or Volume overload:  no known hx  Hepatic Encephalopathy:  no known hx  GI (variceal) Bleeding:  no known hx  HCC:  no known hx  Hx of Infections:  no known hx  Portal vein Thrombosis:  no known hx  SMV Thrombosis:  no known hx  Hx of TIPS:  no known hx  Recent Hospitalizations: none due to liver ds     Noted that back in the 1990s she had developed jaundice due to acute hepatitis-she is unaware of what type of viral hepatitis infection she had.  Had noted easy bruisability, no episodes of overt mucosal or GI bleeding.   Due to financial constraints, she noted being dependent on limited food items- ex. Ramen Noodles.  In addition patient noted that she has been experiencing increased abdominal bloating/belching and constipation. Occasional symptoms of heartburn and sensation of pills getting stuck in the retrosternal area.  Denied episodes of food bolus impaction, episodes of regurgitation of food.  Had been experiencing increased postnasal drip.  Denied having inadvertent weight loss.    Admitted in Nov 2024 for medical and psychiatric evaluation. Hx of substance use (crack cocaine use, alcohol use hx). Was in rehab in Oct-Dec 2024.     Colonoscopy pending 11/26/2025.    Had hemorrhoidectomy 20 years prior. Hemorrhoids came back 2 years later. No bleeding but pain in rectum all the time. Uses preparation H. No constipation.    Review of Systems   Constitutional:  Negative for chills, fever and unexpected weight change.   HENT:  Negative for congestion and trouble swallowing.    Respiratory:  Negative for cough, shortness of breath and wheezing.    Cardiovascular:  Negative for chest  pain.   Gastrointestinal:  Positive for rectal pain. Negative for abdominal distention, abdominal pain, blood in stool, constipation, diarrhea, nausea and vomiting.   Genitourinary:  Negative for difficulty urinating.   Musculoskeletal:  Negative for arthralgias and joint swelling.   Skin:  Negative for color change.   Neurological:  Negative for dizziness, speech difficulty, light-headedness and headaches.   Psychiatric/Behavioral:  Negative for confusion and sleep disturbance.        Objective   Physical Exam  Constitutional:       General: She is awake.      Appearance: Normal appearance. She is obese.   HENT:      Head: Normocephalic and atraumatic.      Nose: Nose normal.      Mouth/Throat:      Mouth: Mucous membranes are moist.   Eyes:      Pupils: Pupils are equal, round, and reactive to light.   Neck:      Thyroid: No thyroid mass.      Trachea: Phonation normal.   Cardiovascular:      Rate and Rhythm: Normal rate and regular rhythm.   Pulmonary:      Effort: Pulmonary effort is normal. No respiratory distress.      Breath sounds: Normal air entry. No decreased breath sounds, wheezing, rhonchi or rales.   Abdominal:      General: Bowel sounds are normal. There is no distension.      Palpations: Abdomen is soft.      Tenderness: There is no abdominal tenderness.   Genitourinary:     Comments: Normal exterior anus. Grade II hemorrhoids without bleeding. No soiling.  Musculoskeletal:      Cervical back: Neck supple.      Right lower leg: No edema.      Left lower leg: No edema.   Skin:     General: Skin is warm.      Capillary Refill: Capillary refill takes less than 2 seconds.   Neurological:      General: No focal deficit present.      Mental Status: She is alert and oriented to person, place, and time. Mental status is at baseline.      Cranial Nerves: Cranial nerves 2-12 are intact.      Motor: Motor function is intact.   Psychiatric:         Attention and Perception: Attention and perception normal.          Mood and Affect: Mood normal.         Speech: Speech normal.         Behavior: Behavior normal.         Assessment/Plan   Problem List Items Addressed This Visit           ICD-10-CM       Gastrointestinal and Abdominal    Hemorrhoids - Primary K64.9    Relevant Medications    polyethylene glycol (GoLYTELY) 236-22.74-6.74 -5.86 gram solution    Other Relevant Orders    Case Request Operating Room: Excision Hemorrhoid, Ligation Hemorrhoid (Completed)     Plan for rubber band ligation. Limited pain meds due to history.         Vivi Roblero MA 06/12/25 7:06 AM

## 2025-06-13 ENCOUNTER — PREP FOR PROCEDURE (OUTPATIENT)
Dept: SURGERY | Facility: CLINIC | Age: 60
End: 2025-06-13
Payer: COMMERCIAL

## 2025-06-16 ENCOUNTER — PATIENT MESSAGE (OUTPATIENT)
Dept: PRIMARY CARE | Facility: CLINIC | Age: 60
End: 2025-06-16

## 2025-06-16 ENCOUNTER — APPOINTMENT (OUTPATIENT)
Dept: PRIMARY CARE | Facility: CLINIC | Age: 60
End: 2025-06-16
Payer: COMMERCIAL

## 2025-06-16 DIAGNOSIS — M62.838 MUSCLE SPASM: Primary | ICD-10-CM

## 2025-06-16 RX ORDER — METHOCARBAMOL 500 MG/1
1000 TABLET, FILM COATED ORAL 3 TIMES DAILY PRN
Qty: 60 TABLET | Refills: 0 | Status: SHIPPED | OUTPATIENT
Start: 2025-06-16

## 2025-06-17 ENCOUNTER — APPOINTMENT (OUTPATIENT)
Dept: HEMATOLOGY/ONCOLOGY | Facility: CLINIC | Age: 60
End: 2025-06-17
Payer: COMMERCIAL

## 2025-06-24 DIAGNOSIS — J34.89 NOSE IRRITATION: ICD-10-CM

## 2025-06-24 DIAGNOSIS — K21.9 GASTROESOPHAGEAL REFLUX DISEASE WITHOUT ESOPHAGITIS: ICD-10-CM

## 2025-06-24 DIAGNOSIS — Z00.00 HEALTHCARE MAINTENANCE: ICD-10-CM

## 2025-06-24 DIAGNOSIS — M54.12 CERVICAL RADICULOPATHY: ICD-10-CM

## 2025-06-24 RX ORDER — MUPIROCIN 20 MG/G
OINTMENT TOPICAL
Qty: 22 G | Refills: 1 | Status: SHIPPED | OUTPATIENT
Start: 2025-06-24 | End: 2025-07-04

## 2025-06-24 RX ORDER — ALBUTEROL SULFATE 90 UG/1
2 INHALANT RESPIRATORY (INHALATION) 4 TIMES DAILY PRN
Qty: 8.5 G | Refills: 1 | Status: SHIPPED | OUTPATIENT
Start: 2025-06-24 | End: 2025-07-24

## 2025-06-24 RX ORDER — PREDNISONE 5 MG/1
TABLET ORAL
Qty: 30 TABLET | Refills: 1 | Status: SHIPPED | OUTPATIENT
Start: 2025-06-24 | End: 2025-06-25 | Stop reason: ALTCHOICE

## 2025-06-25 ENCOUNTER — OFFICE VISIT (OUTPATIENT)
Dept: PRIMARY CARE | Facility: CLINIC | Age: 60
End: 2025-06-25
Payer: COMMERCIAL

## 2025-06-25 ENCOUNTER — APPOINTMENT (OUTPATIENT)
Dept: PRIMARY CARE | Facility: CLINIC | Age: 60
End: 2025-06-25
Payer: COMMERCIAL

## 2025-06-25 VITALS
RESPIRATION RATE: 16 BRPM | OXYGEN SATURATION: 95 % | TEMPERATURE: 97.2 F | BODY MASS INDEX: 39.08 KG/M2 | SYSTOLIC BLOOD PRESSURE: 128 MMHG | WEIGHT: 257 LBS | HEART RATE: 67 BPM | DIASTOLIC BLOOD PRESSURE: 70 MMHG

## 2025-06-25 DIAGNOSIS — E11.42 TYPE 2 DIABETES MELLITUS WITH DIABETIC POLYNEUROPATHY, WITHOUT LONG-TERM CURRENT USE OF INSULIN: ICD-10-CM

## 2025-06-25 DIAGNOSIS — M25.561 CHRONIC KNEE PAIN AFTER TOTAL REPLACEMENT OF RIGHT KNEE JOINT: ICD-10-CM

## 2025-06-25 DIAGNOSIS — R29.898 WEAKNESS OF LEFT LOWER EXTREMITY: ICD-10-CM

## 2025-06-25 DIAGNOSIS — T84.028D INSTABILITY OF PROSTHETIC KNEE, SUBSEQUENT ENCOUNTER: ICD-10-CM

## 2025-06-25 DIAGNOSIS — M54.50 LOW BACK PAIN, UNSPECIFIED BACK PAIN LATERALITY, UNSPECIFIED CHRONICITY, UNSPECIFIED WHETHER SCIATICA PRESENT: ICD-10-CM

## 2025-06-25 DIAGNOSIS — Z96.651 CHRONIC KNEE PAIN AFTER TOTAL REPLACEMENT OF RIGHT KNEE JOINT: ICD-10-CM

## 2025-06-25 DIAGNOSIS — J30.1 ALLERGIC RHINITIS DUE TO POLLEN, UNSPECIFIED SEASONALITY: Primary | ICD-10-CM

## 2025-06-25 DIAGNOSIS — G89.29 CHRONIC KNEE PAIN AFTER TOTAL REPLACEMENT OF RIGHT KNEE JOINT: ICD-10-CM

## 2025-06-25 DIAGNOSIS — Z12.31 ENCOUNTER FOR SCREENING MAMMOGRAM FOR MALIGNANT NEOPLASM OF BREAST: ICD-10-CM

## 2025-06-25 DIAGNOSIS — E11.9 TYPE 2 DIABETES MELLITUS WITHOUT COMPLICATION, WITHOUT LONG-TERM CURRENT USE OF INSULIN: ICD-10-CM

## 2025-06-25 DIAGNOSIS — F31.9 BIPOLAR AFFECTIVE DISORDER, REMISSION STATUS UNSPECIFIED (MULTI): ICD-10-CM

## 2025-06-25 DIAGNOSIS — Z96.659 INSTABILITY OF PROSTHETIC KNEE, SUBSEQUENT ENCOUNTER: ICD-10-CM

## 2025-06-25 DIAGNOSIS — F33.2 MDD (MAJOR DEPRESSIVE DISORDER), RECURRENT SEVERE, WITHOUT PSYCHOSIS (MULTI): ICD-10-CM

## 2025-06-25 DIAGNOSIS — E53.8 VITAMIN B12 DEFICIENCY: ICD-10-CM

## 2025-06-25 PROBLEM — J44.9 CHRONIC OBSTRUCTIVE LUNG DISEASE (MULTI): Status: RESOLVED | Noted: 2023-09-12 | Resolved: 2025-06-25

## 2025-06-25 PROCEDURE — 3074F SYST BP LT 130 MM HG: CPT | Performed by: INTERNAL MEDICINE

## 2025-06-25 PROCEDURE — 99214 OFFICE O/P EST MOD 30 MIN: CPT | Performed by: INTERNAL MEDICINE

## 2025-06-25 PROCEDURE — 3078F DIAST BP <80 MM HG: CPT | Performed by: INTERNAL MEDICINE

## 2025-06-25 RX ORDER — LORATADINE 10 MG/1
10 TABLET ORAL DAILY
Qty: 30 TABLET | Refills: 11 | Status: SHIPPED | OUTPATIENT
Start: 2025-06-25 | End: 2026-06-25

## 2025-06-25 RX ORDER — VITAMIN B COMPLEX
1 TABLET ORAL DAILY
Qty: 30 TABLET | Refills: 11 | Status: SHIPPED | OUTPATIENT
Start: 2025-06-25

## 2025-06-25 RX ORDER — FAMOTIDINE 40 MG/1
40 TABLET, FILM COATED ORAL DAILY
Qty: 30 TABLET | Refills: 0 | Status: SHIPPED | OUTPATIENT
Start: 2025-06-25 | End: 2025-07-25

## 2025-06-25 RX ORDER — FLUTICASONE PROPIONATE 50 MCG
1 SPRAY, SUSPENSION (ML) NASAL 2 TIMES DAILY
Qty: 16 G | Refills: 5 | Status: SHIPPED | OUTPATIENT
Start: 2025-06-25 | End: 2026-06-25

## 2025-06-25 RX ORDER — BLOOD-GLUCOSE CONTROL, NORMAL
1 EACH MISCELLANEOUS ONCE
Qty: 100 EACH | Refills: 0 | Status: SHIPPED | OUTPATIENT
Start: 2025-06-25 | End: 2025-06-25

## 2025-06-25 ASSESSMENT — ENCOUNTER SYMPTOMS
NECK PAIN: 1
HEADACHES: 1
SINUS PRESSURE: 1

## 2025-06-25 NOTE — PROGRESS NOTES
"Patient here for a follow up to discuss weight loss medication. Needs referral for bat tube seat, walker. Sinus infection     Subjective   Patient ID: Alejandra Houston is a 59 y.o. female who presents for Follow-up.    The patient recently underwent bilateral L4-L5 and L5-S1 facet joint Medial branch blockade on 6/20/2025 with  from PM&R..  She notes significant relief, and is quite pleased with the results.  She feels the best she has in a long time in regards to her back pain.    The patient notes a flare-up of sinusitis and ear \"popping\" and does not currently have any nasal spray at home.    The patient reports significant diffuse headache which is nearly constant from waking to sleeping.  She suspects that this may be related to neck pain from cervical radiculopathy.    The patient inquires whether GLP-1 agonists would recommended in her case. The last HbA1c was 7.0% in 3/2025, and her morning blood glucose at home was 140 mg/dL.  She recalls following with [facility] in the past.    The patient reports ongoing knee pain, particularly with walking.  She is having significant difficulty with ambulation despite using a walker for additional support. The patient requests a prescription for a walker with a seat, as well as a shower seat.      Review of Systems   HENT:  Positive for congestion and sinus pressure.         Positive for ear \"popping\".   Musculoskeletal:  Positive for neck pain.        Positive for knee pain.   Neurological:  Positive for headaches.     Objective   Physical Exam  Constitutional:       Appearance: Normal appearance.   Neck:      Vascular: No carotid bruit.   Cardiovascular:      Rate and Rhythm: Normal rate and regular rhythm.      Heart sounds: Normal heart sounds.   Pulmonary:      Effort: Pulmonary effort is normal.      Breath sounds: Normal breath sounds.   Abdominal:      General: Bowel sounds are normal.      Palpations: Abdomen is soft.      Tenderness: There is no " abdominal tenderness.   Skin:     General: Skin is warm and dry.   Neurological:      General: No focal deficit present.      Mental Status: She is alert and oriented to person, place, and time. Mental status is at baseline.   Psychiatric:         Mood and Affect: Mood normal.         Behavior: Behavior normal.         Assessment/Plan   Problem List Items Addressed This Visit           ICD-10-CM    Instability of prosthetic knee T84.028A, Z96.659    Relevant Orders    Shower chair    Walker with Seat    Leg weakness R29.898    Relevant Orders    Shower chair    Walker with Seat    Low back pain M54.50    Relevant Orders    Shower chair    Walker with Seat    Chronic knee pain after total replacement of right knee joint M25.561, G89.29, Z96.651    Relevant Orders    Shower chair    Walker with Seat    Type 2 diabetes mellitus E11.9    Relevant Medications    lancets (Easy Comfort Lancets) 30 gauge misc    blood sugar diagnostic     Other Visit Diagnoses         Codes      Allergic rhinitis due to pollen, unspecified seasonality    -  Primary J30.1    Relevant Medications    fluticasone (Flonase) 50 mcg/actuation nasal spray    loratadine (Claritin) 10 mg tablet      Vitamin B12 deficiency     E53.8    Relevant Medications    B Complex-Vitamin B12 tablet            IMPRESSIONS/PLAN:    Left knee pain / back pain  -Following with pain management.  -Ordered small shower chair and walker with seat to assist with ADLs     Allergic Rhinitis/Eustachian Dysfunction  - Prescribed loratadine 10mg once daily and fluticasone 50 mcg/actuation as 1 spray in each nostril twice daily.    Vitamin B12 Deficiency  - Prescribed B Complex-Vitamin B12 tablet as once daily.     /70 in the office today.     Diabetes II   - Last HbA1c 7.0 per 3/2025.  Recent home readings elevated in the 200-300 mg/dL range per patient.   Continue Metformin two tablets BID. Can consider increasing Trulicity to 4.5 mg weekly as well.  Previously on  "glimperide 2mg every day.  Call the clinic with any concerns.     Depression   - Significantly improved since leaving the hospital. She is following with psychiatry.     Nasal Septum Ulceration  - Followed with ENT and had benign biopsy.     Sleep Apnea   - Completed in-center Sleep Study confirming moderate CADEN.  Previously ordered referral to Sleep Medicine.  She will reach out to them about obtaining the CPAP supplies.     Smoking History   - CT Chest 10/2024 (angio in ER)      Dyspepsia  - Continue pantoprazole 40mg once daily in the mornings.     Liver Cirrhosis  -  Following with Gastroenterology.     Constipation  - Advised the patient to take Miralax BID and Colace BID as directed on packaging.      Vaginal dryness  - Estradiol cream.     Pinched Cervical Nerve Root/Left Shoulder Pain   - Flare-up.  Previously prescribed prednisone 5mg taper to be taken as directed on packaging.  CT Cervical Spine 1/2024 showed mild to moderate spinal canal stenosis at C4-C5 and moderate bilateral foraminal stenosis.  Following with pain management.     Thrombocytopenia  - Last CBC showed platelet count of 77 per 10/2023.  Following with Hematology/Oncology.  Suspect from underlying liver cirrhosis.       Fatigue  - Likely due to untreated sleep apnea.  Encouraged patient to follow-up with Sleep Medicine, and she has appointment for next week.  Vitamin D, Vitamin B12, and TSH wnl per 9/2023.     Health Maintenance   - Routine labs 3/2025, 6/2024.  Last Mammogram 8/2024. Last colonoscopy 5/2019, repeat due [id number].  Previously ordered repeat colonoscopy for 2024.     Follow-up in 3 months, call sooner if needed.        \"I, Dr. Eller, personally performed the services described in the documentation as scribed by Aida Galdamez in my presence, and confirm it is both accurate and complete.   Scribe Attestation     Scribe Attestation  By signing my name below, I, Aida Galdamez, Scribe   attest that this documentation has " been prepared under the direction and in the presence of Jhoan Eller DO.   Aida Galdamez 06/25/25 11:30 AM

## 2025-06-30 DIAGNOSIS — M62.838 MUSCLE SPASM: ICD-10-CM

## 2025-07-01 RX ORDER — LIDOCAINE 4 G/100G
PATCH TOPICAL
Qty: 30 PATCH | Refills: 0 | Status: SHIPPED | OUTPATIENT
Start: 2025-07-01

## 2025-07-02 ENCOUNTER — APPOINTMENT (OUTPATIENT)
Dept: ORTHOPEDIC SURGERY | Facility: CLINIC | Age: 60
End: 2025-07-02
Payer: COMMERCIAL

## 2025-07-02 DIAGNOSIS — T85.698A LOOSENING OF PROSTHESIS: ICD-10-CM

## 2025-07-02 DIAGNOSIS — T84.84XA PAINFUL TOTAL KNEE REPLACEMENT, INITIAL ENCOUNTER: Primary | ICD-10-CM

## 2025-07-02 DIAGNOSIS — Z96.651 CHRONIC KNEE PAIN AFTER TOTAL REPLACEMENT OF RIGHT KNEE JOINT: ICD-10-CM

## 2025-07-02 DIAGNOSIS — Z96.659 PAINFUL TOTAL KNEE REPLACEMENT, INITIAL ENCOUNTER: Primary | ICD-10-CM

## 2025-07-02 DIAGNOSIS — M25.561 CHRONIC KNEE PAIN AFTER TOTAL REPLACEMENT OF RIGHT KNEE JOINT: ICD-10-CM

## 2025-07-02 DIAGNOSIS — G89.29 CHRONIC KNEE PAIN AFTER TOTAL REPLACEMENT OF RIGHT KNEE JOINT: ICD-10-CM

## 2025-07-02 PROCEDURE — 99214 OFFICE O/P EST MOD 30 MIN: CPT | Performed by: STUDENT IN AN ORGANIZED HEALTH CARE EDUCATION/TRAINING PROGRAM

## 2025-07-02 NOTE — PROGRESS NOTES
PRIMARY CARE PHYSICIAN: Jhoan Eller DO  REFERRING PROVIDER: No referring provider defined for this encounter.       SUBJECTIVE  CHIEF COMPLAINT:   Chief Complaint   Patient presents with    Left Knee - New Patient Visit, Pain        HPI: Alejandra Houston is a pleasant 59 y.o. year-old female who is seen today for evaluation of left knee pain. Patient has a complex history of her BL knees including R TKA with Dr. Scott in 2017 with subsequent revision by Dr. Cates in 2022 and L TKA with a non-cemented Depuy Attune PS implant.     Patient reports ongoing L knee pain since the surgery. The onset of pain was not associated with a traumatic injury.  Alejandra Houston states that the pain is located in the knee diffusely, but most prominently on the medial aspect.  Alejandra Houston denies any history of inflammatory arthritis.  The pain is aggravated by prolonged ambulation and alleviated by rest. The patient does note some associated numbness and tingling in the RLE.     FUNCTIONAL STATUS: occasionally limited.  AMBULATORY STATUS: Independent community ambulation with canes/crutches  PREVIOUS TREATMENTS: physical therapy, activity modification, over the counter medications, prescription medication, opioids, and assistive device   HISTORY OF SURGERY ON AFFECTED KNEE(S): Yes   HIP OR GROIN PAIN REPORTED: Yes   SYMPTOMS INTERFERING WITH SLEEP: Yes   INSTABILITY: No   IMPACTING QUALITY OF LIFE: Yes     REVIEW OF SYSTEMS  The patient denies any fever, chills, chest pain, shortness of breath or difficulty breathing.  Patient denies any numbness, tingling, or radicular symptoms.  Adult patient history sheet was filled out by the patient today in clinic and will be scanned into the EMR.  I personally reviewed this form which will be scanned into the EMR.  This includes Past Medical History, Past Surgical History, Medications, Allergies, Social History, Family History and 12 point review of systems.    Medical History[1]  "    Allergies[2]     Surgical History[3]     Family History[4]     Social History     Socioeconomic History    Marital status:      Spouse name: Not on file    Number of children: Not on file    Years of education: Not on file    Highest education level: Not on file   Occupational History     Employer: NONE   Tobacco Use    Smoking status: Every Day     Current packs/day: 0.50     Average packs/day: 0.5 packs/day for 45.5 years (22.7 ttl pk-yrs)     Types: Cigarettes     Start date: 1980     Passive exposure: Past    Smokeless tobacco: Never    Tobacco comments:     5-6 a day currently   Vaping Use    Vaping status: Never Used   Substance and Sexual Activity    Alcohol use: Yes     Comment: social    Drug use: Not Currently     Types: \"Crack\" cocaine     Comment: Former crack user stopped 10/08/24    Sexual activity: Defer   Other Topics Concern    Not on file   Social History Narrative    Not on file     Social Drivers of Health     Financial Resource Strain: Medium Risk (10/9/2024)    Overall Financial Resource Strain (CARDIA)     Difficulty of Paying Living Expenses: Somewhat hard   Food Insecurity: Food Insecurity Present (5/21/2025)    Hunger Vital Sign     Worried About Running Out of Food in the Last Year: Often true     Ran Out of Food in the Last Year: Often true   Transportation Needs: No Transportation Needs (5/13/2025)    [facility]    [facility]     Lack of Transportation (Medical): No     Lack of Transportation (Non-Medical): No   Physical Activity: Not on file   Stress: Not on file   Social Connections: Feeling Socially Integrated (11/15/2023)    OASIS : Social Isolation     Frequency of experiencing loneliness or isolation: Never   Intimate Partner Violence: Not At Risk (10/9/2024)    Humiliation, Afraid, Rape, and Kick questionnaire     Fear of Current or Ex-Partner: No     Emotionally Abused: No     Physically Abused: No     Sexually Abused: No "   Housing Stability: Unknown (5/13/2025)    Received from [facility]    Housing Stability Vital Sign     Unable to Pay for Housing in the Last Year: No     Number of Times Moved in the Last Year: Not on file     Homeless in the Last Year: No        CURRENT MEDICATIONS:   Current Medications[5]     OBJECTIVE    PHYSICAL EXAM  There is no height or weight on file to calculate BMI.    General: Well-appearing female in no acute distress.  Awake, alert and oriented.  Pleasant and cooperative.  Respiratory: Non-labored breathing  Mood: Euthymic   Gait: antalgic  Assistive Device: Cane    Affected Left Knee  Limb Alignment: neutral  ROM: 5-110  Stable, but pain with, varus and valgus stress at full extension and 30 degrees of flexion  Skin: Intact, no abrasions or draining sinuses. Well healed surgical incision.   Effusion: None  Quad Strength: 4/5, related to pain  Hamstring Strength: 4/5, related to pain  Tenderness: Diffuse tenderness to palpation, most prominent over medial aspect of distal femur  Sensation: Intact to light touch distally  Motor function: Able to fire TA, EHL, G/S  Pulses: Palpable DP pulse    IMAGING:  AP / lateral/ mid-flexion/sunrise views: Independent review of left knee x-rays from 4/17/25 were reviewed. No obvious hardware failure. There is evidence of possible loosening, most prominently around the anterolateral aspect of the tibial baseplate. No evidence of fracture, AVN, dislocation, osteomyelitis.      ASSESSMENT & PLAN    IMPRESSION:  Alejandra Houston is a 59 y.o. female complex history of her BL knees including R TKA with Dr. Scott in 2017 with subsequent revision by Dr. Cates in 2022 and L TKA with a non-cemented Depuy "[employer] PS implant. She has had ongoing L knee pain since her surgery. Imaging evidence of possible loosening, most prominently around the tibial baseplate. Inflammatory markers negative. She did have a bone scan in January 2025, however, this was only 11 months after  her procedure.     PLAN:  - Patient should obtain a repeat bone scan in 2025, which would put her 18 months out from her procedure, at which point it would be more reliable to assess for possible loosening of the implants.   - She will follow-up for re-evaluation after the bone scan.   - She is currently scheduled for repeat genicular RFA with pain management, recommend continued follow-up to help manage her symptoms.   - All questions were answered, patient agreeable with the above plan.     Michelet Rose MD  Orthopaedic Surgery PGY2  [facility]  [employer]         [1]   Past Medical History:  Diagnosis Date    Abnormal levels of other serum enzymes 2022    Elevated liver enzymes    Acid reflux     Acute sinusitis 2023    Anxiety     Bipolar disorder     Cellulitis 2017    Chronic cough 2023    Closed fracture of lateral malleolus 2017    Closed fracture of left distal radius 2025    COPD (chronic obstructive pulmonary disease) (Multi)     Depression     DM (diabetes mellitus) (Multi)     Fibromyalgia, primary     Glaucoma     Hypertension     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    Avascular necrosis of bone of left hip    Morbid obesity with BMI of 40.0-44.9, adult (Multi) 2023    Non-intractable vomiting with nausea 2017    Personal history of COVID-19 2023    Positive 2021      Sleep apnea     no cpap    Suicidal ideations     Thrombocytopenia    [2] No Known Allergies  [3]   Past Surgical History:  Procedure Laterality Date     SECTION, CLASSIC  02/15/2018     Section     SECTION, CLASSIC      HIP ARTHROPLASTY      KNEE ARTHROPLASTY      OTHER SURGICAL HISTORY  06/10/2019    Hip replacement    TOTAL KNEE ARTHROPLASTY  2018    Knee Replacement    UVULOPALATOPHARYNGOPLASTY     [4]   Family History  Problem Relation Name Age of Onset    Hypertension Mother       Diabetes Mother      COPD Mother      Heart disease Mother      Lymphoma Mother      Multiple myeloma Mother      Cancer Other fam hx     Diabetes Other fam hx     Hypertension Other fam hx     Heart disease Other fam hx    [5]   Current Outpatient Medications   Medication Sig Dispense Refill    acetaminophen (Tylenol) 500 mg tablet Take 1 tablet (500 mg) by mouth every 8 hours if needed for mild pain (1 - 3). 30 tablet 3    albuterol 90 mcg/actuation inhaler INHALE 2 PUFFS 4 TIMES A DAY AS NEEDED FOR SHORTNESS OF BREATH OR WHEEZING. 8.5 g 1    B Complex-Vitamin B12 tablet Take 1 tablet by mouth once daily. 30 tablet 11    blood sugar diagnostic 1 strip once daily. 100 strip 1    blood-glucose meter (True Metrix Glucose Meter) misc USE AS DIRECTED EVERY DAY 1 each 0    buPROPion XL (Wellbutrin XL) 300 mg 24 hr tablet Take 1 tablet (300 mg) by mouth once daily.      carvedilol (Coreg) 6.25 mg tablet Take 1 tablet (6.25 mg) by mouth 2 times a day. 60 tablet 5    cholecalciferol (Vitamin D-3) 50 mcg (2,000 units) capsule Take 1 capsule (50 mcg) by mouth once daily. 90 capsule 1    docusate sodium (Colace) 100 mg capsule TAKE 1 CAPSULE (100 MG) BY MOUTH 2 TIMES A DAY AS NEEDED FOR CONSTIPATION. 60 capsule 1    dulaglutide (Trulicity) 3 mg/0.5 mL injection Inject 3 mg under the skin 1 (one) time per week. 2 mL 3    DULoxetine (Cymbalta) 60 mg DR capsule Take 1 capsule (60 mg) by mouth 2 times a day. Do not crush or chew. 60 capsule 11    estradiol (Estrace) 0.01 % (0.1 mg/gram) vaginal cream Insert 0.5 Applicatorfuls (2 g) into the vagina once daily. Apply to vagina nightly every Monday/Wednesday/Friday. 42.5 g 11    famotidine (Pepcid) 40 mg tablet TAKE 1 TABLET (40 MG) BY MOUTH ONCE DAILY. 30 tablet 0    fluticasone (Flonase) 50 mcg/actuation nasal spray Administer 1 spray into each nostril 2 times a day. Shake gently. Before first use, prime pump. After use, clean tip and replace cap. 16 g 5    FreeStyle glucose  monitoring kit True Metrix Blood Glucose test in vitro strip; Use as directed 4-5 times daily      ibuprofen 600 mg tablet Take 1 tablet (600 mg) by mouth every 8 hours if needed for mild pain (1 - 3). 30 tablet 1    Ingrezza 40 mg capsule Take 1 capsule (40 mg) by mouth once daily.      ketoconazole (NIZOral) 2 % cream APPLY TOPICALLY 2 TIMES A DAY. 60 g 1    Lidocaine Pain Relief 4 % patch APPLY 1 PATCH TOPICALLY ONCE DAILY AS NEEDED (PAIN). TAKE PATCH OFF AFTER 12 HOURS. 30 patch 0    loratadine (Claritin) 10 mg tablet Take 1 tablet (10 mg) by mouth once daily. 30 tablet 11    metFORMIN  mg 24 hr tablet TAKE 2 TABLETS (1,000 MG) BY MOUTH ONCE DAILY WITH BREAKFAST. DO NOT CRUSH, CHEW, OR SPLIT. 60 tablet 1    methocarbamol (Robaxin) 500 mg tablet Take 2 tablets (1,000 mg) by mouth 3 times a day as needed for muscle spasms. 60 tablet 0    moisturizing mouth (Biotene Dry Mouth Oral Rinse) solution Swish and spit 15 mL 3 times a day as needed (Dry mouth). 1000 mL 1    mupirocin (Bactroban) 2 % ointment APPLY TOPICALLY 3 TIMES A DAY FOR 10 DAYS. 22 g 1    pantoprazole (ProtoNix) 40 mg EC tablet Take 1 tablet (40 mg) by mouth once daily in the morning. Take before meals. Do not crush, chew, or split. 30 tablet 3    polyethylene glycol (Glycolax, Miralax) 17 gram packet Take 17 g by mouth once daily. Mix 1 cap (17g) into 8 ounces of fluid. 90 packet 0    polyethylene glycol (GoLYTELY) 236-22.74-6.74 -5.86 gram solution Take 4,000 mL by mouth early in the morning..      pregabalin (Lyrica) 300 mg capsule Take 1 capsule (300 mg) by mouth 2 times a day. 60 capsule 0    traZODone (Desyrel) 100 mg tablet Take 2 tablets (200 mg) by mouth early in the morning..       No current facility-administered medications for this visit.

## 2025-07-03 ENCOUNTER — APPOINTMENT (OUTPATIENT)
Dept: ORTHOPEDIC SURGERY | Facility: CLINIC | Age: 60
End: 2025-07-03
Payer: COMMERCIAL

## 2025-07-03 ENCOUNTER — HOSPITAL ENCOUNTER (OUTPATIENT)
Dept: PAIN MEDICINE | Facility: CLINIC | Age: 60
Discharge: HOME | End: 2025-07-03
Payer: COMMERCIAL

## 2025-07-03 VITALS
RESPIRATION RATE: 20 BRPM | TEMPERATURE: 97.1 F | DIASTOLIC BLOOD PRESSURE: 67 MMHG | OXYGEN SATURATION: 99 % | HEART RATE: 68 BPM | SYSTOLIC BLOOD PRESSURE: 126 MMHG

## 2025-07-03 DIAGNOSIS — M25.562 CHRONIC PAIN OF LEFT KNEE: ICD-10-CM

## 2025-07-03 DIAGNOSIS — G89.29 CHRONIC PAIN OF LEFT KNEE: ICD-10-CM

## 2025-07-03 PROCEDURE — 3700000012 HC SEDATION LEVEL 5+ TIME - INITIAL 15 MINUTES 5/> YEARS

## 2025-07-03 PROCEDURE — 64624 DSTRJ NULYT AGT GNCLR NRV: CPT | Performed by: PAIN MEDICINE

## 2025-07-03 PROCEDURE — 2500000004 HC RX 250 GENERAL PHARMACY W/ HCPCS (ALT 636 FOR OP/ED): Performed by: PAIN MEDICINE

## 2025-07-03 PROCEDURE — 99152 MOD SED SAME PHYS/QHP 5/>YRS: CPT | Performed by: PAIN MEDICINE

## 2025-07-03 PROCEDURE — 64624 DSTRJ NULYT AGT GNCLR NRV: CPT | Mod: 59 | Performed by: PAIN MEDICINE

## 2025-07-03 RX ORDER — MIDAZOLAM HYDROCHLORIDE 1 MG/ML
INJECTION, SOLUTION INTRAMUSCULAR; INTRAVENOUS AS NEEDED
Status: COMPLETED | OUTPATIENT
Start: 2025-07-03 | End: 2025-07-03

## 2025-07-03 RX ADMIN — MIDAZOLAM 2 MG: 1 INJECTION INTRAMUSCULAR; INTRAVENOUS at 09:51

## 2025-07-03 ASSESSMENT — PAIN - FUNCTIONAL ASSESSMENT: PAIN_FUNCTIONAL_ASSESSMENT: 0-10

## 2025-07-03 ASSESSMENT — PAIN SCALES - GENERAL: PAINLEVEL_OUTOF10: 8

## 2025-07-03 NOTE — H&P
"History Of Present Illness  Alejandra Houston is a 59 y.o. female presenting with left knee pain here today for the radiofrequency ablation of the left genicular nerve     Past Medical History  Medical History[1]  Surgical History  Surgical History[2]  Social History  She reports that she has been smoking cigarettes. She started smoking about 45 years ago. She has a 22.8 pack-year smoking history. She has been exposed to tobacco smoke. She has never used smokeless tobacco. She reports current alcohol use. She reports that she does not currently use drugs after having used the following drugs: \"Crack\" cocaine.    Family History  Family History[3]     Allergies  Allergies[4]  Review of Systems   All 13 systems were reviewed and are within normal levels except as noted below or per HPI. Positive and pertinent negative responses are noted below or in the HPI   Denied any fever or chills. No weight loss and no night sweats. No cough or sputum production. No diarrhea   No constipation  No bladder and bowel incontinence and no other changes in bladder and bowel. No skin changes.   Denied opioids diversion and abuse and denies alcoholism. Denies overuse of  pain medications.    Physical Exam       Past medical history no interval changes has been noted    On physical examination    General   Alert, oriented x3 pleasant and cooperative. Does not look in any major distress.    HEENT  Pupils normal in size. Ears, nose, mouth, and throat appear to be in normal condition.  Head atraumatic      No signs of sedation or signs of withdrawal apparent.    Psychiatric   No signs of depression apparent.    Neuro   No focal neurological deficit apparent. Ambulation at baseline.      Respiratory  No respiratory distress     Abdomen  no distention     Skin  No skin markings supportive of recent IV drug usage .    Cardiovascular  Regular rate and rhythm     Last Recorded Vitals  Blood pressure 128/85, pulse 101, temperature 36.2 °C (97.1 °F), " resp. rate 20, SpO2 95%, not currently breastfeeding.    Assessment/Plan   Left knee pain she is here today for the radiofrequency ablation of the left genicular nerves     Chrissy Merida MD       [1]   Past Medical History:  Diagnosis Date    Abnormal levels of other serum enzymes 2022    Elevated liver enzymes    Acid reflux     Acute sinusitis 2023    Anxiety     Bipolar disorder     Cellulitis 2017    Chronic cough 2023    Closed fracture of lateral malleolus 2017    Closed fracture of left distal radius 2025    COPD (chronic obstructive pulmonary disease) (Multi)     Depression     DM (diabetes mellitus) (Multi)     Fibromyalgia, primary     Glaucoma     Hypertension     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    Avascular necrosis of bone of left hip    Morbid obesity with BMI of 40.0-44.9, adult (Multi) 2023    Non-intractable vomiting with nausea 2017    Personal history of COVID-19 2023    Positive 2021      Sleep apnea     no cpap    Suicidal ideations     Thrombocytopenia    [2]   Past Surgical History:  Procedure Laterality Date     SECTION, CLASSIC  02/15/2018     Section     SECTION, CLASSIC      HIP ARTHROPLASTY      KNEE ARTHROPLASTY      OTHER SURGICAL HISTORY  06/10/2019    Hip replacement    TOTAL KNEE ARTHROPLASTY  2018    Knee Replacement    UVULOPALATOPHARYNGOPLASTY     [3]   Family History  Problem Relation Name Age of Onset    Hypertension Mother      Diabetes Mother      COPD Mother      Heart disease Mother      Lymphoma Mother      Multiple myeloma Mother      Cancer Other fam hx     Diabetes Other fam hx     Hypertension Other fam hx     Heart disease Other fam hx    [4] No Known Allergies

## 2025-07-08 ENCOUNTER — TELEPHONE (OUTPATIENT)
Dept: PAIN MEDICINE | Facility: CLINIC | Age: 60
End: 2025-07-08
Payer: COMMERCIAL

## 2025-07-08 NOTE — TELEPHONE ENCOUNTER
Calls in requesting the right side of her cervical region to be done; she had the left cervical RFA done in March but then proceeded with her knee but would like to have her neck done now  She is aware you are out of office until 7/21/25  she recently had genicular RFA and is aware that we typically like to have time in between procedure to promote the proper healing

## 2025-07-08 NOTE — TELEPHONE ENCOUNTER
She is calling to state the lyrica is not working for her she has not seen any change in her level of pain  What do advise??

## 2025-07-09 NOTE — PREPROCEDURE INSTRUCTIONS
Current Medications    Medication Instructions    acetaminophen (Tylenol) 500 mg tablet Ok to use as needed    albuterol 90 mcg/actuation inhaler BRING day of surgery    B Complex-Vitamin B12 tablet Hold starting today    blood sugar diagnostic Continue as prescribed    blood-glucose meter (True Metrix Glucose Meter) misc Continue as prescribed    buPROPion XL (Wellbutrin XL) 300 mg 24 hr tablet Continue as prescribed    carvedilol (Coreg) 6.25 mg tablet Take morning of surgery    cholecalciferol (Vitamin D-3) 50 mcg (2,000 units) capsule Hold starting today    docusate sodium (Colace) 100 mg capsule Hold day of surgery    dulaglutide (Trulicity) 3 mg/0.5 mL injection Takes on Sunday last dose (7/6 /25)    DULoxetine (Cymbalta) 60 mg DR capsule Take morning of surgery    famotidine (Pepcid) 40 mg tablet Take morning of surgery    fluticasone (Flonase) 50 mcg/actuation nasal spray Continue as prescribed    FreeStyle glucose monitoring kit Continue as prescribed    ibuprofen 600 mg tablet HOLD starting today until after surgery    Ingrezza 40 mg capsule Continue as prescribed    loratadine (Claritin) 10 mg tablet Continue as prescribed    metFORMIN  mg 24 hr tablet HOLD day of surgery    methocarbamol (Robaxin) 500 mg tablet Continue as prescribed    pantoprazole (ProtoNix) 40 mg EC tablet Take morning of surgery    polyethylene glycol (Glycolax, Miralax) 17 gram packet Hold day of surgery    polyethylene glycol (GoLYTELY) 236-22.74-6.74 -5.86 gram solution Bowel prep for surgery-follow instructions    traZODone (Desyrel) 100 mg tablet Ok to use but Hold if makes you too sleepy the next day       NPO Instructions:  You need to be on a clear liquid diet 24 hours prior to surgery  Do not eat any food after midnight the night before your surgery/procedure.  You may have 13 ounces of clear liquids until TWO hours before arrival. This includes water, black tea/coffee, (no milk or cream) apple juice and electrolyte  drinks (Gatorade). No red colors.    Additional Instructions:     The Day before Surgery:  You will be contacted regarding the time of your arrival to facility and surgery time    The Day of Surgery:  Enter through the Main Entrance of [facility], located at [address] Proceed to registration, located on the right hand side of the staircase. You will need your ID and insurance card for registration. Please ensure you have a responsible adult to drive you home. A responsible adult DOES NOT include rideshare service drivers ([employer]ber, Lyft, etc), cab drivers, or public transportation drivers, but “provide a ride” is acceptable.    Take a shower before your procedure. After your shower avoid lotions, powders, deodorants or anything applied to the skin. If you wear contacts or glasses, wear the glasses. If you do not have glasses, please bring a case for your contacts. You may wear hearing aids and dentures, bring a case for them or we will provide one. Make sure you wear something loose and comfortable. Keep in mind your surgical procedure and wear something that will accommodate incisions or bandages. Please remove all jewelry and piercing's.     For further questions Lizy ELMORE can be contacted at [phone] between 7AM-3PM.      Preoperative Fasting Guidelines    Why must I stop eating and drinking before surgery?  With anesthesia, food or liquid in your stomach can enter your lungs causing serious complications  GLP-1 medications can slow the movement of food through your stomach and intestines.  This further increases the risk of food entering your lungs with anesthesia    When do I need to stop eating and drinking before my surgery?  To help ensure food has passed out of your stomach, START a clear liquid diet 24 hours before your surgery  On the day of your surgery/procedure, STOP all clear liquids 2 hours before your arrival time to the hospital/facility     A clear liquid diet consists of  clear liquids and foods that melt into a clear liquid (i.e. gelatin) and excludes solid foods and liquids you cannot see through (i.e. milk). Clears can and should contain sugar to obtain a sufficient number of calories.  A clear liquid diet includes  Clear, fat-free broth  Clear nutritional drinks  Pulp-free popsicles, vegetable and fruit juice  Gelatin  Coffee and tea without creamer or milk  Clear soda and sports drinks    Diabetic Patients  Clear liquids should not be sugar-free   Check your blood glucose levels as you normally do  If you have symptoms of low blood glucose (shakiness, sweating, dizziness, confusion) or high blood glucose (dry mouth, excessive thirst, frequent urination, blurry vision), check your blood glucose level  For low blood glucose increase your consumption of sugar-containing clear liquid   For high blood glucose, decrease your consumption of sugar-containing clears and treat as you normally would  If symptoms persist seek medical attention    Examples of GLP-1 Medications  Trulicity  Clau Nolan

## 2025-07-10 ENCOUNTER — PATIENT MESSAGE (OUTPATIENT)
Dept: PRIMARY CARE | Facility: CLINIC | Age: 60
End: 2025-07-10
Payer: COMMERCIAL

## 2025-07-11 ENCOUNTER — ANESTHESIA (OUTPATIENT)
Dept: OPERATING ROOM | Facility: HOSPITAL | Age: 60
End: 2025-07-11
Payer: COMMERCIAL

## 2025-07-11 ENCOUNTER — APPOINTMENT (OUTPATIENT)
Dept: HEMATOLOGY/ONCOLOGY | Facility: CLINIC | Age: 60
End: 2025-07-11
Payer: COMMERCIAL

## 2025-07-11 ENCOUNTER — ANESTHESIA EVENT (OUTPATIENT)
Dept: OPERATING ROOM | Facility: HOSPITAL | Age: 60
End: 2025-07-11
Payer: COMMERCIAL

## 2025-07-11 ENCOUNTER — HOSPITAL ENCOUNTER (OUTPATIENT)
Facility: HOSPITAL | Age: 60
Setting detail: OUTPATIENT SURGERY
Discharge: HOME | End: 2025-07-11
Attending: SURGERY | Admitting: SURGERY
Payer: COMMERCIAL

## 2025-07-11 VITALS
TEMPERATURE: 97 F | SYSTOLIC BLOOD PRESSURE: 111 MMHG | WEIGHT: 203.93 LBS | OXYGEN SATURATION: 96 % | DIASTOLIC BLOOD PRESSURE: 68 MMHG | BODY MASS INDEX: 31.01 KG/M2 | HEART RATE: 66 BPM | RESPIRATION RATE: 18 BRPM

## 2025-07-11 DIAGNOSIS — K64.9 HEMORRHOIDS, UNSPECIFIED HEMORRHOID TYPE: Primary | ICD-10-CM

## 2025-07-11 LAB — GLUCOSE BLD MANUAL STRIP-MCNC: 105 MG/DL (ref 74–99)

## 2025-07-11 PROCEDURE — 3600000007 HC OR TIME - EACH INCREMENTAL 1 MINUTE - PROCEDURE LEVEL TWO: Performed by: SURGERY

## 2025-07-11 PROCEDURE — 7100000002 HC RECOVERY ROOM TIME - EACH INCREMENTAL 1 MINUTE: Performed by: SURGERY

## 2025-07-11 PROCEDURE — 3600000002 HC OR TIME - INITIAL BASE CHARGE - PROCEDURE LEVEL TWO: Performed by: SURGERY

## 2025-07-11 PROCEDURE — 3700000002 HC GENERAL ANESTHESIA TIME - EACH INCREMENTAL 1 MINUTE: Performed by: SURGERY

## 2025-07-11 PROCEDURE — 82947 ASSAY GLUCOSE BLOOD QUANT: CPT

## 2025-07-11 PROCEDURE — 3700000001 HC GENERAL ANESTHESIA TIME - INITIAL BASE CHARGE: Performed by: SURGERY

## 2025-07-11 PROCEDURE — 7100000009 HC PHASE TWO TIME - INITIAL BASE CHARGE: Performed by: SURGERY

## 2025-07-11 PROCEDURE — 7100000001 HC RECOVERY ROOM TIME - INITIAL BASE CHARGE: Performed by: SURGERY

## 2025-07-11 PROCEDURE — 2500000004 HC RX 250 GENERAL PHARMACY W/ HCPCS (ALT 636 FOR OP/ED): Performed by: SURGERY

## 2025-07-11 PROCEDURE — 2500000004 HC RX 250 GENERAL PHARMACY W/ HCPCS (ALT 636 FOR OP/ED): Performed by: NURSE ANESTHETIST, CERTIFIED REGISTERED

## 2025-07-11 PROCEDURE — 7100000010 HC PHASE TWO TIME - EACH INCREMENTAL 1 MINUTE: Performed by: SURGERY

## 2025-07-11 PROCEDURE — 46221 LIGATION OF HEMORRHOID(S): CPT | Performed by: SURGERY

## 2025-07-11 PROCEDURE — 2720000007 HC OR 272 NO HCPCS: Performed by: SURGERY

## 2025-07-11 PROCEDURE — 96372 THER/PROPH/DIAG INJ SC/IM: CPT | Performed by: SURGERY

## 2025-07-11 RX ORDER — LABETALOL HYDROCHLORIDE 5 MG/ML
5 INJECTION, SOLUTION INTRAVENOUS ONCE AS NEEDED
Status: DISCONTINUED | OUTPATIENT
Start: 2025-07-11 | End: 2025-07-11 | Stop reason: HOSPADM

## 2025-07-11 RX ORDER — HYDRALAZINE HYDROCHLORIDE 20 MG/ML
5 INJECTION INTRAMUSCULAR; INTRAVENOUS EVERY 30 MIN PRN
Status: DISCONTINUED | OUTPATIENT
Start: 2025-07-11 | End: 2025-07-11 | Stop reason: HOSPADM

## 2025-07-11 RX ORDER — ALBUTEROL SULFATE 0.83 MG/ML
2.5 SOLUTION RESPIRATORY (INHALATION) ONCE AS NEEDED
Status: DISCONTINUED | OUTPATIENT
Start: 2025-07-11 | End: 2025-07-11 | Stop reason: HOSPADM

## 2025-07-11 RX ORDER — FENTANYL CITRATE 50 UG/ML
INJECTION, SOLUTION INTRAMUSCULAR; INTRAVENOUS AS NEEDED
Status: DISCONTINUED | OUTPATIENT
Start: 2025-07-11 | End: 2025-07-11

## 2025-07-11 RX ORDER — KETOROLAC TROMETHAMINE 30 MG/ML
INJECTION, SOLUTION INTRAMUSCULAR; INTRAVENOUS AS NEEDED
Status: DISCONTINUED | OUTPATIENT
Start: 2025-07-11 | End: 2025-07-11

## 2025-07-11 RX ORDER — MIDAZOLAM HYDROCHLORIDE 1 MG/ML
INJECTION, SOLUTION INTRAMUSCULAR; INTRAVENOUS AS NEEDED
Status: DISCONTINUED | OUTPATIENT
Start: 2025-07-11 | End: 2025-07-11

## 2025-07-11 RX ORDER — SUCCINYLCHOLINE CHLORIDE 20 MG/ML INJECTION SOLUTION
SOLUTION AS NEEDED
Status: DISCONTINUED | OUTPATIENT
Start: 2025-07-11 | End: 2025-07-11

## 2025-07-11 RX ORDER — CEFAZOLIN SODIUM 2 G/50ML
2 SOLUTION INTRAVENOUS ONCE
Status: COMPLETED | OUTPATIENT
Start: 2025-07-11 | End: 2025-07-11

## 2025-07-11 RX ORDER — TRAMADOL HYDROCHLORIDE 50 MG/1
50 TABLET, FILM COATED ORAL EVERY 6 HOURS PRN
Qty: 12 TABLET | Refills: 0 | Status: SHIPPED | OUTPATIENT
Start: 2025-07-11 | End: 2025-07-18

## 2025-07-11 RX ORDER — SODIUM CHLORIDE, SODIUM LACTATE, POTASSIUM CHLORIDE, CALCIUM CHLORIDE 600; 310; 30; 20 MG/100ML; MG/100ML; MG/100ML; MG/100ML
100 INJECTION, SOLUTION INTRAVENOUS CONTINUOUS
Status: DISCONTINUED | OUTPATIENT
Start: 2025-07-11 | End: 2025-07-11 | Stop reason: HOSPADM

## 2025-07-11 RX ORDER — ONDANSETRON HYDROCHLORIDE 2 MG/ML
INJECTION, SOLUTION INTRAVENOUS AS NEEDED
Status: DISCONTINUED | OUTPATIENT
Start: 2025-07-11 | End: 2025-07-11

## 2025-07-11 RX ORDER — LIDOCAINE HYDROCHLORIDE 10 MG/ML
INJECTION, SOLUTION INFILTRATION; PERINEURAL AS NEEDED
Status: DISCONTINUED | OUTPATIENT
Start: 2025-07-11 | End: 2025-07-11 | Stop reason: HOSPADM

## 2025-07-11 RX ORDER — HEPARIN SODIUM 5000 [USP'U]/ML
5000 INJECTION, SOLUTION INTRAVENOUS; SUBCUTANEOUS ONCE
Status: COMPLETED | OUTPATIENT
Start: 2025-07-11 | End: 2025-07-11

## 2025-07-11 RX ORDER — METRONIDAZOLE 500 MG/100ML
500 INJECTION, SOLUTION INTRAVENOUS ONCE
Status: COMPLETED | OUTPATIENT
Start: 2025-07-11 | End: 2025-07-11

## 2025-07-11 RX ORDER — ACETAMINOPHEN 325 MG/1
650 TABLET ORAL EVERY 4 HOURS PRN
Status: DISCONTINUED | OUTPATIENT
Start: 2025-07-11 | End: 2025-07-11 | Stop reason: HOSPADM

## 2025-07-11 RX ORDER — ROCURONIUM BROMIDE 10 MG/ML
INJECTION, SOLUTION INTRAVENOUS AS NEEDED
Status: DISCONTINUED | OUTPATIENT
Start: 2025-07-11 | End: 2025-07-11

## 2025-07-11 RX ORDER — PROPOFOL 10 MG/ML
INJECTION, EMULSION INTRAVENOUS AS NEEDED
Status: DISCONTINUED | OUTPATIENT
Start: 2025-07-11 | End: 2025-07-11

## 2025-07-11 RX ORDER — ONDANSETRON HYDROCHLORIDE 2 MG/ML
4 INJECTION, SOLUTION INTRAVENOUS ONCE AS NEEDED
Status: DISCONTINUED | OUTPATIENT
Start: 2025-07-11 | End: 2025-07-11 | Stop reason: HOSPADM

## 2025-07-11 RX ORDER — LIDOCAINE HCL/PF 100 MG/5ML
SYRINGE (ML) INTRAVENOUS AS NEEDED
Status: DISCONTINUED | OUTPATIENT
Start: 2025-07-11 | End: 2025-07-11

## 2025-07-11 RX ORDER — LIDOCAINE HYDROCHLORIDE 10 MG/ML
0.1 INJECTION, SOLUTION INFILTRATION; PERINEURAL ONCE
Status: DISCONTINUED | OUTPATIENT
Start: 2025-07-11 | End: 2025-07-11 | Stop reason: HOSPADM

## 2025-07-11 RX ORDER — MIDAZOLAM HYDROCHLORIDE 1 MG/ML
1 INJECTION, SOLUTION INTRAMUSCULAR; INTRAVENOUS ONCE AS NEEDED
Status: DISCONTINUED | OUTPATIENT
Start: 2025-07-11 | End: 2025-07-11 | Stop reason: HOSPADM

## 2025-07-11 RX ADMIN — KETOROLAC TROMETHAMINE 30 MG: 30 INJECTION, SOLUTION INTRAMUSCULAR at 14:45

## 2025-07-11 RX ADMIN — LIDOCAINE HYDROCHLORIDE 60 MG: 20 INJECTION INTRAVENOUS at 14:25

## 2025-07-11 RX ADMIN — ONDANSETRON 4 MG: 2 INJECTION INTRAMUSCULAR; INTRAVENOUS at 14:45

## 2025-07-11 RX ADMIN — SUGAMMADEX 200 MG: 100 INJECTION, SOLUTION INTRAVENOUS at 14:52

## 2025-07-11 RX ADMIN — PROPOFOL 200 MG: 10 INJECTION, EMULSION INTRAVENOUS at 14:25

## 2025-07-11 RX ADMIN — FENTANYL CITRATE 50 MCG: 50 INJECTION, SOLUTION INTRAMUSCULAR; INTRAVENOUS at 14:30

## 2025-07-11 RX ADMIN — PROPOFOL 100 MG: 10 INJECTION, EMULSION INTRAVENOUS at 14:26

## 2025-07-11 RX ADMIN — CEFAZOLIN SODIUM 2 G: 2 SOLUTION INTRAVENOUS at 14:34

## 2025-07-11 RX ADMIN — HEPARIN SODIUM 5000 UNITS: 5000 INJECTION, SOLUTION INTRAVENOUS; SUBCUTANEOUS at 13:28

## 2025-07-11 RX ADMIN — PROPOFOL 100 MG: 10 INJECTION, EMULSION INTRAVENOUS at 14:38

## 2025-07-11 RX ADMIN — SODIUM CHLORIDE, SODIUM LACTATE, POTASSIUM CHLORIDE, AND CALCIUM CHLORIDE: .6; .31; .03; .02 INJECTION, SOLUTION INTRAVENOUS at 14:19

## 2025-07-11 RX ADMIN — METRONIDAZOLE 500 MG: 500 SOLUTION INTRAVENOUS at 14:35

## 2025-07-11 RX ADMIN — FENTANYL CITRATE 50 MCG: 50 INJECTION, SOLUTION INTRAMUSCULAR; INTRAVENOUS at 14:23

## 2025-07-11 RX ADMIN — MIDAZOLAM 2 MG: 1 INJECTION INTRAMUSCULAR; INTRAVENOUS at 14:23

## 2025-07-11 RX ADMIN — ROCURONIUM BROMIDE 40 MG: 10 INJECTION, SOLUTION INTRAVENOUS at 14:30

## 2025-07-11 RX ADMIN — Medication 160 MG: at 14:25

## 2025-07-11 RX ADMIN — DEXAMETHASONE SODIUM PHOSPHATE 4 MG: 4 INJECTION, SOLUTION INTRAMUSCULAR; INTRAVENOUS at 14:45

## 2025-07-11 RX ADMIN — PROPOFOL 100 MG: 10 INJECTION, EMULSION INTRAVENOUS at 14:32

## 2025-07-11 SDOH — HEALTH STABILITY: MENTAL HEALTH: CURRENT SMOKER: 0

## 2025-07-11 ASSESSMENT — PAIN - FUNCTIONAL ASSESSMENT
PAIN_FUNCTIONAL_ASSESSMENT: 0-10

## 2025-07-11 ASSESSMENT — PAIN SCALES - GENERAL
PAINLEVEL_OUTOF10: 0 - NO PAIN
PAIN_LEVEL: 0
PAINLEVEL_OUTOF10: 0 - NO PAIN

## 2025-07-11 NOTE — TELEPHONE ENCOUNTER
I do not do her Trazodone- looks like psychiatry (Marlena Johnson) does.  Would need to see me for the headaches- though not much I can offer in addition to what she is already on.  She may want to reach out to pain management as well.

## 2025-07-11 NOTE — ANESTHESIA PREPROCEDURE EVALUATION
Patient: Alejandra Houston    Procedure Information       Date/Time: 07/11/25 1330    Procedures:       HEMORRHOID EXCISION      HEMORRHOID LIGATION    Location: [facility] / [facility]    Surgeons: Alexandro Walker MD PhD            Relevant Problems   Cardiac   (+) Essential hypertension, benign   (+) Hyperlipidemia   (+) Hypertension      Neuro   (+) Anxiety   (+) Cervical radiculopathy   (+) Depression   (+) Left carpal tunnel syndrome   (+) Lumbar radiculopathy   (+) MDD (major depressive disorder), recurrent severe, without psychosis (Multi)   (+) Major depressive disorder, recurrent   (+) Peripheral nerve disease   (+) Saphenous neuritis, left   (+) Type 2 diabetes mellitus with diabetic polyneuropathy, without long-term current use of insulin      GI   (+) GERD (gastroesophageal reflux disease)      Liver   (+) Fatty liver disease, nonalcoholic      Endocrine   (+) Class 2 obesity with body mass index (BMI) of 38.0 to 38.9 in adult   (+) Class 2 severe obesity with serious comorbidity and body mass index (BMI) of 39.0 to 39.9 in adult   (+) Type 2 diabetes mellitus   (+) Type 2 diabetes mellitus with diabetic polyneuropathy, without long-term current use of insulin      Hematology   (+) Pancytopenia   (+) Thrombocytopenia      Musculoskeletal   (+) Fibromyalgia   (+) Generalized osteoarthritis   (+) Left carpal tunnel syndrome   (+) Primary osteoarthritis of left knee   (+) Spinal stenosis of lumbar region without neurogenic claudication      ID   (+) Dermatophytosis of nail   (+) Genital herpes       Clinical information reviewed:    Allergies  Meds     OB Status           NPO Detail:  NPO/Void Status  Carbohydrate Drink Given Prior to Surgery? : N  Date of Last Liquid: 07/11/25  Time of Last Liquid: 0500  Date of Last Solid: 07/09/25  Time of Last Solid: 2100  Last Intake Type: Solid meal  Time of Last Void: 1314         Physical Exam    Airway  TM distance: >3 FB  Neck ROM: full  Mouth opening: 3  or more finger widths     Cardiovascular   Rhythm: regular     Dental - normal exam    (+) lower dentures, upper dentures     Pulmonary    Abdominal            Anesthesia Plan    History of general anesthesia?: yes  History of complications of general anesthesia?: no    ASA 3     general     The patient is not a current smoker.  Patient was not previously instructed to abstain from smoking on day of procedure.  Patient did not smoke on day of procedure.    intravenous induction   Anesthetic plan and risks discussed with patient.  Use of blood products discussed with patient who.    Plan discussed with CRNA.

## 2025-07-11 NOTE — SIGNIFICANT EVENT
Discharge instructions reviewed and provided to patient and patients . Patient denies questions related to material reviewed. Patient awake/alert. VSS. Patient tolerates PO well and denies nausea. Mesh underwear in place, and ABD dressing c/d/I. Informed patient 1x medication for pain was sent to her pharmacy. Patient denies any needs at this time.

## 2025-07-11 NOTE — ANESTHESIA POSTPROCEDURE EVALUATION
Patient: Alejandra Houston    Procedure Summary       Date: 07/11/25 Room / Location: [facility] / [facility]    Anesthesia Start: 1419 Anesthesia Stop:     Procedure: HEMORRHOID LIGATION (Anus) Diagnosis:       Hemorrhoids, unspecified hemorrhoid type      (Hemorrhoids, unspecified hemorrhoid type [K64.9])    Surgeons: Alexandro Walker MD PhD Responsible Provider: Eliseo Dominguez MD    Anesthesia Type: MAC ASA Status: 3            Anesthesia Type: MAC    Vitals Value Taken Time   /82 07/11/25 15:10   Temp 36.4 07/11/25 15:13   Pulse 69 07/11/25 15:11   Resp 18 07/11/25 15:13   SpO2 98 % 07/11/25 15:11   Vitals shown include unfiled device data.    Anesthesia Post Evaluation    Patient location during evaluation: PACU  Patient participation: complete - patient participated  Level of consciousness: awake and alert  Pain score: 0  Pain management: adequate  Airway patency: patent  Cardiovascular status: acceptable and hemodynamically stable  Respiratory status: acceptable, spontaneous ventilation and nonlabored ventilation  Hydration status: acceptable  Postoperative Nausea and Vomiting: none        There were no known notable events for this encounter.

## 2025-07-15 DIAGNOSIS — K59.00 CONSTIPATION, UNSPECIFIED CONSTIPATION TYPE: ICD-10-CM

## 2025-07-15 RX ORDER — DOCUSATE SODIUM 100 MG/1
100 CAPSULE, LIQUID FILLED ORAL 2 TIMES DAILY PRN
Qty: 60 CAPSULE | Refills: 1 | Status: SHIPPED | OUTPATIENT
Start: 2025-07-15

## 2025-07-16 ENCOUNTER — APPOINTMENT (OUTPATIENT)
Dept: PRIMARY CARE | Facility: CLINIC | Age: 60
End: 2025-07-16
Payer: COMMERCIAL

## 2025-07-18 ENCOUNTER — TELEMEDICINE (OUTPATIENT)
Dept: PRIMARY CARE | Facility: CLINIC | Age: 60
End: 2025-07-18
Payer: COMMERCIAL

## 2025-07-18 DIAGNOSIS — K21.9 GASTROESOPHAGEAL REFLUX DISEASE WITHOUT ESOPHAGITIS: ICD-10-CM

## 2025-07-18 DIAGNOSIS — K76.6 PORTAL HYPERTENSION WITH ESOPHAGEAL VARICES (MULTI): ICD-10-CM

## 2025-07-18 DIAGNOSIS — G89.29 CHRONIC PAIN OF BOTH KNEES: ICD-10-CM

## 2025-07-18 DIAGNOSIS — E11.9 TYPE 2 DIABETES MELLITUS WITHOUT COMPLICATION, WITHOUT LONG-TERM CURRENT USE OF INSULIN: ICD-10-CM

## 2025-07-18 DIAGNOSIS — M25.562 CHRONIC PAIN OF BOTH KNEES: ICD-10-CM

## 2025-07-18 DIAGNOSIS — R51.9 INTRACTABLE HEADACHE, UNSPECIFIED CHRONICITY PATTERN, UNSPECIFIED HEADACHE TYPE: ICD-10-CM

## 2025-07-18 DIAGNOSIS — K74.60 CIRRHOSIS OF LIVER WITHOUT ASCITES, UNSPECIFIED HEPATIC CIRRHOSIS TYPE (MULTI): ICD-10-CM

## 2025-07-18 DIAGNOSIS — M62.838 MUSCLE SPASM: ICD-10-CM

## 2025-07-18 DIAGNOSIS — F31.9 BIPOLAR AFFECTIVE DISORDER, REMISSION STATUS UNSPECIFIED (MULTI): Primary | ICD-10-CM

## 2025-07-18 DIAGNOSIS — I85.00 PORTAL HYPERTENSION WITH ESOPHAGEAL VARICES (MULTI): ICD-10-CM

## 2025-07-18 DIAGNOSIS — M25.561 CHRONIC PAIN OF BOTH KNEES: ICD-10-CM

## 2025-07-18 PROCEDURE — 99213 OFFICE O/P EST LOW 20 MIN: CPT | Performed by: INTERNAL MEDICINE

## 2025-07-18 RX ORDER — METFORMIN HYDROCHLORIDE 500 MG/1
500 TABLET, EXTENDED RELEASE ORAL
Qty: 60 TABLET | Refills: 1 | Status: SHIPPED | OUTPATIENT
Start: 2025-07-18

## 2025-07-18 RX ORDER — CARVEDILOL 6.25 MG/1
6.25 TABLET ORAL 2 TIMES DAILY
Qty: 60 TABLET | Refills: 5 | Status: SHIPPED | OUTPATIENT
Start: 2025-07-18 | End: 2026-01-14

## 2025-07-18 RX ORDER — BUPROPION HYDROCHLORIDE 300 MG/1
300 TABLET ORAL
Qty: 30 TABLET | Refills: 3 | Status: SHIPPED | OUTPATIENT
Start: 2025-07-18

## 2025-07-18 RX ORDER — PREGABALIN 300 MG/1
300 CAPSULE ORAL 2 TIMES DAILY
Qty: 60 CAPSULE | Refills: 0 | Status: CANCELLED | OUTPATIENT
Start: 2025-07-18 | End: 2025-08-17

## 2025-07-18 RX ORDER — FAMOTIDINE 40 MG/1
40 TABLET, FILM COATED ORAL DAILY
Qty: 30 TABLET | Refills: 3 | Status: SHIPPED | OUTPATIENT
Start: 2025-07-18 | End: 2026-07-18

## 2025-07-18 RX ORDER — METHOCARBAMOL 500 MG/1
1000 TABLET, FILM COATED ORAL 3 TIMES DAILY PRN
Qty: 180 TABLET | Refills: 1 | Status: SHIPPED | OUTPATIENT
Start: 2025-07-18

## 2025-07-18 ASSESSMENT — PATIENT HEALTH QUESTIONNAIRE - PHQ9
SUM OF ALL RESPONSES TO PHQ9 QUESTIONS 1 AND 2: 0
2. FEELING DOWN, DEPRESSED OR HOPELESS: NOT AT ALL
1. LITTLE INTEREST OR PLEASURE IN DOING THINGS: NOT AT ALL

## 2025-07-18 ASSESSMENT — ENCOUNTER SYMPTOMS
HEADACHES: 1
VOMITING: 0
NAUSEA: 0

## 2025-07-18 NOTE — PROGRESS NOTES
"Subjective   Patient ID: Alejandra Houston is a 59 y.o. female who presents for Follow-up (She is being seen virtually , woke up with a cold, severe headaches for the past few weeks).    The patient mentions occipital headache, particularly upon getting up in the morning and when laying down at night time.  Symptoms seem to be worsening since initial onset around 2/2025.  She describes the pain as a severe to moderate pressure behind the eyes which lasts from between one hour to the entire day.  The patient denies any significant vision changes, nausea, vomiting, or relieving factors.    The patient underwent \"laser surgery\" for the eyes the previous week.        Review of Systems   Eyes:  Negative for visual disturbance.   Gastrointestinal:  Negative for nausea and vomiting.   Neurological:  Positive for headaches.     Objective   Physical Exam  Comfortable appearing in NAD    Assessment/Plan   Problem List Items Addressed This Visit           ICD-10-CM    GERD (gastroesophageal reflux disease) K21.9    Relevant Medications    famotidine (Pepcid) 40 mg tablet    Headache R51.9    Relevant Orders    MR brain wo IV contrast    Type 2 diabetes mellitus E11.9    Relevant Medications    metFORMIN  mg 24 hr tablet     Other Visit Diagnoses         Codes      Bipolar affective disorder, remission status unspecified (Multi)    -  Primary F31.9    Relevant Medications    buPROPion XL (Wellbutrin XL) 300 mg 24 hr tablet      Muscle spasm     M62.838    Relevant Medications    methocarbamol (Robaxin) 500 mg tablet      Chronic pain of both knees     M25.561, M25.562, G89.29      Cirrhosis of liver without ascites, unspecified hepatic cirrhosis type (Multi)     K74.60    Relevant Medications    carvedilol (Coreg) 6.25 mg tablet      Portal hypertension with esophageal varices (Multi)     K76.6, I85.00    Relevant Medications    carvedilol (Coreg) 6.25 mg tablet            IMPRESSIONS/PLAN:     Intractable Headache  - " Worsening headache.  Ordered MRI Brain.  Instructed patient to head to the ED with any significant worsening, and she verbalized agreement.  Will consider additional management pending results.     Diabetes II   - Last HbA1c 7.0 per 3/2025.  Recent home readings elevated in the 200-300 mg/dL range per patient.   Continue Metformin two tablets BID. Can consider increasing Trulicity to 7.5 mg weekly as well.  Previously on glimperide 2mg every day.  Call the clinic with any concerns.     Depression   - Significantly improved since leaving the hospital. She is following with psychiatry.     Allergic Rhinitis/Eustachian Dysfunction  - Maintained with loratadine 10mg once daily and fluticasone 50 mcg/actuation as 1 spray in each nostril twice daily.    Nasal Septum Ulceration  - Followed with ENT and had benign biopsy.     Sleep Apnea   - Completed in-center Sleep Study confirming moderate CADEN.  Previously ordered referral to Sleep Medicine.  She will reach out to them about obtaining the CPAP supplies.     Smoking History   - CT Chest 10/2024 (angio in ER)      Dyspepsia  - Continue pantoprazole 40mg once daily in the mornings.     Liver Cirrhosis  -  Following with Gastroenterology.     Constipation  - Advised the patient to take Miralax BID and Colace BID as directed on packaging.       Vaginal dryness  - Estradiol cream.     Pinched Cervical Nerve Root/Left Shoulder Pain   - Flare-up.  Previously prescribed prednisone 5mg taper to be taken as directed on packaging.  CT Cervical Spine 1/2024 showed mild to moderate spinal canal stenosis at C4-C5 and moderate bilateral foraminal stenosis.  Following with pain management.     Left knee pain / back pain  -Following with pain management.  Previously ordered small shower chair and walker with seat to assist with ADLs    Thrombocytopenia  - Last CBC showed platelet count of 77 per 10/2023.  Following with Hematology/Oncology.  Suspect from underlying liver cirrhosis.      "  Fatigue  - Likely due to untreated sleep apnea.  Encouraged patient to follow-up with Sleep Medicine, and she has appointment for next week.  Vitamin D, Vitamin B12, and TSH wnl per 9/2023.     Vitamin B12 Deficiency  - Maintained with B Complex-Vitamin B12 tablet as once daily.     Health Maintenance   - Routine labs 3/2025, 6/2024.  Last Mammogram 8/2024. Last colonoscopy 5/2019, repeat due [id number]/[id number].  Previously ordered repeat colonoscopy for 2024.     Follow-up in 3 months, call sooner if needed.        \"I, Dr. Eller, personally performed the services described in the documentation as scribed by Aida Galdamez in my presence, and confirm it is both accurate and complete.   Scribe Attestation     Scribe Attestation  By signing my name below, IAida, Scribe   attest that this documentation has been prepared under the direction and in the presence of Jhoan Eller DO.   Aida Galdamez 07/18/25 2:42 PM   "

## 2025-07-21 DIAGNOSIS — M47.812 CERVICAL SPONDYLOSIS WITHOUT MYELOPATHY: Primary | ICD-10-CM

## 2025-07-22 ENCOUNTER — TELEPHONE (OUTPATIENT)
Dept: PAIN MEDICINE | Facility: CLINIC | Age: 60
End: 2025-07-22
Payer: COMMERCIAL

## 2025-07-22 ENCOUNTER — APPOINTMENT (OUTPATIENT)
Dept: PAIN MEDICINE | Facility: CLINIC | Age: 60
End: 2025-07-22
Payer: COMMERCIAL

## 2025-07-22 NOTE — TELEPHONE ENCOUNTER
Patient called to state that she did not have any relief from the genicular RFA.    She also mentioned that the pregabalin is not working for her. Attempted to reach patient to schedule a follow up appointment. Voicemail left to call office to schedule

## 2025-07-25 ENCOUNTER — APPOINTMENT (OUTPATIENT)
Dept: PRIMARY CARE | Facility: CLINIC | Age: 60
End: 2025-07-25
Payer: COMMERCIAL

## 2025-07-25 ENCOUNTER — APPOINTMENT (OUTPATIENT)
Dept: PAIN MEDICINE | Facility: CLINIC | Age: 60
End: 2025-07-25
Payer: COMMERCIAL

## 2025-07-31 ENCOUNTER — PATIENT MESSAGE (OUTPATIENT)
Dept: PRIMARY CARE | Facility: CLINIC | Age: 60
End: 2025-07-31
Payer: COMMERCIAL

## 2025-07-31 DIAGNOSIS — Z72.0 TOBACCO ABUSE: Primary | ICD-10-CM

## 2025-07-31 DIAGNOSIS — M54.12 CERVICAL RADICULOPATHY: ICD-10-CM

## 2025-07-31 DIAGNOSIS — M54.50 LOW BACK PAIN, UNSPECIFIED BACK PAIN LATERALITY, UNSPECIFIED CHRONICITY, UNSPECIFIED WHETHER SCIATICA PRESENT: ICD-10-CM

## 2025-07-31 RX ORDER — NICOTINE POLACRILEX 2 MG/1
2 LOZENGE ORAL AS NEEDED
Qty: 100 LOZENGE | Refills: 0 | Status: SHIPPED | OUTPATIENT
Start: 2025-07-31 | End: 2025-08-30

## 2025-07-31 RX ORDER — IBUPROFEN 200 MG
1 TABLET ORAL EVERY 24 HOURS
Qty: 30 PATCH | Refills: 1 | Status: SHIPPED | OUTPATIENT
Start: 2025-07-31 | End: 2025-08-30

## 2025-08-01 ENCOUNTER — OFFICE VISIT (OUTPATIENT)
Dept: PAIN MEDICINE | Facility: CLINIC | Age: 60
End: 2025-08-01
Payer: COMMERCIAL

## 2025-08-01 ENCOUNTER — APPOINTMENT (OUTPATIENT)
Dept: RADIOLOGY | Facility: HOSPITAL | Age: 60
End: 2025-08-01
Payer: COMMERCIAL

## 2025-08-01 VITALS
DIASTOLIC BLOOD PRESSURE: 92 MMHG | HEART RATE: 77 BPM | TEMPERATURE: 96.2 F | SYSTOLIC BLOOD PRESSURE: 140 MMHG | RESPIRATION RATE: 18 BRPM | OXYGEN SATURATION: 98 %

## 2025-08-01 DIAGNOSIS — K76.6 PORTAL HYPERTENSION WITH ESOPHAGEAL VARICES (MULTI): ICD-10-CM

## 2025-08-01 DIAGNOSIS — K74.60 CIRRHOSIS OF LIVER WITHOUT ASCITES, UNSPECIFIED HEPATIC CIRRHOSIS TYPE (MULTI): ICD-10-CM

## 2025-08-01 DIAGNOSIS — G89.29 CHRONIC PAIN OF BOTH KNEES: Primary | ICD-10-CM

## 2025-08-01 DIAGNOSIS — M47.812 CERVICAL SPONDYLOSIS: ICD-10-CM

## 2025-08-01 DIAGNOSIS — M25.561 CHRONIC PAIN OF BOTH KNEES: Primary | ICD-10-CM

## 2025-08-01 DIAGNOSIS — I85.00 PORTAL HYPERTENSION WITH ESOPHAGEAL VARICES (MULTI): ICD-10-CM

## 2025-08-01 DIAGNOSIS — M25.562 CHRONIC PAIN OF BOTH KNEES: Primary | ICD-10-CM

## 2025-08-01 PROCEDURE — 3080F DIAST BP >= 90 MM HG: CPT

## 2025-08-01 PROCEDURE — 3077F SYST BP >= 140 MM HG: CPT

## 2025-08-01 PROCEDURE — 99213 OFFICE O/P EST LOW 20 MIN: CPT

## 2025-08-01 RX ORDER — TRAMADOL HYDROCHLORIDE 50 MG/1
50 TABLET, FILM COATED ORAL 2 TIMES DAILY PRN
Qty: 60 TABLET | Refills: 0 | Status: SHIPPED | OUTPATIENT
Start: 2025-08-01 | End: 2025-08-31

## 2025-08-01 ASSESSMENT — PAIN - FUNCTIONAL ASSESSMENT: PAIN_FUNCTIONAL_ASSESSMENT: 0-10

## 2025-08-01 ASSESSMENT — PAIN SCALES - GENERAL
PAINLEVEL_OUTOF10: 9
PAINLEVEL_OUTOF10: 9

## 2025-08-01 NOTE — PATIENT INSTRUCTIONS
Chronic Opioid Treatment Fact Sheet  While you are using opioids from [facility] you may not consume Alcohol, Use Marijuana (recreational or medical), use any other illegal drugs or use controlled substances that have not been personally prescribed to you.  A breach in any of the above will cause us to no longer prescribe opioids for you.      Using opioid medicines to treat your pain  You and your doctor have decided that opioid pain medicine might help reduce your pain and improve your function. Opioids are not likely to make your pain go away completely.  It is important to understand that this treatment involves potential risks and benefits. It is also important that you follow the guidelines in this handout and let your doctor know what you expect from your treatment. Your doctor may ask you to sign an Opioid Patient Care Agreement     What are the goals and possible benefits of opioid treatment?  The goals of treatment are to reduce your pain and improve your daily function. The benefits of opioid medicines are different from person to person. Opioids typically reduce chronic pain by about 30%. Some people find that they can function better day to day, but research has shown that this is not typical.     Experts agree that opioids may actually make pain worse, especially at high doses. “Flare-ups” are common and should not usually be treated by increasing the dose or taking extra medicine.  Your doctor will monitor how you are doing by asking you to rate your pain level and your daily functioning. He or she may want to know how far you can walk, how long you can sit, if you are able to work or do housework, and what kinds of activities you do alone or with family and friends.     What are the common side effects and risks of opioids?  Opioids cause common side effects that can be unpleasant. They can also increase risks of serious health effects that occur less often. Because opioids have risks that can  be serious, your doctor may ask you for urine or blood sample to help protect your safety.     Side effects vary from person to person. You and your doctor will work together to monitor how opioids affect you. Your doctor may need to adjust your dose until you find the right balance between pain reduction, improved function, and side effects.     It is normal to develop physical dependence to opioids:   Physical dependence means your body has adapted to the medicine and you will experience tolerance and withdrawal.   Tolerance means you need to take more of the medicine to get the same effect.   Withdrawal means you will have symptoms when you stop using the medicine.   Opioids Induced Hyperalgesia (paradoxical hyperalgesia) is serious but less common side effect where the opioids medication became the source of increased pain. Your physician may have to wean you off the meds to help you with the pain.     Withdrawal symptoms are usually the opposite of the effects of the medicine.For example, if the medicine causes constipation, the withdrawal symptom would be diarrhea. If the medicine reduces pain, the symptom would be increased pain. Withdrawal from opioids is temporary and usually not dangerous.     Babies born to mothers taking opioids will be dependent on opioids at birth. You should not take opioids if you are trying to get pregnant. If you do get pregnant while taking opioids, let your doctor know right away.     People who have had problems with mental health, drugs, or alcohol are more likely to have problems with opioids. You must tell your doctor about any mental illness, substance abuse, or addiction of any type have experienced in the past. You must also tell your doctor if anyone in your family has had these problems. Research shows these problems sometimes run in families.  Experts agree that people with active substance abuse or addiction problems should not use opioids for chronic non-cancer pain.  If you have problems with substance abuse or addiction, it is important to let your doctor know so you can get the help you need. Tell your doctor right away if you feel you are becoming addicted to opioids.              Common side effects  Constipation  Opioid medicines cause constipation. You may need to be treated for this while you are taking opioids.  Sedation  Many opioid medications can make you feel drowsy, slow your reaction time, and cause loss of  coordination. They can also make it hard to concentrate and think clearly.  Do not drive or use dangerous equipment until you are sure that opioids do not affect your reaction time or thinking ability. It may take a week or longer before you know if you can drive safely while taking opioids.   If you are in a traffic accident while driving on opioids, you may be considered to be “driving  under the influence” (DUI).     Other side effects and withdrawal symptoms:     Other side effect Withdrawal Symptoms   Rash and/or Itching Sweating   Dry eyes Nausea   Blurred vision Abdominal pain/cramping   Nausea and vomiting Diarrhea   Inability to urinate Trouble sleeping   Low blood pressure Muscle ahes   Slow heart beat Fast heart beat   Depressed mood Anxiety   Slow breathing Runny nose   Problem with balance “Goose bumps”   Decrease sex drive (Decrease Testosterone)     Decrease Immune function     Swelling in hands and feet     Jerking of arms and legs     Increased Sensitivity to pain     Distruption of Normal sleep     Dental problems     Apathy     Falls resulting in fractures           Risk of serious bodily harm or death  Opioid pain medicines can cause serious bodily harm or death. Higher doses appear to cause more side effects, some of which can lead to injuries like serious fractures due to falls.   Higher doses increase the risk of overdose. An overdose of opioids, whether by accident or on purpose, can cause serious bodily harm or death. Research continues  to show more and more problems with long-term opioid use, especially at high doses.     Using more opioids than your doctor prescribes can cause you to become dangerously sedated, to stop breathing, or to overdose. Combining opioids with certain other medicines or with alcohol or drugs can have the same effect.              Some opioids have higher risks  There are special problems with some opioids. For example:  meperidine (Demerol) and tramadol (Ultram) are associated with increased seizure risk.  Methadone stays in the body for many days, which increases the risk of overdose. It can also cause heart rhythm problems.   Opioids, that contain acetaminophen, such as Vicodin and Percocet, can harm the liver when taken long term or at high doses.           Are there alternatives to opioid treatment for chronic non-cancer pain?  Your doctor may prescribe other treatments to help your pain and to help you do daily activities better.  These may include exercise, psychological counseling, and medicines that are not opioids. Please be sure to discuss these options with your doctor.     Questions?  Please ask your doctor any questions you have about taking opioids.

## 2025-08-01 NOTE — PROGRESS NOTES
Subjective   Patient ID: Alejandra Houston is a 59 y.o. female who presents for Pain.  HPI    58 yo female presents today for follow up on neck pain and left knee pain. States that the left knee is very painful. Reporting decreased mobility. Characterizes the pain as deep. States that the RFA and steroid injections did not work. Will be going back to the orthopedic surgeon in September for bone scan to assess for loosening of the prosthesis. She was maintained on 300mg pregabalin twice daily.States that the pregabalin did not work. Gabapentin did not help. Duloxetine, acetaminophen and naproxen are not helpful. States that percocet was the only helpful medication for managing her knee pain. She is also taking methocarbamol, states that this is minimally helpful. Reporting right sided neck pain , travels to the base of the head. She is reporting headaches. Aggravating factors include sitting. Alleviating factors include heat. Reports some numbness on the right side of the neck. Neck pain is rated 5/10. Will go up to 10/10 in the mornings.     She is receiving injections for her low back at [facility]. Had a lumbar facet block yesterday.     Review of Systems  All 13 systems were reviewed and are within normal levels except as noted below or per HPI. Positive and pertinent negative responses are noted below or in the HPI   Denied any fever or chills. No weight loss and no night sweats. No cough or sputum production. No diarrhea   Denies constipation.   No bladder and bowel incontinence and no other changes in bladder and bowel. No skin changes. Reports tiredness and fatigability only if the pain is not controlled.   Denied opioids diversion and abuse and denies alcoholism. Denies overuse of the pain medications.      Objective   Physical Exam  General   Alert and oriented x4, pleasant and cooperative.      HEENT  Pupils are equal and normal in size. Ears, nose, mouth, and throat appear to be WNL.  Head atraumatic, symmetric.       No signs of sedation or signs of withdrawal apparent.     Psychiatric   No signs of depression apparent. Appropriate mood and affect.     Neuro   No focal neurological deficit apparent. Ambulation at baseline using cane, using wheelchair for long distances. Normal visual inspection of the cervical spine. Cervical facets tender to palpation. Normal sensation and strength in the bilateral upper extremities.      Respiratory  No respiratory distress, respirations equal and unlabored.     Abdomen  Soft and nontender, no distention noted.     Skin  Warm, dry and intact. No skin markings supportive of recent IV drug usage .      Left knee  Normal visual inspection of the left knee, no effusion noted. Diffuse tenderness to light touch.       Assessment/Plan     60 yo female with chronic neck pain associated with cervical spondylosis. Chronic left knee pain s/p left knee replacement requiring maintenance on tramadol.     I have personally reviewed the [facility] report for this patient. I have considered the risks of abuse, dependence, addiction and diversion. I believe that it is clinically appropriate for this patient to be prescribed this medication based on documented diagnosis.  I believe the benefits of the continuation of the opiate outweighs the risk. Patient has described positive response to the present medication therapy. Patient denies any side effects associated with the usage of the medication. Patient did not elicit any signs supportive of misuse or abuse. The review of the [place] [employer] prescription is not suggestive of any worrisome pattern. Patient believes the usage of this medication has improved the quality of life and allow him to participate in day-to-day activity. Therefore  I recommend the continuation of this medication and I will be refilling the medication prescription.    The patient was counseled regarding diagnostic results, instructions for management, risk factor reductions,  prognosis, patient and family education, impressions, risks and benefits of treatment options and importance of compliance with treatment.     She has tried left genicular RFA and left knee steroid injection and continues to experience severe pain. Was tried on gabapentin, pregabalin, naproxen, celebrex, ibuprofen and methocarbamol without improvement in pain. We will trial her on tramadol 50mg twice daily as needed for severe pain. CSA signed today, narcan kit provided in office. Follow up in 1 month or as needed.     She has right medial nerve branch block C2- 3, C3- 4 scheduled for 08/12.     Explained plan to this patient, and patient verbalized understanding and agreement with the plan.     Please do not hesitate to contact the pain clinic after your visit with any questions or concerns at  M-F 8-4 pm     Patient was reminded not to share medications, not to take prescription medications that were not prescribed to the patient, and not to increase or change dose without consulting the pain clinic. I advised the patient to always take the least amount of medication needed to keep symptoms under control.          Rosa Kelly, JUNAID-CNP 08/01/25 9:43 AM

## 2025-08-01 NOTE — PROGRESS NOTES
Here for left knee pain.  State recent injection in knee did not work. States Lyrica and cymbalta are not covering the pain. Is awakened from sleep by pain . Has appointment on the 9th with orthopedic to assess if surgery is needed for left knee.

## 2025-08-04 ENCOUNTER — APPOINTMENT (OUTPATIENT)
Dept: RADIOLOGY | Facility: CLINIC | Age: 60
End: 2025-08-04
Payer: COMMERCIAL

## 2025-08-05 ENCOUNTER — APPOINTMENT (OUTPATIENT)
Dept: RADIOLOGY | Facility: HOSPITAL | Age: 60
End: 2025-08-05
Payer: COMMERCIAL

## 2025-08-07 NOTE — PROGRESS NOTES
[facility]  Followup Visit Note        HISTORY OF PRESENT ILLNESS   Alejandra Houston is a 59 y.o. female who presents to a [facility] for followup.       Current History    On today's visit, the patient reports ***    Previous visit 05/09/2025    OBSTRUCTIVE SLEEP APNEA / SLEEPINESS / FREQUENT WAKING  -CPAP from [facility] ordered with pressure 5-15 cm H2O EPR on max setting of 3  -Goal of CPAP includes less daytime sleepiness, better sleep quality    BMI>35  -Body mass index is 39.53 kg/m².  today  -With sufficient weight loss may no longer require treatment for CADEN    HYPERTENSION  BP Readings from Last 3 Encounters:   05/09/25 134/90   04/25/25 128/70   03/27/25 124/75      -will benefit from CPAP    Followup 31-90 days after starting CPAP      PAP Adherence:      Sleep Scales:  ESS: No data recorded     REVIEW OF SYSTEMS    All other systems negative      VITAL SIGNS: LMP  (LMP Unknown)      CURRENT WEIGHT: [unfilled]  BMI: [unfilled]  PREVIOUS WEIGHTS:  Wt Readings from Last 3 Encounters:   07/08/25 92.5 kg (203 lb 14.8 oz)   06/25/25 117 kg (257 lb)   06/12/25 118 kg (261 lb)       Constitutional: Alert and oriented, cooperative, no obvious distress   Neuromuscular: Cranial nerves grossly intact, EOMs intact    RESULTS/DATA     Iron (ug/dL)   Date Value   06/03/2024 71   10/28/2021 128     % Saturation (%)   Date Value   06/03/2024 23 (L)     Iron Saturation (%)   Date Value   10/28/2021 42     TIBC (ug/dL)   Date Value   06/03/2024 303   10/28/2021 304     Ferritin   Date Value   06/03/2024 83 ng/mL   10/28/2021 314 ug/L (H)         ASSESSMENT/PLAN     Ms. Houston is a 59 y.o. female and returns in followup for the following issues:    ***    Follow up

## 2025-08-08 ENCOUNTER — APPOINTMENT (OUTPATIENT)
Dept: SLEEP MEDICINE | Facility: CLINIC | Age: 60
End: 2025-08-08
Payer: COMMERCIAL

## 2025-08-08 ENCOUNTER — TELEPHONE (OUTPATIENT)
Dept: PRIMARY CARE | Facility: CLINIC | Age: 60
End: 2025-08-08

## 2025-08-08 NOTE — TELEPHONE ENCOUNTER
"Patient just took BP and it was 183/94, and she feels a little \"shaky\". The BP was taken while she was sitting. Please advise.  "

## 2025-08-11 ENCOUNTER — APPOINTMENT (OUTPATIENT)
Dept: RADIOLOGY | Facility: HOSPITAL | Age: 60
End: 2025-08-11
Payer: COMMERCIAL

## 2025-08-11 DIAGNOSIS — K59.00 CONSTIPATION, UNSPECIFIED CONSTIPATION TYPE: ICD-10-CM

## 2025-08-11 RX ORDER — POLYETHYLENE GLYCOL 3350 17 G/17G
17 POWDER, FOR SOLUTION ORAL DAILY
Qty: 90 PACKET | Refills: 0 | Status: SHIPPED | OUTPATIENT
Start: 2025-08-11 | End: 2025-11-09

## 2025-08-12 ENCOUNTER — APPOINTMENT (OUTPATIENT)
Dept: PAIN MEDICINE | Facility: CLINIC | Age: 60
End: 2025-08-12
Payer: COMMERCIAL

## 2025-08-12 ENCOUNTER — TELEPHONE (OUTPATIENT)
Dept: PRIMARY CARE | Facility: CLINIC | Age: 60
End: 2025-08-12
Payer: COMMERCIAL

## 2025-08-12 DIAGNOSIS — R21 SKIN RASH: ICD-10-CM

## 2025-08-13 ENCOUNTER — APPOINTMENT (OUTPATIENT)
Dept: RADIOLOGY | Facility: HOSPITAL | Age: 60
End: 2025-08-13
Payer: COMMERCIAL

## 2025-08-13 RX ORDER — KETOCONAZOLE 20 MG/G
CREAM TOPICAL 2 TIMES DAILY
Qty: 60 G | Refills: 1 | Status: SHIPPED | OUTPATIENT
Start: 2025-08-13 | End: 2026-08-13

## 2025-08-14 ENCOUNTER — HOSPITAL ENCOUNTER (OUTPATIENT)
Dept: PAIN MEDICINE | Facility: CLINIC | Age: 60
Discharge: HOME | End: 2025-08-14
Payer: COMMERCIAL

## 2025-08-14 VITALS
RESPIRATION RATE: 18 BRPM | HEART RATE: 66 BPM | SYSTOLIC BLOOD PRESSURE: 140 MMHG | DIASTOLIC BLOOD PRESSURE: 82 MMHG | TEMPERATURE: 95.5 F | OXYGEN SATURATION: 99 %

## 2025-08-14 DIAGNOSIS — M47.812 CERVICAL SPONDYLOSIS WITHOUT MYELOPATHY: ICD-10-CM

## 2025-08-14 PROCEDURE — 7100000009 HC PHASE TWO TIME - INITIAL BASE CHARGE

## 2025-08-14 PROCEDURE — 64491 INJ PARAVERT F JNT C/T 2 LEV: CPT | Performed by: PAIN MEDICINE

## 2025-08-14 PROCEDURE — 64490 INJ PARAVERT F JNT C/T 1 LEV: CPT | Performed by: PAIN MEDICINE

## 2025-08-14 PROCEDURE — 64492 INJ PARAVERT F JNT C/T 3 LEV: CPT | Performed by: PAIN MEDICINE

## 2025-08-14 PROCEDURE — 7100000010 HC PHASE TWO TIME - EACH INCREMENTAL 1 MINUTE

## 2025-08-14 PROCEDURE — 2500000004 HC RX 250 GENERAL PHARMACY W/ HCPCS (ALT 636 FOR OP/ED): Performed by: PAIN MEDICINE

## 2025-08-14 RX ORDER — LIDOCAINE HYDROCHLORIDE 20 MG/ML
INJECTION, SOLUTION EPIDURAL; INFILTRATION; INTRACAUDAL; PERINEURAL AS NEEDED
Status: COMPLETED | OUTPATIENT
Start: 2025-08-14 | End: 2025-08-14

## 2025-08-14 RX ORDER — LIDOCAINE HYDROCHLORIDE 10 MG/ML
INJECTION, SOLUTION EPIDURAL; INFILTRATION; INTRACAUDAL; PERINEURAL AS NEEDED
Status: COMPLETED | OUTPATIENT
Start: 2025-08-14 | End: 2025-08-14

## 2025-08-14 RX ADMIN — LIDOCAINE HYDROCHLORIDE 5 ML: 10 INJECTION, SOLUTION EPIDURAL; INFILTRATION; INTRACAUDAL; PERINEURAL at 11:21

## 2025-08-14 RX ADMIN — LIDOCAINE HYDROCHLORIDE 5 ML: 20 INJECTION, SOLUTION EPIDURAL; INFILTRATION; INTRACAUDAL at 11:27

## 2025-08-14 ASSESSMENT — PAIN - FUNCTIONAL ASSESSMENT: PAIN_FUNCTIONAL_ASSESSMENT: 0-10

## 2025-08-14 ASSESSMENT — PAIN SCALES - GENERAL: PAINLEVEL_OUTOF10: 8

## 2025-08-14 ASSESSMENT — PAIN DESCRIPTION - DESCRIPTORS: DESCRIPTORS: TINGLING;NUMBNESS

## 2025-08-15 ENCOUNTER — OFFICE VISIT (OUTPATIENT)
Dept: PRIMARY CARE | Facility: CLINIC | Age: 60
End: 2025-08-15
Payer: COMMERCIAL

## 2025-08-15 VITALS
WEIGHT: 252 LBS | DIASTOLIC BLOOD PRESSURE: 80 MMHG | HEART RATE: 76 BPM | BODY MASS INDEX: 38.32 KG/M2 | RESPIRATION RATE: 16 BRPM | OXYGEN SATURATION: 96 % | SYSTOLIC BLOOD PRESSURE: 130 MMHG

## 2025-08-15 DIAGNOSIS — Z96.651 CHRONIC KNEE PAIN AFTER TOTAL REPLACEMENT OF RIGHT KNEE JOINT: ICD-10-CM

## 2025-08-15 DIAGNOSIS — N39.41 URGE INCONTINENCE: Primary | ICD-10-CM

## 2025-08-15 DIAGNOSIS — G89.29 CHRONIC KNEE PAIN AFTER TOTAL REPLACEMENT OF RIGHT KNEE JOINT: ICD-10-CM

## 2025-08-15 DIAGNOSIS — E11.9 TYPE 2 DIABETES MELLITUS WITHOUT COMPLICATION, WITHOUT LONG-TERM CURRENT USE OF INSULIN: ICD-10-CM

## 2025-08-15 DIAGNOSIS — Z87.891 SMOKING HX: ICD-10-CM

## 2025-08-15 DIAGNOSIS — M25.561 CHRONIC KNEE PAIN AFTER TOTAL REPLACEMENT OF RIGHT KNEE JOINT: ICD-10-CM

## 2025-08-15 DIAGNOSIS — R29.898 WEAKNESS OF LEFT LOWER EXTREMITY: ICD-10-CM

## 2025-08-15 DIAGNOSIS — M54.12 CERVICAL RADICULOPATHY: ICD-10-CM

## 2025-08-15 DIAGNOSIS — M54.2 NECK PAIN: ICD-10-CM

## 2025-08-15 DIAGNOSIS — I10 PRIMARY HYPERTENSION: ICD-10-CM

## 2025-08-15 PROCEDURE — 3079F DIAST BP 80-89 MM HG: CPT | Performed by: INTERNAL MEDICINE

## 2025-08-15 PROCEDURE — 3075F SYST BP GE 130 - 139MM HG: CPT | Performed by: INTERNAL MEDICINE

## 2025-08-15 PROCEDURE — 99215 OFFICE O/P EST HI 40 MIN: CPT | Performed by: INTERNAL MEDICINE

## 2025-08-15 RX ORDER — IBUPROFEN 600 MG/1
600 TABLET, FILM COATED ORAL EVERY 8 HOURS PRN
Qty: 90 TABLET | Refills: 1 | Status: SHIPPED | OUTPATIENT
Start: 2025-08-15

## 2025-08-15 ASSESSMENT — ENCOUNTER SYMPTOMS
COUGH: 1
SHORTNESS OF BREATH: 0
HEADACHES: 1
NECK PAIN: 1

## 2025-08-18 DIAGNOSIS — J34.89 NOSE IRRITATION: ICD-10-CM

## 2025-08-18 DIAGNOSIS — E11.9 TYPE 2 DIABETES MELLITUS WITHOUT COMPLICATION, WITHOUT LONG-TERM CURRENT USE OF INSULIN: ICD-10-CM

## 2025-08-18 DIAGNOSIS — Z00.00 HEALTHCARE MAINTENANCE: ICD-10-CM

## 2025-08-18 RX ORDER — ALBUTEROL SULFATE 90 UG/1
2 INHALANT RESPIRATORY (INHALATION) 4 TIMES DAILY PRN
Qty: 8.5 G | Refills: 1 | Status: SHIPPED | OUTPATIENT
Start: 2025-08-18 | End: 2025-09-17

## 2025-08-18 RX ORDER — MUPIROCIN 20 MG/G
OINTMENT TOPICAL
Qty: 22 G | Refills: 1 | Status: SHIPPED | OUTPATIENT
Start: 2025-08-18 | End: 2025-08-28

## 2025-08-18 RX ORDER — DULAGLUTIDE 3 MG/.5ML
3 INJECTION, SOLUTION SUBCUTANEOUS
Qty: 2 ML | Refills: 3 | Status: SHIPPED | OUTPATIENT
Start: 2025-08-18

## 2025-08-19 ENCOUNTER — APPOINTMENT (OUTPATIENT)
Dept: SLEEP MEDICINE | Facility: CLINIC | Age: 60
End: 2025-08-19
Payer: COMMERCIAL

## 2025-08-25 ENCOUNTER — APPOINTMENT (OUTPATIENT)
Dept: ORTHOPEDIC SURGERY | Facility: CLINIC | Age: 60
End: 2025-08-25
Payer: COMMERCIAL

## 2025-08-25 ENCOUNTER — TELEPHONE (OUTPATIENT)
Dept: GASTROENTEROLOGY | Facility: HOSPITAL | Age: 60
End: 2025-08-25

## 2025-08-25 ENCOUNTER — APPOINTMENT (OUTPATIENT)
Dept: RADIOLOGY | Facility: CLINIC | Age: 60
End: 2025-08-25
Payer: COMMERCIAL

## 2025-08-25 ENCOUNTER — OFFICE VISIT (OUTPATIENT)
Dept: ORTHOPEDIC SURGERY | Facility: CLINIC | Age: 60
End: 2025-08-25
Payer: COMMERCIAL

## 2025-08-25 DIAGNOSIS — M54.12 CERVICAL RADICULITIS: ICD-10-CM

## 2025-08-25 DIAGNOSIS — R51.9 INTRACTABLE HEADACHE, UNSPECIFIED CHRONICITY PATTERN, UNSPECIFIED HEADACHE TYPE: ICD-10-CM

## 2025-08-25 DIAGNOSIS — M48.02 CERVICAL SPINAL STENOSIS: Primary | ICD-10-CM

## 2025-08-25 PROCEDURE — 70551 MRI BRAIN STEM W/O DYE: CPT

## 2025-08-25 PROCEDURE — 70551 MRI BRAIN STEM W/O DYE: CPT | Performed by: RADIOLOGY

## 2025-08-25 PROCEDURE — 99214 OFFICE O/P EST MOD 30 MIN: CPT | Performed by: ORTHOPAEDIC SURGERY

## 2025-08-25 PROCEDURE — 99212 OFFICE O/P EST SF 10 MIN: CPT | Mod: 25 | Performed by: ORTHOPAEDIC SURGERY

## 2025-08-25 ASSESSMENT — PAIN - FUNCTIONAL ASSESSMENT: PAIN_FUNCTIONAL_ASSESSMENT: 0-10

## 2025-08-25 ASSESSMENT — PAIN SCALES - GENERAL: PAINLEVEL_OUTOF10: 8

## 2025-08-27 ENCOUNTER — RESULTS FOLLOW-UP (OUTPATIENT)
Dept: PRIMARY CARE | Facility: CLINIC | Age: 60
End: 2025-08-27
Payer: COMMERCIAL

## 2025-08-27 DIAGNOSIS — N39.41 URGE INCONTINENCE: Primary | ICD-10-CM

## 2025-08-27 LAB
ALBUMIN SERPL-MCNC: 4.6 G/DL (ref 3.6–5.1)
ALBUMIN/CREAT UR: 184 MG/G CREAT
ALP SERPL-CCNC: 70 U/L (ref 37–153)
ALT SERPL-CCNC: 16 U/L (ref 6–29)
ANION GAP SERPL CALCULATED.4IONS-SCNC: 14 MMOL/L (CALC) (ref 7–17)
APPEARANCE UR: ABNORMAL
AST SERPL-CCNC: 19 U/L (ref 10–35)
BACTERIA #/AREA URNS HPF: ABNORMAL /HPF
BACTERIA UR CULT: NORMAL
BILIRUB SERPL-MCNC: 0.9 MG/DL (ref 0.2–1.2)
BILIRUB UR QL STRIP: ABNORMAL
BUN SERPL-MCNC: 20 MG/DL (ref 7–25)
CALCIUM SERPL-MCNC: 9.8 MG/DL (ref 8.6–10.4)
CHLORIDE SERPL-SCNC: 102 MMOL/L (ref 98–110)
CO2 SERPL-SCNC: 23 MMOL/L (ref 20–32)
COLOR UR: ABNORMAL
CREAT SERPL-MCNC: 0.78 MG/DL (ref 0.5–1.03)
CREAT UR-MCNC: 251 MG/DL (ref 20–275)
EGFRCR SERPLBLD CKD-EPI 2021: 89 ML/MIN/1.73M2
EST. AVERAGE GLUCOSE BLD GHB EST-MCNC: 131 MG/DL
EST. AVERAGE GLUCOSE BLD GHB EST-SCNC: 7.3 MMOL/L
GLUCOSE SERPL-MCNC: 152 MG/DL (ref 65–99)
GLUCOSE UR QL STRIP: NEGATIVE
HBA1C MFR BLD: 6.2 %
HGB UR QL STRIP: NEGATIVE
HYALINE CASTS #/AREA URNS LPF: ABNORMAL /LPF
KETONES UR QL STRIP: ABNORMAL
LEUKOCYTE ESTERASE UR QL STRIP: ABNORMAL
MICROALBUMIN UR-MCNC: 46.1 MG/DL
NITRITE UR QL STRIP: NEGATIVE
PH UR STRIP: ABNORMAL [PH] (ref 5–8)
POTASSIUM SERPL-SCNC: 4 MMOL/L (ref 3.5–5.3)
PROT SERPL-MCNC: 7.9 G/DL (ref 6.1–8.1)
PROT UR QL STRIP: ABNORMAL
RBC #/AREA URNS HPF: ABNORMAL /HPF
SERVICE CMNT-IMP: ABNORMAL
SODIUM SERPL-SCNC: 139 MMOL/L (ref 135–146)
SP GR UR STRIP: 1.04 (ref 1–1.03)
SQUAMOUS #/AREA URNS HPF: ABNORMAL /HPF
WBC #/AREA URNS HPF: ABNORMAL /HPF

## 2025-08-28 DIAGNOSIS — K59.00 CONSTIPATION, UNSPECIFIED CONSTIPATION TYPE: ICD-10-CM

## 2025-08-28 RX ORDER — DOCUSATE SODIUM 100 MG/1
100 CAPSULE, LIQUID FILLED ORAL 2 TIMES DAILY PRN
Qty: 60 CAPSULE | Refills: 1 | Status: SHIPPED | OUTPATIENT
Start: 2025-08-28

## 2025-08-29 ENCOUNTER — APPOINTMENT (OUTPATIENT)
Dept: PAIN MEDICINE | Facility: CLINIC | Age: 60
End: 2025-08-29
Payer: COMMERCIAL

## 2025-09-02 ENCOUNTER — APPOINTMENT (OUTPATIENT)
Dept: RADIOLOGY | Facility: CLINIC | Age: 60
End: 2025-09-02
Payer: COMMERCIAL

## 2025-09-03 ENCOUNTER — APPOINTMENT (OUTPATIENT)
Dept: PAIN MEDICINE | Facility: CLINIC | Age: 60
End: 2025-09-03
Payer: COMMERCIAL

## 2025-09-04 ENCOUNTER — HOSPITAL ENCOUNTER (OUTPATIENT)
Dept: RADIOLOGY | Facility: HOSPITAL | Age: 60
Discharge: HOME | End: 2025-09-04
Payer: COMMERCIAL

## 2025-09-04 ENCOUNTER — APPOINTMENT (OUTPATIENT)
Dept: RADIOLOGY | Facility: CLINIC | Age: 60
End: 2025-09-04
Payer: COMMERCIAL

## 2025-09-04 DIAGNOSIS — T85.698A LOOSENING OF PROSTHESIS: ICD-10-CM

## 2025-09-04 PROCEDURE — 78315 BONE IMAGING 3 PHASE: CPT

## 2025-09-04 PROCEDURE — 3430000001 HC RX 343 DIAGNOSTIC RADIOPHARMACEUTICALS: Performed by: STUDENT IN AN ORGANIZED HEALTH CARE EDUCATION/TRAINING PROGRAM

## 2025-09-04 PROCEDURE — 78315 BONE IMAGING 3 PHASE: CPT | Performed by: STUDENT IN AN ORGANIZED HEALTH CARE EDUCATION/TRAINING PROGRAM

## 2025-09-04 PROCEDURE — A9503 TC99M MEDRONATE: HCPCS | Performed by: STUDENT IN AN ORGANIZED HEALTH CARE EDUCATION/TRAINING PROGRAM

## 2025-09-04 RX ADMIN — TECHNETIUM TC 99M MEDRONATE 25 MILLICURIE: 25 INJECTION, POWDER, FOR SOLUTION INTRAVENOUS at 10:58

## 2025-09-05 ENCOUNTER — APPOINTMENT (OUTPATIENT)
Age: 60
End: 2025-09-05
Payer: COMMERCIAL

## 2025-09-23 ENCOUNTER — APPOINTMENT (OUTPATIENT)
Dept: PRIMARY CARE | Facility: CLINIC | Age: 60
End: 2025-09-23
Payer: COMMERCIAL

## 2025-09-25 ENCOUNTER — APPOINTMENT (OUTPATIENT)
Age: 60
End: 2025-09-25
Payer: COMMERCIAL

## 2025-10-22 ENCOUNTER — APPOINTMENT (OUTPATIENT)
Dept: SLEEP MEDICINE | Facility: CLINIC | Age: 60
End: 2025-10-22
Payer: COMMERCIAL

## (undated) DEVICE — DRAPE, INSTRUMENT, W/POUCH, STERI DRAPE, 7 X 11 IN, DISPOSABLE, STERILE

## (undated) DEVICE — SOLUTION, IRRIGATION, SODIUM CHLORIDE 0.9%, 1000 ML, POUR BOTTLE

## (undated) DEVICE — TOWEL PACK, STERILE, 4/PACK, BLUE

## (undated) DEVICE — SUTURE, STRATAFIX, 3-0, SPIRAL MONOCRYL PLUS, PS, 70CM, UNDYED

## (undated) DEVICE — BANDAGE, COFLEX, 6 X 5 YDS, FOAM TAN, STERILE, LF

## (undated) DEVICE — SYRINGE, 60 CC, LUER LOCK, MONOJECT, W/O CAP, LF

## (undated) DEVICE — BURR, OVAL MEDIUM, 4.0MM

## (undated) DEVICE — GLOVE, SURGICAL, ORTHO, PROTEXIS, HYDROGEL, 8.0, PF, LATEX

## (undated) DEVICE — Device

## (undated) DEVICE — DRESSING, ABDOMINAL, TENDERSORB, 8 X 7-1/2 IN, STERILE

## (undated) DEVICE — DRESSING, MEPILEX BORDER, POST-OP AG, 4 X 12 IN

## (undated) DEVICE — SEALER, BIPOLAR, AQUA MANTYS 6.0

## (undated) DEVICE — HOOD, STERISHIELD T4 SYSTEM

## (undated) DEVICE — SLEEVE, VASO PRESS, CALF GARMENT, MEDIUM, GREEN

## (undated) DEVICE — SUTURE, VICRYL, 1, 36 IN, CT-1, UNDYED

## (undated) DEVICE — WOUND SYSTEM, DEBRIDEMENT & CLEANING, O.R DUOPAK

## (undated) DEVICE — GOWN, SURGICAL, NON-REINFORCED, XLARGE, LF

## (undated) DEVICE — BLADE, RECIPROCATING, LARGE, 12.7MM

## (undated) DEVICE — BRIEF, STRETCH, XXXLARGE, MESH PANTS

## (undated) DEVICE — MIXER, CEMENT, MIXEVAC III HIGH VACUUM KIT, STERILE

## (undated) DEVICE — HIGH FLOW TIP FOR INTERPULSE HANDPIECE SET

## (undated) DEVICE — SOLUTION, IRRIGATION, STERILE WATER, 1000 ML, POUR BOTTLE

## (undated) DEVICE — GLOVE, SURGICAL, PROTEXIS PI BLUE W/NEUTHERA, 8.0, PF, LF

## (undated) DEVICE — INTERPULSE HANDPIECE SET W/ 10FT SUCTION TUBING

## (undated) DEVICE — SUTURE, CTD, VICRYL, 2-0, UND, BR, CT-2

## (undated) DEVICE — SUTURE, STRATAFIX, 1 PDO CTX 30 X 30CM, 1/2 36MM

## (undated) DEVICE — NEEDLE, SPINAL, QUINCKE, 18 G X 3.5 IN, PINK HUB

## (undated) DEVICE — TISSUE ADHESIVE, PREMIERPRO EXOFIN, PRECISION PEN HV, 1.0ML